# Patient Record
Sex: FEMALE | Race: WHITE | NOT HISPANIC OR LATINO | Employment: UNEMPLOYED | URBAN - NONMETROPOLITAN AREA
[De-identification: names, ages, dates, MRNs, and addresses within clinical notes are randomized per-mention and may not be internally consistent; named-entity substitution may affect disease eponyms.]

---

## 2017-09-29 ENCOUNTER — APPOINTMENT (OUTPATIENT)
Dept: GENERAL RADIOLOGY | Facility: HOSPITAL | Age: 32
End: 2017-09-29

## 2017-09-29 ENCOUNTER — HOSPITAL ENCOUNTER (EMERGENCY)
Facility: HOSPITAL | Age: 32
Discharge: HOME | End: 2017-09-29
Attending: EMERGENCY MEDICINE | Admitting: EMERGENCY MEDICINE

## 2017-09-29 ENCOUNTER — APPOINTMENT (OUTPATIENT)
Dept: CT IMAGING | Facility: HOSPITAL | Age: 32
End: 2017-09-29

## 2017-09-29 VITALS
SYSTOLIC BLOOD PRESSURE: 115 MMHG | RESPIRATION RATE: 16 BRPM | HEART RATE: 78 BPM | WEIGHT: 120 LBS | TEMPERATURE: 97.4 F | DIASTOLIC BLOOD PRESSURE: 73 MMHG | BODY MASS INDEX: 18.19 KG/M2 | OXYGEN SATURATION: 99 % | HEIGHT: 68 IN

## 2017-09-29 DIAGNOSIS — K92.2 GASTROINTESTINAL HEMORRHAGE, UNSPECIFIED GASTROINTESTINAL HEMORRHAGE TYPE: ICD-10-CM

## 2017-09-29 DIAGNOSIS — K52.9 GASTROENTERITIS: Primary | ICD-10-CM

## 2017-09-29 DIAGNOSIS — N30.90 CYSTITIS: ICD-10-CM

## 2017-09-29 LAB
ABO GROUP BLD: NORMAL
ALBUMIN SERPL-MCNC: 3.6 G/DL (ref 3.4–4.8)
ALBUMIN/GLOB SERPL: 1.6 G/DL (ref 1.1–1.8)
ALP SERPL-CCNC: 46 U/L (ref 38–126)
ALT SERPL W P-5'-P-CCNC: 31 U/L (ref 9–52)
ANION GAP SERPL CALCULATED.3IONS-SCNC: 9 MMOL/L (ref 5–15)
APTT PPP: 30.8 SECONDS (ref 20–40.3)
AST SERPL-CCNC: 24 U/L (ref 14–36)
BACTERIA UR QL AUTO: ABNORMAL /HPF
BASOPHILS # BLD AUTO: 0.03 10*3/MM3 (ref 0–0.2)
BASOPHILS NFR BLD AUTO: 0.7 % (ref 0–2)
BILIRUB SERPL-MCNC: 0.3 MG/DL (ref 0.2–1.3)
BILIRUB UR QL STRIP: NEGATIVE
BLD GP AB SCN SERPL QL: NEGATIVE
BUN BLD-MCNC: 12 MG/DL (ref 7–21)
BUN/CREAT SERPL: 21.4 (ref 7–25)
CALCIUM SPEC-SCNC: 8.1 MG/DL (ref 8.4–10.2)
CHLORIDE SERPL-SCNC: 108 MMOL/L (ref 95–110)
CLARITY UR: ABNORMAL
CO2 SERPL-SCNC: 26 MMOL/L (ref 22–31)
COLOR UR: YELLOW
CREAT BLD-MCNC: 0.56 MG/DL (ref 0.5–1)
DEPRECATED RDW RBC AUTO: 49.5 FL (ref 36.4–46.3)
EOSINOPHIL # BLD AUTO: 0.02 10*3/MM3 (ref 0–0.7)
EOSINOPHIL NFR BLD AUTO: 0.4 % (ref 0–7)
ERYTHROCYTE [DISTWIDTH] IN BLOOD BY AUTOMATED COUNT: 13.5 % (ref 11.5–14.5)
GFR SERPL CREATININE-BSD FRML MDRD: 126 ML/MIN/1.73 (ref 64–149)
GLOBULIN UR ELPH-MCNC: 2.3 GM/DL (ref 2.3–3.5)
GLUCOSE BLD-MCNC: 89 MG/DL (ref 60–100)
GLUCOSE BLDC GLUCOMTR-MCNC: 89 MG/DL (ref 70–130)
GLUCOSE UR STRIP-MCNC: NEGATIVE MG/DL
HCG SERPL QL: NEGATIVE
HCT VFR BLD AUTO: 34.3 % (ref 35–45)
HGB BLD-MCNC: 11.8 G/DL (ref 12–15.5)
HGB UR QL STRIP.AUTO: ABNORMAL
HOLD SPECIMEN: NORMAL
HOLD SPECIMEN: NORMAL
HYALINE CASTS UR QL AUTO: ABNORMAL /LPF
IMM GRANULOCYTES # BLD: 0.02 10*3/MM3 (ref 0–0.02)
IMM GRANULOCYTES NFR BLD: 0.4 % (ref 0–0.5)
INR PPP: 1.08 (ref 0.8–1.2)
KETONES UR QL STRIP: NEGATIVE
LEUKOCYTE ESTERASE UR QL STRIP.AUTO: ABNORMAL
LIPASE SERPL-CCNC: 128 U/L (ref 23–300)
LYMPHOCYTES # BLD AUTO: 1.79 10*3/MM3 (ref 0.6–4.2)
LYMPHOCYTES NFR BLD AUTO: 38.9 % (ref 10–50)
Lab: NORMAL
MAGNESIUM SERPL-MCNC: 1.7 MG/DL (ref 1.6–2.3)
MCH RBC QN AUTO: 35.1 PG (ref 26.5–34)
MCHC RBC AUTO-ENTMCNC: 34.4 G/DL (ref 31.4–36)
MCV RBC AUTO: 102.1 FL (ref 80–98)
MONOCYTES # BLD AUTO: 0.32 10*3/MM3 (ref 0–0.9)
MONOCYTES NFR BLD AUTO: 7 % (ref 0–12)
NEUTROPHILS # BLD AUTO: 2.42 10*3/MM3 (ref 2–8.6)
NEUTROPHILS NFR BLD AUTO: 52.6 % (ref 37–80)
NITRITE UR QL STRIP: NEGATIVE
PH UR STRIP.AUTO: 7 [PH] (ref 5–9)
PLATELET # BLD AUTO: 220 10*3/MM3 (ref 150–450)
PMV BLD AUTO: 9 FL (ref 8–12)
POTASSIUM BLD-SCNC: 4 MMOL/L (ref 3.5–5.1)
PROT SERPL-MCNC: 5.9 G/DL (ref 6.3–8.6)
PROT UR QL STRIP: NEGATIVE
PROTHROMBIN TIME: 13.9 SECONDS (ref 11.1–15.3)
RBC # BLD AUTO: 3.36 10*6/MM3 (ref 3.77–5.16)
RBC # UR: ABNORMAL /HPF
REF LAB TEST METHOD: ABNORMAL
RH BLD: POSITIVE
SODIUM BLD-SCNC: 143 MMOL/L (ref 137–145)
SP GR UR STRIP: 1.01 (ref 1–1.03)
SQUAMOUS #/AREA URNS HPF: ABNORMAL /HPF
TROPONIN I SERPL-MCNC: <0.012 NG/ML
UROBILINOGEN UR QL STRIP: ABNORMAL
WBC NRBC COR # BLD: 4.6 10*3/MM3 (ref 3.2–9.8)
WBC UR QL AUTO: ABNORMAL /HPF
WHOLE BLOOD HOLD SPECIMEN: NORMAL
WHOLE BLOOD HOLD SPECIMEN: NORMAL

## 2017-09-29 PROCEDURE — 86900 BLOOD TYPING SEROLOGIC ABO: CPT | Performed by: EMERGENCY MEDICINE

## 2017-09-29 PROCEDURE — 74177 CT ABD & PELVIS W/CONTRAST: CPT

## 2017-09-29 PROCEDURE — 85610 PROTHROMBIN TIME: CPT | Performed by: EMERGENCY MEDICINE

## 2017-09-29 PROCEDURE — 86850 RBC ANTIBODY SCREEN: CPT | Performed by: EMERGENCY MEDICINE

## 2017-09-29 PROCEDURE — 99284 EMERGENCY DEPT VISIT MOD MDM: CPT

## 2017-09-29 PROCEDURE — 0 IOPAMIDOL 61 % SOLUTION: Performed by: EMERGENCY MEDICINE

## 2017-09-29 PROCEDURE — 96375 TX/PRO/DX INJ NEW DRUG ADDON: CPT

## 2017-09-29 PROCEDURE — 81001 URINALYSIS AUTO W/SCOPE: CPT | Performed by: EMERGENCY MEDICINE

## 2017-09-29 PROCEDURE — 71010 HC CHEST PA OR AP: CPT

## 2017-09-29 PROCEDURE — 80053 COMPREHEN METABOLIC PANEL: CPT | Performed by: EMERGENCY MEDICINE

## 2017-09-29 PROCEDURE — 93010 ELECTROCARDIOGRAM REPORT: CPT | Performed by: INTERNAL MEDICINE

## 2017-09-29 PROCEDURE — 85730 THROMBOPLASTIN TIME PARTIAL: CPT | Performed by: EMERGENCY MEDICINE

## 2017-09-29 PROCEDURE — 25010000002 MORPHINE PER 10 MG: Performed by: EMERGENCY MEDICINE

## 2017-09-29 PROCEDURE — 96374 THER/PROPH/DIAG INJ IV PUSH: CPT

## 2017-09-29 PROCEDURE — 87086 URINE CULTURE/COLONY COUNT: CPT | Performed by: EMERGENCY MEDICINE

## 2017-09-29 PROCEDURE — 82962 GLUCOSE BLOOD TEST: CPT

## 2017-09-29 PROCEDURE — 84703 CHORIONIC GONADOTROPIN ASSAY: CPT | Performed by: EMERGENCY MEDICINE

## 2017-09-29 PROCEDURE — 96361 HYDRATE IV INFUSION ADD-ON: CPT

## 2017-09-29 PROCEDURE — 93005 ELECTROCARDIOGRAM TRACING: CPT

## 2017-09-29 PROCEDURE — 85025 COMPLETE CBC W/AUTO DIFF WBC: CPT | Performed by: EMERGENCY MEDICINE

## 2017-09-29 PROCEDURE — 83690 ASSAY OF LIPASE: CPT | Performed by: EMERGENCY MEDICINE

## 2017-09-29 PROCEDURE — 84484 ASSAY OF TROPONIN QUANT: CPT | Performed by: EMERGENCY MEDICINE

## 2017-09-29 PROCEDURE — 83735 ASSAY OF MAGNESIUM: CPT | Performed by: EMERGENCY MEDICINE

## 2017-09-29 PROCEDURE — 86901 BLOOD TYPING SEROLOGIC RH(D): CPT | Performed by: EMERGENCY MEDICINE

## 2017-09-29 PROCEDURE — 25010000002 ONDANSETRON PER 1 MG: Performed by: EMERGENCY MEDICINE

## 2017-09-29 RX ORDER — ONDANSETRON 2 MG/ML
4 INJECTION INTRAMUSCULAR; INTRAVENOUS ONCE
Status: COMPLETED | OUTPATIENT
Start: 2017-09-29 | End: 2017-09-29

## 2017-09-29 RX ORDER — PANTOPRAZOLE SODIUM 40 MG/10ML
80 INJECTION, POWDER, LYOPHILIZED, FOR SOLUTION INTRAVENOUS ONCE
Status: COMPLETED | OUTPATIENT
Start: 2017-09-29 | End: 2017-09-29

## 2017-09-29 RX ORDER — CEPHALEXIN 500 MG/1
500 CAPSULE ORAL 2 TIMES DAILY
Qty: 9 CAPSULE | Refills: 0 | Status: SHIPPED | OUTPATIENT
Start: 2017-09-29 | End: 2017-09-29

## 2017-09-29 RX ORDER — CEPHALEXIN 500 MG/1
500 CAPSULE ORAL 2 TIMES DAILY
Qty: 10 CAPSULE | Refills: 0 | Status: SHIPPED | OUTPATIENT
Start: 2017-09-29 | End: 2018-06-19

## 2017-09-29 RX ORDER — PANTOPRAZOLE SODIUM 40 MG/1
40 TABLET, DELAYED RELEASE ORAL DAILY
Qty: 30 TABLET | Refills: 0 | Status: SHIPPED | OUTPATIENT
Start: 2017-09-29 | End: 2018-06-19

## 2017-09-29 RX ORDER — ONDANSETRON 4 MG/1
4 TABLET, FILM COATED ORAL EVERY 6 HOURS
Qty: 9 TABLET | Refills: 0 | Status: SHIPPED | OUTPATIENT
Start: 2017-09-29 | End: 2018-06-19

## 2017-09-29 RX ORDER — ONDANSETRON 4 MG/1
4 TABLET, FILM COATED ORAL EVERY 6 HOURS
Qty: 10 TABLET | Refills: 0 | Status: SHIPPED | OUTPATIENT
Start: 2017-09-29 | End: 2017-09-29

## 2017-09-29 RX ORDER — PANTOPRAZOLE SODIUM 40 MG/1
40 TABLET, DELAYED RELEASE ORAL DAILY
Qty: 30 TABLET | Refills: 0 | Status: SHIPPED | OUTPATIENT
Start: 2017-09-29 | End: 2017-09-29

## 2017-09-29 RX ORDER — CEPHALEXIN 500 MG/1
500 CAPSULE ORAL ONCE
Status: COMPLETED | OUTPATIENT
Start: 2017-09-29 | End: 2017-09-29

## 2017-09-29 RX ORDER — MORPHINE SULFATE 4 MG/ML
4 INJECTION, SOLUTION INTRAMUSCULAR; INTRAVENOUS ONCE
Status: COMPLETED | OUTPATIENT
Start: 2017-09-29 | End: 2017-09-29

## 2017-09-29 RX ORDER — SODIUM CHLORIDE 0.9 % (FLUSH) 0.9 %
10 SYRINGE (ML) INJECTION AS NEEDED
Status: DISCONTINUED | OUTPATIENT
Start: 2017-09-29 | End: 2017-09-29 | Stop reason: HOSPADM

## 2017-09-29 RX ADMIN — ONDANSETRON 4 MG: 2 INJECTION INTRAMUSCULAR; INTRAVENOUS at 16:58

## 2017-09-29 RX ADMIN — MORPHINE SULFATE 4 MG: 4 INJECTION, SOLUTION INTRAMUSCULAR; INTRAVENOUS at 19:13

## 2017-09-29 RX ADMIN — SODIUM CHLORIDE 1000 ML: 9 INJECTION, SOLUTION INTRAVENOUS at 15:19

## 2017-09-29 RX ADMIN — CEPHALEXIN 500 MG: 500 CAPSULE ORAL at 18:41

## 2017-09-29 RX ADMIN — PANTOPRAZOLE SODIUM 80 MG: 40 INJECTION, POWDER, FOR SOLUTION INTRAVENOUS at 16:53

## 2017-09-29 RX ADMIN — IOPAMIDOL 75 ML: 612 INJECTION, SOLUTION INTRAVENOUS at 17:17

## 2017-09-29 RX ADMIN — SODIUM CHLORIDE 1000 ML: 9 INJECTION, SOLUTION INTRAVENOUS at 16:53

## 2017-10-01 LAB — BACTERIA SPEC AEROBE CULT: NORMAL

## 2017-10-02 ENCOUNTER — TELEPHONE (OUTPATIENT)
Dept: FAMILY MEDICINE CLINIC | Facility: CLINIC | Age: 32
End: 2017-10-02

## 2017-10-04 ENCOUNTER — APPOINTMENT (OUTPATIENT)
Dept: CT IMAGING | Facility: HOSPITAL | Age: 32
End: 2017-10-04

## 2017-10-04 ENCOUNTER — HOSPITAL ENCOUNTER (EMERGENCY)
Facility: HOSPITAL | Age: 32
Discharge: ANOTHER HOSPITAL | End: 2017-10-04
Attending: FAMILY MEDICINE | Admitting: FAMILY MEDICINE

## 2017-10-04 ENCOUNTER — APPOINTMENT (OUTPATIENT)
Dept: GENERAL RADIOLOGY | Facility: HOSPITAL | Age: 32
End: 2017-10-04

## 2017-10-04 VITALS
OXYGEN SATURATION: 100 % | HEART RATE: 80 BPM | BODY MASS INDEX: 18.19 KG/M2 | RESPIRATION RATE: 20 BRPM | TEMPERATURE: 97.5 F | HEIGHT: 68 IN | DIASTOLIC BLOOD PRESSURE: 72 MMHG | SYSTOLIC BLOOD PRESSURE: 114 MMHG | WEIGHT: 120 LBS

## 2017-10-04 DIAGNOSIS — R56.9 NEW ONSET SEIZURE (HCC): Primary | ICD-10-CM

## 2017-10-04 DIAGNOSIS — F10.921 ALCOHOL INTOXICATION WITH DELIRIUM (HCC): ICD-10-CM

## 2017-10-04 LAB
ALBUMIN SERPL-MCNC: 4.7 G/DL (ref 3.4–4.8)
ALBUMIN/GLOB SERPL: 1.5 G/DL (ref 1.1–1.8)
ALP SERPL-CCNC: 69 U/L (ref 38–126)
ALT SERPL W P-5'-P-CCNC: 21 U/L (ref 9–52)
AMPHET+METHAMPHET UR QL: NEGATIVE
ANION GAP SERPL CALCULATED.3IONS-SCNC: 17 MMOL/L (ref 5–15)
ARTERIAL PATENCY WRIST A: ABNORMAL
AST SERPL-CCNC: 28 U/L (ref 14–36)
ATMOSPHERIC PRESS: ABNORMAL MMHG
B-HCG UR QL: NEGATIVE
BACTERIA UR QL AUTO: ABNORMAL /HPF
BARBITURATES UR QL SCN: NEGATIVE
BASE EXCESS BLDA CALC-SCNC: -3.9 MMOL/L (ref -2.4–2.4)
BASOPHILS # BLD AUTO: 0.02 10*3/MM3 (ref 0–0.2)
BASOPHILS NFR BLD AUTO: 0.3 % (ref 0–2)
BDY SITE: ABNORMAL
BENZODIAZ UR QL SCN: NEGATIVE
BILIRUB SERPL-MCNC: 0.4 MG/DL (ref 0.2–1.3)
BILIRUB UR QL STRIP: NEGATIVE
BUN BLD-MCNC: 10 MG/DL (ref 7–21)
BUN/CREAT SERPL: 16.1 (ref 7–25)
CA-I BLD-MCNC: 4.3 MG/DL (ref 4.5–4.9)
CALCIUM SPEC-SCNC: 9.6 MG/DL (ref 8.4–10.2)
CANNABINOIDS SERPL QL: NEGATIVE
CHLORIDE SERPL-SCNC: 106 MMOL/L (ref 95–110)
CLARITY UR: CLEAR
CO2 BLDA-SCNC: 20 MMOL/L (ref 23–27)
CO2 SERPL-SCNC: 23 MMOL/L (ref 22–31)
COCAINE UR QL: NEGATIVE
COLOR UR: YELLOW
CREAT BLD-MCNC: 0.62 MG/DL (ref 0.5–1)
D-LACTATE SERPL-SCNC: 4.1 MMOL/L (ref 0.5–2)
DEPRECATED RDW RBC AUTO: 50.1 FL (ref 36.4–46.3)
EOSINOPHIL # BLD AUTO: 0.01 10*3/MM3 (ref 0–0.7)
EOSINOPHIL NFR BLD AUTO: 0.2 % (ref 0–7)
ERYTHROCYTE [DISTWIDTH] IN BLOOD BY AUTOMATED COUNT: 13.8 % (ref 11.5–14.5)
ETHANOL BLD-MCNC: 162 MG/DL (ref 0–10)
ETHANOL UR QL: 0.16 %
GFR SERPL CREATININE-BSD FRML MDRD: 112 ML/MIN/1.73 (ref 60–149)
GLOBULIN UR ELPH-MCNC: 3.2 GM/DL (ref 2.3–3.5)
GLUCOSE BLD-MCNC: 106 MG/DL (ref 60–100)
GLUCOSE BLDA-MCNC: 81 MMOL/L
GLUCOSE BLDC GLUCOMTR-MCNC: 104 MG/DL (ref 70–130)
GLUCOSE UR STRIP-MCNC: NEGATIVE MG/DL
HCO3 BLDA-SCNC: 19.1 MMOL/L (ref 22–26)
HCT VFR BLD AUTO: 38.4 % (ref 35–45)
HCT VFR BLD CALC: 36 % (ref 38–47)
HGB BLD-MCNC: 13.4 G/DL (ref 12–15.5)
HGB BLDA-MCNC: 12.2 G/DL (ref 12–16)
HGB UR QL STRIP.AUTO: NEGATIVE
HOLD SPECIMEN: NORMAL
HYALINE CASTS UR QL AUTO: ABNORMAL /LPF
IMM GRANULOCYTES # BLD: 0.03 10*3/MM3 (ref 0–0.02)
IMM GRANULOCYTES NFR BLD: 0.5 % (ref 0–0.5)
KETONES UR QL STRIP: NEGATIVE
LEUKOCYTE ESTERASE UR QL STRIP.AUTO: ABNORMAL
LIPASE SERPL-CCNC: 212 U/L (ref 23–300)
LYMPHOCYTES # BLD AUTO: 1.73 10*3/MM3 (ref 0.6–4.2)
LYMPHOCYTES NFR BLD AUTO: 29.8 % (ref 10–50)
MAGNESIUM SERPL-MCNC: 2 MG/DL (ref 1.6–2.3)
MCH RBC QN AUTO: 35.2 PG (ref 26.5–34)
MCHC RBC AUTO-ENTMCNC: 34.9 G/DL (ref 31.4–36)
MCV RBC AUTO: 100.8 FL (ref 80–98)
METHADONE UR QL SCN: NEGATIVE
MODALITY: ABNORMAL
MONOCYTES # BLD AUTO: 0.29 10*3/MM3 (ref 0–0.9)
MONOCYTES NFR BLD AUTO: 5 % (ref 0–12)
NEUTROPHILS # BLD AUTO: 3.72 10*3/MM3 (ref 2–8.6)
NEUTROPHILS NFR BLD AUTO: 64.2 % (ref 37–80)
NITRITE UR QL STRIP: NEGATIVE
OPIATES UR QL: POSITIVE
OXYCODONE UR QL SCN: NEGATIVE
PCO2 BLDA: 28.7 MM HG (ref 35–45)
PH BLDA: 7.44 PH UNITS (ref 7.35–7.45)
PH UR STRIP.AUTO: 8 [PH] (ref 5–9)
PLATELET # BLD AUTO: 268 10*3/MM3 (ref 150–450)
PMV BLD AUTO: 9.3 FL (ref 8–12)
PO2 BLDA: 33.7 MM HG (ref 80–105)
POTASSIUM BLD-SCNC: 4.4 MMOL/L (ref 3.5–5.1)
POTASSIUM BLDA-SCNC: 3.27 MMOL/L (ref 3.6–4.9)
PROT SERPL-MCNC: 7.9 G/DL (ref 6.3–8.6)
PROT UR QL STRIP: NEGATIVE
RBC # BLD AUTO: 3.81 10*6/MM3 (ref 3.77–5.16)
RBC # UR: ABNORMAL /HPF
REF LAB TEST METHOD: ABNORMAL
SAO2 % BLDCOA: 64 % (ref 94–100)
SODIUM BLD-SCNC: 146 MMOL/L (ref 137–145)
SODIUM BLDA-SCNC: 145.1 MMOL/L (ref 138–146)
SP GR UR STRIP: 1.01 (ref 1–1.03)
SQUAMOUS #/AREA URNS HPF: ABNORMAL /HPF
T4 SERPL-MCNC: 9.3 MCG/DL (ref 5.5–11)
TSH SERPL DL<=0.05 MIU/L-ACNC: 1.25 MIU/ML (ref 0.46–4.68)
UROBILINOGEN UR QL STRIP: ABNORMAL
WBC NRBC COR # BLD: 5.8 10*3/MM3 (ref 3.2–9.8)
WBC UR QL AUTO: ABNORMAL /HPF
WHOLE BLOOD HOLD SPECIMEN: NORMAL
WHOLE BLOOD HOLD SPECIMEN: NORMAL

## 2017-10-04 PROCEDURE — 80307 DRUG TEST PRSMV CHEM ANLYZR: CPT | Performed by: FAMILY MEDICINE

## 2017-10-04 PROCEDURE — 25010000002 MAGNESIUM SULFATE PER 500 MG OF MAGNESIUM: Performed by: FAMILY MEDICINE

## 2017-10-04 PROCEDURE — 70450 CT HEAD/BRAIN W/O DYE: CPT

## 2017-10-04 PROCEDURE — 85025 COMPLETE CBC W/AUTO DIFF WBC: CPT | Performed by: FAMILY MEDICINE

## 2017-10-04 PROCEDURE — 96361 HYDRATE IV INFUSION ADD-ON: CPT

## 2017-10-04 PROCEDURE — 96367 TX/PROPH/DG ADDL SEQ IV INF: CPT

## 2017-10-04 PROCEDURE — 25010000002 THIAMINE PER 100 MG: Performed by: FAMILY MEDICINE

## 2017-10-04 PROCEDURE — 25010000002 LORAZEPAM PER 2 MG: Performed by: FAMILY MEDICINE

## 2017-10-04 PROCEDURE — 83605 ASSAY OF LACTIC ACID: CPT | Performed by: FAMILY MEDICINE

## 2017-10-04 PROCEDURE — 83690 ASSAY OF LIPASE: CPT | Performed by: FAMILY MEDICINE

## 2017-10-04 PROCEDURE — 84443 ASSAY THYROID STIM HORMONE: CPT | Performed by: FAMILY MEDICINE

## 2017-10-04 PROCEDURE — 87040 BLOOD CULTURE FOR BACTERIA: CPT | Performed by: FAMILY MEDICINE

## 2017-10-04 PROCEDURE — 71010 HC CHEST PA OR AP: CPT

## 2017-10-04 PROCEDURE — 25010000002 LEVETRIRACETAM PER 10 MG: Performed by: FAMILY MEDICINE

## 2017-10-04 PROCEDURE — 87086 URINE CULTURE/COLONY COUNT: CPT | Performed by: FAMILY MEDICINE

## 2017-10-04 PROCEDURE — 83735 ASSAY OF MAGNESIUM: CPT | Performed by: FAMILY MEDICINE

## 2017-10-04 PROCEDURE — 81001 URINALYSIS AUTO W/SCOPE: CPT | Performed by: FAMILY MEDICINE

## 2017-10-04 PROCEDURE — 80053 COMPREHEN METABOLIC PANEL: CPT | Performed by: FAMILY MEDICINE

## 2017-10-04 PROCEDURE — 81025 URINE PREGNANCY TEST: CPT | Performed by: FAMILY MEDICINE

## 2017-10-04 PROCEDURE — 96365 THER/PROPH/DIAG IV INF INIT: CPT

## 2017-10-04 PROCEDURE — 82962 GLUCOSE BLOOD TEST: CPT

## 2017-10-04 PROCEDURE — 82803 BLOOD GASES ANY COMBINATION: CPT | Performed by: FAMILY MEDICINE

## 2017-10-04 PROCEDURE — 99285 EMERGENCY DEPT VISIT HI MDM: CPT

## 2017-10-04 PROCEDURE — 96375 TX/PRO/DX INJ NEW DRUG ADDON: CPT

## 2017-10-04 PROCEDURE — 84436 ASSAY OF TOTAL THYROXINE: CPT | Performed by: FAMILY MEDICINE

## 2017-10-04 RX ORDER — LORAZEPAM 2 MG/ML
1 INJECTION INTRAMUSCULAR ONCE
Status: COMPLETED | OUTPATIENT
Start: 2017-10-04 | End: 2017-10-04

## 2017-10-04 RX ORDER — SODIUM CHLORIDE 9 MG/ML
1000 INJECTION, SOLUTION INTRAVENOUS ONCE
Status: COMPLETED | OUTPATIENT
Start: 2017-10-04 | End: 2017-10-04

## 2017-10-04 RX ADMIN — SODIUM CHLORIDE 1000 ML: 9 INJECTION, SOLUTION INTRAVENOUS at 08:32

## 2017-10-04 RX ADMIN — MAGNESIUM SULFATE HEPTAHYDRATE 1000 ML/HR: 500 INJECTION, SOLUTION INTRAMUSCULAR; INTRAVENOUS at 09:58

## 2017-10-04 RX ADMIN — LORAZEPAM 1 MG: 2 INJECTION INTRAMUSCULAR; INTRAVENOUS at 09:45

## 2017-10-04 RX ADMIN — LEVETIRACETAM 1500 MG: 100 INJECTION, SOLUTION INTRAVENOUS at 11:12

## 2017-10-04 NOTE — ED NOTES
"Pt arrived to ER with a GCS of 6.  Pt's fiance states they were in the ER this past Saturday with c/o stomach pain.  Pt's fiance states he found her this morning unresponsive and having seizure like activity.  Pt's fiance stated that pt takes a \"few\" shots of liquor every day but that she did not drink any last night as far as he knew.        Mercedez Shay RN  10/04/17 4910    "

## 2017-10-04 NOTE — ED PROVIDER NOTES
"Subjective   HPI Comments: 32 year old brought in to ed after partner found her unresponsive with vomiting all over and foam around her mouth .       Patient is a 32 y.o. female presenting with altered mental status.   History provided by:  Friend  Altered Mental Status   Presenting symptoms: unresponsiveness    Severity:  Moderate  Most recent episode:  Today  Episode history:  Single  Context: alcohol use    Associated symptoms: nausea and vomiting    Vomiting:     Quality:  Stomach contents    Severity:  Mild    Timing:  Intermittent    Progression:  Unchanged      Review of Systems   Unable to perform ROS: Mental status change   Gastrointestinal: Positive for nausea and vomiting.       Past Medical History:   Diagnosis Date   • GERD (gastroesophageal reflux disease)        No Known Allergies    History reviewed. No pertinent surgical history.    History reviewed. No pertinent family history.    Social History     Social History   • Marital status:      Spouse name: N/A   • Number of children: N/A   • Years of education: N/A     Social History Main Topics   • Smoking status: Current Every Day Smoker     Packs/day: 0.50     Types: Cigarettes   • Smokeless tobacco: Never Used   • Alcohol use 8.4 oz/week     14 Shots of liquor per week   • Drug use: No   • Sexual activity: Not Asked     Other Topics Concern   • None     Social History Narrative   • None           Objective    /72  Pulse 80  Temp 97.5 °F (36.4 °C) (Oral)   Resp 20  Ht 68\" (172.7 cm)  Wt 120 lb (54.4 kg)  LMP 07/03/2017 (LMP Unknown) Comment: reports menstural cycle wsa 2 months ago and could be pregnant  SpO2 100%  BMI 18.25 kg/m2    Physical Exam   Constitutional: She appears well-developed and well-nourished.   HENT:   Head: Normocephalic and atraumatic.   Right Ear: External ear normal.   Left Ear: External ear normal.   Nose: Nose normal.   Mouth/Throat: Oropharynx is clear and moist.   Eyes: Conjunctivae and EOM are normal. " Pupils are equal, round, and reactive to light.   Neck: Normal range of motion. Neck supple.   Pulmonary/Chest: Effort normal and breath sounds normal.   Abdominal: Soft. Bowel sounds are normal.   Musculoskeletal: Normal range of motion.   Neurological: She has normal reflexes. She is unresponsive. GCS eye subscore is 2. GCS verbal subscore is 4. GCS motor subscore is 4.   Limited neuro exam due to decreased mental status.      Skin: Skin is warm.   Psychiatric: She has a normal mood and affect. Judgment normal.   Nursing note and vitals reviewed.      Procedures         ED Course  ED Course        Labs Reviewed   LACTIC ACID, PLASMA - Abnormal; Notable for the following:        Result Value    Lactate 4.1 (*)     All other components within normal limits   COMPREHENSIVE METABOLIC PANEL - Abnormal; Notable for the following:     Glucose 106 (*)     Sodium 146 (*)     Anion Gap 17.0 (*)     All other components within normal limits   URINALYSIS W/ CULTURE IF INDICATED - Abnormal; Notable for the following:     Leuk Esterase, UA Trace (*)     All other components within normal limits   URINE DRUG SCREEN - Abnormal; Notable for the following:     Opiate Screen Positive (*)     All other components within normal limits    Narrative:     Negative Thresholds For Drugs Screened in Urine:     Amphetamines          500 ng/ml  Barbiturates          200 ng/ml  Benzodiazepines       200 ng/ml  Cocaine               150 ng/ml  Methadone             300 ng/mL  Opiates               300 ng/mL  Oxycodone             100 ng/mL  THC                   20 ng/mL    The normal value for all drugs tested is negative. This report includes final unconfirmed screening results.  A positive result by this assay can be, at your request, sent to the Reference Lab for confirmation by gas chromatography. Unconfirmed results must not be used for non-medical purposes, such as employment or legal testing. Clinical consideration should be applied to  any drug of abuse test result, particularly when unconfirmed results are used.   ETHANOL - Abnormal; Notable for the following:     Ethanol 162 (*)     All other components within normal limits   CBC WITH AUTO DIFFERENTIAL - Abnormal; Notable for the following:     .8 (*)     MCH 35.2 (*)     RDW-SD 50.1 (*)     Immature Grans, Absolute 0.03 (*)     All other components within normal limits   URINALYSIS, MICROSCOPIC ONLY - Abnormal; Notable for the following:     RBC, UA 0-2 (*)     WBC, UA 13-20 (*)     All other components within normal limits   BLOOD GAS, ARTERIAL - Abnormal; Notable for the following:     pCO2, Arterial 28.7 (*)     pO2, Arterial 33.7 (*)     HCO3, Arterial 19.1 (*)     Base Excess, Arterial -3.9 (*)     O2 Saturation, Arterial 64.0 (*)     CO2 Content 20.0 (*)     Potassium, Arterial 3.27 (*)     Ionized Calcium 4.30 (*)     All other components within normal limits   BLOOD CULTURE - Normal   BLOOD CULTURE - Normal   URINE CULTURE - Normal   LIPASE - Normal   PREGNANCY, URINE - Normal   MAGNESIUM - Normal   T4 - Normal    Narrative:     The concentration of Total T4 in samples from pregnant women is erroneously low (20%) when measured using the access Total T4 Assay.  Erroneously low results could mask hyperthyroidism.  Do not use the Access Total T4 assay as the only marker for evaluating pregnant patients for thyroid disorders.   TSH - Normal   POCT GLUCOSE FINGERSTICK - Normal   RAINBOW DRAW    Narrative:     The following orders were created for panel order [place] Draw.  Procedure                               Abnormality         Status                     ---------                               -----------         ------                     Light Blue Top[[id number]]                                   Final result               Green Top (Gel)[[id number]]                                  Final result               Lavender Top[[id number]]                                     Final  result               Gold Top - SST[[id number]]                                   Final result                 Please view results for these tests on the individual orders.   LACTIC ACID REFLEX TIMER   LIGHT BLUE TOP   GREEN TOP   LAVENDER TOP   [facility]   CBC AND DIFFERENTIAL    Narrative:     The following orders were created for panel order CBC & Differential.  Procedure                               Abnormality         Status                     ---------                               -----------         ------                     CBC Auto Differential[[id number]]        Abnormal            Final result                 Please view results for these tests on the individual orders.     Ct Head Without Contrast    Result Date: 10/4/2017  Narrative: Noncontrast CT examination of the brain. INDICATION: Alteration of mental status. Confusion, Technique: Axial 5 mm contiguous images with brain parenchymal and bone windows This exam was performed according to our departmental dose-optimization program, which includes automated exposure control, adjustment of the mA and/or kV according to patient size and/or use of iterative reconstruction technique. Prior relevant examination: CT brain December 23, 2009, unavailable on PACS. Examination profoundly degraded by patient movement despite multiple attempts. No evidence of any acute hemorrhage mass effect or shift. There is loss of sulcation in both cerebral hemispheres. This can be secondary to cerebral edema. A repeat examination perhaps after appropriate sedation may be useful.     Impression: CONCLUSION: Very limited examination due to patient motion. No evidence of mass effect or hemorrhage. Questionable loss of sulcation in both cerebral hemispheres,  which may be seen in patient with cerebral edema. A follow-up CT examination possibly with sedation may be useful. Electronically signed by:  Jorge Mullen MD  10/4/2017 9:40 AM CDT Workstation: [id number]    Ct Abdomen  Pelvis With Contrast    Result Date: 9/29/2017  Narrative: [facility] [place] Patient Name: SHAWNEE SPRINGER ORDERING: LETTY HELLER ATTENDING: LETTY HELLER REFERRING: LETTY HELLER ----------------------- PROCEDURE: CT abdomen and pelvis with intravenous contrast HISTORY: Periumbilical pain, upper and lower GI bleed Dose length product: 303 This exam was performed using radiation doses that are as low as reasonably achievable (ALARA). This exam was performed according to our departmental dose optimization program, which includes automated exposure control, adjustment of the mA and/or KV according to patient size and/or use of iterative reconstruction technique. COMPARISON: No comparison CONTRAST: Oral and 75 cc intravenous Isovue 300 TECHNIQUE: Multiple contiguous contrast enhanced axial images are obtained of the abdomen and pelvis. FINDINGS: LOWER CHEST: Unremarkable. HEPATOBILIARY: Unremarkable. SPLEEN: Unremarkable. PANCREAS: Unremarkable ADRENAL GLANDS: Unremarkable. KIDNEYS/URETERS: No evidence of hydronephrosis or suspicious mass. GASTROINTESTINAL: Unremarkable REPRODUCTIVE ORGANS: There is a cystic structure in the right adnexal region measuring 2.3 cm, likely a cyst. The uterus is retroverted. URINARY BLADDER: There is focal thickening of the anterior wall of the urinary bladder. There is perivesicular inflammation which could be due to cystitis. VASCULAR: Unremarkable LYMPH NODES: No pathologically enlarged nodes by size criteria. PERITONEUM/RETROPERITONEUM: Unremarkable. OSSEOUS STRUCTURES: Unremarkable.     Impression: CONCLUSION: Focal thickening of the anterior wall of the urinary bladder, nonspecific but bladder neoplasm should be considered. Perivesicular inflammation is also present which could be due to cystitis. Recommend direct visualization and urinalysis. Electronically signed by:  Ranjit Marie MD  9/29/2017 5:47 PM CDT Workstation: [id number]    Xr Chest 1 View    Result Date:  10/4/2017  Narrative: [facility] Patient Name: MISS SHAWNEE SPRINGER ORDERING: KANWAL GRANT ATTENDING: KANWAL GRANT REFERRING: KANWAL GRANT ----------------------- PROCEDURE: Portable chest x-ray TECHNIQUE: Single AP view of the chest COMPARISON: 9/29/2017 HISTORY: altered mental status FINDINGS:  Life-support devices: None Lungs/pleura: No overt pulmonary vascular congestion, focal pulmonary parenchymal opacity, pleural effusion or pneumothorax. Heart, hilar and mediastinal structures:  Normal accounting for projection and technique     Impression: CONCLUSION: No acute pulmonary disease. Electronically signed by:  Ranjit Marie MD  10/4/2017 8:10 AM CDT Workstation: [id number]    Xr Chest 1 View    Result Date: 9/29/2017  Narrative: PROCEDURE: Single chest view AP REASON FOR EXAM:Weak/Dizzy/AMS triage protocol FINDINGS: Cardiac and pulmonary vasculature are normal. Lungs are clear. Pleural spaces are normal. No acute osseous abnormality.     Impression: Negative single view chest Electronically signed by:  Doug Calvo MD  9/29/2017 4:07 PM CDT Workstation: [id number]    Limited ct scan exam due to shaking movement while exam .   Pt had few  episiodes of generalised shaking with decreased mental status - stays post ictal while in ed.   Discussed findings with MARIOLA Flynn recommends transfer to neuro facility .  Discussed case with dr hill - accepts transfer             MDM  Number of Diagnoses or Management Options  Alcohol intoxication with delirium:   New onset seizure:       Final diagnoses:   New onset seizure   Alcohol intoxication with delirium            Kanwal Grant MD  10/07/17 0903

## 2017-10-04 NOTE — ED NOTES
Lab contacted nurse and said that the second blood culture will be drawn with lactic reflex.     Benji Tsai, ABI  10/04/17 5057

## 2017-10-05 LAB — BACTERIA SPEC AEROBE CULT: NORMAL

## 2017-10-07 ENCOUNTER — HOSPITAL ENCOUNTER (EMERGENCY)
Facility: HOSPITAL | Age: 32
Discharge: HOME | End: 2017-10-07
Attending: EMERGENCY MEDICINE | Admitting: EMERGENCY MEDICINE

## 2017-10-07 ENCOUNTER — APPOINTMENT (OUTPATIENT)
Dept: CT IMAGING | Facility: HOSPITAL | Age: 32
End: 2017-10-07

## 2017-10-07 VITALS
WEIGHT: 119.93 LBS | RESPIRATION RATE: 18 BRPM | SYSTOLIC BLOOD PRESSURE: 133 MMHG | HEART RATE: 92 BPM | TEMPERATURE: 98.2 F | OXYGEN SATURATION: 100 % | BODY MASS INDEX: 18.82 KG/M2 | HEIGHT: 67 IN | DIASTOLIC BLOOD PRESSURE: 99 MMHG

## 2017-10-07 DIAGNOSIS — J01.20 ACUTE NON-RECURRENT ETHMOIDAL SINUSITIS: Primary | ICD-10-CM

## 2017-10-07 LAB
ALBUMIN SERPL-MCNC: 4.3 G/DL (ref 3.4–4.8)
ALBUMIN/GLOB SERPL: 1.7 G/DL (ref 1.1–1.8)
ALP SERPL-CCNC: 48 U/L (ref 38–126)
ALT SERPL W P-5'-P-CCNC: 20 U/L (ref 9–52)
ANION GAP SERPL CALCULATED.3IONS-SCNC: 12 MMOL/L (ref 5–15)
APTT PPP: 27.8 SECONDS (ref 20–40.3)
AST SERPL-CCNC: 17 U/L (ref 14–36)
B-HCG UR QL: NEGATIVE
BACTERIA UR QL AUTO: ABNORMAL /HPF
BASOPHILS # BLD AUTO: 0.04 10*3/MM3 (ref 0–0.2)
BASOPHILS NFR BLD AUTO: 0.5 % (ref 0–2)
BILIRUB SERPL-MCNC: 0.3 MG/DL (ref 0.2–1.3)
BILIRUB UR QL STRIP: NEGATIVE
BUN BLD-MCNC: 10 MG/DL (ref 7–21)
BUN/CREAT SERPL: 17.2 (ref 7–25)
CALCIUM SPEC-SCNC: 9 MG/DL (ref 8.4–10.2)
CHLORIDE SERPL-SCNC: 106 MMOL/L (ref 95–110)
CLARITY UR: ABNORMAL
CO2 SERPL-SCNC: 24 MMOL/L (ref 22–31)
COLOR UR: YELLOW
CREAT BLD-MCNC: 0.58 MG/DL (ref 0.5–1)
DEPRECATED RDW RBC AUTO: 52.3 FL (ref 36.4–46.3)
EOSINOPHIL # BLD AUTO: 0.03 10*3/MM3 (ref 0–0.7)
EOSINOPHIL NFR BLD AUTO: 0.4 % (ref 0–7)
ERYTHROCYTE [DISTWIDTH] IN BLOOD BY AUTOMATED COUNT: 14.1 % (ref 11.5–14.5)
GFR SERPL CREATININE-BSD FRML MDRD: 121 ML/MIN/1.73 (ref 64–149)
GLOBULIN UR ELPH-MCNC: 2.6 GM/DL (ref 2.3–3.5)
GLUCOSE BLD-MCNC: 91 MG/DL (ref 60–100)
GLUCOSE UR STRIP-MCNC: NEGATIVE MG/DL
HCT VFR BLD AUTO: 34.6 % (ref 35–45)
HGB BLD-MCNC: 12.1 G/DL (ref 12–15.5)
HGB UR QL STRIP.AUTO: ABNORMAL
HOLD SPECIMEN: NORMAL
HYALINE CASTS UR QL AUTO: ABNORMAL /LPF
IMM GRANULOCYTES # BLD: 0.03 10*3/MM3 (ref 0–0.02)
IMM GRANULOCYTES NFR BLD: 0.4 % (ref 0–0.5)
INR PPP: 0.84 (ref 0.8–1.2)
KETONES UR QL STRIP: NEGATIVE
LEUKOCYTE ESTERASE UR QL STRIP.AUTO: ABNORMAL
LYMPHOCYTES # BLD AUTO: 3.3 10*3/MM3 (ref 0.6–4.2)
LYMPHOCYTES NFR BLD AUTO: 43.9 % (ref 10–50)
MCH RBC QN AUTO: 35.7 PG (ref 26.5–34)
MCHC RBC AUTO-ENTMCNC: 35 G/DL (ref 31.4–36)
MCV RBC AUTO: 102.1 FL (ref 80–98)
MONOCYTES # BLD AUTO: 0.51 10*3/MM3 (ref 0–0.9)
MONOCYTES NFR BLD AUTO: 6.8 % (ref 0–12)
NEUTROPHILS # BLD AUTO: 3.6 10*3/MM3 (ref 2–8.6)
NEUTROPHILS NFR BLD AUTO: 48 % (ref 37–80)
NITRITE UR QL STRIP: NEGATIVE
PH UR STRIP.AUTO: 6.5 [PH] (ref 5–9)
PLATELET # BLD AUTO: 228 10*3/MM3 (ref 150–450)
PMV BLD AUTO: 8.8 FL (ref 8–12)
POTASSIUM BLD-SCNC: 3.6 MMOL/L (ref 3.5–5.1)
PROT SERPL-MCNC: 6.9 G/DL (ref 6.3–8.6)
PROT UR QL STRIP: NEGATIVE
PROTHROMBIN TIME: 11.4 SECONDS (ref 11.1–15.3)
RBC # BLD AUTO: 3.39 10*6/MM3 (ref 3.77–5.16)
RBC # UR: ABNORMAL /HPF
REF LAB TEST METHOD: ABNORMAL
SODIUM BLD-SCNC: 142 MMOL/L (ref 137–145)
SP GR UR STRIP: 1.02 (ref 1–1.03)
SQUAMOUS #/AREA URNS HPF: ABNORMAL /HPF
UROBILINOGEN UR QL STRIP: ABNORMAL
WBC NRBC COR # BLD: 7.51 10*3/MM3 (ref 3.2–9.8)
WBC UR QL AUTO: ABNORMAL /HPF
YEAST URNS QL MICRO: ABNORMAL /HPF

## 2017-10-07 PROCEDURE — 81001 URINALYSIS AUTO W/SCOPE: CPT | Performed by: EMERGENCY MEDICINE

## 2017-10-07 PROCEDURE — 87086 URINE CULTURE/COLONY COUNT: CPT | Performed by: EMERGENCY MEDICINE

## 2017-10-07 PROCEDURE — 25010000002 METOCLOPRAMIDE PER 10 MG: Performed by: EMERGENCY MEDICINE

## 2017-10-07 PROCEDURE — 70450 CT HEAD/BRAIN W/O DYE: CPT

## 2017-10-07 PROCEDURE — 80053 COMPREHEN METABOLIC PANEL: CPT | Performed by: EMERGENCY MEDICINE

## 2017-10-07 PROCEDURE — 85025 COMPLETE CBC W/AUTO DIFF WBC: CPT | Performed by: EMERGENCY MEDICINE

## 2017-10-07 PROCEDURE — 85730 THROMBOPLASTIN TIME PARTIAL: CPT | Performed by: EMERGENCY MEDICINE

## 2017-10-07 PROCEDURE — 25010000002 DIPHENHYDRAMINE PER 50 MG: Performed by: EMERGENCY MEDICINE

## 2017-10-07 PROCEDURE — 96375 TX/PRO/DX INJ NEW DRUG ADDON: CPT

## 2017-10-07 PROCEDURE — 85610 PROTHROMBIN TIME: CPT | Performed by: EMERGENCY MEDICINE

## 2017-10-07 PROCEDURE — 81025 URINE PREGNANCY TEST: CPT | Performed by: EMERGENCY MEDICINE

## 2017-10-07 PROCEDURE — 96374 THER/PROPH/DIAG INJ IV PUSH: CPT

## 2017-10-07 PROCEDURE — 99284 EMERGENCY DEPT VISIT MOD MDM: CPT

## 2017-10-07 RX ORDER — SODIUM CHLORIDE 0.9 % (FLUSH) 0.9 %
10 SYRINGE (ML) INJECTION AS NEEDED
Status: DISCONTINUED | OUTPATIENT
Start: 2017-10-07 | End: 2017-10-08 | Stop reason: HOSPADM

## 2017-10-07 RX ORDER — DIPHENHYDRAMINE HYDROCHLORIDE 50 MG/ML
12.5 INJECTION INTRAMUSCULAR; INTRAVENOUS ONCE
Status: COMPLETED | OUTPATIENT
Start: 2017-10-07 | End: 2017-10-07

## 2017-10-07 RX ORDER — METOCLOPRAMIDE HYDROCHLORIDE 5 MG/ML
10 INJECTION INTRAMUSCULAR; INTRAVENOUS
Status: DISCONTINUED | OUTPATIENT
Start: 2017-10-07 | End: 2017-10-08 | Stop reason: HOSPADM

## 2017-10-07 RX ORDER — AMOXICILLIN AND CLAVULANATE POTASSIUM 875; 125 MG/1; MG/1
1 TABLET, FILM COATED ORAL 2 TIMES DAILY
Qty: 20 TABLET | Refills: 0 | Status: SHIPPED | OUTPATIENT
Start: 2017-10-07 | End: 2017-10-17

## 2017-10-07 RX ORDER — AMOXICILLIN AND CLAVULANATE POTASSIUM 875; 125 MG/1; MG/1
1 TABLET, FILM COATED ORAL ONCE
Status: COMPLETED | OUTPATIENT
Start: 2017-10-07 | End: 2017-10-07

## 2017-10-07 RX ADMIN — DIPHENHYDRAMINE HYDROCHLORIDE 12.5 MG: 50 INJECTION INTRAMUSCULAR; INTRAVENOUS at 23:30

## 2017-10-07 RX ADMIN — Medication 10 ML: at 23:31

## 2017-10-07 RX ADMIN — AMOXICILLIN AND CLAVULANATE POTASSIUM 1 TABLET: 875; 125 TABLET, FILM COATED ORAL at 23:31

## 2017-10-07 RX ADMIN — METOCLOPRAMIDE 10 MG: 5 INJECTION, SOLUTION INTRAMUSCULAR; INTRAVENOUS at 23:29

## 2017-10-08 NOTE — DISCHARGE INSTRUCTIONS

## 2017-10-08 NOTE — ED PROVIDER NOTES
Subjective   History of Present Illness  32-year-old female with history of alcohol abuse comes into the emergency department because of a headache that is present since she had a seizure last week.  She states they believe the seizure may have been secondary to her alcohol abuse and alcohol withdrawals.  She reports that she has not had any alcohol since she was in the hospital last week.  She only complains of this with pressure in her head.  As stated is been slowly getting worse over the last week.  Does not suddenly changed today but has worsened.  She has no fevers or chills.  She has no neurological complaints.  She states she feels like she may be having a seizure according to the nursing note.  She denies having any other medical problems.  She is unsure as to what medication she was started on the does not believe she was put on medications for seizures.  When she was seen here last week she was transferred to [facility].  Review of Systems   Constitutional: Negative for chills and fever.   HENT: Negative for rhinorrhea, sinus pressure and sneezing.    Eyes: Negative for pain and redness.   Respiratory: Negative for cough, chest tightness and shortness of breath.    Gastrointestinal: Negative for abdominal pain, diarrhea, nausea and vomiting.   Genitourinary: Negative for dysuria, flank pain, menstrual problem, pelvic pain, vaginal bleeding, vaginal discharge and vaginal pain.   Musculoskeletal: Negative for arthralgias, back pain and joint swelling.   Skin: Negative for rash.   Neurological: Positive for seizures and headaches. Negative for dizziness, tremors, syncope, weakness and numbness.   Hematological: Negative.    Psychiatric/Behavioral: Negative for self-injury and suicidal ideas.       Past Medical History:   Diagnosis Date   • GERD (gastroesophageal reflux disease)        No Known Allergies    No past surgical history on file.    No family history on file.    Social History     Social History   •  Marital status:      Spouse name: N/A   • Number of children: N/A   • Years of education: N/A     Social History Main Topics   • Smoking status: Current Every Day Smoker     Packs/day: 0.50     Types: Cigarettes   • Smokeless tobacco: Never Used   • Alcohol use 8.4 oz/week     14 Shots of liquor per week   • Drug use: No   • Sexual activity: Not on file     Other Topics Concern   • Not on file     Social History Narrative   • No narrative on file           Objective   Physical Exam   Constitutional: She is oriented to person, place, and time. She appears well-developed and well-nourished.   HENT:   Head: Normocephalic and atraumatic.   Eyes: Conjunctivae and EOM are normal.   2 mm bilaterally   Neck: Normal range of motion. Neck supple.   Cardiovascular: Regular rhythm and normal heart sounds.    Pulmonary/Chest: Effort normal and breath sounds normal.   Abdominal: Soft. Bowel sounds are normal.   Musculoskeletal: Normal range of motion.   Neurological: She is alert and oriented to person, place, and time. No cranial nerve deficit. She exhibits normal muscle tone. Coordination normal.   Skin: Skin is warm and dry.   Psychiatric: She has a normal mood and affect.   Nursing note and vitals reviewed.      Procedures         ED Course  ED Course        Labs Reviewed   URINALYSIS W/ CULTURE IF INDICATED - Abnormal; Notable for the following:        Result Value    Appearance, UA Cloudy (*)     Blood, UA Small (1+) (*)     Leuk Esterase, UA Moderate (2+) (*)     All other components within normal limits   CBC WITH AUTO DIFFERENTIAL - Abnormal; Notable for the following:     RBC 3.39 (*)     Hematocrit 34.6 (*)     .1 (*)     MCH 35.7 (*)     RDW-SD 52.3 (*)     Immature Grans, Absolute 0.03 (*)     All other components within normal limits   URINALYSIS, MICROSCOPIC ONLY - Abnormal; Notable for the following:     RBC, UA 0-2 (*)     WBC, UA 6-12 (*)     Bacteria, UA 3+ (*)     Squamous Epithelial Cells, UA  13-20 (*)     All other components within normal limits   COMPREHENSIVE METABOLIC PANEL - Normal   PROTIME-INR - Normal    Narrative:     Therapeutic range for most indications is 2.0-3.0 INR,  or 2.5-3.5 for mechanical heart valves.   APTT - Normal    Narrative:     The recommended Heparin therapeutic range is 68-97 seconds.   PREGNANCY, URINE - Normal   URINE CULTURE   CBC AND DIFFERENTIAL    Narrative:     The following orders were created for panel order CBC & Differential.  Procedure                               Abnormality         Status                     ---------                               -----------         ------                     CBC Auto Differential[[id number]]        Abnormal            Final result                 Please view results for these tests on the individual orders.   EXTRA TUBES    Narrative:     The following orders were created for panel order Extra Tubes.  Procedure                               Abnormality         Status                     ---------                               -----------         ------                     Gold Top - SST[[id number]]                                   In process                   Please view results for these tests on the individual orders.   GOLD TOP - SST     CT Head Without Contrast   Final Result   No acute intracranial abnormality.      Electronically signed by:  Negro Morales MD  10/7/2017 10:49 PM   CDT Workstation: [id number]                  [facility]  Number of Diagnoses or Management Options  Acute non-recurrent ethmoidal sinusitis:   Diagnosis management comments: Patient's workup with ethmoid sinusitis.  As his symptoms have been persistent now for approximate 1 week we'll treat with Augmentin especially since she is worsening.  Her head CT does not have any acute intracranial pathology.  She's not had any seizures while in the emergency department.  Discharge home on Augmentin for sinusitis for 10 days.      Final diagnoses:   Acute  non-recurrent ethmoidal sinusitis            Matias Harden MD  10/07/17 1010  I also reviewed the patient's records from her recent hospitalization at [facility].  Chest negative neuropathy any acute intracranial pathology that time as well.  I do not have discharge medications but she has not apparently been taking any antiepileptic medications at this time     Matias Harden MD  10/07/17 2806

## 2017-10-09 LAB
BACTERIA SPEC AEROBE CULT: NORMAL

## 2018-06-16 ENCOUNTER — HOSPITAL ENCOUNTER (EMERGENCY)
Facility: HOSPITAL | Age: 33
Discharge: LEFT BEFORE DISCHARGE | End: 2018-06-16

## 2018-06-16 VITALS
RESPIRATION RATE: 20 BRPM | WEIGHT: 128 LBS | HEART RATE: 84 BPM | TEMPERATURE: 98.7 F | OXYGEN SATURATION: 98 % | HEIGHT: 67 IN | DIASTOLIC BLOOD PRESSURE: 89 MMHG | BODY MASS INDEX: 20.09 KG/M2 | SYSTOLIC BLOOD PRESSURE: 122 MMHG

## 2018-06-19 ENCOUNTER — TELEPHONE (OUTPATIENT)
Dept: OBSTETRICS AND GYNECOLOGY | Facility: CLINIC | Age: 33
End: 2018-06-19

## 2018-06-19 ENCOUNTER — APPOINTMENT (OUTPATIENT)
Dept: ULTRASOUND IMAGING | Facility: HOSPITAL | Age: 33
End: 2018-06-19

## 2018-06-19 ENCOUNTER — HOSPITAL ENCOUNTER (EMERGENCY)
Facility: HOSPITAL | Age: 33
Discharge: HOME | End: 2018-06-19
Attending: EMERGENCY MEDICINE | Admitting: EMERGENCY MEDICINE

## 2018-06-19 VITALS
HEART RATE: 100 BPM | BODY MASS INDEX: 20.09 KG/M2 | TEMPERATURE: 99.5 F | HEIGHT: 66 IN | RESPIRATION RATE: 18 BRPM | WEIGHT: 125 LBS | SYSTOLIC BLOOD PRESSURE: 114 MMHG | OXYGEN SATURATION: 98 % | DIASTOLIC BLOOD PRESSURE: 78 MMHG

## 2018-06-19 DIAGNOSIS — O03.9 MISCARRIAGE: Primary | ICD-10-CM

## 2018-06-19 DIAGNOSIS — O03.9 SAB (SPONTANEOUS ABORTION): Primary | ICD-10-CM

## 2018-06-19 LAB
ABO GROUP BLD: NORMAL
BACTERIA UR QL AUTO: ABNORMAL /HPF
BASOPHILS # BLD AUTO: 0.02 10*3/MM3 (ref 0–0.2)
BASOPHILS NFR BLD AUTO: 0.3 % (ref 0–2)
BILIRUB UR QL STRIP: ABNORMAL
CLARITY UR: CLEAR
COLOR UR: YELLOW
DEPRECATED RDW RBC AUTO: 43.6 FL (ref 36.4–46.3)
EOSINOPHIL # BLD AUTO: 0.04 10*3/MM3 (ref 0–0.7)
EOSINOPHIL NFR BLD AUTO: 0.6 % (ref 0–7)
ERYTHROCYTE [DISTWIDTH] IN BLOOD BY AUTOMATED COUNT: 12.2 % (ref 11.5–14.5)
GLUCOSE UR STRIP-MCNC: ABNORMAL MG/DL
HCG INTACT+B SERPL-ACNC: 1362.7 MIU/ML
HCT VFR BLD AUTO: 36.3 % (ref 35–45)
HGB BLD-MCNC: 13 G/DL (ref 12–15.5)
HGB UR QL STRIP.AUTO: ABNORMAL
HOLD SPECIMEN: NORMAL
HYALINE CASTS UR QL AUTO: ABNORMAL /LPF
IMM GRANULOCYTES # BLD: 0.03 10*3/MM3 (ref 0–0.02)
IMM GRANULOCYTES NFR BLD: 0.4 % (ref 0–0.5)
KETONES UR QL STRIP: ABNORMAL
LEUKOCYTE ESTERASE UR QL STRIP.AUTO: ABNORMAL
LYMPHOCYTES # BLD AUTO: 1.48 10*3/MM3 (ref 0.6–4.2)
LYMPHOCYTES NFR BLD AUTO: 20.6 % (ref 10–50)
MCH RBC QN AUTO: 35.1 PG (ref 26.5–34)
MCHC RBC AUTO-ENTMCNC: 35.8 G/DL (ref 31.4–36)
MCV RBC AUTO: 98.1 FL (ref 80–98)
MONOCYTES # BLD AUTO: 0.74 10*3/MM3 (ref 0–0.9)
MONOCYTES NFR BLD AUTO: 10.3 % (ref 0–12)
NEUTROPHILS # BLD AUTO: 4.87 10*3/MM3 (ref 2–8.6)
NEUTROPHILS NFR BLD AUTO: 67.8 % (ref 37–80)
NITRITE UR QL STRIP: POSITIVE
PH UR STRIP.AUTO: 6 [PH] (ref 5–9)
PLATELET # BLD AUTO: 216 10*3/MM3 (ref 150–450)
PMV BLD AUTO: 9.3 FL (ref 8–12)
PROT UR QL STRIP: ABNORMAL
RBC # BLD AUTO: 3.7 10*6/MM3 (ref 3.77–5.16)
RBC # UR: ABNORMAL /HPF
REF LAB TEST METHOD: ABNORMAL
RH BLD: POSITIVE
SP GR UR STRIP: 1.02 (ref 1–1.03)
SQUAMOUS #/AREA URNS HPF: ABNORMAL /HPF
UROBILINOGEN UR QL STRIP: ABNORMAL
WBC NRBC COR # BLD: 7.18 10*3/MM3 (ref 3.2–9.8)
WBC UR QL AUTO: ABNORMAL /HPF

## 2018-06-19 PROCEDURE — 85025 COMPLETE CBC W/AUTO DIFF WBC: CPT | Performed by: EMERGENCY MEDICINE

## 2018-06-19 PROCEDURE — 76817 TRANSVAGINAL US OBSTETRIC: CPT

## 2018-06-19 PROCEDURE — 86901 BLOOD TYPING SEROLOGIC RH(D): CPT | Performed by: EMERGENCY MEDICINE

## 2018-06-19 PROCEDURE — 87186 SC STD MICRODIL/AGAR DIL: CPT | Performed by: EMERGENCY MEDICINE

## 2018-06-19 PROCEDURE — 36415 COLL VENOUS BLD VENIPUNCTURE: CPT

## 2018-06-19 PROCEDURE — 99283 EMERGENCY DEPT VISIT LOW MDM: CPT

## 2018-06-19 PROCEDURE — 81001 URINALYSIS AUTO W/SCOPE: CPT | Performed by: EMERGENCY MEDICINE

## 2018-06-19 PROCEDURE — 87077 CULTURE AEROBIC IDENTIFY: CPT | Performed by: EMERGENCY MEDICINE

## 2018-06-19 PROCEDURE — 87086 URINE CULTURE/COLONY COUNT: CPT | Performed by: EMERGENCY MEDICINE

## 2018-06-19 PROCEDURE — 84702 CHORIONIC GONADOTROPIN TEST: CPT | Performed by: EMERGENCY MEDICINE

## 2018-06-19 PROCEDURE — 86900 BLOOD TYPING SEROLOGIC ABO: CPT | Performed by: EMERGENCY MEDICINE

## 2018-06-19 RX ORDER — PRENATAL VIT NO.126/IRON/FOLIC 28MG-0.8MG
1 TABLET ORAL DAILY
COMMUNITY
End: 2018-10-10

## 2018-06-19 NOTE — TELEPHONE ENCOUNTER
----- Message from Joanna Schneider sent at 6/19/2018  2:51 PM CDT -----  Regarding: FW: miscarriage  Contact: [phone]  I received this in a staff message and was wondering where I should put her? Just let me know.    Thanks  ----- Message -----  From: Letty Fofana  Sent: 6/19/2018   2:41 PM  To: [facility]  Subject: miscarriage                                      F/U THIAGO Lopez 3 days        Called and spoke with the pt.  Per Dr. Lopez, I told the pt to come on Thursday of this week to have lab drawn (quant), and then to keep her appt with Dr. Lopez on the 10th.  The pt verbalized understanding.

## 2018-06-19 NOTE — TELEPHONE ENCOUNTER
----- Message from Joanna Schneider sent at 6/19/2018  3:14 PM CDT -----  Contact: [phone]  I called pt to schedule an appointment and she is wanting to talk to someone. She is really upset and she doesn't know what she needs to do. She would like a call back please.    Thanks      I did speak with the pt and was very sympathetic with her.  I instructed her to come in on Thursday for a quant level, and then to keep her appt with Dr. Lopez.  The pt was very tearful and upset, but she verbalized understanding.

## 2018-06-19 NOTE — ED PROVIDER NOTES
Subjective   History of Present Illness  Patient is a 33 red female's complaining of lower abdominal/suprapubic cramping for couple days.  Cramping worse today.  Started bleeding about 7 days ago bleeding is worse.  She may apply last some tissue and clots.  Last normal menses was about 70 weeks ago.  She is  2 para 1 AB 0.  She does follow with Dr. Lopez.  She is on prenatal vitamins.  She does not know if she has had to have Campbell Catalan previous pregnancy.  Review of Systems   Constitutional: Negative for activity change, appetite change, chills, diaphoresis, fatigue and fever.   Cardiovascular: Negative for chest pain, palpitations and leg swelling.   Genitourinary: Positive for vaginal bleeding. Negative for difficulty urinating, dyspareunia, dysuria, enuresis, flank pain and frequency.   All other systems reviewed and are negative.      Past Medical History:   Diagnosis Date   • GERD (gastroesophageal reflux disease)        No Known Allergies    History reviewed. No pertinent surgical history.    History reviewed. No pertinent family history.    Social History     Social History   • Marital status:      Social History Main Topics   • Smoking status: Current Every Day Smoker     Packs/day: 0.50     Types: Cigarettes   • Smokeless tobacco: Never Used   • Alcohol use 8.4 oz/week     14 Shots of liquor per week   • Drug use: No     Other Topics Concern   • Not on file           Objective   Physical Exam   Constitutional: She appears well-developed and well-nourished. No distress.   HENT:   Head: Normocephalic and atraumatic.   Cardiovascular: Normal rate, regular rhythm and normal heart sounds.    Pulmonary/Chest: Effort normal and breath sounds normal.   Abdominal: Soft. Bowel sounds are normal. She exhibits no distension and no mass. There is no tenderness. There is no rebound and no guarding.   Musculoskeletal: Normal range of motion. She exhibits no edema, tenderness or deformity.    Neurological: She is alert. She displays abnormal reflex. No cranial nerve deficit or sensory deficit. She exhibits normal muscle tone. Coordination normal.   Skin: She is not diaphoretic.   Psychiatric: She has a normal mood and affect. Her behavior is normal. Judgment and thought content normal.   Nursing note and vitals reviewed.      Procedures           ED Course      Lab work and imaging reviewed.  Beta hCG little low for dates.  Ultrasound was inconclusive that seems to be consistent with a spontaneous .  We will refer patient to Dr. Lopez in the next 2-3 days for reexamination.  I did review these results with the patient.  Of note her urine showed some leukocyte esterase as well as nitrites and equal RBCs and WBCs and trace bacteria I have elected at this point since she is asymptomatic to culture the urine and treatment would be at Dr. Lopez discretion.            MDM      Final diagnoses:   SAB (spontaneous )            Jose Hightower MD  [id number]

## 2018-06-21 ENCOUNTER — LAB (OUTPATIENT)
Dept: LAB | Facility: HOSPITAL | Age: 33
End: 2018-06-21

## 2018-06-21 DIAGNOSIS — O03.9 MISCARRIAGE: ICD-10-CM

## 2018-06-21 LAB
BACTERIA SPEC AEROBE CULT: ABNORMAL
HCG INTACT+B SERPL-ACNC: 206.95 MIU/ML

## 2018-06-21 PROCEDURE — 36415 COLL VENOUS BLD VENIPUNCTURE: CPT

## 2018-06-21 PROCEDURE — 84702 CHORIONIC GONADOTROPIN TEST: CPT

## 2018-10-10 ENCOUNTER — OFFICE VISIT (OUTPATIENT)
Dept: OBSTETRICS AND GYNECOLOGY | Facility: CLINIC | Age: 33
End: 2018-10-10

## 2018-10-10 VITALS
SYSTOLIC BLOOD PRESSURE: 139 MMHG | DIASTOLIC BLOOD PRESSURE: 102 MMHG | HEART RATE: 107 BPM | WEIGHT: 130 LBS | HEIGHT: 66 IN | BODY MASS INDEX: 20.89 KG/M2

## 2018-10-10 DIAGNOSIS — Z12.31 ENCOUNTER FOR SCREENING MAMMOGRAM FOR BREAST CANCER: ICD-10-CM

## 2018-10-10 DIAGNOSIS — R10.2 PELVIC PAIN: ICD-10-CM

## 2018-10-10 DIAGNOSIS — Z01.419 ENCOUNTER FOR GYNECOLOGICAL EXAMINATION WITHOUT ABNORMAL FINDING: Primary | ICD-10-CM

## 2018-10-10 DIAGNOSIS — N92.0 MENORRHAGIA WITH REGULAR CYCLE: ICD-10-CM

## 2018-10-10 DIAGNOSIS — N94.6 DYSMENORRHEA: ICD-10-CM

## 2018-10-10 DIAGNOSIS — Z80.3 FAMILY HISTORY OF BREAST CANCER IN MOTHER: ICD-10-CM

## 2018-10-10 PROCEDURE — 87624 HPV HI-RISK TYP POOLED RSLT: CPT | Performed by: NURSE PRACTITIONER

## 2018-10-10 PROCEDURE — 99395 PREV VISIT EST AGE 18-39: CPT | Performed by: NURSE PRACTITIONER

## 2018-10-10 PROCEDURE — G0123 SCREEN CERV/VAG THIN LAYER: HCPCS | Performed by: NURSE PRACTITIONER

## 2018-10-10 RX ORDER — LURASIDONE HYDROCHLORIDE 40 MG/1
40 TABLET, FILM COATED ORAL
Status: ON HOLD | COMMUNITY
Start: 2018-09-25 | End: 2019-03-16

## 2018-10-10 RX ORDER — DOXEPIN HYDROCHLORIDE 25 MG/1
50 CAPSULE ORAL
Status: ON HOLD | COMMUNITY
Start: 2018-10-03 | End: 2019-03-16

## 2018-10-10 RX ORDER — FOLIC ACID 1 MG/1
1 TABLET ORAL DAILY
Qty: 30 TABLET | Refills: 12 | Status: ON HOLD | OUTPATIENT
Start: 2018-10-10 | End: 2019-03-16

## 2018-10-10 RX ORDER — PNV NO.95/FERROUS FUM/FOLIC AC 28MG-0.8MG
1 TABLET ORAL DAILY
Qty: 30 TABLET | Refills: 12 | Status: ON HOLD | OUTPATIENT
Start: 2018-10-10 | End: 2019-03-16

## 2018-10-10 RX ORDER — PROMETHAZINE HYDROCHLORIDE 25 MG/1
25 TABLET ORAL EVERY 6 HOURS PRN
Status: ON HOLD | COMMUNITY
End: 2019-03-16

## 2018-10-10 RX ORDER — HYDROXYZINE PAMOATE 25 MG/1
25 CAPSULE ORAL 3 TIMES DAILY PRN
Status: ON HOLD | COMMUNITY
End: 2019-03-16

## 2018-10-10 NOTE — PROGRESS NOTES
"Subjective   Nata Bain is a 33 y.o. Here for pap smear. States that she was referred by her PCP for a follow up because she was never seen after her miscarriage in .    LMP- 4 weeks ago; States that since her miscarriage in  her periods have been very heavy and painful. Prior to the SAB she just had mild cramping with a normal flow. Now her period is starting to affect her ADLs.  Last pap- 2016 \"Benign cellular changes\" negative HPV  Last mammogram- never      Gynecologic Exam   The patient's primary symptoms include pelvic pain and vaginal discharge. The patient's pertinent negatives include no genital itching, genital lesions, genital odor, genital rash, missed menses or vaginal bleeding. Pertinent negatives include no abdominal pain, constipation, diarrhea, dysuria, headaches, nausea, rash, urgency or vomiting. She is sexually active. No, her partner does not have an STD. She uses nothing for contraception. Her menstrual history has been regular. Her past medical history is significant for menorrhagia and miscarriage. There is no history of an abdominal surgery, a  section, an ectopic pregnancy, endometriosis, a gynecological surgery, herpes simplex, metrorrhagia, ovarian cysts, perineal abscess, PID, an STD, a terminated pregnancy or vaginosis.       The following portions of the patient's history were reviewed and updated as appropriate: allergies, current medications, past family history, past medical history, past social history, past surgical history and problem list.    Review of Systems   Constitutional: Negative for activity change, appetite change, fatigue and unexpected weight change.   Respiratory: Negative for chest tightness and shortness of breath.    Cardiovascular: Negative for chest pain, palpitations and leg swelling.   Gastrointestinal: Negative for abdominal distention, abdominal pain, blood in stool, constipation, diarrhea, nausea and vomiting.   Endocrine: " Negative for cold intolerance, heat intolerance, polydipsia, polyphagia and polyuria.   Genitourinary: Positive for menorrhagia, pelvic pain and vaginal discharge. Negative for difficulty urinating, dyspareunia, dysuria, genital sores, menstrual problem, missed menses, urgency, vaginal bleeding and vaginal pain.   Musculoskeletal: Negative for gait problem and myalgias.   Skin: Negative for color change, pallor and rash.   Neurological: Negative for dizziness, weakness, light-headedness and headaches.   Hematological: Negative for adenopathy.   Psychiatric/Behavioral: Negative for agitation, confusion, dysphoric mood, self-injury and suicidal ideas. The patient is not nervous/anxious.      Breast: Denies dimpling, skin changes or nipple discharge. Reports lumpy tissue with more tenderness than usual. States that her mother had breast cancer around her age and she's concerned about the lumps that she feels.     Objective   Physical Exam   Constitutional: She is oriented to person, place, and time. She appears well-developed and well-nourished.   Neck: No thyromegaly present.   Cardiovascular: Normal rate, regular rhythm, normal heart sounds and intact distal pulses.    Pulmonary/Chest: Effort normal and breath sounds normal. Right breast exhibits tenderness. Right breast exhibits no inverted nipple, no mass, no nipple discharge and no skin change. Left breast exhibits tenderness. Left breast exhibits no inverted nipple, no mass, no nipple discharge and no skin change. Breasts are symmetrical.   Fibrocystic changes noted bilaterally.   Abdominal: Soft. Bowel sounds are normal. She exhibits no distension. There is no tenderness.   Genitourinary: No breast discharge or bleeding. There is no rash, tenderness, lesion or injury on the right labia. There is no rash, tenderness, lesion or injury on the left labia. Uterus is tender. Uterus is not deviated, not enlarged and not fixed. Cervix exhibits motion tenderness. Cervix  exhibits no discharge and no friability. Right adnexum displays no mass, no tenderness and no fullness. Left adnexum displays no mass, no tenderness and no fullness. No erythema, tenderness or bleeding in the vagina. No foreign body in the vagina. No signs of injury around the vagina. Vaginal discharge found.   Genitourinary Comments: Thick, white discharge noted. Pap smear obtained.    Lymphadenopathy:     She has no axillary adenopathy.        Right: No inguinal adenopathy present.        Left: No inguinal adenopathy present.   Neurological: She is alert and oriented to person, place, and time.   Skin: Skin is warm, dry and intact.   Psychiatric: She has a normal mood and affect. Her speech is normal and behavior is normal.   Nursing note and vitals reviewed.        Assessment/Plan   Nata was seen today for gynecologic exam.    Diagnoses and all orders for this visit:    Encounter for gynecological examination without abnormal finding  -     Liquid-based Pap Smear, Screening    Family history of breast cancer in mother  -     Mammo Screening Digital Tomosynthesis Bilateral With CAD; Future    Pelvic pain  -     US Non-ob Transvaginal; Future    Encounter for screening mammogram for breast cancer  -     Mammo Screening Digital Tomosynthesis Bilateral With CAD; Future    Menorrhagia with regular cycle    Dysmenorrhea    Other orders  -     Prenatal Vit-Fe Fumarate-FA (PRENATAL VITAMIN) 27-0.8 MG tablet; Take 1 tablet by mouth Daily.  -     folic acid (FOLVITE) 1 MG tablet; Take 1 tablet by mouth Daily.    Pt is wanting to have a baby so doesn't want to start on a birth control to help with heavy, painful periods. Had a good amount of pain with bimanual exam. Declined STD screening. Will have pelvic u/s and baseline mammogram done ASAP. Will call with results when available.

## 2018-10-17 LAB
GEN CATEG CVX/VAG CYTO-IMP: NORMAL
LAB AP CASE REPORT: NORMAL
LAB AP GYN ADDITIONAL INFORMATION: NORMAL
LAB AP GYN OTHER FINDINGS: NORMAL
PATH INTERP SPEC-IMP: NORMAL
STAT OF ADQ CVX/VAG CYTO-IMP: NORMAL

## 2018-10-21 ENCOUNTER — HOSPITAL ENCOUNTER (EMERGENCY)
Facility: HOSPITAL | Age: 33
Discharge: HOME | End: 2018-10-21
Admitting: NURSE PRACTITIONER

## 2018-10-21 ENCOUNTER — APPOINTMENT (OUTPATIENT)
Dept: GENERAL RADIOLOGY | Facility: HOSPITAL | Age: 33
End: 2018-10-21

## 2018-10-21 ENCOUNTER — APPOINTMENT (OUTPATIENT)
Dept: CT IMAGING | Facility: HOSPITAL | Age: 33
End: 2018-10-21

## 2018-10-21 VITALS
HEIGHT: 67 IN | HEART RATE: 100 BPM | TEMPERATURE: 98.2 F | OXYGEN SATURATION: 98 % | DIASTOLIC BLOOD PRESSURE: 91 MMHG | WEIGHT: 130 LBS | BODY MASS INDEX: 20.4 KG/M2 | RESPIRATION RATE: 18 BRPM | SYSTOLIC BLOOD PRESSURE: 120 MMHG

## 2018-10-21 DIAGNOSIS — N30.01 ACUTE CYSTITIS WITH HEMATURIA: Primary | ICD-10-CM

## 2018-10-21 LAB
ALBUMIN SERPL-MCNC: 4.3 G/DL (ref 3.4–4.8)
ALBUMIN/GLOB SERPL: 1.2 G/DL (ref 1.1–1.8)
ALP SERPL-CCNC: 93 U/L (ref 38–126)
ALT SERPL W P-5'-P-CCNC: 44 U/L (ref 9–52)
ANION GAP SERPL CALCULATED.3IONS-SCNC: 13 MMOL/L (ref 5–15)
AST SERPL-CCNC: 36 U/L (ref 14–36)
B-HCG UR QL: NEGATIVE
BACTERIA UR QL AUTO: ABNORMAL /HPF
BASOPHILS # BLD AUTO: 0.02 10*3/MM3 (ref 0–0.2)
BASOPHILS NFR BLD AUTO: 0.1 % (ref 0–2)
BILIRUB SERPL-MCNC: 0.7 MG/DL (ref 0.2–1.3)
BILIRUB UR QL STRIP: ABNORMAL
BUN BLD-MCNC: 10 MG/DL (ref 7–21)
BUN/CREAT SERPL: 19.2 (ref 7–25)
CALCIUM SPEC-SCNC: 9.4 MG/DL (ref 8.4–10.2)
CHLORIDE SERPL-SCNC: 96 MMOL/L (ref 95–110)
CLARITY UR: ABNORMAL
CO2 SERPL-SCNC: 24 MMOL/L (ref 22–31)
COLOR UR: ABNORMAL
CREAT BLD-MCNC: 0.52 MG/DL (ref 0.5–1)
D-LACTATE SERPL-SCNC: 0.8 MMOL/L (ref 0.5–2)
DEPRECATED RDW RBC AUTO: 45.9 FL (ref 36.4–46.3)
EOSINOPHIL # BLD AUTO: 0.03 10*3/MM3 (ref 0–0.7)
EOSINOPHIL NFR BLD AUTO: 0.2 % (ref 0–7)
ERYTHROCYTE [DISTWIDTH] IN BLOOD BY AUTOMATED COUNT: 12.4 % (ref 11.5–14.5)
GFR SERPL CREATININE-BSD FRML MDRD: 137 ML/MIN/1.73 (ref 64–149)
GLOBULIN UR ELPH-MCNC: 3.5 GM/DL (ref 2.3–3.5)
GLUCOSE BLD-MCNC: 110 MG/DL (ref 60–100)
GLUCOSE UR STRIP-MCNC: NEGATIVE MG/DL
HCT VFR BLD AUTO: 37.4 % (ref 35–45)
HGB BLD-MCNC: 13.4 G/DL (ref 12–15.5)
HGB UR QL STRIP.AUTO: ABNORMAL
HOLD SPECIMEN: NORMAL
HOLD SPECIMEN: NORMAL
HYALINE CASTS UR QL AUTO: ABNORMAL /LPF
IMM GRANULOCYTES # BLD: 0.05 10*3/MM3 (ref 0–0.02)
IMM GRANULOCYTES NFR BLD: 0.3 % (ref 0–0.5)
KETONES UR QL STRIP: ABNORMAL
LEUKOCYTE ESTERASE UR QL STRIP.AUTO: ABNORMAL
LYMPHOCYTES # BLD AUTO: 1 10*3/MM3 (ref 0.6–4.2)
LYMPHOCYTES NFR BLD AUTO: 6 % (ref 10–50)
MCH RBC QN AUTO: 35.9 PG (ref 26.5–34)
MCHC RBC AUTO-ENTMCNC: 35.8 G/DL (ref 31.4–36)
MCV RBC AUTO: 100.3 FL (ref 80–98)
MONOCYTES # BLD AUTO: 1.57 10*3/MM3 (ref 0–0.9)
MONOCYTES NFR BLD AUTO: 9.4 % (ref 0–12)
MUCOUS THREADS URNS QL MICRO: ABNORMAL /HPF
NEUTROPHILS # BLD AUTO: 14.05 10*3/MM3 (ref 2–8.6)
NEUTROPHILS NFR BLD AUTO: 84 % (ref 37–80)
NITRITE UR QL STRIP: POSITIVE
PH UR STRIP.AUTO: 5.5 [PH] (ref 5–9)
PLATELET # BLD AUTO: 209 10*3/MM3 (ref 150–450)
PMV BLD AUTO: 9.5 FL (ref 8–12)
POTASSIUM BLD-SCNC: 3.7 MMOL/L (ref 3.5–5.1)
PROT SERPL-MCNC: 7.8 G/DL (ref 6.3–8.6)
PROT UR QL STRIP: ABNORMAL
RBC # BLD AUTO: 3.73 10*6/MM3 (ref 3.77–5.16)
RBC # UR: ABNORMAL /HPF
REF LAB TEST METHOD: ABNORMAL
SODIUM BLD-SCNC: 133 MMOL/L (ref 137–145)
SP GR UR STRIP: 1.03 (ref 1–1.03)
SQUAMOUS #/AREA URNS HPF: ABNORMAL /HPF
UROBILINOGEN UR QL STRIP: ABNORMAL
WBC NRBC COR # BLD: 16.72 10*3/MM3 (ref 3.2–9.8)
WBC UR QL AUTO: ABNORMAL /HPF
WHOLE BLOOD HOLD SPECIMEN: NORMAL
WHOLE BLOOD HOLD SPECIMEN: NORMAL

## 2018-10-21 PROCEDURE — 25010000002 MORPHINE PER 10 MG: Performed by: NURSE PRACTITIONER

## 2018-10-21 PROCEDURE — 87086 URINE CULTURE/COLONY COUNT: CPT | Performed by: NURSE PRACTITIONER

## 2018-10-21 PROCEDURE — 87077 CULTURE AEROBIC IDENTIFY: CPT | Performed by: NURSE PRACTITIONER

## 2018-10-21 PROCEDURE — 74176 CT ABD & PELVIS W/O CONTRAST: CPT

## 2018-10-21 PROCEDURE — 25010000002 HYDROMORPHONE PER 4 MG: Performed by: NURSE PRACTITIONER

## 2018-10-21 PROCEDURE — 96365 THER/PROPH/DIAG IV INF INIT: CPT

## 2018-10-21 PROCEDURE — 25010000002 ONDANSETRON PER 1 MG: Performed by: NURSE PRACTITIONER

## 2018-10-21 PROCEDURE — 25010000002 CEFTRIAXONE PER 250 MG: Performed by: NURSE PRACTITIONER

## 2018-10-21 PROCEDURE — 81001 URINALYSIS AUTO W/SCOPE: CPT | Performed by: NURSE PRACTITIONER

## 2018-10-21 PROCEDURE — 85025 COMPLETE CBC W/AUTO DIFF WBC: CPT | Performed by: NURSE PRACTITIONER

## 2018-10-21 PROCEDURE — 87186 SC STD MICRODIL/AGAR DIL: CPT | Performed by: NURSE PRACTITIONER

## 2018-10-21 PROCEDURE — 80053 COMPREHEN METABOLIC PANEL: CPT | Performed by: NURSE PRACTITIONER

## 2018-10-21 PROCEDURE — 81025 URINE PREGNANCY TEST: CPT | Performed by: NURSE PRACTITIONER

## 2018-10-21 PROCEDURE — 96375 TX/PRO/DX INJ NEW DRUG ADDON: CPT

## 2018-10-21 PROCEDURE — 99284 EMERGENCY DEPT VISIT MOD MDM: CPT

## 2018-10-21 PROCEDURE — 83605 ASSAY OF LACTIC ACID: CPT | Performed by: NURSE PRACTITIONER

## 2018-10-21 PROCEDURE — 74018 RADEX ABDOMEN 1 VIEW: CPT

## 2018-10-21 RX ORDER — CIPROFLOXACIN 500 MG/1
500 TABLET, FILM COATED ORAL EVERY 12 HOURS SCHEDULED
Qty: 14 TABLET | Refills: 0 | Status: SHIPPED | OUTPATIENT
Start: 2018-10-21 | End: 2018-10-28

## 2018-10-21 RX ORDER — HYDROMORPHONE HCL 110MG/55ML
1 PATIENT CONTROLLED ANALGESIA SYRINGE INTRAVENOUS ONCE
Status: COMPLETED | OUTPATIENT
Start: 2018-10-21 | End: 2018-10-21

## 2018-10-21 RX ORDER — SODIUM CHLORIDE 0.9 % (FLUSH) 0.9 %
10 SYRINGE (ML) INJECTION AS NEEDED
Status: DISCONTINUED | OUTPATIENT
Start: 2018-10-21 | End: 2018-10-21 | Stop reason: HOSPADM

## 2018-10-21 RX ORDER — ONDANSETRON 2 MG/ML
4 INJECTION INTRAMUSCULAR; INTRAVENOUS ONCE
Status: COMPLETED | OUTPATIENT
Start: 2018-10-21 | End: 2018-10-21

## 2018-10-21 RX ORDER — OXYCODONE AND ACETAMINOPHEN 10; 325 MG/1; MG/1
1 TABLET ORAL ONCE
Status: COMPLETED | OUTPATIENT
Start: 2018-10-21 | End: 2018-10-21

## 2018-10-21 RX ORDER — PHENAZOPYRIDINE HYDROCHLORIDE 200 MG/1
200 TABLET, FILM COATED ORAL 3 TIMES DAILY PRN
Qty: 6 TABLET | Refills: 0 | Status: SHIPPED | OUTPATIENT
Start: 2018-10-21 | End: 2018-10-23

## 2018-10-21 RX ADMIN — MORPHINE SULFATE 4 MG: 4 INJECTION INTRAVENOUS at 13:43

## 2018-10-21 RX ADMIN — SODIUM CHLORIDE 1000 ML: 9 INJECTION, SOLUTION INTRAVENOUS at 15:01

## 2018-10-21 RX ADMIN — CEFTRIAXONE SODIUM 1 G: 1 INJECTION, POWDER, FOR SOLUTION INTRAMUSCULAR; INTRAVENOUS at 13:43

## 2018-10-21 RX ADMIN — OXYCODONE HYDROCHLORIDE AND ACETAMINOPHEN 1 TABLET: 10; 325 TABLET ORAL at 15:56

## 2018-10-21 RX ADMIN — HYDROMORPHONE HYDROCHLORIDE 1 MG: 2 INJECTION INTRAMUSCULAR; INTRAVENOUS; SUBCUTANEOUS at 15:02

## 2018-10-21 RX ADMIN — ONDANSETRON HYDROCHLORIDE 4 MG: 2 SOLUTION INTRAMUSCULAR; INTRAVENOUS at 13:43

## 2018-10-21 NOTE — ED PROVIDER NOTES
Subjective   33-year-old female emergency department complaining of acute generalized onset of abdominal pain that started yesterday.        History provided by:  Patient   used: No    Abdominal Pain   Pain location:  Generalized  Pain quality: sharp and shooting    Pain radiates to:  Does not radiate  Pain severity:  Moderate  Onset quality:  Gradual  Duration:  1 day  Timing:  Constant  Progression:  Worsening  Chronicity:  New  Relieved by:  Nothing  Worsened by:  Nothing  Associated symptoms: dysuria, hematuria and vaginal bleeding    Associated symptoms: no chest pain, no chills, no constipation, no cough, no fever, no nausea and no vomiting        Review of Systems   Constitutional: Negative for activity change, chills and fever.   HENT: Negative for congestion, ear pain, postnasal drip, sinus pain, sinus pressure and voice change.    Respiratory: Negative for apnea, cough and wheezing.    Cardiovascular: Negative for chest pain and palpitations.   Gastrointestinal: Positive for abdominal pain. Negative for abdominal distention, constipation, nausea and vomiting.   Endocrine: Negative for cold intolerance.   Genitourinary: Positive for difficulty urinating, dyspareunia, dysuria, flank pain, frequency, hematuria, urgency and vaginal bleeding.   Musculoskeletal: Negative for arthralgias.   Skin: Negative for rash.   Allergic/Immunologic: Negative for immunocompromised state.   Neurological: Negative for dizziness, weakness and numbness.   Hematological: Negative for adenopathy.   Psychiatric/Behavioral: Negative for confusion.   All other systems reviewed and are negative.      Past Medical History:   Diagnosis Date   • Abnormal Pap smear of cervix    • Anxiety    • Cervical dysplasia    • Depression    • GERD (gastroesophageal reflux disease)    • Seizures (CMS/[facility]) 2017   • Substance abuse (CMS/[facility])        No Known Allergies    History reviewed. No pertinent surgical history.    Family  History   Problem Relation Age of Onset   • Breast cancer Mother        Social History     Social History   • Marital status:      Social History Main Topics   • Smoking status: Current Every Day Smoker     Packs/day: 0.50     Types: Cigarettes   • Smokeless tobacco: Never Used   • Alcohol use 8.4 oz/week     14 Shots of liquor per week   • Drug use: No   • Sexual activity: Yes     Partners: Male     Birth control/ protection: None     Other Topics Concern   • Not on file           Objective   Physical Exam   Constitutional: She is oriented to person, place, and time. Vital signs are normal. She appears well-developed and well-nourished.   HENT:   Head: Normocephalic.   Nose: Nose normal.   Eyes: Pupils are equal, round, and reactive to light. Conjunctivae are normal.   Neck: Normal range of motion.   Cardiovascular: Normal rate, regular rhythm and normal heart sounds.    Pulmonary/Chest: Effort normal and breath sounds normal.   Abdominal: Soft. She exhibits distension. There is tenderness.   Musculoskeletal: Normal range of motion.   Neurological: She is alert and oriented to person, place, and time. GCS eye subscore is 4. GCS verbal subscore is 5. GCS motor subscore is 6.   Skin: Skin is warm and dry.   Psychiatric: She has a normal mood and affect.   Nursing note and vitals reviewed.      Procedures           ED Course      No dysuria, or frequency, however Nitrates noted. Treated with rocephin in ED. CT negative     ..  CT Abdomen Pelvis Without Contrast   Final Result   Conclusion:   No calculi or obstructive uropathy.   Fatty infiltration of the liver.   Large amount retained feces in the colon.   Tampon in place.      [id number]      Electronically signed by:  Ganesh Kyle MD  10/21/2018 2:23 PM   CDT Workstation: [id number]   Final Result   Conclusion:   No renal or ureteral calculus identified.   Moderate amount retained feces in the colon.      [id number]      Electronically signed  by:  Ganesh Kyle MD  10/21/2018 1:46 PM   CDT Workstation: [id number].  Results for orders placed or performed during the hospital encounter of 10/21/18   Comprehensive Metabolic Panel   Result Value Ref Range    Glucose 110 (H) 60 - 100 mg/dL    BUN 10 7 - 21 mg/dL    Creatinine 0.52 0.50 - 1.00 mg/dL    Sodium 133 (L) 137 - 145 mmol/L    Potassium 3.7 3.5 - 5.1 mmol/L    Chloride 96 95 - 110 mmol/L    CO2 24.0 22.0 - 31.0 mmol/L    Calcium 9.4 8.4 - 10.2 mg/dL    Total Protein 7.8 6.3 - 8.6 g/dL    Albumin 4.30 3.40 - 4.80 g/dL    ALT (SGPT) 44 9 - 52 U/L    AST (SGOT) 36 14 - 36 U/L    Alkaline Phosphatase 93 38 - 126 U/L    Total Bilirubin 0.7 0.2 - 1.3 mg/dL    eGFR Non  Amer 137 64 - 149 mL/min/1.73    Globulin 3.5 2.3 - 3.5 gm/dL    A/G Ratio 1.2 1.1 - 1.8 g/dL    BUN/Creatinine Ratio 19.2 7.0 - 25.0    Anion Gap 13.0 5.0 - 15.0 mmol/L   Pregnancy, Urine - Urine, Clean Catch   Result Value Ref Range    HCG, Urine QL Negative Negative   Urinalysis With Microscopic If Indicated (No Culture) - Urine, Clean Catch   Result Value Ref Range    Color, UA Dark Yellow Yellow, Straw, Dark Yellow, Rhonda    Appearance, UA Cloudy (A) Clear    pH, UA 5.5 5.0 - 9.0    Specific Gravity, UA 1.029 1.003 - 1.030    Glucose, UA Negative Negative    Ketones, UA 80 mg/dL (3+) (A) Negative    Bilirubin, UA Small (1+) (A) Negative    Blood, UA Small (1+) (A) Negative    Protein, UA Trace (A) Negative    Leuk Esterase, UA Small (1+) (A) Negative    Nitrite, UA Positive (A) Negative    Urobilinogen, UA 1.0 E.U./dL 0.2 - 1.0 E.U./dL   CBC Auto Differential   Result Value Ref Range    WBC 16.72 (H) 3.20 - 9.80 10*3/mm3    RBC 3.73 (L) 3.77 - 5.16 10*6/mm3    Hemoglobin 13.4 12.0 - 15.5 g/dL    Hematocrit 37.4 35.0 - 45.0 %    .3 (H) 80.0 - 98.0 fL    MCH 35.9 (H) 26.5 - 34.0 pg    MCHC 35.8 31.4 - 36.0 g/dL    RDW 12.4 11.5 - 14.5 %    RDW-SD 45.9 36.4 - 46.3 fl    MPV 9.5 8.0 - 12.0 fL    Platelets 209 150 -  450 10*3/mm3    Neutrophil % 84.0 (H) 37.0 - 80.0 %    Lymphocyte % 6.0 (L) 10.0 - 50.0 %    Monocyte % 9.4 0.0 - 12.0 %    Eosinophil % 0.2 0.0 - 7.0 %    Basophil % 0.1 0.0 - 2.0 %    Immature Grans % 0.3 0.0 - 0.5 %    Neutrophils, Absolute 14.05 (H) 2.00 - 8.60 10*3/mm3    Lymphocytes, Absolute 1.00 0.60 - 4.20 10*3/mm3    Monocytes, Absolute 1.57 (H) 0.00 - 0.90 10*3/mm3    Eosinophils, Absolute 0.03 0.00 - 0.70 10*3/mm3    Basophils, Absolute 0.02 0.00 - 0.20 10*3/mm3    Immature Grans, Absolute 0.05 (H) 0.00 - 0.02 10*3/mm3   Urinalysis, Microscopic Only - Urine, Clean Catch   Result Value Ref Range    RBC, UA 0-2 (A) None Seen /HPF    WBC, UA 3-5 None Seen, 0-2, 3-5 /HPF    Bacteria, UA 3+ (A) None Seen /HPF    Squamous Epithelial Cells, UA 6-12 (A) None Seen, 0-2 /HPF    Hyaline Casts, UA None Seen None Seen /LPF    Mucus, UA Small/1+ (A) None Seen, Trace /HPF    Methodology Manual Light Microscopy    Lactic Acid, Plasma   Result Value Ref Range    Lactate 0.8 0.5 - 2.0 mmol/L   Light Blue Top   Result Value Ref Range    Extra Tube hold for add-on    Green Top (Gel)   Result Value Ref Range    Extra Tube Hold for add-ons.    Lavender Top   Result Value Ref Range    Extra Tube hold for add-on    Gold Top - SST   Result Value Ref Range    Extra Tube Hold for add-ons.                  MDM      Final diagnoses:   Acute cystitis with hematuria            Jorge Maynard, APRN  10/21/18 1533       Jorge Maynard, APRN  10/21/18 1550

## 2018-10-23 LAB — BACTERIA SPEC AEROBE CULT: ABNORMAL

## 2019-03-16 ENCOUNTER — HOSPITAL ENCOUNTER (INPATIENT)
Facility: HOSPITAL | Age: 34
LOS: 10 days | Discharge: HOME | End: 2019-03-26
Attending: EMERGENCY MEDICINE | Admitting: INTERNAL MEDICINE

## 2019-03-16 ENCOUNTER — APPOINTMENT (OUTPATIENT)
Dept: CT IMAGING | Facility: HOSPITAL | Age: 34
End: 2019-03-16

## 2019-03-16 DIAGNOSIS — K85.90 ACUTE PANCREATITIS WITHOUT INFECTION OR NECROSIS, UNSPECIFIED PANCREATITIS TYPE: Primary | ICD-10-CM

## 2019-03-16 PROBLEM — F10.939 ALCOHOL WITHDRAWAL: Status: ACTIVE | Noted: 2019-03-16

## 2019-03-16 LAB
ALBUMIN SERPL-MCNC: 4.8 G/DL (ref 3.4–4.8)
ALBUMIN/GLOB SERPL: 1.6 G/DL (ref 1.1–1.8)
ALP SERPL-CCNC: 87 U/L (ref 38–126)
ALT SERPL W P-5'-P-CCNC: 78 U/L (ref 9–52)
ANION GAP SERPL CALCULATED.3IONS-SCNC: 16 MMOL/L (ref 5–15)
AST SERPL-CCNC: 200 U/L (ref 14–36)
BASOPHILS # BLD AUTO: 0.06 10*3/MM3 (ref 0–0.2)
BASOPHILS NFR BLD AUTO: 0.6 % (ref 0–1.5)
BILIRUB SERPL-MCNC: 1.2 MG/DL (ref 0.2–1.3)
BUN BLD-MCNC: 9 MG/DL (ref 7–21)
BUN/CREAT SERPL: 29 (ref 7–25)
CALCIUM SPEC-SCNC: 10.5 MG/DL (ref 8.4–10.2)
CHLORIDE SERPL-SCNC: 88 MMOL/L (ref 95–110)
CO2 SERPL-SCNC: 27 MMOL/L (ref 22–31)
CREAT BLD-MCNC: 0.31 MG/DL (ref 0.5–1)
DEPRECATED RDW RBC AUTO: 41.6 FL (ref 37–54)
EOSINOPHIL # BLD AUTO: 0.03 10*3/MM3 (ref 0–0.4)
EOSINOPHIL NFR BLD AUTO: 0.3 % (ref 0.3–6.2)
ERYTHROCYTE [DISTWIDTH] IN BLOOD BY AUTOMATED COUNT: 12 % (ref 12.3–15.4)
GFR SERPL CREATININE-BSD FRML MDRD: 247 ML/MIN/1.73 (ref 64–149)
GLOBULIN UR ELPH-MCNC: 3 GM/DL (ref 2.3–3.5)
GLUCOSE BLD-MCNC: 88 MG/DL (ref 60–100)
HCG SERPL QL: NEGATIVE
HCT VFR BLD AUTO: 40.6 % (ref 34–46.6)
HGB BLD-MCNC: 15.1 G/DL (ref 12–15.9)
IMM GRANULOCYTES # BLD AUTO: 0.04 10*3/MM3 (ref 0–0.05)
IMM GRANULOCYTES NFR BLD AUTO: 0.4 % (ref 0–0.5)
INR PPP: 0.98 (ref 0.8–1.2)
LIPASE SERPL-CCNC: 4479 U/L (ref 23–300)
LYMPHOCYTES # BLD AUTO: 2 10*3/MM3 (ref 0.7–3.1)
LYMPHOCYTES NFR BLD AUTO: 21.2 % (ref 19.6–45.3)
MAGNESIUM SERPL-MCNC: 1.1 MG/DL (ref 1.6–2.3)
MCH RBC QN AUTO: 35.8 PG (ref 26.6–33)
MCHC RBC AUTO-ENTMCNC: 37.2 G/DL (ref 31.5–35.7)
MCV RBC AUTO: 96.2 FL (ref 79–97)
MONOCYTES # BLD AUTO: 0.56 10*3/MM3 (ref 0.1–0.9)
MONOCYTES NFR BLD AUTO: 5.9 % (ref 5–12)
NEUTROPHILS # BLD AUTO: 6.76 10*3/MM3 (ref 1.4–7)
NEUTROPHILS NFR BLD AUTO: 71.6 % (ref 42.7–76)
NRBC BLD AUTO-RTO: 0 /100 WBC (ref 0–0)
PLATELET # BLD AUTO: 141 10*3/MM3 (ref 140–450)
PMV BLD AUTO: 10.3 FL (ref 6–12)
POTASSIUM BLD-SCNC: 3.1 MMOL/L (ref 3.5–5.1)
PROT SERPL-MCNC: 7.8 G/DL (ref 6.3–8.6)
PROTHROMBIN TIME: 12.8 SECONDS (ref 11.1–15.3)
RBC # BLD AUTO: 4.22 10*6/MM3 (ref 3.77–5.28)
RBC MORPH BLD: NORMAL
SMALL PLATELETS BLD QL SMEAR: ADEQUATE
SODIUM BLD-SCNC: 131 MMOL/L (ref 137–145)
WBC MORPH BLD: NORMAL
WBC NRBC COR # BLD: 9.45 10*3/MM3 (ref 3.4–10.8)

## 2019-03-16 PROCEDURE — 80053 COMPREHEN METABOLIC PANEL: CPT | Performed by: EMERGENCY MEDICINE

## 2019-03-16 PROCEDURE — 93005 ELECTROCARDIOGRAM TRACING: CPT

## 2019-03-16 PROCEDURE — 25010000002 LORAZEPAM PER 2 MG: Performed by: INTERNAL MEDICINE

## 2019-03-16 PROCEDURE — 74177 CT ABD & PELVIS W/CONTRAST: CPT

## 2019-03-16 PROCEDURE — 36415 COLL VENOUS BLD VENIPUNCTURE: CPT | Performed by: INTERNAL MEDICINE

## 2019-03-16 PROCEDURE — 99284 EMERGENCY DEPT VISIT MOD MDM: CPT

## 2019-03-16 PROCEDURE — 25010000003 POTASSIUM CHLORIDE 10 MEQ/100ML SOLUTION: Performed by: INTERNAL MEDICINE

## 2019-03-16 PROCEDURE — 93010 ELECTROCARDIOGRAM REPORT: CPT | Performed by: INTERNAL MEDICINE

## 2019-03-16 PROCEDURE — 25010000002 ONDANSETRON PER 1 MG: Performed by: EMERGENCY MEDICINE

## 2019-03-16 PROCEDURE — 85610 PROTHROMBIN TIME: CPT | Performed by: INTERNAL MEDICINE

## 2019-03-16 PROCEDURE — 25010000002 MAGNESIUM SULFATE 2 GM/50ML SOLUTION: Performed by: INTERNAL MEDICINE

## 2019-03-16 PROCEDURE — 25010000002 HYDROMORPHONE PER 4 MG: Performed by: EMERGENCY MEDICINE

## 2019-03-16 PROCEDURE — 25010000002 THIAMINE PER 100 MG: Performed by: INTERNAL MEDICINE

## 2019-03-16 PROCEDURE — 85007 BL SMEAR W/DIFF WBC COUNT: CPT | Performed by: EMERGENCY MEDICINE

## 2019-03-16 PROCEDURE — 85025 COMPLETE CBC W/AUTO DIFF WBC: CPT | Performed by: EMERGENCY MEDICINE

## 2019-03-16 PROCEDURE — 25010000002 PROMETHAZINE PER 50 MG: Performed by: EMERGENCY MEDICINE

## 2019-03-16 PROCEDURE — 93005 ELECTROCARDIOGRAM TRACING: CPT | Performed by: EMERGENCY MEDICINE

## 2019-03-16 PROCEDURE — 84703 CHORIONIC GONADOTROPIN ASSAY: CPT | Performed by: EMERGENCY MEDICINE

## 2019-03-16 PROCEDURE — 83735 ASSAY OF MAGNESIUM: CPT | Performed by: INTERNAL MEDICINE

## 2019-03-16 PROCEDURE — 25010000002 ONDANSETRON PER 1 MG: Performed by: INTERNAL MEDICINE

## 2019-03-16 PROCEDURE — 25010000002 HYDROMORPHONE 1 MG/ML SOLUTION: Performed by: INTERNAL MEDICINE

## 2019-03-16 PROCEDURE — 25010000002 MORPHINE PER 10 MG: Performed by: EMERGENCY MEDICINE

## 2019-03-16 PROCEDURE — 83690 ASSAY OF LIPASE: CPT | Performed by: EMERGENCY MEDICINE

## 2019-03-16 PROCEDURE — 25010000002 IOPAMIDOL 61 % SOLUTION: Performed by: EMERGENCY MEDICINE

## 2019-03-16 RX ORDER — SODIUM CHLORIDE 0.9 % (FLUSH) 0.9 %
3-10 SYRINGE (ML) INJECTION AS NEEDED
Status: DISCONTINUED | OUTPATIENT
Start: 2019-03-16 | End: 2019-03-26 | Stop reason: HOSPADM

## 2019-03-16 RX ORDER — ONDANSETRON 2 MG/ML
4 INJECTION INTRAMUSCULAR; INTRAVENOUS ONCE
Status: COMPLETED | OUTPATIENT
Start: 2019-03-16 | End: 2019-03-16

## 2019-03-16 RX ORDER — METOPROLOL TARTRATE 5 MG/5ML
5 INJECTION INTRAVENOUS EVERY 6 HOURS PRN
Status: DISCONTINUED | OUTPATIENT
Start: 2019-03-16 | End: 2019-03-26 | Stop reason: HOSPADM

## 2019-03-16 RX ORDER — LORAZEPAM 2 MG/ML
2 INJECTION INTRAMUSCULAR
Status: DISCONTINUED | OUTPATIENT
Start: 2019-03-16 | End: 2019-03-17

## 2019-03-16 RX ORDER — ONDANSETRON 2 MG/ML
4 INJECTION INTRAMUSCULAR; INTRAVENOUS EVERY 6 HOURS PRN
Status: DISCONTINUED | OUTPATIENT
Start: 2019-03-16 | End: 2019-03-26 | Stop reason: HOSPADM

## 2019-03-16 RX ORDER — FAMOTIDINE 10 MG/ML
20 INJECTION, SOLUTION INTRAVENOUS EVERY 12 HOURS SCHEDULED
Status: DISCONTINUED | OUTPATIENT
Start: 2019-03-17 | End: 2019-03-24 | Stop reason: CLARIF

## 2019-03-16 RX ORDER — PROMETHAZINE HYDROCHLORIDE 25 MG/ML
12.5 INJECTION, SOLUTION INTRAMUSCULAR; INTRAVENOUS EVERY 6 HOURS PRN
Status: DISCONTINUED | OUTPATIENT
Start: 2019-03-16 | End: 2019-03-26 | Stop reason: HOSPADM

## 2019-03-16 RX ORDER — PROMETHAZINE HYDROCHLORIDE 25 MG/ML
6.25 INJECTION, SOLUTION INTRAMUSCULAR; INTRAVENOUS ONCE
Status: COMPLETED | OUTPATIENT
Start: 2019-03-16 | End: 2019-03-16

## 2019-03-16 RX ORDER — SODIUM CHLORIDE 0.9 % (FLUSH) 0.9 %
3 SYRINGE (ML) INJECTION EVERY 12 HOURS SCHEDULED
Status: DISCONTINUED | OUTPATIENT
Start: 2019-03-16 | End: 2019-03-17

## 2019-03-16 RX ORDER — LORAZEPAM 1 MG/1
1 TABLET ORAL
Status: DISCONTINUED | OUTPATIENT
Start: 2019-03-16 | End: 2019-03-17

## 2019-03-16 RX ORDER — SODIUM CHLORIDE 0.9 % (FLUSH) 0.9 %
10 SYRINGE (ML) INJECTION AS NEEDED
Status: DISCONTINUED | OUTPATIENT
Start: 2019-03-16 | End: 2019-03-26 | Stop reason: HOSPADM

## 2019-03-16 RX ORDER — LURASIDONE HYDROCHLORIDE 40 MG/1
40 TABLET, FILM COATED ORAL DAILY
Status: DISCONTINUED | OUTPATIENT
Start: 2019-03-17 | End: 2019-03-22

## 2019-03-16 RX ORDER — LORAZEPAM 2 MG/ML
1 INJECTION INTRAMUSCULAR
Status: DISCONTINUED | OUTPATIENT
Start: 2019-03-16 | End: 2019-03-17

## 2019-03-16 RX ORDER — POTASSIUM CHLORIDE 7.45 MG/ML
10 INJECTION INTRAVENOUS
Status: DISCONTINUED | OUTPATIENT
Start: 2019-03-16 | End: 2019-03-26 | Stop reason: HOSPADM

## 2019-03-16 RX ORDER — MAGNESIUM SULFATE HEPTAHYDRATE 40 MG/ML
2 INJECTION, SOLUTION INTRAVENOUS AS NEEDED
Status: DISCONTINUED | OUTPATIENT
Start: 2019-03-16 | End: 2019-03-26 | Stop reason: HOSPADM

## 2019-03-16 RX ORDER — ONDANSETRON 2 MG/ML
4 INJECTION INTRAMUSCULAR; INTRAVENOUS EVERY 6 HOURS PRN
Status: DISCONTINUED | OUTPATIENT
Start: 2019-03-16 | End: 2019-03-16 | Stop reason: SDUPTHER

## 2019-03-16 RX ORDER — HYDROMORPHONE HCL 110MG/55ML
0.5 PATIENT CONTROLLED ANALGESIA SYRINGE INTRAVENOUS ONCE
Status: COMPLETED | OUTPATIENT
Start: 2019-03-16 | End: 2019-03-16

## 2019-03-16 RX ORDER — LORAZEPAM 1 MG/1
2 TABLET ORAL
Status: DISCONTINUED | OUTPATIENT
Start: 2019-03-16 | End: 2019-03-17

## 2019-03-16 RX ORDER — MAGNESIUM SULFATE HEPTAHYDRATE 40 MG/ML
4 INJECTION, SOLUTION INTRAVENOUS AS NEEDED
Status: DISCONTINUED | OUTPATIENT
Start: 2019-03-16 | End: 2019-03-26 | Stop reason: HOSPADM

## 2019-03-16 RX ORDER — SODIUM CHLORIDE 9 MG/ML
125 INJECTION, SOLUTION INTRAVENOUS CONTINUOUS
Status: DISCONTINUED | OUTPATIENT
Start: 2019-03-16 | End: 2019-03-21

## 2019-03-16 RX ORDER — CLONIDINE HYDROCHLORIDE 0.1 MG/1
0.1 TABLET ORAL EVERY 4 HOURS PRN
Status: DISCONTINUED | OUTPATIENT
Start: 2019-03-16 | End: 2019-03-26 | Stop reason: HOSPADM

## 2019-03-16 RX ORDER — NICOTINE 21 MG/24HR
1 PATCH, TRANSDERMAL 24 HOURS TRANSDERMAL
Status: DISCONTINUED | OUTPATIENT
Start: 2019-03-16 | End: 2019-03-26 | Stop reason: HOSPADM

## 2019-03-16 RX ORDER — LORAZEPAM 2 MG/ML
1 INJECTION INTRAMUSCULAR EVERY 6 HOURS
Status: DISCONTINUED | OUTPATIENT
Start: 2019-03-16 | End: 2019-03-17

## 2019-03-16 RX ORDER — LORAZEPAM 2 MG/ML
1 INJECTION INTRAMUSCULAR EVERY 8 HOURS
Status: DISCONTINUED | OUTPATIENT
Start: 2019-03-17 | End: 2019-03-17

## 2019-03-16 RX ADMIN — MORPHINE SULFATE 4 MG: 4 INJECTION INTRAVENOUS at 19:14

## 2019-03-16 RX ADMIN — NICOTINE 1 PATCH: 21 PATCH TRANSDERMAL at 22:21

## 2019-03-16 RX ADMIN — SODIUM CHLORIDE 125 ML/HR: 900 INJECTION, SOLUTION INTRAVENOUS at 19:51

## 2019-03-16 RX ADMIN — SODIUM CHLORIDE 125 ML/HR: 900 INJECTION, SOLUTION INTRAVENOUS at 22:22

## 2019-03-16 RX ADMIN — IOPAMIDOL 85 ML: 612 INJECTION, SOLUTION INTRAVENOUS at 18:09

## 2019-03-16 RX ADMIN — POTASSIUM CHLORIDE 10 MEQ: 7.46 INJECTION, SOLUTION INTRAVENOUS at 23:45

## 2019-03-16 RX ADMIN — HYDROMORPHONE HYDROCHLORIDE 0.5 MG: 2 INJECTION INTRAMUSCULAR; INTRAVENOUS; SUBCUTANEOUS at 19:51

## 2019-03-16 RX ADMIN — ONDANSETRON 4 MG: 2 INJECTION INTRAMUSCULAR; INTRAVENOUS at 17:12

## 2019-03-16 RX ADMIN — ONDANSETRON 4 MG: 2 INJECTION INTRAMUSCULAR; INTRAVENOUS at 19:14

## 2019-03-16 RX ADMIN — SODIUM CHLORIDE, PRESERVATIVE FREE 3 ML: 5 INJECTION INTRAVENOUS at 21:29

## 2019-03-16 RX ADMIN — PROMETHAZINE HYDROCHLORIDE 6.25 MG: 25 INJECTION INTRAMUSCULAR; INTRAVENOUS at 19:50

## 2019-03-16 RX ADMIN — SODIUM CHLORIDE 1000 ML: 900 INJECTION, SOLUTION INTRAVENOUS at 17:12

## 2019-03-16 RX ADMIN — POTASSIUM CHLORIDE 10 MEQ: 7.46 INJECTION, SOLUTION INTRAVENOUS at 22:22

## 2019-03-16 RX ADMIN — ONDANSETRON 4 MG: 2 INJECTION INTRAMUSCULAR; INTRAVENOUS at 23:13

## 2019-03-16 RX ADMIN — MORPHINE SULFATE 4 MG: 4 INJECTION, SOLUTION INTRAMUSCULAR; INTRAVENOUS at 17:38

## 2019-03-16 RX ADMIN — HYDROMORPHONE HYDROCHLORIDE 0.5 MG: 1 INJECTION, SOLUTION INTRAMUSCULAR; INTRAVENOUS; SUBCUTANEOUS at 23:13

## 2019-03-16 RX ADMIN — FAMOTIDINE 20 MG: 10 INJECTION INTRAVENOUS at 23:13

## 2019-03-16 RX ADMIN — FOLIC ACID 100 ML/HR: 5 INJECTION, SOLUTION INTRAMUSCULAR; INTRAVENOUS; SUBCUTANEOUS at 22:21

## 2019-03-16 RX ADMIN — LORAZEPAM 2 MG: 2 INJECTION, SOLUTION INTRAMUSCULAR; INTRAVENOUS at 21:28

## 2019-03-16 RX ADMIN — MAGNESIUM SULFATE HEPTAHYDRATE 2 G: 40 INJECTION, SOLUTION INTRAVENOUS at 23:14

## 2019-03-17 LAB
ANION GAP SERPL CALCULATED.3IONS-SCNC: 7 MMOL/L (ref 5–15)
BASOPHILS # BLD AUTO: 0.02 10*3/MM3 (ref 0–0.2)
BASOPHILS NFR BLD AUTO: 0.3 % (ref 0–1.5)
BILIRUB UR QL STRIP: NEGATIVE
BUN BLD-MCNC: 5 MG/DL (ref 7–21)
BUN/CREAT SERPL: 15.6 (ref 7–25)
CALCIUM SPEC-SCNC: 7.3 MG/DL (ref 8.4–10.2)
CHLORIDE SERPL-SCNC: 98 MMOL/L (ref 95–110)
CLARITY UR: CLEAR
CO2 SERPL-SCNC: 23 MMOL/L (ref 22–31)
COLOR UR: YELLOW
CREAT BLD-MCNC: 0.32 MG/DL (ref 0.5–1)
DEPRECATED RDW RBC AUTO: 41.7 FL (ref 37–54)
EOSINOPHIL # BLD AUTO: 0.06 10*3/MM3 (ref 0–0.4)
EOSINOPHIL NFR BLD AUTO: 0.9 % (ref 0.3–6.2)
ERYTHROCYTE [DISTWIDTH] IN BLOOD BY AUTOMATED COUNT: 11.9 % (ref 12.3–15.4)
GFR SERPL CREATININE-BSD FRML MDRD: 238 ML/MIN/1.73 (ref 64–149)
GLUCOSE BLD-MCNC: 81 MG/DL (ref 60–100)
GLUCOSE UR STRIP-MCNC: NEGATIVE MG/DL
HCT VFR BLD AUTO: 34.3 % (ref 34–46.6)
HGB BLD-MCNC: 12.4 G/DL (ref 12–15.9)
HGB UR QL STRIP.AUTO: NEGATIVE
IMM GRANULOCYTES # BLD AUTO: 0.02 10*3/MM3 (ref 0–0.05)
IMM GRANULOCYTES NFR BLD AUTO: 0.3 % (ref 0–0.5)
KETONES UR QL STRIP: ABNORMAL
LEUKOCYTE ESTERASE UR QL STRIP.AUTO: NEGATIVE
LYMPHOCYTES # BLD AUTO: 1.59 10*3/MM3 (ref 0.7–3.1)
LYMPHOCYTES NFR BLD AUTO: 23.7 % (ref 19.6–45.3)
MCH RBC QN AUTO: 35.3 PG (ref 26.6–33)
MCHC RBC AUTO-ENTMCNC: 36.2 G/DL (ref 31.5–35.7)
MCV RBC AUTO: 97.7 FL (ref 79–97)
MONOCYTES # BLD AUTO: 0.52 10*3/MM3 (ref 0.1–0.9)
MONOCYTES NFR BLD AUTO: 7.8 % (ref 5–12)
NEUTROPHILS # BLD AUTO: 4.49 10*3/MM3 (ref 1.4–7)
NEUTROPHILS NFR BLD AUTO: 67 % (ref 42.7–76)
NITRITE UR QL STRIP: NEGATIVE
NRBC BLD AUTO-RTO: 0 /100 WBC (ref 0–0)
PH UR STRIP.AUTO: 7 [PH] (ref 5–9)
PLATELET # BLD AUTO: 103 10*3/MM3 (ref 140–450)
PMV BLD AUTO: 10.3 FL (ref 6–12)
POTASSIUM BLD-SCNC: 4.1 MMOL/L (ref 3.5–5.1)
PROT UR QL STRIP: NEGATIVE
RBC # BLD AUTO: 3.51 10*6/MM3 (ref 3.77–5.28)
SODIUM BLD-SCNC: 128 MMOL/L (ref 137–145)
SP GR UR STRIP: 1.02 (ref 1–1.03)
UROBILINOGEN UR QL STRIP: ABNORMAL
WBC NRBC COR # BLD: 6.7 10*3/MM3 (ref 3.4–10.8)

## 2019-03-17 PROCEDURE — 25010000002 ONDANSETRON PER 1 MG: Performed by: INTERNAL MEDICINE

## 2019-03-17 PROCEDURE — 25010000002 HYDROMORPHONE 1 MG/ML SOLUTION: Performed by: INTERNAL MEDICINE

## 2019-03-17 PROCEDURE — 85025 COMPLETE CBC W/AUTO DIFF WBC: CPT | Performed by: INTERNAL MEDICINE

## 2019-03-17 PROCEDURE — 81003 URINALYSIS AUTO W/O SCOPE: CPT | Performed by: EMERGENCY MEDICINE

## 2019-03-17 PROCEDURE — 25010000002 PROMETHAZINE PER 50 MG: Performed by: INTERNAL MEDICINE

## 2019-03-17 PROCEDURE — 25010000002 ZIPRASIDONE MESYLATE PER 10 MG: Performed by: INTERNAL MEDICINE

## 2019-03-17 PROCEDURE — 25010000002 THIAMINE PER 100 MG: Performed by: INTERNAL MEDICINE

## 2019-03-17 PROCEDURE — 25010000003 POTASSIUM CHLORIDE 10 MEQ/100ML SOLUTION: Performed by: INTERNAL MEDICINE

## 2019-03-17 PROCEDURE — 25010000002 LORAZEPAM PER 2 MG: Performed by: INTERNAL MEDICINE

## 2019-03-17 PROCEDURE — 80048 BASIC METABOLIC PNL TOTAL CA: CPT | Performed by: INTERNAL MEDICINE

## 2019-03-17 PROCEDURE — 25010000002 MAGNESIUM SULFATE 2 GM/50ML SOLUTION: Performed by: INTERNAL MEDICINE

## 2019-03-17 RX ORDER — LORAZEPAM 2 MG/ML
2 INJECTION INTRAMUSCULAR
Status: DISCONTINUED | OUTPATIENT
Start: 2019-03-17 | End: 2019-03-22

## 2019-03-17 RX ORDER — LORAZEPAM 1 MG/1
1 TABLET ORAL
Status: DISCONTINUED | OUTPATIENT
Start: 2019-03-17 | End: 2019-03-22

## 2019-03-17 RX ORDER — ZIPRASIDONE MESYLATE 20 MG/ML
20 INJECTION, POWDER, LYOPHILIZED, FOR SOLUTION INTRAMUSCULAR ONCE
Status: COMPLETED | OUTPATIENT
Start: 2019-03-17 | End: 2019-03-17

## 2019-03-17 RX ORDER — LORAZEPAM 2 MG/1
4 TABLET ORAL
Status: DISCONTINUED | OUTPATIENT
Start: 2019-03-17 | End: 2019-03-22

## 2019-03-17 RX ORDER — LORAZEPAM 2 MG/ML
4 INJECTION INTRAMUSCULAR
Status: DISCONTINUED | OUTPATIENT
Start: 2019-03-17 | End: 2019-03-22

## 2019-03-17 RX ORDER — LORAZEPAM 2 MG/ML
1 INJECTION INTRAMUSCULAR
Status: DISCONTINUED | OUTPATIENT
Start: 2019-03-17 | End: 2019-03-22

## 2019-03-17 RX ORDER — LORAZEPAM 2 MG/1
2 TABLET ORAL
Status: DISCONTINUED | OUTPATIENT
Start: 2019-03-17 | End: 2019-03-22

## 2019-03-17 RX ORDER — SODIUM CHLORIDE 0.9 % (FLUSH) 0.9 %
10 SYRINGE (ML) INJECTION EVERY 12 HOURS SCHEDULED
Status: DISCONTINUED | OUTPATIENT
Start: 2019-03-17 | End: 2019-03-19

## 2019-03-17 RX ADMIN — LORAZEPAM 4 MG: 2 INJECTION INTRAMUSCULAR; INTRAVENOUS at 20:00

## 2019-03-17 RX ADMIN — LORAZEPAM 4 MG: 2 INJECTION INTRAMUSCULAR; INTRAVENOUS at 21:25

## 2019-03-17 RX ADMIN — NICOTINE 1 PATCH: 21 PATCH TRANSDERMAL at 08:37

## 2019-03-17 RX ADMIN — LORAZEPAM 1 MG: 2 INJECTION, SOLUTION INTRAMUSCULAR; INTRAVENOUS at 06:39

## 2019-03-17 RX ADMIN — LORAZEPAM 1 MG: 2 INJECTION, SOLUTION INTRAMUSCULAR; INTRAVENOUS at 08:35

## 2019-03-17 RX ADMIN — ZIPRASIDONE MESYLATE 20 MG: 20 INJECTION, POWDER, LYOPHILIZED, FOR SOLUTION INTRAMUSCULAR at 20:01

## 2019-03-17 RX ADMIN — POTASSIUM CHLORIDE 10 MEQ: 7.46 INJECTION, SOLUTION INTRAVENOUS at 00:49

## 2019-03-17 RX ADMIN — HYDROMORPHONE HYDROCHLORIDE 0.5 MG: 1 INJECTION, SOLUTION INTRAMUSCULAR; INTRAVENOUS; SUBCUTANEOUS at 06:39

## 2019-03-17 RX ADMIN — LORAZEPAM 2 MG: 2 INJECTION, SOLUTION INTRAMUSCULAR; INTRAVENOUS at 13:49

## 2019-03-17 RX ADMIN — LORAZEPAM 2 MG: 2 INJECTION INTRAMUSCULAR; INTRAVENOUS at 16:37

## 2019-03-17 RX ADMIN — FOLIC ACID 100 ML/HR: 5 INJECTION, SOLUTION INTRAMUSCULAR; INTRAVENOUS; SUBCUTANEOUS at 08:34

## 2019-03-17 RX ADMIN — LORAZEPAM 2 MG: 2 INJECTION INTRAMUSCULAR; INTRAVENOUS at 17:52

## 2019-03-17 RX ADMIN — LORAZEPAM 2 MG: 2 INJECTION INTRAMUSCULAR; INTRAVENOUS at 17:37

## 2019-03-17 RX ADMIN — LORAZEPAM 2 MG: 2 INJECTION, SOLUTION INTRAMUSCULAR; INTRAVENOUS at 15:00

## 2019-03-17 RX ADMIN — LORAZEPAM 4 MG: 2 INJECTION INTRAMUSCULAR; INTRAVENOUS at 23:20

## 2019-03-17 RX ADMIN — LORAZEPAM 1 MG: 2 INJECTION, SOLUTION INTRAMUSCULAR; INTRAVENOUS at 15:45

## 2019-03-17 RX ADMIN — PROMETHAZINE HYDROCHLORIDE 12.5 MG: 25 INJECTION, SOLUTION INTRAMUSCULAR; INTRAVENOUS at 17:35

## 2019-03-17 RX ADMIN — SODIUM CHLORIDE, PRESERVATIVE FREE 3 ML: 5 INJECTION INTRAVENOUS at 08:37

## 2019-03-17 RX ADMIN — POTASSIUM CHLORIDE 10 MEQ: 7.46 INJECTION, SOLUTION INTRAVENOUS at 02:53

## 2019-03-17 RX ADMIN — LORAZEPAM 1 MG: 2 INJECTION, SOLUTION INTRAMUSCULAR; INTRAVENOUS at 02:53

## 2019-03-17 RX ADMIN — MAGNESIUM SULFATE HEPTAHYDRATE 2 G: 40 INJECTION, SOLUTION INTRAVENOUS at 00:47

## 2019-03-17 RX ADMIN — POTASSIUM CHLORIDE 10 MEQ: 7.46 INJECTION, SOLUTION INTRAVENOUS at 01:45

## 2019-03-17 RX ADMIN — FAMOTIDINE 20 MG: 10 INJECTION INTRAVENOUS at 08:34

## 2019-03-17 RX ADMIN — LORAZEPAM 2 MG: 2 INJECTION INTRAMUSCULAR; INTRAVENOUS at 18:07

## 2019-03-17 RX ADMIN — LORAZEPAM 2 MG: 2 INJECTION, SOLUTION INTRAMUSCULAR; INTRAVENOUS at 07:57

## 2019-03-17 RX ADMIN — FAMOTIDINE 20 MG: 10 INJECTION INTRAVENOUS at 21:34

## 2019-03-17 RX ADMIN — ONDANSETRON 4 MG: 2 INJECTION INTRAMUSCULAR; INTRAVENOUS at 14:53

## 2019-03-17 RX ADMIN — LORAZEPAM 2 MG: 2 INJECTION INTRAMUSCULAR; INTRAVENOUS at 16:52

## 2019-03-17 RX ADMIN — SODIUM CHLORIDE 125 ML/HR: 900 INJECTION, SOLUTION INTRAVENOUS at 15:47

## 2019-03-17 RX ADMIN — HYDROMORPHONE HYDROCHLORIDE 0.5 MG: 1 INJECTION, SOLUTION INTRAMUSCULAR; INTRAVENOUS; SUBCUTANEOUS at 10:57

## 2019-03-17 RX ADMIN — MAGNESIUM SULFATE HEPTAHYDRATE 2 G: 40 INJECTION, SOLUTION INTRAVENOUS at 02:53

## 2019-03-17 RX ADMIN — LORAZEPAM 2 MG: 2 INJECTION INTRAMUSCULAR; INTRAVENOUS at 18:27

## 2019-03-17 RX ADMIN — POTASSIUM CHLORIDE 10 MEQ: 7.46 INJECTION, SOLUTION INTRAVENOUS at 03:46

## 2019-03-17 RX ADMIN — LORAZEPAM 2 MG: 2 INJECTION INTRAMUSCULAR; INTRAVENOUS at 17:22

## 2019-03-17 RX ADMIN — HYDROMORPHONE HYDROCHLORIDE 0.5 MG: 1 INJECTION, SOLUTION INTRAMUSCULAR; INTRAVENOUS; SUBCUTANEOUS at 02:53

## 2019-03-17 RX ADMIN — LORAZEPAM 2 MG: 2 INJECTION INTRAMUSCULAR; INTRAVENOUS at 17:07

## 2019-03-17 RX ADMIN — LORAZEPAM 2 MG: 2 INJECTION, SOLUTION INTRAMUSCULAR; INTRAVENOUS at 11:56

## 2019-03-17 RX ADMIN — ONDANSETRON 4 MG: 2 INJECTION INTRAMUSCULAR; INTRAVENOUS at 06:45

## 2019-03-17 RX ADMIN — HYDROMORPHONE HYDROCHLORIDE 0.5 MG: 1 INJECTION, SOLUTION INTRAMUSCULAR; INTRAVENOUS; SUBCUTANEOUS at 14:53

## 2019-03-17 RX ADMIN — SODIUM CHLORIDE, PRESERVATIVE FREE 10 ML: 5 INJECTION INTRAVENOUS at 21:28

## 2019-03-17 RX ADMIN — LURASIDONE HYDROCHLORIDE 40 MG: 40 TABLET, FILM COATED ORAL at 08:35

## 2019-03-17 RX ADMIN — HYDROMORPHONE HYDROCHLORIDE 0.5 MG: 1 INJECTION, SOLUTION INTRAMUSCULAR; INTRAVENOUS; SUBCUTANEOUS at 20:00

## 2019-03-18 ENCOUNTER — APPOINTMENT (OUTPATIENT)
Dept: INTERVENTIONAL RADIOLOGY/VASCULAR | Facility: HOSPITAL | Age: 34
End: 2019-03-18

## 2019-03-18 ENCOUNTER — APPOINTMENT (OUTPATIENT)
Dept: ULTRASOUND IMAGING | Facility: HOSPITAL | Age: 34
End: 2019-03-18

## 2019-03-18 LAB
ANION GAP SERPL CALCULATED.3IONS-SCNC: 7 MMOL/L (ref 5–15)
BASOPHILS # BLD AUTO: 0.02 10*3/MM3 (ref 0–0.2)
BASOPHILS NFR BLD AUTO: 0.3 % (ref 0–1.5)
BUN BLD-MCNC: 2 MG/DL (ref 7–21)
BUN/CREAT SERPL: 5.1 (ref 7–25)
CALCIUM SPEC-SCNC: 7.7 MG/DL (ref 8.4–10.2)
CHLORIDE SERPL-SCNC: 103 MMOL/L (ref 95–110)
CO2 SERPL-SCNC: 27 MMOL/L (ref 22–31)
CREAT BLD-MCNC: 0.39 MG/DL (ref 0.5–1)
DEPRECATED RDW RBC AUTO: 46.5 FL (ref 37–54)
EOSINOPHIL # BLD AUTO: 0.08 10*3/MM3 (ref 0–0.4)
EOSINOPHIL NFR BLD AUTO: 1.4 % (ref 0.3–6.2)
ERYTHROCYTE [DISTWIDTH] IN BLOOD BY AUTOMATED COUNT: 12.5 % (ref 12.3–15.4)
GFR SERPL CREATININE-BSD FRML MDRD: 189 ML/MIN/1.73 (ref 64–149)
GLUCOSE BLD-MCNC: 67 MG/DL (ref 60–100)
HCT VFR BLD AUTO: 36.4 % (ref 34–46.6)
HGB BLD-MCNC: 12.8 G/DL (ref 12–15.9)
IMM GRANULOCYTES # BLD AUTO: 0.02 10*3/MM3 (ref 0–0.05)
IMM GRANULOCYTES NFR BLD AUTO: 0.3 % (ref 0–0.5)
LYMPHOCYTES # BLD AUTO: 1.5 10*3/MM3 (ref 0.7–3.1)
LYMPHOCYTES NFR BLD AUTO: 25.7 % (ref 19.6–45.3)
MAGNESIUM SERPL-MCNC: 2 MG/DL (ref 1.6–2.3)
MCH RBC QN AUTO: 36.2 PG (ref 26.6–33)
MCHC RBC AUTO-ENTMCNC: 35.2 G/DL (ref 31.5–35.7)
MCV RBC AUTO: 102.8 FL (ref 79–97)
MONOCYTES # BLD AUTO: 0.53 10*3/MM3 (ref 0.1–0.9)
MONOCYTES NFR BLD AUTO: 9.1 % (ref 5–12)
NEUTROPHILS # BLD AUTO: 3.69 10*3/MM3 (ref 1.4–7)
NEUTROPHILS NFR BLD AUTO: 63.2 % (ref 42.7–76)
NRBC BLD AUTO-RTO: 0 /100 WBC (ref 0–0)
PLATELET # BLD AUTO: 108 10*3/MM3 (ref 140–450)
PMV BLD AUTO: 10.7 FL (ref 6–12)
POTASSIUM BLD-SCNC: 4.1 MMOL/L (ref 3.5–5.1)
RBC # BLD AUTO: 3.54 10*6/MM3 (ref 3.77–5.28)
SODIUM BLD-SCNC: 137 MMOL/L (ref 137–145)
WBC NRBC COR # BLD: 5.84 10*3/MM3 (ref 3.4–10.8)

## 2019-03-18 PROCEDURE — 25010000002 LORAZEPAM PER 2 MG: Performed by: INTERNAL MEDICINE

## 2019-03-18 PROCEDURE — 25010000002 PROMETHAZINE PER 50 MG: Performed by: INTERNAL MEDICINE

## 2019-03-18 PROCEDURE — 25010000002 HYDROMORPHONE 1 MG/ML SOLUTION: Performed by: HOSPITALIST

## 2019-03-18 PROCEDURE — 25010000002 ZIPRASIDONE MESYLATE PER 10 MG: Performed by: HOSPITALIST

## 2019-03-18 PROCEDURE — 25010000002 THIAMINE PER 100 MG: Performed by: INTERNAL MEDICINE

## 2019-03-18 PROCEDURE — 36410 VNPNXR 3YR/> PHY/QHP DX/THER: CPT

## 2019-03-18 PROCEDURE — 25010000002 HYDROMORPHONE 1 MG/ML SOLUTION: Performed by: INTERNAL MEDICINE

## 2019-03-18 PROCEDURE — 25010000002 ONDANSETRON PER 1 MG: Performed by: INTERNAL MEDICINE

## 2019-03-18 PROCEDURE — C1751 CATH, INF, PER/CENT/MIDLINE: HCPCS

## 2019-03-18 PROCEDURE — 80048 BASIC METABOLIC PNL TOTAL CA: CPT | Performed by: INTERNAL MEDICINE

## 2019-03-18 PROCEDURE — 76937 US GUIDE VASCULAR ACCESS: CPT

## 2019-03-18 PROCEDURE — 85025 COMPLETE CBC W/AUTO DIFF WBC: CPT | Performed by: INTERNAL MEDICINE

## 2019-03-18 PROCEDURE — 83735 ASSAY OF MAGNESIUM: CPT | Performed by: INTERNAL MEDICINE

## 2019-03-18 RX ORDER — 0.9 % SODIUM CHLORIDE 0.9 %
10 VIAL (ML) INJECTION EVERY 12 HOURS SCHEDULED
Status: DISCONTINUED | OUTPATIENT
Start: 2019-03-18 | End: 2019-03-26 | Stop reason: HOSPADM

## 2019-03-18 RX ORDER — ZIPRASIDONE MESYLATE 20 MG/ML
20 INJECTION, POWDER, LYOPHILIZED, FOR SOLUTION INTRAMUSCULAR EVERY 12 HOURS PRN
Status: DISCONTINUED | OUTPATIENT
Start: 2019-03-18 | End: 2019-03-21

## 2019-03-18 RX ADMIN — FAMOTIDINE 20 MG: 10 INJECTION INTRAVENOUS at 21:52

## 2019-03-18 RX ADMIN — LORAZEPAM 2 MG: 2 INJECTION, SOLUTION INTRAMUSCULAR; INTRAVENOUS at 00:23

## 2019-03-18 RX ADMIN — SODIUM CHLORIDE, PRESERVATIVE FREE 10 ML: 5 INJECTION INTRAVENOUS at 15:17

## 2019-03-18 RX ADMIN — LORAZEPAM 4 MG: 2 INJECTION INTRAMUSCULAR; INTRAVENOUS at 20:42

## 2019-03-18 RX ADMIN — LORAZEPAM 2 MG: 2 INJECTION, SOLUTION INTRAMUSCULAR; INTRAVENOUS at 10:33

## 2019-03-18 RX ADMIN — ZIPRASIDONE MESYLATE 20 MG: 20 INJECTION, POWDER, LYOPHILIZED, FOR SOLUTION INTRAMUSCULAR at 19:48

## 2019-03-18 RX ADMIN — LORAZEPAM 2 MG: 2 INJECTION, SOLUTION INTRAMUSCULAR; INTRAVENOUS at 16:48

## 2019-03-18 RX ADMIN — HYDROMORPHONE HYDROCHLORIDE 0.5 MG: 1 INJECTION, SOLUTION INTRAMUSCULAR; INTRAVENOUS; SUBCUTANEOUS at 07:21

## 2019-03-18 RX ADMIN — SODIUM CHLORIDE, PRESERVATIVE FREE 10 ML: 5 INJECTION INTRAVENOUS at 18:21

## 2019-03-18 RX ADMIN — LORAZEPAM 4 MG: 2 INJECTION INTRAMUSCULAR; INTRAVENOUS at 06:20

## 2019-03-18 RX ADMIN — PROMETHAZINE HYDROCHLORIDE 12.5 MG: 25 INJECTION, SOLUTION INTRAMUSCULAR; INTRAVENOUS at 20:43

## 2019-03-18 RX ADMIN — HYDROMORPHONE HYDROCHLORIDE 1 MG: 1 INJECTION, SOLUTION INTRAMUSCULAR; INTRAVENOUS; SUBCUTANEOUS at 19:15

## 2019-03-18 RX ADMIN — LURASIDONE HYDROCHLORIDE 40 MG: 40 TABLET, FILM COATED ORAL at 08:06

## 2019-03-18 RX ADMIN — ONDANSETRON 4 MG: 2 INJECTION INTRAMUSCULAR; INTRAVENOUS at 16:48

## 2019-03-18 RX ADMIN — FOLIC ACID 100 ML/HR: 5 INJECTION, SOLUTION INTRAMUSCULAR; INTRAVENOUS; SUBCUTANEOUS at 08:54

## 2019-03-18 RX ADMIN — LORAZEPAM 2 MG: 2 TABLET ORAL at 09:46

## 2019-03-18 RX ADMIN — FAMOTIDINE 20 MG: 10 INJECTION INTRAVENOUS at 08:06

## 2019-03-18 RX ADMIN — SODIUM CHLORIDE, PRESERVATIVE FREE 10 ML: 5 INJECTION INTRAVENOUS at 21:54

## 2019-03-18 RX ADMIN — LORAZEPAM 4 MG: 2 INJECTION INTRAMUSCULAR; INTRAVENOUS at 08:03

## 2019-03-18 RX ADMIN — ONDANSETRON 4 MG: 2 INJECTION INTRAMUSCULAR; INTRAVENOUS at 10:32

## 2019-03-18 RX ADMIN — HYDROMORPHONE HYDROCHLORIDE 0.5 MG: 1 INJECTION, SOLUTION INTRAMUSCULAR; INTRAVENOUS; SUBCUTANEOUS at 00:23

## 2019-03-18 RX ADMIN — LORAZEPAM 2 MG: 2 INJECTION, SOLUTION INTRAMUSCULAR; INTRAVENOUS at 14:43

## 2019-03-18 RX ADMIN — SODIUM CHLORIDE, PRESERVATIVE FREE 10 ML: 5 INJECTION INTRAVENOUS at 16:50

## 2019-03-18 RX ADMIN — LORAZEPAM 2 MG: 2 INJECTION, SOLUTION INTRAMUSCULAR; INTRAVENOUS at 18:21

## 2019-03-18 RX ADMIN — SODIUM CHLORIDE 125 ML/HR: 900 INJECTION, SOLUTION INTRAVENOUS at 00:30

## 2019-03-18 RX ADMIN — SODIUM CHLORIDE, PRESERVATIVE FREE 10 ML: 5 INJECTION INTRAVENOUS at 16:49

## 2019-03-18 RX ADMIN — HYDROMORPHONE HYDROCHLORIDE 0.5 MG: 1 INJECTION, SOLUTION INTRAMUSCULAR; INTRAVENOUS; SUBCUTANEOUS at 11:18

## 2019-03-18 RX ADMIN — LORAZEPAM 4 MG: 2 INJECTION INTRAMUSCULAR; INTRAVENOUS at 02:46

## 2019-03-18 RX ADMIN — NICOTINE 1 PATCH: 21 PATCH TRANSDERMAL at 08:06

## 2019-03-18 RX ADMIN — HYDROMORPHONE HYDROCHLORIDE 0.5 MG: 1 INJECTION, SOLUTION INTRAMUSCULAR; INTRAVENOUS; SUBCUTANEOUS at 15:16

## 2019-03-18 RX ADMIN — LORAZEPAM 4 MG: 2 INJECTION INTRAMUSCULAR; INTRAVENOUS at 03:59

## 2019-03-18 RX ADMIN — SODIUM CHLORIDE, PRESERVATIVE FREE 10 ML: 5 INJECTION INTRAVENOUS at 19:15

## 2019-03-18 RX ADMIN — LORAZEPAM 2 MG: 2 INJECTION, SOLUTION INTRAMUSCULAR; INTRAVENOUS at 12:52

## 2019-03-19 LAB
ANION GAP SERPL CALCULATED.3IONS-SCNC: 12 MMOL/L (ref 5–15)
BASOPHILS # BLD AUTO: 0.04 10*3/MM3 (ref 0–0.2)
BASOPHILS NFR BLD AUTO: 0.6 % (ref 0–1.5)
BUN BLD-MCNC: 3 MG/DL (ref 7–21)
BUN/CREAT SERPL: 8.1 (ref 7–25)
CALCIUM SPEC-SCNC: 7.8 MG/DL (ref 8.4–10.2)
CHLORIDE SERPL-SCNC: 102 MMOL/L (ref 95–110)
CO2 SERPL-SCNC: 24 MMOL/L (ref 22–31)
CREAT BLD-MCNC: 0.37 MG/DL (ref 0.5–1)
DEPRECATED RDW RBC AUTO: 45.9 FL (ref 37–54)
EOSINOPHIL # BLD AUTO: 0.12 10*3/MM3 (ref 0–0.4)
EOSINOPHIL NFR BLD AUTO: 1.9 % (ref 0.3–6.2)
ERYTHROCYTE [DISTWIDTH] IN BLOOD BY AUTOMATED COUNT: 12.3 % (ref 12.3–15.4)
GFR SERPL CREATININE-BSD FRML MDRD: 201 ML/MIN/1.73 (ref 64–149)
GLUCOSE BLD-MCNC: 56 MG/DL (ref 60–100)
HCT VFR BLD AUTO: 32.6 % (ref 34–46.6)
HGB BLD-MCNC: 11.5 G/DL (ref 12–15.9)
IMM GRANULOCYTES # BLD AUTO: 0.02 10*3/MM3 (ref 0–0.05)
IMM GRANULOCYTES NFR BLD AUTO: 0.3 % (ref 0–0.5)
LIPASE SERPL-CCNC: 759 U/L (ref 23–300)
LYMPHOCYTES # BLD AUTO: 1.76 10*3/MM3 (ref 0.7–3.1)
LYMPHOCYTES NFR BLD AUTO: 28.1 % (ref 19.6–45.3)
MCH RBC QN AUTO: 36.1 PG (ref 26.6–33)
MCHC RBC AUTO-ENTMCNC: 35.3 G/DL (ref 31.5–35.7)
MCV RBC AUTO: 102.2 FL (ref 79–97)
MONOCYTES # BLD AUTO: 0.54 10*3/MM3 (ref 0.1–0.9)
MONOCYTES NFR BLD AUTO: 8.6 % (ref 5–12)
NEUTROPHILS # BLD AUTO: 3.79 10*3/MM3 (ref 1.4–7)
NEUTROPHILS NFR BLD AUTO: 60.5 % (ref 42.7–76)
NRBC BLD AUTO-RTO: 0 /100 WBC (ref 0–0)
PLATELET # BLD AUTO: 111 10*3/MM3 (ref 140–450)
PMV BLD AUTO: 10.5 FL (ref 6–12)
POTASSIUM BLD-SCNC: 3 MMOL/L (ref 3.5–5.1)
POTASSIUM BLD-SCNC: 3.7 MMOL/L (ref 3.5–5.1)
RBC # BLD AUTO: 3.19 10*6/MM3 (ref 3.77–5.28)
SODIUM BLD-SCNC: 138 MMOL/L (ref 137–145)
WBC NRBC COR # BLD: 6.27 10*3/MM3 (ref 3.4–10.8)

## 2019-03-19 PROCEDURE — 80048 BASIC METABOLIC PNL TOTAL CA: CPT | Performed by: INTERNAL MEDICINE

## 2019-03-19 PROCEDURE — 25010000003 POTASSIUM CHLORIDE 10 MEQ/100ML SOLUTION: Performed by: INTERNAL MEDICINE

## 2019-03-19 PROCEDURE — 84132 ASSAY OF SERUM POTASSIUM: CPT | Performed by: HOSPITALIST

## 2019-03-19 PROCEDURE — 83690 ASSAY OF LIPASE: CPT | Performed by: STUDENT IN AN ORGANIZED HEALTH CARE EDUCATION/TRAINING PROGRAM

## 2019-03-19 PROCEDURE — 25010000002 HYDROMORPHONE 1 MG/ML SOLUTION: Performed by: HOSPITALIST

## 2019-03-19 PROCEDURE — 85025 COMPLETE CBC W/AUTO DIFF WBC: CPT | Performed by: INTERNAL MEDICINE

## 2019-03-19 PROCEDURE — 25010000002 ZIPRASIDONE MESYLATE PER 10 MG: Performed by: HOSPITALIST

## 2019-03-19 PROCEDURE — 25010000002 LORAZEPAM PER 2 MG: Performed by: INTERNAL MEDICINE

## 2019-03-19 RX ORDER — DEXTROSE AND SODIUM CHLORIDE 5; .9 G/100ML; G/100ML
125 INJECTION, SOLUTION INTRAVENOUS CONTINUOUS
Status: DISCONTINUED | OUTPATIENT
Start: 2019-03-19 | End: 2019-03-22

## 2019-03-19 RX ADMIN — HYDROMORPHONE HYDROCHLORIDE 1 MG: 1 INJECTION, SOLUTION INTRAMUSCULAR; INTRAVENOUS; SUBCUTANEOUS at 05:12

## 2019-03-19 RX ADMIN — POTASSIUM CHLORIDE 10 MEQ: 7.46 INJECTION, SOLUTION INTRAVENOUS at 05:58

## 2019-03-19 RX ADMIN — HYDROMORPHONE HYDROCHLORIDE 1 MG: 1 INJECTION, SOLUTION INTRAMUSCULAR; INTRAVENOUS; SUBCUTANEOUS at 21:59

## 2019-03-19 RX ADMIN — SODIUM CHLORIDE, PRESERVATIVE FREE 10 ML: 5 INJECTION INTRAVENOUS at 13:30

## 2019-03-19 RX ADMIN — POTASSIUM CHLORIDE 10 MEQ: 7.46 INJECTION, SOLUTION INTRAVENOUS at 08:30

## 2019-03-19 RX ADMIN — POTASSIUM CHLORIDE 10 MEQ: 7.46 INJECTION, SOLUTION INTRAVENOUS at 07:19

## 2019-03-19 RX ADMIN — POTASSIUM CHLORIDE 10 MEQ: 7.46 INJECTION, SOLUTION INTRAVENOUS at 12:22

## 2019-03-19 RX ADMIN — POTASSIUM CHLORIDE 10 MEQ: 7.46 INJECTION, SOLUTION INTRAVENOUS at 10:53

## 2019-03-19 RX ADMIN — NICOTINE 1 PATCH: 21 PATCH TRANSDERMAL at 08:19

## 2019-03-19 RX ADMIN — DEXTROSE AND SODIUM CHLORIDE 125 ML/HR: 5; 900 INJECTION, SOLUTION INTRAVENOUS at 06:50

## 2019-03-19 RX ADMIN — LORAZEPAM 4 MG: 2 INJECTION INTRAMUSCULAR; INTRAVENOUS at 05:12

## 2019-03-19 RX ADMIN — LORAZEPAM 4 MG: 2 INJECTION INTRAMUSCULAR; INTRAVENOUS at 02:25

## 2019-03-19 RX ADMIN — HYDROMORPHONE HYDROCHLORIDE 1 MG: 1 INJECTION, SOLUTION INTRAMUSCULAR; INTRAVENOUS; SUBCUTANEOUS at 18:02

## 2019-03-19 RX ADMIN — HYDROMORPHONE HYDROCHLORIDE 1 MG: 1 INJECTION, SOLUTION INTRAMUSCULAR; INTRAVENOUS; SUBCUTANEOUS at 14:05

## 2019-03-19 RX ADMIN — FAMOTIDINE 20 MG: 10 INJECTION INTRAVENOUS at 08:20

## 2019-03-19 RX ADMIN — DEXTROSE AND SODIUM CHLORIDE 125 ML/HR: 5; 900 INJECTION, SOLUTION INTRAVENOUS at 15:22

## 2019-03-19 RX ADMIN — FAMOTIDINE 20 MG: 10 INJECTION INTRAVENOUS at 20:55

## 2019-03-19 RX ADMIN — LORAZEPAM 2 MG: 2 INJECTION, SOLUTION INTRAMUSCULAR; INTRAVENOUS at 19:48

## 2019-03-19 RX ADMIN — LURASIDONE HYDROCHLORIDE 40 MG: 40 TABLET, FILM COATED ORAL at 08:19

## 2019-03-19 RX ADMIN — LORAZEPAM 4 MG: 2 INJECTION INTRAMUSCULAR; INTRAVENOUS at 07:27

## 2019-03-19 RX ADMIN — LORAZEPAM 4 MG: 2 INJECTION INTRAMUSCULAR; INTRAVENOUS at 03:30

## 2019-03-19 RX ADMIN — HYDROMORPHONE HYDROCHLORIDE 1 MG: 1 INJECTION, SOLUTION INTRAMUSCULAR; INTRAVENOUS; SUBCUTANEOUS at 10:16

## 2019-03-19 RX ADMIN — LORAZEPAM 4 MG: 2 INJECTION INTRAMUSCULAR; INTRAVENOUS at 00:56

## 2019-03-19 RX ADMIN — POTASSIUM CHLORIDE 10 MEQ: 7.46 INJECTION, SOLUTION INTRAVENOUS at 09:45

## 2019-03-19 RX ADMIN — SODIUM CHLORIDE, PRESERVATIVE FREE 10 ML: 5 INJECTION INTRAVENOUS at 21:50

## 2019-03-19 RX ADMIN — LORAZEPAM 2 MG: 2 INJECTION, SOLUTION INTRAMUSCULAR; INTRAVENOUS at 16:36

## 2019-03-19 RX ADMIN — ZIPRASIDONE MESYLATE 20 MG: 20 INJECTION, POWDER, LYOPHILIZED, FOR SOLUTION INTRAMUSCULAR at 20:54

## 2019-03-19 RX ADMIN — LORAZEPAM 2 MG: 2 INJECTION, SOLUTION INTRAMUSCULAR; INTRAVENOUS at 14:17

## 2019-03-19 RX ADMIN — ZIPRASIDONE MESYLATE 20 MG: 20 INJECTION, POWDER, LYOPHILIZED, FOR SOLUTION INTRAMUSCULAR at 08:19

## 2019-03-19 RX ADMIN — HYDROMORPHONE HYDROCHLORIDE 1 MG: 1 INJECTION, SOLUTION INTRAMUSCULAR; INTRAVENOUS; SUBCUTANEOUS at 00:59

## 2019-03-20 LAB
ANION GAP SERPL CALCULATED.3IONS-SCNC: 4 MMOL/L (ref 5–15)
BASOPHILS # BLD AUTO: 0.03 10*3/MM3 (ref 0–0.2)
BASOPHILS NFR BLD AUTO: 0.6 % (ref 0–1.5)
BUN BLD-MCNC: <2 MG/DL (ref 7–21)
BUN/CREAT SERPL: ABNORMAL (ref 7–25)
CALCIUM SPEC-SCNC: 7.5 MG/DL (ref 8.4–10.2)
CHLORIDE SERPL-SCNC: 108 MMOL/L (ref 95–110)
CO2 SERPL-SCNC: 24 MMOL/L (ref 22–31)
CREAT BLD-MCNC: 0.36 MG/DL (ref 0.5–1)
DEPRECATED RDW RBC AUTO: 44.2 FL (ref 37–54)
EOSINOPHIL # BLD AUTO: 0.11 10*3/MM3 (ref 0–0.4)
EOSINOPHIL NFR BLD AUTO: 2.3 % (ref 0.3–6.2)
ERYTHROCYTE [DISTWIDTH] IN BLOOD BY AUTOMATED COUNT: 12.1 % (ref 12.3–15.4)
GFR SERPL CREATININE-BSD FRML MDRD: 208 ML/MIN/1.73 (ref 64–149)
GLUCOSE BLD-MCNC: 96 MG/DL (ref 60–100)
HCT VFR BLD AUTO: 33.2 % (ref 34–46.6)
HGB BLD-MCNC: 11.9 G/DL (ref 12–15.9)
IMM GRANULOCYTES # BLD AUTO: 0.01 10*3/MM3 (ref 0–0.05)
IMM GRANULOCYTES NFR BLD AUTO: 0.2 % (ref 0–0.5)
LYMPHOCYTES # BLD AUTO: 1.61 10*3/MM3 (ref 0.7–3.1)
LYMPHOCYTES NFR BLD AUTO: 33.1 % (ref 19.6–45.3)
MCH RBC QN AUTO: 36.2 PG (ref 26.6–33)
MCHC RBC AUTO-ENTMCNC: 35.8 G/DL (ref 31.5–35.7)
MCV RBC AUTO: 100.9 FL (ref 79–97)
MONOCYTES # BLD AUTO: 0.58 10*3/MM3 (ref 0.1–0.9)
MONOCYTES NFR BLD AUTO: 11.9 % (ref 5–12)
NEUTROPHILS # BLD AUTO: 2.53 10*3/MM3 (ref 1.4–7)
NEUTROPHILS NFR BLD AUTO: 51.9 % (ref 42.7–76)
NRBC BLD AUTO-RTO: 0 /100 WBC (ref 0–0)
PLATELET # BLD AUTO: 132 10*3/MM3 (ref 140–450)
PMV BLD AUTO: 10.1 FL (ref 6–12)
POTASSIUM BLD-SCNC: 3.1 MMOL/L (ref 3.5–5.1)
POTASSIUM BLD-SCNC: 4 MMOL/L (ref 3.5–5.1)
RBC # BLD AUTO: 3.29 10*6/MM3 (ref 3.77–5.28)
SODIUM BLD-SCNC: 136 MMOL/L (ref 137–145)
WBC NRBC COR # BLD: 4.87 10*3/MM3 (ref 3.4–10.8)

## 2019-03-20 PROCEDURE — 80048 BASIC METABOLIC PNL TOTAL CA: CPT | Performed by: INTERNAL MEDICINE

## 2019-03-20 PROCEDURE — 25010000002 ZIPRASIDONE MESYLATE PER 10 MG: Performed by: HOSPITALIST

## 2019-03-20 PROCEDURE — 25010000002 HYDROMORPHONE 1 MG/ML SOLUTION: Performed by: HOSPITALIST

## 2019-03-20 PROCEDURE — 25010000003 POTASSIUM CHLORIDE 10 MEQ/100ML SOLUTION: Performed by: INTERNAL MEDICINE

## 2019-03-20 PROCEDURE — 85025 COMPLETE CBC W/AUTO DIFF WBC: CPT | Performed by: INTERNAL MEDICINE

## 2019-03-20 PROCEDURE — 25010000002 ONDANSETRON PER 1 MG: Performed by: INTERNAL MEDICINE

## 2019-03-20 PROCEDURE — 84132 ASSAY OF SERUM POTASSIUM: CPT | Performed by: INTERNAL MEDICINE

## 2019-03-20 PROCEDURE — 25010000002 LORAZEPAM PER 2 MG: Performed by: INTERNAL MEDICINE

## 2019-03-20 RX ORDER — SODIUM CHLORIDE 0.9 % (FLUSH) 0.9 %
10 SYRINGE (ML) INJECTION EVERY 12 HOURS SCHEDULED
Status: DISCONTINUED | OUTPATIENT
Start: 2019-03-20 | End: 2019-03-26 | Stop reason: HOSPADM

## 2019-03-20 RX ORDER — SODIUM CHLORIDE 0.9 % (FLUSH) 0.9 %
10 SYRINGE (ML) INJECTION AS NEEDED
Status: DISCONTINUED | OUTPATIENT
Start: 2019-03-20 | End: 2019-03-26 | Stop reason: HOSPADM

## 2019-03-20 RX ORDER — SODIUM CHLORIDE 0.9 % (FLUSH) 0.9 %
20 SYRINGE (ML) INJECTION AS NEEDED
Status: DISCONTINUED | OUTPATIENT
Start: 2019-03-20 | End: 2019-03-26 | Stop reason: HOSPADM

## 2019-03-20 RX ADMIN — NICOTINE 1 PATCH: 21 PATCH TRANSDERMAL at 08:31

## 2019-03-20 RX ADMIN — POTASSIUM CHLORIDE 10 MEQ: 7.46 INJECTION, SOLUTION INTRAVENOUS at 12:21

## 2019-03-20 RX ADMIN — LURASIDONE HYDROCHLORIDE 40 MG: 40 TABLET, FILM COATED ORAL at 08:30

## 2019-03-20 RX ADMIN — FAMOTIDINE 20 MG: 10 INJECTION INTRAVENOUS at 20:44

## 2019-03-20 RX ADMIN — POTASSIUM CHLORIDE 10 MEQ: 7.46 INJECTION, SOLUTION INTRAVENOUS at 10:37

## 2019-03-20 RX ADMIN — ONDANSETRON 4 MG: 2 INJECTION INTRAMUSCULAR; INTRAVENOUS at 08:57

## 2019-03-20 RX ADMIN — LORAZEPAM 2 MG: 2 INJECTION, SOLUTION INTRAMUSCULAR; INTRAVENOUS at 14:47

## 2019-03-20 RX ADMIN — CLONIDINE HYDROCHLORIDE 0.1 MG: 0.1 TABLET ORAL at 20:44

## 2019-03-20 RX ADMIN — LORAZEPAM 2 MG: 2 INJECTION, SOLUTION INTRAMUSCULAR; INTRAVENOUS at 07:48

## 2019-03-20 RX ADMIN — SODIUM CHLORIDE, PRESERVATIVE FREE 10 ML: 5 INJECTION INTRAVENOUS at 20:46

## 2019-03-20 RX ADMIN — FAMOTIDINE 20 MG: 10 INJECTION INTRAVENOUS at 08:30

## 2019-03-20 RX ADMIN — HYDROMORPHONE HYDROCHLORIDE 1 MG: 1 INJECTION, SOLUTION INTRAMUSCULAR; INTRAVENOUS; SUBCUTANEOUS at 20:45

## 2019-03-20 RX ADMIN — SODIUM CHLORIDE, PRESERVATIVE FREE 10 ML: 5 INJECTION INTRAVENOUS at 20:47

## 2019-03-20 RX ADMIN — HYDROMORPHONE HYDROCHLORIDE 1 MG: 1 INJECTION, SOLUTION INTRAMUSCULAR; INTRAVENOUS; SUBCUTANEOUS at 07:47

## 2019-03-20 RX ADMIN — SODIUM CHLORIDE, PRESERVATIVE FREE 10 ML: 5 INJECTION INTRAVENOUS at 20:45

## 2019-03-20 RX ADMIN — POTASSIUM CHLORIDE 10 MEQ: 7.46 INJECTION, SOLUTION INTRAVENOUS at 07:33

## 2019-03-20 RX ADMIN — LORAZEPAM 1 MG: 1 TABLET ORAL at 18:03

## 2019-03-20 RX ADMIN — POTASSIUM CHLORIDE 10 MEQ: 7.46 INJECTION, SOLUTION INTRAVENOUS at 09:27

## 2019-03-20 RX ADMIN — ZIPRASIDONE MESYLATE 20 MG: 20 INJECTION, POWDER, LYOPHILIZED, FOR SOLUTION INTRAMUSCULAR at 08:57

## 2019-03-20 RX ADMIN — POTASSIUM CHLORIDE 10 MEQ: 7.46 INJECTION, SOLUTION INTRAVENOUS at 08:28

## 2019-03-20 RX ADMIN — ZIPRASIDONE MESYLATE 20 MG: 20 INJECTION, POWDER, LYOPHILIZED, FOR SOLUTION INTRAMUSCULAR at 20:44

## 2019-03-20 RX ADMIN — HYDROMORPHONE HYDROCHLORIDE 1 MG: 1 INJECTION, SOLUTION INTRAMUSCULAR; INTRAVENOUS; SUBCUTANEOUS at 16:21

## 2019-03-20 RX ADMIN — SODIUM CHLORIDE 125 ML/HR: 900 INJECTION, SOLUTION INTRAVENOUS at 20:47

## 2019-03-20 RX ADMIN — SODIUM CHLORIDE, PRESERVATIVE FREE 10 ML: 5 INJECTION INTRAVENOUS at 08:31

## 2019-03-20 RX ADMIN — DEXTROSE AND SODIUM CHLORIDE 125 ML/HR: 5; 900 INJECTION, SOLUTION INTRAVENOUS at 08:29

## 2019-03-21 ENCOUNTER — APPOINTMENT (OUTPATIENT)
Dept: CT IMAGING | Facility: HOSPITAL | Age: 34
End: 2019-03-21

## 2019-03-21 LAB
ANION GAP SERPL CALCULATED.3IONS-SCNC: 8 MMOL/L (ref 5–15)
BASOPHILS # BLD AUTO: 0.04 10*3/MM3 (ref 0–0.2)
BASOPHILS NFR BLD AUTO: 0.7 % (ref 0–1.5)
BUN BLD-MCNC: <2 MG/DL (ref 7–21)
BUN/CREAT SERPL: ABNORMAL (ref 7–25)
C DIFF TOX GENS STL QL NAA+PROBE: NEGATIVE
CALCIUM SPEC-SCNC: 8.4 MG/DL (ref 8.4–10.2)
CHLORIDE SERPL-SCNC: 104 MMOL/L (ref 95–110)
CO2 SERPL-SCNC: 23 MMOL/L (ref 22–31)
CREAT BLD-MCNC: 0.37 MG/DL (ref 0.5–1)
DEPRECATED RDW RBC AUTO: 44.2 FL (ref 37–54)
EOSINOPHIL # BLD AUTO: 0.07 10*3/MM3 (ref 0–0.4)
EOSINOPHIL NFR BLD AUTO: 1.2 % (ref 0.3–6.2)
ERYTHROCYTE [DISTWIDTH] IN BLOOD BY AUTOMATED COUNT: 12.1 % (ref 12.3–15.4)
GFR SERPL CREATININE-BSD FRML MDRD: 201 ML/MIN/1.73 (ref 64–149)
GLUCOSE BLD-MCNC: 92 MG/DL (ref 60–100)
HCT VFR BLD AUTO: 35.2 % (ref 34–46.6)
HGB BLD-MCNC: 12.4 G/DL (ref 12–15.9)
IMM GRANULOCYTES # BLD AUTO: 0.02 10*3/MM3 (ref 0–0.05)
IMM GRANULOCYTES NFR BLD AUTO: 0.4 % (ref 0–0.5)
LIPASE SERPL-CCNC: 1424 U/L (ref 23–300)
LYMPHOCYTES # BLD AUTO: 1.8 10*3/MM3 (ref 0.7–3.1)
LYMPHOCYTES NFR BLD AUTO: 31.5 % (ref 19.6–45.3)
MCH RBC QN AUTO: 35 PG (ref 26.6–33)
MCHC RBC AUTO-ENTMCNC: 35.2 G/DL (ref 31.5–35.7)
MCV RBC AUTO: 99.4 FL (ref 79–97)
MONOCYTES # BLD AUTO: 0.83 10*3/MM3 (ref 0.1–0.9)
MONOCYTES NFR BLD AUTO: 14.5 % (ref 5–12)
NEUTROPHILS # BLD AUTO: 2.95 10*3/MM3 (ref 1.4–7)
NEUTROPHILS NFR BLD AUTO: 51.7 % (ref 42.7–76)
NRBC BLD AUTO-RTO: 0 /100 WBC (ref 0–0)
PLATELET # BLD AUTO: 142 10*3/MM3 (ref 140–450)
PMV BLD AUTO: 10.1 FL (ref 6–12)
POTASSIUM BLD-SCNC: 3.4 MMOL/L (ref 3.5–5.1)
RBC # BLD AUTO: 3.54 10*6/MM3 (ref 3.77–5.28)
SODIUM BLD-SCNC: 135 MMOL/L (ref 137–145)
WBC NRBC COR # BLD: 5.71 10*3/MM3 (ref 3.4–10.8)

## 2019-03-21 PROCEDURE — 25010000002 ZIPRASIDONE MESYLATE PER 10 MG: Performed by: HOSPITALIST

## 2019-03-21 PROCEDURE — 25010000003 POTASSIUM CHLORIDE 10 MEQ/100ML SOLUTION: Performed by: INTERNAL MEDICINE

## 2019-03-21 PROCEDURE — 83690 ASSAY OF LIPASE: CPT | Performed by: FAMILY MEDICINE

## 2019-03-21 PROCEDURE — 85025 COMPLETE CBC W/AUTO DIFF WBC: CPT | Performed by: INTERNAL MEDICINE

## 2019-03-21 PROCEDURE — 25010000002 HYDROMORPHONE 1 MG/ML SOLUTION: Performed by: FAMILY MEDICINE

## 2019-03-21 PROCEDURE — 74177 CT ABD & PELVIS W/CONTRAST: CPT

## 2019-03-21 PROCEDURE — 25010000002 LORAZEPAM PER 2 MG: Performed by: INTERNAL MEDICINE

## 2019-03-21 PROCEDURE — 87493 C DIFF AMPLIFIED PROBE: CPT | Performed by: PHYSICIAN ASSISTANT

## 2019-03-21 PROCEDURE — 25010000002 IOPAMIDOL 61 % SOLUTION: Performed by: INTERNAL MEDICINE

## 2019-03-21 PROCEDURE — 25010000002 ONDANSETRON PER 1 MG: Performed by: INTERNAL MEDICINE

## 2019-03-21 PROCEDURE — 25010000002 HYDROMORPHONE 1 MG/ML SOLUTION: Performed by: HOSPITALIST

## 2019-03-21 PROCEDURE — 80048 BASIC METABOLIC PNL TOTAL CA: CPT | Performed by: INTERNAL MEDICINE

## 2019-03-21 RX ORDER — L. ACIDOPHILUS/L.BULGARICUS 1MM CELL
1 TABLET ORAL DAILY
Status: DISCONTINUED | OUTPATIENT
Start: 2019-03-21 | End: 2019-03-26 | Stop reason: HOSPADM

## 2019-03-21 RX ORDER — LORAZEPAM 1 MG/1
1 TABLET ORAL EVERY 8 HOURS
Status: DISCONTINUED | OUTPATIENT
Start: 2019-03-21 | End: 2019-03-22

## 2019-03-21 RX ADMIN — NICOTINE 1 PATCH: 21 PATCH TRANSDERMAL at 08:16

## 2019-03-21 RX ADMIN — LORAZEPAM 1 MG: 2 INJECTION, SOLUTION INTRAMUSCULAR; INTRAVENOUS at 13:41

## 2019-03-21 RX ADMIN — HYDROMORPHONE HYDROCHLORIDE 1 MG: 1 INJECTION, SOLUTION INTRAMUSCULAR; INTRAVENOUS; SUBCUTANEOUS at 16:56

## 2019-03-21 RX ADMIN — FAMOTIDINE 20 MG: 10 INJECTION INTRAVENOUS at 20:13

## 2019-03-21 RX ADMIN — SODIUM CHLORIDE, PRESERVATIVE FREE 10 ML: 5 INJECTION INTRAVENOUS at 20:16

## 2019-03-21 RX ADMIN — LORAZEPAM 1 MG: 2 INJECTION, SOLUTION INTRAMUSCULAR; INTRAVENOUS at 10:38

## 2019-03-21 RX ADMIN — POTASSIUM CHLORIDE 10 MEQ: 7.46 INJECTION, SOLUTION INTRAVENOUS at 17:38

## 2019-03-21 RX ADMIN — SODIUM CHLORIDE 125 ML/HR: 900 INJECTION, SOLUTION INTRAVENOUS at 04:58

## 2019-03-21 RX ADMIN — POTASSIUM CHLORIDE 10 MEQ: 7.46 INJECTION, SOLUTION INTRAVENOUS at 18:42

## 2019-03-21 RX ADMIN — SODIUM CHLORIDE, PRESERVATIVE FREE 10 ML: 5 INJECTION INTRAVENOUS at 20:13

## 2019-03-21 RX ADMIN — SODIUM CHLORIDE, PRESERVATIVE FREE 10 ML: 5 INJECTION INTRAVENOUS at 08:19

## 2019-03-21 RX ADMIN — LORAZEPAM 1 MG: 2 INJECTION, SOLUTION INTRAMUSCULAR; INTRAVENOUS at 21:09

## 2019-03-21 RX ADMIN — HYDROMORPHONE HYDROCHLORIDE 1 MG: 1 INJECTION, SOLUTION INTRAMUSCULAR; INTRAVENOUS; SUBCUTANEOUS at 13:25

## 2019-03-21 RX ADMIN — POTASSIUM CHLORIDE 10 MEQ: 7.46 INJECTION, SOLUTION INTRAVENOUS at 14:01

## 2019-03-21 RX ADMIN — IOPAMIDOL 80 ML: 612 INJECTION, SOLUTION INTRAVENOUS at 16:13

## 2019-03-21 RX ADMIN — HYDROMORPHONE HYDROCHLORIDE 1 MG: 1 INJECTION, SOLUTION INTRAMUSCULAR; INTRAVENOUS; SUBCUTANEOUS at 09:21

## 2019-03-21 RX ADMIN — ZIPRASIDONE MESYLATE 20 MG: 20 INJECTION, POWDER, LYOPHILIZED, FOR SOLUTION INTRAMUSCULAR at 10:37

## 2019-03-21 RX ADMIN — HYDROMORPHONE HYDROCHLORIDE 1 MG: 1 INJECTION, SOLUTION INTRAMUSCULAR; INTRAVENOUS; SUBCUTANEOUS at 04:58

## 2019-03-21 RX ADMIN — FAMOTIDINE 20 MG: 10 INJECTION INTRAVENOUS at 08:14

## 2019-03-21 RX ADMIN — LORAZEPAM 1 MG: 1 TABLET ORAL at 15:27

## 2019-03-21 RX ADMIN — LORAZEPAM 1 MG: 1 TABLET ORAL at 08:12

## 2019-03-21 RX ADMIN — LORAZEPAM 1 MG: 2 INJECTION, SOLUTION INTRAMUSCULAR; INTRAVENOUS at 17:03

## 2019-03-21 RX ADMIN — DEXTROSE AND SODIUM CHLORIDE 125 ML/HR: 5; 900 INJECTION, SOLUTION INTRAVENOUS at 13:59

## 2019-03-21 RX ADMIN — HYDROMORPHONE HYDROCHLORIDE 1 MG: 1 INJECTION, SOLUTION INTRAMUSCULAR; INTRAVENOUS; SUBCUTANEOUS at 20:13

## 2019-03-21 RX ADMIN — LURASIDONE HYDROCHLORIDE 40 MG: 40 TABLET, FILM COATED ORAL at 20:12

## 2019-03-21 RX ADMIN — POTASSIUM CHLORIDE 10 MEQ: 7.46 INJECTION, SOLUTION INTRAVENOUS at 15:04

## 2019-03-21 RX ADMIN — LORAZEPAM 1 MG: 1 TABLET ORAL at 22:24

## 2019-03-21 RX ADMIN — LACTOBACILLUS TAB 1 TABLET: TAB at 08:12

## 2019-03-21 RX ADMIN — SODIUM CHLORIDE, PRESERVATIVE FREE 10 ML: 5 INJECTION INTRAVENOUS at 08:15

## 2019-03-21 RX ADMIN — LORAZEPAM 1 MG: 1 TABLET ORAL at 05:12

## 2019-03-21 RX ADMIN — SODIUM CHLORIDE, PRESERVATIVE FREE 10 ML: 5 INJECTION INTRAVENOUS at 20:17

## 2019-03-21 RX ADMIN — LORAZEPAM 1 MG: 2 INJECTION, SOLUTION INTRAMUSCULAR; INTRAVENOUS at 19:02

## 2019-03-21 RX ADMIN — ONDANSETRON 4 MG: 2 INJECTION INTRAMUSCULAR; INTRAVENOUS at 20:23

## 2019-03-22 LAB
ALBUMIN SERPL-MCNC: 3.2 G/DL (ref 3.4–4.8)
ALBUMIN/GLOB SERPL: 1.2 G/DL (ref 1.1–1.8)
ALP SERPL-CCNC: 61 U/L (ref 38–126)
ALT SERPL W P-5'-P-CCNC: 35 U/L (ref 9–52)
ANION GAP SERPL CALCULATED.3IONS-SCNC: 7 MMOL/L (ref 5–15)
AST SERPL-CCNC: 44 U/L (ref 14–36)
BASOPHILS # BLD AUTO: 0.04 10*3/MM3 (ref 0–0.2)
BASOPHILS NFR BLD AUTO: 0.7 % (ref 0–1.5)
BILIRUB SERPL-MCNC: 0.4 MG/DL (ref 0.2–1.3)
BUN BLD-MCNC: <2 MG/DL (ref 7–21)
BUN/CREAT SERPL: ABNORMAL (ref 7–25)
CALCIUM SPEC-SCNC: 8.8 MG/DL (ref 8.4–10.2)
CHLORIDE SERPL-SCNC: 102 MMOL/L (ref 95–110)
CO2 SERPL-SCNC: 25 MMOL/L (ref 22–31)
CREAT BLD-MCNC: 0.41 MG/DL (ref 0.5–1)
DEPRECATED RDW RBC AUTO: 44.2 FL (ref 37–54)
EOSINOPHIL # BLD AUTO: 0.07 10*3/MM3 (ref 0–0.4)
EOSINOPHIL NFR BLD AUTO: 1.3 % (ref 0.3–6.2)
ERYTHROCYTE [DISTWIDTH] IN BLOOD BY AUTOMATED COUNT: 12.3 % (ref 12.3–15.4)
GFR SERPL CREATININE-BSD FRML MDRD: 179 ML/MIN/1.73 (ref 64–149)
GLOBULIN UR ELPH-MCNC: 2.7 GM/DL (ref 2.3–3.5)
GLUCOSE BLD-MCNC: 97 MG/DL (ref 60–100)
HCT VFR BLD AUTO: 33.1 % (ref 34–46.6)
HGB BLD-MCNC: 12 G/DL (ref 12–15.9)
HOLD SPECIMEN: NORMAL
IMM GRANULOCYTES # BLD AUTO: 0.01 10*3/MM3 (ref 0–0.05)
IMM GRANULOCYTES NFR BLD AUTO: 0.2 % (ref 0–0.5)
LYMPHOCYTES # BLD AUTO: 1.82 10*3/MM3 (ref 0.7–3.1)
LYMPHOCYTES NFR BLD AUTO: 32.6 % (ref 19.6–45.3)
MAGNESIUM SERPL-MCNC: 1.2 MG/DL (ref 1.6–2.3)
MCH RBC QN AUTO: 36 PG (ref 26.6–33)
MCHC RBC AUTO-ENTMCNC: 36.3 G/DL (ref 31.5–35.7)
MCV RBC AUTO: 99.4 FL (ref 79–97)
MONOCYTES # BLD AUTO: 0.77 10*3/MM3 (ref 0.1–0.9)
MONOCYTES NFR BLD AUTO: 13.8 % (ref 5–12)
NEUTROPHILS # BLD AUTO: 2.87 10*3/MM3 (ref 1.4–7)
NEUTROPHILS NFR BLD AUTO: 51.4 % (ref 42.7–76)
NRBC BLD AUTO-RTO: 0 /100 WBC (ref 0–0)
PLATELET # BLD AUTO: 156 10*3/MM3 (ref 140–450)
PMV BLD AUTO: 10.2 FL (ref 6–12)
POTASSIUM BLD-SCNC: 3.4 MMOL/L (ref 3.5–5.1)
POTASSIUM BLD-SCNC: 3.8 MMOL/L (ref 3.5–5.1)
PROT SERPL-MCNC: 5.9 G/DL (ref 6.3–8.6)
RBC # BLD AUTO: 3.33 10*6/MM3 (ref 3.77–5.28)
SODIUM BLD-SCNC: 134 MMOL/L (ref 137–145)
WBC NRBC COR # BLD: 5.58 10*3/MM3 (ref 3.4–10.8)

## 2019-03-22 PROCEDURE — 84132 ASSAY OF SERUM POTASSIUM: CPT | Performed by: PHYSICIAN ASSISTANT

## 2019-03-22 PROCEDURE — 99254 IP/OBS CNSLTJ NEW/EST MOD 60: CPT | Performed by: PSYCHIATRY & NEUROLOGY

## 2019-03-22 PROCEDURE — 83735 ASSAY OF MAGNESIUM: CPT | Performed by: FAMILY MEDICINE

## 2019-03-22 PROCEDURE — 25010000002 HYDROMORPHONE 1 MG/ML SOLUTION: Performed by: FAMILY MEDICINE

## 2019-03-22 PROCEDURE — 25010000002 LORAZEPAM PER 2 MG: Performed by: INTERNAL MEDICINE

## 2019-03-22 PROCEDURE — 25010000002 MAGNESIUM SULFATE 2 GM/50ML SOLUTION: Performed by: INTERNAL MEDICINE

## 2019-03-22 PROCEDURE — 25010000003 POTASSIUM CHLORIDE 10 MEQ/100ML SOLUTION: Performed by: INTERNAL MEDICINE

## 2019-03-22 PROCEDURE — 85025 COMPLETE CBC W/AUTO DIFF WBC: CPT | Performed by: INTERNAL MEDICINE

## 2019-03-22 PROCEDURE — 80053 COMPREHEN METABOLIC PANEL: CPT | Performed by: PHYSICIAN ASSISTANT

## 2019-03-22 RX ORDER — THIAMINE MONONITRATE (VIT B1) 100 MG
100 TABLET ORAL DAILY
Status: DISCONTINUED | OUTPATIENT
Start: 2019-03-22 | End: 2019-03-26 | Stop reason: HOSPADM

## 2019-03-22 RX ORDER — DIAZEPAM 5 MG/1
10 TABLET ORAL EVERY 6 HOURS PRN
Status: DISPENSED | OUTPATIENT
Start: 2019-03-22 | End: 2019-03-24

## 2019-03-22 RX ORDER — DIAZEPAM 5 MG/1
10 TABLET ORAL EVERY 6 HOURS
Status: COMPLETED | OUTPATIENT
Start: 2019-03-22 | End: 2019-03-23

## 2019-03-22 RX ORDER — DIAZEPAM 5 MG/1
10 TABLET ORAL EVERY 6 HOURS PRN
Status: ACTIVE | OUTPATIENT
Start: 2019-03-24 | End: 2019-03-25

## 2019-03-22 RX ORDER — DIAZEPAM 5 MG/1
10 TABLET ORAL DAILY
Status: COMPLETED | OUTPATIENT
Start: 2019-03-25 | End: 2019-03-25

## 2019-03-22 RX ORDER — DIAZEPAM 5 MG/1
10 TABLET ORAL EVERY 12 HOURS
Status: COMPLETED | OUTPATIENT
Start: 2019-03-24 | End: 2019-03-25

## 2019-03-22 RX ORDER — DIAZEPAM 5 MG/1
10 TABLET ORAL EVERY 8 HOURS
Status: DISPENSED | OUTPATIENT
Start: 2019-03-23 | End: 2019-03-24

## 2019-03-22 RX ADMIN — Medication 100 MG: at 18:44

## 2019-03-22 RX ADMIN — MAGNESIUM SULFATE HEPTAHYDRATE 2 G: 40 INJECTION, SOLUTION INTRAVENOUS at 11:40

## 2019-03-22 RX ADMIN — HYDROMORPHONE HYDROCHLORIDE 1 MG: 1 INJECTION, SOLUTION INTRAMUSCULAR; INTRAVENOUS; SUBCUTANEOUS at 09:10

## 2019-03-22 RX ADMIN — LACTOBACILLUS TAB 1 TABLET: TAB at 08:59

## 2019-03-22 RX ADMIN — HYDROMORPHONE HYDROCHLORIDE 1 MG: 1 INJECTION, SOLUTION INTRAMUSCULAR; INTRAVENOUS; SUBCUTANEOUS at 06:13

## 2019-03-22 RX ADMIN — DIAZEPAM 10 MG: 5 TABLET ORAL at 22:31

## 2019-03-22 RX ADMIN — HYDROMORPHONE HYDROCHLORIDE 1 MG: 1 INJECTION, SOLUTION INTRAMUSCULAR; INTRAVENOUS; SUBCUTANEOUS at 15:23

## 2019-03-22 RX ADMIN — LORAZEPAM 1 MG: 1 TABLET ORAL at 13:54

## 2019-03-22 RX ADMIN — SODIUM CHLORIDE, PRESERVATIVE FREE 10 ML: 5 INJECTION INTRAVENOUS at 21:09

## 2019-03-22 RX ADMIN — POTASSIUM CHLORIDE 10 MEQ: 7.46 INJECTION, SOLUTION INTRAVENOUS at 06:14

## 2019-03-22 RX ADMIN — SODIUM CHLORIDE, PRESERVATIVE FREE 10 ML: 5 INJECTION INTRAVENOUS at 21:10

## 2019-03-22 RX ADMIN — FAMOTIDINE 20 MG: 10 INJECTION INTRAVENOUS at 08:59

## 2019-03-22 RX ADMIN — LORAZEPAM 1 MG: 2 INJECTION, SOLUTION INTRAMUSCULAR; INTRAVENOUS at 04:33

## 2019-03-22 RX ADMIN — HYDROMORPHONE HYDROCHLORIDE 1 MG: 1 INJECTION, SOLUTION INTRAMUSCULAR; INTRAVENOUS; SUBCUTANEOUS at 12:15

## 2019-03-22 RX ADMIN — MAGNESIUM SULFATE HEPTAHYDRATE 2 G: 40 INJECTION, SOLUTION INTRAVENOUS at 14:41

## 2019-03-22 RX ADMIN — POTASSIUM CHLORIDE 10 MEQ: 7.46 INJECTION, SOLUTION INTRAVENOUS at 08:59

## 2019-03-22 RX ADMIN — NICOTINE 1 PATCH: 21 PATCH TRANSDERMAL at 08:59

## 2019-03-22 RX ADMIN — HYDROMORPHONE HYDROCHLORIDE 1 MG: 1 INJECTION, SOLUTION INTRAMUSCULAR; INTRAVENOUS; SUBCUTANEOUS at 18:40

## 2019-03-22 RX ADMIN — DIAZEPAM 10 MG: 5 TABLET ORAL at 18:44

## 2019-03-22 RX ADMIN — LORAZEPAM 2 MG: 2 INJECTION, SOLUTION INTRAMUSCULAR; INTRAVENOUS at 14:47

## 2019-03-22 RX ADMIN — LORAZEPAM 2 MG: 2 INJECTION, SOLUTION INTRAMUSCULAR; INTRAVENOUS at 17:49

## 2019-03-22 RX ADMIN — FAMOTIDINE 20 MG: 10 INJECTION INTRAVENOUS at 21:10

## 2019-03-22 RX ADMIN — LORAZEPAM 2 MG: 2 INJECTION, SOLUTION INTRAMUSCULAR; INTRAVENOUS at 11:35

## 2019-03-22 RX ADMIN — POTASSIUM CHLORIDE 10 MEQ: 7.46 INJECTION, SOLUTION INTRAVENOUS at 10:03

## 2019-03-22 RX ADMIN — LORAZEPAM 1 MG: 1 TABLET ORAL at 06:13

## 2019-03-22 RX ADMIN — HYDROMORPHONE HYDROCHLORIDE 1 MG: 1 INJECTION, SOLUTION INTRAMUSCULAR; INTRAVENOUS; SUBCUTANEOUS at 02:44

## 2019-03-22 RX ADMIN — MAGNESIUM SULFATE HEPTAHYDRATE 2 G: 40 INJECTION, SOLUTION INTRAVENOUS at 10:00

## 2019-03-22 RX ADMIN — SODIUM CHLORIDE, PRESERVATIVE FREE 10 ML: 5 INJECTION INTRAVENOUS at 09:00

## 2019-03-22 RX ADMIN — HYDROMORPHONE HYDROCHLORIDE 1 MG: 1 INJECTION, SOLUTION INTRAMUSCULAR; INTRAVENOUS; SUBCUTANEOUS at 21:16

## 2019-03-23 LAB
ANION GAP SERPL CALCULATED.3IONS-SCNC: 10 MMOL/L (ref 5–15)
BASOPHILS # BLD AUTO: 0.04 10*3/MM3 (ref 0–0.2)
BASOPHILS NFR BLD AUTO: 0.7 % (ref 0–1.5)
BUN BLD-MCNC: 3 MG/DL (ref 7–21)
BUN/CREAT SERPL: 7.1 (ref 7–25)
CALCIUM SPEC-SCNC: 9.4 MG/DL (ref 8.4–10.2)
CHLORIDE SERPL-SCNC: 99 MMOL/L (ref 95–110)
CO2 SERPL-SCNC: 24 MMOL/L (ref 22–31)
CREAT BLD-MCNC: 0.42 MG/DL (ref 0.5–1)
DEPRECATED RDW RBC AUTO: 44.2 FL (ref 37–54)
EOSINOPHIL # BLD AUTO: 0.07 10*3/MM3 (ref 0–0.4)
EOSINOPHIL NFR BLD AUTO: 1.3 % (ref 0.3–6.2)
ERYTHROCYTE [DISTWIDTH] IN BLOOD BY AUTOMATED COUNT: 12.2 % (ref 12.3–15.4)
GFR SERPL CREATININE-BSD FRML MDRD: 174 ML/MIN/1.73 (ref 64–149)
GLUCOSE BLD-MCNC: 90 MG/DL (ref 60–100)
HCT VFR BLD AUTO: 32.5 % (ref 34–46.6)
HGB BLD-MCNC: 11.9 G/DL (ref 12–15.9)
IMM GRANULOCYTES # BLD AUTO: 0.01 10*3/MM3 (ref 0–0.05)
IMM GRANULOCYTES NFR BLD AUTO: 0.2 % (ref 0–0.5)
LIPASE SERPL-CCNC: 1035 U/L (ref 23–300)
LYMPHOCYTES # BLD AUTO: 1.29 10*3/MM3 (ref 0.7–3.1)
LYMPHOCYTES NFR BLD AUTO: 24.2 % (ref 19.6–45.3)
MCH RBC QN AUTO: 36.4 PG (ref 26.6–33)
MCHC RBC AUTO-ENTMCNC: 36.6 G/DL (ref 31.5–35.7)
MCV RBC AUTO: 99.4 FL (ref 79–97)
MONOCYTES # BLD AUTO: 1.01 10*3/MM3 (ref 0.1–0.9)
MONOCYTES NFR BLD AUTO: 18.9 % (ref 5–12)
NEUTROPHILS # BLD AUTO: 2.92 10*3/MM3 (ref 1.4–7)
NEUTROPHILS NFR BLD AUTO: 54.7 % (ref 42.7–76)
NRBC BLD AUTO-RTO: 0 /100 WBC (ref 0–0)
PLATELET # BLD AUTO: 200 10*3/MM3 (ref 140–450)
PMV BLD AUTO: 10 FL (ref 6–12)
POTASSIUM BLD-SCNC: 3.8 MMOL/L (ref 3.5–5.1)
RBC # BLD AUTO: 3.27 10*6/MM3 (ref 3.77–5.28)
SODIUM BLD-SCNC: 133 MMOL/L (ref 137–145)
WBC NRBC COR # BLD: 5.34 10*3/MM3 (ref 3.4–10.8)

## 2019-03-23 PROCEDURE — 99232 SBSQ HOSP IP/OBS MODERATE 35: CPT | Performed by: PSYCHIATRY & NEUROLOGY

## 2019-03-23 PROCEDURE — 80048 BASIC METABOLIC PNL TOTAL CA: CPT | Performed by: INTERNAL MEDICINE

## 2019-03-23 PROCEDURE — 85025 COMPLETE CBC W/AUTO DIFF WBC: CPT | Performed by: INTERNAL MEDICINE

## 2019-03-23 PROCEDURE — 83690 ASSAY OF LIPASE: CPT | Performed by: PHYSICIAN ASSISTANT

## 2019-03-23 PROCEDURE — 25010000002 HYDROMORPHONE 1 MG/ML SOLUTION: Performed by: FAMILY MEDICINE

## 2019-03-23 RX ORDER — ACETAMINOPHEN 325 MG/1
325 TABLET ORAL EVERY 6 HOURS PRN
Status: DISCONTINUED | OUTPATIENT
Start: 2019-03-23 | End: 2019-03-26 | Stop reason: HOSPADM

## 2019-03-23 RX ORDER — OXYCODONE AND ACETAMINOPHEN 10; 325 MG/1; MG/1
1 TABLET ORAL EVERY 4 HOURS PRN
Status: DISCONTINUED | OUTPATIENT
Start: 2019-03-23 | End: 2019-03-25

## 2019-03-23 RX ADMIN — OXYCODONE HYDROCHLORIDE AND ACETAMINOPHEN 1 TABLET: 10; 325 TABLET ORAL at 13:56

## 2019-03-23 RX ADMIN — DIAZEPAM 10 MG: 5 TABLET ORAL at 16:46

## 2019-03-23 RX ADMIN — SODIUM CHLORIDE, PRESERVATIVE FREE 10 ML: 5 INJECTION INTRAVENOUS at 21:38

## 2019-03-23 RX ADMIN — DIAZEPAM 10 MG: 5 TABLET ORAL at 08:26

## 2019-03-23 RX ADMIN — DIAZEPAM 10 MG: 5 TABLET ORAL at 01:24

## 2019-03-23 RX ADMIN — FAMOTIDINE 20 MG: 10 INJECTION INTRAVENOUS at 21:37

## 2019-03-23 RX ADMIN — HYDROMORPHONE HYDROCHLORIDE 1 MG: 1 INJECTION, SOLUTION INTRAMUSCULAR; INTRAVENOUS; SUBCUTANEOUS at 01:27

## 2019-03-23 RX ADMIN — DIAZEPAM 10 MG: 5 TABLET ORAL at 06:23

## 2019-03-23 RX ADMIN — SODIUM CHLORIDE, PRESERVATIVE FREE 10 ML: 5 INJECTION INTRAVENOUS at 08:27

## 2019-03-23 RX ADMIN — ACETAMINOPHEN 325 MG: 325 TABLET, FILM COATED ORAL at 04:43

## 2019-03-23 RX ADMIN — OXYCODONE HYDROCHLORIDE AND ACETAMINOPHEN 1 TABLET: 10; 325 TABLET ORAL at 22:35

## 2019-03-23 RX ADMIN — HYDROMORPHONE HYDROCHLORIDE 1 MG: 1 INJECTION, SOLUTION INTRAMUSCULAR; INTRAVENOUS; SUBCUTANEOUS at 04:34

## 2019-03-23 RX ADMIN — DIAZEPAM 10 MG: 5 TABLET ORAL at 19:27

## 2019-03-23 RX ADMIN — OXYCODONE HYDROCHLORIDE AND ACETAMINOPHEN 1 TABLET: 10; 325 TABLET ORAL at 18:22

## 2019-03-23 RX ADMIN — HYDROMORPHONE HYDROCHLORIDE 1 MG: 1 INJECTION, SOLUTION INTRAMUSCULAR; INTRAVENOUS; SUBCUTANEOUS at 07:35

## 2019-03-23 RX ADMIN — LACTOBACILLUS TAB 1 TABLET: TAB at 08:27

## 2019-03-23 RX ADMIN — NICOTINE 1 PATCH: 21 PATCH TRANSDERMAL at 08:26

## 2019-03-23 RX ADMIN — HYDROMORPHONE HYDROCHLORIDE 1 MG: 1 INJECTION, SOLUTION INTRAMUSCULAR; INTRAVENOUS; SUBCUTANEOUS at 11:06

## 2019-03-23 RX ADMIN — FAMOTIDINE 20 MG: 10 INJECTION INTRAVENOUS at 08:26

## 2019-03-23 RX ADMIN — DIAZEPAM 10 MG: 5 TABLET ORAL at 12:16

## 2019-03-23 RX ADMIN — Medication 100 MG: at 08:26

## 2019-03-24 LAB
ANION GAP SERPL CALCULATED.3IONS-SCNC: 14 MMOL/L (ref 5–15)
BASOPHILS # BLD AUTO: 0.08 10*3/MM3 (ref 0–0.2)
BASOPHILS NFR BLD AUTO: 1.1 % (ref 0–1.5)
BUN BLD-MCNC: 8 MG/DL (ref 7–21)
BUN/CREAT SERPL: 13.3 (ref 7–25)
CALCIUM SPEC-SCNC: 10 MG/DL (ref 8.4–10.2)
CHLORIDE SERPL-SCNC: 99 MMOL/L (ref 95–110)
CO2 SERPL-SCNC: 24 MMOL/L (ref 22–31)
CREAT BLD-MCNC: 0.6 MG/DL (ref 0.5–1)
DEPRECATED RDW RBC AUTO: 47.1 FL (ref 37–54)
EOSINOPHIL # BLD AUTO: 0.1 10*3/MM3 (ref 0–0.4)
EOSINOPHIL NFR BLD AUTO: 1.3 % (ref 0.3–6.2)
ERYTHROCYTE [DISTWIDTH] IN BLOOD BY AUTOMATED COUNT: 12.6 % (ref 12.3–15.4)
GFR SERPL CREATININE-BSD FRML MDRD: 115 ML/MIN/1.73 (ref 64–149)
GLUCOSE BLD-MCNC: 96 MG/DL (ref 60–100)
HCT VFR BLD AUTO: 38.6 % (ref 34–46.6)
HGB BLD-MCNC: 13.6 G/DL (ref 12–15.9)
IMM GRANULOCYTES # BLD AUTO: 0.03 10*3/MM3 (ref 0–0.05)
IMM GRANULOCYTES NFR BLD AUTO: 0.4 % (ref 0–0.5)
LYMPHOCYTES # BLD AUTO: 2.7 10*3/MM3 (ref 0.7–3.1)
LYMPHOCYTES NFR BLD AUTO: 36 % (ref 19.6–45.3)
MCH RBC QN AUTO: 36.1 PG (ref 26.6–33)
MCHC RBC AUTO-ENTMCNC: 35.2 G/DL (ref 31.5–35.7)
MCV RBC AUTO: 102.4 FL (ref 79–97)
MONOCYTES # BLD AUTO: 1.35 10*3/MM3 (ref 0.1–0.9)
MONOCYTES NFR BLD AUTO: 18 % (ref 5–12)
NEUTROPHILS # BLD AUTO: 3.24 10*3/MM3 (ref 1.4–7)
NEUTROPHILS NFR BLD AUTO: 43.2 % (ref 42.7–76)
NRBC BLD AUTO-RTO: 0 /100 WBC (ref 0–0)
PLATELET # BLD AUTO: 246 10*3/MM3 (ref 140–450)
PMV BLD AUTO: 10.4 FL (ref 6–12)
POTASSIUM BLD-SCNC: 4.3 MMOL/L (ref 3.5–5.1)
RBC # BLD AUTO: 3.77 10*6/MM3 (ref 3.77–5.28)
SODIUM BLD-SCNC: 137 MMOL/L (ref 137–145)
WBC NRBC COR # BLD: 7.5 10*3/MM3 (ref 3.4–10.8)

## 2019-03-24 PROCEDURE — 80048 BASIC METABOLIC PNL TOTAL CA: CPT | Performed by: INTERNAL MEDICINE

## 2019-03-24 PROCEDURE — 25010000002 PROMETHAZINE PER 50 MG: Performed by: INTERNAL MEDICINE

## 2019-03-24 PROCEDURE — 85025 COMPLETE CBC W/AUTO DIFF WBC: CPT | Performed by: INTERNAL MEDICINE

## 2019-03-24 RX ORDER — FAMOTIDINE 20 MG/1
20 TABLET, FILM COATED ORAL EVERY 12 HOURS
Status: DISCONTINUED | OUTPATIENT
Start: 2019-03-24 | End: 2019-03-26 | Stop reason: HOSPADM

## 2019-03-24 RX ADMIN — PROMETHAZINE HYDROCHLORIDE 12.5 MG: 25 INJECTION, SOLUTION INTRAMUSCULAR; INTRAVENOUS at 22:08

## 2019-03-24 RX ADMIN — OXYCODONE HYDROCHLORIDE AND ACETAMINOPHEN 1 TABLET: 10; 325 TABLET ORAL at 11:18

## 2019-03-24 RX ADMIN — OXYCODONE HYDROCHLORIDE AND ACETAMINOPHEN 1 TABLET: 10; 325 TABLET ORAL at 20:41

## 2019-03-24 RX ADMIN — OXYCODONE HYDROCHLORIDE AND ACETAMINOPHEN 1 TABLET: 10; 325 TABLET ORAL at 05:24

## 2019-03-24 RX ADMIN — OXYCODONE HYDROCHLORIDE AND ACETAMINOPHEN 1 TABLET: 10; 325 TABLET ORAL at 15:24

## 2019-03-24 RX ADMIN — NICOTINE 1 PATCH: 21 PATCH TRANSDERMAL at 09:59

## 2019-03-24 RX ADMIN — FAMOTIDINE 20 MG: 10 INJECTION INTRAVENOUS at 09:58

## 2019-03-24 RX ADMIN — Medication 100 MG: at 09:57

## 2019-03-24 RX ADMIN — SODIUM CHLORIDE, PRESERVATIVE FREE 10 ML: 5 INJECTION INTRAVENOUS at 20:42

## 2019-03-24 RX ADMIN — SODIUM CHLORIDE, PRESERVATIVE FREE 10 ML: 5 INJECTION INTRAVENOUS at 09:58

## 2019-03-24 RX ADMIN — DIAZEPAM 10 MG: 5 TABLET ORAL at 09:58

## 2019-03-24 RX ADMIN — DIAZEPAM 10 MG: 5 TABLET ORAL at 06:26

## 2019-03-24 RX ADMIN — FAMOTIDINE 20 MG: 20 TABLET ORAL at 20:41

## 2019-03-24 RX ADMIN — LACTOBACILLUS TAB 1 TABLET: TAB at 09:57

## 2019-03-24 RX ADMIN — DIAZEPAM 10 MG: 5 TABLET ORAL at 18:16

## 2019-03-24 RX ADMIN — SODIUM CHLORIDE, PRESERVATIVE FREE 10 ML: 5 INJECTION INTRAVENOUS at 09:59

## 2019-03-25 LAB
ANION GAP SERPL CALCULATED.3IONS-SCNC: 9 MMOL/L (ref 5–15)
BASOPHILS # BLD AUTO: 0.1 10*3/MM3 (ref 0–0.2)
BASOPHILS NFR BLD AUTO: 1.5 % (ref 0–1.5)
BUN BLD-MCNC: 12 MG/DL (ref 7–21)
BUN/CREAT SERPL: 20.3 (ref 7–25)
CALCIUM SPEC-SCNC: 9.8 MG/DL (ref 8.4–10.2)
CHLORIDE SERPL-SCNC: 101 MMOL/L (ref 95–110)
CO2 SERPL-SCNC: 27 MMOL/L (ref 22–31)
CREAT BLD-MCNC: 0.59 MG/DL (ref 0.5–1)
DEPRECATED RDW RBC AUTO: 46.9 FL (ref 37–54)
EOSINOPHIL # BLD AUTO: 0.09 10*3/MM3 (ref 0–0.4)
EOSINOPHIL NFR BLD AUTO: 1.3 % (ref 0.3–6.2)
ERYTHROCYTE [DISTWIDTH] IN BLOOD BY AUTOMATED COUNT: 12.6 % (ref 12.3–15.4)
GFR SERPL CREATININE-BSD FRML MDRD: 117 ML/MIN/1.73 (ref 64–149)
GLUCOSE BLD-MCNC: 92 MG/DL (ref 60–100)
HCT VFR BLD AUTO: 37.5 % (ref 34–46.6)
HGB BLD-MCNC: 12.8 G/DL (ref 12–15.9)
IMM GRANULOCYTES # BLD AUTO: 0.02 10*3/MM3 (ref 0–0.05)
IMM GRANULOCYTES NFR BLD AUTO: 0.3 % (ref 0–0.5)
LYMPHOCYTES # BLD AUTO: 2.74 10*3/MM3 (ref 0.7–3.1)
LYMPHOCYTES NFR BLD AUTO: 40.8 % (ref 19.6–45.3)
MCH RBC QN AUTO: 34.9 PG (ref 26.6–33)
MCHC RBC AUTO-ENTMCNC: 34.1 G/DL (ref 31.5–35.7)
MCV RBC AUTO: 102.2 FL (ref 79–97)
MONOCYTES # BLD AUTO: 1.3 10*3/MM3 (ref 0.1–0.9)
MONOCYTES NFR BLD AUTO: 19.3 % (ref 5–12)
NEUTROPHILS # BLD AUTO: 2.47 10*3/MM3 (ref 1.4–7)
NEUTROPHILS NFR BLD AUTO: 36.8 % (ref 42.7–76)
NRBC BLD AUTO-RTO: 0 /100 WBC (ref 0–0)
PLATELET # BLD AUTO: 254 10*3/MM3 (ref 140–450)
PMV BLD AUTO: 10.8 FL (ref 6–12)
POTASSIUM BLD-SCNC: 4.2 MMOL/L (ref 3.5–5.1)
RBC # BLD AUTO: 3.67 10*6/MM3 (ref 3.77–5.28)
SODIUM BLD-SCNC: 137 MMOL/L (ref 137–145)
WBC NRBC COR # BLD: 6.72 10*3/MM3 (ref 3.4–10.8)

## 2019-03-25 PROCEDURE — 80048 BASIC METABOLIC PNL TOTAL CA: CPT | Performed by: INTERNAL MEDICINE

## 2019-03-25 PROCEDURE — 85025 COMPLETE CBC W/AUTO DIFF WBC: CPT | Performed by: INTERNAL MEDICINE

## 2019-03-25 PROCEDURE — 25010000002 METHOCARBAMOL 1000 MG/10ML SOLUTION 10 ML VIAL: Performed by: PHYSICIAN ASSISTANT

## 2019-03-25 PROCEDURE — 99232 SBSQ HOSP IP/OBS MODERATE 35: CPT | Performed by: PSYCHIATRY & NEUROLOGY

## 2019-03-25 PROCEDURE — 25010000002 KETOROLAC TROMETHAMINE PER 15 MG: Performed by: PHYSICIAN ASSISTANT

## 2019-03-25 RX ORDER — CALCIUM CARBONATE 200(500)MG
2 TABLET,CHEWABLE ORAL EVERY 6 HOURS PRN
Status: DISCONTINUED | OUTPATIENT
Start: 2019-03-25 | End: 2019-03-26 | Stop reason: HOSPADM

## 2019-03-25 RX ORDER — KETOROLAC TROMETHAMINE 30 MG/ML
30 INJECTION, SOLUTION INTRAMUSCULAR; INTRAVENOUS ONCE
Status: COMPLETED | OUTPATIENT
Start: 2019-03-25 | End: 2019-03-25

## 2019-03-25 RX ORDER — KETOROLAC TROMETHAMINE 10 MG/1
10 TABLET, FILM COATED ORAL EVERY 6 HOURS PRN
Status: DISCONTINUED | OUTPATIENT
Start: 2019-03-25 | End: 2019-03-26 | Stop reason: HOSPADM

## 2019-03-25 RX ORDER — DOCUSATE SODIUM 100 MG/1
100 CAPSULE, LIQUID FILLED ORAL 2 TIMES DAILY
Status: DISCONTINUED | OUTPATIENT
Start: 2019-03-25 | End: 2019-03-26 | Stop reason: HOSPADM

## 2019-03-25 RX ORDER — ESCITALOPRAM OXALATE 5 MG/1
5 TABLET ORAL ONCE
Status: COMPLETED | OUTPATIENT
Start: 2019-03-25 | End: 2019-03-25

## 2019-03-25 RX ORDER — QUETIAPINE FUMARATE 100 MG/1
100 TABLET, FILM COATED ORAL NIGHTLY
Status: DISCONTINUED | OUTPATIENT
Start: 2019-03-25 | End: 2019-03-26 | Stop reason: HOSPADM

## 2019-03-25 RX ORDER — ESCITALOPRAM OXALATE 10 MG/1
10 TABLET ORAL DAILY
Status: DISCONTINUED | OUTPATIENT
Start: 2019-03-26 | End: 2019-03-26 | Stop reason: HOSPADM

## 2019-03-25 RX ORDER — METHOCARBAMOL 750 MG/1
750 TABLET, FILM COATED ORAL 4 TIMES DAILY
Status: DISCONTINUED | OUTPATIENT
Start: 2019-03-25 | End: 2019-03-26 | Stop reason: HOSPADM

## 2019-03-25 RX ADMIN — CALCIUM CARBONATE (ANTACID) CHEW TAB 500 MG 2 TABLET: 500 CHEW TAB at 11:12

## 2019-03-25 RX ADMIN — DOCUSATE SODIUM 100 MG: 100 CAPSULE, LIQUID FILLED ORAL at 20:05

## 2019-03-25 RX ADMIN — METHOCARBAMOL 750 MG: 750 TABLET ORAL at 18:21

## 2019-03-25 RX ADMIN — METHOCARBAMOL 750 MG: 750 TABLET ORAL at 21:16

## 2019-03-25 RX ADMIN — FAMOTIDINE 20 MG: 20 TABLET ORAL at 09:12

## 2019-03-25 RX ADMIN — QUETIAPINE 100 MG: 100 TABLET ORAL at 20:05

## 2019-03-25 RX ADMIN — LACTOBACILLUS TAB 1 TABLET: TAB at 09:12

## 2019-03-25 RX ADMIN — SODIUM CHLORIDE, PRESERVATIVE FREE 10 ML: 5 INJECTION INTRAVENOUS at 20:06

## 2019-03-25 RX ADMIN — ACETAMINOPHEN 325 MG: 325 TABLET, FILM COATED ORAL at 18:20

## 2019-03-25 RX ADMIN — DOCUSATE SODIUM 100 MG: 100 CAPSULE, LIQUID FILLED ORAL at 11:03

## 2019-03-25 RX ADMIN — METHOCARBAMOL 1000 MG: 100 INJECTION INTRAMUSCULAR; INTRAVENOUS at 11:03

## 2019-03-25 RX ADMIN — ESCITALOPRAM OXALATE 5 MG: 5 TABLET, FILM COATED ORAL at 18:18

## 2019-03-25 RX ADMIN — SODIUM CHLORIDE, PRESERVATIVE FREE 10 ML: 5 INJECTION INTRAVENOUS at 09:13

## 2019-03-25 RX ADMIN — Medication 100 MG: at 09:12

## 2019-03-25 RX ADMIN — KETOROLAC TROMETHAMINE 10 MG: 10 TABLET, FILM COATED ORAL at 20:05

## 2019-03-25 RX ADMIN — NICOTINE 1 PATCH: 21 PATCH TRANSDERMAL at 09:13

## 2019-03-25 RX ADMIN — ACETAMINOPHEN 325 MG: 325 TABLET, FILM COATED ORAL at 13:33

## 2019-03-25 RX ADMIN — DIAZEPAM 10 MG: 5 TABLET ORAL at 18:19

## 2019-03-25 RX ADMIN — ACETAMINOPHEN 325 MG: 325 TABLET, FILM COATED ORAL at 05:53

## 2019-03-25 RX ADMIN — KETOROLAC TROMETHAMINE 30 MG: 30 INJECTION, SOLUTION INTRAMUSCULAR; INTRAVENOUS at 11:03

## 2019-03-25 RX ADMIN — DIAZEPAM 10 MG: 5 TABLET ORAL at 05:54

## 2019-03-25 RX ADMIN — OXYCODONE HYDROCHLORIDE AND ACETAMINOPHEN 1 TABLET: 10; 325 TABLET ORAL at 07:14

## 2019-03-25 RX ADMIN — FAMOTIDINE 20 MG: 20 TABLET ORAL at 20:06

## 2019-03-26 VITALS
DIASTOLIC BLOOD PRESSURE: 77 MMHG | HEART RATE: 78 BPM | SYSTOLIC BLOOD PRESSURE: 114 MMHG | OXYGEN SATURATION: 99 % | TEMPERATURE: 98.2 F | HEIGHT: 67 IN | BODY MASS INDEX: 21.49 KG/M2 | RESPIRATION RATE: 16 BRPM | WEIGHT: 136.9 LBS

## 2019-03-26 PROBLEM — F10.939 ALCOHOL WITHDRAWAL (HCC): Status: RESOLVED | Noted: 2019-03-16 | Resolved: 2019-03-26

## 2019-03-26 PROBLEM — K85.90 ACUTE PANCREATITIS WITHOUT INFECTION OR NECROSIS: Status: RESOLVED | Noted: 2019-03-16 | Resolved: 2019-03-26

## 2019-03-26 LAB
ANION GAP SERPL CALCULATED.3IONS-SCNC: 13 MMOL/L
BASOPHILS # BLD AUTO: 0.06 10*3/MM3 (ref 0–0.2)
BASOPHILS NFR BLD AUTO: 1.2 % (ref 0–1.5)
BUN BLD-MCNC: 16 MG/DL (ref 6–20)
BUN/CREAT SERPL: 24.2 (ref 7–25)
CALCIUM SPEC-SCNC: 10.1 MG/DL (ref 8.6–10.5)
CHLORIDE SERPL-SCNC: 103 MMOL/L (ref 98–107)
CO2 SERPL-SCNC: 24 MMOL/L (ref 22–29)
CREAT BLD-MCNC: 0.66 MG/DL (ref 0.57–1)
DEPRECATED RDW RBC AUTO: 45.1 FL (ref 37–54)
EOSINOPHIL # BLD AUTO: 0.07 10*3/MM3 (ref 0–0.4)
EOSINOPHIL NFR BLD AUTO: 1.4 % (ref 0.3–6.2)
ERYTHROCYTE [DISTWIDTH] IN BLOOD BY AUTOMATED COUNT: 12.3 % (ref 12.3–15.4)
GFR SERPL CREATININE-BSD FRML MDRD: 103 ML/MIN/1.73
GLUCOSE BLD-MCNC: 99 MG/DL (ref 65–99)
HCT VFR BLD AUTO: 33.1 % (ref 34–46.6)
HGB BLD-MCNC: 11.4 G/DL (ref 12–15.9)
IMM GRANULOCYTES # BLD AUTO: 0.02 10*3/MM3 (ref 0–0.05)
IMM GRANULOCYTES NFR BLD AUTO: 0.4 % (ref 0–0.5)
LYMPHOCYTES # BLD AUTO: 2.01 10*3/MM3 (ref 0.7–3.1)
LYMPHOCYTES NFR BLD AUTO: 38.8 % (ref 19.6–45.3)
MCH RBC QN AUTO: 35.1 PG (ref 26.6–33)
MCHC RBC AUTO-ENTMCNC: 34.4 G/DL (ref 31.5–35.7)
MCV RBC AUTO: 101.8 FL (ref 79–97)
MONOCYTES # BLD AUTO: 1 10*3/MM3 (ref 0.1–0.9)
MONOCYTES NFR BLD AUTO: 19.3 % (ref 5–12)
NEUTROPHILS # BLD AUTO: 2.02 10*3/MM3 (ref 1.4–7)
NEUTROPHILS NFR BLD AUTO: 38.9 % (ref 42.7–76)
NRBC BLD AUTO-RTO: 0 /100 WBC (ref 0–0)
PLATELET # BLD AUTO: 240 10*3/MM3 (ref 140–450)
PMV BLD AUTO: 10.3 FL (ref 6–12)
POTASSIUM BLD-SCNC: 4.3 MMOL/L (ref 3.5–5.2)
RBC # BLD AUTO: 3.25 10*6/MM3 (ref 3.77–5.28)
SODIUM BLD-SCNC: 140 MMOL/L (ref 136–145)
WBC NRBC COR # BLD: 5.18 10*3/MM3 (ref 3.4–10.8)

## 2019-03-26 PROCEDURE — 80048 BASIC METABOLIC PNL TOTAL CA: CPT | Performed by: INTERNAL MEDICINE

## 2019-03-26 PROCEDURE — 85025 COMPLETE CBC W/AUTO DIFF WBC: CPT | Performed by: INTERNAL MEDICINE

## 2019-03-26 RX ORDER — HYDROXYZINE PAMOATE 50 MG/1
100 CAPSULE ORAL 3 TIMES DAILY PRN
Qty: 90 CAPSULE | Refills: 1 | Status: SHIPPED | OUTPATIENT
Start: 2019-03-26 | End: 2019-06-09

## 2019-03-26 RX ORDER — ESCITALOPRAM OXALATE 10 MG/1
10 TABLET ORAL DAILY
Qty: 30 TABLET | Refills: 2 | Status: SHIPPED | OUTPATIENT
Start: 2019-03-27 | End: 2019-06-09

## 2019-03-26 RX ORDER — METHOCARBAMOL 750 MG/1
750 TABLET, FILM COATED ORAL 4 TIMES DAILY PRN
Qty: 30 TABLET | Refills: 1 | Status: SHIPPED | OUTPATIENT
Start: 2019-03-26 | End: 2019-04-02

## 2019-03-26 RX ORDER — ONDANSETRON 4 MG/1
4 TABLET, ORALLY DISINTEGRATING ORAL EVERY 6 HOURS PRN
Qty: 20 TABLET | Refills: 1 | Status: SHIPPED | OUTPATIENT
Start: 2019-03-26 | End: 2019-07-21 | Stop reason: SDUPTHER

## 2019-03-26 RX ORDER — L. ACIDOPHILUS/L.BULGARICUS 1MM CELL
1 TABLET ORAL DAILY
Qty: 30 TABLET | Refills: 1 | Status: SHIPPED | OUTPATIENT
Start: 2019-03-27 | End: 2019-06-09

## 2019-03-26 RX ORDER — QUETIAPINE FUMARATE 50 MG/1
50 TABLET, FILM COATED ORAL NIGHTLY
Qty: 30 TABLET | Refills: 1 | Status: SHIPPED | OUTPATIENT
Start: 2019-03-26 | End: 2019-06-09

## 2019-03-26 RX ORDER — KETOROLAC TROMETHAMINE 10 MG/1
10 TABLET, FILM COATED ORAL EVERY 6 HOURS PRN
Qty: 20 TABLET | Refills: 0 | Status: SHIPPED | OUTPATIENT
Start: 2019-03-26 | End: 2019-03-30

## 2019-03-26 RX ORDER — NICOTINE 21 MG/24HR
1 PATCH, TRANSDERMAL 24 HOURS TRANSDERMAL
Qty: 14 EACH | Refills: 1 | Status: SHIPPED | OUTPATIENT
Start: 2019-03-27 | End: 2019-06-09

## 2019-03-26 RX ORDER — PSEUDOEPHEDRINE HCL 30 MG
100 TABLET ORAL 2 TIMES DAILY
Qty: 60 EACH | Refills: 1 | Status: SHIPPED | OUTPATIENT
Start: 2019-03-26 | End: 2019-12-16

## 2019-03-26 RX ADMIN — ESCITALOPRAM OXALATE 10 MG: 10 TABLET ORAL at 08:14

## 2019-03-26 RX ADMIN — LACTOBACILLUS TAB 1 TABLET: TAB at 08:14

## 2019-03-26 RX ADMIN — SODIUM CHLORIDE, PRESERVATIVE FREE 10 ML: 5 INJECTION INTRAVENOUS at 08:18

## 2019-03-26 RX ADMIN — KETOROLAC TROMETHAMINE 10 MG: 10 TABLET, FILM COATED ORAL at 08:14

## 2019-03-26 RX ADMIN — FAMOTIDINE 20 MG: 20 TABLET ORAL at 08:14

## 2019-03-26 RX ADMIN — METHOCARBAMOL 750 MG: 750 TABLET ORAL at 08:14

## 2019-03-26 RX ADMIN — NICOTINE 1 PATCH: 21 PATCH TRANSDERMAL at 08:16

## 2019-03-26 RX ADMIN — DOCUSATE SODIUM 100 MG: 100 CAPSULE, LIQUID FILLED ORAL at 08:14

## 2019-03-26 RX ADMIN — SODIUM CHLORIDE, PRESERVATIVE FREE 10 ML: 5 INJECTION INTRAVENOUS at 08:16

## 2019-03-26 RX ADMIN — Medication 100 MG: at 08:14

## 2019-03-27 ENCOUNTER — READMISSION MANAGEMENT (OUTPATIENT)
Dept: CALL CENTER | Facility: HOSPITAL | Age: 34
End: 2019-03-27

## 2019-03-27 NOTE — OUTREACH NOTE
Prep Survey      Responses   Facility patient discharged from?  [facility]   Is patient eligible?  No   What are the reasons patient is not eligible?  Behavioral Health [Alcohol and narcotic withdrawal]   Discharge diagnosis  Acute pancreatitis, w/o infection or necrosis, alcohol abuse, in recovery, narcotic/drug abuse in recovery, chronic back pain   Does the patient have one of the following disease processes/diagnoses(primary or secondary)?  Other   Prep survey completed?  Yes          nA Yepez RN

## 2019-04-02 ENCOUNTER — OFFICE VISIT (OUTPATIENT)
Dept: FAMILY MEDICINE CLINIC | Facility: CLINIC | Age: 34
End: 2019-04-02

## 2019-04-02 VITALS
DIASTOLIC BLOOD PRESSURE: 64 MMHG | BODY MASS INDEX: 21.82 KG/M2 | SYSTOLIC BLOOD PRESSURE: 112 MMHG | OXYGEN SATURATION: 97 % | HEART RATE: 89 BPM | WEIGHT: 139 LBS | HEIGHT: 67 IN

## 2019-04-02 DIAGNOSIS — M54.9 CHRONIC BACK PAIN, UNSPECIFIED BACK LOCATION, UNSPECIFIED BACK PAIN LATERALITY: ICD-10-CM

## 2019-04-02 DIAGNOSIS — G89.29 CHRONIC BACK PAIN, UNSPECIFIED BACK LOCATION, UNSPECIFIED BACK PAIN LATERALITY: ICD-10-CM

## 2019-04-02 DIAGNOSIS — Z87.898 FORMER CONSUMPTION OF ALCOHOL: ICD-10-CM

## 2019-04-02 DIAGNOSIS — Z09 FOLLOW UP: Primary | ICD-10-CM

## 2019-04-02 PROCEDURE — 99203 OFFICE O/P NEW LOW 30 MIN: CPT | Performed by: STUDENT IN AN ORGANIZED HEALTH CARE EDUCATION/TRAINING PROGRAM

## 2019-04-02 RX ORDER — CARBAMAZEPINE 100 MG/1
100 TABLET, EXTENDED RELEASE ORAL 2 TIMES DAILY
COMMUNITY
Start: 2019-04-01 | End: 2019-12-16

## 2019-04-02 RX ORDER — LITHIUM CARBONATE 300 MG
1 TABLET ORAL 3 TIMES DAILY
COMMUNITY
Start: 2019-04-01 | End: 2019-12-16

## 2019-04-02 RX ORDER — THIAMINE MONONITRATE (VIT B1) 100 MG
100 TABLET ORAL DAILY
Qty: 30 TABLET | Refills: 5 | Status: SHIPPED | OUTPATIENT
Start: 2019-04-02 | End: 2019-12-16

## 2019-04-02 RX ORDER — CYCLOBENZAPRINE HCL 5 MG
5 TABLET ORAL 3 TIMES DAILY PRN
Qty: 90 TABLET | Refills: 3 | Status: SHIPPED | OUTPATIENT
Start: 2019-04-02 | End: 2019-12-16

## 2019-04-02 RX ORDER — KETOROLAC TROMETHAMINE 10 MG/1
10 TABLET, FILM COATED ORAL 3 TIMES DAILY PRN
COMMUNITY
End: 2019-06-09

## 2019-04-02 RX ORDER — LITHIUM CARBONATE 150 MG/1
150 CAPSULE ORAL
COMMUNITY
End: 2019-04-02

## 2019-04-02 RX ORDER — LIDOCAINE 50 MG/G
1 PATCH TOPICAL EVERY 24 HOURS
Qty: 30 EACH | Refills: 2 | Status: SHIPPED | OUTPATIENT
Start: 2019-04-02 | End: 2019-12-16
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
## 2019-04-15 NOTE — PROGRESS NOTES
I have seen the patient.  I have reviewed the notes, assessments, and/or procedures performed by Jose Keen MD, I concur with her/his documentation and assessment and plan for Nata Bain.               This document has been electronically signed by Humble Hernandez MD on April 15, 2019 10:00 AM

## 2019-07-20 ENCOUNTER — HOSPITAL ENCOUNTER (EMERGENCY)
Facility: HOSPITAL | Age: 34
Discharge: LEFT BEFORE DISCHARGE | End: 2019-07-20

## 2019-07-21 ENCOUNTER — HOSPITAL ENCOUNTER (EMERGENCY)
Facility: HOSPITAL | Age: 34
Discharge: HOME | End: 2019-07-21
Attending: EMERGENCY MEDICINE | Admitting: EMERGENCY MEDICINE

## 2019-07-21 VITALS
HEIGHT: 68 IN | TEMPERATURE: 97.8 F | DIASTOLIC BLOOD PRESSURE: 94 MMHG | RESPIRATION RATE: 20 BRPM | HEART RATE: 95 BPM | SYSTOLIC BLOOD PRESSURE: 148 MMHG | WEIGHT: 130.2 LBS | BODY MASS INDEX: 19.73 KG/M2 | OXYGEN SATURATION: 100 %

## 2019-07-21 DIAGNOSIS — F10.29 ALCOHOL DEPENDENCE WITH UNSPECIFIED ALCOHOL-INDUCED DISORDER (HCC): ICD-10-CM

## 2019-07-21 DIAGNOSIS — R11.2 NON-INTRACTABLE VOMITING WITH NAUSEA, UNSPECIFIED VOMITING TYPE: ICD-10-CM

## 2019-07-21 DIAGNOSIS — R74.8 ELEVATED LIPASE: ICD-10-CM

## 2019-07-21 DIAGNOSIS — R10.13 EPIGASTRIC PAIN: Primary | ICD-10-CM

## 2019-07-21 LAB
ALBUMIN SERPL-MCNC: 4.2 G/DL (ref 3.5–5.2)
ALBUMIN/GLOB SERPL: 1.4 G/DL
ALP SERPL-CCNC: 68 U/L (ref 39–117)
ALT SERPL W P-5'-P-CCNC: 17 U/L (ref 1–33)
AMPHET+METHAMPHET UR QL: NEGATIVE
ANION GAP SERPL CALCULATED.3IONS-SCNC: 18 MMOL/L (ref 5–15)
AST SERPL-CCNC: 49 U/L (ref 1–32)
B-HCG UR QL: NEGATIVE
BACTERIA UR QL AUTO: ABNORMAL /HPF
BARBITURATES UR QL SCN: NEGATIVE
BASOPHILS # BLD AUTO: 0.04 10*3/MM3 (ref 0–0.2)
BASOPHILS NFR BLD AUTO: 0.5 % (ref 0–1.5)
BENZODIAZ UR QL SCN: NEGATIVE
BILIRUB SERPL-MCNC: 0.6 MG/DL (ref 0.2–1.2)
BILIRUB UR QL STRIP: ABNORMAL
BUN BLD-MCNC: 9 MG/DL (ref 6–20)
BUN/CREAT SERPL: 17.6 (ref 7–25)
CALCIUM SPEC-SCNC: 9.4 MG/DL (ref 8.6–10.5)
CANNABINOIDS SERPL QL: NEGATIVE
CARBAMAZEPINE SERPL-MCNC: <2 MCG/ML (ref 4–12)
CHLORIDE SERPL-SCNC: 98 MMOL/L (ref 98–107)
CLARITY UR: ABNORMAL
CO2 SERPL-SCNC: 20 MMOL/L (ref 22–29)
COCAINE UR QL: NEGATIVE
COLOR UR: ABNORMAL
CREAT BLD-MCNC: 0.51 MG/DL (ref 0.57–1)
DEPRECATED RDW RBC AUTO: 42.9 FL (ref 37–54)
EOSINOPHIL # BLD AUTO: 0.03 10*3/MM3 (ref 0–0.4)
EOSINOPHIL NFR BLD AUTO: 0.4 % (ref 0.3–6.2)
ERYTHROCYTE [DISTWIDTH] IN BLOOD BY AUTOMATED COUNT: 12.1 % (ref 12.3–15.4)
ETHANOL BLD-MCNC: <10 MG/DL (ref 0–10)
ETHANOL UR QL: <0.01 %
GFR SERPL CREATININE-BSD FRML MDRD: 139 ML/MIN/1.73
GLOBULIN UR ELPH-MCNC: 3 GM/DL
GLUCOSE BLD-MCNC: 118 MG/DL (ref 65–99)
GLUCOSE UR STRIP-MCNC: NEGATIVE MG/DL
GRAN CASTS URNS QL MICRO: ABNORMAL /LPF
HCT VFR BLD AUTO: 35.1 % (ref 34–46.6)
HGB BLD-MCNC: 12.6 G/DL (ref 12–15.9)
HGB UR QL STRIP.AUTO: NEGATIVE
HOLD SPECIMEN: NORMAL
HYALINE CASTS UR QL AUTO: ABNORMAL /LPF
IMM GRANULOCYTES # BLD AUTO: 0.02 10*3/MM3 (ref 0–0.05)
IMM GRANULOCYTES NFR BLD AUTO: 0.3 % (ref 0–0.5)
KETONES UR QL STRIP: ABNORMAL
LDH SERPL-CCNC: 145 U/L (ref 135–214)
LEUKOCYTE ESTERASE UR QL STRIP.AUTO: ABNORMAL
LIPASE SERPL-CCNC: 89 U/L (ref 13–60)
LITHIUM SERPL-SCNC: <0.1 MMOL/L (ref 0.6–1.2)
LYMPHOCYTES # BLD AUTO: 1.77 10*3/MM3 (ref 0.7–3.1)
LYMPHOCYTES NFR BLD AUTO: 22.5 % (ref 19.6–45.3)
MAGNESIUM SERPL-MCNC: 1.7 MG/DL (ref 1.6–2.6)
MCH RBC QN AUTO: 34.7 PG (ref 26.6–33)
MCHC RBC AUTO-ENTMCNC: 35.9 G/DL (ref 31.5–35.7)
MCV RBC AUTO: 96.7 FL (ref 79–97)
METHADONE UR QL SCN: NEGATIVE
MONOCYTES # BLD AUTO: 0.57 10*3/MM3 (ref 0.1–0.9)
MONOCYTES NFR BLD AUTO: 7.3 % (ref 5–12)
NEUTROPHILS # BLD AUTO: 5.42 10*3/MM3 (ref 1.7–7)
NEUTROPHILS NFR BLD AUTO: 69 % (ref 42.7–76)
NITRITE UR QL STRIP: NEGATIVE
NRBC BLD AUTO-RTO: 0 /100 WBC (ref 0–0.2)
OPIATES UR QL: NEGATIVE
OXYCODONE UR QL SCN: NEGATIVE
PH UR STRIP.AUTO: 5.5 [PH] (ref 5–9)
PLATELET # BLD AUTO: 210 10*3/MM3 (ref 140–450)
PMV BLD AUTO: 9.2 FL (ref 6–12)
POTASSIUM BLD-SCNC: 3.2 MMOL/L (ref 3.5–5.2)
PROT SERPL-MCNC: 7.2 G/DL (ref 6–8.5)
PROT UR QL STRIP: ABNORMAL
RBC # BLD AUTO: 3.63 10*6/MM3 (ref 3.77–5.28)
RBC # UR: ABNORMAL /HPF
REF LAB TEST METHOD: ABNORMAL
SODIUM BLD-SCNC: 136 MMOL/L (ref 136–145)
SP GR UR STRIP: 1.05 (ref 1–1.03)
SQUAMOUS #/AREA URNS HPF: ABNORMAL /HPF
UROBILINOGEN UR QL STRIP: ABNORMAL
WBC NRBC COR # BLD: 7.85 10*3/MM3 (ref 3.4–10.8)
WBC UR QL AUTO: ABNORMAL /HPF
WHOLE BLOOD HOLD SPECIMEN: NORMAL
WHOLE BLOOD HOLD SPECIMEN: NORMAL

## 2019-07-21 PROCEDURE — 80156 ASSAY CARBAMAZEPINE TOTAL: CPT | Performed by: EMERGENCY MEDICINE

## 2019-07-21 PROCEDURE — 80307 DRUG TEST PRSMV CHEM ANLYZR: CPT | Performed by: EMERGENCY MEDICINE

## 2019-07-21 PROCEDURE — 96365 THER/PROPH/DIAG IV INF INIT: CPT

## 2019-07-21 PROCEDURE — 85025 COMPLETE CBC W/AUTO DIFF WBC: CPT | Performed by: EMERGENCY MEDICINE

## 2019-07-21 PROCEDURE — 96375 TX/PRO/DX INJ NEW DRUG ADDON: CPT

## 2019-07-21 PROCEDURE — 96361 HYDRATE IV INFUSION ADD-ON: CPT

## 2019-07-21 PROCEDURE — 25010000002 HYDROMORPHONE PER 4 MG: Performed by: EMERGENCY MEDICINE

## 2019-07-21 PROCEDURE — 83735 ASSAY OF MAGNESIUM: CPT | Performed by: EMERGENCY MEDICINE

## 2019-07-21 PROCEDURE — 25010000002 LORAZEPAM PER 2 MG: Performed by: EMERGENCY MEDICINE

## 2019-07-21 PROCEDURE — 81025 URINE PREGNANCY TEST: CPT | Performed by: EMERGENCY MEDICINE

## 2019-07-21 PROCEDURE — 25010000002 MAGNESIUM SULFATE PER 500 MG OF MAGNESIUM: Performed by: EMERGENCY MEDICINE

## 2019-07-21 PROCEDURE — 81001 URINALYSIS AUTO W/SCOPE: CPT | Performed by: EMERGENCY MEDICINE

## 2019-07-21 PROCEDURE — 80053 COMPREHEN METABOLIC PANEL: CPT | Performed by: EMERGENCY MEDICINE

## 2019-07-21 PROCEDURE — 99284 EMERGENCY DEPT VISIT MOD MDM: CPT

## 2019-07-21 PROCEDURE — 83690 ASSAY OF LIPASE: CPT | Performed by: EMERGENCY MEDICINE

## 2019-07-21 PROCEDURE — 83615 LACTATE (LD) (LDH) ENZYME: CPT | Performed by: EMERGENCY MEDICINE

## 2019-07-21 PROCEDURE — 80178 ASSAY OF LITHIUM: CPT | Performed by: EMERGENCY MEDICINE

## 2019-07-21 PROCEDURE — 25010000002 THIAMINE PER 100 MG: Performed by: EMERGENCY MEDICINE

## 2019-07-21 PROCEDURE — 25010000002 ONDANSETRON PER 1 MG: Performed by: EMERGENCY MEDICINE

## 2019-07-21 RX ORDER — LORAZEPAM 2 MG/ML
1 INJECTION INTRAMUSCULAR ONCE
Status: COMPLETED | OUTPATIENT
Start: 2019-07-21 | End: 2019-07-21

## 2019-07-21 RX ORDER — SODIUM CHLORIDE 9 MG/ML
250 INJECTION, SOLUTION INTRAVENOUS CONTINUOUS
Status: DISCONTINUED | OUTPATIENT
Start: 2019-07-21 | End: 2019-07-21 | Stop reason: HOSPADM

## 2019-07-21 RX ORDER — ONDANSETRON 4 MG/1
4 TABLET, ORALLY DISINTEGRATING ORAL EVERY 6 HOURS PRN
Qty: 20 TABLET | Refills: 0 | Status: SHIPPED | OUTPATIENT
Start: 2019-07-21 | End: 2019-12-16

## 2019-07-21 RX ORDER — SODIUM CHLORIDE 0.9 % (FLUSH) 0.9 %
10 SYRINGE (ML) INJECTION AS NEEDED
Status: DISCONTINUED | OUTPATIENT
Start: 2019-07-21 | End: 2019-07-21 | Stop reason: HOSPADM

## 2019-07-21 RX ORDER — HYDROMORPHONE HCL 110MG/55ML
1 PATIENT CONTROLLED ANALGESIA SYRINGE INTRAVENOUS ONCE
Status: COMPLETED | OUTPATIENT
Start: 2019-07-21 | End: 2019-07-21

## 2019-07-21 RX ORDER — HYDROCODONE BITARTRATE AND ACETAMINOPHEN 5; 325 MG/1; MG/1
1 TABLET ORAL 3 TIMES DAILY PRN
COMMUNITY
End: 2019-07-21 | Stop reason: SDUPTHER

## 2019-07-21 RX ORDER — HYDROCODONE BITARTRATE AND ACETAMINOPHEN 5; 325 MG/1; MG/1
1 TABLET ORAL 3 TIMES DAILY PRN
Qty: 15 TABLET | Refills: 0 | Status: SHIPPED | OUTPATIENT
Start: 2019-07-21 | End: 2019-12-16

## 2019-07-21 RX ORDER — ONDANSETRON HCL IN 0.9 % NACL 8 MG/50 ML
8 INTRAVENOUS SOLUTION, PIGGYBACK (ML) INTRAVENOUS ONCE
Status: COMPLETED | OUTPATIENT
Start: 2019-07-21 | End: 2019-07-21

## 2019-07-21 RX ORDER — CHLORDIAZEPOXIDE HYDROCHLORIDE 25 MG/1
25 CAPSULE, GELATIN COATED ORAL 4 TIMES DAILY PRN
Qty: 15 CAPSULE | Refills: 0 | Status: SHIPPED | OUTPATIENT
Start: 2019-07-21 | End: 2019-12-16

## 2019-07-21 RX ADMIN — ONDANSETRON 8 MG: 2 INJECTION INTRAMUSCULAR; INTRAVENOUS at 12:29

## 2019-07-21 RX ADMIN — LORAZEPAM 1 MG: 2 INJECTION INTRAMUSCULAR; INTRAVENOUS at 13:26

## 2019-07-21 RX ADMIN — FOLIC ACID 1000 ML/HR: 5 INJECTION, SOLUTION INTRAMUSCULAR; INTRAVENOUS; SUBCUTANEOUS at 13:08

## 2019-07-21 RX ADMIN — HYDROMORPHONE HYDROCHLORIDE 1 MG: 2 INJECTION, SOLUTION INTRAMUSCULAR; INTRAVENOUS; SUBCUTANEOUS at 12:29

## 2019-07-21 RX ADMIN — SODIUM CHLORIDE 250 ML/HR: 900 INJECTION, SOLUTION INTRAVENOUS at 13:55

## 2019-07-21 RX ADMIN — SODIUM CHLORIDE 1000 ML: 900 INJECTION, SOLUTION INTRAVENOUS at 11:56

## 2019-08-07 ENCOUNTER — HOSPITAL ENCOUNTER (OUTPATIENT)
Dept: PHYSICAL THERAPY | Facility: HOSPITAL | Age: 34
Setting detail: THERAPIES SERIES
Discharge: HOME | End: 2019-08-07

## 2019-08-07 DIAGNOSIS — G89.29 CHRONIC BACK PAIN, UNSPECIFIED BACK LOCATION, UNSPECIFIED BACK PAIN LATERALITY: Primary | ICD-10-CM

## 2019-08-07 DIAGNOSIS — M54.9 CHRONIC BACK PAIN, UNSPECIFIED BACK LOCATION, UNSPECIFIED BACK PAIN LATERALITY: Primary | ICD-10-CM

## 2019-08-07 PROCEDURE — 97162 PT EVAL MOD COMPLEX 30 MIN: CPT | Performed by: PHYSICAL THERAPIST
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
## 2019-08-16 ENCOUNTER — APPOINTMENT (OUTPATIENT)
Dept: PHYSICAL THERAPY | Facility: HOSPITAL | Age: 34
End: 2019-08-16

## 2019-08-20 ENCOUNTER — HOSPITAL ENCOUNTER (OUTPATIENT)
Dept: PHYSICAL THERAPY | Facility: HOSPITAL | Age: 34
Setting detail: THERAPIES SERIES
Discharge: HOME | End: 2019-08-20

## 2019-08-20 DIAGNOSIS — M54.9 CHRONIC BACK PAIN, UNSPECIFIED BACK LOCATION, UNSPECIFIED BACK PAIN LATERALITY: Primary | ICD-10-CM

## 2019-08-20 DIAGNOSIS — G89.29 CHRONIC BACK PAIN, UNSPECIFIED BACK LOCATION, UNSPECIFIED BACK PAIN LATERALITY: Primary | ICD-10-CM

## 2019-08-20 PROCEDURE — G0283 ELEC STIM OTHER THAN WOUND: HCPCS

## 2019-08-20 PROCEDURE — 97110 THERAPEUTIC EXERCISES: CPT

## 2019-08-20 NOTE — THERAPY TREATMENT NOTE
Outpatient Physical Therapy Ortho Treatment Note  [facility]     Patient Name: Nata Bain  : 1985  MRN: [id number]  Today's Date: 2019      Visit Date: 2019  Subjective Improvement: 0%  Visit Number:     Recert Date:   19  MD Visit:   TBD  Total Approved Visits:  20 per year          Visit Dx:    ICD-10-CM ICD-9-CM   1. Chronic back pain, unspecified back location, unspecified back pain laterality M54.9 724.5    G89.29 338.29       Patient Active Problem List   Diagnosis   • Family history of breast cancer in mother   • Pelvic pain   • Menorrhagia with regular cycle   • Dysmenorrhea        Past Medical History:   Diagnosis Date   • Abnormal Pap smear of cervix    • Alcoholism (CMS/[facility])    • Anxiety    • Cervical dysplasia    • Depression    • Fibrocystic breast    • GERD (gastroesophageal reflux disease)    • Seizures (CMS/[facility]) 2017   • Substance abuse (CMS/[facility])    • Substance abuse (CMS/[facility])         Past Surgical History:   Procedure Laterality Date   • RHINOPLASTY         PT Ortho     Row Name 19 1400       Precautions and Contraindications    Precautions/Limitations  no known precautions/limitations  -BB       Posture/Observations    Posture/Observations Comments  Poor posture in sitting. Pt very gaurded at times. LE's shaky at times with stretches.  Pt shifts wt from hip to hip in sitting and in supine LL equal. ASIS equal. Very tender bilat lumbar and sacrum.  -BB      User Key  (r) = Recorded By, (t) = Taken By, (c) = Cosigned By    Initials Name Provider Type    Sita Whitney PTA Physical Therapy Assistant                      PT Assessment/Plan     Row Name 19 1400          PT Assessment    Assessment Comments  Provided HEP sheets. Encouraged compliance with gentle strength and stretch. Poor tolerance to supine and sitting especially when cued to use upright posture.   -BB        PT Plan    PT Frequency  2x/week  -BB     Predicted Duration of  Therapy Intervention (Therapy Eval)  x 3 weeks  -BB     PT Plan Comments  Continue to monitor pain and alignment.  Gentle core strength and stretch. Modalities listed in POC as needed.   -BB       User Key  (r) = Recorded By, (t) = Taken By, (c) = Cosigned By    Initials Name Provider Type    Sita Whitney PTA Physical Therapy Assistant          Modalities     Row Name 08/20/19 1400             Moist Heat    MH Applied  Yes  -BB      Location  low back with IFC in sidelying  -BB      Rx Minutes  15 mins  -BB      MH S/P Rx  Yes  -BB         ELECTRICAL STIMULATION    Attended/Unattended  Unattended  -BB      Stimulation Type  IFC  -BB      Location/Electrode Placement/Other  low back with  MHP sidelying 15 min  -BB        User Key  (r) = Recorded By, (t) = Taken By, (c) = Cosigned By    Initials Name Provider Type    Sita Whitney PTA Physical Therapy Assistant        Exercises     Row Name 08/20/19 1400             Subjective Comments    Subjective Comments  States she has a hard time sitting up straight due to the pain.    -BB         Subjective Pain    Able to rate subjective pain?  yes  -BB      Pre-Treatment Pain Level  7  -BB      Post-Treatment Pain Level  5  -BB      Subjective Pain Comment  reduced pain after IFC and MHP  -BB         Exercise 1    Exercise Name 1  LTR  -BB      Cueing 1  Verbal  -BB      Reps 1  10  -BB      Time 1  10 sec  -BB         Exercise 2    Exercise Name 2  SKTC with towel  -BB      Cueing 2  Verbal;Demo  -BB      Reps 2  3  -BB      Time 2  30  -BB         Exercise 3    Exercise Name 3  Supine piriformis stretch  with towel  -BB      Cueing 3  Verbal;Demo  -BB      Reps 3  3  -BB      Time 3  30  -BB         Exercise 4    Exercise Name 4  seated trans abs  -BB      Cueing 4  Verbal;Demo  -BB      Sets 4  2  -BB      Reps 4  5  -BB      Additional Comments  unable to do supine and had to take a break after 5 in sitting due to discomfort  -BB         Exercise 5     Exercise Name 5  seated add squeeze   -BB      Cueing 5  Verbal;Demo  -BB      Sets 5  --  -BB      Reps 5  10  -BB      Time 5  5  -BB         Exercise 6    Exercise Name 6  alignment check  -BB         Exercise 7    Exercise Name 7  --  -BB         Exercise 8    Exercise Name 8  IFC and MHP see flowsheet  -BB      Time 8  15 min  -BB        User Key  (r) = Recorded By, (t) = Taken By, (c) = Cosigned By    Initials Name Provider Type    Sita Whitney PTA Physical Therapy Assistant                       PT OP Goals     Row Name 08/20/19 1400          PT Short Term Goals    STG Date to Achieve  08/28/19  -BB     STG 1  Pt indep with HEP  -BB     STG 1 Progress  Not Met  -BB     STG 2  Improve lumbar spine flex AROM to min limit 75%-fingers to ankles (pain free)  -BB     STG 2 Progress  Not Met  -BB     STG 3  Improve B hip flex MMT to 5/5  -BB     STG 3 Progress  Not Met  -BB     STG 4  Improve core trunk stability to WFL  -BB     STG 4 Progress  Not Met  -BB     STG 5  Improve L hip ER flexibility to WNL  -BB     STG 5 Progress  Not Met  -BB     STG 6  Reduce LBP by 50-75% with walking short to intermediate distances  -BB     STG 6 Progress  Not Met  -BB        Time Calculation    PT Goal Re-Cert Due Date  08/28/19  -BB       User Key  (r) = Recorded By, (t) = Taken By, (c) = Cosigned By    Initials Name Provider Type    Sita Whitney PTA Physical Therapy Assistant          Therapy Education  Education Details: add sq, trans abs, LTR, SKTC and piriformis stretch. STrongly encouraged postural awareness and HEP.   Given: HEP  Program: New  How Provided: Verbal, Demonstration, Written  Provided to: Patient              Time Calculation:   Start Time: 1400  Stop Time: 1449  Time Calculation (min): 49 min  Total Timed Code Minutes- PT: 30 minute(s)  Therapy Charges for Today     Code Description Service Date Service Provider Modifiers Qty    09715223754 HC PT THER PROC EA 15 MIN 8/20/2019 Sita Purcell  THIAGO, PTA GP 2    38904996081 HC PT ELECTRICAL STIM UNATTENDED 8/20/2019 Sita Purcell, PTA  1                    Sita Purcell PTA  8/20/2019

## 2019-09-04 ENCOUNTER — OFFICE VISIT (OUTPATIENT)
Dept: FAMILY MEDICINE CLINIC | Facility: CLINIC | Age: 34
End: 2019-09-04

## 2019-09-04 VITALS
BODY MASS INDEX: 19.87 KG/M2 | HEART RATE: 104 BPM | OXYGEN SATURATION: 94 % | HEIGHT: 68 IN | DIASTOLIC BLOOD PRESSURE: 80 MMHG | SYSTOLIC BLOOD PRESSURE: 130 MMHG | WEIGHT: 131.1 LBS

## 2019-09-04 DIAGNOSIS — R10.84 GENERALIZED ABDOMINAL PAIN: ICD-10-CM

## 2019-09-04 DIAGNOSIS — Z00.00 HEALTHCARE MAINTENANCE: Primary | ICD-10-CM

## 2019-09-04 PROCEDURE — 99213 OFFICE O/P EST LOW 20 MIN: CPT | Performed by: STUDENT IN AN ORGANIZED HEALTH CARE EDUCATION/TRAINING PROGRAM

## 2019-09-04 RX ORDER — CHOLESTYRAMINE 4 G/5.5G
4 POWDER, FOR SUSPENSION ORAL ONCE
Qty: 40 PACKET | Refills: 0 | Status: SHIPPED | OUTPATIENT
Start: 2019-09-04 | End: 2019-09-04

## 2019-09-04 RX ORDER — SUCRALFATE 1 G/1
1 TABLET ORAL 4 TIMES DAILY
Qty: 120 TABLET | Refills: 0 | Status: SHIPPED | OUTPATIENT
Start: 2019-09-04 | End: 2019-12-16

## 2019-09-04 NOTE — PROGRESS NOTES
I have seen the patient.  I have reviewed the notes, assessments, and/or procedures performed by dr Almaguer, I concur with her/his documentation and assessment and plan for Nata Bain.               This document has been electronically signed by Humble Hernandez MD on September 4, 2019 11:46 AM

## 2019-09-04 NOTE — PROGRESS NOTES
"  Subjective:     Nata Bain is a 34 y.o. female who presents for follow up for ABDOMINAL PAIN, steadily worsening.     Diarrhea for the 2 months,   -no association was food, \"if I eat anything It runs through me\"  -occasionally will notice some blood in her stool but not in the past week. Noticed it in the toilet water and toilet paper  - does have a hemorrhoid from the birth of her child     GERD  -worsening heartburn  -currently on Prilosec and tums ( notes \"eating tums like candy\")    OF NOTE: Patient was  seen in the ED on 7/21/19 by Dr Hampton for epigastric pain. Patient has history of Alcoholic Pancreatitis     CT ABDOMEN ON 3/21/2019- ENDORSED SEVERE FATTY METAMORPHOSIS OF THE LIVER  -patient has history of alcohol abuse \" would drink a fifth of 100 proof alcohol a day)  Patient denies Chest pain, Chest pressure, Palpitations, SOA, fever, chills, N/V, or Diarrhea.     Preventative:  Over the past 2 weeks, have you felt down, depressed, or hopeless?No   Over the past 2 weeks, have you felt little interest or pleasure in doing things?No  Clinical depression screening refused by patient.No     PHQ-9 Depression Screening  Little interest or pleasure in doing things?     Feeling down, depressed, or hopeless?     Trouble falling or staying asleep, or sleeping too much?     Feeling tired or having little energy?     Poor appetite or overeating?     Feeling bad about yourself - or that you are a failure or have let yourself or your family down?     Trouble concentrating on things, such as reading the newspaper or watching television?     Moving or speaking so slowly that other people could have noticed? Or the opposite - being so fidgety or restless that you have been moving around a lot more than usual?     Thoughts that you would be better off dead, or of hurting yourself in some way?     PHQ-9 Total Score     If you checked off any problems, how difficult have these problems made it for you to do your " work, take care of things at home, or get along with other people?         On osteoporosis therapy?Not Indicated     Past Medical Hx:  Past Medical History:   Diagnosis Date   • Abnormal Pap smear of cervix    • Alcoholism (CMS/[facility])    • Anxiety    • Cervical dysplasia    • Depression    • Fibrocystic breast    • GERD (gastroesophageal reflux disease)    • Seizures (CMS/[facility]) 2017   • Substance abuse (CMS/[facility])    • Substance abuse (CMS/[facility])        Past Surgical Hx:  Past Surgical History:   Procedure Laterality Date   • RHINOPLASTY         Health Maintenance:  Health Maintenance   Topic Date Due   • ANNUAL PHYSICAL  02/04/1989   • PNEUMOCOCCAL VACCINE (19-64 MEDIUM RISK) (1 of 1 - PPSV23) 02/04/2005   • TDAP/TD VACCINES (1 - Tdap) 02/04/2005   • INFLUENZA VACCINE  08/01/2019   • PAP SMEAR  10/10/2021       Current Meds:    Current Outpatient Medications:   •  albuterol (PROVENTIL) (2.5 MG/3ML) 0.083% nebulizer solution, Take 2.5 mg by nebulization Every 4 (Four) Hours As Needed for Wheezing., Disp: 25 vial, Rfl: 0  •  diclofenac (FLECTOR) 1.3 % patch patch, Apply 1 patch topically to the appropriate area as directed 2 (Two) Times a Day., Disp: , Rfl:   •  diclofenac (VOLTAREN) 1 % gel gel, Apply 4 g topically to the appropriate area as directed 4 (Four) Times a Day As Needed., Disp: , Rfl:   •  docusate sodium 100 MG capsule, Take 100 mg by mouth 2 (Two) Times a Day., Disp: 60 each, Rfl: 1  •  HYDROcodone-acetaminophen (NORCO) 5-325 MG per tablet, Take 1 tablet by mouth 3 (Three) Times a Day As Needed for Severe Pain . (Patient taking differently: Take 1 tablet by mouth 3 (Three) Times a Day As Needed for Severe Pain . 7.5), Disp: 15 tablet, Rfl: 0  •  ondansetron ODT (ZOFRAN ODT) 4 MG disintegrating tablet, Take 1 tablet by mouth Every 6 (Six) Hours As Needed for Nausea or Vomiting., Disp: 20 tablet, Rfl: 0  •  brompheniramine-pseudoephedrine-DM 30-2-10 MG/5ML syrup, Take 5 mL by mouth 4 (Four) Times a Day As Needed  for Congestion, Cough or Allergies., Disp: 118 mL, Rfl: 0  •  carBAMazepine XR (TEGRETOL-XR) 100 MG 12 hr tablet, Take 100 mg by mouth 2 (Two) Times a Day., Disp: , Rfl:   •  chlordiazePOXIDE (LIBRIUM) 25 MG capsule, Take 1 capsule by mouth 4 (Four) Times a Day As Needed for Withdrawal., Disp: 15 capsule, Rfl: 0  •  cyclobenzaprine (FLEXERIL) 5 MG tablet, Take 1 tablet by mouth 3 (Three) Times a Day As Needed for Muscle Spasms., Disp: 90 tablet, Rfl: 3  •  lidocaine (LIDODERM) 5 %, Place 1 patch on the skin as directed by provider Daily. Remove & Discard patch within 12 hours or as directed by MD, Disp: 30 each, Rfl: 2  •  lithium 300 MG tablet, Take 1 tablet by mouth 3 (Three) Times a Day., Disp: , Rfl:   •  OLANZapine (zyPREXA) 7.5 MG tablet, Take 7.5 mg by mouth Every Night., Disp: , Rfl:   •  PREVALITE 4 g packet, Take 1 packet by mouth 1 (One) Time for 1 dose., Disp: 40 packet, Rfl: 0  •  promethazine-dextromethorphan (PROMETHAZINE-DM) 6.25-15 MG/5ML syrup, Take 5 mL by mouth 4 (Four) Times a Day As Needed for Cough., Disp: 118 mL, Rfl: 0  •  sucralfate (CARAFATE) 1 g tablet, Take 1 tablet by mouth 4 (Four) Times a Day., Disp: 120 tablet, Rfl: 0  •  thiamine (VITAMIN B-1) 100 MG tablet, Take 1 tablet by mouth Daily., Disp: 30 tablet, Rfl: 5    Current Facility-Administered Medications:   •  pneumococcal polysaccharide 23-valent (PNEUMOVAX-23) vaccine 0.5 mL, 0.5 mL, Intramuscular, During Hospitalization, Yosi Almaguer MD    Allergies:  Patient has no known allergies.    Family Hx:  Family History   Problem Relation Age of Onset   • Breast cancer Mother    • Cancer Mother    • COPD Mother    • Breast cancer Maternal Grandmother    • Breast cancer Paternal Grandmother    • Breast cancer Maternal Aunt    • Hypertension Father    • Heart disease Father         Social History:  Social History     Socioeconomic History   • Marital status:      Spouse name: Not on file   • Number of children: Not on file   •  "Years of education: Not on file   • Highest education level: Not on file   Tobacco Use   • Smoking status: Current Every Day Smoker     Packs/day: 1.00     Years: 20.00     Pack years: 20.00     Types: Cigarettes   • Smokeless tobacco: Never Used   Substance and Sexual Activity   • Alcohol use: Yes     Frequency: Never     Comment: occasional use, 1-2 beers/week   • Drug use: No     Types: Fentanyl, Oxycodone     Comment: daily use   • Sexual activity: Yes     Partners: Male     Birth control/protection: None       Review of Systems  Review of Systems   Constitutional: Negative for activity change, appetite change, chills and fever.   HENT: Negative for congestion, ear pain and sore throat.    Eyes: Negative for redness and visual disturbance.   Respiratory: Negative for cough, shortness of breath and wheezing.    Cardiovascular: Negative for chest pain and palpitations.   Gastrointestinal: pOSITIVE for abdominal pain, diarrhea  Genitourinary: Negative for difficulty urinating and dysuria.   Musculoskeletal: Negative for arthralgias and gait problem.   Skin: Negative for color change and rash.   Neurological: Negative for dizziness, weakness and headaches.   Psychiatric/Behavioral: Negative for dysphoric mood and sleep disturbance. The patient is not nervous/anxious.          Objective:     /80   Pulse 104   Ht 171.5 cm (67.5\")   Wt 59.5 kg (131 lb 1.6 oz)   SpO2 94%   BMI 20.23 kg/m²     Constitutional: oriented to person, place, and time. well-developed and well-nourished.   HENT:   Head: Normocephalic and atraumatic.   Right Ear: External ear normal.   Left Ear: External ear normal.   Nose: Nose normal.   Eyes: Conjunctivae and EOM are normal.    Neck: Normal range of motion. Neck supple.   Cardiovascular: Normal rate, regular rhythm and normal heart sounds.    Pulmonary/Chest: Effort normal and breath sounds normal. no wheezes.   Abdominal: Soft. Bowel sounds are normal. exhibits no distension. " generalized tenderness     Musculoskeletal: Normal range of motion.  exhibits no edema or deformity.   Neurological: alert and oriented to person, place, and time.   Skin: Skin is warm.   Psychiatric: has a normal mood and affect. behavior is normal. Thought content normal.   Nursing note and vitals reviewed.    Assessment/Plan:     Nata was seen today for gi problem.    Diagnoses and all orders for this visit:    Healthcare maintenance  -     Pregnancy, Urine - Urine, Clean Catch; Future  -     pneumococcal polysaccharide 23-valent (PNEUMOVAX-23) vaccine 0.5 mL  -     Tdap Vaccine Greater Than or Equal To 6yo IM  -     Hepatitis panel, acute; Future    Generalized abdominal pain  -     Lipase; Future  -     CT Abdomen Pelvis With Contrast; Future  -     Amylase; Future  -     Comprehensive metabolic panel; Future  -     CBC w AUTO Differential; Future    Other orders  -     sucralfate (CARAFATE) 1 g tablet; Take 1 tablet by mouth 4 (Four) Times a Day.  -     PREVALITE 4 g packet; Take 1 packet by mouth 1 (One) Time for 1 dose.     -  prevalite for stool bulking       Reviewed the following with the patient: advised patient of need for:  immunizations discussed.  Reviewed labs, patient voiced understanding of results.     Follow-up:     Return in about 2 weeks (around 9/18/2019).           Goals     None          Preventative:    Vaccines Recommended at this visit:   Pneumovax 23 and TDaP/TD    Vaccines Received at this visit:  Patient refused all vaccinations at this visit.    Screenings Recommended at this visit:  Hep C Screening    Screenings Ordered at this visit:  Hep C Screening    Smoking Status:  Patient is current smoker. Patient is not interested in smoking cessation.    Alcohol Intake:  Occasional/rare    Patient's Body mass index is 20.23 kg/m². BMI is within normal parameters. No follow-up required..         RISK SCORE: 3      Signed,   Yosi Almaguer MD  Family Medicine Resident PGY2  [facility]  [place]        This document has been electronically signed by Yosi Almaguer MD on September 4, 2019 11:28 AM    EMR Dragon/Transcription disclaimer:   Some of this note may be an electronic transcription/translation of spoken language to printed text. The electronic translation of spoken language may permit erroneous, or at times, nonsensical words or phrases to be inadvertently transcribed; Although I have reviewed the note for such errors, some may still exist.

## 2019-09-09 ENCOUNTER — HOSPITAL ENCOUNTER (OUTPATIENT)
Dept: CT IMAGING | Facility: HOSPITAL | Age: 34
Discharge: HOME | End: 2019-09-09
Admitting: RADIOLOGY

## 2019-09-09 ENCOUNTER — LAB (OUTPATIENT)
Dept: LAB | Facility: HOSPITAL | Age: 34
End: 2019-09-09

## 2019-09-09 DIAGNOSIS — R10.84 GENERALIZED ABDOMINAL PAIN: ICD-10-CM

## 2019-09-09 DIAGNOSIS — Z00.00 HEALTHCARE MAINTENANCE: ICD-10-CM

## 2019-09-09 LAB
ALBUMIN SERPL-MCNC: 4.9 G/DL (ref 3.5–5.2)
ALBUMIN/GLOB SERPL: 1.4 G/DL
ALP SERPL-CCNC: 60 U/L (ref 39–117)
ALT SERPL W P-5'-P-CCNC: 19 U/L (ref 1–33)
AMYLASE SERPL-CCNC: 67 U/L (ref 28–100)
ANION GAP SERPL CALCULATED.3IONS-SCNC: 11 MMOL/L (ref 5–15)
AST SERPL-CCNC: 22 U/L (ref 1–32)
B-HCG UR QL: NEGATIVE
BASOPHILS # BLD AUTO: 0.05 10*3/MM3 (ref 0–0.2)
BASOPHILS NFR BLD AUTO: 0.5 % (ref 0–1.5)
BILIRUB SERPL-MCNC: 0.3 MG/DL (ref 0.2–1.2)
BUN BLD-MCNC: 6 MG/DL (ref 6–20)
BUN/CREAT SERPL: 10.9 (ref 7–25)
CALCIUM SPEC-SCNC: 10.5 MG/DL (ref 8.6–10.5)
CHLORIDE SERPL-SCNC: 94 MMOL/L (ref 98–107)
CO2 SERPL-SCNC: 28 MMOL/L (ref 22–29)
CREAT BLD-MCNC: 0.55 MG/DL (ref 0.57–1)
DEPRECATED RDW RBC AUTO: 48.8 FL (ref 37–54)
EOSINOPHIL # BLD AUTO: 0.04 10*3/MM3 (ref 0–0.4)
EOSINOPHIL NFR BLD AUTO: 0.4 % (ref 0.3–6.2)
ERYTHROCYTE [DISTWIDTH] IN BLOOD BY AUTOMATED COUNT: 13.2 % (ref 12.3–15.4)
GFR SERPL CREATININE-BSD FRML MDRD: 127 ML/MIN/1.73
GLOBULIN UR ELPH-MCNC: 3.5 GM/DL
GLUCOSE BLD-MCNC: 87 MG/DL (ref 65–99)
HAV IGM SERPL QL IA: NORMAL
HBV CORE IGM SERPL QL IA: NORMAL
HBV SURFACE AG SERPL QL IA: NORMAL
HCT VFR BLD AUTO: 42.3 % (ref 34–46.6)
HCV AB SER DONR QL: NORMAL
HGB BLD-MCNC: 14.9 G/DL (ref 12–15.9)
IMM GRANULOCYTES # BLD AUTO: 0.04 10*3/MM3 (ref 0–0.05)
IMM GRANULOCYTES NFR BLD AUTO: 0.4 % (ref 0–0.5)
LIPASE SERPL-CCNC: 49 U/L (ref 13–60)
LYMPHOCYTES # BLD AUTO: 2.44 10*3/MM3 (ref 0.7–3.1)
LYMPHOCYTES NFR BLD AUTO: 26.3 % (ref 19.6–45.3)
MCH RBC QN AUTO: 35.3 PG (ref 26.6–33)
MCHC RBC AUTO-ENTMCNC: 35.2 G/DL (ref 31.5–35.7)
MCV RBC AUTO: 100.2 FL (ref 79–97)
MONOCYTES # BLD AUTO: 0.88 10*3/MM3 (ref 0.1–0.9)
MONOCYTES NFR BLD AUTO: 9.5 % (ref 5–12)
NEUTROPHILS # BLD AUTO: 5.84 10*3/MM3 (ref 1.7–7)
NEUTROPHILS NFR BLD AUTO: 62.9 % (ref 42.7–76)
NRBC BLD AUTO-RTO: 0 /100 WBC (ref 0–0.2)
PLATELET # BLD AUTO: 361 10*3/MM3 (ref 140–450)
PMV BLD AUTO: 8.6 FL (ref 6–12)
POTASSIUM BLD-SCNC: 5.4 MMOL/L (ref 3.5–5.2)
PROT SERPL-MCNC: 8.4 G/DL (ref 6–8.5)
RBC # BLD AUTO: 4.22 10*6/MM3 (ref 3.77–5.28)
SODIUM BLD-SCNC: 133 MMOL/L (ref 136–145)
WBC NRBC COR # BLD: 9.29 10*3/MM3 (ref 3.4–10.8)

## 2019-09-09 PROCEDURE — 80074 ACUTE HEPATITIS PANEL: CPT

## 2019-09-09 PROCEDURE — 36415 COLL VENOUS BLD VENIPUNCTURE: CPT

## 2019-09-09 PROCEDURE — 81025 URINE PREGNANCY TEST: CPT

## 2019-09-09 PROCEDURE — 82150 ASSAY OF AMYLASE: CPT

## 2019-09-09 PROCEDURE — 74177 CT ABD & PELVIS W/CONTRAST: CPT

## 2019-09-09 PROCEDURE — 80053 COMPREHEN METABOLIC PANEL: CPT

## 2019-09-09 PROCEDURE — 83690 ASSAY OF LIPASE: CPT

## 2019-09-09 PROCEDURE — 25010000002 IOPAMIDOL 61 % SOLUTION: Performed by: STUDENT IN AN ORGANIZED HEALTH CARE EDUCATION/TRAINING PROGRAM

## 2019-09-09 PROCEDURE — 85025 COMPLETE CBC W/AUTO DIFF WBC: CPT

## 2019-09-09 RX ADMIN — IOPAMIDOL 80 ML: 612 INJECTION, SOLUTION INTRAVENOUS at 11:48

## 2019-10-23 ENCOUNTER — TRANSCRIBE ORDERS (OUTPATIENT)
Dept: MRI IMAGING | Facility: HOSPITAL | Age: 34
End: 2019-10-23

## 2019-10-23 DIAGNOSIS — M54.50 LOW BACK PAIN, UNSPECIFIED BACK PAIN LATERALITY, UNSPECIFIED CHRONICITY, UNSPECIFIED WHETHER SCIATICA PRESENT: Primary | ICD-10-CM

## 2019-10-23 DIAGNOSIS — M43.06 SPONDYLOLYSIS, LUMBAR REGION: ICD-10-CM

## 2019-10-23 DIAGNOSIS — M54.6 PAIN IN THORACIC SPINE: ICD-10-CM

## 2020-01-02 ENCOUNTER — TELEPHONE (OUTPATIENT)
Dept: FAMILY MEDICINE CLINIC | Facility: CLINIC | Age: 35
End: 2020-01-02

## 2020-01-06 ENCOUNTER — APPOINTMENT (OUTPATIENT)
Dept: GENERAL RADIOLOGY | Facility: HOSPITAL | Age: 35
End: 2020-01-06

## 2020-01-06 ENCOUNTER — HOSPITAL ENCOUNTER (INPATIENT)
Facility: HOSPITAL | Age: 35
LOS: 8 days | Discharge: HOME | End: 2020-01-14
Attending: FAMILY MEDICINE | Admitting: INTERNAL MEDICINE

## 2020-01-06 DIAGNOSIS — R11.2 NON-INTRACTABLE VOMITING WITH NAUSEA, UNSPECIFIED VOMITING TYPE: Primary | ICD-10-CM

## 2020-01-06 DIAGNOSIS — F10.930 ALCOHOL WITHDRAWAL SYNDROME WITHOUT COMPLICATION (HCC): ICD-10-CM

## 2020-01-06 PROBLEM — R11.10 NON-INTRACTABLE VOMITING: Status: ACTIVE | Noted: 2020-01-06

## 2020-01-06 LAB
ALBUMIN SERPL-MCNC: 4.6 G/DL (ref 3.5–5.2)
ALBUMIN/GLOB SERPL: 1.4 G/DL
ALP SERPL-CCNC: 84 U/L (ref 39–117)
ALT SERPL W P-5'-P-CCNC: 57 U/L (ref 1–33)
ANION GAP SERPL CALCULATED.3IONS-SCNC: 18 MMOL/L (ref 5–15)
AST SERPL-CCNC: 62 U/L (ref 1–32)
BACTERIA UR QL AUTO: ABNORMAL /HPF
BASOPHILS # BLD AUTO: 0.07 10*3/MM3 (ref 0–0.2)
BASOPHILS NFR BLD AUTO: 0.8 % (ref 0–1.5)
BILIRUB SERPL-MCNC: 0.3 MG/DL (ref 0.2–1.2)
BILIRUB UR QL STRIP: NEGATIVE
BUN BLD-MCNC: 8 MG/DL (ref 6–20)
BUN/CREAT SERPL: 14 (ref 7–25)
CALCIUM SPEC-SCNC: 9.4 MG/DL (ref 8.6–10.5)
CHLORIDE SERPL-SCNC: 95 MMOL/L (ref 98–107)
CLARITY UR: ABNORMAL
CO2 SERPL-SCNC: 25 MMOL/L (ref 22–29)
COLOR UR: YELLOW
CREAT BLD-MCNC: 0.57 MG/DL (ref 0.57–1)
DEPRECATED RDW RBC AUTO: 48.1 FL (ref 37–54)
EOSINOPHIL # BLD AUTO: 0.02 10*3/MM3 (ref 0–0.4)
EOSINOPHIL NFR BLD AUTO: 0.2 % (ref 0.3–6.2)
ERYTHROCYTE [DISTWIDTH] IN BLOOD BY AUTOMATED COUNT: 12.4 % (ref 12.3–15.4)
GFR SERPL CREATININE-BSD FRML MDRD: 122 ML/MIN/1.73
GLOBULIN UR ELPH-MCNC: 3.3 GM/DL
GLUCOSE BLD-MCNC: 85 MG/DL (ref 65–99)
GLUCOSE UR STRIP-MCNC: NEGATIVE MG/DL
HCG SERPL QL: NEGATIVE
HCT VFR BLD AUTO: 41.7 % (ref 34–46.6)
HGB BLD-MCNC: 14.6 G/DL (ref 12–15.9)
HGB UR QL STRIP.AUTO: NEGATIVE
HOLD SPECIMEN: NORMAL
HYALINE CASTS UR QL AUTO: ABNORMAL /LPF
IMM GRANULOCYTES # BLD AUTO: 0.03 10*3/MM3 (ref 0–0.05)
IMM GRANULOCYTES NFR BLD AUTO: 0.3 % (ref 0–0.5)
KETONES UR QL STRIP: NEGATIVE
LEUKOCYTE ESTERASE UR QL STRIP.AUTO: ABNORMAL
LIPASE SERPL-CCNC: 52 U/L (ref 13–60)
LYMPHOCYTES # BLD AUTO: 1.65 10*3/MM3 (ref 0.7–3.1)
LYMPHOCYTES NFR BLD AUTO: 17.9 % (ref 19.6–45.3)
MCH RBC QN AUTO: 36.5 PG (ref 26.6–33)
MCHC RBC AUTO-ENTMCNC: 35 G/DL (ref 31.5–35.7)
MCV RBC AUTO: 104.3 FL (ref 79–97)
MONOCYTES # BLD AUTO: 0.44 10*3/MM3 (ref 0.1–0.9)
MONOCYTES NFR BLD AUTO: 4.8 % (ref 5–12)
NEUTROPHILS # BLD AUTO: 7.01 10*3/MM3 (ref 1.7–7)
NEUTROPHILS NFR BLD AUTO: 76 % (ref 42.7–76)
NITRITE UR QL STRIP: NEGATIVE
NRBC BLD AUTO-RTO: 0 /100 WBC (ref 0–0.2)
PH UR STRIP.AUTO: 6.5 [PH] (ref 5–9)
PLATELET # BLD AUTO: 191 10*3/MM3 (ref 140–450)
PMV BLD AUTO: 9.1 FL (ref 6–12)
POTASSIUM BLD-SCNC: 3.9 MMOL/L (ref 3.5–5.2)
PROT SERPL-MCNC: 7.9 G/DL (ref 6–8.5)
PROT UR QL STRIP: ABNORMAL
RBC # BLD AUTO: 4 10*6/MM3 (ref 3.77–5.28)
RBC # UR: ABNORMAL /HPF
REF LAB TEST METHOD: ABNORMAL
SODIUM BLD-SCNC: 138 MMOL/L (ref 136–145)
SP GR UR STRIP: 1.01 (ref 1–1.03)
SQUAMOUS #/AREA URNS HPF: ABNORMAL /HPF
UROBILINOGEN UR QL STRIP: ABNORMAL
WBC NRBC COR # BLD: 9.22 10*3/MM3 (ref 3.4–10.8)
WBC UR QL AUTO: ABNORMAL /HPF
WHOLE BLOOD HOLD SPECIMEN: NORMAL
WHOLE BLOOD HOLD SPECIMEN: NORMAL

## 2020-01-06 PROCEDURE — 94799 UNLISTED PULMONARY SVC/PX: CPT

## 2020-01-06 PROCEDURE — G0378 HOSPITAL OBSERVATION PER HR: HCPCS

## 2020-01-06 PROCEDURE — 74019 RADEX ABDOMEN 2 VIEWS: CPT

## 2020-01-06 PROCEDURE — 81001 URINALYSIS AUTO W/SCOPE: CPT

## 2020-01-06 PROCEDURE — 82746 ASSAY OF FOLIC ACID SERUM: CPT | Performed by: INTERNAL MEDICINE

## 2020-01-06 PROCEDURE — 25010000002 THIAMINE PER 100 MG: Performed by: FAMILY MEDICINE

## 2020-01-06 PROCEDURE — 85025 COMPLETE CBC W/AUTO DIFF WBC: CPT

## 2020-01-06 PROCEDURE — 25010000002 ONDANSETRON PER 1 MG: Performed by: FAMILY MEDICINE

## 2020-01-06 PROCEDURE — 83690 ASSAY OF LIPASE: CPT

## 2020-01-06 PROCEDURE — 25010000002 THIAMINE PER 100 MG: Performed by: HOSPITALIST

## 2020-01-06 PROCEDURE — 25010000002 LORAZEPAM PER 2 MG: Performed by: FAMILY MEDICINE

## 2020-01-06 PROCEDURE — 25010000002 MAGNESIUM SULFATE PER 500 MG OF MAGNESIUM: Performed by: FAMILY MEDICINE

## 2020-01-06 PROCEDURE — 84703 CHORIONIC GONADOTROPIN ASSAY: CPT

## 2020-01-06 PROCEDURE — 25010000002 LORAZEPAM PER 2 MG: Performed by: HOSPITALIST

## 2020-01-06 PROCEDURE — 99284 EMERGENCY DEPT VISIT MOD MDM: CPT

## 2020-01-06 PROCEDURE — 82607 VITAMIN B-12: CPT | Performed by: INTERNAL MEDICINE

## 2020-01-06 PROCEDURE — 80053 COMPREHEN METABOLIC PANEL: CPT

## 2020-01-06 PROCEDURE — 25010000002 KETOROLAC TROMETHAMINE PER 15 MG: Performed by: FAMILY MEDICINE

## 2020-01-06 PROCEDURE — 25010000002 LORAZEPAM PER 2 MG

## 2020-01-06 RX ORDER — LORAZEPAM 2 MG/ML
INJECTION INTRAMUSCULAR
Status: DISCONTINUED
Start: 2020-01-06 | End: 2020-01-14 | Stop reason: HOSPADM

## 2020-01-06 RX ORDER — SODIUM CHLORIDE 0.9 % (FLUSH) 0.9 %
10 SYRINGE (ML) INJECTION EVERY 12 HOURS SCHEDULED
Status: DISCONTINUED | OUTPATIENT
Start: 2020-01-06 | End: 2020-01-14 | Stop reason: HOSPADM

## 2020-01-06 RX ORDER — THIAMINE MONONITRATE (VIT B1) 100 MG
100 TABLET ORAL ONCE
Status: COMPLETED | OUTPATIENT
Start: 2020-01-06 | End: 2020-01-07

## 2020-01-06 RX ORDER — LORAZEPAM 2 MG/ML
1 INJECTION INTRAMUSCULAR EVERY 6 HOURS
Status: DISCONTINUED | OUTPATIENT
Start: 2020-01-06 | End: 2020-01-07

## 2020-01-06 RX ORDER — LORAZEPAM 2 MG/ML
1 INJECTION INTRAMUSCULAR
Status: DISCONTINUED | OUTPATIENT
Start: 2020-01-06 | End: 2020-01-14 | Stop reason: HOSPADM

## 2020-01-06 RX ORDER — ALBUTEROL SULFATE 2.5 MG/3ML
2.5 SOLUTION RESPIRATORY (INHALATION) EVERY 6 HOURS PRN
Status: DISCONTINUED | OUTPATIENT
Start: 2020-01-06 | End: 2020-01-10 | Stop reason: DRUGHIGH

## 2020-01-06 RX ORDER — LORAZEPAM 2 MG/1
2 TABLET ORAL
Status: DISCONTINUED | OUTPATIENT
Start: 2020-01-06 | End: 2020-01-14 | Stop reason: HOSPADM

## 2020-01-06 RX ORDER — LORAZEPAM 2 MG/ML
INJECTION INTRAMUSCULAR
Status: COMPLETED
Start: 2020-01-06 | End: 2020-01-06

## 2020-01-06 RX ORDER — LORAZEPAM 2 MG/ML
2 INJECTION INTRAMUSCULAR
Status: DISCONTINUED | OUTPATIENT
Start: 2020-01-06 | End: 2020-01-14 | Stop reason: HOSPADM

## 2020-01-06 RX ORDER — ONDANSETRON 2 MG/ML
4 INJECTION INTRAMUSCULAR; INTRAVENOUS ONCE
Status: COMPLETED | OUTPATIENT
Start: 2020-01-06 | End: 2020-01-06

## 2020-01-06 RX ORDER — PROMETHAZINE HYDROCHLORIDE 25 MG/ML
12.5 INJECTION, SOLUTION INTRAMUSCULAR; INTRAVENOUS ONCE
Status: DISCONTINUED | OUTPATIENT
Start: 2020-01-06 | End: 2020-01-06

## 2020-01-06 RX ORDER — KETOROLAC TROMETHAMINE 15 MG/ML
15 INJECTION, SOLUTION INTRAMUSCULAR; INTRAVENOUS ONCE
Status: COMPLETED | OUTPATIENT
Start: 2020-01-06 | End: 2020-01-06

## 2020-01-06 RX ORDER — LORAZEPAM 1 MG/1
1 TABLET ORAL
Status: DISCONTINUED | OUTPATIENT
Start: 2020-01-06 | End: 2020-01-14 | Stop reason: HOSPADM

## 2020-01-06 RX ORDER — LORAZEPAM 2 MG/ML
2 INJECTION INTRAMUSCULAR ONCE
Status: COMPLETED | OUTPATIENT
Start: 2020-01-06 | End: 2020-01-06

## 2020-01-06 RX ORDER — SERTRALINE HYDROCHLORIDE 25 MG/1
25 TABLET, FILM COATED ORAL DAILY
Status: DISCONTINUED | OUTPATIENT
Start: 2020-01-07 | End: 2020-01-14 | Stop reason: HOSPADM

## 2020-01-06 RX ORDER — BUPRENORPHINE HYDROCHLORIDE AND NALOXONE HYDROCHLORIDE DIHYDRATE 8; 2 MG/1; MG/1
1 TABLET SUBLINGUAL DAILY
Status: DISCONTINUED | OUTPATIENT
Start: 2020-01-07 | End: 2020-01-07

## 2020-01-06 RX ORDER — SODIUM CHLORIDE 9 MG/ML
INJECTION, SOLUTION INTRAVENOUS
Status: COMPLETED
Start: 2020-01-06 | End: 2020-01-07

## 2020-01-06 RX ORDER — SODIUM CHLORIDE 0.9 % (FLUSH) 0.9 %
10 SYRINGE (ML) INJECTION AS NEEDED
Status: DISCONTINUED | OUTPATIENT
Start: 2020-01-06 | End: 2020-01-14 | Stop reason: HOSPADM

## 2020-01-06 RX ORDER — LORAZEPAM 2 MG/ML
1 INJECTION INTRAMUSCULAR ONCE
Status: COMPLETED | OUTPATIENT
Start: 2020-01-06 | End: 2020-01-06

## 2020-01-06 RX ORDER — LORAZEPAM 2 MG/ML
1 INJECTION INTRAMUSCULAR EVERY 8 HOURS
Status: DISCONTINUED | OUTPATIENT
Start: 2020-01-07 | End: 2020-01-07

## 2020-01-06 RX ADMIN — LORAZEPAM 1 MG: 2 INJECTION INTRAMUSCULAR; INTRAVENOUS at 17:29

## 2020-01-06 RX ADMIN — LORAZEPAM 1 MG: 2 INJECTION, SOLUTION INTRAMUSCULAR; INTRAVENOUS at 22:30

## 2020-01-06 RX ADMIN — KETOROLAC TROMETHAMINE 15 MG: 15 INJECTION, SOLUTION INTRAMUSCULAR; INTRAVENOUS at 17:28

## 2020-01-06 RX ADMIN — LORAZEPAM 2 MG: 2 INJECTION INTRAMUSCULAR at 19:42

## 2020-01-06 RX ADMIN — SODIUM CHLORIDE, PRESERVATIVE FREE 10 ML: 5 INJECTION INTRAVENOUS at 22:30

## 2020-01-06 RX ADMIN — MAGNESIUM SULFATE HEPTAHYDRATE 1000 ML/HR: 500 INJECTION, SOLUTION INTRAMUSCULAR; INTRAVENOUS at 17:50

## 2020-01-06 RX ADMIN — LORAZEPAM 2 MG: 2 INJECTION, SOLUTION INTRAMUSCULAR; INTRAVENOUS at 19:42

## 2020-01-06 RX ADMIN — THIAMINE HYDROCHLORIDE 100 MG: 100 INJECTION, SOLUTION INTRAMUSCULAR; INTRAVENOUS at 22:32

## 2020-01-06 RX ADMIN — SODIUM CHLORIDE 1000 ML: 9 INJECTION, SOLUTION INTRAVENOUS at 22:31

## 2020-01-06 RX ADMIN — ONDANSETRON 4 MG: 2 INJECTION INTRAMUSCULAR; INTRAVENOUS at 17:34

## 2020-01-06 NOTE — ED PROVIDER NOTES
Subjective   Patient admits to daily alcohol use and states that she has not had a drink in the past 2days she is trying to quit drinking.  Presents emergency department with upper abdominal pain, nausea vomiting and believes that she may be having pancreatitis again.      Nausea   The primary symptoms include fatigue, abdominal pain, nausea and vomiting. Primary symptoms do not include fever, diarrhea, jaundice, dysuria, myalgias, arthralgias or rash. The illness began yesterday.   The illness does not include chills. Associated medical issues do not include gastric bypass or bowel resection.   Vomiting   The primary symptoms include fatigue, abdominal pain, nausea and vomiting. Primary symptoms do not include fever, diarrhea, jaundice, dysuria, myalgias, arthralgias or rash.   The illness does not include chills. Associated medical issues do not include gastric bypass or bowel resection.   Abdominal Pain   Associated symptoms: fatigue, nausea and vomiting    Associated symptoms: no chest pain, no chills, no cough, no diarrhea, no dysuria, no fever, no shortness of breath and no sore throat        Review of Systems   Constitutional: Positive for activity change, appetite change and fatigue. Negative for chills, diaphoresis and fever.   HENT: Negative for congestion, ear discharge, ear pain, nosebleeds, rhinorrhea, sinus pressure, sore throat and trouble swallowing.    Eyes: Negative for discharge and redness.   Respiratory: Negative for apnea, cough, chest tightness, shortness of breath and wheezing.    Cardiovascular: Negative for chest pain.   Gastrointestinal: Positive for abdominal pain, nausea and vomiting. Negative for diarrhea and jaundice.   Endocrine: Negative for polyuria.   Genitourinary: Negative for dysuria, frequency and urgency.   Musculoskeletal: Negative for arthralgias, myalgias and neck pain.   Skin: Negative for color change and rash.   Allergic/Immunologic: Negative for immunocompromised state.    Neurological: Negative for dizziness, seizures, syncope, weakness, light-headedness and headaches.   Hematological: Negative for adenopathy. Does not bruise/bleed easily.   Psychiatric/Behavioral: Negative for behavioral problems and confusion.   All other systems reviewed and are negative.      Past Medical History:   Diagnosis Date   • Abnormal Pap smear of cervix    • Alcoholism (CMS/[facility])    • Anxiety    • Cervical dysplasia    • Depression    • Fibrocystic breast    • GERD (gastroesophageal reflux disease)    • Seizures (CMS/[facility]) 2017   • Substance abuse (CMS/[facility])    • Substance abuse (CMS/[facility])        No Known Allergies    Past Surgical History:   Procedure Laterality Date   • RHINOPLASTY         Family History   Problem Relation Age of Onset   • Breast cancer Mother    • Cancer Mother    • COPD Mother    • Breast cancer Maternal Grandmother    • Breast cancer Paternal Grandmother    • Breast cancer Maternal Aunt    • Hypertension Father    • Heart disease Father        Social History     Socioeconomic History   • Marital status:      Spouse name: Not on file   • Number of children: Not on file   • Years of education: Not on file   • Highest education level: Not on file   Tobacco Use   • Smoking status: Current Every Day Smoker     Packs/day: 1.00     Years: 20.00     Pack years: 20.00     Types: Cigarettes   • Smokeless tobacco: Never Used   Substance and Sexual Activity   • Alcohol use: Yes     Frequency: Never     Comment: patient states she usually drinks a fifth of 100 proof vodka daily, but has not in the last few days.   • Drug use: No     Types: Fentanyl, Oxycodone     Comment: daily use   • Sexual activity: Yes     Partners: Male     Birth control/protection: None           Objective   Physical Exam   Constitutional: She is oriented to person, place, and time. She appears well-developed and well-nourished.   HENT:   Head: Normocephalic and atraumatic.   Nose: Nose normal.   Mouth/Throat:  Oropharynx is clear and moist.   Eyes: Pupils are equal, round, and reactive to light. Conjunctivae and EOM are normal. Right eye exhibits no discharge. Left eye exhibits no discharge. No scleral icterus.   Neck: Normal range of motion. Neck supple. No tracheal deviation present.   Cardiovascular: Regular rhythm and normal heart sounds. Tachycardia present.   No murmur heard.  Pulmonary/Chest: Effort normal and breath sounds normal. No stridor. No respiratory distress. She has no wheezes. She has no rales.   Abdominal: Soft. Bowel sounds are normal. She exhibits no distension and no mass. There is tenderness in the epigastric area and left upper quadrant. There is no rebound and no guarding.   Musculoskeletal: She exhibits no edema.   Neurological: She is alert and oriented to person, place, and time. She displays tremor (mild, hands). Coordination normal.   Skin: Skin is warm and dry. No rash noted. No erythema.   Psychiatric: She has a normal mood and affect. Her behavior is normal. Thought content normal.   Nursing note and vitals reviewed.      Procedures           ED Course             Labs Reviewed   URINALYSIS W/ CULTURE IF INDICATED - Abnormal; Notable for the following components:       Result Value    Appearance, UA Slightly Cloudy (*)     Protein, UA 30 mg/dL (1+) (*)     Leuk Esterase, UA Trace (*)     All other components within normal limits   URINALYSIS, MICROSCOPIC ONLY - Abnormal; Notable for the following components:    RBC, UA 0-2 (*)     Bacteria, UA 3+ (*)     Squamous Epithelial Cells, UA 3-5 (*)     All other components within normal limits   COMPREHENSIVE METABOLIC PANEL - Abnormal; Notable for the following components:    Chloride 95 (*)     ALT (SGPT) 57 (*)     AST (SGOT) 62 (*)     Anion Gap 18.0 (*)     All other components within normal limits    Narrative:     GFR Normal >60  Chronic Kidney Disease <60  Kidney Failure <15     CBC WITH AUTO DIFFERENTIAL - Abnormal; Notable for the  following components:    .3 (*)     MCH 36.5 (*)     Lymphocyte % 17.9 (*)     Monocyte % 4.8 (*)     Eosinophil % 0.2 (*)     Neutrophils, Absolute 7.01 (*)     All other components within normal limits   LIPASE - Normal   HCG, SERUM, QUALITATIVE - Normal   RAINBOW DRAW    Narrative:     The following orders were created for panel order [place] Draw.  Procedure                               Abnormality         Status                     ---------                               -----------         ------                     Light Blue Top[[id number]]                                   Final result               Green Top (Gel)[[id number]]                                  Final result               Lavender Top[[id number]]                                     Final result               Gold Top - SST[[id number]]                                                                Please view results for these tests on the individual orders.   CBC AND DIFFERENTIAL    Narrative:     The following orders were created for panel order CBC & Differential.  Procedure                               Abnormality         Status                     ---------                               -----------         ------                     CBC Auto Differential[[id number]]        Abnormal            Final result                 Please view results for these tests on the individual orders.   LIGHT BLUE TOP   GREEN TOP   LAVENDER TOP   GOLD TOP - SST       XR Abdomen Flat & Upright   Final Result   CONCLUSION:   No free air.   No mechanical bowel obstruction.      [id number]      Electronically signed by:  Ganesh Kyle MD  1/6/2020 5:21 PM [facility]   Workstation: [id number]                                              [facility]    Final diagnoses:   Non-intractable vomiting with nausea, unspecified vomiting type   Alcohol withdrawal syndrome without complication (CMS/HCC)            Han Perdomo MD  01/06/20 1842       Han Perdomo MD  01/06/20  1844

## 2020-01-07 LAB
ANION GAP SERPL CALCULATED.3IONS-SCNC: 11 MMOL/L (ref 5–15)
BASOPHILS # BLD AUTO: 0.04 10*3/MM3 (ref 0–0.2)
BASOPHILS NFR BLD AUTO: 0.7 % (ref 0–1.5)
BUN BLD-MCNC: 10 MG/DL (ref 6–20)
BUN/CREAT SERPL: 19.2 (ref 7–25)
CALCIUM SPEC-SCNC: 8.2 MG/DL (ref 8.6–10.5)
CHLORIDE SERPL-SCNC: 102 MMOL/L (ref 98–107)
CO2 SERPL-SCNC: 24 MMOL/L (ref 22–29)
CREAT BLD-MCNC: 0.52 MG/DL (ref 0.57–1)
DEPRECATED RDW RBC AUTO: 50.8 FL (ref 37–54)
EOSINOPHIL # BLD AUTO: 0.04 10*3/MM3 (ref 0–0.4)
EOSINOPHIL NFR BLD AUTO: 0.7 % (ref 0.3–6.2)
ERYTHROCYTE [DISTWIDTH] IN BLOOD BY AUTOMATED COUNT: 12.8 % (ref 12.3–15.4)
GFR SERPL CREATININE-BSD FRML MDRD: 136 ML/MIN/1.73
GLUCOSE BLD-MCNC: 79 MG/DL (ref 65–99)
HCT VFR BLD AUTO: 34.5 % (ref 34–46.6)
HGB BLD-MCNC: 11.9 G/DL (ref 12–15.9)
IMM GRANULOCYTES # BLD AUTO: 0.07 10*3/MM3 (ref 0–0.05)
IMM GRANULOCYTES NFR BLD AUTO: 1.2 % (ref 0–0.5)
LYMPHOCYTES # BLD AUTO: 2.13 10*3/MM3 (ref 0.7–3.1)
LYMPHOCYTES NFR BLD AUTO: 37 % (ref 19.6–45.3)
MCH RBC QN AUTO: 37.3 PG (ref 26.6–33)
MCHC RBC AUTO-ENTMCNC: 34.5 G/DL (ref 31.5–35.7)
MCV RBC AUTO: 108.2 FL (ref 79–97)
MONOCYTES # BLD AUTO: 0.51 10*3/MM3 (ref 0.1–0.9)
MONOCYTES NFR BLD AUTO: 8.9 % (ref 5–12)
NEUTROPHILS # BLD AUTO: 2.96 10*3/MM3 (ref 1.7–7)
NEUTROPHILS NFR BLD AUTO: 51.5 % (ref 42.7–76)
NRBC BLD AUTO-RTO: 0 /100 WBC (ref 0–0.2)
PLATELET # BLD AUTO: 169 10*3/MM3 (ref 140–450)
PMV BLD AUTO: 9.3 FL (ref 6–12)
POTASSIUM BLD-SCNC: 4 MMOL/L (ref 3.5–5.2)
RBC # BLD AUTO: 3.19 10*6/MM3 (ref 3.77–5.28)
SODIUM BLD-SCNC: 137 MMOL/L (ref 136–145)
WBC NRBC COR # BLD: 5.75 10*3/MM3 (ref 3.4–10.8)

## 2020-01-07 PROCEDURE — 25010000002 KETOROLAC TROMETHAMINE PER 15 MG: Performed by: HOSPITALIST

## 2020-01-07 PROCEDURE — G0378 HOSPITAL OBSERVATION PER HR: HCPCS

## 2020-01-07 PROCEDURE — 85025 COMPLETE CBC W/AUTO DIFF WBC: CPT | Performed by: HOSPITALIST

## 2020-01-07 PROCEDURE — 25010000002 KETOROLAC TROMETHAMINE PER 15 MG: Performed by: PHYSICIAN ASSISTANT

## 2020-01-07 PROCEDURE — 94640 AIRWAY INHALATION TREATMENT: CPT

## 2020-01-07 PROCEDURE — 25010000002 LORAZEPAM PER 2 MG: Performed by: HOSPITALIST

## 2020-01-07 PROCEDURE — 25010000002 THIAMINE PER 100 MG: Performed by: HOSPITALIST

## 2020-01-07 PROCEDURE — 94760 N-INVAS EAR/PLS OXIMETRY 1: CPT

## 2020-01-07 PROCEDURE — 80048 BASIC METABOLIC PNL TOTAL CA: CPT | Performed by: HOSPITALIST

## 2020-01-07 RX ORDER — DIAZEPAM 5 MG/1
5 TABLET ORAL EVERY 6 HOURS
Status: DISCONTINUED | OUTPATIENT
Start: 2020-01-07 | End: 2020-01-08

## 2020-01-07 RX ORDER — SODIUM CHLORIDE 9 MG/ML
100 INJECTION, SOLUTION INTRAVENOUS CONTINUOUS
Status: DISCONTINUED | OUTPATIENT
Start: 2020-01-07 | End: 2020-01-14 | Stop reason: HOSPADM

## 2020-01-07 RX ORDER — BUPRENORPHINE HYDROCHLORIDE AND NALOXONE HYDROCHLORIDE DIHYDRATE 8; 2 MG/1; MG/1
1 TABLET SUBLINGUAL 3 TIMES DAILY
Status: DISCONTINUED | OUTPATIENT
Start: 2020-01-07 | End: 2020-01-14 | Stop reason: HOSPADM

## 2020-01-07 RX ORDER — NICOTINE 21 MG/24HR
1 PATCH, TRANSDERMAL 24 HOURS TRANSDERMAL
Status: DISCONTINUED | OUTPATIENT
Start: 2020-01-07 | End: 2020-01-14 | Stop reason: HOSPADM

## 2020-01-07 RX ORDER — IPRATROPIUM BROMIDE AND ALBUTEROL SULFATE 2.5; .5 MG/3ML; MG/3ML
3 SOLUTION RESPIRATORY (INHALATION)
Status: DISCONTINUED | OUTPATIENT
Start: 2020-01-07 | End: 2020-01-10

## 2020-01-07 RX ORDER — KETOROLAC TROMETHAMINE 30 MG/ML
30 INJECTION, SOLUTION INTRAMUSCULAR; INTRAVENOUS EVERY 6 HOURS PRN
Status: DISPENSED | OUTPATIENT
Start: 2020-01-07 | End: 2020-01-12

## 2020-01-07 RX ADMIN — LORAZEPAM 2 MG: 2 INJECTION, SOLUTION INTRAMUSCULAR; INTRAVENOUS at 21:53

## 2020-01-07 RX ADMIN — SODIUM CHLORIDE 100 ML/HR: 900 INJECTION, SOLUTION INTRAVENOUS at 05:16

## 2020-01-07 RX ADMIN — DIAZEPAM 5 MG: 5 TABLET ORAL at 12:46

## 2020-01-07 RX ADMIN — BUPRENORPHINE HYDROCHLORIDE AND NALOXONE HYDROCHLORIDE DIHYDRATE 1 TABLET: 8; 2 TABLET SUBLINGUAL at 09:28

## 2020-01-07 RX ADMIN — KETOROLAC TROMETHAMINE 30 MG: 30 INJECTION, SOLUTION INTRAMUSCULAR at 14:46

## 2020-01-07 RX ADMIN — BUPRENORPHINE HYDROCHLORIDE AND NALOXONE HYDROCHLORIDE DIHYDRATE 1 TABLET: 8; 2 TABLET SUBLINGUAL at 15:32

## 2020-01-07 RX ADMIN — FOLIC ACID 100 ML/HR: 5 INJECTION, SOLUTION INTRAMUSCULAR; INTRAVENOUS; SUBCUTANEOUS at 10:46

## 2020-01-07 RX ADMIN — SODIUM CHLORIDE, PRESERVATIVE FREE 10 ML: 5 INJECTION INTRAVENOUS at 09:33

## 2020-01-07 RX ADMIN — BUPRENORPHINE HYDROCHLORIDE AND NALOXONE HYDROCHLORIDE DIHYDRATE 1 TABLET: 8; 2 TABLET SUBLINGUAL at 21:47

## 2020-01-07 RX ADMIN — LORAZEPAM 2 MG: 2 INJECTION, SOLUTION INTRAMUSCULAR; INTRAVENOUS at 00:46

## 2020-01-07 RX ADMIN — SODIUM CHLORIDE 100 ML/HR: 900 INJECTION, SOLUTION INTRAVENOUS at 21:47

## 2020-01-07 RX ADMIN — LORAZEPAM 2 MG: 2 INJECTION, SOLUTION INTRAMUSCULAR; INTRAVENOUS at 19:32

## 2020-01-07 RX ADMIN — LORAZEPAM 2 MG: 2 INJECTION, SOLUTION INTRAMUSCULAR; INTRAVENOUS at 03:16

## 2020-01-07 RX ADMIN — SERTRALINE HYDROCHLORIDE 25 MG: 25 TABLET ORAL at 09:28

## 2020-01-07 RX ADMIN — DIAZEPAM 5 MG: 5 TABLET ORAL at 18:31

## 2020-01-07 RX ADMIN — IPRATROPIUM BROMIDE AND ALBUTEROL SULFATE 3 ML: 2.5; .5 SOLUTION RESPIRATORY (INHALATION) at 20:28

## 2020-01-07 RX ADMIN — LORAZEPAM 1 MG: 2 INJECTION, SOLUTION INTRAMUSCULAR; INTRAVENOUS at 09:29

## 2020-01-07 RX ADMIN — NICOTINE 1 PATCH: 21 PATCH, EXTENDED RELEASE TRANSDERMAL at 10:45

## 2020-01-07 RX ADMIN — LORAZEPAM 2 MG: 2 INJECTION, SOLUTION INTRAMUSCULAR; INTRAVENOUS at 17:02

## 2020-01-07 RX ADMIN — LORAZEPAM 1 MG: 2 INJECTION, SOLUTION INTRAMUSCULAR; INTRAVENOUS at 05:35

## 2020-01-07 RX ADMIN — LORAZEPAM 2 MG: 2 INJECTION, SOLUTION INTRAMUSCULAR; INTRAVENOUS at 14:11

## 2020-01-07 RX ADMIN — LORAZEPAM 2 MG: 2 INJECTION, SOLUTION INTRAMUSCULAR; INTRAVENOUS at 10:46

## 2020-01-07 NOTE — PROGRESS NOTES
[facility] Medicine Services  INPATIENT PROGRESS NOTE    Length of Stay: 0  Date of Admission: 1/6/2020  Primary Care Physician: Provider, No Known    Subjective   Chief Complaint/HPI: This 34-year-old  female has a longstanding history of alcohol abuse.  Patient drinks at least 1/5 of vodka a day.  Patient had a brief stent of sobriety in early to mid 2019, but started drinking heavily again secondary to chronic back pain and stress.  Patient's last drink was on January 6 around 2 AM, and she began to have withdrawal symptoms shortly after.    Ativan is not optimal treatment for this particular patient's condition.  She does have a history of utilizing a Valium taper with success.  Have discussed with Dr. Zuniga of psychiatry and have initiated a Valium taper.    Review of Systems   Constitutional: Negative for chills and fever.   Respiratory: Negative for shortness of breath.    Cardiovascular: Negative for chest pain.   Gastrointestinal: Positive for nausea. Negative for abdominal pain and vomiting.   Musculoskeletal: Positive for arthralgias.   Neurological: Negative for dizziness and syncope.   Psychiatric/Behavioral: Negative for confusion and hallucinations.      All pertinent negatives and positives are as above. All other systems have been reviewed and are negative unless otherwise stated.     Objective    Temp:  [97.6 °F (36.4 °C)-97.8 °F (36.6 °C)] 97.6 °F (36.4 °C)  Heart Rate:  [] 104  Resp:  [16-20] 18  BP: ()/() 138/77    Physical Exam   Constitutional: She is oriented to person, place, and time. She appears well-developed and well-nourished. No distress.   HENT:   Head: Normocephalic and atraumatic.   Cardiovascular: Normal rate and regular rhythm.   Pulmonary/Chest: Effort normal and breath sounds normal. No stridor. No respiratory distress.   Abdominal: Soft. Bowel sounds are normal. She exhibits no distension. There is no  tenderness.   Musculoskeletal: She exhibits no edema.   Neurological: She is alert and oriented to person, place, and time.   Skin: Skin is warm and dry. Capillary refill takes less than 2 seconds. She is not diaphoretic.     Results Review:  I have reviewed the labs, radiology results, and diagnostic studies.    Laboratory Data:   Results from last 7 days   Lab Units 01/07/20  0509 01/06/20  1442   SODIUM mmol/L 137 138   POTASSIUM mmol/L 4.0 3.9   CHLORIDE mmol/L 102 95*   CO2 mmol/L 24.0 25.0   BUN mg/dL 10 8   CREATININE mg/dL 0.52* 0.57   GLUCOSE mg/dL 79 85   CALCIUM mg/dL 8.2* 9.4   BILIRUBIN mg/dL  --  0.3   ALK PHOS U/L  --  84   ALT (SGPT) U/L  --  57*   AST (SGOT) U/L  --  62*   ANION GAP mmol/L 11.0 18.0*     Estimated Creatinine Clearance: 164 mL/min (A) (by C-G formula based on SCr of 0.52 mg/dL (L)).          Results from last 7 days   Lab Units 01/07/20  0509 01/06/20  1442   WBC 10*3/mm3 5.75 9.22   HEMOGLOBIN g/dL 11.9* 14.6   HEMATOCRIT % 34.5 41.7   PLATELETS 10*3/mm3 169 191           Culture Data:   No results found for: BLOODCX  No results found for: URINECX  No results found for: RESPCX  No results found for: WOUNDCX  No results found for: STOOLCX  No components found for: BODYFLD    Radiology Data:   Imaging Results (Last 24 Hours)     Procedure Component Value Units Date/Time    XR Abdomen Flat & Upright [[id number]] Collected:  01/06/20 1708     Updated:  01/06/20 1722    Narrative:       ABDOMINAL SERIES    HISTORY: Abdominal pain. Nausea and vomiting. Fatigue.    Supine and upright films of the abdomen were obtained.    COMPARISON: October 21, 2018    FINDINGS:    No free air.  No mechanical bowel obstruction.  Some retained feces scattered throughout the colon.  No organomegaly.  No abnormal calcifications.  No acute osseous abnormality.        Impression:       CONCLUSION:  No free air.  No mechanical bowel obstruction.    [id number]    Electronically signed by:  Ganesh Kyle MD  1/6/2020  5:21 PM [facility]  Workstation: [id number]          I have reviewed the patient's current medications.     Assessment/Plan     Active Hospital Problems    Diagnosis   • Non-intractable vomiting   ETOH abuse  ETOH withdrawal  Chronic pain        Plan: Scheduled Valium with subsequent Valium taper, supportive treatment, continue to treat as hospital course dictates.                  This document has been electronically signed by KYLEE Trammell on January 7, 2020 12:25 PM

## 2020-01-07 NOTE — PLAN OF CARE
Problem: Patient Care Overview  Goal: Plan of Care Review  Outcome: Ongoing (interventions implemented as appropriate)  Flowsheets (Taken 1/7/2020 1210)  Progress: improving  Plan of Care Reviewed With: patient  Outcome Summary: pain controled, no nausea and vomiting, ativan given for withdrawal no n/v

## 2020-01-07 NOTE — PLAN OF CARE
Problem: Patient Care Overview  Goal: Plan of Care Review  Outcome: Ongoing (interventions implemented as appropriate)  Flowsheets (Taken 1/7/2020 7602)  Progress: no change  Plan of Care Reviewed With: patient  Outcome Summary: new admit this shift, VSS, prn and scheduled Ativan given. Will continue to monitor.

## 2020-01-07 NOTE — H&P
[facility] Medicine Admission      Date of Admission: 1/6/2020      Primary Care Physician: Provider, No Known      Chief Complaint: Alcohol withdrawal    HPI: Patient is a 34-year-old  female with a long history of severe alcohol abuse.  She states that she has been drinking heavily for greater than 15 years.  She states that she drinks at least fifth of vodka every day.  She will drink 2 fifths of vodka if she has access to it.  She was hospitalized roughly a year ago for alcoholic pancreatitis and went through significant alcohol withdrawal during that hospital stay.  She states that every morning when she wakes up she is jittery and tremulous and she has to start drinking to combat that.  States that she is tried to stop drinking at home on multiple occasions and has suffered significant withdrawal symptoms including nausea, vomiting, hallucinations, and seizures.  Her last drink was approximately 2 AM on the morning of January 6.  She began to have withdrawal symptoms and presented to the hospital rather than drinking again.  Her CIWA score in the emergency department was 12.  At the time of my interview patient denied hallucinations, but was markedly tremulous.    Concurrent Medical History:  has a past medical history of Abnormal Pap smear of cervix, Alcoholism (CMS/[facility]), Anxiety, Cervical dysplasia, Depression, Fibrocystic breast, GERD (gastroesophageal reflux disease), Seizures (CMS/HCC) (2017), Substance abuse (CMS/[facility]), and Substance abuse (CMS/[facility]).    Past Surgical History:  has a past surgical history that includes Rhinoplasty.    Family History: family history includes Breast cancer in her maternal aunt, maternal grandmother, mother, and paternal grandmother; COPD in her mother; Cancer in her mother; Heart disease in her father; Hypertension in her father.     Social History:  reports that she has been smoking cigarettes. She has a 20.00 pack-year  smoking history. She has never used smokeless tobacco. She reports that she drinks alcohol. She reports that she does not use drugs.    Allergies: No Known Allergies    Medications:   Prior to Admission medications    Medication Sig Start Date End Date Taking? Authorizing Provider   albuterol (PROVENTIL) (2.5 MG/3ML) 0.083% nebulizer solution Take 2.5 mg by nebulization Every 6 (Six) Hours As Needed for Wheezing or Shortness of Air. 12/16/19  Yes Mayelin Hanson APRN   buprenorphine-naloxone (SUBOXONE) 2-0.5 MG film film Place  under the tongue Daily.   Yes Emergency, Nurse Tiarra, RN   Sertraline HCl (ZOLOFT PO) Take  by mouth.   Yes Caty Yepez MD   baclofen (LIORESAL) 10 MG tablet  12/12/19   Emergency, Nurse Tiarra, RN   diclofenac (FLECTOR) 1.3 % patch patch Apply 1 patch topically to the appropriate area as directed 2 (Two) Times a Day.    Caty Yepez MD   diclofenac (VOLTAREN) 1 % gel gel Apply 4 g topically to the appropriate area as directed 4 (Four) Times a Day As Needed.    ProviderCaty MD       Review of Systems:  Review of Systems   Constitutional: Positive for activity change and appetite change. Negative for chills, fatigue, fever and unexpected weight change.   HENT: Negative for congestion, facial swelling, hearing loss, nosebleeds, rhinorrhea, sneezing, trouble swallowing and voice change.    Eyes: Negative for photophobia and visual disturbance.   Respiratory: Negative for apnea, cough, choking, chest tightness, shortness of breath, wheezing and stridor.    Cardiovascular: Negative for chest pain, palpitations and leg swelling.   Gastrointestinal: Positive for nausea and vomiting. Negative for abdominal pain, blood in stool, constipation and diarrhea.   Endocrine: Negative for cold intolerance, heat intolerance, polydipsia, polyphagia and polyuria.   Genitourinary: Negative for dysuria, flank pain and hematuria.   Musculoskeletal: Negative for arthralgias, back  pain, myalgias and neck pain.   Skin: Negative for rash and wound.   Allergic/Immunologic: Negative for immunocompromised state.   Neurological: Positive for tremors and headaches. Negative for dizziness, seizures, syncope, speech difficulty, weakness, light-headedness and numbness.   Hematological: Does not bruise/bleed easily.   Psychiatric/Behavioral: Positive for agitation. Negative for behavioral problems, confusion, decreased concentration, hallucinations, self-injury and suicidal ideas. The patient is nervous/anxious.       Otherwise complete ROS is negative except as mentioned above.    Physical Exam:   Temp:  [97.8 °F (36.6 °C)] 97.8 °F (36.6 °C)  Heart Rate:  [] 92  Resp:  [16-18] 16  BP: (114-158)/() 118/76     Physical Exam   Constitutional: She is oriented to person, place, and time. She appears well-developed and well-nourished.   HENT:   Head: Normocephalic and atraumatic.   Nose: Nose normal.   Mouth/Throat: Oropharynx is clear and moist.   Eyes: Pupils are equal, round, and reactive to light. Conjunctivae, EOM and lids are normal. No scleral icterus.   Neck: Normal range of motion. Neck supple. No JVD present. No tracheal tenderness and no spinous process tenderness present. No neck rigidity. No tracheal deviation, no edema and normal range of motion present.   Cardiovascular: Normal rate, regular rhythm, S1 normal, S2 normal, normal heart sounds and normal pulses. Exam reveals no gallop and no friction rub.   No murmur heard.  Pulmonary/Chest: Effort normal and breath sounds normal. No accessory muscle usage. No respiratory distress. She has no decreased breath sounds. She has no wheezes. She has no rales. She exhibits no tenderness.   Abdominal: Soft. Bowel sounds are normal. She exhibits no distension and no mass. There is no tenderness. There is no rebound and no guarding.   Musculoskeletal: She exhibits no edema or tenderness.   Neurological: She is alert and oriented to person,  place, and time. She has normal reflexes. She displays no atrophy and normal reflexes. No cranial nerve deficit or sensory deficit. She exhibits normal muscle tone. She displays no seizure activity. Coordination normal.   Skin: Skin is warm. No rash noted. No pallor.   Psychiatric: Judgment and thought content normal. Her mood appears anxious. She is agitated.   Significantly tremulous         Results Reviewed:  I have personally reviewed current lab, radiology, and data and agree with results.  Lab Results (last 24 hours)     Procedure Component Value Units Date/Time    [place] Draw [[id number]] Collected:  01/06/20 1441    Specimen:  Blood Updated:  01/06/20 1545    Narrative:       The following orders were created for panel order [place] Draw.  Procedure                               Abnormality         Status                     ---------                               -----------         ------                     Light Blue Top[[id number]]                                   Final result               Green Top (Gel)[[id number]]                                  Final result               Lavender Top[[id number]]                                     Final result               Gold Top - SST[[id number]]                                                                Please view results for these tests on the individual orders.    Light Blue Top [[id number]] Collected:  01/06/20 1441    Specimen:  Blood Updated:  01/06/20 1545     Extra Tube hold for add-on     Comment: Auto resulted       Green Top (Gel) [[id number]] Collected:  01/06/20 1442    Specimen:  Blood Updated:  01/06/20 1545     Extra Tube Hold for add-ons.     Comment: Auto resulted.       Lavender Top [[id number]] Collected:  01/06/20 1442    Specimen:  Blood Updated:  01/06/20 1545     Extra Tube hold for add-on     Comment: Auto resulted       Comprehensive Metabolic Panel [[id number]]  (Abnormal) Collected:  01/06/20 1442    Specimen:  Blood Updated:   01/06/20 1516     Glucose 85 mg/dL      BUN 8 mg/dL      Creatinine 0.57 mg/dL      Sodium 138 mmol/L      Potassium 3.9 mmol/L      Chloride 95 mmol/L      CO2 25.0 mmol/L      Calcium 9.4 mg/dL      Total Protein 7.9 g/dL      Albumin 4.60 g/dL      ALT (SGPT) 57 U/L      AST (SGOT) 62 U/L      Alkaline Phosphatase 84 U/L      Total Bilirubin 0.3 mg/dL      eGFR Non African Amer 122 mL/min/1.73      Globulin 3.3 gm/dL      A/G Ratio 1.4 g/dL      BUN/Creatinine Ratio 14.0     Anion Gap 18.0 mmol/L     Narrative:       GFR Normal >60  Chronic Kidney Disease <60  Kidney Failure <15      Lipase [[id number]]  (Normal) Collected:  01/06/20 1442    Specimen:  Blood Updated:  01/06/20 1516     Lipase 52 U/L     hCG, Serum, Qualitative [[id number]]  (Normal) Collected:  01/06/20 1441    Specimen:  Blood Updated:  01/06/20 1507     HCG Qualitative Negative    CBC & Differential [[id number]] Collected:  01/06/20 1442    Specimen:  Blood Updated:  01/06/20 1453    Narrative:       The following orders were created for panel order CBC & Differential.  Procedure                               Abnormality         Status                     ---------                               -----------         ------                     CBC Auto Differential[[id number]]        Abnormal            Final result                 Please view results for these tests on the individual orders.    CBC Auto Differential [[id number]]  (Abnormal) Collected:  01/06/20 1442    Specimen:  Blood Updated:  01/06/20 1453     WBC 9.22 10*3/mm3      RBC 4.00 10*6/mm3      Hemoglobin 14.6 g/dL      Hematocrit 41.7 %      .3 fL      MCH 36.5 pg      MCHC 35.0 g/dL      RDW 12.4 %      RDW-SD 48.1 fl      MPV 9.1 fL      Platelets 191 10*3/mm3      Neutrophil % 76.0 %      Lymphocyte % 17.9 %      Monocyte % 4.8 %      Eosinophil % 0.2 %      Basophil % 0.8 %      Immature Grans % 0.3 %      Neutrophils, Absolute 7.01 10*3/mm3      Lymphocytes, Absolute 1.65  10*3/mm3      Monocytes, Absolute 0.44 10*3/mm3      Eosinophils, Absolute 0.02 10*3/mm3      Basophils, Absolute 0.07 10*3/mm3      Immature Grans, Absolute 0.03 10*3/mm3      nRBC 0.0 /100 WBC     Urinalysis, Microscopic Only - Urine, Clean Catch [[id number]]  (Abnormal) Collected:  01/06/20 1327    Specimen:  Urine, Clean Catch Updated:  01/06/20 1425     RBC, UA 0-2 /HPF      WBC, UA 0-2 /HPF      Bacteria, UA 3+ /HPF      Squamous Epithelial Cells, UA 3-5 /HPF      Hyaline Casts, UA None Seen /LPF      Methodology Manual Light Microscopy    Urinalysis With Culture If Indicated - Urine, Clean Catch [[id number]]  (Abnormal) Collected:  01/06/20 1327    Specimen:  Urine, Clean Catch Updated:  01/06/20 1402     Color, UA Yellow     Appearance, UA Slightly Cloudy     pH, UA 6.5     Specific [place], UA 1.006     Comment: Result obtained by Refractometer        Glucose, UA Negative     Ketones, UA Negative     Bilirubin, UA Negative     Blood, UA Negative     Protein, UA 30 mg/dL (1+)     Leuk Esterase, UA Trace     Nitrite, UA Negative     Urobilinogen, UA 0.2 E.U./dL        Imaging Results (Last 24 Hours)     Procedure Component Value Units Date/Time    XR Abdomen Flat & Upright [[id number]] Collected:  01/06/20 1708     Updated:  01/06/20 1722    Narrative:       ABDOMINAL SERIES    HISTORY: Abdominal pain. Nausea and vomiting. Fatigue.    Supine and upright films of the abdomen were obtained.    COMPARISON: October 21, 2018    FINDINGS:    No free air.  No mechanical bowel obstruction.  Some retained feces scattered throughout the colon.  No organomegaly.  No abnormal calcifications.  No acute osseous abnormality.        Impression:       CONCLUSION:  No free air.  No mechanical bowel obstruction.    [id number]    Electronically signed by:  Ganesh Kyle MD  1/6/2020 5:21 PM CST  Workstation: [id number]            Assessment:    Active Hospital Problems    Diagnosis   • Non-intractable vomiting             Plan:  1.   Alcohol withdrawal: Patient with history of significant alcohol withdrawal.  She has a very longstanding history of heavy alcohol abuse.  She verbalizes the desire to stop drinking.  She is already starting to have withdrawal symptoms.  She has received Ativan in the emergency department.  Will place on CIWA scale with both scheduled and as needed Ativan.  I have discussed with the patient and her spouse that due to the volume of alcohol that she drinks, she may require intubation to get through her symptoms.  They verbalized understanding.  2.  Alcohol abuse: Patient with longstanding history of alcohol abuse.  She states that she wants to quit.  She is attempted multiple times in the past but has failed due to withdrawal symptoms.  3.  History of alcoholic pancreatitis  4.  Tobacco abuse  5.  DVT prophylaxis: SCDs.    I discussed the patient's findings and my recommendations with: Patient and spouse        This document has been electronically signed by Ant Saenz MD on January 6, 2020 7:12 PM

## 2020-01-08 PROBLEM — F10.939 ALCOHOL WITHDRAWAL (HCC): Status: ACTIVE | Noted: 2020-01-08

## 2020-01-08 LAB
ANION GAP SERPL CALCULATED.3IONS-SCNC: 9 MMOL/L (ref 5–15)
BASOPHILS # BLD AUTO: 0.03 10*3/MM3 (ref 0–0.2)
BASOPHILS NFR BLD AUTO: 0.6 % (ref 0–1.5)
BUN BLD-MCNC: 5 MG/DL (ref 6–20)
BUN/CREAT SERPL: 11.9 (ref 7–25)
CALCIUM SPEC-SCNC: 8 MG/DL (ref 8.6–10.5)
CHLORIDE SERPL-SCNC: 104 MMOL/L (ref 98–107)
CO2 SERPL-SCNC: 25 MMOL/L (ref 22–29)
CREAT BLD-MCNC: 0.42 MG/DL (ref 0.57–1)
DEPRECATED RDW RBC AUTO: 48.8 FL (ref 37–54)
EOSINOPHIL # BLD AUTO: 0.04 10*3/MM3 (ref 0–0.4)
EOSINOPHIL NFR BLD AUTO: 0.8 % (ref 0.3–6.2)
ERYTHROCYTE [DISTWIDTH] IN BLOOD BY AUTOMATED COUNT: 12.4 % (ref 12.3–15.4)
GFR SERPL CREATININE-BSD FRML MDRD: >150 ML/MIN/1.73
GLUCOSE BLD-MCNC: 90 MG/DL (ref 65–99)
HCT VFR BLD AUTO: 36.3 % (ref 34–46.6)
HGB BLD-MCNC: 12.5 G/DL (ref 12–15.9)
IMM GRANULOCYTES # BLD AUTO: 0.01 10*3/MM3 (ref 0–0.05)
IMM GRANULOCYTES NFR BLD AUTO: 0.2 % (ref 0–0.5)
LIPASE SERPL-CCNC: 43 U/L (ref 13–60)
LYMPHOCYTES # BLD AUTO: 1.91 10*3/MM3 (ref 0.7–3.1)
LYMPHOCYTES NFR BLD AUTO: 36.9 % (ref 19.6–45.3)
MCH RBC QN AUTO: 36.9 PG (ref 26.6–33)
MCHC RBC AUTO-ENTMCNC: 34.4 G/DL (ref 31.5–35.7)
MCV RBC AUTO: 107.1 FL (ref 79–97)
MONOCYTES # BLD AUTO: 0.45 10*3/MM3 (ref 0.1–0.9)
MONOCYTES NFR BLD AUTO: 8.7 % (ref 5–12)
NEUTROPHILS # BLD AUTO: 2.74 10*3/MM3 (ref 1.7–7)
NEUTROPHILS NFR BLD AUTO: 52.8 % (ref 42.7–76)
NRBC BLD AUTO-RTO: 0 /100 WBC (ref 0–0.2)
PLATELET # BLD AUTO: 168 10*3/MM3 (ref 140–450)
PMV BLD AUTO: 9.7 FL (ref 6–12)
POTASSIUM BLD-SCNC: 3.9 MMOL/L (ref 3.5–5.2)
RBC # BLD AUTO: 3.39 10*6/MM3 (ref 3.77–5.28)
SODIUM BLD-SCNC: 138 MMOL/L (ref 136–145)
WBC NRBC COR # BLD: 5.18 10*3/MM3 (ref 3.4–10.8)

## 2020-01-08 PROCEDURE — 94799 UNLISTED PULMONARY SVC/PX: CPT

## 2020-01-08 PROCEDURE — 25010000002 KETOROLAC TROMETHAMINE PER 15 MG: Performed by: HOSPITALIST

## 2020-01-08 PROCEDURE — 83690 ASSAY OF LIPASE: CPT | Performed by: PHYSICIAN ASSISTANT

## 2020-01-08 PROCEDURE — 85025 COMPLETE CBC W/AUTO DIFF WBC: CPT | Performed by: HOSPITALIST

## 2020-01-08 PROCEDURE — 25010000002 LORAZEPAM PER 2 MG: Performed by: HOSPITALIST

## 2020-01-08 PROCEDURE — 94760 N-INVAS EAR/PLS OXIMETRY 1: CPT

## 2020-01-08 PROCEDURE — 80048 BASIC METABOLIC PNL TOTAL CA: CPT | Performed by: HOSPITALIST

## 2020-01-08 PROCEDURE — 25010000002 KETOROLAC TROMETHAMINE PER 15 MG: Performed by: PHYSICIAN ASSISTANT

## 2020-01-08 PROCEDURE — 25010000002 THIAMINE PER 100 MG: Performed by: HOSPITALIST

## 2020-01-08 RX ORDER — DIAZEPAM 5 MG/1
10 TABLET ORAL EVERY 6 HOURS
Status: DISCONTINUED | OUTPATIENT
Start: 2020-01-08 | End: 2020-01-12

## 2020-01-08 RX ORDER — ACETAMINOPHEN 325 MG/1
650 TABLET ORAL EVERY 6 HOURS PRN
Status: DISCONTINUED | OUTPATIENT
Start: 2020-01-08 | End: 2020-01-14 | Stop reason: HOSPADM

## 2020-01-08 RX ADMIN — SERTRALINE HYDROCHLORIDE 25 MG: 25 TABLET ORAL at 08:17

## 2020-01-08 RX ADMIN — NICOTINE 1 PATCH: 21 PATCH, EXTENDED RELEASE TRANSDERMAL at 08:15

## 2020-01-08 RX ADMIN — LORAZEPAM 2 MG: 2 INJECTION, SOLUTION INTRAMUSCULAR; INTRAVENOUS at 21:34

## 2020-01-08 RX ADMIN — KETOROLAC TROMETHAMINE 30 MG: 30 INJECTION, SOLUTION INTRAMUSCULAR at 14:30

## 2020-01-08 RX ADMIN — LORAZEPAM 2 MG: 2 INJECTION, SOLUTION INTRAMUSCULAR; INTRAVENOUS at 19:44

## 2020-01-08 RX ADMIN — BUPRENORPHINE HYDROCHLORIDE AND NALOXONE HYDROCHLORIDE DIHYDRATE 1 TABLET: 8; 2 TABLET SUBLINGUAL at 20:30

## 2020-01-08 RX ADMIN — SODIUM CHLORIDE 100 ML/HR: 900 INJECTION, SOLUTION INTRAVENOUS at 21:06

## 2020-01-08 RX ADMIN — DIAZEPAM 10 MG: 5 TABLET ORAL at 17:39

## 2020-01-08 RX ADMIN — LORAZEPAM 2 MG: 2 INJECTION, SOLUTION INTRAMUSCULAR; INTRAVENOUS at 14:30

## 2020-01-08 RX ADMIN — SODIUM CHLORIDE, PRESERVATIVE FREE 10 ML: 5 INJECTION INTRAVENOUS at 08:15

## 2020-01-08 RX ADMIN — LORAZEPAM 2 MG: 2 INJECTION, SOLUTION INTRAMUSCULAR; INTRAVENOUS at 18:26

## 2020-01-08 RX ADMIN — DIAZEPAM 5 MG: 5 TABLET ORAL at 06:37

## 2020-01-08 RX ADMIN — LORAZEPAM 2 MG: 2 INJECTION, SOLUTION INTRAMUSCULAR; INTRAVENOUS at 10:59

## 2020-01-08 RX ADMIN — DIAZEPAM 5 MG: 5 TABLET ORAL at 12:26

## 2020-01-08 RX ADMIN — KETOROLAC TROMETHAMINE 30 MG: 30 INJECTION, SOLUTION INTRAMUSCULAR at 05:28

## 2020-01-08 RX ADMIN — LORAZEPAM 2 MG: 2 INJECTION, SOLUTION INTRAMUSCULAR; INTRAVENOUS at 18:54

## 2020-01-08 RX ADMIN — LORAZEPAM 2 MG: 2 INJECTION, SOLUTION INTRAMUSCULAR; INTRAVENOUS at 21:06

## 2020-01-08 RX ADMIN — IPRATROPIUM BROMIDE AND ALBUTEROL SULFATE 3 ML: 2.5; .5 SOLUTION RESPIRATORY (INHALATION) at 07:40

## 2020-01-08 RX ADMIN — FOLIC ACID 100 ML/HR: 5 INJECTION, SOLUTION INTRAMUSCULAR; INTRAVENOUS; SUBCUTANEOUS at 09:25

## 2020-01-08 RX ADMIN — DIAZEPAM 5 MG: 5 TABLET ORAL at 02:11

## 2020-01-08 RX ADMIN — BUPRENORPHINE HYDROCHLORIDE AND NALOXONE HYDROCHLORIDE DIHYDRATE 1 TABLET: 8; 2 TABLET SUBLINGUAL at 17:38

## 2020-01-08 RX ADMIN — KETOROLAC TROMETHAMINE 30 MG: 30 INJECTION, SOLUTION INTRAMUSCULAR at 20:30

## 2020-01-08 RX ADMIN — IPRATROPIUM BROMIDE AND ALBUTEROL SULFATE 3 ML: 2.5; .5 SOLUTION RESPIRATORY (INHALATION) at 19:53

## 2020-01-08 RX ADMIN — LORAZEPAM 2 MG: 2 INJECTION, SOLUTION INTRAMUSCULAR; INTRAVENOUS at 20:30

## 2020-01-08 RX ADMIN — LORAZEPAM 2 MG: 2 INJECTION, SOLUTION INTRAMUSCULAR; INTRAVENOUS at 17:01

## 2020-01-08 RX ADMIN — LORAZEPAM 2 MG: 2 INJECTION, SOLUTION INTRAMUSCULAR; INTRAVENOUS at 17:39

## 2020-01-08 RX ADMIN — IPRATROPIUM BROMIDE AND ALBUTEROL SULFATE 3 ML: 2.5; .5 SOLUTION RESPIRATORY (INHALATION) at 10:23

## 2020-01-08 RX ADMIN — BUPRENORPHINE HYDROCHLORIDE AND NALOXONE HYDROCHLORIDE DIHYDRATE 1 TABLET: 8; 2 TABLET SUBLINGUAL at 08:15

## 2020-01-08 RX ADMIN — LORAZEPAM 2 MG: 2 INJECTION, SOLUTION INTRAMUSCULAR; INTRAVENOUS at 09:25

## 2020-01-08 RX ADMIN — LORAZEPAM 2 MG: 2 INJECTION, SOLUTION INTRAMUSCULAR; INTRAVENOUS at 22:57

## 2020-01-08 RX ADMIN — LORAZEPAM 2 MG: 2 INJECTION, SOLUTION INTRAMUSCULAR; INTRAVENOUS at 12:09

## 2020-01-08 RX ADMIN — LORAZEPAM 2 MG: 2 INJECTION, SOLUTION INTRAMUSCULAR; INTRAVENOUS at 05:24

## 2020-01-08 RX ADMIN — IPRATROPIUM BROMIDE AND ALBUTEROL SULFATE 3 ML: 2.5; .5 SOLUTION RESPIRATORY (INHALATION) at 14:27

## 2020-01-08 NOTE — PAYOR COMM NOTE
"Lena Rucker  [facility]  (P)[phone]  (F)[phone]    REF#[id number]        Springer, Shawnee Avis (34 y.o. Female)     Date of Birth Social Security Number Address Home Phone MRN    1985  [address] [facility] [id number] [phone] [id number]    Yazidism Marital Status          None        Admission Date Admission Type Admitting Provider Attending Provider Department, Room/Bed    1/6/20 Emergency Rubens Liu MD Williams, Kevin L, MD [facility] 3 EAST, 369/1    Discharge Date Discharge Disposition Discharge Destination                       Attending Provider:  Ant Saenz MD    Allergies:  No Known Allergies    Isolation:  None   Infection:  None   Code Status:  CPR    Ht:  172.7 cm (67.99\")   Wt:  67.5 kg (148 lb 14.4 oz)    Admission Cmt:  None   Principal Problem:  None                Active Insurance as of 1/6/2020     Primary Coverage     Payor Plan Insurance Group Employer/Plan Group    [place] [id number]     Payor Plan Address Payor Plan Phone Number Payor Plan Fax Number Effective Dates    [employer] Claims Office - [address] [address]   1/1/2020 - None Entered    [facility] [id number]       Subscriber Name Subscriber Birth Date Member ID       SHAWNEE SPRINGER 1985 [id number]                 Emergency Contacts      (Rel.) Home Phone Work Phone Mobile Phone    UmuJorge (Spouse) [phone] -- [phone]    OdellJoanna (Mother) [phone] -- [phone]    Hilario Springer (Step Parent) [phone] -- [phone]               History & Physical      Ant Saenz MD at 01/06/20 [facility]                [facility] Medicine Admission      Date of Admission: 1/6/2020      Primary Care Physician: Provider, No Known      Chief Complaint: Alcohol withdrawal    HPI: Patient is a 34-year-old  female with a long history " of severe alcohol abuse.  She states that she has been drinking heavily for greater than 15 years.  She states that she drinks at least fifth of vodka every day.  She will drink 2 fifths of vodka if she has access to it.  She was hospitalized roughly a year ago for alcoholic pancreatitis and went through significant alcohol withdrawal during that hospital stay.  She states that every morning when she wakes up she is jittery and tremulous and she has to start drinking to combat that.  States that she is tried to stop drinking at home on multiple occasions and has suffered significant withdrawal symptoms including nausea, vomiting, hallucinations, and seizures.  Her last drink was approximately 2 AM on the morning of January 6.  She began to have withdrawal symptoms and presented to the hospital rather than drinking again.  Her CIWA score in the emergency department was 12.  At the time of my interview patient denied hallucinations, but was markedly tremulous.    Concurrent Medical History:  has a past medical history of Abnormal Pap smear of cervix, Alcoholism (CMS/[facility]), Anxiety, Cervical dysplasia, Depression, Fibrocystic breast, GERD (gastroesophageal reflux disease), Seizures (CMS/[facility]) (2017), Substance abuse (CMS/[facility]), and Substance abuse (CMS/[facility]).    Past Surgical History:  has a past surgical history that includes Rhinoplasty.    Family History: family history includes Breast cancer in her maternal aunt, maternal grandmother, mother, and paternal grandmother; COPD in her mother; Cancer in her mother; Heart disease in her father; Hypertension in her father.     Social History:  reports that she has been smoking cigarettes. She has a 20.00 pack-year smoking history. She has never used smokeless tobacco. She reports that she drinks alcohol. She reports that she does not use drugs.    Allergies: No Known Allergies    Medications:   Prior to Admission medications    Medication Sig Start Date End Date Taking?  Authorizing Provider   albuterol (PROVENTIL) (2.5 MG/3ML) 0.083% nebulizer solution Take 2.5 mg by nebulization Every 6 (Six) Hours As Needed for Wheezing or Shortness of Air. 12/16/19  Yes Mayelin Hanson APRN   buprenorphine-naloxone (SUBOXONE) 2-0.5 MG film film Place  under the tongue Daily.   Yes Emergency, Nurse Tiarra, RN   Sertraline HCl (ZOLOFT PO) Take  by mouth.   Yes Caty Yepez MD   baclofen (LIORESAL) 10 MG tablet  12/12/19   Emergency, Nurse Tiarra, RN   diclofenac (FLECTOR) 1.3 % patch patch Apply 1 patch topically to the appropriate area as directed 2 (Two) Times a Day.    Caty Yepez MD   diclofenac (VOLTAREN) 1 % gel gel Apply 4 g topically to the appropriate area as directed 4 (Four) Times a Day As Needed.    ProviderCaty MD       Review of Systems:  Review of Systems   Constitutional: Positive for activity change and appetite change. Negative for chills, fatigue, fever and unexpected weight change.   HENT: Negative for congestion, facial swelling, hearing loss, nosebleeds, rhinorrhea, sneezing, trouble swallowing and voice change.    Eyes: Negative for photophobia and visual disturbance.   Respiratory: Negative for apnea, cough, choking, chest tightness, shortness of breath, wheezing and stridor.    Cardiovascular: Negative for chest pain, palpitations and leg swelling.   Gastrointestinal: Positive for nausea and vomiting. Negative for abdominal pain, blood in stool, constipation and diarrhea.   Endocrine: Negative for cold intolerance, heat intolerance, polydipsia, polyphagia and polyuria.   Genitourinary: Negative for dysuria, flank pain and hematuria.   Musculoskeletal: Negative for arthralgias, back pain, myalgias and neck pain.   Skin: Negative for rash and wound.   Allergic/Immunologic: Negative for immunocompromised state.   Neurological: Positive for tremors and headaches. Negative for dizziness, seizures, syncope, speech difficulty, weakness,  light-headedness and numbness.   Hematological: Does not bruise/bleed easily.   Psychiatric/Behavioral: Positive for agitation. Negative for behavioral problems, confusion, decreased concentration, hallucinations, self-injury and suicidal ideas. The patient is nervous/anxious.       Otherwise complete ROS is negative except as mentioned above.    Physical Exam:   Temp:  [97.8 °F (36.6 °C)] 97.8 °F (36.6 °C)  Heart Rate:  [] 92  Resp:  [16-18] 16  BP: (114-158)/() 118/76     Physical Exam   Constitutional: She is oriented to person, place, and time. She appears well-developed and well-nourished.   HENT:   Head: Normocephalic and atraumatic.   Nose: Nose normal.   Mouth/Throat: Oropharynx is clear and moist.   Eyes: Pupils are equal, round, and reactive to light. Conjunctivae, EOM and lids are normal. No scleral icterus.   Neck: Normal range of motion. Neck supple. No JVD present. No tracheal tenderness and no spinous process tenderness present. No neck rigidity. No tracheal deviation, no edema and normal range of motion present.   Cardiovascular: Normal rate, regular rhythm, S1 normal, S2 normal, normal heart sounds and normal pulses. Exam reveals no gallop and no friction rub.   No murmur heard.  Pulmonary/Chest: Effort normal and breath sounds normal. No accessory muscle usage. No respiratory distress. She has no decreased breath sounds. She has no wheezes. She has no rales. She exhibits no tenderness.   Abdominal: Soft. Bowel sounds are normal. She exhibits no distension and no mass. There is no tenderness. There is no rebound and no guarding.   Musculoskeletal: She exhibits no edema or tenderness.   Neurological: She is alert and oriented to person, place, and time. She has normal reflexes. She displays no atrophy and normal reflexes. No cranial nerve deficit or sensory deficit. She exhibits normal muscle tone. She displays no seizure activity. Coordination normal.   Skin: Skin is warm. No rash noted.  No pallor.   Psychiatric: Judgment and thought content normal. Her mood appears anxious. She is agitated.   Significantly tremulous         Results Reviewed:  I have personally reviewed current lab, radiology, and data and agree with results.  Lab Results (last 24 hours)     Procedure Component Value Units Date/Time    [place] Draw [[id number]] Collected:  01/06/20 1441    Specimen:  Blood Updated:  01/06/20 1545    Narrative:       The following orders were created for panel order [place] Draw.  Procedure                               Abnormality         Status                     ---------                               -----------         ------                     Light Blue Top[[id number]]                                   Final result               Green Top (Gel)[[id number]]                                  Final result               Lavender Top[[id number]]                                     Final result               Gold Top - SST[[id number]]                                                                Please view results for these tests on the individual orders.    Light Blue Top [[id number]] Collected:  01/06/20 1441    Specimen:  Blood Updated:  01/06/20 1545     Extra Tube hold for add-on     Comment: Auto resulted       Green Top (Gel) [[id number]] Collected:  01/06/20 1442    Specimen:  Blood Updated:  01/06/20 1545     Extra Tube Hold for add-ons.     Comment: Auto resulted.       Lavender Top [[id number]] Collected:  01/06/20 1442    Specimen:  Blood Updated:  01/06/20 1545     Extra Tube hold for add-on     Comment: Auto resulted       Comprehensive Metabolic Panel [[id number]]  (Abnormal) Collected:  01/06/20 1442    Specimen:  Blood Updated:  01/06/20 1516     Glucose 85 mg/dL      BUN 8 mg/dL      Creatinine 0.57 mg/dL      Sodium 138 mmol/L      Potassium 3.9 mmol/L      Chloride 95 mmol/L      CO2 25.0 mmol/L      Calcium 9.4 mg/dL      Total Protein 7.9 g/dL      Albumin 4.60 g/dL      ALT (SGPT)  57 U/L      AST (SGOT) 62 U/L      Alkaline Phosphatase 84 U/L      Total Bilirubin 0.3 mg/dL      eGFR Non African Amer 122 mL/min/1.73      Globulin 3.3 gm/dL      A/G Ratio 1.4 g/dL      BUN/Creatinine Ratio 14.0     Anion Gap 18.0 mmol/L     Narrative:       GFR Normal >60  Chronic Kidney Disease <60  Kidney Failure <15      Lipase [[id number]]  (Normal) Collected:  01/06/20 1442    Specimen:  Blood Updated:  01/06/20 1516     Lipase 52 U/L     hCG, Serum, Qualitative [[id number]]  (Normal) Collected:  01/06/20 1441    Specimen:  Blood Updated:  01/06/20 1507     HCG Qualitative Negative    CBC & Differential [[id number]] Collected:  01/06/20 1442    Specimen:  Blood Updated:  01/06/20 1453    Narrative:       The following orders were created for panel order CBC & Differential.  Procedure                               Abnormality         Status                     ---------                               -----------         ------                     CBC Auto Differential[[id number]]        Abnormal            Final result                 Please view results for these tests on the individual orders.    CBC Auto Differential [[id number]]  (Abnormal) Collected:  01/06/20 1442    Specimen:  Blood Updated:  01/06/20 1453     WBC 9.22 10*3/mm3      RBC 4.00 10*6/mm3      Hemoglobin 14.6 g/dL      Hematocrit 41.7 %      .3 fL      MCH 36.5 pg      MCHC 35.0 g/dL      RDW 12.4 %      RDW-SD 48.1 fl      MPV 9.1 fL      Platelets 191 10*3/mm3      Neutrophil % 76.0 %      Lymphocyte % 17.9 %      Monocyte % 4.8 %      Eosinophil % 0.2 %      Basophil % 0.8 %      Immature Grans % 0.3 %      Neutrophils, Absolute 7.01 10*3/mm3      Lymphocytes, Absolute 1.65 10*3/mm3      Monocytes, Absolute 0.44 10*3/mm3      Eosinophils, Absolute 0.02 10*3/mm3      Basophils, Absolute 0.07 10*3/mm3      Immature Grans, Absolute 0.03 10*3/mm3      nRBC 0.0 /100 WBC     Urinalysis, Microscopic Only - Urine, Clean Catch [[id number]]   (Abnormal) Collected:  01/06/20 1327    Specimen:  Urine, Clean Catch Updated:  01/06/20 1425     RBC, UA 0-2 /HPF      WBC, UA 0-2 /HPF      Bacteria, UA 3+ /HPF      Squamous Epithelial Cells, UA 3-5 /HPF      Hyaline Casts, UA None Seen /LPF      Methodology Manual Light Microscopy    Urinalysis With Culture If Indicated - Urine, Clean Catch [[id number]]  (Abnormal) Collected:  01/06/20 1327    Specimen:  Urine, Clean Catch Updated:  01/06/20 1402     Color, UA Yellow     Appearance, UA Slightly Cloudy     pH, UA 6.5     Specific [place], UA 1.006     Comment: Result obtained by Refractometer        Glucose, UA Negative     Ketones, UA Negative     Bilirubin, UA Negative     Blood, UA Negative     Protein, UA 30 mg/dL (1+)     Leuk Esterase, UA Trace     Nitrite, UA Negative     Urobilinogen, UA 0.2 E.U./dL        Imaging Results (Last 24 Hours)     Procedure Component Value Units Date/Time    XR Abdomen Flat & Upright [[id number]] Collected:  01/06/20 1708     Updated:  01/06/20 1722    Narrative:       ABDOMINAL SERIES    HISTORY: Abdominal pain. Nausea and vomiting. Fatigue.    Supine and upright films of the abdomen were obtained.    COMPARISON: October 21, 2018    FINDINGS:    No free air.  No mechanical bowel obstruction.  Some retained feces scattered throughout the colon.  No organomegaly.  No abnormal calcifications.  No acute osseous abnormality.        Impression:       CONCLUSION:  No free air.  No mechanical bowel obstruction.    [id number]    Electronically signed by:  Ganesh Kyle MD  1/6/2020 5:21 PM CST  Workstation: [id number]            Assessment:    Active Hospital Problems    Diagnosis   • Non-intractable vomiting             Plan:  1.  Alcohol withdrawal: Patient with history of significant alcohol withdrawal.  She has a very longstanding history of heavy alcohol abuse.  She verbalizes the desire to stop drinking.  She is already starting to have withdrawal symptoms.  She has received Ativan in the  emergency department.  Will place on CIWA scale with both scheduled and as needed Ativan.  I have discussed with the patient and her spouse that due to the volume of alcohol that she drinks, she may require intubation to get through her symptoms.  They verbalized understanding.  2.  Alcohol abuse: Patient with longstanding history of alcohol abuse.  She states that she wants to quit.  She is attempted multiple times in the past but has failed due to withdrawal symptoms.  3.  History of alcoholic pancreatitis  4.  Tobacco abuse  5.  DVT prophylaxis: SCDs.    I discussed the patient's findings and my recommendations with: Patient and spouse        This document has been electronically signed by Ant Saenz MD on January 6, 2020 7:12 PM                    Electronically signed by Ant Saenz MD at 01/06/20 2036          Emergency Department Notes      Han Perdomo MD at 01/06/20 1654          Subjective   Patient admits to daily alcohol use and states that she has not had a drink in the past 2days she is trying to quit drinking.  Presents emergency department with upper abdominal pain, nausea vomiting and believes that she may be having pancreatitis again.      Nausea   The primary symptoms include fatigue, abdominal pain, nausea and vomiting. Primary symptoms do not include fever, diarrhea, jaundice, dysuria, myalgias, arthralgias or rash. The illness began yesterday.   The illness does not include chills. Associated medical issues do not include gastric bypass or bowel resection.   Vomiting   The primary symptoms include fatigue, abdominal pain, nausea and vomiting. Primary symptoms do not include fever, diarrhea, jaundice, dysuria, myalgias, arthralgias or rash.   The illness does not include chills. Associated medical issues do not include gastric bypass or bowel resection.   Abdominal Pain   Associated symptoms: fatigue, nausea and vomiting    Associated symptoms: no chest pain, no chills,  no cough, no diarrhea, no dysuria, no fever, no shortness of breath and no sore throat        Review of Systems   Constitutional: Positive for activity change, appetite change and fatigue. Negative for chills, diaphoresis and fever.   HENT: Negative for congestion, ear discharge, ear pain, nosebleeds, rhinorrhea, sinus pressure, sore throat and trouble swallowing.    Eyes: Negative for discharge and redness.   Respiratory: Negative for apnea, cough, chest tightness, shortness of breath and wheezing.    Cardiovascular: Negative for chest pain.   Gastrointestinal: Positive for abdominal pain, nausea and vomiting. Negative for diarrhea and jaundice.   Endocrine: Negative for polyuria.   Genitourinary: Negative for dysuria, frequency and urgency.   Musculoskeletal: Negative for arthralgias, myalgias and neck pain.   Skin: Negative for color change and rash.   Allergic/Immunologic: Negative for immunocompromised state.   Neurological: Negative for dizziness, seizures, syncope, weakness, light-headedness and headaches.   Hematological: Negative for adenopathy. Does not bruise/bleed easily.   Psychiatric/Behavioral: Negative for behavioral problems and confusion.   All other systems reviewed and are negative.      Past Medical History:   Diagnosis Date   • Abnormal Pap smear of cervix    • Alcoholism (CMS/[facility])    • Anxiety    • Cervical dysplasia    • Depression    • Fibrocystic breast    • GERD (gastroesophageal reflux disease)    • Seizures (CMS/[facility]) 2017   • Substance abuse (CMS/[facility])    • Substance abuse (CMS/[facility])        No Known Allergies    Past Surgical History:   Procedure Laterality Date   • RHINOPLASTY         Family History   Problem Relation Age of Onset   • Breast cancer Mother    • Cancer Mother    • COPD Mother    • Breast cancer Maternal Grandmother    • Breast cancer Paternal Grandmother    • Breast cancer Maternal Aunt    • Hypertension Father    • Heart disease Father        Social History          Socioeconomic History   • Marital status:      Spouse name: Not on file   • Number of children: Not on file   • Years of education: Not on file   • Highest education level: Not on file   Tobacco Use   • Smoking status: Current Every Day Smoker     Packs/day: 1.00     Years: 20.00     Pack years: 20.00     Types: Cigarettes   • Smokeless tobacco: Never Used   Substance and Sexual Activity   • Alcohol use: Yes     Frequency: Never     Comment: patient states she usually drinks a fifth of 100 proof vodka daily, but has not in the last few days.   • Drug use: No     Types: Fentanyl, Oxycodone     Comment: daily use   • Sexual activity: Yes     Partners: Male     Birth control/protection: None           Objective   Physical Exam   Constitutional: She is oriented to person, place, and time. She appears well-developed and well-nourished.   HENT:   Head: Normocephalic and atraumatic.   Nose: Nose normal.   Mouth/Throat: Oropharynx is clear and moist.   Eyes: Pupils are equal, round, and reactive to light. Conjunctivae and EOM are normal. Right eye exhibits no discharge. Left eye exhibits no discharge. No scleral icterus.   Neck: Normal range of motion. Neck supple. No tracheal deviation present.   Cardiovascular: Regular rhythm and normal heart sounds. Tachycardia present.   No murmur heard.  Pulmonary/Chest: Effort normal and breath sounds normal. No stridor. No respiratory distress. She has no wheezes. She has no rales.   Abdominal: Soft. Bowel sounds are normal. She exhibits no distension and no mass. There is tenderness in the epigastric area and left upper quadrant. There is no rebound and no guarding.   Musculoskeletal: She exhibits no edema.   Neurological: She is alert and oriented to person, place, and time. She displays tremor (mild, hands). Coordination normal.   Skin: Skin is warm and dry. No rash noted. No erythema.   Psychiatric: She has a normal mood and affect. Her behavior is normal. Thought  content normal.   Nursing note and vitals reviewed.      Procedures          ED Course             Labs Reviewed   URINALYSIS W/ CULTURE IF INDICATED - Abnormal; Notable for the following components:       Result Value    Appearance, UA Slightly Cloudy (*)     Protein, UA 30 mg/dL (1+) (*)     Leuk Esterase, UA Trace (*)     All other components within normal limits   URINALYSIS, MICROSCOPIC ONLY - Abnormal; Notable for the following components:    RBC, UA 0-2 (*)     Bacteria, UA 3+ (*)     Squamous Epithelial Cells, UA 3-5 (*)     All other components within normal limits   COMPREHENSIVE METABOLIC PANEL - Abnormal; Notable for the following components:    Chloride 95 (*)     ALT (SGPT) 57 (*)     AST (SGOT) 62 (*)     Anion Gap 18.0 (*)     All other components within normal limits    Narrative:     GFR Normal >60  Chronic Kidney Disease <60  Kidney Failure <15     CBC WITH AUTO DIFFERENTIAL - Abnormal; Notable for the following components:    .3 (*)     MCH 36.5 (*)     Lymphocyte % 17.9 (*)     Monocyte % 4.8 (*)     Eosinophil % 0.2 (*)     Neutrophils, Absolute 7.01 (*)     All other components within normal limits   LIPASE - Normal   HCG, SERUM, QUALITATIVE - Normal   RAINBOW DRAW    Narrative:     The following orders were created for panel order [place] Draw.  Procedure                               Abnormality         Status                     ---------                               -----------         ------                     Light Blue Top[[id number]]                                   Final result               Green Top (Gel)[[id number]]                                  Final result               Lavender Top[[id number]]                                     Final result               Gold Top - SST[[id number]]                                                                Please view results for these tests on the individual orders.   CBC AND DIFFERENTIAL    Narrative:     The following orders were  created for panel order CBC & Differential.  Procedure                               Abnormality         Status                     ---------                               -----------         ------                     CBC Auto Differential[[id number]]        Abnormal            Final result                 Please view results for these tests on the individual orders.   LIGHT BLUE TOP   GREEN TOP   LAVENDER TOP   GOLD TOP - SST       XR Abdomen Flat & Upright   Final Result   CONCLUSION:   No free air.   No mechanical bowel obstruction.      [id number]      Electronically signed by:  Ganesh Kyle MD  1/6/2020 5:21 PM [facility]   Workstation: [id number]                                              [facility]    Final diagnoses:   Non-intractable vomiting with nausea, unspecified vomiting type   Alcohol withdrawal syndrome without complication (CMS/HCC)            Han Perdomo MD  01/06/20 1842       Han Perdomo MD  01/06/20 1844      Electronically signed by Han Perdomo MD at 01/06/20 1844         Facility-Administered Medications as of 1/8/2020   Medication Dose Route Frequency Provider Last Rate Last Dose   • acetaminophen (TYLENOL) tablet 650 mg  650 mg Oral Q6H PRN George Benjamin PA       • albuterol (PROVENTIL) nebulizer solution 0.083% 2.5 mg/3mL  2.5 mg Nebulization Q6H PRN Ant Saenz MD       • buprenorphine-naloxone (SUBOXONE) 8-2 MG per SL tablet 1 tablet  1 tablet Sublingual TID Ant Saenz MD   1 tablet at 01/08/20 0815   • diazePAM (VALIUM) tablet 5 mg  5 mg Oral Q6H Ant Saenz MD   5 mg at 01/08/20 0637   • ipratropium-albuterol (DUO-NEB) nebulizer solution 3 mL  3 mL Nebulization 4x Daily - RT George Benjamin PA   3 mL at 01/08/20 1023   • [COMPLETED] ketorolac (TORADOL) injection 15 mg  15 mg Intravenous Once Han Perdomo MD   15 mg at 01/06/20 1728   • ketorolac (TORADOL) injection 30 mg  30 mg Intravenous Q6H PRN George Benjamin PA   30 mg at  01/08/20 0528   • [COMPLETED] LORazepam (ATIVAN) injection 1 mg  1 mg Intravenous Once Han Perdomo MD   1 mg at 01/06/20 1729   • LORazepam (ATIVAN) tablet 1 mg  1 mg Oral Q2H PRN Ant Saenz MD        Or   • LORazepam (ATIVAN) injection 1 mg  1 mg Intravenous Q2H PRN Ant Saenz MD        Or   • LORazepam (ATIVAN) tablet 2 mg  2 mg Oral Q1H PRN Ant Saenz MD        Or   • LORazepam (ATIVAN) injection 2 mg  2 mg Intravenous Q1H PRN Ant Saenz MD   2 mg at 01/08/20 1059    Or   • LORazepam (ATIVAN) injection 2 mg  2 mg Intravenous Q15 Min PRN Ant Saenz MD        Or   • LORazepam (ATIVAN) injection 2 mg  2 mg Intramuscular Q15 Min PRN Ant Saenz MD       • [COMPLETED] LORazepam (ATIVAN) injection 2 mg  2 mg Intravenous Once Ant Saenz MD   2 mg at 01/06/20 1942   • multiple vitamin (M.V.I. Adult) 10 mL, thiamine (B-1) 100 mg, folic acid 1 mg in sodium chloride 0.9 % 1,000 mL infusion  100 mL/hr Intravenous Daily Ant Saenz  mL/hr at 01/08/20 0925 100 mL/hr at 01/08/20 0925   • [COMPLETED] multiple vitamin (M.V.I. Adult) 10 mL, thiamine (B-1) 100 mg, folic acid 1 mg, magnesium sulfate 2 g in sodium chloride 0.9 % 1,000 mL infusion  1,000 mL/hr Intravenous Once Han Perdomo MD   Stopped at 01/06/20 1848   • nicotine (NICODERM CQ) 21 MG/24HR patch 1 patch  1 patch Transdermal Q24H George Benjamin, PA   1 patch at 01/08/20 0815   • [COMPLETED] ondansetron (ZOFRAN) injection 4 mg  4 mg Intravenous Once Han Perdomo MD   4 mg at 01/06/20 1734   • sertraline (ZOLOFT) tablet 25 mg  25 mg Oral Daily Ant Saenz MD   25 mg at 01/08/20 0817   • sodium chloride 0.9 % flush 10 mL  10 mL Intravenous Q12H Ant Saenz MD   10 mL at 01/08/20 0815   • sodium chloride 0.9 % flush 10 mL  10 mL Intravenous PRN Ant Saenz MD       • sodium chloride 0.9 % infusion  100 mL/hr Intravenous Continuous Yosi Steinberg MD    Stopped at 01/08/20 0925   • [COMPLETED] thiamine (B-1) 100 mg in sodium chloride 0.9 % 100 mL IVPB  100 mg Intravenous Once Ant Saenz MD   Stopped at 01/07/20 0047    Or   • [COMPLETED] thiamine (VITAMIN B-1) tablet 100 mg  100 mg Oral Once Ant Saenz MD         Lab Results (last 48 hours)     Procedure Component Value Units Date/Time    Basic Metabolic Panel [[id number]]  (Abnormal) Collected:  01/08/20 0510    Specimen:  Blood Updated:  01/08/20 0623     Glucose 90 mg/dL      BUN 5 mg/dL      Creatinine 0.42 mg/dL      Sodium 138 mmol/L      Potassium 3.9 mmol/L      Chloride 104 mmol/L      CO2 25.0 mmol/L      Calcium 8.0 mg/dL      eGFR Non African Amer >150 mL/min/1.73      BUN/Creatinine Ratio 11.9     Anion Gap 9.0 mmol/L     Narrative:       GFR Normal >60  Chronic Kidney Disease <60  Kidney Failure <15      CBC & Differential [[id number]] Collected:  01/08/20 0510    Specimen:  Blood Updated:  01/08/20 0557    Narrative:       The following orders were created for panel order CBC & Differential.  Procedure                               Abnormality         Status                     ---------                               -----------         ------                     CBC Auto Differential[[id number]]        Abnormal            Final result                 Please view results for these tests on the individual orders.    CBC Auto Differential [[id number]]  (Abnormal) Collected:  01/08/20 0510    Specimen:  Blood Updated:  01/08/20 0557     WBC 5.18 10*3/mm3      RBC 3.39 10*6/mm3      Hemoglobin 12.5 g/dL      Hematocrit 36.3 %      .1 fL      MCH 36.9 pg      MCHC 34.4 g/dL      RDW 12.4 %      RDW-SD 48.8 fl      MPV 9.7 fL      Platelets 168 10*3/mm3      Neutrophil % 52.8 %      Lymphocyte % 36.9 %      Monocyte % 8.7 %      Eosinophil % 0.8 %      Basophil % 0.6 %      Immature Grans % 0.2 %      Neutrophils, Absolute 2.74 10*3/mm3      Lymphocytes, Absolute 1.91 10*3/mm3       Monocytes, Absolute 0.45 10*3/mm3      Eosinophils, Absolute 0.04 10*3/mm3      Basophils, Absolute 0.03 10*3/mm3      Immature Grans, Absolute 0.01 10*3/mm3      nRBC 0.0 /100 WBC     [place] Draw [[id number]] Collected:  01/06/20 1441    Specimen:  Blood Updated:  01/07/20 4209    Narrative:       The following orders were created for panel order [place] Draw.  Procedure                               Abnormality         Status                     ---------                               -----------         ------                     Light Blue Top[[id number]]                                   Final result               Green Top (Gel)[[id number]]                                  Final result               Lavender Top[[id number]]                                     Final result               Gold Top - SST[[id number]]                                                                Please view results for these tests on the individual orders.    Basic Metabolic Panel [[id number]]  (Abnormal) Collected:  01/07/20 0509    Specimen:  Blood Updated:  01/07/20 0535     Glucose 79 mg/dL      BUN 10 mg/dL      Creatinine 0.52 mg/dL      Sodium 137 mmol/L      Potassium 4.0 mmol/L      Chloride 102 mmol/L      CO2 24.0 mmol/L      Calcium 8.2 mg/dL      eGFR Non African Amer 136 mL/min/1.73      BUN/Creatinine Ratio 19.2     Anion Gap 11.0 mmol/L     Narrative:       GFR Normal >60  Chronic Kidney Disease <60  Kidney Failure <15      CBC & Differential [[id number]] Collected:  01/07/20 0509    Specimen:  Blood Updated:  01/07/20 0528    Narrative:       The following orders were created for panel order CBC & Differential.  Procedure                               Abnormality         Status                     ---------                               -----------         ------                     CBC Auto Differential[[id number]]        Abnormal            Final result                 Please view results for these tests on the  individual orders.    CBC Auto Differential [[id number]]  (Abnormal) Collected:  01/07/20 0509    Specimen:  Blood Updated:  01/07/20 0528     WBC 5.75 10*3/mm3      RBC 3.19 10*6/mm3      Hemoglobin 11.9 g/dL      Hematocrit 34.5 %      .2 fL      MCH 37.3 pg      MCHC 34.5 g/dL      RDW 12.8 %      RDW-SD 50.8 fl      MPV 9.3 fL      Platelets 169 10*3/mm3      Neutrophil % 51.5 %      Lymphocyte % 37.0 %      Monocyte % 8.9 %      Eosinophil % 0.7 %      Basophil % 0.7 %      Immature Grans % 1.2 %      Neutrophils, Absolute 2.96 10*3/mm3      Lymphocytes, Absolute 2.13 10*3/mm3      Monocytes, Absolute 0.51 10*3/mm3      Eosinophils, Absolute 0.04 10*3/mm3      Basophils, Absolute 0.04 10*3/mm3      Immature Grans, Absolute 0.07 10*3/mm3      nRBC 0.0 /100 WBC     Light Blue Top [[id number]] Collected:  01/06/20 1441    Specimen:  Blood Updated:  01/06/20 1545     Extra Tube hold for add-on     Comment: Auto resulted       Green Top (Gel) [[id number]] Collected:  01/06/20 1442    Specimen:  Blood Updated:  01/06/20 1545     Extra Tube Hold for add-ons.     Comment: Auto resulted.       Lavender Top [[id number]] Collected:  01/06/20 1442    Specimen:  Blood Updated:  01/06/20 1545     Extra Tube hold for add-on     Comment: Auto resulted       Comprehensive Metabolic Panel [[id number]]  (Abnormal) Collected:  01/06/20 1442    Specimen:  Blood Updated:  01/06/20 1516     Glucose 85 mg/dL      BUN 8 mg/dL      Creatinine 0.57 mg/dL      Sodium 138 mmol/L      Potassium 3.9 mmol/L      Chloride 95 mmol/L      CO2 25.0 mmol/L      Calcium 9.4 mg/dL      Total Protein 7.9 g/dL      Albumin 4.60 g/dL      ALT (SGPT) 57 U/L      AST (SGOT) 62 U/L      Alkaline Phosphatase 84 U/L      Total Bilirubin 0.3 mg/dL      eGFR Non African Amer 122 mL/min/1.73      Globulin 3.3 gm/dL      A/G Ratio 1.4 g/dL      BUN/Creatinine Ratio 14.0     Anion Gap 18.0 mmol/L     Narrative:       GFR Normal >60  Chronic Kidney Disease  <60  Kidney Failure <15      Lipase [[id number]]  (Normal) Collected:  01/06/20 1442    Specimen:  Blood Updated:  01/06/20 1516     Lipase 52 U/L     hCG, Serum, Qualitative [[id number]]  (Normal) Collected:  01/06/20 1441    Specimen:  Blood Updated:  01/06/20 1507     HCG Qualitative Negative    CBC & Differential [[id number]] Collected:  01/06/20 1442    Specimen:  Blood Updated:  01/06/20 1453    Narrative:       The following orders were created for panel order CBC & Differential.  Procedure                               Abnormality         Status                     ---------                               -----------         ------                     CBC Auto Differential[[id number]]        Abnormal            Final result                 Please view results for these tests on the individual orders.    CBC Auto Differential [[id number]]  (Abnormal) Collected:  01/06/20 1442    Specimen:  Blood Updated:  01/06/20 1453     WBC 9.22 10*3/mm3      RBC 4.00 10*6/mm3      Hemoglobin 14.6 g/dL      Hematocrit 41.7 %      .3 fL      MCH 36.5 pg      MCHC 35.0 g/dL      RDW 12.4 %      RDW-SD 48.1 fl      MPV 9.1 fL      Platelets 191 10*3/mm3      Neutrophil % 76.0 %      Lymphocyte % 17.9 %      Monocyte % 4.8 %      Eosinophil % 0.2 %      Basophil % 0.8 %      Immature Grans % 0.3 %      Neutrophils, Absolute 7.01 10*3/mm3      Lymphocytes, Absolute 1.65 10*3/mm3      Monocytes, Absolute 0.44 10*3/mm3      Eosinophils, Absolute 0.02 10*3/mm3      Basophils, Absolute 0.07 10*3/mm3      Immature Grans, Absolute 0.03 10*3/mm3      nRBC 0.0 /100 WBC     Urinalysis, Microscopic Only - Urine, Clean Catch [[id number]]  (Abnormal) Collected:  01/06/20 1327    Specimen:  Urine, Clean Catch Updated:  01/06/20 1425     RBC, UA 0-2 /HPF      WBC, UA 0-2 /HPF      Bacteria, UA 3+ /HPF      Squamous Epithelial Cells, UA 3-5 /HPF      Hyaline Casts, UA None Seen /LPF      Methodology Manual Light Microscopy    Urinalysis With  Culture If Indicated - Urine, Clean Catch [[id number]]  (Abnormal) Collected:  01/06/20 1327    Specimen:  Urine, Clean Catch Updated:  01/06/20 1402     Color, UA Yellow     Appearance, UA Slightly Cloudy     pH, UA 6.5     Specific [place], UA 1.006     Comment: Result obtained by Refractometer        Glucose, UA Negative     Ketones, UA Negative     Bilirubin, UA Negative     Blood, UA Negative     Protein, UA 30 mg/dL (1+)     Leuk Esterase, UA Trace     Nitrite, UA Negative     Urobilinogen, UA 0.2 E.U./dL          Imaging Results (Last 48 Hours)     Procedure Component Value Units Date/Time    XR Abdomen Flat & Upright [[id number]] Collected:  01/06/20 1708     Updated:  01/06/20 1722    Narrative:       ABDOMINAL SERIES    HISTORY: Abdominal pain. Nausea and vomiting. Fatigue.    Supine and upright films of the abdomen were obtained.    COMPARISON: October 21, 2018    FINDINGS:    No free air.  No mechanical bowel obstruction.  Some retained feces scattered throughout the colon.  No organomegaly.  No abnormal calcifications.  No acute osseous abnormality.        Impression:       CONCLUSION:  No free air.  No mechanical bowel obstruction.    [id number]    Electronically signed by:  Ganesh Kyle MD  1/6/2020 5:21 PM CST  Workstation: [id number]        ECG/EMG Results (last 48 hours)     ** No results found for the last 48 hours. **           Physician Progress Notes (last 48 hours) (Notes from 01/06/20 1121 through 01/08/20 1121)      George Benjamin PA at 01/07/20 1221              [facility] Medicine Services  INPATIENT PROGRESS NOTE    Length of Stay: 0  Date of Admission: 1/6/2020  Primary Care Physician: Provider, No Known    Subjective   Chief Complaint/HPI: This 34-year-old  female has a longstanding history of alcohol abuse.  Patient drinks at least 1/5 of vodka a day.  Patient had a brief stent of sobriety in early to mid 2019, but started drinking heavily  again secondary to chronic back pain and stress.  Patient's last drink was on January 6 around 2 AM, and she began to have withdrawal symptoms shortly after.    Ativan is not optimal treatment for this particular patient's condition.  She does have a history of utilizing a Valium taper with success.  Have discussed with Dr. Zuniga of psychiatry and have initiated a Valium taper.    Review of Systems   Constitutional: Negative for chills and fever.   Respiratory: Negative for shortness of breath.    Cardiovascular: Negative for chest pain.   Gastrointestinal: Positive for nausea. Negative for abdominal pain and vomiting.   Musculoskeletal: Positive for arthralgias.   Neurological: Negative for dizziness and syncope.   Psychiatric/Behavioral: Negative for confusion and hallucinations.      All pertinent negatives and positives are as above. All other systems have been reviewed and are negative unless otherwise stated.     Objective    Temp:  [97.6 °F (36.4 °C)-97.8 °F (36.6 °C)] 97.6 °F (36.4 °C)  Heart Rate:  [] 104  Resp:  [16-20] 18  BP: ()/() 138/77    Physical Exam   Constitutional: She is oriented to person, place, and time. She appears well-developed and well-nourished. No distress.   HENT:   Head: Normocephalic and atraumatic.   Cardiovascular: Normal rate and regular rhythm.   Pulmonary/Chest: Effort normal and breath sounds normal. No stridor. No respiratory distress.   Abdominal: Soft. Bowel sounds are normal. She exhibits no distension. There is no tenderness.   Musculoskeletal: She exhibits no edema.   Neurological: She is alert and oriented to person, place, and time.   Skin: Skin is warm and dry. Capillary refill takes less than 2 seconds. She is not diaphoretic.     Results Review:  I have reviewed the labs, radiology results, and diagnostic studies.    Laboratory Data:   Results from last 7 days   Lab Units 01/07/20  0509 01/06/20  1442   SODIUM mmol/L 137 138   POTASSIUM mmol/L 4.0  3.9   CHLORIDE mmol/L 102 95*   CO2 mmol/L 24.0 25.0   BUN mg/dL 10 8   CREATININE mg/dL 0.52* 0.57   GLUCOSE mg/dL 79 85   CALCIUM mg/dL 8.2* 9.4   BILIRUBIN mg/dL  --  0.3   ALK PHOS U/L  --  84   ALT (SGPT) U/L  --  57*   AST (SGOT) U/L  --  62*   ANION GAP mmol/L 11.0 18.0*     Estimated Creatinine Clearance: 164 mL/min (A) (by C-G formula based on SCr of 0.52 mg/dL (L)).          Results from last 7 days   Lab Units 01/07/20  0509 01/06/20  1442   WBC 10*3/mm3 5.75 9.22   HEMOGLOBIN g/dL 11.9* 14.6   HEMATOCRIT % 34.5 41.7   PLATELETS 10*3/mm3 169 191           Culture Data:   No results found for: BLOODCX  No results found for: URINECX  No results found for: RESPCX  No results found for: WOUNDCX  No results found for: STOOLCX  No components found for: BODYFLD    Radiology Data:   Imaging Results (Last 24 Hours)     Procedure Component Value Units Date/Time    XR Abdomen Flat & Upright [[id number]] Collected:  01/06/20 1708     Updated:  01/06/20 1722    Narrative:       ABDOMINAL SERIES    HISTORY: Abdominal pain. Nausea and vomiting. Fatigue.    Supine and upright films of the abdomen were obtained.    COMPARISON: October 21, 2018    FINDINGS:    No free air.  No mechanical bowel obstruction.  Some retained feces scattered throughout the colon.  No organomegaly.  No abnormal calcifications.  No acute osseous abnormality.        Impression:       CONCLUSION:  No free air.  No mechanical bowel obstruction.    [id number]    Electronically signed by:  Ganesh Kyle MD  1/6/2020 5:21 PM CST  Workstation: [id number]          I have reviewed the patient's current medications.     Assessment/Plan     Active Hospital Problems    Diagnosis   • Non-intractable vomiting   ETOH abuse  ETOH withdrawal  Chronic pain        Plan: Scheduled Valium with subsequent Valium taper, supportive treatment, continue to treat as hospital course dictates.                  This document has been electronically signed by KYLEE Trammell on  January 7, 2020 12:25 PM        Electronically signed by George Benjamin PA at 01/07/20 1226       Consult Notes (last 48 hours) (Notes from 01/06/20 1121 through 01/08/20 1121)    No notes of this type exist for this encounter.

## 2020-01-08 NOTE — NURSING NOTE
Patient shaking all over nervous, agitated, head moving around Patient at times seeing hallucinations.  Ativan given as ordered Family concerned.  Called KYLEE HOFF aware.  Dr ochoa up to see patient going to unit to control withdrawal.

## 2020-01-08 NOTE — PROGRESS NOTES
[facility] Medicine Services  INPATIENT PROGRESS NOTE    Length of Stay: 0  Date of Admission: 1/6/2020  Primary Care Physician: Provider, No Known    Subjective   Please note that all previous progress notes, lab findings, radiographical findings, medication changes, and physical exam findings have been noted and updated as appropriate.    1/8/2020: Patient much more anxious, still demonstrating tremors despite CIWA scale.  Patient was found to have a medication box with Tylenol and antidepressants.  Patient was counseled that she cannot keep medications in the room.  Spouse will take medications out of the room.  Have asked attending physician, Dr. Ant Saenz, to evaluate the patient for possible escalation of treatment.    Update:  Patient refusing intubation for ativan drip.  Will go to ICU under the care of Dr. Saenz.    Chief Complaint/HPI: This 34-year-old  female has a longstanding history of alcohol abuse.  Patient drinks at least 1/5 of vodka a day.  Patient had a brief stent of sobriety in early to mid 2019, but started drinking heavily again secondary to chronic back pain and stress.  Patient's last drink was on January 6 around 2 AM, and she began to have withdrawal symptoms shortly after.    Ativan is not optimal treatment for this particular patient's condition.  She does have a history of utilizing a Valium taper with success.  Have discussed with Dr. Zuniga of psychiatry and have initiated a Valium taper.    Review of Systems   Constitutional: Negative for chills and fever.   Respiratory: Negative for shortness of breath.    Cardiovascular: Negative for chest pain.   Gastrointestinal: Positive for nausea. Negative for abdominal pain and vomiting.   Musculoskeletal: Positive for arthralgias and back pain.   Neurological: Positive for tremors. Negative for dizziness and syncope.   Psychiatric/Behavioral: Negative for confusion and  hallucinations. The patient is nervous/anxious.       All pertinent negatives and positives are as above. All other systems have been reviewed and are negative unless otherwise stated.     Objective    Temp:  [96.5 °F (35.8 °C)-97.8 °F (36.6 °C)] 96.5 °F (35.8 °C)  Heart Rate:  [] 92  Resp:  [16-19] 16  BP: ()/(55-94) 118/56    Physical Exam   Constitutional: She is oriented to person, place, and time. She appears well-developed and well-nourished. No distress.   HENT:   Head: Normocephalic and atraumatic.   Cardiovascular: Normal rate and regular rhythm.   Pulmonary/Chest: Effort normal and breath sounds normal. No stridor. No respiratory distress.   Abdominal: Soft. Bowel sounds are normal. She exhibits no distension. There is no tenderness.   Musculoskeletal: She exhibits no edema.   Neurological: She is alert and oriented to person, place, and time.   Skin: Skin is warm and dry. Capillary refill takes less than 2 seconds. She is not diaphoretic.   Psychiatric: Her mood appears anxious. She is slowed.     Results Review:  I have reviewed the labs, radiology results, and diagnostic studies.    Laboratory Data:   Results from last 7 days   Lab Units 01/08/20  0510 01/07/20  0509 01/06/20  1442   SODIUM mmol/L 138 137 138   POTASSIUM mmol/L 3.9 4.0 3.9   CHLORIDE mmol/L 104 102 95*   CO2 mmol/L 25.0 24.0 25.0   BUN mg/dL 5* 10 8   CREATININE mg/dL 0.42* 0.52* 0.57   GLUCOSE mg/dL 90 79 85   CALCIUM mg/dL 8.0* 8.2* 9.4   BILIRUBIN mg/dL  --   --  0.3   ALK PHOS U/L  --   --  84   ALT (SGPT) U/L  --   --  57*   AST (SGOT) U/L  --   --  62*   ANION GAP mmol/L 9.0 11.0 18.0*     Estimated Creatinine Clearance: 203 mL/min (A) (by C-G formula based on SCr of 0.42 mg/dL (L)).          Results from last 7 days   Lab Units 01/08/20  0510 01/07/20  0509 01/06/20  1442   WBC 10*3/mm3 5.18 5.75 9.22   HEMOGLOBIN g/dL 12.5 11.9* 14.6   HEMATOCRIT % 36.3 34.5 41.7   PLATELETS 10*3/mm3 168 169 191           Culture  Data:   No results found for: BLOODCX  No results found for: URINECX  No results found for: RESPCX  No results found for: WOUNDCX  No results found for: STOOLCX  No components found for: BODYFLD    Radiology Data:   Imaging Results (Last 24 Hours)     ** No results found for the last 24 hours. **          I have reviewed the patient's current medications.     Assessment/Plan     Active Hospital Problems    Diagnosis   • Alcohol withdrawal (CMS/HCC)   • Non-intractable vomiting   ETOH abuse  ETOH withdrawal  Chronic pain        Plan:  Send to ICU under the care of Dr. Ant Saenz.  Patient currently refusing intubation for ativan drip.                  This document has been electronically signed by KYLEE Trammell on January 8, 2020 11:30 AM

## 2020-01-08 NOTE — PLAN OF CARE
Problem: Patient Care Overview  Goal: Plan of Care Review  Outcome: Ongoing (interventions implemented as appropriate)  Flowsheets  Taken 1/8/2020 0156  Progress: no change  Outcome Summary: BP lower, currently sleeping well without complaints at this time, will cont to monitor  Taken 1/7/2020 2019  Plan of Care Reviewed With: patient

## 2020-01-09 LAB
ANION GAP SERPL CALCULATED.3IONS-SCNC: 8 MMOL/L (ref 5–15)
BASOPHILS # BLD AUTO: 0.03 10*3/MM3 (ref 0–0.2)
BASOPHILS NFR BLD AUTO: 0.5 % (ref 0–1.5)
BUN BLD-MCNC: 5 MG/DL (ref 6–20)
BUN/CREAT SERPL: 11.4 (ref 7–25)
CALCIUM SPEC-SCNC: 8.2 MG/DL (ref 8.6–10.5)
CHLORIDE SERPL-SCNC: 107 MMOL/L (ref 98–107)
CO2 SERPL-SCNC: 24 MMOL/L (ref 22–29)
CREAT BLD-MCNC: 0.44 MG/DL (ref 0.57–1)
DEPRECATED RDW RBC AUTO: 49.8 FL (ref 37–54)
EOSINOPHIL # BLD AUTO: 0.06 10*3/MM3 (ref 0–0.4)
EOSINOPHIL NFR BLD AUTO: 1 % (ref 0.3–6.2)
ERYTHROCYTE [DISTWIDTH] IN BLOOD BY AUTOMATED COUNT: 12.6 % (ref 12.3–15.4)
GFR SERPL CREATININE-BSD FRML MDRD: >150 ML/MIN/1.73
GLUCOSE BLD-MCNC: 90 MG/DL (ref 65–99)
HCT VFR BLD AUTO: 33.4 % (ref 34–46.6)
HGB BLD-MCNC: 11.4 G/DL (ref 12–15.9)
IMM GRANULOCYTES # BLD AUTO: 0.02 10*3/MM3 (ref 0–0.05)
IMM GRANULOCYTES NFR BLD AUTO: 0.3 % (ref 0–0.5)
LYMPHOCYTES # BLD AUTO: 1.67 10*3/MM3 (ref 0.7–3.1)
LYMPHOCYTES NFR BLD AUTO: 28.1 % (ref 19.6–45.3)
MACROCYTES BLD QL SMEAR: NORMAL
MCH RBC QN AUTO: 36.7 PG (ref 26.6–33)
MCHC RBC AUTO-ENTMCNC: 34.1 G/DL (ref 31.5–35.7)
MCV RBC AUTO: 107.4 FL (ref 79–97)
MONOCYTES # BLD AUTO: 0.51 10*3/MM3 (ref 0.1–0.9)
MONOCYTES NFR BLD AUTO: 8.6 % (ref 5–12)
NEUTROPHILS # BLD AUTO: 3.65 10*3/MM3 (ref 1.7–7)
NEUTROPHILS NFR BLD AUTO: 61.5 % (ref 42.7–76)
NRBC BLD AUTO-RTO: 0 /100 WBC (ref 0–0.2)
PLATELET # BLD AUTO: 147 10*3/MM3 (ref 140–450)
PMV BLD AUTO: 9.5 FL (ref 6–12)
POTASSIUM BLD-SCNC: 3.8 MMOL/L (ref 3.5–5.2)
RBC # BLD AUTO: 3.11 10*6/MM3 (ref 3.77–5.28)
SMALL PLATELETS BLD QL SMEAR: ADEQUATE
SODIUM BLD-SCNC: 139 MMOL/L (ref 136–145)
WBC MORPH BLD: NORMAL
WBC NRBC COR # BLD: 5.94 10*3/MM3 (ref 3.4–10.8)

## 2020-01-09 PROCEDURE — 25010000002 THIAMINE PER 100 MG: Performed by: HOSPITALIST

## 2020-01-09 PROCEDURE — 94799 UNLISTED PULMONARY SVC/PX: CPT

## 2020-01-09 PROCEDURE — 85025 COMPLETE CBC W/AUTO DIFF WBC: CPT | Performed by: HOSPITALIST

## 2020-01-09 PROCEDURE — 80048 BASIC METABOLIC PNL TOTAL CA: CPT | Performed by: HOSPITALIST

## 2020-01-09 PROCEDURE — 25010000002 KETOROLAC TROMETHAMINE PER 15 MG: Performed by: HOSPITALIST

## 2020-01-09 PROCEDURE — 85007 BL SMEAR W/DIFF WBC COUNT: CPT | Performed by: HOSPITALIST

## 2020-01-09 PROCEDURE — 25010000002 LORAZEPAM PER 2 MG: Performed by: HOSPITALIST

## 2020-01-09 RX ADMIN — LORAZEPAM 2 MG: 2 INJECTION, SOLUTION INTRAMUSCULAR; INTRAVENOUS at 09:08

## 2020-01-09 RX ADMIN — LORAZEPAM 2 MG: 2 INJECTION, SOLUTION INTRAMUSCULAR; INTRAVENOUS at 07:37

## 2020-01-09 RX ADMIN — KETOROLAC TROMETHAMINE 30 MG: 30 INJECTION, SOLUTION INTRAMUSCULAR at 21:30

## 2020-01-09 RX ADMIN — LORAZEPAM 2 MG: 2 INJECTION, SOLUTION INTRAMUSCULAR; INTRAVENOUS at 15:25

## 2020-01-09 RX ADMIN — IPRATROPIUM BROMIDE AND ALBUTEROL SULFATE 3 ML: 2.5; .5 SOLUTION RESPIRATORY (INHALATION) at 16:45

## 2020-01-09 RX ADMIN — LORAZEPAM 2 MG: 2 INJECTION, SOLUTION INTRAMUSCULAR; INTRAVENOUS at 09:30

## 2020-01-09 RX ADMIN — LORAZEPAM 2 MG: 2 INJECTION, SOLUTION INTRAMUSCULAR; INTRAVENOUS at 17:56

## 2020-01-09 RX ADMIN — LORAZEPAM 2 MG: 2 INJECTION, SOLUTION INTRAMUSCULAR; INTRAVENOUS at 21:24

## 2020-01-09 RX ADMIN — LORAZEPAM 2 MG: 2 INJECTION, SOLUTION INTRAMUSCULAR; INTRAVENOUS at 23:22

## 2020-01-09 RX ADMIN — DIAZEPAM 10 MG: 5 TABLET ORAL at 12:32

## 2020-01-09 RX ADMIN — DIAZEPAM 10 MG: 5 TABLET ORAL at 06:30

## 2020-01-09 RX ADMIN — LORAZEPAM 2 MG: 2 INJECTION, SOLUTION INTRAMUSCULAR; INTRAVENOUS at 12:13

## 2020-01-09 RX ADMIN — IPRATROPIUM BROMIDE AND ALBUTEROL SULFATE 3 ML: 2.5; .5 SOLUTION RESPIRATORY (INHALATION) at 08:06

## 2020-01-09 RX ADMIN — NICOTINE 1 PATCH: 21 PATCH, EXTENDED RELEASE TRANSDERMAL at 08:26

## 2020-01-09 RX ADMIN — BUPRENORPHINE HYDROCHLORIDE AND NALOXONE HYDROCHLORIDE DIHYDRATE 1 TABLET: 8; 2 TABLET SUBLINGUAL at 21:24

## 2020-01-09 RX ADMIN — ACETAMINOPHEN 650 MG: 325 TABLET, FILM COATED ORAL at 23:32

## 2020-01-09 RX ADMIN — IPRATROPIUM BROMIDE AND ALBUTEROL SULFATE 3 ML: 2.5; .5 SOLUTION RESPIRATORY (INHALATION) at 12:19

## 2020-01-09 RX ADMIN — LORAZEPAM 2 MG: 2 INJECTION, SOLUTION INTRAMUSCULAR; INTRAVENOUS at 02:27

## 2020-01-09 RX ADMIN — LORAZEPAM 2 MG: 2 INJECTION, SOLUTION INTRAMUSCULAR; INTRAVENOUS at 13:49

## 2020-01-09 RX ADMIN — BUPRENORPHINE HYDROCHLORIDE AND NALOXONE HYDROCHLORIDE DIHYDRATE 1 TABLET: 8; 2 TABLET SUBLINGUAL at 15:47

## 2020-01-09 RX ADMIN — SODIUM CHLORIDE, PRESERVATIVE FREE 10 ML: 5 INJECTION INTRAVENOUS at 08:26

## 2020-01-09 RX ADMIN — LORAZEPAM 2 MG: 2 INJECTION, SOLUTION INTRAMUSCULAR; INTRAVENOUS at 10:44

## 2020-01-09 RX ADMIN — DIAZEPAM 10 MG: 5 TABLET ORAL at 23:33

## 2020-01-09 RX ADMIN — DIAZEPAM 10 MG: 5 TABLET ORAL at 17:59

## 2020-01-09 RX ADMIN — LORAZEPAM 2 MG: 2 INJECTION, SOLUTION INTRAMUSCULAR; INTRAVENOUS at 14:41

## 2020-01-09 RX ADMIN — LORAZEPAM 2 MG: 2 INJECTION, SOLUTION INTRAMUSCULAR; INTRAVENOUS at 15:48

## 2020-01-09 RX ADMIN — KETOROLAC TROMETHAMINE 30 MG: 30 INJECTION, SOLUTION INTRAMUSCULAR at 14:44

## 2020-01-09 RX ADMIN — KETOROLAC TROMETHAMINE 30 MG: 30 INJECTION, SOLUTION INTRAMUSCULAR at 08:26

## 2020-01-09 RX ADMIN — LORAZEPAM 2 MG: 2 INJECTION, SOLUTION INTRAMUSCULAR; INTRAVENOUS at 16:22

## 2020-01-09 RX ADMIN — BUPRENORPHINE HYDROCHLORIDE AND NALOXONE HYDROCHLORIDE DIHYDRATE 1 TABLET: 8; 2 TABLET SUBLINGUAL at 08:26

## 2020-01-09 RX ADMIN — SERTRALINE HYDROCHLORIDE 25 MG: 25 TABLET ORAL at 08:29

## 2020-01-09 RX ADMIN — KETOROLAC TROMETHAMINE 30 MG: 30 INJECTION, SOLUTION INTRAMUSCULAR at 02:30

## 2020-01-09 RX ADMIN — FOLIC ACID 100 ML/HR: 5 INJECTION, SOLUTION INTRAMUSCULAR; INTRAVENOUS; SUBCUTANEOUS at 09:08

## 2020-01-09 RX ADMIN — LORAZEPAM 2 MG: 2 INJECTION, SOLUTION INTRAMUSCULAR; INTRAVENOUS at 04:21

## 2020-01-09 RX ADMIN — DIAZEPAM 10 MG: 5 TABLET ORAL at 00:35

## 2020-01-09 NOTE — PLAN OF CARE
Problem: Skin Injury Risk (Adult)  Intervention: Promote/Optimize Nutrition  Flowsheets (Taken 1/9/2020 1256)  Oral Nutrition Promotion: calorie dense foods provided; calorie dense liquids provided; nutritional therapy counseling provided     Problem: Patient Care Overview  Goal: Plan of Care Review  Outcome: Ongoing (interventions implemented as appropriate)  Flowsheets  Taken 1/8/2020 1811 by Barbara Ramirez RN  Progress: no change  Taken 1/9/2020 1256 by Gavi Ng RD  Plan of Care Reviewed With: patient;spouse;caregiver  Outcome Summary: New Assessment:  Pt reports a hx of decreased appetite for the last month with a wt loss of ~15#.  Rd will add supplements and monitor

## 2020-01-09 NOTE — SIGNIFICANT NOTE
Called by nursing secondary to markedly elevated CIWA score.  I went to visit with patient and she was obviously very tremulous, very anxious, very nervous, and sweating.  CIWA score at the time of my evaluation was 24.  I discussed with the patient again the importance of appropriate treatment including intubation and Ativan drip.  Patient and her  both declined intubation at this time.  I counseled him for approximately 15 minutes and strongly encouraged them to reconsider.  I talked with the patient about the inappropriateness of only partially treating her alcohol withdrawal.  Patient verbalized understanding as did her .  They still remain steadfast that they did not want to be intubated.  Patient did state that if she quit breathing intubation was acceptable, but not until then.  Nursing staff was in the room.  I have significant concern that giving the patient the amount of benzodiazepines that she will require for treatment of her alcohol withdrawal will lead to severe respiratory depression and potentially respiratory arrest.  I discussed the concern with the patient.  She continued to decline intubation at the present time.  I told her we would continue to manage her alcohol withdrawal as best we were able given the constraints she placed on ice.        This document has been electronically signed by Ant Saenz MD on January 8, 2020 8:05 PM

## 2020-01-09 NOTE — PROGRESS NOTES
[facility] Medicine Services  INPATIENT PROGRESS NOTE    Length of Stay: 1  Date of Admission: 1/6/2020  Primary Care Physician: Provider, No Known    Subjective   Chief Complaint: Alcohol Withdrawal  HPI:  Patient still has significant tremulousness and anxiety.  She wants to get to the bedside commode.    Review of Systems   Constitutional: Positive for activity change. Negative for appetite change, chills, fatigue, fever and unexpected weight change.   Respiratory: Negative for cough, choking, chest tightness, shortness of breath and wheezing.    Cardiovascular: Negative for chest pain, palpitations and leg swelling.   Gastrointestinal: Negative for abdominal pain, blood in stool, constipation, diarrhea, nausea and vomiting.   Genitourinary: Negative for dysuria, flank pain and hematuria.   Neurological: Positive for tremors. Negative for dizziness, seizures, syncope, speech difficulty, weakness, light-headedness, numbness and headaches.   Hematological: Does not bruise/bleed easily.   Psychiatric/Behavioral: Positive for agitation, confusion and decreased concentration. The patient is nervous/anxious.         All pertinent negatives and positives are as above. All other systems have been reviewed and are negative unless otherwise stated.     Objective    Temp:  [97.9 °F (36.6 °C)-98.7 °F (37.1 °C)] 98.6 °F (37 °C)  Heart Rate:  [] 78  Resp:  [12-23] 12  BP: (110-151)/(67-96) 131/90    Physical Exam   Constitutional: She appears well-developed and well-nourished.   HENT:   Head: Normocephalic and atraumatic.   Eyes: Pupils are equal, round, and reactive to light. EOM are normal.   Neck: Normal range of motion. Neck supple.   Cardiovascular: Normal rate, regular rhythm and normal heart sounds. Exam reveals no gallop and no friction rub.   No murmur heard.  Pulmonary/Chest: Effort normal and breath sounds normal. No respiratory distress. She has no wheezes. She has no  rales. She exhibits no tenderness.   Abdominal: Soft. Bowel sounds are normal. She exhibits no distension. There is no tenderness. There is no guarding.   Musculoskeletal: She exhibits no edema.   Skin: Skin is warm and dry.   Psychiatric: Thought content normal. Her mood appears anxious. Her speech is rapid and/or pressured and slurred. She is agitated and hyperactive. She is not aggressive and not actively hallucinating. Cognition and memory are impaired. She expresses impulsivity. She is inattentive.   Vitals reviewed.        Results Review:  I have reviewed the labs, radiology results, and diagnostic studies.    Laboratory Data:   Results from last 7 days   Lab Units 01/09/20  0310 01/08/20  0510 01/07/20  0509 01/06/20  1442   SODIUM mmol/L 139 138 137 138   POTASSIUM mmol/L 3.8 3.9 4.0 3.9   CHLORIDE mmol/L 107 104 102 95*   CO2 mmol/L 24.0 25.0 24.0 25.0   BUN mg/dL 5* 5* 10 8   CREATININE mg/dL 0.44* 0.42* 0.52* 0.57   GLUCOSE mg/dL 90 90 79 85   CALCIUM mg/dL 8.2* 8.0* 8.2* 9.4   BILIRUBIN mg/dL  --   --   --  0.3   ALK PHOS U/L  --   --   --  84   ALT (SGPT) U/L  --   --   --  57*   AST (SGOT) U/L  --   --   --  62*   ANION GAP mmol/L 8.0 9.0 11.0 18.0*     Estimated Creatinine Clearance: 188 mL/min (A) (by C-G formula based on SCr of 0.44 mg/dL (L)).          Results from last 7 days   Lab Units 01/09/20  0310 01/08/20  0510 01/07/20  0509 01/06/20  1442   WBC 10*3/mm3 5.94 5.18 5.75 9.22   HEMOGLOBIN g/dL 11.4* 12.5 11.9* 14.6   HEMATOCRIT % 33.4* 36.3 34.5 41.7   PLATELETS 10*3/mm3 147 168 169 191           Culture Data:   No results found for: BLOODCX  No results found for: URINECX  No results found for: RESPCX  No results found for: WOUNDCX  No results found for: STOOLCX  No components found for: BODYFLD    Radiology Data:   Imaging Results (Last 24 Hours)     ** No results found for the last 24 hours. **          I have reviewed the patient's current medications.     Assessment/Plan     Active  Hospital Problems    Diagnosis   • Alcohol withdrawal (CMS/HCC)   • Non-intractable vomiting       Plan:    1.  Alcohol withdrawal:  Patient with severe withdrawal symptoms.  Patient continues to decline intubation.  Will continue to do our best to appropriately treat the patient's withdrawal symptoms within the confines she has allowed.  2.  Chronic alcohol use  3.  Chronic Pain              Discharge Planning: I expect patient to be discharged to home in 5-7 days.        This document has been electronically signed by Ant Saenz MD on January 9, 2020 12:44 PM

## 2020-01-09 NOTE — CONSULTS
"Adult Nutrition  Assessment    Patient Name:  Nata Bain  YOB: 1985  MRN: [id number]  Admit Date:  1/6/2020    Assessment Date:  1/9/2020    Comments:  Pt was transferred to CCU due to increasing symptoms of Alcohol Withdrawal which may eventually require intubation.  Pt has a hx of severe alcohol abuse.  She reports decreased appetite for the past month with wt loss of ~15#.  (However, per Epic records I cannot verify this wt loss).  SHe is still not eating well.  Intake only averaging ~33% for documented meals.  Will add Boost with meals and monitor her intake.      Reason for Assessment     Row Name 01/09/20 1239          Reason for Assessment    Reason For Assessment  nurse/nurse practitioner consult     Diagnosis  substance use/abuse     Identified At Risk by Screening Criteria  unintentional loss of 10 lbs or more in the past 2 mos;reduced oral intake over the last month         Nutrition/Diet History     Row Name 01/09/20 1240          Nutrition/Diet History    Typical Food/Fluid Intake  Pt states that her appetite is getting better than it was.  She has not been eating well for the last month.  \"I have been sick\"  She has lost wt, about 15#--verified by her            Labs/Tests/Procedures/Meds     Row Name 01/09/20 1244          Labs/Procedures/Meds    Lab Results Reviewed  reviewed, pertinent     Lab Results Comments  Bun 5; Creat 0.44; ALT 57 (H);         Diagnostic Tests/Procedures    Diagnostic Test/Procedure Reviewed  reviewed, pertinent        Medications    Pertinent Medications Reviewed  reviewed, pertinent     Pertinent Medications Comments  VAlium; CIWA protocol; MVI bag         Physical Findings     Row Name 01/09/20 1246          Physical Findings    Overall Physical Appearance  on oxygen therapy     Gastrointestinal  nausea         Estimated/Assessed Needs     Row Name 01/09/20 1252          Calculation Measurements    Weight Used For Calculations  63.5 kg (140 " lb)        Estimated/Assessed Needs    Additional Documentation  Additional Requirements (Group);Fluid Requirements (Group);Keeler-St. Jeor Equation (Group);Protein Requirements (Group);Calorie Requirements (Group);KCAL/KG (Group)        Calorie Requirements    Estimated Calorie Requirement (kcal/day)  1900     Estimated Calorie Need Method  kcal/kg        KCAL/KG    KCAL/KG  30 Kcal/Kg (kcal)     30 Kcal/Kg (kcal)  1905.12        Keeler-St. Jeor Equation    RMR (Keeler-St. Jeor Equation)  1388.413        Protein Requirements    Weight Used For Protein Calculations  63.5 kg (140 lb)     Est Protein Requirement Amount (gms/kg)  1.2 gm protein     Estimated Protein Requirements (gms/day)  76.2        Fluid Requirements    Estimated Fluid Requirements (mL/day)  1900     Estimated Fluid Requirement Method  RDA Method     RDA Method (mL)  1900     Niesha-Celsa Method (over 20 kg)  2770.08         Nutrition Prescription Ordered     Row Name 01/09/20 1253          Nutrition Prescription PO    Current PO Diet  Regular     Fluid Consistency  Thin         Evaluation of Received Nutrient/Fluid Intake     Row Name 01/09/20 1253          PO Evaluation    Number of Days PO Intake Evaluated  Insufficient Data     Number of Meals  3     % PO Intake  25/25/50               Electronically signed by:  Gavi Ng RD  01/09/20 12:58 PM

## 2020-01-09 NOTE — PROGRESS NOTES
Discharge Planning Assessment  [facility]     Patient Name: Nata Bain  MRN: [id number]  Today's Date: 1/9/2020    Admit Date: 1/6/2020    Discharge Needs Assessment     Row Name 01/09/20 1256       Living Environment    Lives With  child(mojgan), dependent;spouse    Current Living Arrangements  home/apartment/condo    Primary Care Provided by  self    Provides Primary Care For  child(mojgan) 8 year old son.     Family Caregiver if Needed  spouse    Quality of Family Relationships  helpful;supportive    Able to Return to Prior Arrangements  yes    Living Arrangement Comments  Pt resides at home with spouse and 8 year old son.        Resource/Environmental Concerns    Transportation Concerns  car, none       Transition Planning    Patient/Family Anticipates Transition to  home    Patient/Family Anticipated Services at Transition  rehabilitation services    Transportation Anticipated  family or friend will provide       Discharge Needs Assessment    Concerns to be Addressed  adjustment to diagnosis/illness;coping/stress;substance/tobacco abuse/use    Concerns Comments  Alcoholic. Pt admitts to drinking 2 fifths of Vodka daily. Has been to [facility] in past. Refused resources but wants to stop drinking.     Equipment Currently Used at Home  nebulizer    Equipment Needed After Discharge  none    Outpatient/Agency/Support Group Needs  outpatient substance abuse treatment    Current Discharge Risk  chronically ill;substance use/abuse        Discharge Plan     Row Name 01/09/20 1300       Plan    Plan Comments  LSW assessment complete. Pt resides at home with spouse and 8 year old son. Pt voiced good support system. Pt reports being independent prior to hospitalization. Pt admitts to drinking 2 fifths of vodka dialy. Pt reports she has been to [facility] in past. LSW offered resources. Pt refused. However she openly admitts she wants to stop drinking for her spouse and son. Pt plans to return home at d/c and she does  not anticipate any needs. LSW/case mgt will follow up as consulted and complete arrangements as ordered. Ric ANTHONY        Destination      Coordination has not been started for this encounter.      Durable Medical Equipment      Coordination has not been started for this encounter.      Dialysis/Infusion      Coordination has not been started for this encounter.      Home Medical Care      Coordination has not been started for this encounter.      Therapy      Coordination has not been started for this encounter.      Community Resources      Coordination has not been started for this encounter.        Expected Discharge Date and Time     Expected Discharge Date Expected Discharge Time    Noe 10, 2020         Demographic Summary     Row Name 01/09/20 1256       General Information    Admission Type  inpatient    Referral Source  high risk screening    Reason for Consult  discharge planning    Preferred Language  English     Used During This Interaction  no       Contact Information    Contact Information Obtained for          Functional Status     Row Name 01/09/20 1256       Functional Status    Usual Activity Tolerance  good    Current Activity Tolerance  fair       Functional Status, IADL    Medications  independent Pt uses [facility]    Meal Preparation  independent    Housekeeping  independent    Laundry  independent    Shopping  independent       Mental Status Summary    Recent Changes in Mental Status/Cognitive Functioning  no changes       Employment/    Employment Status  homemaker        Psychosocial    No documentation.       Abuse/Neglect    No documentation.       Legal    No documentation.       Substance Abuse    No documentation.       Patient Forms    No documentation.           RAJ Avila

## 2020-01-09 NOTE — PLAN OF CARE
Patient moved to ICU today secondary to large amount of medication that is needed for control of her alcohol withdrawal syndrome, to be able to be monitored. She has continued to need doses of ativan for control of symptoms. CIWA decreased to 9 at one time but has consistently remained 19 or 20. Vital signs have remained stable and urine output has been adequate. Patient was able to tolerate eating at dinner tonight.

## 2020-01-09 NOTE — PLAN OF CARE
No significant events this shift. Pt alert and oriented. Last CIWA score was 14, with visible tremors, and headache noted. Ativan PRN given as needed. Pt sleeping off and on most of the day. No complaints of nausea or vomiting. Appetite slightly increased. PT able to transfer to bedside commode and back to bed with very little assistance. VSS. Will continute to monitor.

## 2020-01-09 NOTE — PLAN OF CARE
Problem: Patient Care Overview  Goal: Plan of Care Review  Outcome: Ongoing (interventions implemented as appropriate)  Flowsheets  Taken 1/9/2020 0400  Plan of Care Reviewed With: patient  Taken 1/9/2020 0509  Outcome Summary: Patient resting on RA with VSS through shift. Tremors have been decently controlled with CIWA scale. Continuing to monitor patient.  Goal: Individualization and Mutuality  Outcome: Ongoing (interventions implemented as appropriate)  Goal: Discharge Needs Assessment  Outcome: Ongoing (interventions implemented as appropriate)  Goal: Interprofessional Rounds/Family Conf  Outcome: Ongoing (interventions implemented as appropriate)     Problem: Pain, Chronic (Adult)  Goal: Acceptable Pain/Comfort Level and Functional Ability  Outcome: Ongoing (interventions implemented as appropriate)  Flowsheets (Taken 1/8/2020 1811 by Barbara Ramirez RN)  Acceptable Pain/Comfort Level and Functional Ability: making progress toward outcome     Problem: Alcohol Withdrawal Acute, Risk/Actual (Adult)  Goal: Signs and Symptoms of Listed Potential Problems Will be Absent, Minimized or Managed (Alcohol Withdrawal Acute, Risk/Actual)  Outcome: Ongoing (interventions implemented as appropriate)  Flowsheets  Taken 1/8/2020 1811 by Barbara Ramirez RN  Problems Assessed (Alcohol Withdrawal Syndrome): all  Taken 1/9/2020 0509 by Criss Solis RN  Problems Present (Alcohol W/D Syndrome): effects of SHAHRAZD (alcohol withdrawal syndrome)     Problem: Nausea/Vomiting (Adult)  Goal: Symptom Relief  Outcome: Ongoing (interventions implemented as appropriate)  Flowsheets (Taken 1/9/2020 0509)  Symptom Relief: making progress toward outcome  Goal: Adequate Hydration  Outcome: Ongoing (interventions implemented as appropriate)  Flowsheets (Taken 1/9/2020 0509)  Adequate Hydration: making progress toward outcome     Problem: Fall Risk (Adult)  Goal: Identify Related Risk Factors and Signs and Symptoms  Outcome: Ongoing  (interventions implemented as appropriate)  Flowsheets (Taken 1/9/2020 0501)  Related Risk Factors (Fall Risk): culprit medication(s); depression/anxiety; polypharmacy; environment unfamiliar  Signs and Symptoms (Fall Risk): presence of risk factors  Goal: Absence of Fall  Outcome: Ongoing (interventions implemented as appropriate)  Flowsheets (Taken 1/9/2020 2450)  Absence of Fall: making progress toward outcome

## 2020-01-10 LAB
AMPHET+METHAMPHET UR QL: NEGATIVE
AMPHETAMINES UR QL: NEGATIVE
ANION GAP SERPL CALCULATED.3IONS-SCNC: 13 MMOL/L (ref 5–15)
ANISOCYTOSIS BLD QL: ABNORMAL
BARBITURATES UR QL SCN: NEGATIVE
BASOPHILS # BLD AUTO: 0.05 10*3/MM3 (ref 0–0.2)
BASOPHILS NFR BLD AUTO: 0.5 % (ref 0–1.5)
BENZODIAZ UR QL SCN: POSITIVE
BUN BLD-MCNC: 6 MG/DL (ref 6–20)
BUN/CREAT SERPL: 11.1 (ref 7–25)
BUPRENORPHINE SERPL-MCNC: POSITIVE NG/ML
CALCIUM SPEC-SCNC: 9.6 MG/DL (ref 8.6–10.5)
CANNABINOIDS SERPL QL: NEGATIVE
CHLORIDE SERPL-SCNC: 100 MMOL/L (ref 98–107)
CO2 SERPL-SCNC: 27 MMOL/L (ref 22–29)
COCAINE UR QL: NEGATIVE
CREAT BLD-MCNC: 0.54 MG/DL (ref 0.57–1)
DEPRECATED RDW RBC AUTO: 53 FL (ref 37–54)
EOSINOPHIL # BLD AUTO: 0.07 10*3/MM3 (ref 0–0.4)
EOSINOPHIL NFR BLD AUTO: 0.7 % (ref 0.3–6.2)
ERYTHROCYTE [DISTWIDTH] IN BLOOD BY AUTOMATED COUNT: 13.1 % (ref 12.3–15.4)
GFR SERPL CREATININE-BSD FRML MDRD: 130 ML/MIN/1.73
GLUCOSE BLD-MCNC: 91 MG/DL (ref 65–99)
HCT VFR BLD AUTO: 38.8 % (ref 34–46.6)
HGB BLD-MCNC: 13 G/DL (ref 12–15.9)
IMM GRANULOCYTES # BLD AUTO: 0.03 10*3/MM3 (ref 0–0.05)
IMM GRANULOCYTES NFR BLD AUTO: 0.3 % (ref 0–0.5)
LARGE PLATELETS: ABNORMAL
LYMPHOCYTES # BLD AUTO: 0.94 10*3/MM3 (ref 0.7–3.1)
LYMPHOCYTES # BLD MANUAL: 1.22 10*3/MM3 (ref 0.7–3.1)
LYMPHOCYTES NFR BLD AUTO: 9.3 % (ref 19.6–45.3)
LYMPHOCYTES NFR BLD MANUAL: 12 % (ref 19.6–45.3)
LYMPHOCYTES NFR BLD MANUAL: 6 % (ref 5–12)
MCH RBC QN AUTO: 37 PG (ref 26.6–33)
MCHC RBC AUTO-ENTMCNC: 33.5 G/DL (ref 31.5–35.7)
MCV RBC AUTO: 110.5 FL (ref 79–97)
METHADONE UR QL SCN: NEGATIVE
MONOCYTES # BLD AUTO: 0.54 10*3/MM3 (ref 0.1–0.9)
MONOCYTES # BLD AUTO: 0.61 10*3/MM3 (ref 0.1–0.9)
MONOCYTES NFR BLD AUTO: 5.3 % (ref 5–12)
NEUTROPHILS # BLD AUTO: 8.31 10*3/MM3 (ref 1.7–7)
NEUTROPHILS # BLD AUTO: 8.51 10*3/MM3 (ref 1.7–7)
NEUTROPHILS NFR BLD AUTO: 83.9 % (ref 42.7–76)
NEUTROPHILS NFR BLD MANUAL: 82 % (ref 42.7–76)
NRBC BLD AUTO-RTO: 0 /100 WBC (ref 0–0.2)
OPIATES UR QL: NEGATIVE
OXYCODONE UR QL SCN: NEGATIVE
PCP UR QL SCN: NEGATIVE
PLATELET # BLD AUTO: 160 10*3/MM3 (ref 140–450)
PMV BLD AUTO: 9.4 FL (ref 6–12)
POIKILOCYTOSIS BLD QL SMEAR: ABNORMAL
POTASSIUM BLD-SCNC: 4.3 MMOL/L (ref 3.5–5.2)
PROPOXYPH UR QL: NEGATIVE
RBC # BLD AUTO: 3.51 10*6/MM3 (ref 3.77–5.28)
SMALL PLATELETS BLD QL SMEAR: ADEQUATE
SMUDGE CELLS IN BLOOD BY LIGHT MICROSCOPY: 2 /100 WBC
SODIUM BLD-SCNC: 140 MMOL/L (ref 136–145)
STOMATOCYTES BLD QL SMEAR: ABNORMAL
TRICYCLICS UR QL SCN: NEGATIVE
WBC MORPH BLD: NORMAL
WBC NRBC COR # BLD: 10.14 10*3/MM3 (ref 3.4–10.8)

## 2020-01-10 PROCEDURE — 25010000002 THIAMINE PER 100 MG: Performed by: HOSPITALIST

## 2020-01-10 PROCEDURE — 25010000002 LORAZEPAM PER 2 MG: Performed by: HOSPITALIST

## 2020-01-10 PROCEDURE — 25010000002 LORAZEPAM PER 2 MG

## 2020-01-10 PROCEDURE — 80306 DRUG TEST PRSMV INSTRMNT: CPT | Performed by: HOSPITALIST

## 2020-01-10 PROCEDURE — 85007 BL SMEAR W/DIFF WBC COUNT: CPT | Performed by: HOSPITALIST

## 2020-01-10 PROCEDURE — 25010000002 KETOROLAC TROMETHAMINE PER 15 MG: Performed by: HOSPITALIST

## 2020-01-10 PROCEDURE — 94760 N-INVAS EAR/PLS OXIMETRY 1: CPT

## 2020-01-10 PROCEDURE — 94799 UNLISTED PULMONARY SVC/PX: CPT

## 2020-01-10 PROCEDURE — 85025 COMPLETE CBC W/AUTO DIFF WBC: CPT | Performed by: HOSPITALIST

## 2020-01-10 PROCEDURE — 80048 BASIC METABOLIC PNL TOTAL CA: CPT | Performed by: HOSPITALIST

## 2020-01-10 RX ORDER — LORAZEPAM 2 MG/ML
INJECTION INTRAMUSCULAR
Status: COMPLETED
Start: 2020-01-10 | End: 2020-01-10

## 2020-01-10 RX ORDER — IPRATROPIUM BROMIDE AND ALBUTEROL SULFATE 2.5; .5 MG/3ML; MG/3ML
3 SOLUTION RESPIRATORY (INHALATION) EVERY 4 HOURS PRN
Status: DISCONTINUED | OUTPATIENT
Start: 2020-01-10 | End: 2020-01-14 | Stop reason: HOSPADM

## 2020-01-10 RX ADMIN — KETOROLAC TROMETHAMINE 30 MG: 30 INJECTION, SOLUTION INTRAMUSCULAR at 11:10

## 2020-01-10 RX ADMIN — DIAZEPAM 10 MG: 5 TABLET ORAL at 17:48

## 2020-01-10 RX ADMIN — LORAZEPAM 2 MG: 2 INJECTION, SOLUTION INTRAMUSCULAR; INTRAVENOUS at 19:50

## 2020-01-10 RX ADMIN — BUPRENORPHINE HYDROCHLORIDE AND NALOXONE HYDROCHLORIDE DIHYDRATE 1 TABLET: 8; 2 TABLET SUBLINGUAL at 08:00

## 2020-01-10 RX ADMIN — BUPRENORPHINE HYDROCHLORIDE AND NALOXONE HYDROCHLORIDE DIHYDRATE 1 TABLET: 8; 2 TABLET SUBLINGUAL at 16:09

## 2020-01-10 RX ADMIN — LORAZEPAM 2 MG: 2 INJECTION, SOLUTION INTRAMUSCULAR; INTRAVENOUS at 22:36

## 2020-01-10 RX ADMIN — SODIUM CHLORIDE 100 ML/HR: 900 INJECTION, SOLUTION INTRAVENOUS at 19:40

## 2020-01-10 RX ADMIN — DIAZEPAM 10 MG: 5 TABLET ORAL at 12:40

## 2020-01-10 RX ADMIN — SODIUM CHLORIDE 100 ML/HR: 900 INJECTION, SOLUTION INTRAVENOUS at 04:23

## 2020-01-10 RX ADMIN — LORAZEPAM 2 MG: 2 INJECTION, SOLUTION INTRAMUSCULAR; INTRAVENOUS at 16:06

## 2020-01-10 RX ADMIN — LORAZEPAM 2 MG: 2 INJECTION, SOLUTION INTRAMUSCULAR; INTRAVENOUS at 17:50

## 2020-01-10 RX ADMIN — BUPRENORPHINE HYDROCHLORIDE AND NALOXONE HYDROCHLORIDE DIHYDRATE 1 TABLET: 8; 2 TABLET SUBLINGUAL at 21:12

## 2020-01-10 RX ADMIN — KETOROLAC TROMETHAMINE 30 MG: 30 INJECTION, SOLUTION INTRAMUSCULAR at 17:49

## 2020-01-10 RX ADMIN — LORAZEPAM 2 MG: 2 INJECTION, SOLUTION INTRAMUSCULAR; INTRAVENOUS at 21:15

## 2020-01-10 RX ADMIN — LORAZEPAM 2 MG: 2 INJECTION, SOLUTION INTRAMUSCULAR; INTRAVENOUS at 11:10

## 2020-01-10 RX ADMIN — FOLIC ACID 100 ML/HR: 5 INJECTION, SOLUTION INTRAMUSCULAR; INTRAVENOUS; SUBCUTANEOUS at 08:54

## 2020-01-10 RX ADMIN — LORAZEPAM 2 MG: 2 INJECTION, SOLUTION INTRAMUSCULAR; INTRAVENOUS at 07:54

## 2020-01-10 RX ADMIN — LORAZEPAM 2 MG: 2 INJECTION, SOLUTION INTRAMUSCULAR; INTRAVENOUS at 12:40

## 2020-01-10 RX ADMIN — IPRATROPIUM BROMIDE AND ALBUTEROL SULFATE 3 ML: 2.5; .5 SOLUTION RESPIRATORY (INHALATION) at 08:23

## 2020-01-10 RX ADMIN — LORAZEPAM 2 MG: 2 INJECTION, SOLUTION INTRAMUSCULAR; INTRAVENOUS at 08:52

## 2020-01-10 RX ADMIN — SERTRALINE HYDROCHLORIDE 25 MG: 25 TABLET ORAL at 08:00

## 2020-01-10 RX ADMIN — KETOROLAC TROMETHAMINE 30 MG: 30 INJECTION, SOLUTION INTRAMUSCULAR at 04:23

## 2020-01-10 RX ADMIN — LORAZEPAM 2 MG: 2 INJECTION, SOLUTION INTRAMUSCULAR; INTRAVENOUS at 10:15

## 2020-01-10 RX ADMIN — NICOTINE 1 PATCH: 21 PATCH, EXTENDED RELEASE TRANSDERMAL at 08:01

## 2020-01-10 RX ADMIN — LORAZEPAM 2 MG: 2 INJECTION, SOLUTION INTRAMUSCULAR; INTRAVENOUS at 13:25

## 2020-01-10 RX ADMIN — LORAZEPAM 2 MG: 2 INJECTION, SOLUTION INTRAMUSCULAR; INTRAVENOUS at 04:23

## 2020-01-10 RX ADMIN — LORAZEPAM 2 MG: 2 INJECTION, SOLUTION INTRAMUSCULAR; INTRAVENOUS at 18:41

## 2020-01-10 RX ADMIN — LORAZEPAM 2 MG: 2 INJECTION, SOLUTION INTRAMUSCULAR; INTRAVENOUS at 16:23

## 2020-01-10 RX ADMIN — SODIUM CHLORIDE, PRESERVATIVE FREE 10 ML: 5 INJECTION INTRAVENOUS at 08:01

## 2020-01-10 RX ADMIN — ACETAMINOPHEN 650 MG: 325 TABLET, FILM COATED ORAL at 07:37

## 2020-01-10 RX ADMIN — LORAZEPAM 2 MG: 2 INJECTION, SOLUTION INTRAMUSCULAR; INTRAVENOUS at 09:18

## 2020-01-10 RX ADMIN — LORAZEPAM 2 MG: 2 INJECTION, SOLUTION INTRAMUSCULAR; INTRAVENOUS at 15:00

## 2020-01-10 RX ADMIN — DIAZEPAM 10 MG: 5 TABLET ORAL at 07:28

## 2020-01-10 RX ADMIN — LORAZEPAM 1 MG: 2 INJECTION, SOLUTION INTRAMUSCULAR; INTRAVENOUS at 01:22

## 2020-01-10 NOTE — PLAN OF CARE
Problem: Patient Care Overview  Goal: Plan of Care Review  Outcome: Ongoing (interventions implemented as appropriate)  Flowsheets  Taken 1/10/2020 0603  Progress: improving  Outcome Summary: Patient resting on RA with VSS through shift. Patient tolerating ativan per CIWA scale well and continues to experience tremors. Patient receiving tramadol for abd pain, tolerating well. Continuing to monitor patient.  Taken 1/10/2020 0400  Plan of Care Reviewed With: patient;spouse  Goal: Individualization and Mutuality  Outcome: Ongoing (interventions implemented as appropriate)  Goal: Discharge Needs Assessment  Outcome: Ongoing (interventions implemented as appropriate)  Goal: Interprofessional Rounds/Family Conf  Outcome: Ongoing (interventions implemented as appropriate)     Problem: Pain, Chronic (Adult)  Goal: Acceptable Pain/Comfort Level and Functional Ability  Outcome: Ongoing (interventions implemented as appropriate)     Problem: Alcohol Withdrawal Acute, Risk/Actual (Adult)  Goal: Signs and Symptoms of Listed Potential Problems Will be Absent, Minimized or Managed (Alcohol Withdrawal Acute, Risk/Actual)  Outcome: Ongoing (interventions implemented as appropriate)     Problem: Nausea/Vomiting (Adult)  Goal: Symptom Relief  Outcome: Ongoing (interventions implemented as appropriate)  Goal: Adequate Hydration  Outcome: Ongoing (interventions implemented as appropriate)     Problem: Fall Risk (Adult)  Goal: Absence of Fall  Outcome: Ongoing (interventions implemented as appropriate)     Problem: Skin Injury Risk (Adult)  Goal: Identify Related Risk Factors and Signs and Symptoms  Outcome: Ongoing (interventions implemented as appropriate)  Goal: Skin Health and Integrity  Outcome: Ongoing (interventions implemented as appropriate)

## 2020-01-10 NOTE — SIGNIFICANT NOTE
"1/9/2020 1943-pt sleeping, family ask to wait  1/9/2020 2036-pt states \"not right now, i'll call ya\"  Now- pt sleeping, family ask not to disturb  "

## 2020-01-10 NOTE — PROGRESS NOTES
[facility] Medicine Services  INPATIENT PROGRESS NOTE    Length of Stay: 2  Date of Admission: 1/6/2020  Primary Care Physician: Provider, No Known    Subjective   Chief Complaint: Alcohol Withdrawal  HPI: Patient continues to have tremors and anxiety.  She is complaining of significant abdominal and back pain.  She is asking specifically for narcotics..    Review of Systems   Constitutional: Positive for activity change. Negative for appetite change, chills, fatigue, fever and unexpected weight change.   Respiratory: Negative for cough, choking, chest tightness, shortness of breath and wheezing.    Cardiovascular: Negative for chest pain, palpitations and leg swelling.   Gastrointestinal: Negative for abdominal pain, blood in stool, constipation, diarrhea, nausea and vomiting.   Genitourinary: Negative for dysuria, flank pain and hematuria.   Musculoskeletal: Positive for back pain and myalgias.   Neurological: Positive for tremors. Negative for dizziness, seizures, syncope, speech difficulty, weakness, light-headedness, numbness and headaches.   Hematological: Does not bruise/bleed easily.   Psychiatric/Behavioral: Positive for agitation and decreased concentration. Negative for confusion. The patient is nervous/anxious.         All pertinent negatives and positives are as above. All other systems have been reviewed and are negative unless otherwise stated.     Objective    Temp:  [97.5 °F (36.4 °C)-98.8 °F (37.1 °C)] 98.6 °F (37 °C)  Heart Rate:  [] 83  Resp:  [14-20] 20  BP: (100-161)/() 132/84    Physical Exam   Constitutional: She appears well-developed and well-nourished.   HENT:   Head: Normocephalic and atraumatic.   Eyes: Pupils are equal, round, and reactive to light. EOM are normal.   Neck: Normal range of motion. Neck supple.   Cardiovascular: Normal rate, regular rhythm and normal heart sounds. Exam reveals no gallop and no friction rub.   No murmur  heard.  Pulmonary/Chest: Effort normal and breath sounds normal. No respiratory distress. She has no wheezes. She has no rales. She exhibits no tenderness.   Abdominal: Soft. Bowel sounds are normal. She exhibits no distension. There is no tenderness. There is no guarding.   Musculoskeletal: She exhibits no edema.   Skin: Skin is warm and dry.   Psychiatric: Thought content normal. Her mood appears anxious. Her affect is angry and labile. Her speech is rapid and/or pressured and slurred. She is agitated and hyperactive. She is not aggressive and not actively hallucinating. Cognition and memory are impaired. She expresses impulsivity. She is inattentive.   Vitals reviewed.        Results Review:  I have reviewed the labs, radiology results, and diagnostic studies.    Laboratory Data:   Results from last 7 days   Lab Units 01/10/20  0433 01/09/20  0310 01/08/20  0510  01/06/20  1442   SODIUM mmol/L 140 139 138   < > 138   POTASSIUM mmol/L 4.3 3.8 3.9   < > 3.9   CHLORIDE mmol/L 100 107 104   < > 95*   CO2 mmol/L 27.0 24.0 25.0   < > 25.0   BUN mg/dL 6 5* 5*   < > 8   CREATININE mg/dL 0.54* 0.44* 0.42*   < > 0.57   GLUCOSE mg/dL 91 90 90   < > 85   CALCIUM mg/dL 9.6 8.2* 8.0*   < > 9.4   BILIRUBIN mg/dL  --   --   --   --  0.3   ALK PHOS U/L  --   --   --   --  84   ALT (SGPT) U/L  --   --   --   --  57*   AST (SGOT) U/L  --   --   --   --  62*   ANION GAP mmol/L 13.0 8.0 9.0   < > 18.0*    < > = values in this interval not displayed.     Estimated Creatinine Clearance: 144.6 mL/min (A) (by C-G formula based on SCr of 0.54 mg/dL (L)).          Results from last 7 days   Lab Units 01/10/20  0433 01/09/20  0310 01/08/20  0510 01/07/20  0509 01/06/20  1442   WBC 10*3/mm3 10.14 5.94 5.18 5.75 9.22   HEMOGLOBIN g/dL 13.0 11.4* 12.5 11.9* 14.6   HEMATOCRIT % 38.8 33.4* 36.3 34.5 41.7   PLATELETS 10*3/mm3 160 147 168 169 191           Culture Data:   No results found for: BLOODCX  No results found for: URINECX  No results  found for: RESPCX  No results found for: WOUNDCX  No results found for: STOOLCX  No components found for: BODYFLD    Radiology Data:   Imaging Results (Last 24 Hours)     ** No results found for the last 24 hours. **          I have reviewed the patient's current medications.     Assessment/Plan     Active Hospital Problems    Diagnosis   • Alcohol withdrawal (CMS/HCC)   • Non-intractable vomiting       Plan:    1.  Alcohol withdrawal:  Patient with severe withdrawal symptoms.  Patient continues to decline intubation.  Will continue to do our best to appropriately treat the patient's withdrawal symptoms within the confines she has allowed.  2.  Chronic alcohol use  3.  Chronic Pain    Patient is adamant that she get narcotics for her abdominal and back pain.  She states that she is on Suboxone for pain at home.  The Suboxone that she is getting here (at the same as her home dose) is not effective.  She states that the Toradol is also not effective.  I expressed to her my concern on restarting her on narcotic analgesics given that she has a history of opiate addiction and is on large doses of Ativan for her withdrawal.  She became very upset and told me that I did not know her.  When I asked her what she took prior to the initiation of Suboxone, she told me that she took roughly a dozen Lortab tens every day in addition to chewing a 75 mcg fentanyl patch every day.  I told her that she would not be able to get the amount of narcotics that it would take to make her comfortable.  She was at high risk of relapse with her opiate addiction.  Patient became very tearful and her  immediately raise his voice to me and to tell me that he did not appreciate me scolding his wife.  I again tried to explain to the patient and her  that my intentions were in her best interest.  I tried to help them understand that it was unsafe to attempt to add the amount of narcotics that she was going to require and that I had  significant concerns about a relapse.  Patient and her  were both visibly upset.      Discharge Planning: I expect patient to be discharged to home in 5-7 days.        This document has been electronically signed by Ant Saenz MD on January 10, 2020 2:24 PM

## 2020-01-11 ENCOUNTER — APPOINTMENT (OUTPATIENT)
Dept: INTERVENTIONAL RADIOLOGY/VASCULAR | Facility: HOSPITAL | Age: 35
End: 2020-01-11

## 2020-01-11 ENCOUNTER — APPOINTMENT (OUTPATIENT)
Dept: ULTRASOUND IMAGING | Facility: HOSPITAL | Age: 35
End: 2020-01-11

## 2020-01-11 LAB
ANION GAP SERPL CALCULATED.3IONS-SCNC: 12 MMOL/L (ref 5–15)
BASOPHILS # BLD AUTO: 0.06 10*3/MM3 (ref 0–0.2)
BASOPHILS NFR BLD AUTO: 0.5 % (ref 0–1.5)
BUN BLD-MCNC: 9 MG/DL (ref 6–20)
BUN/CREAT SERPL: 14.8 (ref 7–25)
CALCIUM SPEC-SCNC: 8.5 MG/DL (ref 8.6–10.5)
CHLORIDE SERPL-SCNC: 102 MMOL/L (ref 98–107)
CO2 SERPL-SCNC: 21 MMOL/L (ref 22–29)
CREAT BLD-MCNC: 0.61 MG/DL (ref 0.57–1)
DEPRECATED RDW RBC AUTO: 50.7 FL (ref 37–54)
EOSINOPHIL # BLD AUTO: 0.04 10*3/MM3 (ref 0–0.4)
EOSINOPHIL NFR BLD AUTO: 0.3 % (ref 0.3–6.2)
ERYTHROCYTE [DISTWIDTH] IN BLOOD BY AUTOMATED COUNT: 12.8 % (ref 12.3–15.4)
GFR SERPL CREATININE-BSD FRML MDRD: 113 ML/MIN/1.73
GLUCOSE BLD-MCNC: 107 MG/DL (ref 65–99)
HCT VFR BLD AUTO: 34.1 % (ref 34–46.6)
HGB BLD-MCNC: 11.6 G/DL (ref 12–15.9)
IMM GRANULOCYTES # BLD AUTO: 0.07 10*3/MM3 (ref 0–0.05)
IMM GRANULOCYTES NFR BLD AUTO: 0.6 % (ref 0–0.5)
LYMPHOCYTES # BLD AUTO: 0.88 10*3/MM3 (ref 0.7–3.1)
LYMPHOCYTES NFR BLD AUTO: 7.2 % (ref 19.6–45.3)
MCH RBC QN AUTO: 36.4 PG (ref 26.6–33)
MCHC RBC AUTO-ENTMCNC: 34 G/DL (ref 31.5–35.7)
MCV RBC AUTO: 106.9 FL (ref 79–97)
MONOCYTES # BLD AUTO: 1.13 10*3/MM3 (ref 0.1–0.9)
MONOCYTES NFR BLD AUTO: 9.2 % (ref 5–12)
NEUTROPHILS # BLD AUTO: 10.08 10*3/MM3 (ref 1.7–7)
NEUTROPHILS NFR BLD AUTO: 82.2 % (ref 42.7–76)
NRBC BLD AUTO-RTO: 0 /100 WBC (ref 0–0.2)
PLATELET # BLD AUTO: 119 10*3/MM3 (ref 140–450)
PMV BLD AUTO: 9.7 FL (ref 6–12)
POTASSIUM BLD-SCNC: 3.9 MMOL/L (ref 3.5–5.2)
RBC # BLD AUTO: 3.19 10*6/MM3 (ref 3.77–5.28)
SODIUM BLD-SCNC: 135 MMOL/L (ref 136–145)
WBC NRBC COR # BLD: 12.26 10*3/MM3 (ref 3.4–10.8)

## 2020-01-11 PROCEDURE — 36410 VNPNXR 3YR/> PHY/QHP DX/THER: CPT

## 2020-01-11 PROCEDURE — C1751 CATH, INF, PER/CENT/MIDLINE: HCPCS

## 2020-01-11 PROCEDURE — 25010000002 LORAZEPAM PER 2 MG: Performed by: HOSPITALIST

## 2020-01-11 PROCEDURE — 05HD33Z INSERTION OF INFUSION DEVICE INTO RIGHT CEPHALIC VEIN, PERCUTANEOUS APPROACH: ICD-10-PCS | Performed by: FAMILY MEDICINE

## 2020-01-11 PROCEDURE — 25010000002 THIAMINE PER 100 MG: Performed by: HOSPITALIST

## 2020-01-11 PROCEDURE — 25010000002 KETOROLAC TROMETHAMINE PER 15 MG: Performed by: HOSPITALIST

## 2020-01-11 PROCEDURE — 85025 COMPLETE CBC W/AUTO DIFF WBC: CPT | Performed by: HOSPITALIST

## 2020-01-11 PROCEDURE — 76937 US GUIDE VASCULAR ACCESS: CPT

## 2020-01-11 PROCEDURE — 80048 BASIC METABOLIC PNL TOTAL CA: CPT | Performed by: HOSPITALIST

## 2020-01-11 RX ADMIN — LORAZEPAM 1 MG: 1 TABLET ORAL at 20:57

## 2020-01-11 RX ADMIN — LORAZEPAM 2 MG: 2 INJECTION, SOLUTION INTRAMUSCULAR; INTRAVENOUS at 04:39

## 2020-01-11 RX ADMIN — LORAZEPAM 2 MG: 2 INJECTION, SOLUTION INTRAMUSCULAR; INTRAVENOUS at 03:58

## 2020-01-11 RX ADMIN — KETOROLAC TROMETHAMINE 30 MG: 30 INJECTION, SOLUTION INTRAMUSCULAR at 02:19

## 2020-01-11 RX ADMIN — SODIUM CHLORIDE 100 ML/HR: 900 INJECTION, SOLUTION INTRAVENOUS at 20:56

## 2020-01-11 RX ADMIN — SODIUM CHLORIDE 100 ML/HR: 900 INJECTION, SOLUTION INTRAVENOUS at 06:04

## 2020-01-11 RX ADMIN — BUPRENORPHINE HYDROCHLORIDE AND NALOXONE HYDROCHLORIDE DIHYDRATE 1 TABLET: 8; 2 TABLET SUBLINGUAL at 20:56

## 2020-01-11 RX ADMIN — DIAZEPAM 10 MG: 5 TABLET ORAL at 12:14

## 2020-01-11 RX ADMIN — LORAZEPAM 1 MG: 1 TABLET ORAL at 16:38

## 2020-01-11 RX ADMIN — SERTRALINE HYDROCHLORIDE 25 MG: 25 TABLET ORAL at 08:15

## 2020-01-11 RX ADMIN — LORAZEPAM 2 MG: 2 INJECTION, SOLUTION INTRAMUSCULAR; INTRAVENOUS at 02:26

## 2020-01-11 RX ADMIN — DIAZEPAM 10 MG: 5 TABLET ORAL at 06:16

## 2020-01-11 RX ADMIN — SODIUM CHLORIDE, PRESERVATIVE FREE 10 ML: 5 INJECTION INTRAVENOUS at 20:57

## 2020-01-11 RX ADMIN — LORAZEPAM 1 MG: 2 INJECTION, SOLUTION INTRAMUSCULAR; INTRAVENOUS at 08:16

## 2020-01-11 RX ADMIN — DIAZEPAM 10 MG: 5 TABLET ORAL at 18:33

## 2020-01-11 RX ADMIN — LORAZEPAM 1 MG: 2 INJECTION, SOLUTION INTRAMUSCULAR; INTRAVENOUS at 10:25

## 2020-01-11 RX ADMIN — BUPRENORPHINE HYDROCHLORIDE AND NALOXONE HYDROCHLORIDE DIHYDRATE 1 TABLET: 8; 2 TABLET SUBLINGUAL at 16:27

## 2020-01-11 RX ADMIN — NICOTINE 1 PATCH: 21 PATCH, EXTENDED RELEASE TRANSDERMAL at 08:39

## 2020-01-11 RX ADMIN — BUPRENORPHINE HYDROCHLORIDE AND NALOXONE HYDROCHLORIDE DIHYDRATE 1 TABLET: 8; 2 TABLET SUBLINGUAL at 08:15

## 2020-01-11 RX ADMIN — FOLIC ACID 100 ML/HR: 5 INJECTION, SOLUTION INTRAMUSCULAR; INTRAVENOUS; SUBCUTANEOUS at 08:17

## 2020-01-11 RX ADMIN — LORAZEPAM 2 MG: 2 INJECTION, SOLUTION INTRAMUSCULAR; INTRAVENOUS at 06:16

## 2020-01-11 RX ADMIN — ACETAMINOPHEN 650 MG: 325 TABLET, FILM COATED ORAL at 18:33

## 2020-01-11 RX ADMIN — KETOROLAC TROMETHAMINE 30 MG: 30 INJECTION, SOLUTION INTRAMUSCULAR at 15:45

## 2020-01-11 RX ADMIN — DIAZEPAM 10 MG: 5 TABLET ORAL at 02:19

## 2020-01-11 RX ADMIN — LORAZEPAM 1 MG: 2 INJECTION, SOLUTION INTRAMUSCULAR; INTRAVENOUS at 13:07

## 2020-01-11 RX ADMIN — KETOROLAC TROMETHAMINE 30 MG: 30 INJECTION, SOLUTION INTRAMUSCULAR at 10:30

## 2020-01-11 NOTE — PLAN OF CARE
Patient continues with repeated requests for narcotics this shift. MD addressed this with patient and spouse. Patient continues with ativan per CIWA scale. Electronic cigarette being used by patient in the patient room. Educated the patient on the no tobacco use of any kind on hospital premises. Patient agrees to follow all rules @ this time. Will continue to monitor patient for S/S of withdrawal.

## 2020-01-11 NOTE — PROGRESS NOTES
[facility] Medicine Services  INPATIENT PROGRESS NOTE    Length of Stay: 3  Date of Admission: 1/6/2020  Primary Care Physician: Provider, No Known    Subjective   Chief Complaint: Alcohol withdrawal  HPI:    Patient has been with polysubstance abuse and withdrawal.  He admits to drinking alcohol daily and also using multiple narcotic drugs off the street as well.    Review of Systems   Respiratory: Negative for shortness of breath.    Cardiovascular: Negative for chest pain.          All pertinent negatives and positives are as above. All other systems have been reviewed and are negative unless otherwise stated.     Objective    Temp:  [97.6 °F (36.4 °C)-98.6 °F (37 °C)] 98.5 °F (36.9 °C)  Heart Rate:  [] 94  Resp:  [10-20] 14  BP: ()/(54-93) 143/93  Physical Exam   Constitutional: She appears well-developed and well-nourished. No distress.   HENT:   Head: Normocephalic and atraumatic.   Cardiovascular: Normal rate.   Pulmonary/Chest: Effort normal. No respiratory distress. She has no wheezes.   Abdominal: Soft. She exhibits no distension.   Musculoskeletal: Normal range of motion. She exhibits no edema.   Neurological: She is alert. No cranial nerve deficit.   Skin: Skin is warm and dry. She is not diaphoretic.   Psychiatric: She has a normal mood and affect. Her behavior is normal. Judgment and thought content normal.   Vitals reviewed.          Results Review:  I have reviewed the labs, radiology results, and diagnostic studies.    Laboratory Data:   Lab Results (last 24 hours)     Procedure Component Value Units Date/Time    CBC & Differential [[id number]] Collected:  01/11/20 0349    Specimen:  Blood Updated:  01/11/20 0521    Narrative:       The following orders were created for panel order CBC & Differential.  Procedure                               Abnormality         Status                     ---------                               -----------          ------                     CBC Auto Differential[[id number]]        Abnormal            Final result                 Please view results for these tests on the individual orders.    CBC Auto Differential [[id number]]  (Abnormal) Collected:  01/11/20 0349    Specimen:  Blood Updated:  01/11/20 0521     WBC 12.26 10*3/mm3      RBC 3.19 10*6/mm3      Hemoglobin 11.6 g/dL      Hematocrit 34.1 %      .9 fL      MCH 36.4 pg      MCHC 34.0 g/dL      RDW 12.8 %      RDW-SD 50.7 fl      MPV 9.7 fL      Platelets 119 10*3/mm3      Neutrophil % 82.2 %      Lymphocyte % 7.2 %      Monocyte % 9.2 %      Eosinophil % 0.3 %      Basophil % 0.5 %      Immature Grans % 0.6 %      Neutrophils, Absolute 10.08 10*3/mm3      Lymphocytes, Absolute 0.88 10*3/mm3      Monocytes, Absolute 1.13 10*3/mm3      Eosinophils, Absolute 0.04 10*3/mm3      Basophils, Absolute 0.06 10*3/mm3      Immature Grans, Absolute 0.07 10*3/mm3      nRBC 0.0 /100 WBC     Basic Metabolic Panel [[id number]]  (Abnormal) Collected:  01/11/20 0349    Specimen:  Blood Updated:  01/11/20 0503     Glucose 107 mg/dL      BUN 9 mg/dL      Creatinine 0.61 mg/dL      Sodium 135 mmol/L      Potassium 3.9 mmol/L      Chloride 102 mmol/L      CO2 21.0 mmol/L      Calcium 8.5 mg/dL      eGFR Non African Amer 113 mL/min/1.73      BUN/Creatinine Ratio 14.8     Anion Gap 12.0 mmol/L     Narrative:       GFR Normal >60  Chronic Kidney Disease <60  Kidney Failure <15      Urine Drug Screen - Urine, Clean Catch [[id number]]  (Abnormal) Collected:  01/10/20 1747    Specimen:  Urine, Clean Catch Updated:  01/10/20 1823     THC, Screen, Urine Negative     Phencyclidine (PCP), Urine Negative     Cocaine Screen, Urine Negative     Methamphetamine, Ur Negative     Opiate Screen Negative     Amphetamine Screen, Urine Negative     Benzodiazepine Screen, Urine Positive     Tricyclic Antidepressants Screen Negative     Methadone Screen, Urine Negative     Barbiturates Screen, Urine  Negative     Oxycodone Screen, Urine Negative     Propoxyphene Screen Negative     Buprenorphine, Screen, Urine Positive    Narrative:       Cutoff For Drugs Screened:    Amphetamines               500 ng/ml  Barbiturates               200 ng/ml  Benzodiazepines            150 ng/ml  Cocaine                    150 ng/ml  Methadone                  200 ng/ml  Opiates                    100 ng/ml  Phencyclidine               25 ng/ml  THC                            50 ng/ml  Methamphetamine            500 ng/ml  Tricyclic Antidepressants  300 ng/ml  Oxycodone                  100 ng/ml  Propoxyphene               300 ng/ml  Buprenorphine               10 ng/ml    The normal value for all drugs tested is negative. This report includes unconfirmed screening results, with the cutoff values listed, to be used for medical treatment purposes only.  Unconfirmed results must not be used for non-medical purposes such as employment or legal testing.  Clinical consideration should be applied to any drug of abuse test, particularly when unconfirmed results are used.            Culture Data:   No results found for: BLOODCX  No results found for: URINECX  No results found for: RESPCX  No results found for: WOUNDCX  No results found for: STOOLCX  No components found for: BODYFLD    Radiology Data:   Imaging Results (Last 24 Hours)     Procedure Component Value Units Date/Time    US Guided Vascular Access [[id number]] Resulted:  01/11/20 1221     Updated:  01/11/20 1221    Narrative:       This procedure was auto-finalized with no dictation required.    IR Insert Midline Without Port Pump 5 Plus [[id number]] Resulted:  01/11/20 1145     Updated:  01/11/20 1145    Narrative:       This procedure was auto-finalized with no dictation required.          I have reviewed the patient's current medications.     Assessment/Plan     Active Hospital Problems    Diagnosis   • Alcohol withdrawal (CMS/HCC)   • Non-intractable vomiting       Alcohol  withdrawal-continue with CIWA protocol and continue with Ativan    Polysubstance abuse-continue with counseling    DVT prophylaxis-SCD          Mateus Rosario MD   01/11/20   5:37 PM

## 2020-01-11 NOTE — PROGRESS NOTES
TWO PATIENT IDENTIFIERS WERE USED. THE PATIENT WAS DRAPED WITH A FULL BODY DRAPE AND THE PATIENT'S RIGHT ARM WAS PREPPED WITH CHLORA PREP. ULTRASOUND WAS USED TO LOCALIZE THERIGHT CEPHALIC VEIN. SUBCUTANEOUS TISSUE AT THE CATHETER SITE WAS INFILTRATED WITH 2% LIDOCAINE. UNDER ULTRASOUND GUIDANCE, THE VEIN WAS ACCESSED WITH A 21 GAUGE  NEEDLE. AN 0.018 WIRE WAS THEN THREADED THROUGH THE NEEDLE. THE 21 GAUGE NEEDLE WAS REMOVED AND A 4 Monegasque SHEATH WAS PLACED OVER THE WIRE INTO THE VEIN.THE MIDLINE CATHETER WAS TRIMMED TO 20CM. THE MIDLINE CATHETER WAS THEN PLACED OVER THE WIRE INTO THE VEIN, THE SHEATH WAS PEELED AWAY, WIRE WAS REMOVED. CATHETER WAS FLUSHED WITH NORMAL SALINE AND CATHETER TIP APPLIED. BIOPATCH PLACED. CATHETER SECURED WITH STAT LOCK AND TEGADERM. PATIENT TOLERATED PROCEDURE WELL. THIS WAS DONE AT BEDSIDE      IMPRESSION:SUCCESSFUL PLACEMENT OF DUAL LUMEN MIDLINE.           Joanna Pineda  1/11/2020  11:45 AM

## 2020-01-11 NOTE — PLAN OF CARE
Problem: Patient Care Overview  Goal: Plan of Care Review  Outcome: Ongoing (interventions implemented as appropriate)  Flowsheets  Taken 1/10/2020 1802 by Sandy Nino RN  Progress: no change  Taken 1/11/2020 0400 by Katerine Minor RN  Plan of Care Reviewed With: patient;spouse  Taken 1/11/2020 0749 by Katerine Minor RN  Outcome Summary: Pt sinus tachycardia upon start of shift, now NSR in 70's. BP WNL. Pt continues to have moderate tremors, states frustration and feelings of anxiety. CIWA scale followed and Ativan IV given as prescribed. Spouse at bedside. Toradol given IV for c/o back pain. Tolerating regular diet well without N/V. Pt asks for Ativan each time RN enters room. Explained CIWA scale to patient. Will continue to monitor closely.  Goal: Individualization and Mutuality  Outcome: Ongoing (interventions implemented as appropriate)  Goal: Discharge Needs Assessment  Outcome: Ongoing (interventions implemented as appropriate)  Goal: Interprofessional Rounds/Family Conf  Outcome: Ongoing (interventions implemented as appropriate)     Problem: Pain, Chronic (Adult)  Goal: Acceptable Pain/Comfort Level and Functional Ability  Outcome: Ongoing (interventions implemented as appropriate)     Problem: Alcohol Withdrawal Acute, Risk/Actual (Adult)  Goal: Signs and Symptoms of Listed Potential Problems Will be Absent, Minimized or Managed (Alcohol Withdrawal Acute, Risk/Actual)  Outcome: Ongoing (interventions implemented as appropriate)     Problem: Nausea/Vomiting (Adult)  Goal: Symptom Relief  Outcome: Ongoing (interventions implemented as appropriate)  Goal: Adequate Hydration  Outcome: Ongoing (interventions implemented as appropriate)     Problem: Fall Risk (Adult)  Goal: Absence of Fall  Outcome: Ongoing (interventions implemented as appropriate)     Problem: Skin Injury Risk (Adult)  Goal: Identify Related Risk Factors and Signs and Symptoms  Outcome: Ongoing (interventions implemented as  appropriate)  Goal: Skin Health and Integrity  Outcome: Ongoing (interventions implemented as appropriate)

## 2020-01-11 NOTE — NURSING NOTE
Patient continues to want ativan IV.  CIWAS does not justify her having ativan at this time.  Gave Ativan 1mg earlier and patient wanted me to put it in a closer port and flush behind it.  I told her this isnt the way to give the med.  Will continue to monitor

## 2020-01-12 ENCOUNTER — APPOINTMENT (OUTPATIENT)
Dept: GENERAL RADIOLOGY | Facility: HOSPITAL | Age: 35
End: 2020-01-12

## 2020-01-12 LAB
ANION GAP SERPL CALCULATED.3IONS-SCNC: 12 MMOL/L (ref 5–15)
B PERT DNA SPEC QL NAA+PROBE: NOT DETECTED
BACTERIA BLD CULT: ABNORMAL
BACTERIA UR QL AUTO: ABNORMAL /HPF
BASOPHILS # BLD AUTO: 0.02 10*3/MM3 (ref 0–0.2)
BASOPHILS # BLD AUTO: 0.03 10*3/MM3 (ref 0–0.2)
BASOPHILS NFR BLD AUTO: 0.3 % (ref 0–1.5)
BASOPHILS NFR BLD AUTO: 0.4 % (ref 0–1.5)
BILIRUB UR QL STRIP: NEGATIVE
BOTTLE TYPE: ABNORMAL
BUN BLD-MCNC: 8 MG/DL (ref 6–20)
BUN/CREAT SERPL: 13.3 (ref 7–25)
C PNEUM DNA NPH QL NAA+NON-PROBE: NOT DETECTED
CALCIUM SPEC-SCNC: 8.8 MG/DL (ref 8.6–10.5)
CHLORIDE SERPL-SCNC: 98 MMOL/L (ref 98–107)
CLARITY UR: CLEAR
CO2 SERPL-SCNC: 22 MMOL/L (ref 22–29)
COLOR UR: YELLOW
CREAT BLD-MCNC: 0.6 MG/DL (ref 0.57–1)
D-LACTATE SERPL-SCNC: 0.5 MMOL/L (ref 0.5–2)
DEPRECATED RDW RBC AUTO: 49.2 FL (ref 37–54)
DEPRECATED RDW RBC AUTO: 49.4 FL (ref 37–54)
EOSINOPHIL # BLD AUTO: 0.01 10*3/MM3 (ref 0–0.4)
EOSINOPHIL # BLD AUTO: 0.01 10*3/MM3 (ref 0–0.4)
EOSINOPHIL NFR BLD AUTO: 0.1 % (ref 0.3–6.2)
EOSINOPHIL NFR BLD AUTO: 0.1 % (ref 0.3–6.2)
ERYTHROCYTE [DISTWIDTH] IN BLOOD BY AUTOMATED COUNT: 12.6 % (ref 12.3–15.4)
ERYTHROCYTE [DISTWIDTH] IN BLOOD BY AUTOMATED COUNT: 12.9 % (ref 12.3–15.4)
FLUAV H1 2009 PAND RNA NPH QL NAA+PROBE: NOT DETECTED
FLUAV H1 HA GENE NPH QL NAA+PROBE: NOT DETECTED
FLUAV H3 RNA NPH QL NAA+PROBE: NOT DETECTED
FLUAV SUBTYP SPEC NAA+PROBE: NOT DETECTED
FLUBV RNA ISLT QL NAA+PROBE: NOT DETECTED
FOLATE SERPL-MCNC: <2 NG/ML (ref 4.78–24.2)
GFR SERPL CREATININE-BSD FRML MDRD: 115 ML/MIN/1.73
GLUCOSE BLD-MCNC: 113 MG/DL (ref 65–99)
GLUCOSE UR STRIP-MCNC: NEGATIVE MG/DL
HADV DNA SPEC NAA+PROBE: NOT DETECTED
HCOV 229E RNA SPEC QL NAA+PROBE: NOT DETECTED
HCOV HKU1 RNA SPEC QL NAA+PROBE: NOT DETECTED
HCOV NL63 RNA SPEC QL NAA+PROBE: NOT DETECTED
HCOV OC43 RNA SPEC QL NAA+PROBE: NOT DETECTED
HCT VFR BLD AUTO: 32.5 % (ref 34–46.6)
HCT VFR BLD AUTO: 34.5 % (ref 34–46.6)
HGB BLD-MCNC: 11.5 G/DL (ref 12–15.9)
HGB BLD-MCNC: 12 G/DL (ref 12–15.9)
HGB UR QL STRIP.AUTO: NEGATIVE
HMPV RNA NPH QL NAA+NON-PROBE: NOT DETECTED
HPIV1 RNA SPEC QL NAA+PROBE: NOT DETECTED
HPIV2 RNA SPEC QL NAA+PROBE: NOT DETECTED
HPIV3 RNA NPH QL NAA+PROBE: NOT DETECTED
HPIV4 P GENE NPH QL NAA+PROBE: NOT DETECTED
HYALINE CASTS UR QL AUTO: ABNORMAL /LPF
IMM GRANULOCYTES # BLD AUTO: 0.03 10*3/MM3 (ref 0–0.05)
IMM GRANULOCYTES # BLD AUTO: 0.05 10*3/MM3 (ref 0–0.05)
IMM GRANULOCYTES NFR BLD AUTO: 0.4 % (ref 0–0.5)
IMM GRANULOCYTES NFR BLD AUTO: 0.7 % (ref 0–0.5)
KETONES UR QL STRIP: NEGATIVE
LEUKOCYTE ESTERASE UR QL STRIP.AUTO: ABNORMAL
LYMPHOCYTES # BLD AUTO: 0.41 10*3/MM3 (ref 0.7–3.1)
LYMPHOCYTES # BLD AUTO: 0.61 10*3/MM3 (ref 0.7–3.1)
LYMPHOCYTES NFR BLD AUTO: 5.7 % (ref 19.6–45.3)
LYMPHOCYTES NFR BLD AUTO: 8.2 % (ref 19.6–45.3)
M PNEUMO IGG SER IA-ACNC: NOT DETECTED
MCH RBC QN AUTO: 37 PG (ref 26.6–33)
MCH RBC QN AUTO: 37.3 PG (ref 26.6–33)
MCHC RBC AUTO-ENTMCNC: 34.8 G/DL (ref 31.5–35.7)
MCHC RBC AUTO-ENTMCNC: 35.4 G/DL (ref 31.5–35.7)
MCV RBC AUTO: 105.5 FL (ref 79–97)
MCV RBC AUTO: 106.5 FL (ref 79–97)
MONOCYTES # BLD AUTO: 0.81 10*3/MM3 (ref 0.1–0.9)
MONOCYTES # BLD AUTO: 0.95 10*3/MM3 (ref 0.1–0.9)
MONOCYTES NFR BLD AUTO: 11.2 % (ref 5–12)
MONOCYTES NFR BLD AUTO: 12.7 % (ref 5–12)
NEUTROPHILS # BLD AUTO: 5.82 10*3/MM3 (ref 1.7–7)
NEUTROPHILS # BLD AUTO: 5.97 10*3/MM3 (ref 1.7–7)
NEUTROPHILS NFR BLD AUTO: 77.9 % (ref 42.7–76)
NEUTROPHILS NFR BLD AUTO: 82.3 % (ref 42.7–76)
NITRITE UR QL STRIP: POSITIVE
NRBC BLD AUTO-RTO: 0 /100 WBC (ref 0–0.2)
NRBC BLD AUTO-RTO: 0 /100 WBC (ref 0–0.2)
PH UR STRIP.AUTO: 7 [PH] (ref 5–9)
PLATELET # BLD AUTO: 108 10*3/MM3 (ref 140–450)
PLATELET # BLD AUTO: 110 10*3/MM3 (ref 140–450)
PMV BLD AUTO: 9.7 FL (ref 6–12)
PMV BLD AUTO: 9.8 FL (ref 6–12)
POTASSIUM BLD-SCNC: 3.4 MMOL/L (ref 3.5–5.2)
PROT UR QL STRIP: NEGATIVE
RBC # BLD AUTO: 3.08 10*6/MM3 (ref 3.77–5.28)
RBC # BLD AUTO: 3.24 10*6/MM3 (ref 3.77–5.28)
RBC # UR: ABNORMAL /HPF
REF LAB TEST METHOD: ABNORMAL
RHINOVIRUS RNA SPEC NAA+PROBE: NOT DETECTED
RSV RNA NPH QL NAA+NON-PROBE: NOT DETECTED
SODIUM BLD-SCNC: 132 MMOL/L (ref 136–145)
SP GR UR STRIP: 1.01 (ref 1–1.03)
SQUAMOUS #/AREA URNS HPF: ABNORMAL /HPF
UROBILINOGEN UR QL STRIP: ABNORMAL
VIT B12 BLD-MCNC: 542 PG/ML (ref 211–946)
WBC NRBC COR # BLD: 7.25 10*3/MM3 (ref 3.4–10.8)
WBC NRBC COR # BLD: 7.47 10*3/MM3 (ref 3.4–10.8)
WBC UR QL AUTO: ABNORMAL /HPF

## 2020-01-12 PROCEDURE — 80048 BASIC METABOLIC PNL TOTAL CA: CPT | Performed by: HOSPITALIST

## 2020-01-12 PROCEDURE — 25010000002 PIPERACILLIN SOD-TAZOBACTAM PER 1 G: Performed by: INTERNAL MEDICINE

## 2020-01-12 PROCEDURE — 81001 URINALYSIS AUTO W/SCOPE: CPT | Performed by: INTERNAL MEDICINE

## 2020-01-12 PROCEDURE — 85025 COMPLETE CBC W/AUTO DIFF WBC: CPT | Performed by: INTERNAL MEDICINE

## 2020-01-12 PROCEDURE — 71045 X-RAY EXAM CHEST 1 VIEW: CPT

## 2020-01-12 PROCEDURE — 25010000002 VANCOMYCIN 5 G RECONSTITUTED SOLUTION: Performed by: INTERNAL MEDICINE

## 2020-01-12 PROCEDURE — 25010000002 KETOROLAC TROMETHAMINE PER 15 MG: Performed by: HOSPITALIST

## 2020-01-12 PROCEDURE — 87040 BLOOD CULTURE FOR BACTERIA: CPT | Performed by: INTERNAL MEDICINE

## 2020-01-12 PROCEDURE — 25010000002 LORAZEPAM PER 2 MG: Performed by: HOSPITALIST

## 2020-01-12 PROCEDURE — 85025 COMPLETE CBC W/AUTO DIFF WBC: CPT | Performed by: HOSPITALIST

## 2020-01-12 PROCEDURE — 87150 DNA/RNA AMPLIFIED PROBE: CPT | Performed by: INTERNAL MEDICINE

## 2020-01-12 PROCEDURE — 87086 URINE CULTURE/COLONY COUNT: CPT | Performed by: INTERNAL MEDICINE

## 2020-01-12 PROCEDURE — 87186 SC STD MICRODIL/AGAR DIL: CPT | Performed by: INTERNAL MEDICINE

## 2020-01-12 PROCEDURE — 83605 ASSAY OF LACTIC ACID: CPT | Performed by: INTERNAL MEDICINE

## 2020-01-12 PROCEDURE — 25010000002 THIAMINE PER 100 MG: Performed by: HOSPITALIST

## 2020-01-12 PROCEDURE — 0099U HC BIOFIRE FILMARRAY RESP PANEL 1: CPT | Performed by: HOSPITALIST

## 2020-01-12 PROCEDURE — 87088 URINE BACTERIA CULTURE: CPT | Performed by: INTERNAL MEDICINE

## 2020-01-12 RX ORDER — POTASSIUM CHLORIDE 1.5 G/1.77G
20 POWDER, FOR SOLUTION ORAL ONCE
Status: COMPLETED | OUTPATIENT
Start: 2020-01-12 | End: 2020-01-12

## 2020-01-12 RX ORDER — POTASSIUM CHLORIDE 1.5 G/1.77G
40 POWDER, FOR SOLUTION ORAL ONCE
Status: COMPLETED | OUTPATIENT
Start: 2020-01-12 | End: 2020-01-12

## 2020-01-12 RX ORDER — LACTULOSE 10 G/15ML
20 SOLUTION ORAL DAILY
Status: DISCONTINUED | OUTPATIENT
Start: 2020-01-12 | End: 2020-01-14 | Stop reason: HOSPADM

## 2020-01-12 RX ORDER — DIAZEPAM 5 MG/1
10 TABLET ORAL EVERY 6 HOURS PRN
Status: DISCONTINUED | OUTPATIENT
Start: 2020-01-12 | End: 2020-01-14 | Stop reason: HOSPADM

## 2020-01-12 RX ORDER — CHLORDIAZEPOXIDE HYDROCHLORIDE 10 MG/1
10 CAPSULE, GELATIN COATED ORAL EVERY 6 HOURS SCHEDULED
Status: DISCONTINUED | OUTPATIENT
Start: 2020-01-12 | End: 2020-01-13

## 2020-01-12 RX ORDER — IBUPROFEN 400 MG/1
400 TABLET ORAL ONCE
Status: COMPLETED | OUTPATIENT
Start: 2020-01-12 | End: 2020-01-12

## 2020-01-12 RX ADMIN — BUPRENORPHINE HYDROCHLORIDE AND NALOXONE HYDROCHLORIDE DIHYDRATE 1 TABLET: 8; 2 TABLET SUBLINGUAL at 15:50

## 2020-01-12 RX ADMIN — SERTRALINE HYDROCHLORIDE 25 MG: 25 TABLET ORAL at 09:19

## 2020-01-12 RX ADMIN — SODIUM CHLORIDE 100 ML/HR: 900 INJECTION, SOLUTION INTRAVENOUS at 19:10

## 2020-01-12 RX ADMIN — SODIUM CHLORIDE, PRESERVATIVE FREE 10 ML: 5 INJECTION INTRAVENOUS at 09:20

## 2020-01-12 RX ADMIN — PIPERACILLIN SODIUM AND TAZOBACTAM SODIUM 3.38 G: 3; .375 INJECTION, POWDER, LYOPHILIZED, FOR SOLUTION INTRAVENOUS at 10:14

## 2020-01-12 RX ADMIN — LACTULOSE 20 G: 20 SOLUTION ORAL at 12:41

## 2020-01-12 RX ADMIN — PIPERACILLIN SODIUM AND TAZOBACTAM SODIUM 3.38 G: 3; .375 INJECTION, POWDER, LYOPHILIZED, FOR SOLUTION INTRAVENOUS at 16:22

## 2020-01-12 RX ADMIN — ACETAMINOPHEN 650 MG: 325 TABLET, FILM COATED ORAL at 03:00

## 2020-01-12 RX ADMIN — NICOTINE 1 PATCH: 21 PATCH, EXTENDED RELEASE TRANSDERMAL at 09:20

## 2020-01-12 RX ADMIN — SODIUM CHLORIDE, PRESERVATIVE FREE 10 ML: 5 INJECTION INTRAVENOUS at 20:38

## 2020-01-12 RX ADMIN — VANCOMYCIN HYDROCHLORIDE 1250 MG: 5 INJECTION, POWDER, LYOPHILIZED, FOR SOLUTION INTRAVENOUS at 10:59

## 2020-01-12 RX ADMIN — DIAZEPAM 10 MG: 5 TABLET ORAL at 20:38

## 2020-01-12 RX ADMIN — LORAZEPAM 1 MG: 2 INJECTION, SOLUTION INTRAMUSCULAR; INTRAVENOUS at 04:53

## 2020-01-12 RX ADMIN — BUPRENORPHINE HYDROCHLORIDE AND NALOXONE HYDROCHLORIDE DIHYDRATE 1 TABLET: 8; 2 TABLET SUBLINGUAL at 20:38

## 2020-01-12 RX ADMIN — LORAZEPAM 1 MG: 1 TABLET ORAL at 02:35

## 2020-01-12 RX ADMIN — POTASSIUM CHLORIDE 40 MEQ: 1.5 POWDER, FOR SOLUTION ORAL at 10:15

## 2020-01-12 RX ADMIN — LORAZEPAM 1 MG: 1 TABLET ORAL at 23:11

## 2020-01-12 RX ADMIN — KETOROLAC TROMETHAMINE 30 MG: 30 INJECTION, SOLUTION INTRAMUSCULAR at 04:52

## 2020-01-12 RX ADMIN — DIAZEPAM 10 MG: 5 TABLET ORAL at 14:03

## 2020-01-12 RX ADMIN — ACETAMINOPHEN 650 MG: 325 TABLET, FILM COATED ORAL at 19:10

## 2020-01-12 RX ADMIN — BUPRENORPHINE HYDROCHLORIDE AND NALOXONE HYDROCHLORIDE DIHYDRATE 1 TABLET: 8; 2 TABLET SUBLINGUAL at 09:19

## 2020-01-12 RX ADMIN — DIAZEPAM 10 MG: 5 TABLET ORAL at 05:44

## 2020-01-12 RX ADMIN — PIPERACILLIN SODIUM AND TAZOBACTAM SODIUM 3.38 G: 3; .375 INJECTION, POWDER, LYOPHILIZED, FOR SOLUTION INTRAVENOUS at 23:04

## 2020-01-12 RX ADMIN — CHLORDIAZEPOXIDE HYDROCHLORIDE 10 MG: 10 CAPSULE ORAL at 23:04

## 2020-01-12 RX ADMIN — CHLORDIAZEPOXIDE HYDROCHLORIDE 10 MG: 10 CAPSULE ORAL at 12:41

## 2020-01-12 RX ADMIN — POTASSIUM CHLORIDE 20 MEQ: 1.5 POWDER, FOR SOLUTION ORAL at 10:15

## 2020-01-12 RX ADMIN — IBUPROFEN 400 MG: 400 TABLET ORAL at 04:14

## 2020-01-12 RX ADMIN — SODIUM CHLORIDE 100 ML/HR: 900 INJECTION, SOLUTION INTRAVENOUS at 10:14

## 2020-01-12 RX ADMIN — FOLIC ACID 100 ML/HR: 5 INJECTION, SOLUTION INTRAMUSCULAR; INTRAVENOUS; SUBCUTANEOUS at 09:19

## 2020-01-12 RX ADMIN — LORAZEPAM 1 MG: 1 TABLET ORAL at 15:56

## 2020-01-12 RX ADMIN — VANCOMYCIN HYDROCHLORIDE 750 MG: 5 INJECTION, POWDER, LYOPHILIZED, FOR SOLUTION INTRAVENOUS at 19:10

## 2020-01-12 RX ADMIN — SODIUM CHLORIDE 100 ML/HR: 900 INJECTION, SOLUTION INTRAVENOUS at 05:56

## 2020-01-12 RX ADMIN — CHLORDIAZEPOXIDE HYDROCHLORIDE 10 MG: 10 CAPSULE ORAL at 18:00

## 2020-01-12 NOTE — NURSING NOTE
CNA called around 0300 to state pt temp was 102.6. Ice packs applied, room cooled, and tylenol given. At 0400 temperature was all over the place from different sites. Pt agreed to rectal temp, rectal temp 104.1. Dr. Steinberg paged. Cooling blanket applied, more ice packs applied, and ibuprofen given per order. Temp is now down to 101.4, cooling blanket still in place. Will continue to monitor this pt.

## 2020-01-12 NOTE — PROGRESS NOTES
"Pharmacokinetics by Pharmacy - Vancomycin Initial Consult    Nata Bain is a 34 y.o. female being initiated on vancomycin for sepsis. Patient is also receiving Zosyn.    Objective:     [Ht: 172.7 cm (67.99\"); Wt: 62 kg (136 lb 11 oz)]     WBC   Date Value Ref Range Status   01/12/2020 7.25 3.40 - 10.80 10*3/mm3 Final   01/11/2020 12.26 (H) 3.40 - 10.80 10*3/mm3 Final   01/10/2020 10.14 3.40 - 10.80 10*3/mm3 Final    No results found for: CRP, LACTATE   Temp Readings from Last 1 Encounters:   01/12/20 97.6 °F (36.4 °C)     Estimated Creatinine Clearance: 130.5 mL/min (by C-G formula based on SCr of 0.6 mg/dL).   Creatinine   Date Value Ref Range Status   01/12/2020 0.60 0.57 - 1.00 mg/dL Final   01/11/2020 0.61 0.57 - 1.00 mg/dL Final   01/10/2020 0.54 (L) 0.57 - 1.00 mg/dL Final       Baseline culture results:  Microbiology Results (last 10 days)     ** No results found for the last 240 hours. **        No results found for: RESPCX    Assessment  Pharmacy has been consulted to dose vancomycin for 1 day  No levels will be needed for a duration of 1 day    WBC WNL today  Febrile recently  Scr stable  No cultures available    Patient to receive loading dose of 1250 mg IV vancomycin once followed by 750 mg IV every 8 hours for 2 doses only as this will be for the duration of the current consult  No levels ordered    Plan  1. Give vancomycin 1250mg IV x 1 followed by vancomycin 750mg IV Q8H  2. No levels ordered  3. Pharmacy will monitor renal function and adjust dose accordingly.    Ponce Agosto, [facility]  01/12/20 9:44 AM     "

## 2020-01-12 NOTE — PLAN OF CARE
Problem: Patient Care Overview  Goal: Plan of Care Review  Outcome: Ongoing (interventions implemented as appropriate)  Flowsheets (Taken 1/11/2020 2151)  Progress: no change  Plan of Care Reviewed With: patient; spouse  Note:   Pt up ad prashanth. Vss. Scoring low on the CIWA scale with PRN medications given per order. C/o pain with medications given. No s/s of distress at this time. Will continue to monitor this pt

## 2020-01-13 LAB
ANION GAP SERPL CALCULATED.3IONS-SCNC: 9 MMOL/L (ref 5–15)
BACTERIA SPEC AEROBE CULT: ABNORMAL
BASOPHILS # BLD AUTO: 0.02 10*3/MM3 (ref 0–0.2)
BASOPHILS NFR BLD AUTO: 0.4 % (ref 0–1.5)
BUN BLD-MCNC: 5 MG/DL (ref 6–20)
BUN/CREAT SERPL: 8.6 (ref 7–25)
CALCIUM SPEC-SCNC: 8.8 MG/DL (ref 8.6–10.5)
CHLORIDE SERPL-SCNC: 106 MMOL/L (ref 98–107)
CO2 SERPL-SCNC: 22 MMOL/L (ref 22–29)
CREAT BLD-MCNC: 0.58 MG/DL (ref 0.57–1)
DEPRECATED RDW RBC AUTO: 51.6 FL (ref 37–54)
EOSINOPHIL # BLD AUTO: 0.03 10*3/MM3 (ref 0–0.4)
EOSINOPHIL NFR BLD AUTO: 0.6 % (ref 0.3–6.2)
ERYTHROCYTE [DISTWIDTH] IN BLOOD BY AUTOMATED COUNT: 13.1 % (ref 12.3–15.4)
GFR SERPL CREATININE-BSD FRML MDRD: 120 ML/MIN/1.73
GLUCOSE BLD-MCNC: 97 MG/DL (ref 65–99)
HCT VFR BLD AUTO: 31.5 % (ref 34–46.6)
HGB BLD-MCNC: 10.8 G/DL (ref 12–15.9)
IMM GRANULOCYTES # BLD AUTO: 0.03 10*3/MM3 (ref 0–0.05)
IMM GRANULOCYTES NFR BLD AUTO: 0.6 % (ref 0–0.5)
LYMPHOCYTES # BLD AUTO: 0.92 10*3/MM3 (ref 0.7–3.1)
LYMPHOCYTES NFR BLD AUTO: 17.1 % (ref 19.6–45.3)
MCH RBC QN AUTO: 36.7 PG (ref 26.6–33)
MCHC RBC AUTO-ENTMCNC: 34.3 G/DL (ref 31.5–35.7)
MCV RBC AUTO: 107.1 FL (ref 79–97)
MONOCYTES # BLD AUTO: 0.77 10*3/MM3 (ref 0.1–0.9)
MONOCYTES NFR BLD AUTO: 14.3 % (ref 5–12)
NEUTROPHILS # BLD AUTO: 3.62 10*3/MM3 (ref 1.7–7)
NEUTROPHILS NFR BLD AUTO: 67 % (ref 42.7–76)
NRBC BLD AUTO-RTO: 0 /100 WBC (ref 0–0.2)
PLATELET # BLD AUTO: 103 10*3/MM3 (ref 140–450)
PMV BLD AUTO: 10.7 FL (ref 6–12)
POTASSIUM BLD-SCNC: 3.8 MMOL/L (ref 3.5–5.2)
RBC # BLD AUTO: 2.94 10*6/MM3 (ref 3.77–5.28)
SODIUM BLD-SCNC: 137 MMOL/L (ref 136–145)
WBC NRBC COR # BLD: 5.39 10*3/MM3 (ref 3.4–10.8)

## 2020-01-13 PROCEDURE — 87070 CULTURE OTHR SPECIMN AEROBIC: CPT | Performed by: INTERNAL MEDICINE

## 2020-01-13 PROCEDURE — 80048 BASIC METABOLIC PNL TOTAL CA: CPT | Performed by: HOSPITALIST

## 2020-01-13 PROCEDURE — 87205 SMEAR GRAM STAIN: CPT | Performed by: INTERNAL MEDICINE

## 2020-01-13 PROCEDURE — 25010000002 PIPERACILLIN SOD-TAZOBACTAM PER 1 G: Performed by: INTERNAL MEDICINE

## 2020-01-13 PROCEDURE — 25010000002 THIAMINE PER 100 MG: Performed by: HOSPITALIST

## 2020-01-13 PROCEDURE — 85025 COMPLETE CBC W/AUTO DIFF WBC: CPT | Performed by: HOSPITALIST

## 2020-01-13 PROCEDURE — 25010000002 VANCOMYCIN 5 G RECONSTITUTED SOLUTION: Performed by: INTERNAL MEDICINE

## 2020-01-13 RX ORDER — LANOLIN ALCOHOL/MO/W.PET/CERES
1000 CREAM (GRAM) TOPICAL DAILY
Status: DISCONTINUED | OUTPATIENT
Start: 2020-01-14 | End: 2020-01-14 | Stop reason: HOSPADM

## 2020-01-13 RX ORDER — THIAMINE MONONITRATE (VIT B1) 100 MG
100 TABLET ORAL DAILY
Status: DISCONTINUED | OUTPATIENT
Start: 2020-01-14 | End: 2020-01-14 | Stop reason: HOSPADM

## 2020-01-13 RX ORDER — FOLIC ACID 1 MG/1
1 TABLET ORAL DAILY
Status: DISCONTINUED | OUTPATIENT
Start: 2020-01-14 | End: 2020-01-14 | Stop reason: HOSPADM

## 2020-01-13 RX ORDER — CHLORDIAZEPOXIDE HYDROCHLORIDE 10 MG/1
10 CAPSULE, GELATIN COATED ORAL EVERY 8 HOURS SCHEDULED
Status: DISCONTINUED | OUTPATIENT
Start: 2020-01-13 | End: 2020-01-14 | Stop reason: HOSPADM

## 2020-01-13 RX ADMIN — DIAZEPAM 10 MG: 5 TABLET ORAL at 17:36

## 2020-01-13 RX ADMIN — DIAZEPAM 10 MG: 5 TABLET ORAL at 05:41

## 2020-01-13 RX ADMIN — SODIUM CHLORIDE, PRESERVATIVE FREE 10 ML: 5 INJECTION INTRAVENOUS at 21:35

## 2020-01-13 RX ADMIN — ACETAMINOPHEN 650 MG: 325 TABLET, FILM COATED ORAL at 17:36

## 2020-01-13 RX ADMIN — PIPERACILLIN SODIUM AND TAZOBACTAM SODIUM 3.38 G: 3; .375 INJECTION, POWDER, LYOPHILIZED, FOR SOLUTION INTRAVENOUS at 09:07

## 2020-01-13 RX ADMIN — LORAZEPAM 1 MG: 1 TABLET ORAL at 18:27

## 2020-01-13 RX ADMIN — CHLORDIAZEPOXIDE HYDROCHLORIDE 10 MG: 10 CAPSULE ORAL at 05:37

## 2020-01-13 RX ADMIN — VANCOMYCIN HYDROCHLORIDE 750 MG: 5 INJECTION, POWDER, LYOPHILIZED, FOR SOLUTION INTRAVENOUS at 02:19

## 2020-01-13 RX ADMIN — SODIUM CHLORIDE, PRESERVATIVE FREE 10 ML: 5 INJECTION INTRAVENOUS at 09:07

## 2020-01-13 RX ADMIN — SODIUM CHLORIDE 100 ML/HR: 900 INJECTION, SOLUTION INTRAVENOUS at 09:06

## 2020-01-13 RX ADMIN — NICOTINE 1 PATCH: 21 PATCH, EXTENDED RELEASE TRANSDERMAL at 09:07

## 2020-01-13 RX ADMIN — CHLORDIAZEPOXIDE HYDROCHLORIDE 10 MG: 10 CAPSULE ORAL at 22:49

## 2020-01-13 RX ADMIN — ACETAMINOPHEN 650 MG: 325 TABLET, FILM COATED ORAL at 11:37

## 2020-01-13 RX ADMIN — BUPRENORPHINE HYDROCHLORIDE AND NALOXONE HYDROCHLORIDE DIHYDRATE 1 TABLET: 8; 2 TABLET SUBLINGUAL at 09:07

## 2020-01-13 RX ADMIN — LORAZEPAM 1 MG: 1 TABLET ORAL at 14:53

## 2020-01-13 RX ADMIN — DIAZEPAM 10 MG: 5 TABLET ORAL at 11:37

## 2020-01-13 RX ADMIN — BUPRENORPHINE HYDROCHLORIDE AND NALOXONE HYDROCHLORIDE DIHYDRATE 1 TABLET: 8; 2 TABLET SUBLINGUAL at 16:13

## 2020-01-13 RX ADMIN — SERTRALINE HYDROCHLORIDE 25 MG: 25 TABLET ORAL at 09:07

## 2020-01-13 RX ADMIN — BUPRENORPHINE HYDROCHLORIDE AND NALOXONE HYDROCHLORIDE DIHYDRATE 1 TABLET: 8; 2 TABLET SUBLINGUAL at 21:35

## 2020-01-13 RX ADMIN — CHLORDIAZEPOXIDE HYDROCHLORIDE 10 MG: 10 CAPSULE ORAL at 11:37

## 2020-01-13 RX ADMIN — LORAZEPAM 1 MG: 1 TABLET ORAL at 09:05

## 2020-01-13 RX ADMIN — LACTULOSE 20 G: 20 SOLUTION ORAL at 09:06

## 2020-01-13 RX ADMIN — FOLIC ACID 100 ML/HR: 5 INJECTION, SOLUTION INTRAMUSCULAR; INTRAVENOUS; SUBCUTANEOUS at 09:06

## 2020-01-13 NOTE — CONSULTS
Adult Nutrition  Assessment    Patient Name:  Nata Bain  YOB: 1985  MRN: [id number]  Admit Date:  1/6/2020    Assessment Date:  1/13/2020    Comments:  Pt continues to be treated for Alcohol withdrawals, CIWA.  During her admit she was dxed with Sepsis/recurrent fevers due to UTI and EColi in Blood cultures.  She remains on Thiamine.  RD was unable to speak to pt due to pt sleeping.  Per staff she said that she got EColi here-from food given to her at the hospital.  She is eating food brought to her from family.  Intake averaging ~45% for the last 5 documented meals.  ? Acceptance and intake of supplements.  Will monitor    Reason for Assessment     Row Name 01/13/20 1556          Reason for Assessment    Reason For Assessment  follow-up protocol         Nutrition/Diet History     Row Name 01/13/20 1553          Nutrition/Diet History    Typical Food/Fluid Intake  Pt sleeping soundly and no family was present.  She would not answer when I called her name.             Labs/Tests/Procedures/Meds     Row Name 01/13/20 1556          Labs/Procedures/Meds    Lab Results Reviewed  reviewed, pertinent        Diagnostic Tests/Procedures    Diagnostic Test/Procedure Reviewed  reviewed, pertinent        Medications    Pertinent Medications Reviewed  reviewed, pertinent         Physical Findings     Row Name 01/13/20 1554          Physical Findings    Overall Physical Appearance  on oxygen therapy           Nutrition Prescription Ordered     Row Name 01/13/20 1554          Nutrition Prescription PO    Current PO Diet  Regular     Fluid Consistency  Thin     Supplement  Boost Plus (Ensure Enlive, Ensure Plus)     Supplement Frequency  3 times a day         Evaluation of Received Nutrient/Fluid Intake     Row Name 01/13/20 1550          PO Evaluation    Number of Days PO Intake Evaluated  3 days     Number of Meals  5     % PO Intake  45% average               Electronically signed by:  Gavi Ng,  RD  01/13/20 4:01 PM

## 2020-01-13 NOTE — PROGRESS NOTES
[facility] Medicine Services  INPATIENT PROGRESS NOTE    Length of Stay: 5  Date of Admission: 1/6/2020  Primary Care Physician: Provider, No Known    Subjective   Chief Complaint: Fever and alcohol withdrawal    HPI: Ms. Bain is a 34-year-old-year-old female with history of alcohol abuse with recurrent alcohol withdrawal who was admitted for alcohol withdrawal symptoms.  During the admission on 1/12/2020 patient developed high-grade fevers to 104 F.  Blood cultures growing E. coli with sensitivities pending.  She has been started on IV Zosyn.    This morning patient denies fevers and does complain of generalized body pains and some abdominal pain.  She has nausea but denied vomiting and ate most of her breakfast this morning.    Review of Systems   Constitutional: Positive for fatigue and fever. Negative for activity change, appetite change, chills, diaphoresis and unexpected weight change.   HENT: Negative for congestion, dental problem, drooling, ear discharge, rhinorrhea, sinus pressure, sinus pain, sneezing, sore throat, tinnitus and trouble swallowing.    Eyes: Negative for photophobia, discharge, itching and visual disturbance.   Respiratory: Negative for apnea, cough, choking, chest tightness, shortness of breath, wheezing and stridor.    Cardiovascular: Negative for chest pain, palpitations and leg swelling.   Gastrointestinal: Positive for abdominal pain. Negative for abdominal distention, anal bleeding, blood in stool, constipation, diarrhea, nausea and vomiting.   Endocrine: Negative for cold intolerance and heat intolerance.   Genitourinary: Negative for difficulty urinating, dyspareunia, dysuria, enuresis, frequency, urgency, vaginal bleeding and vaginal discharge.   Musculoskeletal: Positive for arthralgias, back pain and myalgias. Negative for gait problem, joint swelling, neck pain and neck stiffness.   Skin: Negative for rash.   Allergic/Immunologic:  Negative.    Neurological: Negative for dizziness, tremors, seizures, syncope, facial asymmetry, speech difficulty, weakness, light-headedness, numbness and headaches.   Psychiatric/Behavioral: Negative for agitation, behavioral problems, confusion, decreased concentration, dysphoric mood, hallucinations, self-injury and sleep disturbance. The patient is not nervous/anxious and is not hyperactive.      All pertinent negatives and positives are as above. All other systems have been reviewed and are negative unless otherwise stated.     Objective    Temp:  [96.5 °F (35.8 °C)-98.2 °F (36.8 °C)] 98.2 °F (36.8 °C)  Heart Rate:  [73-98] 76  Resp:  [18] 18  BP: (126-142)/(77-97) 134/78    Physical Exam   Constitutional: She is oriented to person, place, and time. She appears well-developed and well-nourished.   HENT:   Head: Normocephalic and atraumatic.   Mouth/Throat: No oropharyngeal exudate.   Eyes: Pupils are equal, round, and reactive to light. EOM are normal. No scleral icterus.   Neck: Normal range of motion. No JVD present.   Cardiovascular: Normal rate, regular rhythm and normal heart sounds.   No murmur heard.  Pulmonary/Chest: Effort normal and breath sounds normal. No stridor. No respiratory distress. She has no wheezes. She has no rales. She exhibits no tenderness.   Abdominal: Soft. Bowel sounds are normal. She exhibits no distension. There is tenderness. There is no rebound and no guarding. No hernia.   Vague generalized tenderness.  No rebound.   Musculoskeletal: Normal range of motion. She exhibits no edema.   Neurological: She is alert and oriented to person, place, and time. She exhibits normal muscle tone.   Skin: Skin is warm and dry.     Results Review:  I have reviewed the labs, radiology results, and diagnostic studies.    Laboratory Data:   Results from last 7 days   Lab Units 01/13/20  0530 01/12/20  0440 01/11/20  0349  01/06/20  1442   SODIUM mmol/L 137 132* 135*   < > 138   POTASSIUM mmol/L  3.8 3.4* 3.9   < > 3.9   CHLORIDE mmol/L 106 98 102   < > 95*   CO2 mmol/L 22.0 22.0 21.0*   < > 25.0   BUN mg/dL 5* 8 9   < > 8   CREATININE mg/dL 0.58 0.60 0.61   < > 0.57   GLUCOSE mg/dL 97 113* 107*   < > 85   CALCIUM mg/dL 8.8 8.8 8.5*   < > 9.4   BILIRUBIN mg/dL  --   --   --   --  0.3   ALK PHOS U/L  --   --   --   --  84   ALT (SGPT) U/L  --   --   --   --  57*   AST (SGOT) U/L  --   --   --   --  62*   ANION GAP mmol/L 9.0 12.0 12.0   < > 18.0*    < > = values in this interval not displayed.     Estimated Creatinine Clearance: 134.6 mL/min (by C-G formula based on SCr of 0.58 mg/dL).          Results from last 7 days   Lab Units 01/13/20  0530 01/12/20  1031 01/12/20  0440 01/11/20  0349 01/10/20  0433   WBC 10*3/mm3 5.39 7.47 7.25 12.26* 10.14   HEMOGLOBIN g/dL 10.8* 11.5* 12.0 11.6* 13.0   HEMATOCRIT % 31.5* 32.5* 34.5 34.1 38.8   PLATELETS 10*3/mm3 103* 110* 108* 119* 160           Culture Data:   Blood Culture   Date Value Ref Range Status   01/12/2020 No growth at 24 hours  Preliminary   01/12/2020 No growth at 24 hours  Preliminary   01/12/2020 No growth at 24 hours  Preliminary   01/12/2020 Abnormal Stain (A)  Preliminary     Urine Culture   Date Value Ref Range Status   01/12/2020 <25,000 CFU/mL Escherichia coli (A)  Final     Comment:     Call if further workup needed.       No results found for: RESPCX  No results found for: WOUNDCX  No results found for: STOOLCX  No components found for: BODYFLD    Radiology Data:   Imaging Results (Last 24 Hours)     ** No results found for the last 24 hours. **          I have reviewed the patient's current medications.     Assessment/Plan     Active Hospital Problems    Diagnosis   • Alcohol withdrawal (CMS/HCC)   • Non-intractable vomiting         Recurrent Fevers        Hyponatremia        Thrombocytopenia         Anemia  Plan:      1. Sepsis/recurrent fevers: Likely secondary to urinary tract infection.  Blood cultures growing E. coli on urine culture as  well.  Continue IV Zosyn.  Await sensitivities.  Plan to switch antibiotics to oral based on sensitivities and discharge in next 1 to 4 hours.  Discontinue vancomycin.    2.  Alcohol withdrawal: Continue patient on the CIWAS protocol.  Will wean Librium to 10 mg every 8 hours and keep IV Ativan PRN.  Plan to discharge to complete a Librium taper in the next 24 hours. Discontinue IV banana bag and with folic acid, thiamine orally.    3.  Nausea and vomiting.  Continue patient on Zofran as needed    3.  Generalized weakness/deconditioning.  This is likely secondary to withdrawal.  Will get physical therapy to work with patient.    4.  Hyponatremia: Mild, continue IV fluids and monitor sodium levels with BMP    5.  Thrombocytopenia: Currently no signs of bleed, continue to monitor    6.  Macrocytic anemia: Likely due to B12 versus folate deficiency.  Replete B12 folic acid orally.    DVT prophylaxis with SCDs  CODE STATUS is full code    Discharge Planning: I expect patient to be discharged to home in 1 to 2 days.    Mckenzie Young MD

## 2020-01-13 NOTE — PLAN OF CARE
Problem: Patient Care Overview  Goal: Plan of Care Review  Outcome: Ongoing (interventions implemented as appropriate)  Flowsheets (Taken 1/13/2020 7275)  Progress: no change  Plan of Care Reviewed With: patient  Note:   Vital signs stable. CIWA scores have ranged from 6 to 8. Pt consistently has complained of pain to head, flank, back and abdomen. Pt declined to eat hospital food, saying she believes it gave her e-coli. Family brings food to the Pt.

## 2020-01-13 NOTE — PLAN OF CARE
Problem: Patient Care Overview  Goal: Plan of Care Review  Outcome: Ongoing (interventions implemented as appropriate)  Flowsheets (Taken 1/13/2020 6367)  Progress: improving  Plan of Care Reviewed With: caregiver  Outcome Summary: Pt with intake averaging ~45%.  Alcohol withdrawals continue to be treated.  Dxed with Sepsis/fevers/Ecoli in blood cultures.  Will continue to monitor

## 2020-01-13 NOTE — PLAN OF CARE
Problem: Patient Care Overview  Goal: Plan of Care Review  Outcome: Ongoing (interventions implemented as appropriate)  Flowsheets (Taken 1/12/2020 2149)  Progress: no change  Plan of Care Reviewed With: patient; spouse  Note:   Pt up ad prashanth. Walking halls this shift. Vss. No new complaints. Will continue to monitor this pt.

## 2020-01-14 VITALS
OXYGEN SATURATION: 96 % | HEART RATE: 74 BPM | BODY MASS INDEX: 20.61 KG/M2 | RESPIRATION RATE: 18 BRPM | DIASTOLIC BLOOD PRESSURE: 82 MMHG | HEIGHT: 68 IN | SYSTOLIC BLOOD PRESSURE: 138 MMHG | WEIGHT: 136 LBS | TEMPERATURE: 96.7 F

## 2020-01-14 PROBLEM — N39.0 E. COLI UTI (URINARY TRACT INFECTION): Status: ACTIVE | Noted: 2020-01-14

## 2020-01-14 PROBLEM — B96.20 E. COLI UTI (URINARY TRACT INFECTION): Status: ACTIVE | Noted: 2020-01-14

## 2020-01-14 PROBLEM — A41.50 SEPSIS DUE TO GRAM NEGATIVE BACTERIA: Status: ACTIVE | Noted: 2020-01-14

## 2020-01-14 LAB
ANION GAP SERPL CALCULATED.3IONS-SCNC: 13 MMOL/L (ref 5–15)
ANISOCYTOSIS BLD QL: NORMAL
BASOPHILS # BLD AUTO: 0.04 10*3/MM3 (ref 0–0.2)
BASOPHILS NFR BLD AUTO: 0.7 % (ref 0–1.5)
BUN BLD-MCNC: 7 MG/DL (ref 6–20)
BUN/CREAT SERPL: 11.7 (ref 7–25)
CALCIUM SPEC-SCNC: 9.3 MG/DL (ref 8.6–10.5)
CHLORIDE SERPL-SCNC: 98 MMOL/L (ref 98–107)
CLUMPED PLATELETS: PRESENT
CO2 SERPL-SCNC: 24 MMOL/L (ref 22–29)
CREAT BLD-MCNC: 0.6 MG/DL (ref 0.57–1)
DEPRECATED RDW RBC AUTO: 50.2 FL (ref 37–54)
EOSINOPHIL # BLD AUTO: 0.01 10*3/MM3 (ref 0–0.4)
EOSINOPHIL NFR BLD AUTO: 0.2 % (ref 0.3–6.2)
ERYTHROCYTE [DISTWIDTH] IN BLOOD BY AUTOMATED COUNT: 13.1 % (ref 12.3–15.4)
GFR SERPL CREATININE-BSD FRML MDRD: 115 ML/MIN/1.73
GLUCOSE BLD-MCNC: 99 MG/DL (ref 65–99)
HCT VFR BLD AUTO: 33.9 % (ref 34–46.6)
HGB BLD-MCNC: 11.8 G/DL (ref 12–15.9)
IMM GRANULOCYTES # BLD AUTO: 0.02 10*3/MM3 (ref 0–0.05)
IMM GRANULOCYTES NFR BLD AUTO: 0.3 % (ref 0–0.5)
LYMPHOCYTES # BLD AUTO: 1.17 10*3/MM3 (ref 0.7–3.1)
LYMPHOCYTES NFR BLD AUTO: 20 % (ref 19.6–45.3)
MCH RBC QN AUTO: 36.5 PG (ref 26.6–33)
MCHC RBC AUTO-ENTMCNC: 34.8 G/DL (ref 31.5–35.7)
MCV RBC AUTO: 105 FL (ref 79–97)
MONOCYTES # BLD AUTO: 1.13 10*3/MM3 (ref 0.1–0.9)
MONOCYTES NFR BLD AUTO: 19.3 % (ref 5–12)
NEUTROPHILS # BLD AUTO: 3.47 10*3/MM3 (ref 1.7–7)
NEUTROPHILS NFR BLD AUTO: 59.5 % (ref 42.7–76)
NRBC BLD AUTO-RTO: 0 /100 WBC (ref 0–0.2)
PLATELET # BLD AUTO: 144 10*3/MM3 (ref 140–450)
PMV BLD AUTO: 10 FL (ref 6–12)
POTASSIUM BLD-SCNC: 3.8 MMOL/L (ref 3.5–5.2)
RBC # BLD AUTO: 3.23 10*6/MM3 (ref 3.77–5.28)
SMALL PLATELETS BLD QL SMEAR: ADEQUATE
SODIUM BLD-SCNC: 135 MMOL/L (ref 136–145)
WBC MORPH BLD: NORMAL
WBC NRBC COR # BLD: 5.84 10*3/MM3 (ref 3.4–10.8)

## 2020-01-14 PROCEDURE — 85007 BL SMEAR W/DIFF WBC COUNT: CPT | Performed by: HOSPITALIST

## 2020-01-14 PROCEDURE — 80048 BASIC METABOLIC PNL TOTAL CA: CPT | Performed by: HOSPITALIST

## 2020-01-14 PROCEDURE — 85025 COMPLETE CBC W/AUTO DIFF WBC: CPT | Performed by: HOSPITALIST

## 2020-01-14 RX ORDER — ONDANSETRON 4 MG/1
4 TABLET, FILM COATED ORAL EVERY 8 HOURS PRN
Qty: 21 TABLET | Refills: 0 | Status: SHIPPED | OUTPATIENT
Start: 2020-01-14 | End: 2021-01-18

## 2020-01-14 RX ORDER — CHLORDIAZEPOXIDE HYDROCHLORIDE 10 MG/1
CAPSULE, GELATIN COATED ORAL
Qty: 6 CAPSULE | Refills: 0 | Status: SHIPPED | OUTPATIENT
Start: 2020-01-14 | End: 2020-01-23

## 2020-01-14 RX ORDER — FOLIC ACID 1 MG/1
1 TABLET ORAL DAILY
Qty: 30 TABLET | Refills: 0 | Status: SHIPPED | OUTPATIENT
Start: 2020-01-15 | End: 2020-06-10

## 2020-01-14 RX ORDER — AMOXICILLIN AND CLAVULANATE POTASSIUM 875; 125 MG/1; MG/1
1 TABLET, FILM COATED ORAL EVERY 12 HOURS SCHEDULED
Qty: 20 TABLET | Refills: 0 | Status: SHIPPED | OUTPATIENT
Start: 2020-01-14 | End: 2020-01-24

## 2020-01-14 RX ORDER — SERTRALINE HYDROCHLORIDE 25 MG/1
25 TABLET, FILM COATED ORAL DAILY
Qty: 30 TABLET | Refills: 0 | Status: ON HOLD | OUTPATIENT
Start: 2020-01-14 | End: 2020-06-01

## 2020-01-14 RX ORDER — LANOLIN ALCOHOL/MO/W.PET/CERES
100 CREAM (GRAM) TOPICAL DAILY
Qty: 30 TABLET | Refills: 0 | Status: SHIPPED | OUTPATIENT
Start: 2020-01-15 | End: 2020-06-10 | Stop reason: SDUPTHER

## 2020-01-14 RX ORDER — AMOXICILLIN AND CLAVULANATE POTASSIUM 875; 125 MG/1; MG/1
1 TABLET, FILM COATED ORAL EVERY 12 HOURS SCHEDULED
Status: DISCONTINUED | OUTPATIENT
Start: 2020-01-14 | End: 2020-01-14 | Stop reason: HOSPADM

## 2020-01-14 RX ADMIN — SODIUM CHLORIDE 100 ML/HR: 900 INJECTION, SOLUTION INTRAVENOUS at 03:45

## 2020-01-14 RX ADMIN — LACTULOSE 20 G: 20 SOLUTION ORAL at 09:28

## 2020-01-14 RX ADMIN — SODIUM CHLORIDE, PRESERVATIVE FREE 10 ML: 5 INJECTION INTRAVENOUS at 09:28

## 2020-01-14 RX ADMIN — CYANOCOBALAMIN TAB 1000 MCG 1000 MCG: 1000 TAB at 09:29

## 2020-01-14 RX ADMIN — SERTRALINE HYDROCHLORIDE 25 MG: 25 TABLET ORAL at 09:29

## 2020-01-14 RX ADMIN — CHLORDIAZEPOXIDE HYDROCHLORIDE 10 MG: 10 CAPSULE ORAL at 13:23

## 2020-01-14 RX ADMIN — NICOTINE 1 PATCH: 21 PATCH, EXTENDED RELEASE TRANSDERMAL at 09:28

## 2020-01-14 RX ADMIN — DIAZEPAM 10 MG: 5 TABLET ORAL at 11:45

## 2020-01-14 RX ADMIN — LORAZEPAM 1 MG: 1 TABLET ORAL at 05:40

## 2020-01-14 RX ADMIN — FOLIC ACID 1 MG: 1 TABLET ORAL at 09:29

## 2020-01-14 RX ADMIN — CHLORDIAZEPOXIDE HYDROCHLORIDE 10 MG: 10 CAPSULE ORAL at 05:40

## 2020-01-14 RX ADMIN — BUPRENORPHINE HYDROCHLORIDE AND NALOXONE HYDROCHLORIDE DIHYDRATE 1 TABLET: 8; 2 TABLET SUBLINGUAL at 09:29

## 2020-01-14 RX ADMIN — ACETAMINOPHEN 650 MG: 325 TABLET, FILM COATED ORAL at 13:22

## 2020-01-14 RX ADMIN — SODIUM CHLORIDE 100 ML/HR: 900 INJECTION, SOLUTION INTRAVENOUS at 13:23

## 2020-01-14 RX ADMIN — LORAZEPAM 1 MG: 1 TABLET ORAL at 13:23

## 2020-01-14 RX ADMIN — AMOXICILLIN AND CLAVULANATE POTASSIUM 1 TABLET: 875; 125 TABLET, FILM COATED ORAL at 14:48

## 2020-01-14 RX ADMIN — ACETAMINOPHEN 650 MG: 325 TABLET, FILM COATED ORAL at 05:40

## 2020-01-14 RX ADMIN — DIAZEPAM 10 MG: 5 TABLET ORAL at 04:37

## 2020-01-14 RX ADMIN — Medication 100 MG: at 09:29

## 2020-01-14 NOTE — CONSULTS
"Adult Nutrition  Assessment    Patient Name:  Nata Bain  YOB: 1985  MRN: [id number]  Admit Date:  1/6/2020    Assessment Date:  1/14/2020    Comments:  Pt seems in very good spirits today.  SHe reports an improved appetite.  NO Gi distress except for intermittent mild nausea.  Drinking the Boost.  Discharge pending.  Encouraged pt to eat well at home, Nutrient dense foods and beverages.  She voiced understanding.      Reason for Assessment     Row Name 01/14/20 1516          Reason for Assessment    Reason For Assessment  follow-up protocol         Nutrition/Diet History     Row Name 01/14/20 1516          Nutrition/Diet History    Typical Food/Fluid Intake  Pt awake and reports that she is eating \"pretty good, better\"  She still has intermittent nausea but not bad.  Drinking the Boost           Labs/Tests/Procedures/Meds     Row Name 01/14/20 1516          Labs/Procedures/Meds    Lab Results Reviewed  reviewed, pertinent     Lab Results Comments  Na 135        Diagnostic Tests/Procedures    Diagnostic Test/Procedure Reviewed  reviewed, pertinent        Medications    Pertinent Medications Reviewed  reviewed, pertinent     Pertinent Medications Comments  Librium; Folic Acid; LActulose; Thiamine; Vit B12         Physical Findings     Row Name 01/14/20 1517          Physical Findings    Gastrointestinal  nausea mild           Nutrition Prescription Ordered     Row Name 01/14/20 1517          Nutrition Prescription PO    Current PO Diet  Regular     Fluid Consistency  Thin     Supplement  Boost Plus (Ensure Enlive, Ensure Plus)     Supplement Frequency  3 times a day         Evaluation of Received Nutrient/Fluid Intake     Row Name 01/14/20 1517          PO Evaluation    Number of Days PO Intake Evaluated  Insufficient Data     Number of Meals  3     % PO Intake  100/100/75               Electronically signed by:  Gavi Ng RD  01/14/20 3:20 PM  "

## 2020-01-14 NOTE — PLAN OF CARE
Problem: Patient Care Overview  Goal: Plan of Care Review  Outcome: Ongoing (interventions implemented as appropriate)  Flowsheets  Taken 1/14/2020 0336  Progress: no change  Outcome Summary: Pt resting at this time.  VSS.  No s/s of pain or discomfort are noted.  Continuing to monitor this pt with the CIWA scale.  Pt significant other remains at bedside.  Will continue to monitor this pt.  Taken 1/13/2020 2376  Plan of Care Reviewed With: patient

## 2020-01-14 NOTE — DISCHARGE SUMMARY
[facility] Medicine [facility]  DISCHARGE SUMMARY       Date of Admission: 1/6/2020  Date of Discharge:  1/14/2020  Primary Care Physician: Provider, No Known    Presenting Problem/History of Present Illness:  Alcohol withdrawal syndrome without complication (CMS/[facility]) [F10.230]  Non-intractable vomiting with nausea, unspecified vomiting type [R11.2]  Alcohol withdrawal (CMS/[facility]) [F10.239]       Final Discharge Diagnoses:  Active Hospital Problems    Diagnosis   • **Sepsis due to Gram negative bacteria (CMS/[facility])   • E. coli UTI (urinary tract infection)   • Alcohol withdrawal (CMS/[facility])   • Non-intractable vomiting     Consults:   Consults     No orders found from 12/8/2019 to 1/7/2020.          Procedures Performed: None              Pertinent Test Results:   Collected Updated Procedure Result Status    01/14/2020 0524 01/14/2020 0554 CBC Auto Differential [[id number]]   (Abnormal)   Blood    Final result WBC 5.84 10*3/mm3   RBC 3.23Low  10*6/mm3   Hemoglobin 11.8Low  g/dL   Hematocrit 33.9Low  %   .0High  fL   MCH 36.5High  pg   MCHC 34.8 g/dL   RDW 13.1 %   RDW-SD 50.2 fl   MPV 10.0 fL   Platelets 144 10*3/mm3   Neutrophil Rel % 59.5 %   Lymphocyte Rel % 20.0 %   Monocyte Rel % 19.3High  %   Eosinophil Rel % 0.2Low  %   Basophil Rel % 0.7 %   Immature Granulocyte Rel % 0.3 %   Neutrophils Absolute 3.47 10*3/mm3   Lymphocytes Absolute 1.17 10*3/mm3   Monocytes Absolute 1.13High  10*3/mm3   Eosinophils Absolute 0.01 10*3/mm3   Basophils Absolute 0.04 10*3/mm3   Immature Grans, Absolute 0.02 10*3/mm3   nRBC 0.0 /100 WBC           01/14/2020 0524 01/14/2020 0627 Scan Slide [[id number]]   Blood    Final result Anisocytes Slight/1+   WBC Morphology Normal   Platelet Estimate Adequate   Clumped Platelets Present            01/14/2020 0523 01/14/2020 0606 Basic Metabolic Panel [[id number]]    (Abnormal)   Blood    Final result Glucose 99 mg/dL   BUN 7 mg/dL   Creatinine 0.60  mg/dL   Sodium 135Low  mmol/L   Potassium 3.8 mmol/L   Chloride 98 mmol/L   CO2 24.0 mmol/L   Calcium 9.3 mg/dL   eGFR Non African Am 115 mL/min/1.73   BUN/Creatinine Ratio 11.7    Anion Gap 13.0 mmol/L           01/13/2020 1157 01/15/2020 1231 Respiratory Culture - Sputum, Cough [[id number]]   Sputum from Cough    Final result Respiratory Culture Light growth (2+) Normal Respiratory Valentin   Gram Stain Few (2+) WBCs per low power field    Rare (1+) Epithelial cells per low power field    Mixed bacterial valentin              01/13/2020 0530 01/13/2020 0638 Basic Metabolic Panel [[id number]]    (Abnormal)   Blood    Final result Glucose 97 mg/dL   BUN 5Low  mg/dL   Creatinine 0.58 mg/dL   Sodium 137 mmol/L   Potassium 3.8 mmol/L   Chloride 106 mmol/L   CO2 22.0 mmol/L   Calcium 8.8 mg/dL   eGFR Non African Am 120 mL/min/1.73   BUN/Creatinine Ratio 8.6    Anion Gap 9.0 mmol/L          01/12/2020 1300 01/12/2020 1306 Urinalysis With Culture If Indicated - Urine, Clean Catch [[id number]]   (Abnormal)   Urine, Clean Catch    Final result Color, UA Yellow   Appearance, UA Clear   pH, UA 7.0   Specific Gravity, UA 1.012   Glucose Negative   Ketones, UA Negative   Bilirubin, UA Negative   Blood, UA Negative   Protein, UA Negative   Leukocytes, UA TraceAbnormal    Nitrite, UA PositiveAbnormal    Urobilinogen, UA 1.0 E.U./dL           01/12/2020 1300 01/12/2020 1308 Urinalysis, Microscopic Only - Urine, Clean Catch [[id number]]   (Abnormal)   Urine, Clean Catch    Final result RBC, UA 6-12Abnormal  /HPF   WBC, UA 6-12Abnormal  /HPF   Bacteria, UA 1+Abnormal  /HPF   Squamous Epithelial Cells, UA 3-5Abnormal  /HPF   Hyaline Casts, UA 0-2 /LPF   Methodology: Automated Microscopy            01/12/2020 1300 01/13/2020 0946 Urine Culture - Urine, Urine, Clean Catch [[id number]]   (Abnormal)   Urine, Clean Catch    Final result Urine Culture <25,000 CFU/mL Escherichia coliAbnormal         01/12/2020 0440 01/16/2020 0605 Blood Culture -  Blood, Arm, Left [[id number]]    (Abnormal)   Blood from Arm, Left    Final result Blood Culture Escherichia coli ESBLAbnormal    Isolated from Aerobic Bottle   Gram Stain Aerobic Bottle Gram negative bacilli       Susceptibility     Escherichia coli ESBL (1)     Antibiotic Interpretation Method Status    Ertapenem Susceptible JORGE Final    Meropenem Susceptible JORGE Final    Susceptibility Comments     Cefazolin sensitivity will not be reported for Enterobacteriaceae in non-urine isolates. If cefazolin is preferred, please call the microbiology lab to request an E-test.   With the exception of urinary-sourced infections, aminoglycosides should not be used as monotherapy.   For ESBL-producing infections in the blood, a carbapenem is recommended as first-line therapy for optimal clinical outcomes.          01/12/2020 0440 01/12/2020 2241 Blood Culture ID, PCR - Blood, Arm, Left [[id number]]    (Abnormal)   Blood from Arm, Left    Final result BCID, PCR Escherichia coli. Identification by BCID PCR.Critical    BOTTLE TYPE Aerobic Bottle              01/11/2020 0349 01/11/2020 0503 Basic Metabolic Panel [[id number]]    (Abnormal)   Blood    Final result Glucose 107High  mg/dL   BUN 9 mg/dL   Creatinine 0.61 mg/dL   Sodium 135Low  mmol/L   Potassium 3.9 mmol/L   Chloride 102 mmol/L   CO2 21.0Low  mmol/L   Calcium 8.5Low  mg/dL   eGFR Non African Am 113 mL/min/1.73   BUN/Creatinine Ratio 14.8    Anion Gap 12.0 mmol/L           01/11/2020 0349 01/11/2020 0521 CBC Auto Differential [[id number]]   (Abnormal)   Blood    Final result WBC 12.26High  10*3/mm3   RBC 3.19Low  10*6/mm3   Hemoglobin 11.6Low  g/dL   Hematocrit 34.1 %   .9High  fL   MCH 36.4High  pg   MCHC 34.0 g/dL   RDW 12.8 %   RDW-SD 50.7 fl   MPV 9.7 fL   Platelets 119Low  10*3/mm3   Neutrophil Rel % 82.2High  %   Lymphocyte Rel % 7.2Low  %   Monocyte Rel % 9.2 %   Eosinophil Rel % 0.3 %   Basophil Rel % 0.5 %   Immature Granulocyte Rel % 0.6High  %      Neutrophils Absolute 10.08High  10*3/mm3   Lymphocytes Absolute 0.88 10*3/mm3   Monocytes Absolute 1.13High  10*3/mm3   Eosinophils Absolute 0.04 10*3/mm3   Basophils Absolute 0.06 10*3/mm3   Immature Grans, Absolute 0.07High  10*3/mm3   nRBC 0.0 /100 WBC           01/10/2020 1747 01/10/2020 1823 Urine Drug Screen - Urine, Clean Catch [[id number]]    (Abnormal)   Urine, Clean Catch    Final result THC Screen, Urine Negative   Phencyclidine (PCP), Urine Negative   Cocaine Screen, Urine Negative   Methamphetamine, Ur Negative   Opiate Screen Negative   Amphetamine, Urine Qual Negative   Benzodiazepine Screen, Urine PositiveAbnormal    Tricyclic Antidepressants Screen Negative   Methadone Screen , Urine Negative   Barbiturates Screen, Urine Negative   Oxycodone Screen, Urine Negative   Propoxyphene Screen Negative   Buprenorphine, Screen, Urine PositiveAbnormal              Procedure Component Value Units Date/Time   XR Chest 1 View [[id number]] Larry as Reviewed   Order Status: Completed Collected: 01/12/20 0923    Updated: 01/12/20 0951   Narrative:       PROCEDURE: Single chest view portable    REASON FOR EXAM:fever, R11.2 Nausea with vomiting, unspecified  F10.230 Alcohol dependence with withdrawal, uncomplicated    FINDINGS: Right PICC line versus midline central venous catheter  with tip in the region of the mid to distal right subclavian  vein. Cardiac and pulmonary vasculature are normal. Lungs are  clear. Pleural spaces are normal. No acute osseous abnormality.   Impression:     1.  Right PICC line versus midline central venous catheter with  tip in the region of the mid to distal right subclavian vein.   2.  Otherwise unremarkable chest.    Electronically signed by:  Doug Calvo MD  1/12/2020 9:50 AM [facility]  Workstation: [id number]       Chief Complaint on Day of Discharge: None    Hospital Course:  The patient is a 34 y.o. female is a 34-year-old-year-old female with history of Alcoholic pancreatitis, alcohol abuse  "with recurrent alcohol withdrawal treatments in the past who was admitted for alcohol withdrawal symptoms. During the admission on 1/12/2020 patient developed high-grade fevers to 104 F.    Urine culture grew E. coli and patient was started on IV Zosyn.  Blood cultures growing ESBL E. coli in 1 out of 6 bottles which was thought to be a contaminant as patient responded to IV Zosyn which she was treated with.  Patient defervesced and was afebrile for 48 hours prior to discharge.  She was discharged to complete a course of p.o. Augmentin and given the Librium taper for alcohol detox.     Condition on Discharge:  Stable    Physical Exam on Discharge:  /80 (BP Location: Left arm, Patient Position: Sitting)   Pulse 73   Temp 96.8 °F (36 °C) (Temporal)   Resp 18   Ht 172.7 cm (67.99\")   Wt 61.7 kg (136 lb)   LMP 01/01/2020   SpO2 95%   Breastfeeding No   BMI 20.68 kg/m²      Physical Exam  Constitutional: She is oriented to person, place, and time. She appears well-developed and well-nourished.   HENT:   Head: Normocephalic and atraumatic.   Mouth/Throat: No oropharyngeal exudate.   Eyes: Pupils are equal, round, and reactive to light. EOM are normal. No scleral icterus.   Neck: Normal range of motion. No JVD present.   Cardiovascular: Normal rate, regular rhythm and normal heart sounds.   No murmur heard.  Pulmonary/Chest: Effort normal and breath sounds normal. No stridor. No respiratory distress. She has no wheezes. She has no rales. She exhibits no tenderness.   Abdominal: Soft. Bowel sounds are normal. She exhibits no distension. There is  no tenderness. There is no rebound and no guarding. No hernia.   Musculoskeletal: Normal range of motion. She exhibits no edema.   Neurological: She is alert and oriented to person, place, and time. She exhibits normal muscle tone.   Skin: Skin is warm and dry.     Discharge Disposition: Home self-care    Discharge Medications:     Discharge Medications      New " Medications      Instructions Start Date   amoxicillin-clavulanate 875-125 MG per tablet  Commonly known as:  AUGMENTIN   1 tablet, Oral, Every 12 Hours Scheduled      chlordiazePOXIDE 10 MG capsule  Commonly known as:  LIBRIUM  Notes to patient:  Take as directed   On 1/14 take 1 tablet 3 times daily, on 01/15 take 1 tab 2 times daily and on 01/16 take 1 tab then stop      cyanocobalamin 1000 MCG tablet  Commonly known as:  VITAMIN B-12   1,000 mcg, Oral, Daily      folic acid 1 MG tablet  Commonly known as:  FOLVITE   1 mg, Oral, Daily      ondansetron 4 MG tablet  Commonly known as:  ZOFRAN   4 mg, Oral, Every 8 Hours PRN      thiamine 100 MG tablet  Commonly known as:  VITAMIN B1   100 mg, Oral, Daily         Changes to Medications      Instructions Start Date   sertraline 25 MG tablet  Commonly known as:  ZOLOFT  What changed:    · medication strength  · how much to take  · when to take this   25 mg, Oral, Daily         Continue These Medications      Instructions Start Date   albuterol (2.5 MG/3ML) 0.083% nebulizer solution  Commonly known as:  PROVENTIL   2.5 mg, Nebulization, Every 6 Hours PRN      buprenorphine-naloxone 2-0.5 MG film film  Commonly known as:  SUBOXONE   Sublingual, Daily      diclofenac 1 % gel gel  Commonly known as:  VOLTAREN   4 g, Topical, 4 Times Daily PRN      diclofenac 1.3 % patch patch  Commonly known as:  FLECTOR   1 patch, Topical, 2 Times Daily         Stop These Medications    baclofen 10 MG tablet  Commonly known as:  LIORESAL            Discharge Diet:  Regular diet    Activity at Discharge: Self-limited activity    Discharge Care Plan/Instructions:   1.  Establish care with family medicine resident at [facility] family medicine clinic and follow-up within 1 week of discharge.   2.  Complete your antibiotics as prescribed to treat your blood infection.    Follow-up Appointments:   Future Appointments   Date Time Provider Department Center   1/23/2020  1:45 PM  Yosi Almaguer MD [facility] None       Test Results Pending at Discharge:       Mckenzie Young MD  01/14/20  11:11 AM    Time: 35 minutes of time was spent evaluating patient and planning discharge.      Part of this note may be an electronic transcription/translation of spoken language to printed text using the Dragon Dictation system.

## 2020-01-14 NOTE — PLAN OF CARE
Problem: Patient Care Overview  Goal: Plan of Care Review  Outcome: Ongoing (interventions implemented as appropriate)  Flowsheets (Taken 1/14/2020 1046)  Progress: no change  Plan of Care Reviewed With: patient  Note:   Vital signs stable. Pt says she feels better than yesterday. Still complains of high baseline pain in head, flanks, back, and abdomen. Pt frequently asks about her PRN meds.

## 2020-01-14 NOTE — PLAN OF CARE
Problem: Patient Care Overview  Goal: Plan of Care Review  Outcome: Ongoing (interventions implemented as appropriate)  Flowsheets (Taken 1/14/2020 151)  Progress: improving  Plan of Care Reviewed With: patient; spouse  Outcome Summary: Very good po intake for the last 3 meals.  Drinking the Boost.  Discharge pending.

## 2020-01-15 ENCOUNTER — READMISSION MANAGEMENT (OUTPATIENT)
Dept: CALL CENTER | Facility: HOSPITAL | Age: 35
End: 2020-01-15

## 2020-01-15 LAB
BACTERIA SPEC RESP CULT: NORMAL
GRAM STN SPEC: NORMAL

## 2020-01-15 NOTE — OUTREACH NOTE
Prep Survey      Responses   Facility patient discharged from?  [facility]   Is patient eligible?  No   What are the reasons patient is not eligible?  Other   Does the patient have one of the following disease processes/diagnoses(primary or secondary)?  Other   Prep survey completed?  Yes          Zoila Leyva RN

## 2020-01-15 NOTE — PAYOR COMM NOTE
"Shawnee Springer (34 y.o. Female)     Date of Birth Social Security Number Address Home Phone MRN    [id number]  [id number] [phone] [id number]    Faith Marital Status          None        Admission Date Admission Type Admitting Provider Attending Provider Department, Room/Bed    1/6/20 Emergency Rubens Liu MD  [address], 427/1    Discharge Date Discharge Disposition Discharge Destination        1/14/2020 Home or Self Care              Attending Provider:  (none)   Allergies:  No Known Allergies    Isolation:  None   Infection:  None   Code Status:  Prior    Ht:  172.7 cm (67.99\")   Wt:  61.7 kg (136 lb)    Admission Cmt:  None   Principal Problem:  Sepsis due to Gram negative bacteria (CMS/[facility]) [A41.50]                 Active Insurance as of 1/6/2020     Primary Coverage     Payor Plan Insurance Group Employer/Plan Group    [place] [id number]     Payor Plan Address Payor Plan Phone Number Payor Plan Fax Number Effective Dates    [employer] Claims Office - [address] [address] [phone]  1/1/2020 - None Entered    [facility] [address]       Subscriber Name Subscriber Birth Date Member ID       SPRINGER,SHAWNEE PATRICIA 1985 [id number]                 Emergency Contacts      (Rel.) Home Phone Work Phone Mobile Phone    Jorge Sanz (Spouse) [phone] -- [phone]    OdellJoanna (Mother) [phone] -- [phone]    Hilario Springer (Step Parent) [phone] -- [phone]            Insurance Information                [place] Phone: [phone]    Subscriber: Shawnee Springer Subscriber#: [id number]    Group#: [id number] Precert#:              History & Physical      Ant Saenz MD at 01/06/20 1912                [facility] Medicine Admission      Date of Admission: 1/6/2020      Primary Care " Physician: Provider, No Known      Chief Complaint: Alcohol withdrawal    HPI: Patient is a 34-year-old  female with a long history of severe alcohol abuse.  She states that she has been drinking heavily for greater than 15 years.  She states that she drinks at least fifth of vodka every day.  She will drink 2 fifths of vodka if she has access to it.  She was hospitalized roughly a year ago for alcoholic pancreatitis and went through significant alcohol withdrawal during that hospital stay.  She states that every morning when she wakes up she is jittery and tremulous and she has to start drinking to combat that.  States that she is tried to stop drinking at home on multiple occasions and has suffered significant withdrawal symptoms including nausea, vomiting, hallucinations, and seizures.  Her last drink was approximately 2 AM on the morning of January 6.  She began to have withdrawal symptoms and presented to the hospital rather than drinking again.  Her CIWA score in the emergency department was 12.  At the time of my interview patient denied hallucinations, but was markedly tremulous.    Concurrent Medical History:  has a past medical history of Abnormal Pap smear of cervix, Alcoholism (CMS/[facility]), Anxiety, Cervical dysplasia, Depression, Fibrocystic breast, GERD (gastroesophageal reflux disease), Seizures (CMS/HCC) (2017), Substance abuse (CMS/[facility]), and Substance abuse (CMS/[facility]).    Past Surgical History:  has a past surgical history that includes Rhinoplasty.    Family History: family history includes Breast cancer in her maternal aunt, maternal grandmother, mother, and paternal grandmother; COPD in her mother; Cancer in her mother; Heart disease in her father; Hypertension in her father.     Social History:  reports that she has been smoking cigarettes. She has a 20.00 pack-year smoking history. She has never used smokeless tobacco. She reports that she drinks alcohol. She reports that she does not use  drugs.    Allergies: No Known Allergies    Medications:   Prior to Admission medications    Medication Sig Start Date End Date Taking? Authorizing Provider   albuterol (PROVENTIL) (2.5 MG/3ML) 0.083% nebulizer solution Take 2.5 mg by nebulization Every 6 (Six) Hours As Needed for Wheezing or Shortness of Air. 12/16/19  Yes Mayelin Hanson APRN   buprenorphine-naloxone (SUBOXONE) 2-0.5 MG film film Place  under the tongue Daily.   Yes Emergency, Nurse Epic, RN   Sertraline HCl (ZOLOFT PO) Take  by mouth.   Yes Caty Yepez MD   baclofen (LIORESAL) 10 MG tablet  12/12/19   Emergency, Nurse Tiarra RN   diclofenac (FLECTOR) 1.3 % patch patch Apply 1 patch topically to the appropriate area as directed 2 (Two) Times a Day.    Caty Yepez MD   diclofenac (VOLTAREN) 1 % gel gel Apply 4 g topically to the appropriate area as directed 4 (Four) Times a Day As Needed.    ProviderCaty MD       Review of Systems:  Review of Systems   Constitutional: Positive for activity change and appetite change. Negative for chills, fatigue, fever and unexpected weight change.   HENT: Negative for congestion, facial swelling, hearing loss, nosebleeds, rhinorrhea, sneezing, trouble swallowing and voice change.    Eyes: Negative for photophobia and visual disturbance.   Respiratory: Negative for apnea, cough, choking, chest tightness, shortness of breath, wheezing and stridor.    Cardiovascular: Negative for chest pain, palpitations and leg swelling.   Gastrointestinal: Positive for nausea and vomiting. Negative for abdominal pain, blood in stool, constipation and diarrhea.   Endocrine: Negative for cold intolerance, heat intolerance, polydipsia, polyphagia and polyuria.   Genitourinary: Negative for dysuria, flank pain and hematuria.   Musculoskeletal: Negative for arthralgias, back pain, myalgias and neck pain.   Skin: Negative for rash and wound.   Allergic/Immunologic: Negative for immunocompromised  state.   Neurological: Positive for tremors and headaches. Negative for dizziness, seizures, syncope, speech difficulty, weakness, light-headedness and numbness.   Hematological: Does not bruise/bleed easily.   Psychiatric/Behavioral: Positive for agitation. Negative for behavioral problems, confusion, decreased concentration, hallucinations, self-injury and suicidal ideas. The patient is nervous/anxious.       Otherwise complete ROS is negative except as mentioned above.    Physical Exam:   Temp:  [97.8 °F (36.6 °C)] 97.8 °F (36.6 °C)  Heart Rate:  [] 92  Resp:  [16-18] 16  BP: (114-158)/() 118/76     Physical Exam   Constitutional: She is oriented to person, place, and time. She appears well-developed and well-nourished.   HENT:   Head: Normocephalic and atraumatic.   Nose: Nose normal.   Mouth/Throat: Oropharynx is clear and moist.   Eyes: Pupils are equal, round, and reactive to light. Conjunctivae, EOM and lids are normal. No scleral icterus.   Neck: Normal range of motion. Neck supple. No JVD present. No tracheal tenderness and no spinous process tenderness present. No neck rigidity. No tracheal deviation, no edema and normal range of motion present.   Cardiovascular: Normal rate, regular rhythm, S1 normal, S2 normal, normal heart sounds and normal pulses. Exam reveals no gallop and no friction rub.   No murmur heard.  Pulmonary/Chest: Effort normal and breath sounds normal. No accessory muscle usage. No respiratory distress. She has no decreased breath sounds. She has no wheezes. She has no rales. She exhibits no tenderness.   Abdominal: Soft. Bowel sounds are normal. She exhibits no distension and no mass. There is no tenderness. There is no rebound and no guarding.   Musculoskeletal: She exhibits no edema or tenderness.   Neurological: She is alert and oriented to person, place, and time. She has normal reflexes. She displays no atrophy and normal reflexes. No cranial nerve deficit or sensory  deficit. She exhibits normal muscle tone. She displays no seizure activity. Coordination normal.   Skin: Skin is warm. No rash noted. No pallor.   Psychiatric: Judgment and thought content normal. Her mood appears anxious. She is agitated.   Significantly tremulous         Results Reviewed:  I have personally reviewed current lab, radiology, and data and agree with results.  Lab Results (last 24 hours)     Procedure Component Value Units Date/Time    [place] Draw [[id number]] Collected:  01/06/20 1441    Specimen:  Blood Updated:  01/06/20 1545    Narrative:       The following orders were created for panel order [place] Draw.  Procedure                               Abnormality         Status                     ---------                               -----------         ------                     Light Blue Top[[id number]]                                   Final result               Green Top (Gel)[[id number]]                                  Final result               Lavender Top[[id number]]                                     Final result               Gold Top - SST[[id number]]                                                                Please view results for these tests on the individual orders.    Light Blue Top [[id number]] Collected:  01/06/20 1441    Specimen:  Blood Updated:  01/06/20 1545     Extra Tube hold for add-on     Comment: Auto resulted       Green Top (Gel) [[id number]] Collected:  01/06/20 1442    Specimen:  Blood Updated:  01/06/20 1545     Extra Tube Hold for add-ons.     Comment: Auto resulted.       Lavender Top [[id number]] Collected:  01/06/20 1442    Specimen:  Blood Updated:  01/06/20 1545     Extra Tube hold for add-on     Comment: Auto resulted       Comprehensive Metabolic Panel [[id number]]  (Abnormal) Collected:  01/06/20 1442    Specimen:  Blood Updated:  01/06/20 1516     Glucose 85 mg/dL      BUN 8 mg/dL      Creatinine 0.57 mg/dL      Sodium 138 mmol/L      Potassium 3.9 mmol/L         Chloride 95 mmol/L      CO2 25.0 mmol/L      Calcium 9.4 mg/dL      Total Protein 7.9 g/dL      Albumin 4.60 g/dL      ALT (SGPT) 57 U/L      AST (SGOT) 62 U/L      Alkaline Phosphatase 84 U/L      Total Bilirubin 0.3 mg/dL      eGFR Non African Amer 122 mL/min/1.73      Globulin 3.3 gm/dL      A/G Ratio 1.4 g/dL      BUN/Creatinine Ratio 14.0     Anion Gap 18.0 mmol/L     Narrative:       GFR Normal >60  Chronic Kidney Disease <60  Kidney Failure <15      Lipase [[id number]]  (Normal) Collected:  01/06/20 1442    Specimen:  Blood Updated:  01/06/20 1516     Lipase 52 U/L     hCG, Serum, Qualitative [[id number]]  (Normal) Collected:  01/06/20 1441    Specimen:  Blood Updated:  01/06/20 1507     HCG Qualitative Negative    CBC & Differential [[id number]] Collected:  01/06/20 1442    Specimen:  Blood Updated:  01/06/20 1453    Narrative:       The following orders were created for panel order CBC & Differential.  Procedure                               Abnormality         Status                     ---------                               -----------         ------                     CBC Auto Differential[[id number]]        Abnormal            Final result                 Please view results for these tests on the individual orders.    CBC Auto Differential [[id number]]  (Abnormal) Collected:  01/06/20 1442    Specimen:  Blood Updated:  01/06/20 1453     WBC 9.22 10*3/mm3      RBC 4.00 10*6/mm3      Hemoglobin 14.6 g/dL      Hematocrit 41.7 %      .3 fL      MCH 36.5 pg      MCHC 35.0 g/dL      RDW 12.4 %      RDW-SD 48.1 fl      MPV 9.1 fL      Platelets 191 10*3/mm3      Neutrophil % 76.0 %      Lymphocyte % 17.9 %      Monocyte % 4.8 %      Eosinophil % 0.2 %      Basophil % 0.8 %      Immature Grans % 0.3 %      Neutrophils, Absolute 7.01 10*3/mm3      Lymphocytes, Absolute 1.65 10*3/mm3      Monocytes, Absolute 0.44 10*3/mm3      Eosinophils, Absolute 0.02 10*3/mm3      Basophils, Absolute 0.07 10*3/mm3       Immature Grans, Absolute 0.03 10*3/mm3      nRBC 0.0 /100 WBC     Urinalysis, Microscopic Only - Urine, Clean Catch [[id number]]  (Abnormal) Collected:  01/06/20 1327    Specimen:  Urine, Clean Catch Updated:  01/06/20 1425     RBC, UA 0-2 /HPF      WBC, UA 0-2 /HPF      Bacteria, UA 3+ /HPF      Squamous Epithelial Cells, UA 3-5 /HPF      Hyaline Casts, UA None Seen /LPF      Methodology Manual Light Microscopy    Urinalysis With Culture If Indicated - Urine, Clean Catch [[id number]]  (Abnormal) Collected:  01/06/20 1327    Specimen:  Urine, Clean Catch Updated:  01/06/20 1402     Color, UA Yellow     Appearance, UA Slightly Cloudy     pH, UA 6.5     Specific [place], UA 1.006     Comment: Result obtained by Refractometer        Glucose, UA Negative     Ketones, UA Negative     Bilirubin, UA Negative     Blood, UA Negative     Protein, UA 30 mg/dL (1+)     Leuk Esterase, UA Trace     Nitrite, UA Negative     Urobilinogen, UA 0.2 E.U./dL        Imaging Results (Last 24 Hours)     Procedure Component Value Units Date/Time    XR Abdomen Flat & Upright [[id number]] Collected:  01/06/20 1708     Updated:  01/06/20 1722    Narrative:       ABDOMINAL SERIES    HISTORY: Abdominal pain. Nausea and vomiting. Fatigue.    Supine and upright films of the abdomen were obtained.    COMPARISON: October 21, 2018    FINDINGS:    No free air.  No mechanical bowel obstruction.  Some retained feces scattered throughout the colon.  No organomegaly.  No abnormal calcifications.  No acute osseous abnormality.        Impression:       CONCLUSION:  No free air.  No mechanical bowel obstruction.    [id number]    Electronically signed by:  Ganesh Kyle MD  1/6/2020 5:21 PM [facility]  Workstation: [id number]            Assessment:    Active Hospital Problems    Diagnosis   • Non-intractable vomiting             Plan:  1.  Alcohol withdrawal: Patient with history of significant alcohol withdrawal.  She has a very longstanding history of heavy alcohol abuse.   She verbalizes the desire to stop drinking.  She is already starting to have withdrawal symptoms.  She has received Ativan in the emergency department.  Will place on CIWA scale with both scheduled and as needed Ativan.  I have discussed with the patient and her spouse that due to the volume of alcohol that she drinks, she may require intubation to get through her symptoms.  They verbalized understanding.  2.  Alcohol abuse: Patient with longstanding history of alcohol abuse.  She states that she wants to quit.  She is attempted multiple times in the past but has failed due to withdrawal symptoms.  3.  History of alcoholic pancreatitis  4.  Tobacco abuse  5.  DVT prophylaxis: SCDs.    I discussed the patient's findings and my recommendations with: Patient and spouse        This document has been electronically signed by Ant Saenz MD on January 6, 2020 7:12 PM                    Electronically signed by Ant Saenz MD at 01/06/20 2036          Emergency Department Notes      Han Perdomo MD at 01/06/20 1654          Subjective   Patient admits to daily alcohol use and states that she has not had a drink in the past 2days she is trying to quit drinking.  Presents emergency department with upper abdominal pain, nausea vomiting and believes that she may be having pancreatitis again.      Nausea   The primary symptoms include fatigue, abdominal pain, nausea and vomiting. Primary symptoms do not include fever, diarrhea, jaundice, dysuria, myalgias, arthralgias or rash. The illness began yesterday.   The illness does not include chills. Associated medical issues do not include gastric bypass or bowel resection.   Vomiting   The primary symptoms include fatigue, abdominal pain, nausea and vomiting. Primary symptoms do not include fever, diarrhea, jaundice, dysuria, myalgias, arthralgias or rash.   The illness does not include chills. Associated medical issues do not include gastric bypass or bowel  resection.   Abdominal Pain   Associated symptoms: fatigue, nausea and vomiting    Associated symptoms: no chest pain, no chills, no cough, no diarrhea, no dysuria, no fever, no shortness of breath and no sore throat        Review of Systems   Constitutional: Positive for activity change, appetite change and fatigue. Negative for chills, diaphoresis and fever.   HENT: Negative for congestion, ear discharge, ear pain, nosebleeds, rhinorrhea, sinus pressure, sore throat and trouble swallowing.    Eyes: Negative for discharge and redness.   Respiratory: Negative for apnea, cough, chest tightness, shortness of breath and wheezing.    Cardiovascular: Negative for chest pain.   Gastrointestinal: Positive for abdominal pain, nausea and vomiting. Negative for diarrhea and jaundice.   Endocrine: Negative for polyuria.   Genitourinary: Negative for dysuria, frequency and urgency.   Musculoskeletal: Negative for arthralgias, myalgias and neck pain.   Skin: Negative for color change and rash.   Allergic/Immunologic: Negative for immunocompromised state.   Neurological: Negative for dizziness, seizures, syncope, weakness, light-headedness and headaches.   Hematological: Negative for adenopathy. Does not bruise/bleed easily.   Psychiatric/Behavioral: Negative for behavioral problems and confusion.   All other systems reviewed and are negative.      Past Medical History:   Diagnosis Date   • Abnormal Pap smear of cervix    • Alcoholism (CMS/[facility])    • Anxiety    • Cervical dysplasia    • Depression    • Fibrocystic breast    • GERD (gastroesophageal reflux disease)    • Seizures (CMS/HCC) 2017   • Substance abuse (CMS/HCC)    • Substance abuse (CMS/[facility])        No Known Allergies    Past Surgical History:   Procedure Laterality Date   • RHINOPLASTY         Family History   Problem Relation Age of Onset   • Breast cancer Mother    • Cancer Mother    • COPD Mother    • Breast cancer Maternal Grandmother    • Breast cancer Paternal  Grandmother    • Breast cancer Maternal Aunt    • Hypertension Father    • Heart disease Father        Social History     Socioeconomic History   • Marital status:      Spouse name: Not on file   • Number of children: Not on file   • Years of education: Not on file   • Highest education level: Not on file   Tobacco Use   • Smoking status: Current Every Day Smoker     Packs/day: 1.00     Years: 20.00     Pack years: 20.00     Types: Cigarettes   • Smokeless tobacco: Never Used   Substance and Sexual Activity   • Alcohol use: Yes     Frequency: Never     Comment: patient states she usually drinks a fifth of 100 proof vodka daily, but has not in the last few days.   • Drug use: No     Types: Fentanyl, Oxycodone     Comment: daily use   • Sexual activity: Yes     Partners: Male     Birth control/protection: None           Objective   Physical Exam   Constitutional: She is oriented to person, place, and time. She appears well-developed and well-nourished.   HENT:   Head: Normocephalic and atraumatic.   Nose: Nose normal.   Mouth/Throat: Oropharynx is clear and moist.   Eyes: Pupils are equal, round, and reactive to light. Conjunctivae and EOM are normal. Right eye exhibits no discharge. Left eye exhibits no discharge. No scleral icterus.   Neck: Normal range of motion. Neck supple. No tracheal deviation present.   Cardiovascular: Regular rhythm and normal heart sounds. Tachycardia present.   No murmur heard.  Pulmonary/Chest: Effort normal and breath sounds normal. No stridor. No respiratory distress. She has no wheezes. She has no rales.   Abdominal: Soft. Bowel sounds are normal. She exhibits no distension and no mass. There is tenderness in the epigastric area and left upper quadrant. There is no rebound and no guarding.   Musculoskeletal: She exhibits no edema.   Neurological: She is alert and oriented to person, place, and time. She displays tremor (mild, hands). Coordination normal.   Skin: Skin is warm and  dry. No rash noted. No erythema.   Psychiatric: She has a normal mood and affect. Her behavior is normal. Thought content normal.   Nursing note and vitals reviewed.      Procedures          ED Course             Labs Reviewed   URINALYSIS W/ CULTURE IF INDICATED - Abnormal; Notable for the following components:       Result Value    Appearance, UA Slightly Cloudy (*)     Protein, UA 30 mg/dL (1+) (*)     Leuk Esterase, UA Trace (*)     All other components within normal limits   URINALYSIS, MICROSCOPIC ONLY - Abnormal; Notable for the following components:    RBC, UA 0-2 (*)     Bacteria, UA 3+ (*)     Squamous Epithelial Cells, UA 3-5 (*)     All other components within normal limits   COMPREHENSIVE METABOLIC PANEL - Abnormal; Notable for the following components:    Chloride 95 (*)     ALT (SGPT) 57 (*)     AST (SGOT) 62 (*)     Anion Gap 18.0 (*)     All other components within normal limits    Narrative:     GFR Normal >60  Chronic Kidney Disease <60  Kidney Failure <15     CBC WITH AUTO DIFFERENTIAL - Abnormal; Notable for the following components:    .3 (*)     MCH 36.5 (*)     Lymphocyte % 17.9 (*)     Monocyte % 4.8 (*)     Eosinophil % 0.2 (*)     Neutrophils, Absolute 7.01 (*)     All other components within normal limits   LIPASE - Normal   HCG, SERUM, QUALITATIVE - Normal   RAINBOW DRAW    Narrative:     The following orders were created for panel order [place] Draw.  Procedure                               Abnormality         Status                     ---------                               -----------         ------                     Light Blue Top[[id number]]                                   Final result               Green Top (Gel)[[id number]]                                  Final result               Lavender Top[[id number]]                                     Final result               Gold Top - SST[[id number]]                                                                Please view  results for these tests on the individual orders.   CBC AND DIFFERENTIAL    Narrative:     The following orders were created for panel order CBC & Differential.  Procedure                               Abnormality         Status                     ---------                               -----------         ------                     CBC Auto Differential[[id number]]        Abnormal            Final result                 Please view results for these tests on the individual orders.   LIGHT BLUE TOP   GREEN TOP   LAVENDER TOP   GOLD TOP - SST       XR Abdomen Flat & Upright   Final Result   CONCLUSION:   No free air.   No mechanical bowel obstruction.      [id number]      Electronically signed by:  Ganesh Kyle MD  1/6/2020 5:21 PM [facility]   Workstation: [id number]                                              [facility]    Final diagnoses:   Non-intractable vomiting with nausea, unspecified vomiting type   Alcohol withdrawal syndrome without complication (CMS/HCC)            Han Perdomo MD  01/06/20 1842       Han Perdomo MD  01/06/20 1844      Electronically signed by Han Perdomo MD at 01/06/20 1844          Physician Progress Notes (last 7 days) (Notes from 01/08/20 1430 through 01/15/20 1430)      Mckenzie Young MD at 01/13/20 1306              [facility] Medicine Services  INPATIENT PROGRESS NOTE    Length of Stay: 5  Date of Admission: 1/6/2020  Primary Care Physician: Provider, No Known    Subjective   Chief Complaint: Fever and alcohol withdrawal    HPI: Ms. Bain is a 34-year-old-year-old female with history of alcohol abuse with recurrent alcohol withdrawal who was admitted for alcohol withdrawal symptoms.  During the admission on 1/12/2020 patient developed high-grade fevers to 104 F.  Blood cultures growing E. coli with sensitivities pending.  She has been started on IV Zosyn.    This morning patient denies fevers and does complain of generalized  body pains and some abdominal pain.  She has nausea but denied vomiting and ate most of her breakfast this morning.    Review of Systems   Constitutional: Positive for fatigue and fever. Negative for activity change, appetite change, chills, diaphoresis and unexpected weight change.   HENT: Negative for congestion, dental problem, drooling, ear discharge, rhinorrhea, sinus pressure, sinus pain, sneezing, sore throat, tinnitus and trouble swallowing.    Eyes: Negative for photophobia, discharge, itching and visual disturbance.   Respiratory: Negative for apnea, cough, choking, chest tightness, shortness of breath, wheezing and stridor.    Cardiovascular: Negative for chest pain, palpitations and leg swelling.   Gastrointestinal: Positive for abdominal pain. Negative for abdominal distention, anal bleeding, blood in stool, constipation, diarrhea, nausea and vomiting.   Endocrine: Negative for cold intolerance and heat intolerance.   Genitourinary: Negative for difficulty urinating, dyspareunia, dysuria, enuresis, frequency, urgency, vaginal bleeding and vaginal discharge.   Musculoskeletal: Positive for arthralgias, back pain and myalgias. Negative for gait problem, joint swelling, neck pain and neck stiffness.   Skin: Negative for rash.   Allergic/Immunologic: Negative.    Neurological: Negative for dizziness, tremors, seizures, syncope, facial asymmetry, speech difficulty, weakness, light-headedness, numbness and headaches.   Psychiatric/Behavioral: Negative for agitation, behavioral problems, confusion, decreased concentration, dysphoric mood, hallucinations, self-injury and sleep disturbance. The patient is not nervous/anxious and is not hyperactive.      All pertinent negatives and positives are as above. All other systems have been reviewed and are negative unless otherwise stated.     Objective    Temp:  [96.5 °F (35.8 °C)-98.2 °F (36.8 °C)] 98.2 °F (36.8 °C)  Heart Rate:  [73-98] 76  Resp:  [18] 18  BP:  (126-142)/(77-97) 134/78    Physical Exam   Constitutional: She is oriented to person, place, and time. She appears well-developed and well-nourished.   HENT:   Head: Normocephalic and atraumatic.   Mouth/Throat: No oropharyngeal exudate.   Eyes: Pupils are equal, round, and reactive to light. EOM are normal. No scleral icterus.   Neck: Normal range of motion. No JVD present.   Cardiovascular: Normal rate, regular rhythm and normal heart sounds.   No murmur heard.  Pulmonary/Chest: Effort normal and breath sounds normal. No stridor. No respiratory distress. She has no wheezes. She has no rales. She exhibits no tenderness.   Abdominal: Soft. Bowel sounds are normal. She exhibits no distension. There is tenderness. There is no rebound and no guarding. No hernia.   Vague generalized tenderness.  No rebound.   Musculoskeletal: Normal range of motion. She exhibits no edema.   Neurological: She is alert and oriented to person, place, and time. She exhibits normal muscle tone.   Skin: Skin is warm and dry.     Results Review:  I have reviewed the labs, radiology results, and diagnostic studies.    Laboratory Data:   Results from last 7 days   Lab Units 01/13/20  0530 01/12/20  0440 01/11/20  0349  01/06/20  1442   SODIUM mmol/L 137 132* 135*   < > 138   POTASSIUM mmol/L 3.8 3.4* 3.9   < > 3.9   CHLORIDE mmol/L 106 98 102   < > 95*   CO2 mmol/L 22.0 22.0 21.0*   < > 25.0   BUN mg/dL 5* 8 9   < > 8   CREATININE mg/dL 0.58 0.60 0.61   < > 0.57   GLUCOSE mg/dL 97 113* 107*   < > 85   CALCIUM mg/dL 8.8 8.8 8.5*   < > 9.4   BILIRUBIN mg/dL  --   --   --   --  0.3   ALK PHOS U/L  --   --   --   --  84   ALT (SGPT) U/L  --   --   --   --  57*   AST (SGOT) U/L  --   --   --   --  62*   ANION GAP mmol/L 9.0 12.0 12.0   < > 18.0*    < > = values in this interval not displayed.     Estimated Creatinine Clearance: 134.6 mL/min (by C-G formula based on SCr of 0.58 mg/dL).          Results from last 7 days   Lab Units 01/13/20  0538  01/12/20  1031 01/12/20  0440 01/11/20  0349 01/10/20  0433   WBC 10*3/mm3 5.39 7.47 7.25 12.26* 10.14   HEMOGLOBIN g/dL 10.8* 11.5* 12.0 11.6* 13.0   HEMATOCRIT % 31.5* 32.5* 34.5 34.1 38.8   PLATELETS 10*3/mm3 103* 110* 108* 119* 160           Culture Data:   Blood Culture   Date Value Ref Range Status   01/12/2020 No growth at 24 hours  Preliminary   01/12/2020 No growth at 24 hours  Preliminary   01/12/2020 No growth at 24 hours  Preliminary   01/12/2020 Abnormal Stain (A)  Preliminary     Urine Culture   Date Value Ref Range Status   01/12/2020 <25,000 CFU/mL Escherichia coli (A)  Final     Comment:     Call if further workup needed.       No results found for: RESPCX  No results found for: WOUNDCX  No results found for: STOOLCX  No components found for: BODYFLD    Radiology Data:   Imaging Results (Last 24 Hours)     ** No results found for the last 24 hours. **          I have reviewed the patient's current medications.     Assessment/Plan     Active Hospital Problems    Diagnosis   • Alcohol withdrawal (CMS/HCC)   • Non-intractable vomiting         Recurrent Fevers        Hyponatremia        Thrombocytopenia         Anemia  Plan:      1. Sepsis/recurrent fevers: Likely secondary to urinary tract infection.  Blood cultures growing E. coli on urine culture as well.  Continue IV Zosyn.  Await sensitivities.  Plan to switch antibiotics to oral based on sensitivities and discharge in next 1 to 4 hours.  Discontinue vancomycin.    2.  Alcohol withdrawal: Continue patient on the CIWAS protocol.  Will wean Librium to 10 mg every 8 hours and keep IV Ativan PRN.  Plan to discharge to complete a Librium taper in the next 24 hours. Discontinue IV banana bag and with folic acid, thiamine orally.    3.  Nausea and vomiting.  Continue patient on Zofran as needed    3.  Generalized weakness/deconditioning.  This is likely secondary to withdrawal.  Will get physical therapy to work with patient.    4.  Hyponatremia: Mild,  continue IV fluids and monitor sodium levels with BMP    5.  Thrombocytopenia: Currently no signs of bleed, continue to monitor    6.  Macrocytic anemia: Likely due to B12 versus folate deficiency.  Replete B12 folic acid orally.    DVT prophylaxis with SCDs  CODE STATUS is full code    Discharge Planning: I expect patient to be discharged to home in 1 to 2 days.    Mckenzie Young MD    Electronically signed by Mckenzie Young MD at 01/13/20 1319     Larry Bob MD at 01/12/20 1436              [facility] Medicine Services  INPATIENT PROGRESS NOTE    Length of Stay: 4  Date of Admission: 1/6/2020  Primary Care Physician: Provider, No Known    Subjective   Chief Complaint: Fever and alcohol withdrawal    HPI: Ms. Bain is a 34-year-old-year-old female with history of alcohol abuse with recurrent alcohol withdrawal who was admitted for alcohol withdrawal symptoms.  Early this morning patient developed a fever of 102.6 around 3 AM and despite being given Tylenol patient spiked another fever of 104 around 4 AM.  Initial blood cultures will obtain by the on-call physician.  This morning during rounds patient is febrile is improving but still remains very weak report of some chills.  Denies any chest pain, shortness of breath, vision changes, palpitations, orthopnea, or hallucinations.    Review of Systems   Constitutional: Positive for chills, diaphoresis, fatigue and fever. Negative for activity change, appetite change and unexpected weight change.   HENT: Negative for congestion, dental problem, drooling, ear discharge, rhinorrhea, sinus pressure, sinus pain, sneezing, sore throat, tinnitus and trouble swallowing.    Eyes: Negative for photophobia, discharge, itching and visual disturbance.   Respiratory: Positive for cough. Negative for apnea, choking, chest tightness, shortness of breath, wheezing and stridor.    Cardiovascular: Negative for chest pain, palpitations  and leg swelling.   Gastrointestinal: Negative for abdominal distention, abdominal pain, anal bleeding, blood in stool, constipation, diarrhea, nausea and vomiting.   Endocrine: Negative for cold intolerance and heat intolerance.   Genitourinary: Positive for frequency. Negative for difficulty urinating, dyspareunia, dysuria, enuresis, urgency, vaginal bleeding and vaginal discharge.   Musculoskeletal: Positive for arthralgias, back pain and myalgias. Negative for gait problem, joint swelling, neck pain and neck stiffness.   Skin: Negative for rash.   Allergic/Immunologic: Negative.    Neurological: Negative for dizziness, tremors, seizures, syncope, facial asymmetry, speech difficulty, weakness, light-headedness, numbness and headaches.   Psychiatric/Behavioral: Positive for confusion. Negative for agitation, behavioral problems, decreased concentration, dysphoric mood, hallucinations, self-injury, sleep disturbance and suicidal ideas. The patient is nervous/anxious and is hyperactive.      All pertinent negatives and positives are as above. All other systems have been reviewed and are negative unless otherwise stated.     Objective    Temp:  [96 °F (35.6 °C)-104.1 °F (40.1 °C)] 96.5 °F (35.8 °C)  Heart Rate:  [] 74  Resp:  [14-18] 18  BP: (105-152)/(61-93) 105/68    Physical Exam   Constitutional: She appears well-developed and well-nourished.   HENT:   Head: Normocephalic and atraumatic.   Eyes: Pupils are equal, round, and reactive to light. EOM are normal.   Neck: Normal range of motion. No JVD present.   Pulmonary/Chest: No stridor. No respiratory distress. She has no wheezes. She has no rales. She exhibits no tenderness.   Abdominal: Soft. Bowel sounds are normal. She exhibits no distension. There is no tenderness. There is no rebound and no guarding. No hernia.   Skin: She is diaphoretic.   Psychiatric: Her mood appears anxious. She is slowed. Thought content is delusional. Cognition and memory are  normal. She does not exhibit a depressed mood. She is inattentive.       Results Review:  I have reviewed the labs, radiology results, and diagnostic studies.    Laboratory Data:   Results from last 7 days   Lab Units 01/12/20  0440 01/11/20  0349 01/10/20  0433  01/06/20  1442   SODIUM mmol/L 132* 135* 140   < > 138   POTASSIUM mmol/L 3.4* 3.9 4.3   < > 3.9   CHLORIDE mmol/L 98 102 100   < > 95*   CO2 mmol/L 22.0 21.0* 27.0   < > 25.0   BUN mg/dL 8 9 6   < > 8   CREATININE mg/dL 0.60 0.61 0.54*   < > 0.57   GLUCOSE mg/dL 113* 107* 91   < > 85   CALCIUM mg/dL 8.8 8.5* 9.6   < > 9.4   BILIRUBIN mg/dL  --   --   --   --  0.3   ALK PHOS U/L  --   --   --   --  84   ALT (SGPT) U/L  --   --   --   --  57*   AST (SGOT) U/L  --   --   --   --  62*   ANION GAP mmol/L 12.0 12.0 13.0   < > 18.0*    < > = values in this interval not displayed.     Estimated Creatinine Clearance: 130.5 mL/min (by C-G formula based on SCr of 0.6 mg/dL).          Results from last 7 days   Lab Units 01/12/20  1031 01/12/20  0440 01/11/20  0349 01/10/20  0433 01/09/20  0310   WBC 10*3/mm3 7.47 7.25 12.26* 10.14 5.94   HEMOGLOBIN g/dL 11.5* 12.0 11.6* 13.0 11.4*   HEMATOCRIT % 32.5* 34.5 34.1 38.8 33.4*   PLATELETS 10*3/mm3 110* 108* 119* 160 147           Culture Data:   No results found for: BLOODCX  No results found for: URINECX  No results found for: RESPCX  No results found for: WOUNDCX  No results found for: STOOLCX  No components found for: BODYFLD    Radiology Data:   Imaging Results (Last 24 Hours)     Procedure Component Value Units Date/Time    XR Chest 1 View [[id number]] Collected:  01/12/20 0923     Updated:  01/12/20 0951    Narrative:         PROCEDURE: Single chest view portable    REASON FOR EXAM:fever, R11.2 Nausea with vomiting, unspecified  F10.230 Alcohol dependence with withdrawal, uncomplicated    FINDINGS: Right PICC line versus midline central venous catheter  with tip in the region of the mid to distal right  subclavian  vein. Cardiac and pulmonary vasculature are normal. Lungs are  clear. Pleural spaces are normal. No acute osseous abnormality.      Impression:       1.  Right PICC line versus midline central venous catheter with  tip in the region of the mid to distal right subclavian vein.   2.  Otherwise unremarkable chest.    Electronically signed by:  Doug Calvo MD  1/12/2020 9:50 AM CST  Workstation: [id number]          I have reviewed the patient's current medications.     Assessment/Plan     Active Hospital Problems    Diagnosis   • Alcohol withdrawal (CMS/HCC)   • Non-intractable vomiting         Recurrent Fevers        Hyponatremia        Thrombocytopenia         Anemia  Plan:      1. Sepsis/recurrent fevers: This may be secondary to infectious etiology versus worsening alcohol withdrawal symptoms.  Due to concern for an infectious etiology blood cultures have been obtained, will obtain urinalysis and urine culture.  We will start the sepsis bundle with lactate levels obtained.  Start patient on IV fluids with initial 30 mill grams per kg and keep patient on normal saline at a rate of 125 cc an hour.  Chest x-ray obtained upon review shows no acute chest process.  Start patient on antibiotic coverage with vancomycin and Zosyn while awaiting for cultures to be available.  Will de-escalate antibiotic treatment if initial cultures showed no positive cultures.    2.  Alcohol withdrawal: Continue patient on the CIWAS protocol.  Start patient on Librium 10 mg every 6 hours as needed for withdrawal symptoms.  We will only keep Ativan 1 mg IV PRN for symptoms if not controlled with the Librium.  Continue banana bag with folic acid and thiamine.    3.  Nausea and vomiting.  Continue patient on Zofran as needed    3.  Generalized weakness/deconditioning.  This is likely secondary to withdrawal.  Will get physical therapy to work with patient.    4.  Hyponatremia: Mild, continue IV fluids and monitor sodium levels with  BMP    5.  Thrombocytopenia: Currently no signs of bleed, continue to monitor    6.  Macrocytic anemia: Likely due to B12 versus folate deficiency.  Obtain folate and B12 levels.        Continue patient on folic acid thiamine with the banana bag    The patient has one is at the bedside and therefore plans were discussed with the patient and  who agreed to the plan.      Discharge Planning: I expect patient to be discharged to home in 2 to 3 days days.    Larry Bob MD    Electronically signed by Larry Bob MD at 01/12/20 9877     Mateus Rosario MD at 01/11/20 8289              [facility] Medicine Services  INPATIENT PROGRESS NOTE    Length of Stay: 3  Date of Admission: 1/6/2020  Primary Care Physician: Provider, No Known    Subjective   Chief Complaint: Alcohol withdrawal  HPI:    Patient has been with polysubstance abuse and withdrawal.  He admits to drinking alcohol daily and also using multiple narcotic drugs off the street as well.    Review of Systems   Respiratory: Negative for shortness of breath.    Cardiovascular: Negative for chest pain.          All pertinent negatives and positives are as above. All other systems have been reviewed and are negative unless otherwise stated.     Objective    Temp:  [97.6 °F (36.4 °C)-98.6 °F (37 °C)] 98.5 °F (36.9 °C)  Heart Rate:  [] 94  Resp:  [10-20] 14  BP: ()/(54-93) 143/93  Physical Exam   Constitutional: She appears well-developed and well-nourished. No distress.   HENT:   Head: Normocephalic and atraumatic.   Cardiovascular: Normal rate.   Pulmonary/Chest: Effort normal. No respiratory distress. She has no wheezes.   Abdominal: Soft. She exhibits no distension.   Musculoskeletal: Normal range of motion. She exhibits no edema.   Neurological: She is alert. No cranial nerve deficit.   Skin: Skin is warm and dry. She is not diaphoretic.   Psychiatric: She has a normal mood and affect. Her behavior is  normal. Judgment and thought content normal.   Vitals reviewed.          Results Review:  I have reviewed the labs, radiology results, and diagnostic studies.    Laboratory Data:   Lab Results (last 24 hours)     Procedure Component Value Units Date/Time    CBC & Differential [[id number]] Collected:  01/11/20 0349    Specimen:  Blood Updated:  01/11/20 0521    Narrative:       The following orders were created for panel order CBC & Differential.  Procedure                               Abnormality         Status                     ---------                               -----------         ------                     CBC Auto Differential[[id number]]        Abnormal            Final result                 Please view results for these tests on the individual orders.    CBC Auto Differential [[id number]]  (Abnormal) Collected:  01/11/20 0349    Specimen:  Blood Updated:  01/11/20 0521     WBC 12.26 10*3/mm3      RBC 3.19 10*6/mm3      Hemoglobin 11.6 g/dL      Hematocrit 34.1 %      .9 fL      MCH 36.4 pg      MCHC 34.0 g/dL      RDW 12.8 %      RDW-SD 50.7 fl      MPV 9.7 fL      Platelets 119 10*3/mm3      Neutrophil % 82.2 %      Lymphocyte % 7.2 %      Monocyte % 9.2 %      Eosinophil % 0.3 %      Basophil % 0.5 %      Immature Grans % 0.6 %      Neutrophils, Absolute 10.08 10*3/mm3      Lymphocytes, Absolute 0.88 10*3/mm3      Monocytes, Absolute 1.13 10*3/mm3      Eosinophils, Absolute 0.04 10*3/mm3      Basophils, Absolute 0.06 10*3/mm3      Immature Grans, Absolute 0.07 10*3/mm3      nRBC 0.0 /100 WBC     Basic Metabolic Panel [[id number]]  (Abnormal) Collected:  01/11/20 0349    Specimen:  Blood Updated:  01/11/20 0503     Glucose 107 mg/dL      BUN 9 mg/dL      Creatinine 0.61 mg/dL      Sodium 135 mmol/L      Potassium 3.9 mmol/L      Chloride 102 mmol/L      CO2 21.0 mmol/L      Calcium 8.5 mg/dL      eGFR Non African Amer 113 mL/min/1.73      BUN/Creatinine Ratio 14.8     Anion Gap 12.0 mmol/L      Narrative:       GFR Normal >60  Chronic Kidney Disease <60  Kidney Failure <15      Urine Drug Screen - Urine, Clean Catch [[id number]]  (Abnormal) Collected:  01/10/20 1747    Specimen:  Urine, Clean Catch Updated:  01/10/20 1823     THC, Screen, Urine Negative     Phencyclidine (PCP), Urine Negative     Cocaine Screen, Urine Negative     Methamphetamine, Ur Negative     Opiate Screen Negative     Amphetamine Screen, Urine Negative     Benzodiazepine Screen, Urine Positive     Tricyclic Antidepressants Screen Negative     Methadone Screen, Urine Negative     Barbiturates Screen, Urine Negative     Oxycodone Screen, Urine Negative     Propoxyphene Screen Negative     Buprenorphine, Screen, Urine Positive    Narrative:       Cutoff For Drugs Screened:    Amphetamines               500 ng/ml  Barbiturates               200 ng/ml  Benzodiazepines            150 ng/ml  Cocaine                    150 ng/ml  Methadone                  200 ng/ml  Opiates                    100 ng/ml  Phencyclidine               25 ng/ml  THC                            50 ng/ml  Methamphetamine            500 ng/ml  Tricyclic Antidepressants  300 ng/ml  Oxycodone                  100 ng/ml  Propoxyphene               300 ng/ml  Buprenorphine               10 ng/ml    The normal value for all drugs tested is negative. This report includes unconfirmed screening results, with the cutoff values listed, to be used for medical treatment purposes only.  Unconfirmed results must not be used for non-medical purposes such as employment or legal testing.  Clinical consideration should be applied to any drug of abuse test, particularly when unconfirmed results are used.            Culture Data:   No results found for: BLOODCX  No results found for: URINECX  No results found for: RESPCX  No results found for: WOUNDCX  No results found for: STOOLCX  No components found for: BODYFLD    Radiology Data:   Imaging Results (Last 24 Hours)     Procedure  Component Value Units Date/Time    US Guided Vascular Access [[id number]] Resulted:  01/11/20 1221     Updated:  01/11/20 1221    Narrative:       This procedure was auto-finalized with no dictation required.    IR Insert Midline Without Port Pump 5 Plus [[id number]] Resulted:  01/11/20 1145     Updated:  01/11/20 1145    Narrative:       This procedure was auto-finalized with no dictation required.          I have reviewed the patient's current medications.     Assessment/Plan     Active Hospital Problems    Diagnosis   • Alcohol withdrawal (CMS/HCC)   • Non-intractable vomiting       Alcohol withdrawal-continue with CIWA protocol and continue with Ativan    Polysubstance abuse-continue with counseling    DVT prophylaxis-SCD          Mateus Rosario MD   01/11/20   5:37 PM      Electronically signed by Mateus Rosario MD at 01/11/20 2770     Ant Saenz MD at 01/10/20 1424              [facility] Medicine Services  INPATIENT PROGRESS NOTE    Length of Stay: 2  Date of Admission: 1/6/2020  Primary Care Physician: Provider, No Known    Subjective   Chief Complaint: Alcohol Withdrawal  HPI: Patient continues to have tremors and anxiety.  She is complaining of significant abdominal and back pain.  She is asking specifically for narcotics..    Review of Systems   Constitutional: Positive for activity change. Negative for appetite change, chills, fatigue, fever and unexpected weight change.   Respiratory: Negative for cough, choking, chest tightness, shortness of breath and wheezing.    Cardiovascular: Negative for chest pain, palpitations and leg swelling.   Gastrointestinal: Negative for abdominal pain, blood in stool, constipation, diarrhea, nausea and vomiting.   Genitourinary: Negative for dysuria, flank pain and hematuria.   Musculoskeletal: Positive for back pain and myalgias.   Neurological: Positive for tremors. Negative for dizziness, seizures, syncope, speech  difficulty, weakness, light-headedness, numbness and headaches.   Hematological: Does not bruise/bleed easily.   Psychiatric/Behavioral: Positive for agitation and decreased concentration. Negative for confusion. The patient is nervous/anxious.         All pertinent negatives and positives are as above. All other systems have been reviewed and are negative unless otherwise stated.     Objective    Temp:  [97.5 °F (36.4 °C)-98.8 °F (37.1 °C)] 98.6 °F (37 °C)  Heart Rate:  [] 83  Resp:  [14-20] 20  BP: (100-161)/() 132/84    Physical Exam   Constitutional: She appears well-developed and well-nourished.   HENT:   Head: Normocephalic and atraumatic.   Eyes: Pupils are equal, round, and reactive to light. EOM are normal.   Neck: Normal range of motion. Neck supple.   Cardiovascular: Normal rate, regular rhythm and normal heart sounds. Exam reveals no gallop and no friction rub.   No murmur heard.  Pulmonary/Chest: Effort normal and breath sounds normal. No respiratory distress. She has no wheezes. She has no rales. She exhibits no tenderness.   Abdominal: Soft. Bowel sounds are normal. She exhibits no distension. There is no tenderness. There is no guarding.   Musculoskeletal: She exhibits no edema.   Skin: Skin is warm and dry.   Psychiatric: Thought content normal. Her mood appears anxious. Her affect is angry and labile. Her speech is rapid and/or pressured and slurred. She is agitated and hyperactive. She is not aggressive and not actively hallucinating. Cognition and memory are impaired. She expresses impulsivity. She is inattentive.   Vitals reviewed.        Results Review:  I have reviewed the labs, radiology results, and diagnostic studies.    Laboratory Data:   Results from last 7 days   Lab Units 01/10/20  0433 01/09/20  0310 01/08/20  0510  01/06/20  1442   SODIUM mmol/L 140 139 138   < > 138   POTASSIUM mmol/L 4.3 3.8 3.9   < > 3.9   CHLORIDE mmol/L 100 107 104   < > 95*   CO2 mmol/L 27.0 24.0  25.0   < > 25.0   BUN mg/dL 6 5* 5*   < > 8   CREATININE mg/dL 0.54* 0.44* 0.42*   < > 0.57   GLUCOSE mg/dL 91 90 90   < > 85   CALCIUM mg/dL 9.6 8.2* 8.0*   < > 9.4   BILIRUBIN mg/dL  --   --   --   --  0.3   ALK PHOS U/L  --   --   --   --  84   ALT (SGPT) U/L  --   --   --   --  57*   AST (SGOT) U/L  --   --   --   --  62*   ANION GAP mmol/L 13.0 8.0 9.0   < > 18.0*    < > = values in this interval not displayed.     Estimated Creatinine Clearance: 144.6 mL/min (A) (by C-G formula based on SCr of 0.54 mg/dL (L)).          Results from last 7 days   Lab Units 01/10/20  0433 01/09/20  0310 01/08/20  0510 01/07/20  0509 01/06/20  1442   WBC 10*3/mm3 10.14 5.94 5.18 5.75 9.22   HEMOGLOBIN g/dL 13.0 11.4* 12.5 11.9* 14.6   HEMATOCRIT % 38.8 33.4* 36.3 34.5 41.7   PLATELETS 10*3/mm3 160 147 168 169 191           Culture Data:   No results found for: BLOODCX  No results found for: URINECX  No results found for: RESPCX  No results found for: WOUNDCX  No results found for: STOOLCX  No components found for: BODYFLD    Radiology Data:   Imaging Results (Last 24 Hours)     ** No results found for the last 24 hours. **          I have reviewed the patient's current medications.     Assessment/Plan     Active Hospital Problems    Diagnosis   • Alcohol withdrawal (CMS/HCC)   • Non-intractable vomiting       Plan:    1.  Alcohol withdrawal:  Patient with severe withdrawal symptoms.  Patient continues to decline intubation.  Will continue to do our best to appropriately treat the patient's withdrawal symptoms within the confines she has allowed.  2.  Chronic alcohol use  3.  Chronic Pain    Patient is adamant that she get narcotics for her abdominal and back pain.  She states that she is on Suboxone for pain at home.  The Suboxone that she is getting here (at the same as her home dose) is not effective.  She states that the Toradol is also not effective.  I expressed to her my concern on restarting her on narcotic analgesics  given that she has a history of opiate addiction and is on large doses of Ativan for her withdrawal.  She became very upset and told me that I did not know her.  When I asked her what she took prior to the initiation of Suboxone, she told me that she took roughly a dozen Lortab tens every day in addition to chewing a 75 mcg fentanyl patch every day.  I told her that she would not be able to get the amount of narcotics that it would take to make her comfortable.  She was at high risk of relapse with her opiate addiction.  Patient became very tearful and her  immediately raise his voice to me and to tell me that he did not appreciate me scolding his wife.  I again tried to explain to the patient and her  that my intentions were in her best interest.  I tried to help them understand that it was unsafe to attempt to add the amount of narcotics that she was going to require and that I had significant concerns about a relapse.  Patient and her  were both visibly upset.      Discharge Planning: I expect patient to be discharged to home in 5-7 days.        This document has been electronically signed by Ant Saenz MD on January 10, 2020 2:24 PM        Electronically signed by Ant Saenz MD at 01/10/20 1436     Ant Saenz MD at 01/09/20 1244              [facility] Medicine Services  INPATIENT PROGRESS NOTE    Length of Stay: 1  Date of Admission: 1/6/2020  Primary Care Physician: Provider, No Known    Subjective   Chief Complaint: Alcohol Withdrawal  HPI:  Patient still has significant tremulousness and anxiety.  She wants to get to the bedside commode.    Review of Systems   Constitutional: Positive for activity change. Negative for appetite change, chills, fatigue, fever and unexpected weight change.   Respiratory: Negative for cough, choking, chest tightness, shortness of breath and wheezing.    Cardiovascular: Negative for chest pain,  palpitations and leg swelling.   Gastrointestinal: Negative for abdominal pain, blood in stool, constipation, diarrhea, nausea and vomiting.   Genitourinary: Negative for dysuria, flank pain and hematuria.   Neurological: Positive for tremors. Negative for dizziness, seizures, syncope, speech difficulty, weakness, light-headedness, numbness and headaches.   Hematological: Does not bruise/bleed easily.   Psychiatric/Behavioral: Positive for agitation, confusion and decreased concentration. The patient is nervous/anxious.         All pertinent negatives and positives are as above. All other systems have been reviewed and are negative unless otherwise stated.     Objective    Temp:  [97.9 °F (36.6 °C)-98.7 °F (37.1 °C)] 98.6 °F (37 °C)  Heart Rate:  [] 78  Resp:  [12-23] 12  BP: (110-151)/(67-96) 131/90    Physical Exam   Constitutional: She appears well-developed and well-nourished.   HENT:   Head: Normocephalic and atraumatic.   Eyes: Pupils are equal, round, and reactive to light. EOM are normal.   Neck: Normal range of motion. Neck supple.   Cardiovascular: Normal rate, regular rhythm and normal heart sounds. Exam reveals no gallop and no friction rub.   No murmur heard.  Pulmonary/Chest: Effort normal and breath sounds normal. No respiratory distress. She has no wheezes. She has no rales. She exhibits no tenderness.   Abdominal: Soft. Bowel sounds are normal. She exhibits no distension. There is no tenderness. There is no guarding.   Musculoskeletal: She exhibits no edema.   Skin: Skin is warm and dry.   Psychiatric: Thought content normal. Her mood appears anxious. Her speech is rapid and/or pressured and slurred. She is agitated and hyperactive. She is not aggressive and not actively hallucinating. Cognition and memory are impaired. She expresses impulsivity. She is inattentive.   Vitals reviewed.        Results Review:  I have reviewed the labs, radiology results, and diagnostic studies.    Laboratory  Data:   Results from last 7 days   Lab Units 01/09/20  0310 01/08/20  0510 01/07/20  0509 01/06/20  1442   SODIUM mmol/L 139 138 137 138   POTASSIUM mmol/L 3.8 3.9 4.0 3.9   CHLORIDE mmol/L 107 104 102 95*   CO2 mmol/L 24.0 25.0 24.0 25.0   BUN mg/dL 5* 5* 10 8   CREATININE mg/dL 0.44* 0.42* 0.52* 0.57   GLUCOSE mg/dL 90 90 79 85   CALCIUM mg/dL 8.2* 8.0* 8.2* 9.4   BILIRUBIN mg/dL  --   --   --  0.3   ALK PHOS U/L  --   --   --  84   ALT (SGPT) U/L  --   --   --  57*   AST (SGOT) U/L  --   --   --  62*   ANION GAP mmol/L 8.0 9.0 11.0 18.0*     Estimated Creatinine Clearance: 188 mL/min (A) (by C-G formula based on SCr of 0.44 mg/dL (L)).          Results from last 7 days   Lab Units 01/09/20  0310 01/08/20  0510 01/07/20  0509 01/06/20  1442   WBC 10*3/mm3 5.94 5.18 5.75 9.22   HEMOGLOBIN g/dL 11.4* 12.5 11.9* 14.6   HEMATOCRIT % 33.4* 36.3 34.5 41.7   PLATELETS 10*3/mm3 147 168 169 191           Culture Data:   No results found for: BLOODCX  No results found for: URINECX  No results found for: RESPCX  No results found for: WOUNDCX  No results found for: STOOLCX  No components found for: BODYFLD    Radiology Data:   Imaging Results (Last 24 Hours)     ** No results found for the last 24 hours. **          I have reviewed the patient's current medications.     Assessment/Plan     Active Hospital Problems    Diagnosis   • Alcohol withdrawal (CMS/HCC)   • Non-intractable vomiting       Plan:    1.  Alcohol withdrawal:  Patient with severe withdrawal symptoms.  Patient continues to decline intubation.  Will continue to do our best to appropriately treat the patient's withdrawal symptoms within the confines she has allowed.  2.  Chronic alcohol use  3.  Chronic Pain              Discharge Planning: I expect patient to be discharged to home in 5-7 days.        This document has been electronically signed by Ant Saenz MD on January 9, 2020 12:44 PM        Electronically signed by Ant Saenz MD at 01/09/20  1257

## 2020-01-15 NOTE — PAYOR COMM NOTE
"Justina Romo   [facility]  phone [phone]  fax [phone]    Auth# [id number]    Shawnee Springer (34 y.o. Female)     Date of Birth Social Security Number Address Home Phone MRN    1985  [address] [phone] [id number]    Restoration Marital Status          None        Admission Date Admission Type Admitting Provider Attending Provider Department, Room/Bed    1/6/20 Emergency Rubens Liu MD  [address], 427/1    Discharge Date Discharge Disposition Discharge Destination        1/14/2020 Home or Self Care              Attending Provider:  (none)   Allergies:  No Known Allergies    Isolation:  None   Infection:  None   Code Status:  Prior    Ht:  172.7 cm (67.99\")   Wt:  61.7 kg (136 lb)    Admission Cmt:  None   Principal Problem:  Sepsis due to Gram negative bacteria (CMS/[facility]) [A41.50]                 Active Insurance as of 1/6/2020     Primary Coverage     Payor Plan Insurance Group Employer/Plan Group    [place] [id number]     Payor Plan Address Payor Plan Phone Number Payor Plan Fax Number Effective Dates    [employer] Claims Office - [address] [address]   1/1/2020 - None Entered    [facility] [id number]       Subscriber Name Subscriber Birth Date Member ID       SHAWNEE SPRINGER 1985 [id number]                 Emergency Contacts      (Rel.) Home Phone Work Phone Mobile Phone    Jorge Sanz (Spouse) [phone] -- [phone]    Joanna Springer (Mother) [phone] -- [phone]    Hilario Springer (Step Parent) [phone] -- [phone]            Discharge Summary    No notes of this type exist for this encounter.         Discharge Order (From admission, onward)     Start     Ordered    01/14/20 1434  Discharge patient  Once     Expected Discharge Date:  01/14/20    Expected Discharge Time:  Afternoon    Discharge Disposition:  Home or Self Care  "   Physician of Record for Attribution - Please select from Treatment Team:  NEO ROSARIO [[id number]]    Review needed by CMO to determine Physician of Record:  No       Question Answer Comment   Physician of Record for Attribution - Please select from Treatment Team NEO ROSARIO    Review needed by CMO to determine Physician of Record No        01/14/20 1449

## 2020-01-16 LAB
BACTERIA SPEC AEROBE CULT: ABNORMAL
GRAM STN SPEC: ABNORMAL
ISOLATED FROM: ABNORMAL

## 2020-01-17 LAB
BACTERIA SPEC AEROBE CULT: NORMAL
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
## 2020-01-23 ENCOUNTER — OFFICE VISIT (OUTPATIENT)
Dept: FAMILY MEDICINE CLINIC | Facility: CLINIC | Age: 35
End: 2020-01-23

## 2020-01-23 VITALS
OXYGEN SATURATION: 98 % | SYSTOLIC BLOOD PRESSURE: 126 MMHG | BODY MASS INDEX: 21.82 KG/M2 | DIASTOLIC BLOOD PRESSURE: 82 MMHG | WEIGHT: 139 LBS | HEART RATE: 94 BPM | TEMPERATURE: 96.1 F | HEIGHT: 67 IN

## 2020-01-23 DIAGNOSIS — Z09 HOSPITAL DISCHARGE FOLLOW-UP: Primary | ICD-10-CM

## 2020-01-23 DIAGNOSIS — M54.50 CHRONIC MIDLINE LOW BACK PAIN WITHOUT SCIATICA: ICD-10-CM

## 2020-01-23 DIAGNOSIS — G89.29 CHRONIC MIDLINE LOW BACK PAIN WITHOUT SCIATICA: ICD-10-CM

## 2020-01-23 PROCEDURE — 99213 OFFICE O/P EST LOW 20 MIN: CPT | Performed by: STUDENT IN AN ORGANIZED HEALTH CARE EDUCATION/TRAINING PROGRAM

## 2020-01-23 RX ORDER — IBUPROFEN 600 MG/1
600 TABLET ORAL EVERY 6 HOURS PRN
Qty: 60 TABLET | Refills: 0 | Status: SHIPPED | OUTPATIENT
Start: 2020-01-23 | End: 2020-02-07

## 2020-01-23 RX ORDER — DIAZEPAM 5 MG/1
10 TABLET ORAL EVERY 8 HOURS PRN
COMMUNITY
Start: 2020-01-16 | End: 2021-04-12 | Stop reason: HOSPADM

## 2020-01-23 NOTE — PROGRESS NOTES
I have seen the patient.  I have reviewed the notes, assessments, and/or procedures performed by *Yosi Almaguer MD  ** during office visit I concur with her/his documentation and assessment and plan for Nata Bain.              This document has been electronically signed by George Fofana MD on January 23, 2020 3:22 PM

## 2020-01-29 ENCOUNTER — DOCUMENTATION (OUTPATIENT)
Dept: INTERNAL MEDICINE | Facility: HOSPITAL | Age: 35
End: 2020-01-29

## 2020-02-20 NOTE — PROGRESS NOTES
"    Family Medicine Residency  Yosi Almaguer MD    Subjective:     Nata Bain is a 35 y.o. female who presents for management of     Alcohol dependence syndrome  -At last office visit on 1/23/2020-patient reported that she was given Valium 5 mg 3 times daily for the week and plan to taper to 2 mg 3 times daily the following week.   -patient is going to [facility]   - patient reported that she is still valium 5 mg TID   -next appointment is on Thursday     -Patient reports seeing a therapist at [facility]    Chronic back pain  -Lumbar XR images obtained revealed   \" - Fracture(s): None    - Alignment: minimal levoscoliosis without abrupt step-offs.  There is also minimal, grade 1 retrolisthesis of L4 on L5.    - Disc space heights: Within normal limits for age      - Focal osteolytic/blastic: None      - Bone density:  Grossly within normal limits for age    - Misc.:  Lower lumbar facet arthropathy is present\"   (Copied from Dr. Ibarra's x-ray report on 1/23/2020)  -still reports lumbar pain rated at a 5-6 out of 10.  Describes pain as a throbbing dull sensation.  -denies Bowel or urinary incontinence.    Anxiety   -Patient stated \"My nerves are shot, I do not know what to do with my son \".  -Patient endorsed being under much more stress and anxiety regarding her health and her son.  Patient endorsed having to call the police on her 8-year-old child.  -currently on Zoloft  25 mg   -Patient sees therapist at [facility] however endorsed that she would like to see a different therapist.    UTI  -Patient reported that her urine started to have a strong odor a few weeks ago.    -Endorsed that she has history of recurrent UTIs and that her UTIs are always associated with a strong odor.  -Denies increased frequency or dysuria     Patient denied chest pain, chest pressure, SOA, fever, chills, n/v or diarrhea at this time.     The following portions of the patient's history were reviewed and " updated as appropriate: allergies, current medications, past family history, past medical history, past social history, past surgical history and problem list.    Past Medical Hx:  Past Medical History:   Diagnosis Date   • Abnormal Pap smear of cervix    • Alcoholism (CMS/[facility])    • Anxiety    • Cervical dysplasia    • Depression    • Fibrocystic breast    • GERD (gastroesophageal reflux disease)    • Seizures (CMS/[facility]) 2017   • Substance abuse (CMS/[facility])    • Substance abuse (CMS/[facility])        Past Surgical Hx:  Past Surgical History:   Procedure Laterality Date   • RHINOPLASTY         Current Meds:    Current Outpatient Medications:   •  albuterol (PROVENTIL) (2.5 MG/3ML) 0.083% nebulizer solution, Take 2.5 mg by nebulization Every 6 (Six) Hours As Needed for Wheezing or Shortness of Air., Disp: 150 mL, Rfl: 0  •  buprenorphine-naloxone (SUBOXONE) 8-2 MG per SL tablet, Place 3 tablets under the tongue Daily., Disp: , Rfl:   •  diazePAM (VALIUM) 5 MG tablet, , Disp: , Rfl:   •  folic acid (FOLVITE) 1 MG tablet, Take 1 tablet by mouth Daily., Disp: 30 tablet, Rfl: 0  •  gabapentin (NEURONTIN) 300 MG capsule, Take 1 capsule by mouth 3 (Three) Times a Day., Disp: , Rfl:   •  ondansetron (ZOFRAN) 4 MG tablet, Take 1 tablet by mouth Every 8 (Eight) Hours As Needed for Nausea or Vomiting., Disp: 21 tablet, Rfl: 0  •  sertraline (ZOLOFT) 25 MG tablet, Take 1 tablet by mouth Daily., Disp: 30 tablet, Rfl: 0  •  thiamine (VITAMIN B1) 100 MG tablet, Take 1 tablet by mouth Daily., Disp: 30 tablet, Rfl: 0  •  vitamin B-12 (VITAMIN B-12) 1000 MCG tablet, Take 1 tablet by mouth Daily., Disp: 30 tablet, Rfl: 0  •  sulfamethoxazole-trimethoprim (BACTRIM DS) 800-160 MG per tablet, Take 1 tablet by mouth 2 (Two) Times a Day for 7 days., Disp: 14 tablet, Rfl: 0    Allergies:  No Known Allergies    Family Hx:  Family History   Problem Relation Age of Onset   • Breast cancer Mother    • Cancer Mother    • COPD Mother    • Breast cancer  Maternal Grandmother    • Breast cancer Paternal Grandmother    • Breast cancer Maternal Aunt    • Hypertension Father    • Heart disease Father         Social History:  Social History     Socioeconomic History   • Marital status:      Spouse name: Not on file   • Number of children: Not on file   • Years of education: Not on file   • Highest education level: Not on file   Tobacco Use   • Smoking status: Current Every Day Smoker     Packs/day: 1.00     Years: 20.00     Pack years: 20.00     Types: Cigarettes   • Smokeless tobacco: Never Used   Substance and Sexual Activity   • Alcohol use: Not Currently     Frequency: Never   • Drug use: No     Types: Fentanyl, Oxycodone     Comment: daily use   • Sexual activity: Yes     Partners: Male     Birth control/protection: None       Review of Systems  Review of Systems   Constitutional: Negative for activity change, appetite change, chills, fatigue and fever.   HENT: Negative for congestion, hearing loss, sinus pressure, sinus pain, sneezing, sore throat and trouble swallowing.    Eyes: Negative for pain, discharge and itching.   Respiratory: Negative for apnea, cough, choking, chest tightness, shortness of breath, wheezing and stridor.    Cardiovascular: Negative for chest pain, palpitations and leg swelling.   Gastrointestinal: Negative for abdominal distention, abdominal pain, anal bleeding, constipation, diarrhea, nausea and vomiting.   Genitourinary: Negative for difficulty urinating, dyspareunia, dysuria, enuresis and flank pain.   Musculoskeletal: Positive for back pain. Negative for arthralgias and gait problem.   Skin: Negative for color change.   Neurological: Positive for headaches. Negative for dizziness, seizures, syncope, facial asymmetry, light-headedness and numbness.   Psychiatric/Behavioral: Negative for agitation and behavioral problems. The patient is nervous/anxious.        Objective:     /88   Pulse 103   Temp 98.1 °F (36.7 °C)  "(Tympanic)   Ht 170.2 cm (67\")   Wt 61.5 kg (135 lb 8 oz)   SpO2 98%   Breastfeeding No   BMI 21.22 kg/m²   Physical Exam   Constitutional: She is oriented to person, place, and time. She appears well-developed and well-nourished. No distress.   HENT:   Head: Normocephalic and atraumatic.   Right Ear: External ear normal.   Left Ear: External ear normal.   Nose: Nose normal.   Mouth/Throat: Oropharynx is clear and moist.   Eyes: Pupils are equal, round, and reactive to light. EOM are normal. Right eye exhibits no discharge. Left eye exhibits no discharge.   Neck: Normal range of motion. Neck supple. No tracheal deviation present. No thyromegaly present.   Cardiovascular: Normal rate, regular rhythm, normal heart sounds and intact distal pulses.   Pulmonary/Chest: Effort normal and breath sounds normal. No stridor. No respiratory distress. She has no wheezes. She has no rales.   Abdominal: Soft. Bowel sounds are normal. She exhibits no distension. There is no tenderness.   Musculoskeletal: Normal range of motion. She exhibits no edema, tenderness or deformity.   Lymphadenopathy:     She has no cervical adenopathy.   Neurological: She is alert and oriented to person, place, and time. No cranial nerve deficit.   Skin: Skin is warm and dry. She is not diaphoretic.   Psychiatric: Her mood appears anxious.        Assessment/Plan:     Nata was seen today for back pain.    Diagnoses and all orders for this visit:    Chronic midline low back pain without sciatica  -     MRI Lumbar Spine Without Contrast; Future  -     Ambulatory Referral to Orthopedic Surgery  -Due to on relenting nature of back pain, will make referral to orthopedic surgery and further imaging    Anxiety  -     Ambulatory Referral to Psychology  -As patient voiced desire to speak with a different therapist, referral was made to see Dr. Calabrese    Acute cystitis without hematuria  -     POCT urinalysis dipstick, automated-UA revealed trace blood, " negative proteins, negative nitrites but trace leukocytes  -     sulfamethoxazole-trimethoprim (BACTRIM DS) 800-160 MG per tablet; Take 1 tablet by mouth 2 (Two) Times a Day for 7 days.  Due to history of recurrent UTIs, decision was made to start antibiotic therapy empirically.      · Rx changes: refer to A/P  · Patient Education:  Medication compliance and advised lifestyle and dietary modifications  · Compliance at present is estimated to be excellent.     Reviewed labs, patient voiced understanding of results.   I reviewed this patients previous chart in order to optimize patient care.   Discussed risks and benefits of continued treatment. Patient voiced understanding.    Follow-up:     Return in about 2 weeks (around 3/6/2020).    Goals: Resolution of UTI  Barriers:none     Preventative:  Health Maintenance   Topic Date Due   • ANNUAL PHYSICAL  02/04/1989   • TDAP/TD VACCINES (2 - Tdap) 02/04/1997   • PNEUMOCOCCAL VACCINE (19-64 MEDIUM RISK) (1 of 1 - PPSV23) 02/04/2005   • PAP SMEAR  10/10/2021   • INFLUENZA VACCINE  Addressed       Female Preventative: Does monthly breast self examination    Social History     Tobacco Use   • Smoking status: Current Every Day Smoker     Packs/day: 1.00     Years: 20.00     Pack years: 20.00     Types: Cigarettes   • Smokeless tobacco: Never Used   Substance Use Topics   • Alcohol use: Not Currently     Frequency: Never       Patient's Body mass index is 21.22 kg/m². BMI is within normal parameters. No follow-up required..   eat more fruits and vegetables, decrease soda or juice intake, increase water intake and reduce fast food intake    RISK SCORE: 3    Signed,   Yosi Almaguer MD  Family Medicine Resident PGY2  [facility]        This document has been electronically signed by Yosi Almaguer MD on February 21, 2020 12:59 PM    Part of this note may be an electronic transcription/translation of spoken language to printed text using the Dragon Dictation System

## 2020-02-21 ENCOUNTER — OFFICE VISIT (OUTPATIENT)
Dept: FAMILY MEDICINE CLINIC | Facility: CLINIC | Age: 35
End: 2020-02-21

## 2020-02-21 VITALS
BODY MASS INDEX: 21.27 KG/M2 | HEIGHT: 67 IN | WEIGHT: 135.5 LBS | HEART RATE: 103 BPM | SYSTOLIC BLOOD PRESSURE: 122 MMHG | DIASTOLIC BLOOD PRESSURE: 88 MMHG | OXYGEN SATURATION: 98 % | TEMPERATURE: 98.1 F

## 2020-02-21 DIAGNOSIS — F41.9 ANXIETY: ICD-10-CM

## 2020-02-21 DIAGNOSIS — G89.29 CHRONIC MIDLINE LOW BACK PAIN WITHOUT SCIATICA: Primary | ICD-10-CM

## 2020-02-21 DIAGNOSIS — M54.50 CHRONIC MIDLINE LOW BACK PAIN WITHOUT SCIATICA: Primary | ICD-10-CM

## 2020-02-21 DIAGNOSIS — N30.00 ACUTE CYSTITIS WITHOUT HEMATURIA: ICD-10-CM

## 2020-02-21 LAB
BILIRUB BLD-MCNC: NEGATIVE MG/DL
CLARITY, POC: CLEAR
COLOR UR: ABNORMAL
GLUCOSE UR STRIP-MCNC: NEGATIVE MG/DL
KETONES UR QL: NEGATIVE
LEUKOCYTE EST, POC: ABNORMAL
NITRITE UR-MCNC: NEGATIVE MG/ML
PH UR: 6.5 [PH] (ref 5–8)
PROT UR STRIP-MCNC: NEGATIVE MG/DL
RBC # UR STRIP: ABNORMAL /UL
SP GR UR: 1.01 (ref 1–1.03)
UROBILINOGEN UR QL: NORMAL

## 2020-02-21 PROCEDURE — 99213 OFFICE O/P EST LOW 20 MIN: CPT | Performed by: STUDENT IN AN ORGANIZED HEALTH CARE EDUCATION/TRAINING PROGRAM

## 2020-02-21 RX ORDER — BUPRENORPHINE HYDROCHLORIDE AND NALOXONE HYDROCHLORIDE DIHYDRATE 8; 2 MG/1; MG/1
1 TABLET SUBLINGUAL 3 TIMES DAILY
COMMUNITY
Start: 2020-02-20

## 2020-02-21 RX ORDER — GABAPENTIN 300 MG/1
1 CAPSULE ORAL 3 TIMES DAILY
COMMUNITY
Start: 2020-02-20 | End: 2020-03-28

## 2020-02-21 RX ORDER — SULFAMETHOXAZOLE AND TRIMETHOPRIM 800; 160 MG/1; MG/1
1 TABLET ORAL 2 TIMES DAILY
Qty: 14 TABLET | Refills: 0 | Status: SHIPPED | OUTPATIENT
Start: 2020-02-21 | End: 2020-02-28

## 2020-02-21 NOTE — PROGRESS NOTES
I have seen the patient.  I have reviewed the notes, assessments, and/or procedures performed by Yosi Almaguer MD, I concur with her/his documentation and assessment and plan for Nata Bain.               This document has been electronically signed by Humble Hernandez MD on February 21, 2020 4:44 PM

## 2020-02-26 ENCOUNTER — APPOINTMENT (OUTPATIENT)
Dept: MRI IMAGING | Facility: HOSPITAL | Age: 35
End: 2020-02-26

## 2020-03-06 ENCOUNTER — HOSPITAL ENCOUNTER (EMERGENCY)
Facility: HOSPITAL | Age: 35
Discharge: HOME | End: 2020-03-06
Attending: FAMILY MEDICINE | Admitting: FAMILY MEDICINE

## 2020-03-06 VITALS
OXYGEN SATURATION: 98 % | HEIGHT: 68 IN | WEIGHT: 130 LBS | TEMPERATURE: 98.1 F | RESPIRATION RATE: 18 BRPM | BODY MASS INDEX: 19.7 KG/M2 | DIASTOLIC BLOOD PRESSURE: 79 MMHG | HEART RATE: 87 BPM | SYSTOLIC BLOOD PRESSURE: 135 MMHG

## 2020-03-06 DIAGNOSIS — F10.920 ALCOHOLIC INTOXICATION WITHOUT COMPLICATION (HCC): Primary | ICD-10-CM

## 2020-03-06 LAB
ALBUMIN SERPL-MCNC: 4.5 G/DL (ref 3.5–5.2)
ALBUMIN/GLOB SERPL: 1.4 G/DL
ALP SERPL-CCNC: 87 U/L (ref 39–117)
ALT SERPL W P-5'-P-CCNC: 13 U/L (ref 1–33)
AMPHET+METHAMPHET UR QL: NEGATIVE
AMPHETAMINES UR QL: NEGATIVE
AMYLASE SERPL-CCNC: 60 U/L (ref 28–100)
ANION GAP SERPL CALCULATED.3IONS-SCNC: 14 MMOL/L (ref 5–15)
APAP SERPL-MCNC: <5 MCG/ML (ref 10–30)
AST SERPL-CCNC: 25 U/L (ref 1–32)
B-HCG UR QL: NEGATIVE
BACTERIA UR QL AUTO: ABNORMAL /HPF
BARBITURATES UR QL SCN: NEGATIVE
BASOPHILS # BLD AUTO: 0.06 10*3/MM3 (ref 0–0.2)
BASOPHILS NFR BLD AUTO: 0.6 % (ref 0–1.5)
BENZODIAZ UR QL SCN: POSITIVE
BILIRUB SERPL-MCNC: 0.2 MG/DL (ref 0.2–1.2)
BILIRUB UR QL STRIP: NEGATIVE
BUN BLD-MCNC: 15 MG/DL (ref 6–20)
BUN/CREAT SERPL: 31.3 (ref 7–25)
BUPRENORPHINE SERPL-MCNC: POSITIVE NG/ML
CALCIUM SPEC-SCNC: 8.8 MG/DL (ref 8.6–10.5)
CANNABINOIDS SERPL QL: NEGATIVE
CHLORIDE SERPL-SCNC: 103 MMOL/L (ref 98–107)
CLARITY UR: CLEAR
CO2 SERPL-SCNC: 24 MMOL/L (ref 22–29)
COCAINE UR QL: NEGATIVE
COLOR UR: YELLOW
CREAT BLD-MCNC: 0.48 MG/DL (ref 0.57–1)
DEPRECATED RDW RBC AUTO: 44.1 FL (ref 37–54)
EOSINOPHIL # BLD AUTO: 0.03 10*3/MM3 (ref 0–0.4)
EOSINOPHIL NFR BLD AUTO: 0.3 % (ref 0.3–6.2)
ERYTHROCYTE [DISTWIDTH] IN BLOOD BY AUTOMATED COUNT: 12.5 % (ref 12.3–15.4)
ETHANOL BLD-MCNC: 313 MG/DL (ref 0–10)
ETHANOL UR QL: 0.31 %
GFR SERPL CREATININE-BSD FRML MDRD: 148 ML/MIN/1.73
GLOBULIN UR ELPH-MCNC: 3.3 GM/DL
GLUCOSE BLD-MCNC: 96 MG/DL (ref 65–99)
GLUCOSE UR STRIP-MCNC: NEGATIVE MG/DL
HCT VFR BLD AUTO: 41 % (ref 34–46.6)
HGB BLD-MCNC: 14.5 G/DL (ref 12–15.9)
HGB UR QL STRIP.AUTO: NEGATIVE
HOLD SPECIMEN: NORMAL
HOLD SPECIMEN: NORMAL
HYALINE CASTS UR QL AUTO: ABNORMAL /LPF
IMM GRANULOCYTES # BLD AUTO: 0.02 10*3/MM3 (ref 0–0.05)
IMM GRANULOCYTES NFR BLD AUTO: 0.2 % (ref 0–0.5)
KETONES UR QL STRIP: NEGATIVE
LEUKOCYTE ESTERASE UR QL STRIP.AUTO: NEGATIVE
LIPASE SERPL-CCNC: 75 U/L (ref 13–60)
LYMPHOCYTES # BLD AUTO: 4.44 10*3/MM3 (ref 0.7–3.1)
LYMPHOCYTES NFR BLD AUTO: 45.5 % (ref 19.6–45.3)
MCH RBC QN AUTO: 34.2 PG (ref 26.6–33)
MCHC RBC AUTO-ENTMCNC: 35.4 G/DL (ref 31.5–35.7)
MCV RBC AUTO: 96.7 FL (ref 79–97)
METHADONE UR QL SCN: NEGATIVE
MONOCYTES # BLD AUTO: 0.45 10*3/MM3 (ref 0.1–0.9)
MONOCYTES NFR BLD AUTO: 4.6 % (ref 5–12)
NEUTROPHILS # BLD AUTO: 4.75 10*3/MM3 (ref 1.7–7)
NEUTROPHILS NFR BLD AUTO: 48.8 % (ref 42.7–76)
NITRITE UR QL STRIP: POSITIVE
NRBC BLD AUTO-RTO: 0 /100 WBC (ref 0–0.2)
OPIATES UR QL: NEGATIVE
OXYCODONE UR QL SCN: NEGATIVE
PCP UR QL SCN: NEGATIVE
PH UR STRIP.AUTO: 6 [PH] (ref 5–9)
PLATELET # BLD AUTO: 232 10*3/MM3 (ref 140–450)
PMV BLD AUTO: 8.7 FL (ref 6–12)
POTASSIUM BLD-SCNC: 3.9 MMOL/L (ref 3.5–5.2)
PROPOXYPH UR QL: NEGATIVE
PROT SERPL-MCNC: 7.8 G/DL (ref 6–8.5)
PROT UR QL STRIP: ABNORMAL
RBC # BLD AUTO: 4.24 10*6/MM3 (ref 3.77–5.28)
RBC # UR: ABNORMAL /HPF
REF LAB TEST METHOD: ABNORMAL
SALICYLATES SERPL-MCNC: <0.3 MG/DL
SODIUM BLD-SCNC: 141 MMOL/L (ref 136–145)
SP GR UR STRIP: 1.02 (ref 1–1.03)
SQUAMOUS #/AREA URNS HPF: ABNORMAL /HPF
TRICYCLICS UR QL SCN: NEGATIVE
UROBILINOGEN UR QL STRIP: ABNORMAL
WBC NRBC COR # BLD: 9.75 10*3/MM3 (ref 3.4–10.8)
WBC UR QL AUTO: ABNORMAL /HPF
WHOLE BLOOD HOLD SPECIMEN: NORMAL
WHOLE BLOOD HOLD SPECIMEN: NORMAL

## 2020-03-06 PROCEDURE — 96361 HYDRATE IV INFUSION ADD-ON: CPT

## 2020-03-06 PROCEDURE — 99284 EMERGENCY DEPT VISIT MOD MDM: CPT

## 2020-03-06 PROCEDURE — 81025 URINE PREGNANCY TEST: CPT | Performed by: NURSE PRACTITIONER

## 2020-03-06 PROCEDURE — 80307 DRUG TEST PRSMV CHEM ANLYZR: CPT | Performed by: NURSE PRACTITIONER

## 2020-03-06 PROCEDURE — 80053 COMPREHEN METABOLIC PANEL: CPT | Performed by: NURSE PRACTITIONER

## 2020-03-06 PROCEDURE — 96374 THER/PROPH/DIAG INJ IV PUSH: CPT

## 2020-03-06 PROCEDURE — 96376 TX/PRO/DX INJ SAME DRUG ADON: CPT

## 2020-03-06 PROCEDURE — 85025 COMPLETE CBC W/AUTO DIFF WBC: CPT | Performed by: NURSE PRACTITIONER

## 2020-03-06 PROCEDURE — 25010000002 LORAZEPAM PER 2 MG: Performed by: NURSE PRACTITIONER

## 2020-03-06 PROCEDURE — 83690 ASSAY OF LIPASE: CPT | Performed by: NURSE PRACTITIONER

## 2020-03-06 PROCEDURE — 81001 URINALYSIS AUTO W/SCOPE: CPT | Performed by: NURSE PRACTITIONER

## 2020-03-06 PROCEDURE — 82150 ASSAY OF AMYLASE: CPT | Performed by: NURSE PRACTITIONER

## 2020-03-06 RX ORDER — LORAZEPAM 2 MG/ML
1 INJECTION INTRAMUSCULAR ONCE
Status: COMPLETED | OUTPATIENT
Start: 2020-03-06 | End: 2020-03-06

## 2020-03-06 RX ORDER — NITROFURANTOIN 25; 75 MG/1; MG/1
100 CAPSULE ORAL ONCE
Status: COMPLETED | OUTPATIENT
Start: 2020-03-06 | End: 2020-03-06

## 2020-03-06 RX ADMIN — NITROFURANTOIN MONOHYDRATE/MACROCRYSTALLINE 100 MG: 25; 75 CAPSULE ORAL at 19:21

## 2020-03-06 RX ADMIN — SODIUM CHLORIDE 1000 ML: 900 INJECTION, SOLUTION INTRAVENOUS at 19:20

## 2020-03-06 RX ADMIN — LORAZEPAM 1 MG: 2 INJECTION INTRAMUSCULAR; INTRAVENOUS at 19:21

## 2020-03-06 RX ADMIN — LORAZEPAM 1 MG: 2 INJECTION INTRAMUSCULAR; INTRAVENOUS at 21:50

## 2020-03-06 NOTE — ED NOTES
Pt reports last drink was 2am and pt usually drinks a 5th of vodka daily.     Goldie Barrera  03/06/20 7110

## 2020-03-07 NOTE — ED NOTES
As  I responded to room 5 in the Emergency as requested by the patient and her  . The couple were both noticeably upset that she(pt) was not going to be admitted to the hospital for Detox from chronic alcohol consumption. I apologized to them for any misunderstanding and explained as informed by their provider along with case management that our facility does not provide a program for detox at this time. I reiterated the fact that the best plan of care would be for her to contact the facility at which case management had arranged a screening on their behalf. They refused this plan due to the distance (approx. 1.5 hrs from [place]) away of this detox facility. I again explained that a detox facility would be the best plan of care and that based on the patients condition she did not meet criteria per the ER medical staff for admission to this hospital at this time. I explained to them both of the s/s of withdrawal from ETOH and how important it would be to properly detox at an appropriate facility.I instructed the patient and her  if they choose not to travel to the detox center we contacted or any other  that they need to immediately return to the ED if she experiences any of the s/s peviously explained. The patient will be given a list of these s/s and the information on the facility we contacted as well as other facilities in the surrounding area.     Yosi Murray RN  03/06/20 8044

## 2020-03-07 NOTE — ED NOTES
ARNEL YA d/w me pt wants admission for alcohol detox and she explained to her we do not offer that service here. I called [facility] and spoke with Zhou and he said pt needs to call them and do a prescreen over the phone and then they will d/w her details . Pt must call and be coherent per [facility] staff. I d/w Joshua YA . We will d/w pt.

## 2020-03-07 NOTE — ED NOTES
"I went with Joshua YA to pt's room to d/w pt and her  dcpoc. We d/w them pt doesn't meet criteria for admission at this time and explained we do not do detox or rehab. I d/w [facility] [place], [place] has beds and provided them with the number to call for prescreen. Pt and  do not want to go there because \"it's too far\".  works and said an hour and a half is just too far to go. I explained to them again there is nothing closer . I also provided them with a complete packet with lists of other facilities. Pt said she has been to [facility] 3 times in the past. They said  at the Suboxone clinic in [place] told them to come to the ER and she would be admitted. Pt and  understand pt is being d/c'd and they wanted to speak to the house supervisor. We called and d/w VIJAY Murray RN house supervisor to go speak with them.   "

## 2020-03-07 NOTE — ED NOTES
Pt states that a doctor told her that they were worried about her liver and that she could die.  Pt states that she is on suboxone now and also drinks and that she is never awake.  Pt's  states that the dr scared her and now she wants to seek treatment for her alcoholism.  Pt says that this dr says that we will admit her and that if we don't that she should ask to speak to an .      Goldie Barrera  03/06/20 0499

## 2020-03-07 NOTE — DISCHARGE INSTRUCTIONS
Please see Alcohol Intoxication handout for alcohol withdrawal symptoms. It is advised you call [facility] for in-patient detox and treatment. If you choose not to seek this treatment, return back to the ER if you go into withdrawal from alcohol.

## 2020-03-07 NOTE — ED PROVIDER NOTES
"Subjective   Patient presents to the ER with desire for alcohol intoxication. Patient states her last drink was earlier today in the AM. She reports she has been having episodes of where she will fall asleep and sleep 6-8 hours at a time. She discussed this with her Suboxone doctor Dr. Zaragoza who told her that was a symptoms of her \"liver shutting down\". Patient states she is now scared she is going to die and want to stop drinking. Patient reports she has been an alcoholic for the past 15-20 years and currently she drink from 1/5 to 1/2 gallon of Vodka daily. She is on Suboxone due to history of Percocet abuse. She c/o generalized body aches. She states the last time she tried to detox on her own she had a seizure and so she was advised by Dr. Zaragoza she needs medical detox. They were advised by him to come to this ER for this service. Patient is tearful and keeps repeating \"I just don't want to die\".  is a bedside.           Review of Systems   Constitutional: Positive for appetite change and fatigue. Negative for fever.   Respiratory: Negative.    Cardiovascular: Negative.    Gastrointestinal: Positive for abdominal pain (generalized). Negative for blood in stool, constipation, diarrhea, nausea and vomiting.   Genitourinary: Negative.    Musculoskeletal: Positive for myalgias (generalized body).   Skin: Negative.    Neurological: Positive for tremors. Negative for dizziness, weakness and headaches.   Psychiatric/Behavioral: Negative for suicidal ideas. The patient is nervous/anxious.        Past Medical History:   Diagnosis Date   • Abnormal Pap smear of cervix    • Alcoholism (CMS/[facility])    • Anxiety    • Cervical dysplasia    • Depression    • Fibrocystic breast    • GERD (gastroesophageal reflux disease)    • Seizures (CMS/[facility]) 2017   • Substance abuse (CMS/[facility])    • Substance abuse (CMS/[facility])        No Known Allergies    Past Surgical History:   Procedure Laterality Date   • RHINOPLASTY         Family " "History   Problem Relation Age of Onset   • Breast cancer Mother    • Cancer Mother    • COPD Mother    • Breast cancer Maternal Grandmother    • Breast cancer Paternal Grandmother    • Breast cancer Maternal Aunt    • Hypertension Father    • Heart disease Father        Social History     Socioeconomic History   • Marital status:      Spouse name: Not on file   • Number of children: Not on file   • Years of education: Not on file   • Highest education level: Not on file   Tobacco Use   • Smoking status: Current Every Day Smoker     Packs/day: 1.00     Years: 20.00     Pack years: 20.00     Types: Cigarettes   • Smokeless tobacco: Never Used   Substance and Sexual Activity   • Alcohol use: Yes     Frequency: Never     Comment: Pt reports drinking a 5th of vodka daily   • Drug use: No     Types: Fentanyl, Oxycodone   • Sexual activity: Yes     Partners: Male     Birth control/protection: None           Objective    /79 (BP Location: Left arm, Patient Position: Sitting)   Pulse 87   Temp 98.1 °F (36.7 °C) (Oral)   Resp 18   Ht 172.7 cm (68\")   Wt 59 kg (130 lb)   LMP 02/19/2020 (Within Weeks)   SpO2 98%   BMI 19.77 kg/m²     Physical Exam   Constitutional: She is oriented to person, place, and time. She appears well-developed and well-nourished. No distress.   HENT:   Head: Normocephalic and atraumatic.   Eyes: Pupils are equal, round, and reactive to light.   Neck: Neck supple.   Cardiovascular: Normal rate and regular rhythm.   No murmur heard.  Pulmonary/Chest: Effort normal and breath sounds normal. No respiratory distress.   Abdominal: Soft. Bowel sounds are normal. She exhibits no distension. There is tenderness (generalized). There is no rebound and no guarding.   Musculoskeletal: Normal range of motion.   Tremors noted to arms   Neurological: She is alert and oriented to person, place, and time.   Appears intoxicated   Skin: Skin is warm and dry. Capillary refill takes less than 2 seconds. "   Psychiatric: Her behavior is normal.   Tearful during interview and assessment.    Nursing note and vitals reviewed.      Procedures  Results for orders placed or performed during the hospital encounter of 03/06/20   Comprehensive Metabolic Panel   Result Value Ref Range    Glucose 96 65 - 99 mg/dL    BUN 15 6 - 20 mg/dL    Creatinine 0.48 (L) 0.57 - 1.00 mg/dL    Sodium 141 136 - 145 mmol/L    Potassium 3.9 3.5 - 5.2 mmol/L    Chloride 103 98 - 107 mmol/L    CO2 24.0 22.0 - 29.0 mmol/L    Calcium 8.8 8.6 - 10.5 mg/dL    Total Protein 7.8 6.0 - 8.5 g/dL    Albumin 4.50 3.50 - 5.20 g/dL    ALT (SGPT) 13 1 - 33 U/L    AST (SGOT) 25 1 - 32 U/L    Alkaline Phosphatase 87 39 - 117 U/L    Total Bilirubin 0.2 0.2 - 1.2 mg/dL    eGFR Non African Amer 148 >60 mL/min/1.73    Globulin 3.3 gm/dL    A/G Ratio 1.4 g/dL    BUN/Creatinine Ratio 31.3 (H) 7.0 - 25.0    Anion Gap 14.0 5.0 - 15.0 mmol/L   Acetaminophen Level   Result Value Ref Range    Acetaminophen <5.0 (L) 10.0 - 30.0 mcg/mL   Ethanol   Result Value Ref Range    Ethanol 313 (H) 0 - 10 mg/dL    Ethanol % 0.313 %   Salicylate Level   Result Value Ref Range    Salicylate <0.3 <=30.0 mg/dL   Urine Drug Screen - Urine, Clean Catch   Result Value Ref Range    THC, Screen, Urine Negative Negative    Phencyclidine (PCP), Urine Negative Negative    Cocaine Screen, Urine Negative Negative    Methamphetamine, Ur Negative Negative    Opiate Screen Negative Negative    Amphetamine Screen, Urine Negative Negative    Benzodiazepine Screen, Urine Positive (A) Negative    Tricyclic Antidepressants Screen Negative Negative    Methadone Screen, Urine Negative Negative    Barbiturates Screen, Urine Negative Negative    Oxycodone Screen, Urine Negative Negative    Propoxyphene Screen Negative Negative    Buprenorphine, Screen, Urine Positive (A) Negative   Pregnancy, Urine - Urine, Clean Catch   Result Value Ref Range    HCG, Urine QL Negative Negative   Urinalysis With Microscopic If  Indicated (No Culture) - Urine, Clean Catch   Result Value Ref Range    Color, UA Yellow Yellow, Straw, Dark Yellow, Rhonda    Appearance, UA Clear Clear    pH, UA 6.0 5.0 - 9.0    Specific Gravity, UA 1.020 1.003 - 1.030    Glucose, UA Negative Negative    Ketones, UA Negative Negative    Bilirubin, UA Negative Negative    Blood, UA Negative Negative    Protein, UA Trace (A) Negative    Leuk Esterase, UA Negative Negative    Nitrite, UA Positive (A) Negative    Urobilinogen, UA 0.2 E.U./dL 0.2 - 1.0 E.U./dL   CBC Auto Differential   Result Value Ref Range    WBC 9.75 3.40 - 10.80 10*3/mm3    RBC 4.24 3.77 - 5.28 10*6/mm3    Hemoglobin 14.5 12.0 - 15.9 g/dL    Hematocrit 41.0 34.0 - 46.6 %    MCV 96.7 79.0 - 97.0 fL    MCH 34.2 (H) 26.6 - 33.0 pg    MCHC 35.4 31.5 - 35.7 g/dL    RDW 12.5 12.3 - 15.4 %    RDW-SD 44.1 37.0 - 54.0 fl    MPV 8.7 6.0 - 12.0 fL    Platelets 232 140 - 450 10*3/mm3    Neutrophil % 48.8 42.7 - 76.0 %    Lymphocyte % 45.5 (H) 19.6 - 45.3 %    Monocyte % 4.6 (L) 5.0 - 12.0 %    Eosinophil % 0.3 0.3 - 6.2 %    Basophil % 0.6 0.0 - 1.5 %    Immature Grans % 0.2 0.0 - 0.5 %    Neutrophils, Absolute 4.75 1.70 - 7.00 10*3/mm3    Lymphocytes, Absolute 4.44 (H) 0.70 - 3.10 10*3/mm3    Monocytes, Absolute 0.45 0.10 - 0.90 10*3/mm3    Eosinophils, Absolute 0.03 0.00 - 0.40 10*3/mm3    Basophils, Absolute 0.06 0.00 - 0.20 10*3/mm3    Immature Grans, Absolute 0.02 0.00 - 0.05 10*3/mm3    nRBC 0.0 0.0 - 0.2 /100 WBC   Urinalysis, Microscopic Only - Urine, Clean Catch   Result Value Ref Range    RBC, UA 0-2 (A) None Seen /HPF    WBC, UA 3-5 None Seen, 0-2, 3-5 /HPF    Bacteria, UA 1+ (A) None Seen /HPF    Squamous Epithelial Cells, UA 3-5 (A) None Seen, 0-2 /HPF    Hyaline Casts, UA 0-2 None Seen /LPF    Methodology Automated Microscopy    Amylase   Result Value Ref Range    Amylase 60 28 - 100 U/L   Lipase   Result Value Ref Range    Lipase 75 (H) 13 - 60 U/L   Light Blue Top   Result Value Ref Range     Extra Tube hold for add-on    Green Top (Gel)   Result Value Ref Range    Extra Tube Hold for add-ons.    Lavender Top   Result Value Ref Range    Extra Tube hold for add-on    Gold Top - SST   Result Value Ref Range    Extra Tube Hold for add-ons.               ED Course  ED Course as of Mar 07 1018   Fri Mar 06, 2020   2109 Patient and patient's  is upset about discharge at this time. Hanna (case coordinator) and Ish (house supervisor) have both been in to talk to patient at depth about inpatient treatment at [facility] but patient's  is refusing at this time secondary to it being out of town. Patient and patient's  is insistent her detox will be here or no where and they are taking no responsibly to seek further treatment outside this facility despite appropriate resources being provided to them that are possibly available tonight if they would make a phone call to [facility].      [SH]      ED Course User Index  [SH] Jeannie Painter, BHAKTI                                           [facility]    Final diagnoses:   Alcoholic intoxication without complication (CMS/HCC)            Jeannie Painter APRN  03/07/20 1018

## 2020-03-08 ENCOUNTER — HOSPITAL ENCOUNTER (EMERGENCY)
Facility: HOSPITAL | Age: 35
Discharge: HOME | End: 2020-03-08
Attending: EMERGENCY MEDICINE | Admitting: EMERGENCY MEDICINE

## 2020-03-08 ENCOUNTER — APPOINTMENT (OUTPATIENT)
Dept: GENERAL RADIOLOGY | Facility: HOSPITAL | Age: 35
End: 2020-03-08

## 2020-03-08 VITALS
RESPIRATION RATE: 18 BRPM | TEMPERATURE: 98.9 F | BODY MASS INDEX: 19.7 KG/M2 | SYSTOLIC BLOOD PRESSURE: 125 MMHG | WEIGHT: 130 LBS | HEIGHT: 68 IN | OXYGEN SATURATION: 95 % | DIASTOLIC BLOOD PRESSURE: 78 MMHG | HEART RATE: 72 BPM

## 2020-03-08 DIAGNOSIS — R11.2 NON-INTRACTABLE VOMITING WITH NAUSEA, UNSPECIFIED VOMITING TYPE: Primary | ICD-10-CM

## 2020-03-08 DIAGNOSIS — F10.10 ALCOHOL ABUSE: ICD-10-CM

## 2020-03-08 LAB
ALBUMIN SERPL-MCNC: 4.3 G/DL (ref 3.5–5.2)
ALBUMIN/GLOB SERPL: 1.3 G/DL
ALP SERPL-CCNC: 89 U/L (ref 39–117)
ALT SERPL W P-5'-P-CCNC: 29 U/L (ref 1–33)
AMPHET+METHAMPHET UR QL: NEGATIVE
AMPHETAMINES UR QL: NEGATIVE
AMYLASE SERPL-CCNC: 45 U/L (ref 28–100)
ANION GAP SERPL CALCULATED.3IONS-SCNC: 16 MMOL/L (ref 5–15)
APAP SERPL-MCNC: <5 MCG/ML (ref 10–30)
AST SERPL-CCNC: 101 U/L (ref 1–32)
BARBITURATES UR QL SCN: NEGATIVE
BASOPHILS # BLD AUTO: 0.03 10*3/MM3 (ref 0–0.2)
BASOPHILS NFR BLD AUTO: 0.3 % (ref 0–1.5)
BENZODIAZ UR QL SCN: POSITIVE
BILIRUB SERPL-MCNC: 0.9 MG/DL (ref 0.2–1.2)
BUN BLD-MCNC: 5 MG/DL (ref 6–20)
BUN/CREAT SERPL: 9.8 (ref 7–25)
BUPRENORPHINE SERPL-MCNC: POSITIVE NG/ML
CALCIUM SPEC-SCNC: 8.7 MG/DL (ref 8.6–10.5)
CANNABINOIDS SERPL QL: NEGATIVE
CHLORIDE SERPL-SCNC: 100 MMOL/L (ref 98–107)
CO2 SERPL-SCNC: 22 MMOL/L (ref 22–29)
COCAINE UR QL: NEGATIVE
CREAT BLD-MCNC: 0.51 MG/DL (ref 0.57–1)
DEPRECATED RDW RBC AUTO: 42.5 FL (ref 37–54)
EOSINOPHIL # BLD AUTO: 0.02 10*3/MM3 (ref 0–0.4)
EOSINOPHIL NFR BLD AUTO: 0.2 % (ref 0.3–6.2)
ERYTHROCYTE [DISTWIDTH] IN BLOOD BY AUTOMATED COUNT: 12.3 % (ref 12.3–15.4)
ETHANOL BLD-MCNC: <10 MG/DL (ref 0–10)
ETHANOL UR QL: <0.01 %
GFR SERPL CREATININE-BSD FRML MDRD: 138 ML/MIN/1.73
GLOBULIN UR ELPH-MCNC: 3.3 GM/DL
GLUCOSE BLD-MCNC: 128 MG/DL (ref 65–99)
HCT VFR BLD AUTO: 40.7 % (ref 34–46.6)
HGB BLD-MCNC: 14.4 G/DL (ref 12–15.9)
HOLD SPECIMEN: NORMAL
HOLD SPECIMEN: NORMAL
IMM GRANULOCYTES # BLD AUTO: 0.03 10*3/MM3 (ref 0–0.05)
IMM GRANULOCYTES NFR BLD AUTO: 0.3 % (ref 0–0.5)
LIPASE SERPL-CCNC: 54 U/L (ref 13–60)
LYMPHOCYTES # BLD AUTO: 1.56 10*3/MM3 (ref 0.7–3.1)
LYMPHOCYTES NFR BLD AUTO: 16.2 % (ref 19.6–45.3)
MCH RBC QN AUTO: 33.8 PG (ref 26.6–33)
MCHC RBC AUTO-ENTMCNC: 35.4 G/DL (ref 31.5–35.7)
MCV RBC AUTO: 95.5 FL (ref 79–97)
METHADONE UR QL SCN: NEGATIVE
MONOCYTES # BLD AUTO: 0.49 10*3/MM3 (ref 0.1–0.9)
MONOCYTES NFR BLD AUTO: 5.1 % (ref 5–12)
NEUTROPHILS # BLD AUTO: 7.49 10*3/MM3 (ref 1.7–7)
NEUTROPHILS NFR BLD AUTO: 77.9 % (ref 42.7–76)
NRBC BLD AUTO-RTO: 0 /100 WBC (ref 0–0.2)
OPIATES UR QL: NEGATIVE
OXYCODONE UR QL SCN: NEGATIVE
PCP UR QL SCN: NEGATIVE
PLATELET # BLD AUTO: 182 10*3/MM3 (ref 140–450)
PMV BLD AUTO: 9.9 FL (ref 6–12)
POTASSIUM BLD-SCNC: 3.5 MMOL/L (ref 3.5–5.2)
PROPOXYPH UR QL: NEGATIVE
PROT SERPL-MCNC: 7.6 G/DL (ref 6–8.5)
RBC # BLD AUTO: 4.26 10*6/MM3 (ref 3.77–5.28)
SALICYLATES SERPL-MCNC: <0.3 MG/DL
SODIUM BLD-SCNC: 138 MMOL/L (ref 136–145)
TRICYCLICS UR QL SCN: NEGATIVE
TROPONIN T SERPL-MCNC: <0.01 NG/ML (ref 0–0.03)
TROPONIN T SERPL-MCNC: <0.01 NG/ML (ref 0–0.03)
WBC NRBC COR # BLD: 9.62 10*3/MM3 (ref 3.4–10.8)
WHOLE BLOOD HOLD SPECIMEN: NORMAL
WHOLE BLOOD HOLD SPECIMEN: NORMAL

## 2020-03-08 PROCEDURE — 82150 ASSAY OF AMYLASE: CPT | Performed by: NURSE PRACTITIONER

## 2020-03-08 PROCEDURE — 84484 ASSAY OF TROPONIN QUANT: CPT | Performed by: NURSE PRACTITIONER

## 2020-03-08 PROCEDURE — 85025 COMPLETE CBC W/AUTO DIFF WBC: CPT | Performed by: NURSE PRACTITIONER

## 2020-03-08 PROCEDURE — 71045 X-RAY EXAM CHEST 1 VIEW: CPT

## 2020-03-08 PROCEDURE — 93005 ELECTROCARDIOGRAM TRACING: CPT

## 2020-03-08 PROCEDURE — 96374 THER/PROPH/DIAG INJ IV PUSH: CPT

## 2020-03-08 PROCEDURE — 25010000002 ONDANSETRON PER 1 MG: Performed by: NURSE PRACTITIONER

## 2020-03-08 PROCEDURE — 99284 EMERGENCY DEPT VISIT MOD MDM: CPT

## 2020-03-08 PROCEDURE — 80307 DRUG TEST PRSMV CHEM ANLYZR: CPT | Performed by: NURSE PRACTITIONER

## 2020-03-08 PROCEDURE — 93005 ELECTROCARDIOGRAM TRACING: CPT | Performed by: EMERGENCY MEDICINE

## 2020-03-08 PROCEDURE — 83690 ASSAY OF LIPASE: CPT | Performed by: NURSE PRACTITIONER

## 2020-03-08 PROCEDURE — 25010000002 LORAZEPAM PER 2 MG: Performed by: NURSE PRACTITIONER

## 2020-03-08 PROCEDURE — 96375 TX/PRO/DX INJ NEW DRUG ADDON: CPT

## 2020-03-08 PROCEDURE — 80053 COMPREHEN METABOLIC PANEL: CPT | Performed by: NURSE PRACTITIONER

## 2020-03-08 PROCEDURE — 93010 ELECTROCARDIOGRAM REPORT: CPT | Performed by: INTERNAL MEDICINE

## 2020-03-08 RX ORDER — SODIUM CHLORIDE 0.9 % (FLUSH) 0.9 %
10 SYRINGE (ML) INJECTION AS NEEDED
Status: DISCONTINUED | OUTPATIENT
Start: 2020-03-08 | End: 2020-03-08 | Stop reason: HOSPADM

## 2020-03-08 RX ORDER — PROMETHAZINE HYDROCHLORIDE 25 MG/1
25 SUPPOSITORY RECTAL EVERY 6 HOURS PRN
Qty: 12 SUPPOSITORY | Refills: 0 | Status: SHIPPED | OUTPATIENT
Start: 2020-03-08 | End: 2021-01-19 | Stop reason: SDUPTHER

## 2020-03-08 RX ORDER — ONDANSETRON 2 MG/ML
4 INJECTION INTRAMUSCULAR; INTRAVENOUS ONCE
Status: COMPLETED | OUTPATIENT
Start: 2020-03-08 | End: 2020-03-08

## 2020-03-08 RX ORDER — LORAZEPAM 2 MG/ML
0.5 INJECTION INTRAMUSCULAR ONCE
Status: COMPLETED | OUTPATIENT
Start: 2020-03-08 | End: 2020-03-08

## 2020-03-08 RX ADMIN — LORAZEPAM 0.5 MG: 2 INJECTION INTRAMUSCULAR; INTRAVENOUS at 17:51

## 2020-03-08 RX ADMIN — ONDANSETRON 4 MG: 2 INJECTION INTRAMUSCULAR; INTRAVENOUS at 18:28

## 2020-03-08 RX ADMIN — SODIUM CHLORIDE 1000 ML: 900 INJECTION, SOLUTION INTRAVENOUS at 18:28

## 2020-03-08 NOTE — ED NOTES
IV attempt X2, RAC and RFA unsuccessful, dressing applied and apologized to patient     Celi Rand, LPN  03/08/20 4156

## 2020-03-09 ENCOUNTER — TELEPHONE (OUTPATIENT)
Dept: GENERAL RADIOLOGY | Facility: HOSPITAL | Age: 35
End: 2020-03-09

## 2020-03-09 NOTE — DISCHARGE INSTRUCTIONS
Follow up with detox facility or outpatient facility as discussed on Friday. See previous handouts on facility information. Call tomorrow to set this up. Fill Rx for antinausea medication.   Call Dr. Almaguer's office tomorrow if symptoms are not improving.

## 2020-03-09 NOTE — ED PROVIDER NOTES
Subjective   Patient presents to the ER with complaints of nausea vomiting diarrhea, abdominal pain, chest pain.  Patient was seen on Friday requesting medical alcoholic detox.  Patient did not show any signs of DTs at that time and was discharged home to follow-up outpatient.  Patient has been states they did not seek the outpatient treatment that was offered to them at the time and patient has been without alcohol for the past 48 hours.  Patient states today is when she started with the nausea vomiting and the diarrhea.  She states her abdominal pain is sharp and generalized and that her chest pain feels like her heart is doing flips in her chest.  She does have tremors noted to her hands.  She complains of generalized weakness at this time.   states that she did fall earlier today secondary to the weakness.          Review of Systems   Constitutional: Positive for activity change, appetite change and fatigue.   HENT: Negative.    Respiratory: Positive for shortness of breath. Negative for cough.    Cardiovascular: Positive for chest pain and palpitations.   Gastrointestinal: Positive for abdominal pain, diarrhea, nausea and vomiting. Negative for constipation.   Genitourinary: Negative.    Musculoskeletal: Positive for myalgias.   Skin: Negative.    Neurological: Positive for tremors, weakness and light-headedness. Negative for seizures and syncope.   Psychiatric/Behavioral: The patient is nervous/anxious.        Past Medical History:   Diagnosis Date   • Abnormal Pap smear of cervix    • Alcoholism (CMS/[facility])    • Anxiety    • Cervical dysplasia    • Depression    • Fibrocystic breast    • GERD (gastroesophageal reflux disease)    • Seizures (CMS/HCC) 2017   • Substance abuse (CMS/[facility])    • Substance abuse (CMS/[facility])        No Known Allergies    Past Surgical History:   Procedure Laterality Date   • RHINOPLASTY         Family History   Problem Relation Age of Onset   • Breast cancer Mother    • Cancer  "Mother    • COPD Mother    • Breast cancer Maternal Grandmother    • Breast cancer Paternal Grandmother    • Breast cancer Maternal Aunt    • Hypertension Father    • Heart disease Father        Social History     Socioeconomic History   • Marital status:      Spouse name: Not on file   • Number of children: Not on file   • Years of education: Not on file   • Highest education level: Not on file   Tobacco Use   • Smoking status: Current Every Day Smoker     Packs/day: 1.00     Years: 20.00     Pack years: 20.00     Types: Cigarettes   • Smokeless tobacco: Never Used   Substance and Sexual Activity   • Alcohol use: Yes     Frequency: Never     Comment: Pt reports drinking a 5th of vodka daily   • Drug use: No     Types: Fentanyl, Oxycodone   • Sexual activity: Yes     Partners: Male     Birth control/protection: None           Objective    /78   Pulse 72   Temp 98.9 °F (37.2 °C) (Oral)   Resp 18   Ht 172.7 cm (68\")   Wt 59 kg (130 lb)   LMP 02/19/2020 (Within Weeks)   SpO2 95%   Breastfeeding No   BMI 19.77 kg/m²     Physical Exam   Constitutional: She is oriented to person, place, and time. She appears well-developed and well-nourished. She does not appear ill.   HENT:   Head: Normocephalic and atraumatic.   Eyes: Pupils are equal, round, and reactive to light.   Neck: Normal range of motion. Neck supple.   Cardiovascular: Normal rate, regular rhythm, intact distal pulses and normal pulses.   No murmur heard.  Pulmonary/Chest: Effort normal and breath sounds normal. No respiratory distress. She has no decreased breath sounds. She has no wheezes. She has no rhonchi. She has no rales.   Abdominal: Soft. Bowel sounds are normal. She exhibits no distension. There is tenderness in the right upper quadrant and left upper quadrant. There is no rebound and no guarding.   Musculoskeletal: Normal range of motion.   Neurological: She is alert and oriented to person, place, and time.   Skin: Skin is warm " and dry. Capillary refill takes less than 2 seconds.   Psychiatric: She has a normal mood and affect. Her behavior is normal.   Nursing note and vitals reviewed.      ECG 12 Lead    Date/Time: 3/8/2020 3:15 PM  Performed by: Jeannie Painter APRN  Authorized by: Papito Hampton MD   Interpreted by physician  Rhythm: sinus tachycardia  Rate: tachycardic  BPM: 115  ST Segments: ST segments normal          Results for orders placed or performed during the hospital encounter of 03/08/20   Comprehensive Metabolic Panel   Result Value Ref Range    Glucose 128 (H) 65 - 99 mg/dL    BUN 5 (L) 6 - 20 mg/dL    Creatinine 0.51 (L) 0.57 - 1.00 mg/dL    Sodium 138 136 - 145 mmol/L    Potassium 3.5 3.5 - 5.2 mmol/L    Chloride 100 98 - 107 mmol/L    CO2 22.0 22.0 - 29.0 mmol/L    Calcium 8.7 8.6 - 10.5 mg/dL    Total Protein 7.6 6.0 - 8.5 g/dL    Albumin 4.30 3.50 - 5.20 g/dL    ALT (SGPT) 29 1 - 33 U/L    AST (SGOT) 101 (H) 1 - 32 U/L    Alkaline Phosphatase 89 39 - 117 U/L    Total Bilirubin 0.9 0.2 - 1.2 mg/dL    eGFR Non African Amer 138 >60 mL/min/1.73    Globulin 3.3 gm/dL    A/G Ratio 1.3 g/dL    BUN/Creatinine Ratio 9.8 7.0 - 25.0    Anion Gap 16.0 (H) 5.0 - 15.0 mmol/L   Acetaminophen Level   Result Value Ref Range    Acetaminophen <5.0 (L) 10.0 - 30.0 mcg/mL   Ethanol   Result Value Ref Range    Ethanol <10 0 - 10 mg/dL    Ethanol % <0.010 %   Salicylate Level   Result Value Ref Range    Salicylate <0.3 <=30.0 mg/dL   Urine Drug Screen - Urine, Clean Catch   Result Value Ref Range    THC, Screen, Urine Negative Negative    Phencyclidine (PCP), Urine Negative Negative    Cocaine Screen, Urine Negative Negative    Methamphetamine, Ur Negative Negative    Opiate Screen Negative Negative    Amphetamine Screen, Urine Negative Negative    Benzodiazepine Screen, Urine Positive (A) Negative    Tricyclic Antidepressants Screen Negative Negative    Methadone Screen, Urine Negative Negative    Barbiturates Screen, Urine  Negative Negative    Oxycodone Screen, Urine Negative Negative    Propoxyphene Screen Negative Negative    Buprenorphine, Screen, Urine Positive (A) Negative   Amylase   Result Value Ref Range    Amylase 45 28 - 100 U/L   Lipase   Result Value Ref Range    Lipase 54 13 - 60 U/L   Troponin   Result Value Ref Range    Troponin T <0.010 0.000 - 0.030 ng/mL   Troponin   Result Value Ref Range    Troponin T <0.010 0.000 - 0.030 ng/mL   CBC Auto Differential   Result Value Ref Range    WBC 9.62 3.40 - 10.80 10*3/mm3    RBC 4.26 3.77 - 5.28 10*6/mm3    Hemoglobin 14.4 12.0 - 15.9 g/dL    Hematocrit 40.7 34.0 - 46.6 %    MCV 95.5 79.0 - 97.0 fL    MCH 33.8 (H) 26.6 - 33.0 pg    MCHC 35.4 31.5 - 35.7 g/dL    RDW 12.3 12.3 - 15.4 %    RDW-SD 42.5 37.0 - 54.0 fl    MPV 9.9 6.0 - 12.0 fL    Platelets 182 140 - 450 10*3/mm3    Neutrophil % 77.9 (H) 42.7 - 76.0 %    Lymphocyte % 16.2 (L) 19.6 - 45.3 %    Monocyte % 5.1 5.0 - 12.0 %    Eosinophil % 0.2 (L) 0.3 - 6.2 %    Basophil % 0.3 0.0 - 1.5 %    Immature Grans % 0.3 0.0 - 0.5 %    Neutrophils, Absolute 7.49 (H) 1.70 - 7.00 10*3/mm3    Lymphocytes, Absolute 1.56 0.70 - 3.10 10*3/mm3    Monocytes, Absolute 0.49 0.10 - 0.90 10*3/mm3    Eosinophils, Absolute 0.02 0.00 - 0.40 10*3/mm3    Basophils, Absolute 0.03 0.00 - 0.20 10*3/mm3    Immature Grans, Absolute 0.03 0.00 - 0.05 10*3/mm3    nRBC 0.0 0.0 - 0.2 /100 WBC   Light Blue Top   Result Value Ref Range    Extra Tube hold for add-on    Green Top (Gel)   Result Value Ref Range    Extra Tube Hold for add-ons.    Lavender Top   Result Value Ref Range    Extra Tube hold for add-on    Gold Top - SST   Result Value Ref Range    Extra Tube Hold for add-ons.      Xr Chest 1 View    Result Date: 3/8/2020  Narrative: PORTABLE CHEST HISTORY: Chest pain Portable AP upright film of the chest was obtained at 5:00 PM. COMPARISON: January 12, 2020 FINDINGS: EKG leads. The lungs are clear of an acute process. The heart is not enlarged. The  pulmonary vasculature is not increased. No pleural effusion. No pneumothorax. No acute osseous abnormality.     Impression: CONCLUSION: Normal portable chest [id number] Electronically signed by:  Ganesh Kyle MD  3/8/2020 5:47 PM CDT Workstation: [id number]               ED Course                                           MDM    Final diagnoses:   Non-intractable vomiting with nausea, unspecified vomiting type   Alcohol abuse            Jeannie Painter, APRN  03/12/20 [id number]

## 2020-03-23 ENCOUNTER — APPOINTMENT (OUTPATIENT)
Dept: MRI IMAGING | Facility: HOSPITAL | Age: 35
End: 2020-03-23

## 2020-04-07 ENCOUNTER — TELEPHONE (OUTPATIENT)
Dept: FAMILY MEDICINE CLINIC | Facility: CLINIC | Age: 35
End: 2020-04-07

## 2020-04-07 NOTE — TELEPHONE ENCOUNTER
TRIED TO CALL PATIENT TO RESCHEDULE APPT FOR 04/27 WITH DR LEZAMA; UNABLE TO REACH PATIENT BUT LEFT VOICEMAIL X1.      THANK YOU,      SAMSON

## 2020-04-16 PROCEDURE — U0002 COVID-19 LAB TEST NON-CDC: HCPCS | Performed by: NURSE PRACTITIONER

## 2020-04-27 ENCOUNTER — OFFICE VISIT (OUTPATIENT)
Dept: FAMILY MEDICINE CLINIC | Facility: CLINIC | Age: 35
End: 2020-04-27

## 2020-04-27 DIAGNOSIS — F10.282 ALCOHOL DEPENDENCE WITH ALCOHOL-INDUCED SLEEP DISORDER (HCC): Primary | ICD-10-CM

## 2020-04-27 DIAGNOSIS — F31.32 BIPOLAR AFFECTIVE DISORDER, CURRENTLY DEPRESSED, MODERATE (HCC): ICD-10-CM

## 2020-04-27 PROCEDURE — 90791 PSYCH DIAGNOSTIC EVALUATION: CPT | Performed by: PSYCHOLOGIST
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
## 2020-04-28 ENCOUNTER — TELEPHONE (OUTPATIENT)
Dept: GENERAL RADIOLOGY | Facility: HOSPITAL | Age: 35
End: 2020-04-28

## 2020-05-07 DIAGNOSIS — M54.40 CHRONIC LOW BACK PAIN WITH SCIATICA, SCIATICA LATERALITY UNSPECIFIED, UNSPECIFIED BACK PAIN LATERALITY: Primary | ICD-10-CM

## 2020-05-07 DIAGNOSIS — G89.29 CHRONIC LOW BACK PAIN WITH SCIATICA, SCIATICA LATERALITY UNSPECIFIED, UNSPECIFIED BACK PAIN LATERALITY: Primary | ICD-10-CM

## 2020-05-07 DIAGNOSIS — M54.40 CHRONIC BILATERAL LOW BACK PAIN WITH SCIATICA, SCIATICA LATERALITY UNSPECIFIED: ICD-10-CM

## 2020-05-07 DIAGNOSIS — G89.29 CHRONIC BILATERAL LOW BACK PAIN WITH SCIATICA, SCIATICA LATERALITY UNSPECIFIED: ICD-10-CM

## 2020-06-01 ENCOUNTER — HOSPITAL ENCOUNTER (INPATIENT)
Facility: HOSPITAL | Age: 35
LOS: 4 days | Discharge: HOME | End: 2020-06-05
Attending: EMERGENCY MEDICINE | Admitting: HOSPITALIST

## 2020-06-01 ENCOUNTER — APPOINTMENT (OUTPATIENT)
Dept: CT IMAGING | Facility: HOSPITAL | Age: 35
End: 2020-06-01

## 2020-06-01 ENCOUNTER — APPOINTMENT (OUTPATIENT)
Dept: GENERAL RADIOLOGY | Facility: HOSPITAL | Age: 35
End: 2020-06-01

## 2020-06-01 ENCOUNTER — APPOINTMENT (OUTPATIENT)
Dept: ULTRASOUND IMAGING | Facility: HOSPITAL | Age: 35
End: 2020-06-01

## 2020-06-01 DIAGNOSIS — K85.20 ALCOHOL-INDUCED ACUTE PANCREATITIS, UNSPECIFIED COMPLICATION STATUS: Primary | ICD-10-CM

## 2020-06-01 DIAGNOSIS — K85.20 ALCOHOL-INDUCED ACUTE PANCREATITIS WITHOUT INFECTION OR NECROSIS: ICD-10-CM

## 2020-06-01 PROBLEM — F32.A ANXIETY AND DEPRESSION: Status: ACTIVE | Noted: 2020-06-01

## 2020-06-01 PROBLEM — F10.20 ALCOHOL DEPENDENCE: Status: ACTIVE | Noted: 2020-06-01

## 2020-06-01 PROBLEM — F11.90 OPIOID USE DISORDER: Status: ACTIVE | Noted: 2020-06-01

## 2020-06-01 PROBLEM — E87.6 HYPOKALEMIA: Status: ACTIVE | Noted: 2020-06-01

## 2020-06-01 PROBLEM — F41.9 ANXIETY AND DEPRESSION: Status: ACTIVE | Noted: 2020-06-01

## 2020-06-01 PROBLEM — F17.210 DEPENDENCE ON NICOTINE FROM CIGARETTES: Status: ACTIVE | Noted: 2020-06-01

## 2020-06-01 PROBLEM — E87.1 HYPONATREMIA: Status: ACTIVE | Noted: 2020-06-01

## 2020-06-01 LAB
ALBUMIN SERPL-MCNC: 4.5 G/DL (ref 3.5–5.2)
ALBUMIN/GLOB SERPL: 1.5 G/DL
ALP SERPL-CCNC: 96 U/L (ref 39–117)
ALT SERPL W P-5'-P-CCNC: 29 U/L (ref 1–33)
AMPHET+METHAMPHET UR QL: NEGATIVE
AMPHETAMINES UR QL: NEGATIVE
AMYLASE SERPL-CCNC: 419 U/L (ref 28–100)
ANION GAP SERPL CALCULATED.3IONS-SCNC: 14 MMOL/L (ref 5–15)
AST SERPL-CCNC: 62 U/L (ref 1–32)
BACTERIA UR QL AUTO: ABNORMAL /HPF
BARBITURATES UR QL SCN: NEGATIVE
BASOPHILS # BLD AUTO: 0.04 10*3/MM3 (ref 0–0.2)
BASOPHILS NFR BLD AUTO: 0.3 % (ref 0–1.5)
BENZODIAZ UR QL SCN: POSITIVE
BILIRUB SERPL-MCNC: 0.6 MG/DL (ref 0.2–1.2)
BILIRUB UR QL STRIP: ABNORMAL
BUN BLD-MCNC: 11 MG/DL (ref 6–20)
BUN/CREAT SERPL: 22.4 (ref 7–25)
BUPRENORPHINE SERPL-MCNC: POSITIVE NG/ML
CALCIUM SPEC-SCNC: 9.6 MG/DL (ref 8.6–10.5)
CANNABINOIDS SERPL QL: NEGATIVE
CHLORIDE SERPL-SCNC: 98 MMOL/L (ref 98–107)
CLARITY UR: ABNORMAL
CO2 SERPL-SCNC: 21 MMOL/L (ref 22–29)
COCAINE UR QL: NEGATIVE
COLOR UR: ABNORMAL
CREAT BLD-MCNC: 0.49 MG/DL (ref 0.57–1)
D-DIMER, QUANTITATIVE (MAD,POW, STR): 731 NG/ML (FEU) (ref 0–470)
DEPRECATED RDW RBC AUTO: 48.5 FL (ref 37–54)
EOSINOPHIL # BLD AUTO: 0.05 10*3/MM3 (ref 0–0.4)
EOSINOPHIL NFR BLD AUTO: 0.4 % (ref 0.3–6.2)
ERYTHROCYTE [DISTWIDTH] IN BLOOD BY AUTOMATED COUNT: 12.9 % (ref 12.3–15.4)
ETHANOL BLD-MCNC: <10 MG/DL (ref 0–10)
ETHANOL UR QL: <0.01 %
GFR SERPL CREATININE-BSD FRML MDRD: 145 ML/MIN/1.73
GLOBULIN UR ELPH-MCNC: 3.1 GM/DL
GLUCOSE BLD-MCNC: 107 MG/DL (ref 65–99)
GLUCOSE UR STRIP-MCNC: NEGATIVE MG/DL
HCG SERPL QL: NEGATIVE
HCT VFR BLD AUTO: 39.1 % (ref 34–46.6)
HGB BLD-MCNC: 14.3 G/DL (ref 12–15.9)
HGB UR QL STRIP.AUTO: NEGATIVE
HYALINE CASTS UR QL AUTO: ABNORMAL /LPF
IMM GRANULOCYTES # BLD AUTO: 0.05 10*3/MM3 (ref 0–0.05)
IMM GRANULOCYTES NFR BLD AUTO: 0.4 % (ref 0–0.5)
KETONES UR QL STRIP: ABNORMAL
LDH SERPL-CCNC: 173 U/L (ref 135–214)
LEUKOCYTE ESTERASE UR QL STRIP.AUTO: ABNORMAL
LIPASE SERPL-CCNC: 2645 U/L (ref 13–60)
LYMPHOCYTES # BLD AUTO: 2.15 10*3/MM3 (ref 0.7–3.1)
LYMPHOCYTES NFR BLD AUTO: 17.6 % (ref 19.6–45.3)
MCH RBC QN AUTO: 37 PG (ref 26.6–33)
MCHC RBC AUTO-ENTMCNC: 36.6 G/DL (ref 31.5–35.7)
MCV RBC AUTO: 101.3 FL (ref 79–97)
METHADONE UR QL SCN: NEGATIVE
MONOCYTES # BLD AUTO: 0.55 10*3/MM3 (ref 0.1–0.9)
MONOCYTES NFR BLD AUTO: 4.5 % (ref 5–12)
MUCOUS THREADS URNS QL MICRO: ABNORMAL /HPF
NEUTROPHILS # BLD AUTO: 9.4 10*3/MM3 (ref 1.7–7)
NEUTROPHILS NFR BLD AUTO: 76.8 % (ref 42.7–76)
NITRITE UR QL STRIP: NEGATIVE
NRBC BLD AUTO-RTO: 0 /100 WBC (ref 0–0.2)
OPIATES UR QL: POSITIVE
OXYCODONE UR QL SCN: NEGATIVE
PCP UR QL SCN: NEGATIVE
PH UR STRIP.AUTO: 5.5 [PH] (ref 5–9)
PLATELET # BLD AUTO: 226 10*3/MM3 (ref 140–450)
PMV BLD AUTO: 9 FL (ref 6–12)
POTASSIUM BLD-SCNC: 3.4 MMOL/L (ref 3.5–5.2)
PROPOXYPH UR QL: NEGATIVE
PROT SERPL-MCNC: 7.6 G/DL (ref 6–8.5)
PROT UR QL STRIP: ABNORMAL
RBC # BLD AUTO: 3.86 10*6/MM3 (ref 3.77–5.28)
RBC # UR: ABNORMAL /HPF
REF LAB TEST METHOD: ABNORMAL
SODIUM BLD-SCNC: 133 MMOL/L (ref 136–145)
SP GR UR STRIP: 1.04 (ref 1–1.03)
SQUAMOUS #/AREA URNS HPF: ABNORMAL /HPF
TRICYCLICS UR QL SCN: NEGATIVE
TROPONIN T SERPL-MCNC: <0.01 NG/ML (ref 0–0.03)
UROBILINOGEN UR QL STRIP: ABNORMAL
WAXY CASTS #/AREA URNS LPF: ABNORMAL /LPF
WBC NRBC COR # BLD: 12.24 10*3/MM3 (ref 3.4–10.8)
WBC UR QL AUTO: ABNORMAL /HPF

## 2020-06-01 PROCEDURE — 82150 ASSAY OF AMYLASE: CPT | Performed by: EMERGENCY MEDICINE

## 2020-06-01 PROCEDURE — 25010000002 LORAZEPAM PER 2 MG: Performed by: STUDENT IN AN ORGANIZED HEALTH CARE EDUCATION/TRAINING PROGRAM

## 2020-06-01 PROCEDURE — 85025 COMPLETE CBC W/AUTO DIFF WBC: CPT | Performed by: EMERGENCY MEDICINE

## 2020-06-01 PROCEDURE — 84703 CHORIONIC GONADOTROPIN ASSAY: CPT | Performed by: EMERGENCY MEDICINE

## 2020-06-01 PROCEDURE — 25010000002 METOCLOPRAMIDE PER 10 MG: Performed by: EMERGENCY MEDICINE

## 2020-06-01 PROCEDURE — 74177 CT ABD & PELVIS W/CONTRAST: CPT

## 2020-06-01 PROCEDURE — 99285 EMERGENCY DEPT VISIT HI MDM: CPT

## 2020-06-01 PROCEDURE — 80053 COMPREHEN METABOLIC PANEL: CPT | Performed by: EMERGENCY MEDICINE

## 2020-06-01 PROCEDURE — 93005 ELECTROCARDIOGRAM TRACING: CPT | Performed by: EMERGENCY MEDICINE

## 2020-06-01 PROCEDURE — 83615 LACTATE (LD) (LDH) ENZYME: CPT | Performed by: EMERGENCY MEDICINE

## 2020-06-01 PROCEDURE — 25010000002 HYDROMORPHONE 1 MG/ML SOLUTION: Performed by: EMERGENCY MEDICINE

## 2020-06-01 PROCEDURE — 80307 DRUG TEST PRSMV CHEM ANLYZR: CPT | Performed by: EMERGENCY MEDICINE

## 2020-06-01 PROCEDURE — 71045 X-RAY EXAM CHEST 1 VIEW: CPT

## 2020-06-01 PROCEDURE — 81001 URINALYSIS AUTO W/SCOPE: CPT | Performed by: EMERGENCY MEDICINE

## 2020-06-01 PROCEDURE — 99223 1ST HOSP IP/OBS HIGH 75: CPT | Performed by: STUDENT IN AN ORGANIZED HEALTH CARE EDUCATION/TRAINING PROGRAM

## 2020-06-01 PROCEDURE — 85379 FIBRIN DEGRADATION QUANT: CPT | Performed by: EMERGENCY MEDICINE

## 2020-06-01 PROCEDURE — 93005 ELECTROCARDIOGRAM TRACING: CPT

## 2020-06-01 PROCEDURE — 71275 CT ANGIOGRAPHY CHEST: CPT

## 2020-06-01 PROCEDURE — 93010 ELECTROCARDIOGRAM REPORT: CPT | Performed by: INTERNAL MEDICINE

## 2020-06-01 PROCEDURE — 83690 ASSAY OF LIPASE: CPT | Performed by: EMERGENCY MEDICINE

## 2020-06-01 PROCEDURE — 84484 ASSAY OF TROPONIN QUANT: CPT | Performed by: EMERGENCY MEDICINE

## 2020-06-01 PROCEDURE — 25010000002 ONDANSETRON PER 1 MG: Performed by: STUDENT IN AN ORGANIZED HEALTH CARE EDUCATION/TRAINING PROGRAM

## 2020-06-01 PROCEDURE — 0 IOPAMIDOL PER 1 ML: Performed by: EMERGENCY MEDICINE

## 2020-06-01 PROCEDURE — 76705 ECHO EXAM OF ABDOMEN: CPT

## 2020-06-01 RX ORDER — LORAZEPAM 2 MG/ML
2 INJECTION INTRAMUSCULAR EVERY 4 HOURS
Status: DISCONTINUED | OUTPATIENT
Start: 2020-06-01 | End: 2020-06-04

## 2020-06-01 RX ORDER — ALBUTEROL SULFATE 2.5 MG/3ML
2.5 SOLUTION RESPIRATORY (INHALATION) EVERY 6 HOURS PRN
Status: DISCONTINUED | OUTPATIENT
Start: 2020-06-01 | End: 2020-06-05 | Stop reason: HOSPADM

## 2020-06-01 RX ORDER — ACETAMINOPHEN 325 MG/1
650 TABLET ORAL EVERY 6 HOURS PRN
Status: DISCONTINUED | OUTPATIENT
Start: 2020-06-01 | End: 2020-06-05 | Stop reason: HOSPADM

## 2020-06-01 RX ORDER — BUPROPION HYDROCHLORIDE 100 MG/1
200 TABLET, EXTENDED RELEASE ORAL NIGHTLY
Status: DISCONTINUED | OUTPATIENT
Start: 2020-06-01 | End: 2020-06-05 | Stop reason: HOSPADM

## 2020-06-01 RX ORDER — FOLIC ACID 1 MG/1
1 TABLET ORAL DAILY
Status: DISCONTINUED | OUTPATIENT
Start: 2020-06-02 | End: 2020-06-05 | Stop reason: HOSPADM

## 2020-06-01 RX ORDER — FAMOTIDINE 10 MG/ML
20 INJECTION, SOLUTION INTRAVENOUS ONCE
Status: COMPLETED | OUTPATIENT
Start: 2020-06-01 | End: 2020-06-01

## 2020-06-01 RX ORDER — ONDANSETRON 2 MG/ML
4 INJECTION INTRAMUSCULAR; INTRAVENOUS EVERY 6 HOURS PRN
Status: DISCONTINUED | OUTPATIENT
Start: 2020-06-01 | End: 2020-06-05 | Stop reason: HOSPADM

## 2020-06-01 RX ORDER — SODIUM CHLORIDE 0.9 % (FLUSH) 0.9 %
10 SYRINGE (ML) INJECTION AS NEEDED
Status: DISCONTINUED | OUTPATIENT
Start: 2020-06-01 | End: 2020-06-05 | Stop reason: HOSPADM

## 2020-06-01 RX ORDER — GABAPENTIN 300 MG/1
600 CAPSULE ORAL EVERY 8 HOURS SCHEDULED
Status: DISCONTINUED | OUTPATIENT
Start: 2020-06-01 | End: 2020-06-05 | Stop reason: HOSPADM

## 2020-06-01 RX ORDER — SERTRALINE HYDROCHLORIDE 25 MG/1
25 TABLET, FILM COATED ORAL DAILY
Status: DISCONTINUED | OUTPATIENT
Start: 2020-06-02 | End: 2020-06-05 | Stop reason: HOSPADM

## 2020-06-01 RX ORDER — SODIUM CHLORIDE 9 MG/ML
50 INJECTION, SOLUTION INTRAVENOUS CONTINUOUS
Status: DISCONTINUED | OUTPATIENT
Start: 2020-06-01 | End: 2020-06-05 | Stop reason: HOSPADM

## 2020-06-01 RX ORDER — METOCLOPRAMIDE HYDROCHLORIDE 5 MG/ML
10 INJECTION INTRAMUSCULAR; INTRAVENOUS ONCE
Status: COMPLETED | OUTPATIENT
Start: 2020-06-01 | End: 2020-06-01

## 2020-06-01 RX ORDER — KETOROLAC TROMETHAMINE 30 MG/ML
30 INJECTION, SOLUTION INTRAMUSCULAR; INTRAVENOUS EVERY 6 HOURS PRN
Status: DISCONTINUED | OUTPATIENT
Start: 2020-06-01 | End: 2020-06-05 | Stop reason: HOSPADM

## 2020-06-01 RX ORDER — ONDANSETRON 4 MG/1
4 TABLET, FILM COATED ORAL EVERY 6 HOURS PRN
Status: DISCONTINUED | OUTPATIENT
Start: 2020-06-01 | End: 2020-06-05 | Stop reason: HOSPADM

## 2020-06-01 RX ORDER — NICOTINE 21 MG/24HR
1 PATCH, TRANSDERMAL 24 HOURS TRANSDERMAL
Status: DISCONTINUED | OUTPATIENT
Start: 2020-06-02 | End: 2020-06-05 | Stop reason: HOSPADM

## 2020-06-01 RX ORDER — BUPRENORPHINE HYDROCHLORIDE AND NALOXONE HYDROCHLORIDE DIHYDRATE 8; 2 MG/1; MG/1
1 TABLET SUBLINGUAL DAILY
Status: DISCONTINUED | OUTPATIENT
Start: 2020-06-02 | End: 2020-06-02

## 2020-06-01 RX ORDER — LANOLIN ALCOHOL/MO/W.PET/CERES
1000 CREAM (GRAM) TOPICAL DAILY
Status: DISCONTINUED | OUTPATIENT
Start: 2020-06-02 | End: 2020-06-05 | Stop reason: HOSPADM

## 2020-06-01 RX ORDER — SODIUM CHLORIDE 0.9 % (FLUSH) 0.9 %
10 SYRINGE (ML) INJECTION EVERY 12 HOURS SCHEDULED
Status: DISCONTINUED | OUTPATIENT
Start: 2020-06-01 | End: 2020-06-05 | Stop reason: HOSPADM

## 2020-06-01 RX ORDER — ASPIRIN 81 MG/1
324 TABLET, CHEWABLE ORAL ONCE
Status: DISCONTINUED | OUTPATIENT
Start: 2020-06-01 | End: 2020-06-05 | Stop reason: HOSPADM

## 2020-06-01 RX ORDER — BUPROPION HYDROCHLORIDE 150 MG/1
300 TABLET, EXTENDED RELEASE ORAL NIGHTLY
COMMUNITY
End: 2021-01-07

## 2020-06-01 RX ADMIN — LORAZEPAM 2 MG: 2 INJECTION INTRAMUSCULAR; INTRAVENOUS at 21:23

## 2020-06-01 RX ADMIN — SODIUM CHLORIDE 125 ML/HR: 900 INJECTION, SOLUTION INTRAVENOUS at 16:46

## 2020-06-01 RX ADMIN — GABAPENTIN 600 MG: 300 CAPSULE ORAL at 21:24

## 2020-06-01 RX ADMIN — IOPAMIDOL 90 ML: 755 INJECTION, SOLUTION INTRAVENOUS at 16:37

## 2020-06-01 RX ADMIN — HYDROMORPHONE HYDROCHLORIDE 1 MG: 1 INJECTION, SOLUTION INTRAMUSCULAR; INTRAVENOUS; SUBCUTANEOUS at 15:56

## 2020-06-01 RX ADMIN — FAMOTIDINE 20 MG: 10 INJECTION, SOLUTION INTRAVENOUS at 15:57

## 2020-06-01 RX ADMIN — ONDANSETRON 4 MG: 2 INJECTION INTRAMUSCULAR; INTRAVENOUS at 21:23

## 2020-06-01 RX ADMIN — SODIUM CHLORIDE 500 ML: 9 INJECTION, SOLUTION INTRAVENOUS at 15:55

## 2020-06-01 RX ADMIN — METOCLOPRAMIDE 10 MG: 5 INJECTION, SOLUTION INTRAMUSCULAR; INTRAVENOUS at 15:57

## 2020-06-01 NOTE — ED PROVIDER NOTES
"Subjective   34yo female pmh significant ptsd/etoh abuse/bipolar disorder/pancreatitis presents ED c/o 1d hx epigastric abdominal pain characterized as \"sharp\"/radiating back, substernal chest/neg exac or relieve factors/associated nausea, vomiting.  ROS neg fever/chills/hematemesis/hematochoezia/melena/dysuria.  Pt reports last etoh intake this am.      History provided by:  Patient  Abdominal Pain   Pain location:  Epigastric  Pain quality: aching and sharp    Pain radiates to:  Chest and back  Pain severity:  Severe  Onset quality:  Sudden  Duration:  1 day  Timing:  Constant  Progression:  Waxing and waning  Associated symptoms: chest pain, diarrhea, nausea and vomiting        Review of Systems   Constitutional: Negative.    HENT: Negative.    Respiratory: Negative.    Cardiovascular: Positive for chest pain.   Gastrointestinal: Positive for abdominal pain, diarrhea, nausea and vomiting. Negative for blood in stool.   Genitourinary: Negative.    Musculoskeletal: Negative.    Skin: Negative.    Allergic/Immunologic: Negative for immunocompromised state.   All other systems reviewed and are negative.      Past Medical History:   Diagnosis Date   • Abnormal Pap smear of cervix    • Alcoholism (CMS/HCC)    • Anxiety    • Cervical dysplasia    • Depression    • Fibrocystic breast    • GERD (gastroesophageal reflux disease)    • Seizures (CMS/HCC) 2017   • Substance abuse (CMS/HCC)    • Substance abuse (CMS/HCC)        No Known Allergies    Past Surgical History:   Procedure Laterality Date   • RHINOPLASTY         Family History   Problem Relation Age of Onset   • Breast cancer Mother    • Cancer Mother    • COPD Mother    • Breast cancer Maternal Grandmother    • Breast cancer Paternal Grandmother    • Breast cancer Maternal Aunt    • Hypertension Father    • Heart disease Father        Social History     Socioeconomic History   • Marital status:      Spouse name: Not on file   • Number of children: Not on " file   • Years of education: Not on file   • Highest education level: Not on file   Tobacco Use   • Smoking status: Current Every Day Smoker     Packs/day: 1.00     Years: 20.00     Pack years: 20.00     Types: Cigarettes   • Smokeless tobacco: Never Used   Substance and Sexual Activity   • Alcohol use: Yes     Frequency: Never     Comment: Pt reports drinking a 5th of vodka daily   • Drug use: No     Types: Fentanyl, Oxycodone   • Sexual activity: Yes     Partners: Male     Birth control/protection: None           Objective   Physical Exam   Constitutional: She is oriented to person, place, and time. She appears well-developed and well-nourished. She appears distressed.   HENT:   Head: Normocephalic and atraumatic.   Mouth/Throat: Oropharynx is clear and moist.   Eyes: Pupils are equal, round, and reactive to light.   Neck: Normal range of motion. Neck supple. No JVD present. No tracheal deviation present.   Cardiovascular: Normal rate, regular rhythm, normal heart sounds and intact distal pulses. Exam reveals no gallop and no friction rub.   No murmur heard.  Pulmonary/Chest: Effort normal and breath sounds normal. She has no wheezes. She has no rales.   Abdominal: Normal appearance. Bowel sounds are decreased. There is tenderness in the right upper quadrant and epigastric area. There is guarding. There is no rigidity, no rebound, no CVA tenderness, no tenderness at McBurney's point and negative Majano's sign.       Musculoskeletal: She exhibits no edema.   Lymphadenopathy:     She has no cervical adenopathy.   Neurological: She is alert and oriented to person, place, and time.   Skin: She is not diaphoretic.   Nursing note and vitals reviewed.      ECG 12 Lead    Date/Time: 6/1/2020 3:53 PM  Performed by: Aris Villarreal MD  Authorized by: Aris iVllarreal MD   Interpreted by physician  Rhythm: sinus tachycardia  Rate: tachycardic  BPM: 101  QRS axis: normal  Conduction: conduction normal  ST Segments: ST segments  normal  T Waves: T waves normal  Other: no other findings  Clinical impression: non-specific ECG                 ED Course      Labs Reviewed   COMPREHENSIVE METABOLIC PANEL - Abnormal; Notable for the following components:       Result Value    Glucose 107 (*)     Creatinine 0.49 (*)     Sodium 133 (*)     Potassium 3.4 (*)     CO2 21.0 (*)     AST (SGOT) 62 (*)     All other components within normal limits    Narrative:     GFR Normal >60  Chronic Kidney Disease <60  Kidney Failure <15     AMYLASE - Abnormal; Notable for the following components:    Amylase 419 (*)     All other components within normal limits   LIPASE - Abnormal; Notable for the following components:    Lipase 2,645 (*)     All other components within normal limits   D-DIMER, QUANTITATIVE - Abnormal; Notable for the following components:    D-Dimer, Quantitative 731 (*)     All other components within normal limits    Narrative:     Dimer values <500 ng/ml FEU are FDA approved as aid in diagnosis of deep venous thrombosis and pulmonary embolism.  This test should not be used in an exclusion strategy with pretest probability alone.    A recent guideline regarding diagnosis for pulmonary thromboembolism recommends an adjusted exclusion criterion of age x 10 ng/ml FEU for patients >50 years of age ([employer] Intern Med 2015; 163: 701-711).     CBC WITH AUTO DIFFERENTIAL - Abnormal; Notable for the following components:    WBC 12.24 (*)     .3 (*)     MCH 37.0 (*)     MCHC 36.6 (*)     Neutrophil % 76.8 (*)     Lymphocyte % 17.6 (*)     Monocyte % 4.5 (*)     Neutrophils, Absolute 9.40 (*)     All other components within normal limits   HCG, SERUM, QUALITATIVE - Normal   TROPONIN (IN-HOUSE) - Normal    Narrative:     Troponin T Reference Range:  <= 0.03 ng/mL-   Negative for AMI  >0.03 ng/mL-     Abnormal for myocardial necrosis.  Clinicians would have to utilize clinical acumen, EKG, Troponin and serial changes to determine if it is an Acute  Myocardial Infarction or myocardial injury due to an underlying chronic condition.       Results may be falsely decreased if patient taking Biotin.     ETHANOL   URINALYSIS W/ MICROSCOPIC IF INDICATED (NO CULTURE)   URINE DRUG SCREEN   LACTATE DEHYDROGENASE   CBC AND DIFFERENTIAL    Narrative:     The following orders were created for panel order CBC & Differential.  Procedure                               Abnormality         Status                     ---------                               -----------         ------                     CBC Auto Differential[[id number]]        Abnormal            Final result                 Please view results for these tests on the individual orders.     Us Gallbladder    Result Date: 6/1/2020  Narrative: Ultrasound gallbladder, right upper quadrant. HISTORY: Right upper quadrant abdominal pain. Prior exam: CT September 9, 2019. FINDINGS: Normal liver. No masses or intrahepatic biliary dilatation. Normal gallbladder. No stones or gallbladder wall thickening. There is mild dilatation common bile duct 0.6 cm. Inflammatory changes and fluid surrounding the pancreatic head and neck. The above findings suggest acute pancreatitis. Additional information with respect to pancreatitis may be obtained by CT the abdomen with contrast. Normal right kidney 10.2 x 4.7 x 4.0 cm. Normal aorta and inferior vena cava.     Impression: Normal liver. Normal gallbladder. Mild dilatation common bile duct 0.6 cm. Inflammatory changes and peripancreatic fluid surrounding the pancreatic head and neck. The above findings suggest acute pancreatitis. CT abdomen with contrast may be useful for further evaluation. Electronically signed by:  Jorge Mullen MD  6/1/2020 4:09 PM CDT Workstation: [id number]                                         MDM    Final diagnoses:   Alcohol-induced acute pancreatitis, unspecified complication status            Aris Villarreal MD  06/01/20 1610       Aris Villarreal MD  06/01/20  [id number]

## 2020-06-02 ENCOUNTER — TELEPHONE (OUTPATIENT)
Dept: GENERAL RADIOLOGY | Facility: HOSPITAL | Age: 35
End: 2020-06-02

## 2020-06-02 LAB
ANION GAP SERPL CALCULATED.3IONS-SCNC: 9 MMOL/L (ref 5–15)
BASOPHILS # BLD AUTO: 0.04 10*3/MM3 (ref 0–0.2)
BASOPHILS NFR BLD AUTO: 0.4 % (ref 0–1.5)
BUN BLD-MCNC: 8 MG/DL (ref 6–20)
BUN/CREAT SERPL: 17.8 (ref 7–25)
CALCIUM SPEC-SCNC: 8.2 MG/DL (ref 8.6–10.5)
CHLORIDE SERPL-SCNC: 102 MMOL/L (ref 98–107)
CO2 SERPL-SCNC: 21 MMOL/L (ref 22–29)
CREAT BLD-MCNC: 0.45 MG/DL (ref 0.57–1)
DEPRECATED RDW RBC AUTO: 48.6 FL (ref 37–54)
EOSINOPHIL # BLD AUTO: 0.09 10*3/MM3 (ref 0–0.4)
EOSINOPHIL NFR BLD AUTO: 1 % (ref 0.3–6.2)
ERYTHROCYTE [DISTWIDTH] IN BLOOD BY AUTOMATED COUNT: 12.6 % (ref 12.3–15.4)
FOLATE SERPL-MCNC: >20 NG/ML (ref 4.78–24.2)
GFR SERPL CREATININE-BSD FRML MDRD: >150 ML/MIN/1.73
GLUCOSE BLD-MCNC: 90 MG/DL (ref 65–99)
HCT VFR BLD AUTO: 34.2 % (ref 34–46.6)
HGB BLD-MCNC: 12.2 G/DL (ref 12–15.9)
IMM GRANULOCYTES # BLD AUTO: 0.05 10*3/MM3 (ref 0–0.05)
IMM GRANULOCYTES NFR BLD AUTO: 0.6 % (ref 0–0.5)
LIPASE SERPL-CCNC: 1460 U/L (ref 13–60)
LYMPHOCYTES # BLD AUTO: 2.14 10*3/MM3 (ref 0.7–3.1)
LYMPHOCYTES NFR BLD AUTO: 23.6 % (ref 19.6–45.3)
MCH RBC QN AUTO: 37 PG (ref 26.6–33)
MCHC RBC AUTO-ENTMCNC: 35.7 G/DL (ref 31.5–35.7)
MCV RBC AUTO: 103.6 FL (ref 79–97)
MONOCYTES # BLD AUTO: 0.54 10*3/MM3 (ref 0.1–0.9)
MONOCYTES NFR BLD AUTO: 5.9 % (ref 5–12)
NEUTROPHILS # BLD AUTO: 6.22 10*3/MM3 (ref 1.7–7)
NEUTROPHILS NFR BLD AUTO: 68.5 % (ref 42.7–76)
NRBC BLD AUTO-RTO: 0 /100 WBC (ref 0–0.2)
PLATELET # BLD AUTO: 173 10*3/MM3 (ref 140–450)
PMV BLD AUTO: 9.3 FL (ref 6–12)
POTASSIUM BLD-SCNC: 3.4 MMOL/L (ref 3.5–5.2)
RBC # BLD AUTO: 3.3 10*6/MM3 (ref 3.77–5.28)
SODIUM BLD-SCNC: 132 MMOL/L (ref 136–145)
VIT B12 BLD-MCNC: 574 PG/ML (ref 211–946)
WBC NRBC COR # BLD: 9.08 10*3/MM3 (ref 3.4–10.8)

## 2020-06-02 PROCEDURE — 25010000002 LORAZEPAM PER 2 MG: Performed by: STUDENT IN AN ORGANIZED HEALTH CARE EDUCATION/TRAINING PROGRAM

## 2020-06-02 PROCEDURE — 25010000002 KETOROLAC TROMETHAMINE PER 15 MG: Performed by: STUDENT IN AN ORGANIZED HEALTH CARE EDUCATION/TRAINING PROGRAM

## 2020-06-02 PROCEDURE — 82746 ASSAY OF FOLIC ACID SERUM: CPT | Performed by: STUDENT IN AN ORGANIZED HEALTH CARE EDUCATION/TRAINING PROGRAM

## 2020-06-02 PROCEDURE — 85025 COMPLETE CBC W/AUTO DIFF WBC: CPT | Performed by: STUDENT IN AN ORGANIZED HEALTH CARE EDUCATION/TRAINING PROGRAM

## 2020-06-02 PROCEDURE — 83690 ASSAY OF LIPASE: CPT | Performed by: STUDENT IN AN ORGANIZED HEALTH CARE EDUCATION/TRAINING PROGRAM

## 2020-06-02 PROCEDURE — 99232 SBSQ HOSP IP/OBS MODERATE 35: CPT | Performed by: STUDENT IN AN ORGANIZED HEALTH CARE EDUCATION/TRAINING PROGRAM

## 2020-06-02 PROCEDURE — 25010000002 THIAMINE PER 100 MG: Performed by: STUDENT IN AN ORGANIZED HEALTH CARE EDUCATION/TRAINING PROGRAM

## 2020-06-02 PROCEDURE — 80048 BASIC METABOLIC PNL TOTAL CA: CPT | Performed by: STUDENT IN AN ORGANIZED HEALTH CARE EDUCATION/TRAINING PROGRAM

## 2020-06-02 PROCEDURE — 25010000002 ONDANSETRON PER 1 MG: Performed by: STUDENT IN AN ORGANIZED HEALTH CARE EDUCATION/TRAINING PROGRAM

## 2020-06-02 PROCEDURE — 82607 VITAMIN B-12: CPT | Performed by: STUDENT IN AN ORGANIZED HEALTH CARE EDUCATION/TRAINING PROGRAM

## 2020-06-02 RX ORDER — BUPRENORPHINE HYDROCHLORIDE AND NALOXONE HYDROCHLORIDE DIHYDRATE 8; 2 MG/1; MG/1
1 TABLET SUBLINGUAL 3 TIMES DAILY
Status: DISCONTINUED | OUTPATIENT
Start: 2020-06-02 | End: 2020-06-05 | Stop reason: HOSPADM

## 2020-06-02 RX ORDER — BUPRENORPHINE HYDROCHLORIDE AND NALOXONE HYDROCHLORIDE DIHYDRATE 8; 2 MG/1; MG/1
1 TABLET SUBLINGUAL 3 TIMES DAILY
Status: DISCONTINUED | OUTPATIENT
Start: 2020-06-02 | End: 2020-06-02

## 2020-06-02 RX ADMIN — THIAMINE HYDROCHLORIDE 250 MG: 100 INJECTION, SOLUTION INTRAMUSCULAR; INTRAVENOUS at 10:53

## 2020-06-02 RX ADMIN — SERTRALINE 25 MG: 25 TABLET, FILM COATED ORAL at 10:52

## 2020-06-02 RX ADMIN — CYANOCOBALAMIN TAB 1000 MCG 1000 MCG: 1000 TAB at 10:52

## 2020-06-02 RX ADMIN — BUPRENORPHINE HYDROCHLORIDE AND NALOXONE HYDROCHLORIDE DIHYDRATE 1 TABLET: 8; 2 TABLET SUBLINGUAL at 10:52

## 2020-06-02 RX ADMIN — LORAZEPAM 2 MG: 2 INJECTION INTRAMUSCULAR; INTRAVENOUS at 07:07

## 2020-06-02 RX ADMIN — SODIUM CHLORIDE 125 ML/HR: 900 INJECTION, SOLUTION INTRAVENOUS at 07:07

## 2020-06-02 RX ADMIN — BUPROPION HYDROCHLORIDE 200 MG: 100 TABLET, EXTENDED RELEASE ORAL at 22:00

## 2020-06-02 RX ADMIN — BUPRENORPHINE HYDROCHLORIDE AND NALOXONE HYDROCHLORIDE DIHYDRATE 1 TABLET: 8; 2 TABLET SUBLINGUAL at 17:44

## 2020-06-02 RX ADMIN — KETOROLAC TROMETHAMINE 30 MG: 30 INJECTION, SOLUTION INTRAMUSCULAR; INTRAVENOUS at 03:57

## 2020-06-02 RX ADMIN — FOLIC ACID 1 MG: 1 TABLET ORAL at 10:52

## 2020-06-02 RX ADMIN — SODIUM CHLORIDE 200 ML/HR: 900 INJECTION, SOLUTION INTRAVENOUS at 23:43

## 2020-06-02 RX ADMIN — LORAZEPAM 2 MG: 2 INJECTION INTRAMUSCULAR; INTRAVENOUS at 22:04

## 2020-06-02 RX ADMIN — KETOROLAC TROMETHAMINE 30 MG: 30 INJECTION, SOLUTION INTRAMUSCULAR; INTRAVENOUS at 17:44

## 2020-06-02 RX ADMIN — NICOTINE 1 PATCH: 14 PATCH, EXTENDED RELEASE TRANSDERMAL at 10:53

## 2020-06-02 RX ADMIN — ONDANSETRON 4 MG: 2 INJECTION INTRAMUSCULAR; INTRAVENOUS at 03:56

## 2020-06-02 RX ADMIN — GABAPENTIN 600 MG: 300 CAPSULE ORAL at 22:04

## 2020-06-02 RX ADMIN — ONDANSETRON 4 MG: 2 INJECTION INTRAMUSCULAR; INTRAVENOUS at 15:20

## 2020-06-02 RX ADMIN — SODIUM CHLORIDE 200 ML/HR: 900 INJECTION, SOLUTION INTRAVENOUS at 11:14

## 2020-06-02 RX ADMIN — GABAPENTIN 600 MG: 300 CAPSULE ORAL at 07:00

## 2020-06-02 RX ADMIN — LORAZEPAM 2 MG: 2 INJECTION INTRAMUSCULAR; INTRAVENOUS at 17:44

## 2020-06-02 RX ADMIN — SODIUM CHLORIDE 200 ML/HR: 900 INJECTION, SOLUTION INTRAVENOUS at 17:55

## 2020-06-02 RX ADMIN — LORAZEPAM 2 MG: 2 INJECTION INTRAMUSCULAR; INTRAVENOUS at 03:56

## 2020-06-02 RX ADMIN — LORAZEPAM 2 MG: 2 INJECTION INTRAMUSCULAR; INTRAVENOUS at 14:56

## 2020-06-02 RX ADMIN — GABAPENTIN 600 MG: 300 CAPSULE ORAL at 14:56

## 2020-06-02 RX ADMIN — KETOROLAC TROMETHAMINE 30 MG: 30 INJECTION, SOLUTION INTRAMUSCULAR; INTRAVENOUS at 11:14

## 2020-06-02 RX ADMIN — LORAZEPAM 2 MG: 2 INJECTION INTRAMUSCULAR; INTRAVENOUS at 10:53

## 2020-06-02 RX ADMIN — SODIUM CHLORIDE, PRESERVATIVE FREE 10 ML: 5 INJECTION INTRAVENOUS at 11:05

## 2020-06-02 NOTE — H&P
HISTORY AND PHYSICAL  NAME: Nata Bain  : 1985  MRN: [id number]    DATE OF ADMISSION:  2020     DATE & TIME SEEN: 20 at 1830    PCP: Yosi Almaguer MD    CODE STATUS: Full code    CHIEF COMPLAINT:  Abdominal pain    HPI:  Nata Bain is a 35 y.o. female with a CMH of alcohol abuse with withdrawal seizures who presents complaining of sharp epigastric pain that started the morning of admission and radiated up to her chest, causing a sharp pain there, then generalized to include her back as well.  She denies any exacerbating or relieving factors for the abdominal pain but states that aspirin alleviated her chest pain.  She has had associated nonbloody, bilious emesis x2 today, and nonbloody diarrhea for about 3 days.  In addition she states her urine output is decreased.  Feels exactly like it just worse.  Pain is currently at a 9 and located in epigastrium.  Last drink was this morning.  She drinks about 1 to 1/2 pints or 1/5 of vodka per day.    CONCURRENT MEDICAL HISTORY:  Past Medical History:   Diagnosis Date   • Abnormal Pap smear of cervix    • Alcoholism (CMS/[facility])    • Anxiety    • Cervical dysplasia    • Depression    • Fibrocystic breast    • GERD (gastroesophageal reflux disease)    • Seizures (CMS/[facility]) 2017   • Substance abuse (CMS/[facility])    • Substance abuse (CMS/[facility])        PAST SURGICAL HISTORY:  Past Surgical History:   Procedure Laterality Date   • RHINOPLASTY         FAMILY HISTORY:  Family History   Problem Relation Age of Onset   • Breast cancer Mother    • Cancer Mother    • COPD Mother    • Breast cancer Maternal Grandmother    • Breast cancer Paternal Grandmother    • Breast cancer Maternal Aunt    • Hypertension Father    • Heart disease Father         SOCIAL HISTORY:  Social History     Socioeconomic History   • Marital status:      Spouse name: Not on file   • Number of children: Not on file   • Years of education: Not on file   • Highest  education level: Not on file   Tobacco Use   • Smoking status: Current Every Day Smoker     Packs/day: 1.00     Years: 20.00     Pack years: 20.00     Types: Cigarettes   • Smokeless tobacco: Never Used   Substance and Sexual Activity   • Alcohol use: Yes     Frequency: Never     Comment: Pt reports drinking a 5th of vodka daily   • Drug use: No     Types: Fentanyl, Oxycodone   • Sexual activity: Yes     Partners: Male     Birth control/protection: None       HOME MEDICATIONS:  Prior to Admission medications    Medication Sig Start Date End Date Taking? Authorizing Provider   albuterol (PROVENTIL) (2.5 MG/3ML) 0.083% nebulizer solution Take 2.5 mg by nebulization Every 6 (Six) Hours As Needed for Wheezing or Shortness of Air. 4/9/20   Rayna De La Torre APRN   azithromycin (ZITHROMAX) 250 MG tablet Take 2 tablets the first day, then 1 tablet daily for 4 days. 4/16/20   Linda Jennings APRN   buprenorphine-naloxone (SUBOXONE) 8-2 MG per SL tablet Place 3 tablets under the tongue Daily. 2/20/20   Caty Yepez MD   diazePAM (VALIUM) 5 MG tablet  1/16/20   Caty Yepez MD   folic acid (FOLVITE) 1 MG tablet Take 1 tablet by mouth Daily. 1/15/20   Mckenzie Young MD   gabapentin (NEURONTIN) 600 MG tablet  3/26/20   Caty Yepez MD   guaiFENesin (MUCINEX) 600 MG 12 hr tablet Take 1 tablet by mouth 2 (Two) Times a Day. 4/9/20   Rayna De La Torre APRN   ondansetron (ZOFRAN) 4 MG tablet Take 1 tablet by mouth Every 8 (Eight) Hours As Needed for Nausea or Vomiting. 1/14/20   Mckenzie Young MD   promethazine (PHENERGAN) 25 MG suppository Insert 1 suppository into the rectum Every 6 (Six) Hours As Needed for Nausea or Vomiting. 3/8/20   Jeannie Painter APRN   promethazine-dextromethorphan (PROMETHAZINE-DM) 6.25-15 MG/5ML syrup Take 5 mL by mouth 4 (Four) Times a Day As Needed for Cough. 4/16/20   Dick, Linda D, APRN   sertraline (ZOLOFT) 25 MG tablet Take 1 tablet by  mouth Daily. 1/14/20   Mckenzie Young MD   thiamine (VITAMIN B1) 100 MG tablet Take 1 tablet by mouth Daily. 1/15/20   Mckenzie Young MD   vitamin B-12 (VITAMIN B-12) 1000 MCG tablet Take 1 tablet by mouth Daily. 1/15/20   Mckenzie Young MD       ALLERGIES:  Patient has no known allergies.    REVIEW OF SYSTEMS  Review of Systems   Constitutional: Positive for appetite change. Negative for activity change.   HENT: Negative for congestion and trouble swallowing.    Respiratory: Negative for chest tightness and shortness of breath.    Cardiovascular: Negative for chest pain and palpitations.   Gastrointestinal: Positive for abdominal pain, diarrhea, nausea and vomiting. Negative for abdominal distention, anal bleeding and blood in stool.   Genitourinary: Negative for difficulty urinating and dysuria.   Musculoskeletal: Negative for arthralgias and myalgias.   Skin: Negative for color change and pallor.   Neurological: Negative for dizziness, light-headedness and headaches.   Psychiatric/Behavioral: Negative for agitation and behavioral problems.       PHYSICAL EXAM:  Temp:  [97 °F (36.1 °C)-98.2 °F (36.8 °C)] 97 °F (36.1 °C)  Heart Rate:  [] 88  Resp:  [18-20] 20  BP: (129-161)/() 161/85  Body mass index is 19.71 kg/m².  Physical Exam   Constitutional: She is oriented to person, place, and time. She appears well-developed and well-nourished. No distress.   HENT:   Head: Normocephalic and atraumatic.   Right Ear: External ear normal.   Left Ear: External ear normal.   Nose: Nose normal.   Eyes: Pupils are equal, round, and reactive to light. EOM are normal.   Neck: Normal range of motion. Neck supple.   Cardiovascular: Normal rate, regular rhythm and normal heart sounds. Exam reveals no gallop and no friction rub.   No murmur heard.  Pulmonary/Chest: Effort normal and breath sounds normal. No respiratory distress. She has no wheezes. She has no rales.   Abdominal: Soft. Normal appearance and  bowel sounds are normal. She exhibits no distension. There is generalized tenderness (Primarily in upper abdomen) and tenderness in the right upper quadrant, epigastric area and left upper quadrant.   Musculoskeletal: Normal range of motion. She exhibits no edema.   Neurological: She is alert and oriented to person, place, and time.   Skin: Skin is warm. No erythema.   Psychiatric: She has a normal mood and affect. Her behavior is normal.       DIAGNOSTIC DATA:   Lab Results (last 24 hours)     Procedure Component Value Units Date/Time    Urinalysis, Microscopic Only - Urine, Clean Catch [[id number]]  (Abnormal) Collected:  06/01/20 1614    Specimen:  Urine, Clean Catch Updated:  06/01/20 1648     RBC, UA 0-2 /HPF      WBC, UA 3-5 /HPF      Bacteria, UA 2+ /HPF      Squamous Epithelial Cells, UA 6-12 /HPF      Hyaline Casts, UA 13-20 /LPF      Waxy Casts 0-2 /LPF      Mucus, UA Moderate/2+ /HPF      Methodology Manual Light Microscopy    Urinalysis With Microscopic If Indicated (No Culture) - Urine, Clean Catch [[id number]]  (Abnormal) Collected:  06/01/20 1614    Specimen:  Urine, Clean Catch Updated:  06/01/20 1641     Color, UA Dark Yellow     Appearance, UA Cloudy     pH, UA 5.5     Specific Gravity, UA 1.044     Comment: BY REFRACTOMETER        Glucose, UA Negative     Ketones, UA 15 mg/dL (1+)     Bilirubin, UA Small (1+)     Blood, UA Negative     Protein, UA 30 mg/dL (1+)     Leuk Esterase, UA Trace     Nitrite, UA Negative     Urobilinogen, UA 0.2 E.U./dL    Lactate Dehydrogenase [[id number]]  (Normal) Collected:  06/01/20 1522    Specimen:  Blood Updated:  06/01/20 1639      U/L     Urine Drug Screen - Urine, Clean Catch [[id number]]  (Abnormal) Collected:  06/01/20 1614    Specimen:  Urine, Clean Catch Updated:  06/01/20 1636     THC, Screen, Urine Negative     Phencyclidine (PCP), Urine Negative     Cocaine Screen, Urine Negative     Methamphetamine, Ur Negative     Opiate Screen Positive      Amphetamine Screen, Urine Negative     Benzodiazepine Screen, Urine Positive     Tricyclic Antidepressants Screen Negative     Methadone Screen, Urine Negative     Barbiturates Screen, Urine Negative     Oxycodone Screen, Urine Negative     Propoxyphene Screen Negative     Buprenorphine, Screen, Urine Positive    Narrative:       Cutoff For Drugs Screened:    Amphetamines               500 ng/ml  Barbiturates               200 ng/ml  Benzodiazepines            150 ng/ml  Cocaine                    150 ng/ml  Methadone                  200 ng/ml  Opiates                    100 ng/ml  Phencyclidine               25 ng/ml  THC                            50 ng/ml  Methamphetamine            500 ng/ml  Tricyclic Antidepressants  300 ng/ml  Oxycodone                  100 ng/ml  Propoxyphene               300 ng/ml  Buprenorphine               10 ng/ml    The normal value for all drugs tested is negative. This report includes unconfirmed screening results, with the cutoff values listed, to be used for medical treatment purposes only.  Unconfirmed results must not be used for non-medical purposes such as employment or legal testing.  Clinical consideration should be applied to any drug of abuse test, particularly when unconfirmed results are used.      Lipase [[id number]]  (Abnormal) Collected:  06/01/20 1522    Specimen:  Blood Updated:  06/01/20 1605     Lipase 2,645 U/L     D-dimer, Quantitative [[id number]]  (Abnormal) Collected:  06/01/20 1522    Specimen:  Blood Updated:  06/01/20 1559     D-Dimer, Quantitative 731 ng/mL (FEU)     Narrative:       Dimer values <500 ng/ml FEU are FDA approved as aid in diagnosis of deep venous thrombosis and pulmonary embolism.  This test should not be used in an exclusion strategy with pretest probability alone.    A recent guideline regarding diagnosis for pulmonary thromboembolism recommends an adjusted exclusion criterion of age x 10 ng/ml FEU for patients >50 years of age (Lesley  Intern Med 2015; 163: 701-711).      Comprehensive Metabolic Panel [[id number]]  (Abnormal) Collected:  06/01/20 1522    Specimen:  Blood Updated:  06/01/20 1559     Glucose 107 mg/dL      BUN 11 mg/dL      Creatinine 0.49 mg/dL      Sodium 133 mmol/L      Potassium 3.4 mmol/L      Chloride 98 mmol/L      CO2 21.0 mmol/L      Calcium 9.6 mg/dL      Total Protein 7.6 g/dL      Albumin 4.50 g/dL      ALT (SGPT) 29 U/L      AST (SGOT) 62 U/L      Alkaline Phosphatase 96 U/L      Total Bilirubin 0.6 mg/dL      eGFR Non African Amer 145 mL/min/1.73      Globulin 3.1 gm/dL      A/G Ratio 1.5 g/dL      BUN/Creatinine Ratio 22.4     Anion Gap 14.0 mmol/L     Narrative:       GFR Normal >60  Chronic Kidney Disease <60  Kidney Failure <15      Amylase [[id number]]  (Abnormal) Collected:  06/01/20 1522    Specimen:  Blood Updated:  06/01/20 1559     Amylase 419 U/L     Ethanol [[id number]] Collected:  06/01/20 1522    Specimen:  Blood Updated:  06/01/20 1559     Ethanol <10 mg/dL      Ethanol % <0.010 %     Troponin [[id number]]  (Normal) Collected:  06/01/20 1522    Specimen:  Blood Updated:  06/01/20 1556     Troponin T <0.010 ng/mL     Narrative:       Troponin T Reference Range:  <= 0.03 ng/mL-   Negative for AMI  >0.03 ng/mL-     Abnormal for myocardial necrosis.  Clinicians would have to utilize clinical acumen, EKG, Troponin and serial changes to determine if it is an Acute Myocardial Infarction or myocardial injury due to an underlying chronic condition.       Results may be falsely decreased if patient taking Biotin.      hCG, Serum, Qualitative [[id number]]  (Normal) Collected:  06/01/20 1522    Specimen:  Blood Updated:  06/01/20 1548     HCG Qualitative Negative    CBC & Differential [[id number]] Collected:  06/01/20 1522    Specimen:  Blood Updated:  06/01/20 1530    Narrative:       The following orders were created for panel order CBC & Differential.  Procedure                               Abnormality         Status                      ---------                               -----------         ------                     CBC Auto Differential[[id number]]        Abnormal            Final result                 Please view results for these tests on the individual orders.    CBC Auto Differential [[id number]]  (Abnormal) Collected:  06/01/20 1522    Specimen:  Blood Updated:  06/01/20 1530     WBC 12.24 10*3/mm3      RBC 3.86 10*6/mm3      Hemoglobin 14.3 g/dL      Hematocrit 39.1 %      .3 fL      MCH 37.0 pg      MCHC 36.6 g/dL      RDW 12.9 %      RDW-SD 48.5 fl      MPV 9.0 fL      Platelets 226 10*3/mm3      Neutrophil % 76.8 %      Lymphocyte % 17.6 %      Monocyte % 4.5 %      Eosinophil % 0.4 %      Basophil % 0.3 %      Immature Grans % 0.4 %      Neutrophils, Absolute 9.40 10*3/mm3      Lymphocytes, Absolute 2.15 10*3/mm3      Monocytes, Absolute 0.55 10*3/mm3      Eosinophils, Absolute 0.05 10*3/mm3      Basophils, Absolute 0.04 10*3/mm3      Immature Grans, Absolute 0.05 10*3/mm3      nRBC 0.0 /100 WBC            Imaging Results (Last 24 Hours)     Procedure Component Value Units Date/Time    CT Angiogram Chest [[id number]] Collected:  06/01/20 1624     Updated:  06/01/20 1737    Narrative:       PROCEDURE: CT CHEST ANGIOGRAPHY WITH IV CONTRAST, CT ABDOMEN  PELVIS WITH IV CONTRAST    HISTORY: chest pain, K85.20 Alcohol induced acute pancreatitis  without necrosis or infection    TECHNIQUE: Axial images were obtained and multiplanar  reconstructions were made.    This exam was performed using radiation doses that are as low as  reasonably achievable (ALARA).  This exam was performed according to our departmental dose  optimization program, which includes automated exposure control,  adjustment of the mA and/or KV according to patient size and/or  use of iterative reconstruction technique.    CONTRAST: 90 mL Isovue-370    Note: Reconstructed MIP images were obtained in multiple  obliquities. No 3-D surface rendered  images were obtained for  this exam.    CTA FINDINGS:  PULMONARY CTA: The main pulmonary arteries and their major  branches are opacified with contrast without evidence of abnormal  filling defects.  OTHER VASCULAR: Unremarkable  LUNGS/PLEURA: The lungs are normal.  TRACHEA AND BRONCHI:  The trachea and bronchi are patent.  MEDIASTINUM, MICHAEL AND LYMPH NODES: The mediastinum, michael and  lymph nodes are normal.  HEART AND PERICARDIUM: The heart and pericardium are normal.  OSSEOUS STRUCTURES: Unremarkable.    ABDOMINAL/PELVIC CT findings  HEPATOBILIARY: Fatty liver.  SPLEEN: Unremarkable.  PANCREAS: There is a moderate amount of peripancreatic fat  stranding compatible with history of acute pancreatitis. No  walled off fluid collection is seen. Fluid is noted to track  inferiorly into the abdomen to the level of the umbilicus. The  pancreas enhances somewhat heterogeneously, especially in the  head.  ADRENAL GLANDS: Unremarkable.  KIDNEYS/URETERS: No evidence of hydronephrosis or suspicious  mass.  GASTROINTESTINAL: A normal appendix is visualized.  REPRODUCTIVE ORGANS: Unremarkable.  URINARY BLADDER: Unremarkable  VASCULAR: Unremarkable  LYMPH NODES: No pathologically enlarged nodes by size criteria.  PERITONEUM/RETROPERITONEUM: Unremarkable.   OSSEOUS STRUCTURES: Unremarkable.      Impression:       CONCLUSION:   1.  There is a moderate amount of peripancreatic fat stranding  compatible with history of acute pancreatitis. No walled off  fluid collection is seen. Fluid is noted to track inferiorly into  the abdomen to the level of the umbilicus.   2.  The pancreas enhances somewhat heterogeneously, especially in  the head.  3.  Fatty liver.    Electronically signed by:  Ranjit Marie MD  6/1/2020 5:36 PM CDT  Workstation: [id number]    CT Abdomen Pelvis With Contrast [[id number]] Collected:  06/01/20 1624     Updated:  06/01/20 1737    Narrative:       PROCEDURE: CT CHEST ANGIOGRAPHY WITH IV CONTRAST, CT ABDOMEN  PELVIS  WITH IV CONTRAST    HISTORY: chest pain, K85.20 Alcohol induced acute pancreatitis  without necrosis or infection    TECHNIQUE: Axial images were obtained and multiplanar  reconstructions were made.    This exam was performed using radiation doses that are as low as  reasonably achievable (ALARA).  This exam was performed according to our departmental dose  optimization program, which includes automated exposure control,  adjustment of the mA and/or KV according to patient size and/or  use of iterative reconstruction technique.    CONTRAST: 90 mL Isovue-370    Note: Reconstructed MIP images were obtained in multiple  obliquities. No 3-D surface rendered images were obtained for  this exam.    CTA FINDINGS:  PULMONARY CTA: The main pulmonary arteries and their major  branches are opacified with contrast without evidence of abnormal  filling defects.  OTHER VASCULAR: Unremarkable  LUNGS/PLEURA: The lungs are normal.  TRACHEA AND BRONCHI:  The trachea and bronchi are patent.  MEDIASTINUM, MICHAEL AND LYMPH NODES: The mediastinum, michael and  lymph nodes are normal.  HEART AND PERICARDIUM: The heart and pericardium are normal.  OSSEOUS STRUCTURES: Unremarkable.    ABDOMINAL/PELVIC CT findings  HEPATOBILIARY: Fatty liver.  SPLEEN: Unremarkable.  PANCREAS: There is a moderate amount of peripancreatic fat  stranding compatible with history of acute pancreatitis. No  walled off fluid collection is seen. Fluid is noted to track  inferiorly into the abdomen to the level of the umbilicus. The  pancreas enhances somewhat heterogeneously, especially in the  head.  ADRENAL GLANDS: Unremarkable.  KIDNEYS/URETERS: No evidence of hydronephrosis or suspicious  mass.  GASTROINTESTINAL: A normal appendix is visualized.  REPRODUCTIVE ORGANS: Unremarkable.  URINARY BLADDER: Unremarkable  VASCULAR: Unremarkable  LYMPH NODES: No pathologically enlarged nodes by size criteria.  PERITONEUM/RETROPERITONEUM: Unremarkable.   OSSEOUS STRUCTURES:  Unremarkable.      Impression:       CONCLUSION:   1.  There is a moderate amount of peripancreatic fat stranding  compatible with history of acute pancreatitis. No walled off  fluid collection is seen. Fluid is noted to track inferiorly into  the abdomen to the level of the umbilicus.   2.  The pancreas enhances somewhat heterogeneously, especially in  the head.  3.  Fatty liver.    Electronically signed by:  Ranjit Marie MD  6/1/2020 5:36 PM CDT  Workstation: [id number]    XR Chest 1 View [[id number]] Collected:  06/01/20 1558     Updated:  06/01/20 1630    Narrative:       Chest x-ray single view.       CLINICAL INDICATION: Shortness of breath . Chest pain    COMPARISON: Chest March 8, 2020.    FINDINGS: Cardiac silhouette is normal in size. Pulmonary  vascularity is unremarkable.     No focal infiltrate or consolidation.  No pleural effusion.  No  pneumothorax.      Impression:       No evidence of active disease. Normal chest.    Electronically signed by:  Jorge Mullen MD  6/1/2020 4:29 PM CDT  Workstation: [id number]    US Gallbladder [[id number]] Collected:  06/01/20 1513     Updated:  06/01/20 1610    Narrative:       Ultrasound gallbladder, right upper quadrant.    HISTORY: Right upper quadrant abdominal pain.    Prior exam: CT September 9, 2019.    FINDINGS:    Normal liver. No masses or intrahepatic biliary dilatation.    Normal gallbladder. No stones or gallbladder wall thickening.    There is mild dilatation common bile duct 0.6 cm. Inflammatory  changes and fluid surrounding the pancreatic head and neck. The  above findings suggest acute pancreatitis. Additional information  with respect to pancreatitis may be obtained by CT the abdomen  with contrast.    Normal right kidney 10.2 x 4.7 x 4.0 cm.    Normal aorta and inferior vena cava.      Impression:       Normal liver. Normal gallbladder. Mild dilatation  common bile duct 0.6 cm. Inflammatory changes and peripancreatic  fluid surrounding the pancreatic head  and neck. The above  findings suggest acute pancreatitis. CT abdomen with contrast may  be useful for further evaluation.    Electronically signed by:  Jorge Mullen MD  6/1/2020 4:09 PM CDT  Workstation: [id number]            I reviewed the patient's new clinical results.    ASSESSMENT AND PLAN: This is a 35 y.o. female with:    Active Hospital Problems    Diagnosis POA   • **Alcohol-induced acute pancreatitis [K85.20] Yes     -On admission had lipase of 2245, amylase of 419, d-dimer elevated to 731.  She received Pepcid, Dilaudid, Reglan, and a total of 1000 mL's of IV fluid.  Gallbladder ultrasound, chest x-ray were unremarkable.  CTA of chest, abdomen and pelvis showed peripancreatic fat stranding, and fatty liver as per Dr. Colindres reading.  -N.p.o.  -IV fluids  -Toradol and acetaminophen as needed for pain due to history of opioid abuse  -Zofran for nausea as needed  -Scheduled Ativan, and CIWA protocol  -IV thiamine  -Continuing folate and B12     • Alcohol dependence (CMS/HCC) [F10.20] Yes     -Last drink this morning  -Scheduled Ativan and CIWA protocol  -IV thiamine  -Continuing folate and B12     • Opioid use disorder (CMS/HCC) [F11.99] Yes     -Continue Suboxone and gabapentin     • Anxiety and depression [F41.9, F32.9] Yes     -Continue Zoloft and Wellbutrin     • Dependence on nicotine from cigarettes [F17.210] Yes     - Nicotine patches     • Hypokalemia [E87.6] Unknown     -We will monitor and replace as needed     • Hyponatremia [E87.1] Yes     -We will monitor and replace as needed         DVT prophylaxis: SCDs/TEDs     Nata Bain and I have discussed pain goals for this hospitalization after reviewing her current clinical condition, medical history and prior pain experiences.  The goal is to keep the pain level 1.  To help achieve this, I plan to try and treat the pain with toradol and acetaminophen due to her opioid withdrawal treatment. Opioids will be used however if necessary.    JOELLE #  [id number], reviewed and consistent with patient reported medications.    Expected Length of Stay: Where: current living arrangements and When:  2-3days    I discussed the patient's findings and my recommendations with patient and primary care team.     Brissa Jeffrey MD is the attending on record at time of admission, She is aware of the patient's status and agrees with the above history and physical.          This document has been electronically signed by Rishabh Raya MD on June 1, 2020 22:18

## 2020-06-02 NOTE — PROGRESS NOTES
"    FAMILY MEDICINE DAILY PROGRESS NOTE    NAME: Nata Bain  : 1985  MRN: [id number]      LOS: 1 day     PROVIDER OF SERVICE: Yosi Almaguer MD    Chief Complaint: Alcohol-induced acute pancreatitis    Subjective:     Interval History:  History taken from: patient chart RN  Patient is a 35-year-old  female admitted for alcohol induced pancreatitis.  Patient was found resting comfortably in bed, no acute distress.  Patient does rate epigastric abdominal pain at 6-7 out of 10.  Patient reports Toradol helps \"some \".    Patient voiced that her last drink was the morning of 2020, stated \"fell off the wagon\".      Patient Denies chest pain, chest pressure, SOA, fever, cough, chills, n/v or diarrhrea at this time     Review of Systems:   Review of Systems   Constitutional: Negative for activity change, appetite change, chills, fatigue and fever.   HENT: Negative for congestion, hearing loss, sinus pressure, sinus pain, sneezing, sore throat and trouble swallowing.    Eyes: Negative for pain, discharge and itching.   Respiratory: Negative for apnea, cough, choking, chest tightness, shortness of breath, wheezing and stridor.    Cardiovascular: Negative for chest pain, palpitations and leg swelling.   Gastrointestinal: Positive for abdominal pain. Negative for abdominal distention, anal bleeding, constipation, diarrhea, nausea and vomiting.   Genitourinary: Negative for difficulty urinating, dysuria, enuresis and flank pain.   Musculoskeletal: Negative for arthralgias, back pain and gait problem.   Skin: Negative for color change.   Neurological: Negative for dizziness, seizures, syncope, facial asymmetry, light-headedness, numbness and headaches.   Psychiatric/Behavioral: Negative for agitation and behavioral problems.       Objective:     Vital Signs  Temp:  [97 °F (36.1 °C)-98.2 °F (36.8 °C)] 97 °F (36.1 °C)  Heart Rate:  [] 98  Resp:  [18-20] 18  BP: (126-161)/() 126/79  Body " mass index is 19.71 kg/m².    Physical Exam  Physical Exam   Constitutional: She is oriented to person, place, and time. She appears well-developed and well-nourished. No distress.   HENT:   Head: Normocephalic and atraumatic.   Right Ear: External ear normal.   Left Ear: External ear normal.   Nose: Nose normal.   Mouth/Throat: Oropharynx is clear and moist.   Eyes: Pupils are equal, round, and reactive to light. EOM are normal. Right eye exhibits no discharge. Left eye exhibits no discharge.   Neck: Normal range of motion. Neck supple. No tracheal deviation present. No thyromegaly present.   Cardiovascular: Normal rate, regular rhythm, normal heart sounds and intact distal pulses.   Pulmonary/Chest: Effort normal and breath sounds normal. No stridor. No respiratory distress. She has no wheezes. She has no rales.   Abdominal: Soft. Bowel sounds are normal. She exhibits no distension. There is generalized tenderness.   Musculoskeletal: Normal range of motion. She exhibits no edema, tenderness or deformity.   Lymphadenopathy:     She has no cervical adenopathy.   Neurological: She is alert and oriented to person, place, and time. No cranial nerve deficit.   Skin: Skin is warm and dry. She is not diaphoretic.   Psychiatric: She has a normal mood and affect.   Vitals reviewed.      Medication Review    Current Facility-Administered Medications:   •  acetaminophen (TYLENOL) tablet 650 mg, 650 mg, Oral, Q6H PRN, Rishabh Raya MD  •  albuterol (PROVENTIL) nebulizer solution 0.083% 2.5 mg/3mL, 2.5 mg, Nebulization, Q6H PRN, Rishabh Raya MD  •  aspirin chewable tablet 324 mg, 324 mg, Oral, Once, Rishabh Raya MD  •  buprenorphine-naloxone (SUBOXONE) 8-2 MG per SL tablet 1 tablet, 1 tablet, Sublingual, Daily, Rishabh Raya MD  •  buPROPion SR (WELLBUTRIN SR) 12 hr tablet 200 mg, 200 mg, Oral, Nightly, Rishabh Raya MD  •  folic acid (FOLVITE) tablet 1 mg, 1 mg, Oral, Daily, Elver  Rishabh DAS MD  •  gabapentin (NEURONTIN) capsule 600 mg, 600 mg, Oral, Q8H, Rishabh Raya MD, 600 mg at 06/02/20 0700  •  ketorolac (TORADOL) injection 30 mg, 30 mg, Intravenous, Q6H PRN, Rishabh Raya MD, 30 mg at 06/02/20 0357  •  LORazepam (ATIVAN) injection 2 mg, 2 mg, Intravenous, Q4H, Rishabh Raya MD, 2 mg at 06/02/20 0707  •  nicotine (NICODERM CQ) 14 MG/24HR patch 1 patch, 1 patch, Transdermal, Q24H, Rishabh Raya MD  •  ondansetron (ZOFRAN) tablet 4 mg, 4 mg, Oral, Q6H PRN **OR** ondansetron (ZOFRAN) injection 4 mg, 4 mg, Intravenous, Q6H PRN, Rishabh Raya MD, 4 mg at 06/02/20 0356  •  sertraline (ZOLOFT) tablet 25 mg, 25 mg, Oral, Daily, Rishabh Raya MD  •  [COMPLETED] Insert peripheral IV, , , Once **AND** sodium chloride 0.9 % flush 10 mL, 10 mL, Intravenous, PRN, Rishabh Raya MD  •  sodium chloride 0.9 % flush 10 mL, 10 mL, Intravenous, Q12H, Rishabh Raya MD  •  sodium chloride 0.9 % flush 10 mL, 10 mL, Intravenous, PRN, Rishabh Raya MD  •  sodium chloride 0.9 % infusion, 125 mL/hr, Intravenous, Continuous, Rishabh Raya MD, Last Rate: 125 mL/hr at 06/02/20 0707, 125 mL/hr at 06/02/20 0707  •  thiamine (B-1) 250 mg in sodium chloride 0.9 % 100 mL IVPB, 250 mg, Intravenous, Daily, Rishabh Raya MD  •  vitamin B-12 (CYANOCOBALAMIN) tablet 1,000 mcg, 1,000 mcg, Oral, Daily, Rishabh Raya MD     Diagnostic Data    Lab Results (last 24 hours)     Procedure Component Value Units Date/Time    CBC & Differential [[id number]] Collected:  06/02/20 0644    Specimen:  Blood Updated:  06/02/20 0726    Narrative:       The following orders were created for panel order CBC & Differential.  Procedure                               Abnormality         Status                     ---------                               -----------         ------                     CBC Auto Differential[[id number]]        Abnormal            Final  result                 Please view results for these tests on the individual orders.    CBC Auto Differential [[id number]]  (Abnormal) Collected:  06/02/20 0644    Specimen:  Blood Updated:  06/02/20 0726     WBC 9.08 10*3/mm3      RBC 3.30 10*6/mm3      Hemoglobin 12.2 g/dL      Comment: Results confirmed by recollection        Hematocrit 34.2 %      .6 fL      MCH 37.0 pg      MCHC 35.7 g/dL      RDW 12.6 %      RDW-SD 48.6 fl      MPV 9.3 fL      Platelets 173 10*3/mm3      Neutrophil % 68.5 %      Lymphocyte % 23.6 %      Monocyte % 5.9 %      Eosinophil % 1.0 %      Basophil % 0.4 %      Immature Grans % 0.6 %      Neutrophils, Absolute 6.22 10*3/mm3      Lymphocytes, Absolute 2.14 10*3/mm3      Monocytes, Absolute 0.54 10*3/mm3      Eosinophils, Absolute 0.09 10*3/mm3      Basophils, Absolute 0.04 10*3/mm3      Immature Grans, Absolute 0.05 10*3/mm3      nRBC 0.0 /100 WBC     Lipase [[id number]]  (Abnormal) Collected:  06/02/20 0606    Specimen:  Blood Updated:  06/02/20 0648     Lipase 1,460 U/L     Basic Metabolic Panel [[id number]]  (Abnormal) Collected:  06/02/20 0606    Specimen:  Blood Updated:  06/02/20 0639     Glucose 90 mg/dL      BUN 8 mg/dL      Creatinine 0.45 mg/dL      Sodium 132 mmol/L      Potassium 3.4 mmol/L      Chloride 102 mmol/L      CO2 21.0 mmol/L      Calcium 8.2 mg/dL      eGFR Non African Amer >150 mL/min/1.73      BUN/Creatinine Ratio 17.8     Anion Gap 9.0 mmol/L     Narrative:       GFR Normal >60  Chronic Kidney Disease <60  Kidney Failure <15      Urinalysis, Microscopic Only - Urine, Clean Catch [[id number]]  (Abnormal) Collected:  06/01/20 1614    Specimen:  Urine, Clean Catch Updated:  06/01/20 1648     RBC, UA 0-2 /HPF      WBC, UA 3-5 /HPF      Bacteria, UA 2+ /HPF      Squamous Epithelial Cells, UA 6-12 /HPF      Hyaline Casts, UA 13-20 /LPF      Waxy Casts 0-2 /LPF      Mucus, UA Moderate/2+ /HPF      Methodology Manual Light Microscopy    Urinalysis With Microscopic  If Indicated (No Culture) - Urine, Clean Catch [[id number]]  (Abnormal) Collected:  06/01/20 1614    Specimen:  Urine, Clean Catch Updated:  06/01/20 1641     Color, UA Dark Yellow     Appearance, UA Cloudy     pH, UA 5.5     Specific Gravity, UA 1.044     Comment: BY REFRACTOMETER        Glucose, UA Negative     Ketones, UA 15 mg/dL (1+)     Bilirubin, UA Small (1+)     Blood, UA Negative     Protein, UA 30 mg/dL (1+)     Leuk Esterase, UA Trace     Nitrite, UA Negative     Urobilinogen, UA 0.2 E.U./dL    Lactate Dehydrogenase [[id number]]  (Normal) Collected:  06/01/20 1522    Specimen:  Blood Updated:  06/01/20 1639      U/L     Urine Drug Screen - Urine, Clean Catch [[id number]]  (Abnormal) Collected:  06/01/20 1614    Specimen:  Urine, Clean Catch Updated:  06/01/20 1636     THC, Screen, Urine Negative     Phencyclidine (PCP), Urine Negative     Cocaine Screen, Urine Negative     Methamphetamine, Ur Negative     Opiate Screen Positive     Amphetamine Screen, Urine Negative     Benzodiazepine Screen, Urine Positive     Tricyclic Antidepressants Screen Negative     Methadone Screen, Urine Negative     Barbiturates Screen, Urine Negative     Oxycodone Screen, Urine Negative     Propoxyphene Screen Negative     Buprenorphine, Screen, Urine Positive    Narrative:       Cutoff For Drugs Screened:    Amphetamines               500 ng/ml  Barbiturates               200 ng/ml  Benzodiazepines            150 ng/ml  Cocaine                    150 ng/ml  Methadone                  200 ng/ml  Opiates                    100 ng/ml  Phencyclidine               25 ng/ml  THC                            50 ng/ml  Methamphetamine            500 ng/ml  Tricyclic Antidepressants  300 ng/ml  Oxycodone                  100 ng/ml  Propoxyphene               300 ng/ml  Buprenorphine               10 ng/ml    The normal value for all drugs tested is negative. This report includes unconfirmed screening results, with the cutoff values  listed, to be used for medical treatment purposes only.  Unconfirmed results must not be used for non-medical purposes such as employment or legal testing.  Clinical consideration should be applied to any drug of abuse test, particularly when unconfirmed results are used.      Lipase [[id number]]  (Abnormal) Collected:  06/01/20 1522    Specimen:  Blood Updated:  06/01/20 1605     Lipase 2,645 U/L     D-dimer, Quantitative [[id number]]  (Abnormal) Collected:  06/01/20 1522    Specimen:  Blood Updated:  06/01/20 1559     D-Dimer, Quantitative 731 ng/mL (FEU)     Narrative:       Dimer values <500 ng/ml FEU are FDA approved as aid in diagnosis of deep venous thrombosis and pulmonary embolism.  This test should not be used in an exclusion strategy with pretest probability alone.    A recent guideline regarding diagnosis for pulmonary thromboembolism recommends an adjusted exclusion criterion of age x 10 ng/ml FEU for patients >50 years of age ([employer] Intern Med 2015; 163: 701-711).      Comprehensive Metabolic Panel [[id number]]  (Abnormal) Collected:  06/01/20 1522    Specimen:  Blood Updated:  06/01/20 1559     Glucose 107 mg/dL      BUN 11 mg/dL      Creatinine 0.49 mg/dL      Sodium 133 mmol/L      Potassium 3.4 mmol/L      Chloride 98 mmol/L      CO2 21.0 mmol/L      Calcium 9.6 mg/dL      Total Protein 7.6 g/dL      Albumin 4.50 g/dL      ALT (SGPT) 29 U/L      AST (SGOT) 62 U/L      Alkaline Phosphatase 96 U/L      Total Bilirubin 0.6 mg/dL      eGFR Non African Amer 145 mL/min/1.73      Globulin 3.1 gm/dL      A/G Ratio 1.5 g/dL      BUN/Creatinine Ratio 22.4     Anion Gap 14.0 mmol/L     Narrative:       GFR Normal >60  Chronic Kidney Disease <60  Kidney Failure <15      Amylase [[id number]]  (Abnormal) Collected:  06/01/20 1522    Specimen:  Blood Updated:  06/01/20 1559     Amylase 419 U/L     Ethanol [[id number]] Collected:  06/01/20 1522    Specimen:  Blood Updated:  06/01/20 1559     Ethanol <10 mg/dL       Ethanol % <0.010 %     Troponin [[id number]]  (Normal) Collected:  06/01/20 1522    Specimen:  Blood Updated:  06/01/20 1556     Troponin T <0.010 ng/mL     Narrative:       Troponin T Reference Range:  <= 0.03 ng/mL-   Negative for AMI  >0.03 ng/mL-     Abnormal for myocardial necrosis.  Clinicians would have to utilize clinical acumen, EKG, Troponin and serial changes to determine if it is an Acute Myocardial Infarction or myocardial injury due to an underlying chronic condition.       Results may be falsely decreased if patient taking Biotin.      hCG, Serum, Qualitative [[id number]]  (Normal) Collected:  06/01/20 1522    Specimen:  Blood Updated:  06/01/20 1548     HCG Qualitative Negative    CBC & Differential [[id number]] Collected:  06/01/20 1522    Specimen:  Blood Updated:  06/01/20 1530    Narrative:       The following orders were created for panel order CBC & Differential.  Procedure                               Abnormality         Status                     ---------                               -----------         ------                     CBC Auto Differential[[id number]]        Abnormal            Final result                 Please view results for these tests on the individual orders.    CBC Auto Differential [[id number]]  (Abnormal) Collected:  06/01/20 1522    Specimen:  Blood Updated:  06/01/20 1530     WBC 12.24 10*3/mm3      RBC 3.86 10*6/mm3      Hemoglobin 14.3 g/dL      Hematocrit 39.1 %      .3 fL      MCH 37.0 pg      MCHC 36.6 g/dL      RDW 12.9 %      RDW-SD 48.5 fl      MPV 9.0 fL      Platelets 226 10*3/mm3      Neutrophil % 76.8 %      Lymphocyte % 17.6 %      Monocyte % 4.5 %      Eosinophil % 0.4 %      Basophil % 0.3 %      Immature Grans % 0.4 %      Neutrophils, Absolute 9.40 10*3/mm3      Lymphocytes, Absolute 2.15 10*3/mm3      Monocytes, Absolute 0.55 10*3/mm3      Eosinophils, Absolute 0.05 10*3/mm3      Basophils, Absolute 0.04 10*3/mm3      Immature Grans, Absolute  0.05 10*3/mm3      nRBC 0.0 /100 WBC             I reviewed the patient's new clinical results.    Assessment/Plan:     Active Hospital Problems    Diagnosis POA   • **Alcohol-induced acute pancreatitis [K85.20] Yes     -On admission had lipase of 2245, amylase of 419, d-dimer elevated to 731.  She received Pepcid, Dilaudid, Reglan, and a total of 1000 mL's of IV fluid.  Gallbladder ultrasound, chest x-ray were unremarkable.  CTA of chest, abdomen and pelvis showed peripancreatic fat stranding, and fatty liver as per Dr. Colindres reading.    Trend lipase-   Results from last 7 days   Lab Units 06/02/20  0606 06/01/20  1522   LIPASE U/L 1,460* 2,645*   ]  - continue N.p.o.  -IV fluids    -Toradol and acetaminophen as needed for pain due to history of opioid abuse  -Zofran for nausea as needed  -Scheduled Ativan, and CIWA protocol  -IV thiamine  -Continuing folate and B12     • Alcohol dependence (CMS/HCC) [F10.20] Yes     Alcohol cessation counseling was provided  -Scheduled Ativan and CIWA protocol  -IV thiamine  -Continuing folate and B12     • Opioid use disorder (CMS/HCC) [F11.99] Yes     -Continue Suboxone and gabapentin     • Anxiety and depression [F41.9, F32.9] Yes     -Continue Zoloft and Wellbutrin     • Dependence on nicotine from cigarettes [F17.210] Yes     - Nicotine patches     • Hypokalemia [E87.6] Yes     -We will monitor and replace as needed    Results from last 7 days   Lab Units 06/02/20  0606 06/01/20  1522   POTASSIUM mmol/L 3.4* 3.4*   ]       • Hyponatremia [E87.1] Yes     -We will monitor and replace as needed    Results from last 7 days   Lab Units 06/02/20  0606 06/01/20  1522   SODIUM mmol/L 132* 133*   ]       Will continue to evaluate and monitor pt's course and will adjust treatment and care accordingly.   DVT prophylaxis: SCDs/TEDs   Code status is   Code Status and Medical Interventions:   Ordered at: 06/01/20 2047     Code Status:    CPR     Medical Interventions (Level of Support  Prior to Arrest):    Full       Plan for disposition:Place of residence in 1-2 days    Time: 15 min     Signed,   Yosi Almaguer MD  Family Medicine Resident PGY2  [facility]        This document has been electronically signed by Yosi Almaguer MD on June 2, 2020 07:38

## 2020-06-02 NOTE — PLAN OF CARE
Problem: Patient Care Overview  Goal: Plan of Care Review  Outcome: Ongoing (interventions implemented as appropriate)  Flowsheets (Taken 6/1/2020 1501)  Plan of Care Reviewed With: patient  Note:   Pt is alert and verbal. Independent with care. Denies distress at this time.

## 2020-06-02 NOTE — NURSING NOTE
Spoke with Dr Farmer regarding potassium of 3.4. Does not want to replace at this time.  Also, addressed UA. Does not want to order antibiotics at this time due to no symptoms.

## 2020-06-02 NOTE — TELEPHONE ENCOUNTER
PT: SHAWNEE SPRINGER : 1985, DID NOT SHOW UP FOR HER MRI LUMBAR SPINE WITHOUT CONTRAST APPOINTMENT ON 20 AT 10AM

## 2020-06-02 NOTE — ACP (ADVANCE CARE PLANNING)
Patient wishes to be full code and that her , Jorge Sanz, make decisions for her in the event that she is unable to.          This document has been electronically signed by Rishabh Raya MD on June 1, 2020 22:21

## 2020-06-02 NOTE — PROGRESS NOTES
Discharge Planning Assessment  [facility]     Patient Name: Nata Bain  MRN: [id number]  Today's Date: 6/2/2020    Admit Date: 6/1/2020    Discharge Needs Assessment     Row Name 06/02/20 1044       Living Environment    Lives With  spouse    Current Living Arrangements  home/apartment/condo    Primary Care Provided by  self    Provides Primary Care For  no one    Family Caregiver if Needed  spouse    Quality of Family Relationships  helpful;involved    Able to Return to Prior Arrangements  yes    Living Arrangement Comments  Pt resides at home with spouse and two dogs.        Resource/Environmental Concerns    Transportation Concerns  -- Pt does not drive. Relies on spouse for transportation needs.        Transition Planning    Patient/Family Anticipates Transition to  home    Patient/Family Anticipated Services at Transition  rehabilitation services    Transportation Anticipated  family or friend will provide       Discharge Needs Assessment    Concerns to be Addressed  coping/stress;substance/tobacco abuse/use;mental health;decision making    Concerns Comments  Pt has long history of alcoholism. She reports drinking at least a pint of alcohol daily.     Equipment Currently Used at Home  none    Equipment Needed After Discharge  none    Outpatient/Agency/Support Group Needs  outpatient substance abuse treatment Pt reports that she attends AA meetings.     Current Discharge Risk  substance use/abuse    Discharge Coordination/Progress  Pt also attends [facility] in [place]. Pt reports she see's a therapist at [facility] on a regular basis.         Discharge Plan     Row Name 06/02/20 1050       Plan    Plan Comments  W assessment complete. Pt resides at home with spouse. Pt appears to have limited but good support system. Pt reports being independent with ADL's and IADL's. Pt reports long history of alcoholism. She consumes at least a pint of alcohol daily. Pt infromed LSW that  she attends AA meetings. Pt is also established with [facility] in [place]. Pt reports she see's a therapist their on a regular basis. LSW offered additional resources however pt was not interested. . Her plan is to return home at d/c. LSW/case mgt will follow up as consulted and complete arrangements as ordered. Ric ANTHONY        Destination      Coordination has not been started for this encounter.      Durable Medical Equipment      Coordination has not been started for this encounter.      Dialysis/Infusion      Coordination has not been started for this encounter.      Home Medical Care      Coordination has not been started for this encounter.      Therapy      Coordination has not been started for this encounter.      Community Resources      Coordination has not been started for this encounter.          Demographic Summary     Row Name 06/02/20 1043       General Information    Admission Type  inpatient    Referral Source  high risk screening    Reason for Consult  discharge planning    Preferred Language  English     Used During This Interaction  no       Contact Information    Contact Information Obtained for          Functional Status     Row Name 06/02/20 1043       Functional Status    Usual Activity Tolerance  good    Current Activity Tolerance  fair       Functional Status, IADL    Medications  independent Pt uses [facility]    Meal Preparation  independent    Housekeeping  independent    Laundry  independent    Shopping  independent       Mental Status Summary    Recent Changes in Mental Status/Cognitive Functioning  no changes       Employment/    Employment Status  unemployed        Psychosocial    No documentation.       Abuse/Neglect    No documentation.       Legal    No documentation.       Substance Abuse    No documentation.       Patient Forms    No documentation.           RAJ Avila

## 2020-06-03 LAB
ANION GAP SERPL CALCULATED.3IONS-SCNC: 15 MMOL/L (ref 5–15)
BASOPHILS # BLD AUTO: 0.03 10*3/MM3 (ref 0–0.2)
BASOPHILS NFR BLD AUTO: 0.4 % (ref 0–1.5)
BUN BLD-MCNC: 6 MG/DL (ref 6–20)
BUN/CREAT SERPL: 15.8 (ref 7–25)
CALCIUM SPEC-SCNC: 7.1 MG/DL (ref 8.6–10.5)
CHLORIDE SERPL-SCNC: 109 MMOL/L (ref 98–107)
CO2 SERPL-SCNC: 17 MMOL/L (ref 22–29)
CREAT BLD-MCNC: 0.38 MG/DL (ref 0.57–1)
DEPRECATED RDW RBC AUTO: 48.8 FL (ref 37–54)
EOSINOPHIL # BLD AUTO: 0.12 10*3/MM3 (ref 0–0.4)
EOSINOPHIL NFR BLD AUTO: 1.5 % (ref 0.3–6.2)
ERYTHROCYTE [DISTWIDTH] IN BLOOD BY AUTOMATED COUNT: 12.3 % (ref 12.3–15.4)
GFR SERPL CREATININE-BSD FRML MDRD: >150 ML/MIN/1.73
GLUCOSE BLD-MCNC: 42 MG/DL (ref 65–99)
GLUCOSE BLDC GLUCOMTR-MCNC: 87 MG/DL (ref 70–130)
GLUCOSE BLDC GLUCOMTR-MCNC: 97 MG/DL (ref 70–130)
HCT VFR BLD AUTO: 30.8 % (ref 34–46.6)
HGB BLD-MCNC: 10.5 G/DL (ref 12–15.9)
IMM GRANULOCYTES # BLD AUTO: 0.04 10*3/MM3 (ref 0–0.05)
IMM GRANULOCYTES NFR BLD AUTO: 0.5 % (ref 0–0.5)
LIPASE SERPL-CCNC: 546 U/L (ref 13–60)
LYMPHOCYTES # BLD AUTO: 1.99 10*3/MM3 (ref 0.7–3.1)
LYMPHOCYTES NFR BLD AUTO: 24.3 % (ref 19.6–45.3)
MCH RBC QN AUTO: 36.7 PG (ref 26.6–33)
MCHC RBC AUTO-ENTMCNC: 34.1 G/DL (ref 31.5–35.7)
MCV RBC AUTO: 107.7 FL (ref 79–97)
MONOCYTES # BLD AUTO: 0.52 10*3/MM3 (ref 0.1–0.9)
MONOCYTES NFR BLD AUTO: 6.3 % (ref 5–12)
NEUTROPHILS # BLD AUTO: 5.5 10*3/MM3 (ref 1.7–7)
NEUTROPHILS NFR BLD AUTO: 67 % (ref 42.7–76)
NRBC BLD AUTO-RTO: 0 /100 WBC (ref 0–0.2)
PLATELET # BLD AUTO: 157 10*3/MM3 (ref 140–450)
PMV BLD AUTO: 10.2 FL (ref 6–12)
POTASSIUM BLD-SCNC: 3.5 MMOL/L (ref 3.5–5.2)
RBC # BLD AUTO: 2.86 10*6/MM3 (ref 3.77–5.28)
SODIUM BLD-SCNC: 141 MMOL/L (ref 136–145)
WBC NRBC COR # BLD: 8.2 10*3/MM3 (ref 3.4–10.8)

## 2020-06-03 PROCEDURE — 83690 ASSAY OF LIPASE: CPT | Performed by: STUDENT IN AN ORGANIZED HEALTH CARE EDUCATION/TRAINING PROGRAM

## 2020-06-03 PROCEDURE — 25010000002 KETOROLAC TROMETHAMINE PER 15 MG: Performed by: STUDENT IN AN ORGANIZED HEALTH CARE EDUCATION/TRAINING PROGRAM

## 2020-06-03 PROCEDURE — 99232 SBSQ HOSP IP/OBS MODERATE 35: CPT | Performed by: STUDENT IN AN ORGANIZED HEALTH CARE EDUCATION/TRAINING PROGRAM

## 2020-06-03 PROCEDURE — 82962 GLUCOSE BLOOD TEST: CPT

## 2020-06-03 PROCEDURE — 80048 BASIC METABOLIC PNL TOTAL CA: CPT | Performed by: STUDENT IN AN ORGANIZED HEALTH CARE EDUCATION/TRAINING PROGRAM

## 2020-06-03 PROCEDURE — 85025 COMPLETE CBC W/AUTO DIFF WBC: CPT | Performed by: STUDENT IN AN ORGANIZED HEALTH CARE EDUCATION/TRAINING PROGRAM

## 2020-06-03 PROCEDURE — 25010000002 LORAZEPAM PER 2 MG: Performed by: STUDENT IN AN ORGANIZED HEALTH CARE EDUCATION/TRAINING PROGRAM

## 2020-06-03 PROCEDURE — 25010000002 THIAMINE PER 100 MG: Performed by: STUDENT IN AN ORGANIZED HEALTH CARE EDUCATION/TRAINING PROGRAM

## 2020-06-03 RX ADMIN — SODIUM CHLORIDE 200 ML/HR: 900 INJECTION, SOLUTION INTRAVENOUS at 08:03

## 2020-06-03 RX ADMIN — GABAPENTIN 600 MG: 300 CAPSULE ORAL at 06:53

## 2020-06-03 RX ADMIN — KETOROLAC TROMETHAMINE 30 MG: 30 INJECTION, SOLUTION INTRAMUSCULAR; INTRAVENOUS at 01:06

## 2020-06-03 RX ADMIN — KETOROLAC TROMETHAMINE 30 MG: 30 INJECTION, SOLUTION INTRAMUSCULAR; INTRAVENOUS at 07:17

## 2020-06-03 RX ADMIN — LORAZEPAM 2 MG: 2 INJECTION INTRAMUSCULAR; INTRAVENOUS at 06:53

## 2020-06-03 RX ADMIN — SODIUM CHLORIDE 200 ML/HR: 900 INJECTION, SOLUTION INTRAVENOUS at 16:16

## 2020-06-03 RX ADMIN — GABAPENTIN 600 MG: 300 CAPSULE ORAL at 21:14

## 2020-06-03 RX ADMIN — LORAZEPAM 2 MG: 2 INJECTION INTRAMUSCULAR; INTRAVENOUS at 14:24

## 2020-06-03 RX ADMIN — KETOROLAC TROMETHAMINE 30 MG: 30 INJECTION, SOLUTION INTRAMUSCULAR; INTRAVENOUS at 21:14

## 2020-06-03 RX ADMIN — LORAZEPAM 2 MG: 2 INJECTION INTRAMUSCULAR; INTRAVENOUS at 16:22

## 2020-06-03 RX ADMIN — LORAZEPAM 2 MG: 2 INJECTION INTRAMUSCULAR; INTRAVENOUS at 21:14

## 2020-06-03 RX ADMIN — BUPROPION HYDROCHLORIDE 200 MG: 100 TABLET, EXTENDED RELEASE ORAL at 21:13

## 2020-06-03 RX ADMIN — SERTRALINE 25 MG: 25 TABLET, FILM COATED ORAL at 08:00

## 2020-06-03 RX ADMIN — CYANOCOBALAMIN TAB 1000 MCG 1000 MCG: 1000 TAB at 08:00

## 2020-06-03 RX ADMIN — KETOROLAC TROMETHAMINE 30 MG: 30 INJECTION, SOLUTION INTRAMUSCULAR; INTRAVENOUS at 14:24

## 2020-06-03 RX ADMIN — THIAMINE HYDROCHLORIDE 250 MG: 100 INJECTION, SOLUTION INTRAMUSCULAR; INTRAVENOUS at 09:47

## 2020-06-03 RX ADMIN — SODIUM CHLORIDE, PRESERVATIVE FREE 10 ML: 5 INJECTION INTRAVENOUS at 21:14

## 2020-06-03 RX ADMIN — BUPRENORPHINE HYDROCHLORIDE AND NALOXONE HYDROCHLORIDE DIHYDRATE 1 TABLET: 8; 2 TABLET SUBLINGUAL at 21:13

## 2020-06-03 RX ADMIN — FOLIC ACID 1 MG: 1 TABLET ORAL at 08:00

## 2020-06-03 RX ADMIN — BUPRENORPHINE HYDROCHLORIDE AND NALOXONE HYDROCHLORIDE DIHYDRATE 1 TABLET: 8; 2 TABLET SUBLINGUAL at 08:00

## 2020-06-03 RX ADMIN — BUPRENORPHINE HYDROCHLORIDE AND NALOXONE HYDROCHLORIDE DIHYDRATE 1 TABLET: 8; 2 TABLET SUBLINGUAL at 16:14

## 2020-06-03 RX ADMIN — GABAPENTIN 600 MG: 300 CAPSULE ORAL at 14:24

## 2020-06-03 RX ADMIN — LORAZEPAM 2 MG: 2 INJECTION INTRAMUSCULAR; INTRAVENOUS at 01:06

## 2020-06-03 RX ADMIN — LORAZEPAM 2 MG: 2 INJECTION INTRAMUSCULAR; INTRAVENOUS at 09:47

## 2020-06-03 RX ADMIN — SODIUM CHLORIDE 125 ML/HR: 900 INJECTION, SOLUTION INTRAVENOUS at 21:20

## 2020-06-03 RX ADMIN — NICOTINE 1 PATCH: 14 PATCH, EXTENDED RELEASE TRANSDERMAL at 08:02

## 2020-06-03 NOTE — PLAN OF CARE
Problem: Patient Care Overview  Goal: Plan of Care Review  Outcome: Ongoing (interventions implemented as appropriate)  Flowsheets  Taken 6/3/2020 0429  Progress: improving  Taken 6/2/2020 2100  Plan of Care Reviewed With: patient

## 2020-06-03 NOTE — PLAN OF CARE
Patient started on clear liquids today  Problem: Patient Care Overview  Goal: Plan of Care Review  Outcome: Ongoing (interventions implemented as appropriate)

## 2020-06-03 NOTE — PROGRESS NOTES
FAMILY MEDICINE DAILY PROGRESS NOTE    NAME: Nata Bain  : 1985  MRN: [id number]      LOS: 2 days     PROVIDER OF SERVICE: Yosi Almaguer MD    Chief Complaint: Alcohol-induced acute pancreatitis    Subjective:     Interval History:  History taken from: patient chart RN  Patient is a 35-year-old  female admitted for alcohol induced pancreatitis.  Patient was found resting comfortably in bed, no acute distress. Patient reports improvement in pain (rates pain at 0-1) and is wanting to eat.       Patient Denies chest pain, chest pressure, SOA, fever, cough, chills, n/v or diarrea at this time     Review of Systems:   Review of Systems   Constitutional: Negative for activity change, appetite change, chills, fatigue and fever.   HENT: Negative for congestion, hearing loss, sinus pressure, sinus pain, sneezing, sore throat and trouble swallowing.    Eyes: Negative for pain, discharge and itching.   Respiratory: Negative for apnea, cough, choking, chest tightness, shortness of breath, wheezing and stridor.    Cardiovascular: Negative for chest pain, palpitations and leg swelling.   Gastrointestinal: Negative for abdominal distention, anal bleeding, constipation, diarrhea, nausea and vomiting.   Genitourinary: Negative for difficulty urinating, dysuria, enuresis and flank pain.   Musculoskeletal: Negative for arthralgias, back pain and gait problem.   Skin: Negative for color change.   Neurological: Negative for dizziness, seizures, syncope, facial asymmetry, light-headedness, numbness and headaches.   Psychiatric/Behavioral: Negative for agitation and behavioral problems.       Objective:     Vital Signs  Temp:  [97 °F (36.1 °C)-98.3 °F (36.8 °C)] 97.1 °F (36.2 °C)  Heart Rate:  [72-84] 77  Resp:  [18] 18  BP: (105-144)/(69-94) 105/69  Body mass index is 20.82 kg/m².    Physical Exam  Physical Exam   Constitutional: She is oriented to person, place, and time. She appears well-developed and  well-nourished. No distress.   HENT:   Head: Normocephalic and atraumatic.   Right Ear: External ear normal.   Left Ear: External ear normal.   Nose: Nose normal.   Mouth/Throat: Oropharynx is clear and moist.   Eyes: Pupils are equal, round, and reactive to light. EOM are normal. Right eye exhibits no discharge. Left eye exhibits no discharge.   Neck: Normal range of motion. Neck supple. No tracheal deviation present. No thyromegaly present.   Cardiovascular: Normal rate, regular rhythm, normal heart sounds and intact distal pulses.   Pulmonary/Chest: Effort normal and breath sounds normal. No stridor. No respiratory distress. She has no wheezes. She has no rales.   Abdominal: Soft. Bowel sounds are normal. She exhibits no distension.   Musculoskeletal: Normal range of motion. She exhibits no edema, tenderness or deformity.   Lymphadenopathy:     She has no cervical adenopathy.   Neurological: She is alert and oriented to person, place, and time. No cranial nerve deficit.   Skin: Skin is warm and dry. She is not diaphoretic.   Psychiatric: She has a normal mood and affect.   Vitals reviewed.      Medication Review    Current Facility-Administered Medications:   •  acetaminophen (TYLENOL) tablet 650 mg, 650 mg, Oral, Q6H PRN, Rishabh Raya MD  •  albuterol (PROVENTIL) nebulizer solution 0.083% 2.5 mg/3mL, 2.5 mg, Nebulization, Q6H PRN, Rishabh Raya MD  •  aspirin chewable tablet 324 mg, 324 mg, Oral, Once, Rishabh Raya MD  •  buprenorphine-naloxone (SUBOXONE) 8-2 MG per SL tablet 1 tablet, 1 tablet, Sublingual, TID, Rishabh Raya MD, 1 tablet at 06/02/20 1744  •  buPROPion SR (WELLBUTRIN SR) 12 hr tablet 200 mg, 200 mg, Oral, Nightly, Rishabh Raya MD, 200 mg at 06/02/20 2200  •  folic acid (FOLVITE) tablet 1 mg, 1 mg, Oral, Daily, Rishabh Raya MD, 1 mg at 06/02/20 1052  •  gabapentin (NEURONTIN) capsule 600 mg, 600 mg, Oral, Q8H, Rishabh Raya MD, 600 mg at  06/03/20 0653  •  ketorolac (TORADOL) injection 30 mg, 30 mg, Intravenous, Q6H PRN, Rishabh Raya MD, 30 mg at 06/03/20 0717  •  LORazepam (ATIVAN) injection 2 mg, 2 mg, Intravenous, Q4H, Rishabh Raya MD, 2 mg at 06/03/20 0653  •  nicotine (NICODERM CQ) 14 MG/24HR patch 1 patch, 1 patch, Transdermal, Q24H, Rishabh Raya MD, 1 patch at 06/02/20 1053  •  ondansetron (ZOFRAN) tablet 4 mg, 4 mg, Oral, Q6H PRN **OR** ondansetron (ZOFRAN) injection 4 mg, 4 mg, Intravenous, Q6H PRN, Rishabh Raya MD, 4 mg at 06/02/20 1520  •  sertraline (ZOLOFT) tablet 25 mg, 25 mg, Oral, Daily, Rishabh Raya MD, 25 mg at 06/02/20 1052  •  [COMPLETED] Insert peripheral IV, , , Once **AND** sodium chloride 0.9 % flush 10 mL, 10 mL, Intravenous, PRN, Rishabh Raya MD  •  sodium chloride 0.9 % flush 10 mL, 10 mL, Intravenous, Q12H, Rishabh Raya MD, 10 mL at 06/02/20 1105  •  sodium chloride 0.9 % flush 10 mL, 10 mL, Intravenous, PRN, Rishabh Raya MD  •  sodium chloride 0.9 % infusion, 200 mL/hr, Intravenous, Continuous, Ponce Farmer MD, Last Rate: 200 mL/hr at 06/02/20 2343, 200 mL/hr at 06/02/20 2343  •  thiamine (B-1) 250 mg in sodium chloride 0.9 % 100 mL IVPB, 250 mg, Intravenous, Daily, Rishabh Raya MD, Last Rate: 200 mL/hr at 06/02/20 1053, 250 mg at 06/02/20 1053  •  vitamin B-12 (CYANOCOBALAMIN) tablet 1,000 mcg, 1,000 mcg, Oral, Daily, Rishabh Raya MD, 1,000 mcg at 06/02/20 1052     Diagnostic Data    Lab Results (last 24 hours)     Procedure Component Value Units Date/Time    Basic Metabolic Panel [[id number]]  (Abnormal) Collected:  06/03/20 0454    Specimen:  Blood Updated:  06/03/20 0715     Glucose 42 mg/dL      Comment: Results confirmed by repeat analysis        BUN 6 mg/dL      Creatinine 0.38 mg/dL      Sodium 141 mmol/L      Potassium 3.5 mmol/L      Chloride 109 mmol/L      CO2 17.0 mmol/L      Calcium 7.1 mg/dL      eGFR Non African Amer >150  mL/min/1.73      BUN/Creatinine Ratio 15.8     Anion Gap 15.0 mmol/L     Narrative:       GFR Normal >60  Chronic Kidney Disease <60  Kidney Failure <15      Lipase [[id number]]  (Abnormal) Collected:  06/03/20 0454    Specimen:  Blood Updated:  06/03/20 0653     Lipase 546 U/L     CBC & Differential [[id number]] Collected:  06/03/20 0454    Specimen:  Blood Updated:  06/03/20 0633    Narrative:       The following orders were created for panel order CBC & Differential.  Procedure                               Abnormality         Status                     ---------                               -----------         ------                     CBC Auto Differential[[id number]]        Abnormal            Final result                 Please view results for these tests on the individual orders.    CBC Auto Differential [[id number]]  (Abnormal) Collected:  06/03/20 0454    Specimen:  Blood Updated:  06/03/20 0633     WBC 8.20 10*3/mm3      RBC 2.86 10*6/mm3      Hemoglobin 10.5 g/dL      Hematocrit 30.8 %      .7 fL      MCH 36.7 pg      MCHC 34.1 g/dL      RDW 12.3 %      RDW-SD 48.8 fl      MPV 10.2 fL      Platelets 157 10*3/mm3      Neutrophil % 67.0 %      Lymphocyte % 24.3 %      Monocyte % 6.3 %      Eosinophil % 1.5 %      Basophil % 0.4 %      Immature Grans % 0.5 %      Neutrophils, Absolute 5.50 10*3/mm3      Lymphocytes, Absolute 1.99 10*3/mm3      Monocytes, Absolute 0.52 10*3/mm3      Eosinophils, Absolute 0.12 10*3/mm3      Basophils, Absolute 0.03 10*3/mm3      Immature Grans, Absolute 0.04 10*3/mm3      nRBC 0.0 /100 WBC     Vitamin B12 [[id number]]  (Normal) Collected:  06/02/20 0822    Specimen:  Blood Updated:  06/02/20 1239     Vitamin B-12 574 pg/mL     Narrative:       Results may be falsely increased if patient taking Biotin.      Folate [[id number]]  (Normal) Collected:  06/02/20 0822    Specimen:  Blood Updated:  06/02/20 1239     Folate >20.00 ng/mL     Narrative:       Results may be falsely  increased if patient taking Biotin.              I reviewed the patient's new clinical results.    Assessment/Plan:     Active Hospital Problems    Diagnosis POA   • **Alcohol-induced acute pancreatitis [K85.20] Yes     -On admission had lipase of 2245, amylase of 419, d-dimer elevated to 731.  She received Pepcid, Dilaudid, Reglan, and a total of 1000 mL's of IV fluid.  Gallbladder ultrasound, chest x-ray were unremarkable.  CTA of chest, abdomen and pelvis showed peripancreatic fat stranding, and fatty liver as per Dr. Colindres reading.    Trend lipase-   Results from last 7 days   Lab Units 06/03/20  0454 06/02/20  0606 06/01/20  1522   LIPASE U/L 546* 1,460* 2,645*   ]  -advance diet as tolerated starting with clears  -IV fluids    -Toradol and acetaminophen as needed for pain due to history of opioid abuse  -Zofran for nausea as needed  -Scheduled Ativan, and CIWA protocol  -IV thiamine  -Continuing folate and B12     • Alcohol dependence (CMS/HCC) [F10.20] Yes     Alcohol cessation counseling was provided  -Scheduled Ativan and CIWA protocol  -IV thiamine  -Continuing folate and B12     • Opioid use disorder (CMS/HCC) [F11.99] Yes     -Continue Suboxone and gabapentin     • Anxiety and depression [F41.9, F32.9] Yes     -Continue Zoloft and Wellbutrin     • Dependence on nicotine from cigarettes [F17.210] Yes     - Nicotine patches     • Hypokalemia [E87.6] Yes     -We will monitor and replace as needed    Results from last 7 days   Lab Units 06/03/20  0454 06/02/20  0606 06/01/20  1522   POTASSIUM mmol/L 3.5 3.4* 3.4*   ]       • Hyponatremia [E87.1] Yes     -We will monitor and replace as needed    Results from last 7 days   Lab Units 06/03/20  0454 06/02/20  0606 06/01/20  1522   SODIUM mmol/L 141 132* 133*   ]       Will continue to evaluate and monitor pt's course and will adjust treatment and care accordingly.   DVT prophylaxis: SCDs/TEDs   Code status is   Code Status and Medical Interventions:   Ordered  at: 06/01/20 2047     Code Status:    CPR     Medical Interventions (Level of Support Prior to Arrest):    Full       Plan for disposition:Place of residence in 1-2 days    Time: 15 min     Signed,   Yosi Almaguer MD  Family Medicine Resident PGY2  [facility]        This document has been electronically signed by Yosi Almaguer MD on Renetta 3, 2020 07:51

## 2020-06-04 LAB
ANION GAP SERPL CALCULATED.3IONS-SCNC: 9 MMOL/L (ref 5–15)
BASOPHILS # BLD AUTO: 0.02 10*3/MM3 (ref 0–0.2)
BASOPHILS NFR BLD AUTO: 0.3 % (ref 0–1.5)
BUN BLD-MCNC: 4 MG/DL (ref 6–20)
BUN/CREAT SERPL: 11.4 (ref 7–25)
CALCIUM SPEC-SCNC: 7.5 MG/DL (ref 8.6–10.5)
CHLORIDE SERPL-SCNC: 112 MMOL/L (ref 98–107)
CHOLEST SERPL-MCNC: 179 MG/DL (ref 0–200)
CO2 SERPL-SCNC: 21 MMOL/L (ref 22–29)
CREAT BLD-MCNC: 0.35 MG/DL (ref 0.57–1)
DEPRECATED RDW RBC AUTO: 47.8 FL (ref 37–54)
EOSINOPHIL # BLD AUTO: 0.13 10*3/MM3 (ref 0–0.4)
EOSINOPHIL NFR BLD AUTO: 1.7 % (ref 0.3–6.2)
ERYTHROCYTE [DISTWIDTH] IN BLOOD BY AUTOMATED COUNT: 12.3 % (ref 12.3–15.4)
GFR SERPL CREATININE-BSD FRML MDRD: >150 ML/MIN/1.73
GLUCOSE BLD-MCNC: 98 MG/DL (ref 65–99)
HCT VFR BLD AUTO: 29.6 % (ref 34–46.6)
HDLC SERPL-MCNC: 55 MG/DL (ref 40–60)
HGB BLD-MCNC: 10.3 G/DL (ref 12–15.9)
IMM GRANULOCYTES # BLD AUTO: 0.03 10*3/MM3 (ref 0–0.05)
IMM GRANULOCYTES NFR BLD AUTO: 0.4 % (ref 0–0.5)
LDLC SERPL CALC-MCNC: 105 MG/DL (ref 0–100)
LDLC/HDLC SERPL: 1.92 {RATIO}
LIPASE SERPL-CCNC: 418 U/L (ref 13–60)
LYMPHOCYTES # BLD AUTO: 1.78 10*3/MM3 (ref 0.7–3.1)
LYMPHOCYTES NFR BLD AUTO: 23.8 % (ref 19.6–45.3)
MCH RBC QN AUTO: 36.9 PG (ref 26.6–33)
MCHC RBC AUTO-ENTMCNC: 34.8 G/DL (ref 31.5–35.7)
MCV RBC AUTO: 106.1 FL (ref 79–97)
MONOCYTES # BLD AUTO: 0.42 10*3/MM3 (ref 0.1–0.9)
MONOCYTES NFR BLD AUTO: 5.6 % (ref 5–12)
NEUTROPHILS # BLD AUTO: 5.11 10*3/MM3 (ref 1.7–7)
NEUTROPHILS NFR BLD AUTO: 68.2 % (ref 42.7–76)
NRBC BLD AUTO-RTO: 0 /100 WBC (ref 0–0.2)
PLATELET # BLD AUTO: 161 10*3/MM3 (ref 140–450)
PMV BLD AUTO: 10.1 FL (ref 6–12)
POTASSIUM BLD-SCNC: 3.4 MMOL/L (ref 3.5–5.2)
RBC # BLD AUTO: 2.79 10*6/MM3 (ref 3.77–5.28)
SODIUM BLD-SCNC: 142 MMOL/L (ref 136–145)
TRIGL SERPL-MCNC: 93 MG/DL (ref 0–150)
VLDLC SERPL-MCNC: 18.6 MG/DL
WBC NRBC COR # BLD: 7.49 10*3/MM3 (ref 3.4–10.8)

## 2020-06-04 PROCEDURE — 80061 LIPID PANEL: CPT | Performed by: HOSPITALIST

## 2020-06-04 PROCEDURE — 99232 SBSQ HOSP IP/OBS MODERATE 35: CPT | Performed by: STUDENT IN AN ORGANIZED HEALTH CARE EDUCATION/TRAINING PROGRAM

## 2020-06-04 PROCEDURE — 25010000002 KETOROLAC TROMETHAMINE PER 15 MG: Performed by: STUDENT IN AN ORGANIZED HEALTH CARE EDUCATION/TRAINING PROGRAM

## 2020-06-04 PROCEDURE — 85025 COMPLETE CBC W/AUTO DIFF WBC: CPT | Performed by: STUDENT IN AN ORGANIZED HEALTH CARE EDUCATION/TRAINING PROGRAM

## 2020-06-04 PROCEDURE — 25010000002 LORAZEPAM PER 2 MG: Performed by: STUDENT IN AN ORGANIZED HEALTH CARE EDUCATION/TRAINING PROGRAM

## 2020-06-04 PROCEDURE — 25010000002 THIAMINE PER 100 MG: Performed by: STUDENT IN AN ORGANIZED HEALTH CARE EDUCATION/TRAINING PROGRAM

## 2020-06-04 PROCEDURE — 83690 ASSAY OF LIPASE: CPT | Performed by: STUDENT IN AN ORGANIZED HEALTH CARE EDUCATION/TRAINING PROGRAM

## 2020-06-04 PROCEDURE — 80048 BASIC METABOLIC PNL TOTAL CA: CPT | Performed by: STUDENT IN AN ORGANIZED HEALTH CARE EDUCATION/TRAINING PROGRAM

## 2020-06-04 RX ORDER — LORAZEPAM 2 MG/ML
2 INJECTION INTRAMUSCULAR
Status: DISCONTINUED | OUTPATIENT
Start: 2020-06-04 | End: 2020-06-05 | Stop reason: HOSPADM

## 2020-06-04 RX ORDER — LORAZEPAM 2 MG/1
2 TABLET ORAL
Status: DISCONTINUED | OUTPATIENT
Start: 2020-06-04 | End: 2020-06-05 | Stop reason: HOSPADM

## 2020-06-04 RX ORDER — LORAZEPAM 0.5 MG/1
1 TABLET ORAL
Status: DISCONTINUED | OUTPATIENT
Start: 2020-06-04 | End: 2020-06-05 | Stop reason: HOSPADM

## 2020-06-04 RX ORDER — POTASSIUM CHLORIDE 750 MG/1
40 CAPSULE, EXTENDED RELEASE ORAL DAILY
Status: DISCONTINUED | OUTPATIENT
Start: 2020-06-04 | End: 2020-06-05 | Stop reason: HOSPADM

## 2020-06-04 RX ORDER — LORAZEPAM 2 MG/ML
1 INJECTION INTRAMUSCULAR
Status: DISCONTINUED | OUTPATIENT
Start: 2020-06-04 | End: 2020-06-05 | Stop reason: HOSPADM

## 2020-06-04 RX ORDER — LORAZEPAM 2 MG/ML
2 INJECTION INTRAMUSCULAR EVERY 6 HOURS
Status: DISCONTINUED | OUTPATIENT
Start: 2020-06-04 | End: 2020-06-04

## 2020-06-04 RX ORDER — AMOXICILLIN 250 MG
2 CAPSULE ORAL NIGHTLY
Status: DISCONTINUED | OUTPATIENT
Start: 2020-06-04 | End: 2020-06-05 | Stop reason: HOSPADM

## 2020-06-04 RX ADMIN — LORAZEPAM 2 MG: 2 TABLET ORAL at 23:29

## 2020-06-04 RX ADMIN — BUPROPION HYDROCHLORIDE 200 MG: 100 TABLET, EXTENDED RELEASE ORAL at 20:27

## 2020-06-04 RX ADMIN — SENNOSIDES AND DOCUSATE SODIUM 2 TABLET: 8.6; 5 TABLET ORAL at 20:26

## 2020-06-04 RX ADMIN — BUPRENORPHINE HYDROCHLORIDE AND NALOXONE HYDROCHLORIDE DIHYDRATE 1 TABLET: 8; 2 TABLET SUBLINGUAL at 15:03

## 2020-06-04 RX ADMIN — KETOROLAC TROMETHAMINE 30 MG: 30 INJECTION, SOLUTION INTRAMUSCULAR; INTRAVENOUS at 11:28

## 2020-06-04 RX ADMIN — CYANOCOBALAMIN TAB 1000 MCG 1000 MCG: 1000 TAB at 08:07

## 2020-06-04 RX ADMIN — KETOROLAC TROMETHAMINE 30 MG: 30 INJECTION, SOLUTION INTRAMUSCULAR; INTRAVENOUS at 05:54

## 2020-06-04 RX ADMIN — KETOROLAC TROMETHAMINE 30 MG: 30 INJECTION, SOLUTION INTRAMUSCULAR; INTRAVENOUS at 23:29

## 2020-06-04 RX ADMIN — FOLIC ACID 1 MG: 1 TABLET ORAL at 08:07

## 2020-06-04 RX ADMIN — SODIUM CHLORIDE 125 ML/HR: 900 INJECTION, SOLUTION INTRAVENOUS at 05:50

## 2020-06-04 RX ADMIN — THIAMINE HYDROCHLORIDE 250 MG: 100 INJECTION, SOLUTION INTRAMUSCULAR; INTRAVENOUS at 10:06

## 2020-06-04 RX ADMIN — SODIUM CHLORIDE 50 ML/HR: 900 INJECTION, SOLUTION INTRAVENOUS at 18:02

## 2020-06-04 RX ADMIN — LORAZEPAM 2 MG: 2 INJECTION INTRAMUSCULAR; INTRAVENOUS at 05:54

## 2020-06-04 RX ADMIN — LORAZEPAM 2 MG: 2 TABLET ORAL at 11:28

## 2020-06-04 RX ADMIN — BUPRENORPHINE HYDROCHLORIDE AND NALOXONE HYDROCHLORIDE DIHYDRATE 1 TABLET: 8; 2 TABLET SUBLINGUAL at 08:07

## 2020-06-04 RX ADMIN — BUPRENORPHINE HYDROCHLORIDE AND NALOXONE HYDROCHLORIDE DIHYDRATE 1 TABLET: 8; 2 TABLET SUBLINGUAL at 20:27

## 2020-06-04 RX ADMIN — SERTRALINE 25 MG: 25 TABLET, FILM COATED ORAL at 08:07

## 2020-06-04 RX ADMIN — POTASSIUM CHLORIDE 40 MEQ: 10 CAPSULE, COATED, EXTENDED RELEASE ORAL at 08:07

## 2020-06-04 RX ADMIN — LORAZEPAM 2 MG: 2 TABLET ORAL at 17:57

## 2020-06-04 RX ADMIN — GABAPENTIN 600 MG: 300 CAPSULE ORAL at 23:00

## 2020-06-04 RX ADMIN — GABAPENTIN 600 MG: 300 CAPSULE ORAL at 05:54

## 2020-06-04 RX ADMIN — KETOROLAC TROMETHAMINE 30 MG: 30 INJECTION, SOLUTION INTRAMUSCULAR; INTRAVENOUS at 17:57

## 2020-06-04 RX ADMIN — GABAPENTIN 600 MG: 300 CAPSULE ORAL at 15:02

## 2020-06-04 RX ADMIN — NICOTINE 1 PATCH: 14 PATCH, EXTENDED RELEASE TRANSDERMAL at 08:08

## 2020-06-04 NOTE — PLAN OF CARE
Problem: Patient Care Overview  Goal: Plan of Care Review  Outcome: Ongoing (interventions implemented as appropriate)  Flowsheets (Taken 6/4/2020 0053)  Progress: improving  Plan of Care Reviewed With: patient  Note:   Pt up ad prashanth. Vss. C/o pain to abdomen with PRN pain medication. No s/s of distress at this time. Her CIWA score this shift was 1. Will continue to monitor this pt.

## 2020-06-04 NOTE — PROGRESS NOTES
FAMILY MEDICINE DAILY PROGRESS NOTE    NAME: Nata Bain  : 1985  MRN: [id number]      LOS: 3 days     PROVIDER OF SERVICE: Yosi Almaguer MD    Chief Complaint: Alcohol-induced acute pancreatitis    Subjective:     Interval History:  History taken from: patient chart RN  Patient is a 35-year-old  female admitted for alcohol induced pancreatitis.  Patient was found resting comfortably in bed, no acute distress. Patient has no complaints at this time. Tolerate mechanical soft diet and a cheeseburger last night without issues.     Patient Denies chest pain, chest pressure, SOA, fever, cough, chills, n/v or diarrea at this time     Review of Systems:   Review of Systems   Constitutional: Negative for activity change, appetite change, chills, fatigue and fever.   HENT: Negative for congestion, hearing loss, sinus pressure, sinus pain, sneezing, sore throat and trouble swallowing.    Eyes: Negative for pain, discharge and itching.   Respiratory: Negative for apnea, cough, choking, chest tightness, shortness of breath, wheezing and stridor.    Cardiovascular: Negative for chest pain, palpitations and leg swelling.   Gastrointestinal: Negative for abdominal distention, anal bleeding, constipation, diarrhea, nausea and vomiting.   Genitourinary: Negative for difficulty urinating, dysuria, enuresis and flank pain.   Musculoskeletal: Negative for arthralgias, back pain and gait problem.   Skin: Negative for color change.   Neurological: Negative for dizziness, seizures, syncope, facial asymmetry, light-headedness, numbness and headaches.   Psychiatric/Behavioral: Negative for agitation and behavioral problems.       Objective:     Vital Signs  Temp:  [97.4 °F (36.3 °C)-98.1 °F (36.7 °C)] 98 °F (36.7 °C)  Heart Rate:  [73-81] 77  Resp:  [18] 18  BP: (110-121)/(60-87) 121/87  Body mass index is 21.32 kg/m².    Physical Exam  Physical Exam   Constitutional: She is oriented to person, place, and time.  She appears well-developed and well-nourished. No distress.   HENT:   Head: Normocephalic and atraumatic.   Right Ear: External ear normal.   Left Ear: External ear normal.   Nose: Nose normal.   Mouth/Throat: Oropharynx is clear and moist.   Eyes: Pupils are equal, round, and reactive to light. EOM are normal. Right eye exhibits no discharge. Left eye exhibits no discharge.   Neck: Normal range of motion. Neck supple. No tracheal deviation present. No thyromegaly present.   Cardiovascular: Normal rate, regular rhythm, normal heart sounds and intact distal pulses.   Pulmonary/Chest: Effort normal and breath sounds normal. No stridor. No respiratory distress. She has no wheezes. She has no rales.   Abdominal: Soft. Bowel sounds are normal. She exhibits no distension.   Musculoskeletal: Normal range of motion. She exhibits no edema, tenderness or deformity.   Lymphadenopathy:     She has no cervical adenopathy.   Neurological: She is alert and oriented to person, place, and time. No cranial nerve deficit.   Skin: Skin is warm and dry. She is not diaphoretic.   Psychiatric: She has a normal mood and affect.   Vitals reviewed.      Medication Review    Current Facility-Administered Medications:   •  acetaminophen (TYLENOL) tablet 650 mg, 650 mg, Oral, Q6H PRN, Rishabh Raya MD  •  albuterol (PROVENTIL) nebulizer solution 0.083% 2.5 mg/3mL, 2.5 mg, Nebulization, Q6H PRN, Rishabh Raya MD  •  aspirin chewable tablet 324 mg, 324 mg, Oral, Once, Rishabh Raya MD  •  buprenorphine-naloxone (SUBOXONE) 8-2 MG per SL tablet 1 tablet, 1 tablet, Sublingual, TID, Rishabh Raya MD, 1 tablet at 06/03/20 2113  •  buPROPion SR (WELLBUTRIN SR) 12 hr tablet 200 mg, 200 mg, Oral, Nightly, Rishabh Raya MD, 200 mg at 06/03/20 2113  •  folic acid (FOLVITE) tablet 1 mg, 1 mg, Oral, Daily, Rishabh Raya MD, 1 mg at 06/03/20 0800  •  gabapentin (NEURONTIN) capsule 600 mg, 600 mg, Oral, Q8H, Elver  Rishabh DAS MD, 600 mg at 06/04/20 0554  •  ketorolac (TORADOL) injection 30 mg, 30 mg, Intravenous, Q6H PRN, Rishabh Raya MD, 30 mg at 06/04/20 0554  •  LORazepam (ATIVAN) injection 2 mg, 2 mg, Intravenous, Q6H, Yosi Almaguer MD  •  nicotine (NICODERM CQ) 14 MG/24HR patch 1 patch, 1 patch, Transdermal, Q24H, Rishabh Raya MD, 1 patch at 06/03/20 0802  •  ondansetron (ZOFRAN) tablet 4 mg, 4 mg, Oral, Q6H PRN **OR** ondansetron (ZOFRAN) injection 4 mg, 4 mg, Intravenous, Q6H PRN, Rishabh Raya MD, 4 mg at 06/02/20 1520  •  potassium chloride (MICRO-K) CR capsule 40 mEq, 40 mEq, Oral, Daily, Yosi Almaguer MD  •  sertraline (ZOLOFT) tablet 25 mg, 25 mg, Oral, Daily, Rishabh Raya MD, 25 mg at 06/03/20 0800  •  [COMPLETED] Insert peripheral IV, , , Once **AND** sodium chloride 0.9 % flush 10 mL, 10 mL, Intravenous, PRN, Rishabh Raya MD  •  sodium chloride 0.9 % flush 10 mL, 10 mL, Intravenous, Q12H, Rishabh Raya MD, 10 mL at 06/03/20 2114  •  sodium chloride 0.9 % flush 10 mL, 10 mL, Intravenous, PRN, Rishabh Raya MD  •  sodium chloride 0.9 % infusion, 125 mL/hr, Intravenous, Continuous, Ponce Farmer MD, Last Rate: 125 mL/hr at 06/04/20 0550, 125 mL/hr at 06/04/20 0550  •  thiamine (B-1) 250 mg in sodium chloride 0.9 % 100 mL IVPB, 250 mg, Intravenous, Daily, Rishabh Raya MD, Last Rate: 200 mL/hr at 06/03/20 0947, 250 mg at 06/03/20 0947  •  vitamin B-12 (CYANOCOBALAMIN) tablet 1,000 mcg, 1,000 mcg, Oral, Daily, Rishabh Raya MD, 1,000 mcg at 06/03/20 0800     Diagnostic Data    Lab Results (last 24 hours)     Procedure Component Value Units Date/Time    Lipase [[id number]]  (Abnormal) Collected:  06/04/20 0543    Specimen:  Blood Updated:  06/04/20 0649     Lipase 418 U/L     Basic Metabolic Panel [[id number]]  (Abnormal) Collected:  06/04/20 0543    Specimen:  Blood Updated:  06/04/20 0638     Glucose 98 mg/dL      BUN 4 mg/dL      Creatinine 0.35  mg/dL      Sodium 142 mmol/L      Potassium 3.4 mmol/L      Chloride 112 mmol/L      CO2 21.0 mmol/L      Calcium 7.5 mg/dL      eGFR Non African Amer >150 mL/min/1.73      BUN/Creatinine Ratio 11.4     Anion Gap 9.0 mmol/L     Narrative:       GFR Normal >60  Chronic Kidney Disease <60  Kidney Failure <15      Lipid Panel [[id number]]  (Abnormal) Collected:  06/04/20 0543    Specimen:  Blood Updated:  06/04/20 0638     Total Cholesterol 179 mg/dL      Triglycerides 93 mg/dL      HDL Cholesterol 55 mg/dL      LDL Cholesterol  105 mg/dL      VLDL Cholesterol 18.6 mg/dL      LDL/HDL Ratio 1.92    Narrative:       Cholesterol Reference Ranges  (U.S. Department of Health and Human Services ATP III Classifications)    Desirable          <200 mg/dL  Borderline High    200-239 mg/dL  High Risk          >240 mg/dL      Triglyceride Reference Ranges  (U.S. Department of Health and Human Services ATP III Classifications)    Normal           <150 mg/dL  Borderline High  150-199 mg/dL  High             200-499 mg/dL  Very High        >500 mg/dL    HDL Reference Ranges  (U.S. Department of Health and Human Services ATP III Classifcations)    Low     <40 mg/dl (major risk factor for CHD)  High    >60 mg/dl ('negative' risk factor for CHD)        LDL Reference Ranges  (U.S. Department of Health and Human Services ATP III Classifcations)    Optimal          <100 mg/dL  Near Optimal     100-129 mg/dL  Borderline High  130-159 mg/dL  High             160-189 mg/dL  Very High        >189 mg/dL    CBC & Differential [[id number]] Collected:  06/04/20 0543    Specimen:  Blood Updated:  06/04/20 0623    Narrative:       The following orders were created for panel order CBC & Differential.  Procedure                               Abnormality         Status                     ---------                               -----------         ------                     CBC Auto Differential[[id number]]        Abnormal            Final result                  Please view results for these tests on the individual orders.    CBC Auto Differential [[id number]]  (Abnormal) Collected:  06/04/20 0543    Specimen:  Blood Updated:  06/04/20 0623     WBC 7.49 10*3/mm3      RBC 2.79 10*6/mm3      Hemoglobin 10.3 g/dL      Hematocrit 29.6 %      .1 fL      MCH 36.9 pg      MCHC 34.8 g/dL      RDW 12.3 %      RDW-SD 47.8 fl      MPV 10.1 fL      Platelets 161 10*3/mm3      Neutrophil % 68.2 %      Lymphocyte % 23.8 %      Monocyte % 5.6 %      Eosinophil % 1.7 %      Basophil % 0.3 %      Immature Grans % 0.4 %      Neutrophils, Absolute 5.11 10*3/mm3      Lymphocytes, Absolute 1.78 10*3/mm3      Monocytes, Absolute 0.42 10*3/mm3      Eosinophils, Absolute 0.13 10*3/mm3      Basophils, Absolute 0.02 10*3/mm3      Immature Grans, Absolute 0.03 10*3/mm3      nRBC 0.0 /100 WBC     POC Glucose Once [[id number]]  (Normal) Collected:  06/03/20 1036    Specimen:  Blood Updated:  06/03/20 1109     Glucose 97 mg/dL      Comment: : [id number] ERICKSON PAXTONMeter ID: [id number]       POC Glucose Once [[id number]]  (Normal) Collected:  06/03/20 0807    Specimen:  Blood Updated:  06/03/20 0823     Glucose 87 mg/dL      Comment: : [id number] MONK CHRISTYMeter ID: [id number]               I reviewed the patient's new clinical results.    Assessment/Plan:     Active Hospital Problems    Diagnosis POA   • **Alcohol-induced acute pancreatitis [K85.20] Yes     -On admission had lipase of 2245, amylase of 419, d-dimer elevated to 731.  She received Pepcid, Dilaudid, Reglan, and a total of 1000 mL's of IV fluid.  Gallbladder ultrasound, chest x-ray were unremarkable.  CTA of chest, abdomen and pelvis showed peripancreatic fat stranding, and fatty liver as per Dr. Colindres reading.    Trend lipase-   Results from last 7 days   Lab Units 06/04/20  0543 06/03/20  0454 06/02/20  0606 06/01/20  1522   LIPASE U/L 418* 546* 1,460* 2,645*   ]  -advance diet as tolerated starting with  clears  -IV fluids    -Toradol and acetaminophen as needed for pain due to history of opioid abuse  -Zofran for nausea as needed  -Scheduled Ativan, and CIWA protocol  -IV thiamine  -Continuing folate and B12     • Alcohol dependence (CMS/HCC) [F10.20] Yes     Alcohol cessation counseling was provided  -Scheduled Ativan and CIWA protocol-  Ativan 2 mg Q6H   -IV thiamine  -Continuing folate and B12     • Opioid use disorder (CMS/HCC) [F11.99] Yes     -Continue Suboxone and gabapentin     • Anxiety and depression [F41.9, F32.9] Yes     -Continue Zoloft and Wellbutrin     • Dependence on nicotine from cigarettes [F17.210] Yes     - Nicotine patches     • Hypokalemia [E87.6] Yes     -We will monitor and replace as needed  Started on 40 meQ micro k po daily     Results from last 7 days   Lab Units 06/04/20  0543 06/03/20  0454 06/02/20  0606 06/01/20  1522   POTASSIUM mmol/L 3.4* 3.5 3.4* 3.4*   ]       • Hyponatremia [E87.1] Yes     -We will monitor and replace as needed    Results from last 7 days   Lab Units 06/04/20  0543 06/03/20  0454 06/02/20  0606 06/01/20  1522   SODIUM mmol/L 142 141 132* 133*   ]       Will continue to evaluate and monitor pt's course and will adjust treatment and care accordingly.   DVT prophylaxis: SCDs/TEDs   Code status is   Code Status and Medical Interventions:   Ordered at: 06/01/20 2047     Code Status:    CPR     Medical Interventions (Level of Support Prior to Arrest):    Full       Plan for disposition:Place of residence in 1-2 days    Time: 15 min     Signed,   Yosi Almaguer MD  Family Medicine Resident PGY2  [facility]        This document has been electronically signed by Yosi Almaguer MD on June 4, 2020 07:54

## 2020-06-04 NOTE — PLAN OF CARE
Problem: Patient Care Overview  Goal: Plan of Care Review  Outcome: Ongoing (interventions implemented as appropriate)  Flowsheets  Taken 6/4/2020 0053 by Lena Casas RNA  Plan of Care Reviewed With: patient  Taken 6/4/2020 1635 by Mino Horner RN  Outcome Summary: pt vss. Pt resting well at this time.

## 2020-06-05 ENCOUNTER — READMISSION MANAGEMENT (OUTPATIENT)
Dept: CALL CENTER | Facility: HOSPITAL | Age: 35
End: 2020-06-05

## 2020-06-05 VITALS
OXYGEN SATURATION: 99 % | SYSTOLIC BLOOD PRESSURE: 122 MMHG | HEIGHT: 68 IN | DIASTOLIC BLOOD PRESSURE: 100 MMHG | WEIGHT: 134 LBS | HEART RATE: 110 BPM | RESPIRATION RATE: 18 BRPM | TEMPERATURE: 96.2 F | BODY MASS INDEX: 20.31 KG/M2

## 2020-06-05 PROBLEM — K85.20 ALCOHOL-INDUCED ACUTE PANCREATITIS: Status: RESOLVED | Noted: 2020-06-01 | Resolved: 2020-06-05

## 2020-06-05 PROBLEM — E87.1 HYPONATREMIA: Status: RESOLVED | Noted: 2020-06-01 | Resolved: 2020-06-05

## 2020-06-05 PROBLEM — E87.6 HYPOKALEMIA: Status: RESOLVED | Noted: 2020-06-01 | Resolved: 2020-06-05

## 2020-06-05 LAB
ANION GAP SERPL CALCULATED.3IONS-SCNC: 11 MMOL/L (ref 5–15)
BASOPHILS # BLD AUTO: 0.05 10*3/MM3 (ref 0–0.2)
BASOPHILS NFR BLD AUTO: 0.6 % (ref 0–1.5)
BUN BLD-MCNC: 4 MG/DL (ref 6–20)
BUN/CREAT SERPL: 9.3 (ref 7–25)
CALCIUM SPEC-SCNC: 9 MG/DL (ref 8.6–10.5)
CHLORIDE SERPL-SCNC: 103 MMOL/L (ref 98–107)
CO2 SERPL-SCNC: 27 MMOL/L (ref 22–29)
CREAT BLD-MCNC: 0.43 MG/DL (ref 0.57–1)
DEPRECATED RDW RBC AUTO: 48.4 FL (ref 37–54)
EOSINOPHIL # BLD AUTO: 0.14 10*3/MM3 (ref 0–0.4)
EOSINOPHIL NFR BLD AUTO: 1.8 % (ref 0.3–6.2)
ERYTHROCYTE [DISTWIDTH] IN BLOOD BY AUTOMATED COUNT: 12.3 % (ref 12.3–15.4)
GFR SERPL CREATININE-BSD FRML MDRD: >150 ML/MIN/1.73
GLUCOSE BLD-MCNC: 95 MG/DL (ref 65–99)
HCT VFR BLD AUTO: 31.3 % (ref 34–46.6)
HGB BLD-MCNC: 10.9 G/DL (ref 12–15.9)
IMM GRANULOCYTES # BLD AUTO: 0.03 10*3/MM3 (ref 0–0.05)
IMM GRANULOCYTES NFR BLD AUTO: 0.4 % (ref 0–0.5)
LIPASE SERPL-CCNC: 338 U/L (ref 13–60)
LYMPHOCYTES # BLD AUTO: 2.3 10*3/MM3 (ref 0.7–3.1)
LYMPHOCYTES NFR BLD AUTO: 28.9 % (ref 19.6–45.3)
MCH RBC QN AUTO: 37.1 PG (ref 26.6–33)
MCHC RBC AUTO-ENTMCNC: 34.8 G/DL (ref 31.5–35.7)
MCV RBC AUTO: 106.5 FL (ref 79–97)
MONOCYTES # BLD AUTO: 0.57 10*3/MM3 (ref 0.1–0.9)
MONOCYTES NFR BLD AUTO: 7.2 % (ref 5–12)
NEUTROPHILS # BLD AUTO: 4.86 10*3/MM3 (ref 1.7–7)
NEUTROPHILS NFR BLD AUTO: 61.1 % (ref 42.7–76)
NRBC BLD AUTO-RTO: 0 /100 WBC (ref 0–0.2)
PLATELET # BLD AUTO: 194 10*3/MM3 (ref 140–450)
PMV BLD AUTO: 10.3 FL (ref 6–12)
POTASSIUM BLD-SCNC: 3.9 MMOL/L (ref 3.5–5.2)
RBC # BLD AUTO: 2.94 10*6/MM3 (ref 3.77–5.28)
SODIUM BLD-SCNC: 141 MMOL/L (ref 136–145)
WBC NRBC COR # BLD: 7.95 10*3/MM3 (ref 3.4–10.8)

## 2020-06-05 PROCEDURE — 83690 ASSAY OF LIPASE: CPT | Performed by: STUDENT IN AN ORGANIZED HEALTH CARE EDUCATION/TRAINING PROGRAM

## 2020-06-05 PROCEDURE — 99238 HOSP IP/OBS DSCHRG MGMT 30/<: CPT | Performed by: STUDENT IN AN ORGANIZED HEALTH CARE EDUCATION/TRAINING PROGRAM

## 2020-06-05 PROCEDURE — 80048 BASIC METABOLIC PNL TOTAL CA: CPT | Performed by: STUDENT IN AN ORGANIZED HEALTH CARE EDUCATION/TRAINING PROGRAM

## 2020-06-05 PROCEDURE — 85025 COMPLETE CBC W/AUTO DIFF WBC: CPT | Performed by: STUDENT IN AN ORGANIZED HEALTH CARE EDUCATION/TRAINING PROGRAM

## 2020-06-05 PROCEDURE — 25010000002 KETOROLAC TROMETHAMINE PER 15 MG: Performed by: STUDENT IN AN ORGANIZED HEALTH CARE EDUCATION/TRAINING PROGRAM

## 2020-06-05 RX ORDER — CHLORDIAZEPOXIDE HYDROCHLORIDE 25 MG/1
CAPSULE, GELATIN COATED ORAL
Qty: 12 CAPSULE | Refills: 0 | Status: SHIPPED | OUTPATIENT
Start: 2020-06-06 | End: 2020-06-08

## 2020-06-05 RX ORDER — CHLORDIAZEPOXIDE HYDROCHLORIDE 25 MG/1
CAPSULE, GELATIN COATED ORAL
Qty: 3 CAPSULE | Refills: 0 | Status: SHIPPED | OUTPATIENT
Start: 2020-06-05 | End: 2020-06-07

## 2020-06-05 RX ADMIN — POTASSIUM CHLORIDE 40 MEQ: 10 CAPSULE, COATED, EXTENDED RELEASE ORAL at 08:53

## 2020-06-05 RX ADMIN — SERTRALINE 25 MG: 25 TABLET, FILM COATED ORAL at 08:54

## 2020-06-05 RX ADMIN — BUPRENORPHINE HYDROCHLORIDE AND NALOXONE HYDROCHLORIDE DIHYDRATE 1 TABLET: 8; 2 TABLET SUBLINGUAL at 08:53

## 2020-06-05 RX ADMIN — LORAZEPAM 1 MG: 0.5 TABLET ORAL at 06:45

## 2020-06-05 RX ADMIN — GABAPENTIN 600 MG: 300 CAPSULE ORAL at 06:45

## 2020-06-05 RX ADMIN — KETOROLAC TROMETHAMINE 30 MG: 30 INJECTION, SOLUTION INTRAMUSCULAR; INTRAVENOUS at 06:46

## 2020-06-05 RX ADMIN — SODIUM CHLORIDE, PRESERVATIVE FREE 10 ML: 5 INJECTION INTRAVENOUS at 08:55

## 2020-06-05 RX ADMIN — FOLIC ACID 1 MG: 1 TABLET ORAL at 08:54

## 2020-06-05 RX ADMIN — CYANOCOBALAMIN TAB 1000 MCG 1000 MCG: 1000 TAB at 08:54

## 2020-06-05 RX ADMIN — NICOTINE 1 PATCH: 14 PATCH, EXTENDED RELEASE TRANSDERMAL at 08:54

## 2020-06-05 NOTE — PROGRESS NOTES
"    FAMILY MEDICINE DAILY PROGRESS NOTE    NAME: Nata Bain  : 1985  MRN: [id number]      LOS: 4 days     PROVIDER OF SERVICE: Yosi Almaguer MD    Chief Complaint: Alcohol-induced acute pancreatitis    Subjective:     Interval History:  History taken from: patient chart RN  Patient is a 35-year-old  female admitted for alcohol induced pancreatitis.  Patient was found resting comfortably in bed, no acute distress.  Patient endorsed complete resolution of symptoms.  Patient is able to tolerate solid food without incident.  Patient stated \"I want to go home today \".    Patient Denies chest pain, chest pressure, SOA, fever, cough, chills, n/v or diarrea at this time     Review of Systems:   Review of Systems   Constitutional: Negative for activity change, appetite change, chills, fatigue and fever.   HENT: Negative for congestion, hearing loss, sinus pressure, sinus pain, sneezing, sore throat and trouble swallowing.    Eyes: Negative for pain, discharge and itching.   Respiratory: Negative for apnea, cough, choking, chest tightness, shortness of breath, wheezing and stridor.    Cardiovascular: Negative for chest pain, palpitations and leg swelling.   Gastrointestinal: Negative for abdominal distention, anal bleeding, constipation, diarrhea, nausea and vomiting.   Genitourinary: Negative for difficulty urinating, dysuria, enuresis and flank pain.   Musculoskeletal: Negative for arthralgias, back pain and gait problem.   Skin: Negative for color change.   Neurological: Negative for dizziness, seizures, syncope, facial asymmetry, light-headedness, numbness and headaches.   Psychiatric/Behavioral: Negative for agitation and behavioral problems.       Objective:     Vital Signs  Temp:  [96 °F (35.6 °C)-98.5 °F (36.9 °C)] 96.2 °F (35.7 °C)  Heart Rate:  [72-85] 72  Resp:  [18-20] 18  BP: (122-150)/() 122/100  Body mass index is 20.37 kg/m².    Physical Exam  Physical Exam   Constitutional: " She is oriented to person, place, and time. She appears well-developed and well-nourished. No distress.   HENT:   Head: Normocephalic and atraumatic.   Right Ear: External ear normal.   Left Ear: External ear normal.   Nose: Nose normal.   Mouth/Throat: Oropharynx is clear and moist.   Eyes: Pupils are equal, round, and reactive to light. EOM are normal. Right eye exhibits no discharge. Left eye exhibits no discharge.   Neck: Normal range of motion. Neck supple. No tracheal deviation present. No thyromegaly present.   Cardiovascular: Normal rate, regular rhythm, normal heart sounds and intact distal pulses.   Pulmonary/Chest: Effort normal and breath sounds normal. No stridor. No respiratory distress. She has no wheezes. She has no rales.   Abdominal: Soft. Bowel sounds are normal. She exhibits no distension.   Musculoskeletal: Normal range of motion. She exhibits no edema, tenderness or deformity.   Lymphadenopathy:     She has no cervical adenopathy.   Neurological: She is alert and oriented to person, place, and time. No cranial nerve deficit.   Skin: Skin is warm and dry. She is not diaphoretic.   Psychiatric: She has a normal mood and affect.   Vitals reviewed.      Medication Review    Current Facility-Administered Medications:   •  acetaminophen (TYLENOL) tablet 650 mg, 650 mg, Oral, Q6H PRN, Rishabh Raya MD  •  albuterol (PROVENTIL) nebulizer solution 0.083% 2.5 mg/3mL, 2.5 mg, Nebulization, Q6H PRN, Rishabh Raya MD  •  aspirin chewable tablet 324 mg, 324 mg, Oral, Once, Rishabh Raya MD  •  buprenorphine-naloxone (SUBOXONE) 8-2 MG per SL tablet 1 tablet, 1 tablet, Sublingual, TID, Rishabh Raya MD, 1 tablet at 06/04/20 2027  •  buPROPion SR (WELLBUTRIN SR) 12 hr tablet 200 mg, 200 mg, Oral, Nightly, Rishabh Raya MD, 200 mg at 06/04/20 2027  •  folic acid (FOLVITE) tablet 1 mg, 1 mg, Oral, Daily, Rishabh Raya MD, 1 mg at 06/04/20 0807  •  gabapentin (NEURONTIN)  capsule 600 mg, 600 mg, Oral, Q8H, Rishabh Raya MD, 600 mg at 06/05/20 0645  •  ketorolac (TORADOL) injection 30 mg, 30 mg, Intravenous, Q6H PRN, Rishabh Raya MD, 30 mg at 06/05/20 0646  •  LORazepam (ATIVAN) tablet 1 mg, 1 mg, Oral, Q2H PRN, 1 mg at 06/05/20 0645 **OR** LORazepam (ATIVAN) injection 1 mg, 1 mg, Intravenous, Q2H PRN **OR** LORazepam (ATIVAN) tablet 2 mg, 2 mg, Oral, Q1H PRN, 2 mg at 06/04/20 2329 **OR** LORazepam (ATIVAN) injection 2 mg, 2 mg, Intravenous, Q1H PRN **OR** LORazepam (ATIVAN) injection 2 mg, 2 mg, Intravenous, Q15 Min PRN **OR** LORazepam (ATIVAN) injection 2 mg, 2 mg, Intramuscular, Q15 Min PRN, Ponce Farmer MD  •  nicotine (NICODERM CQ) 14 MG/24HR patch 1 patch, 1 patch, Transdermal, Q24H, Rishabh Raya MD, 1 patch at 06/04/20 0808  •  ondansetron (ZOFRAN) tablet 4 mg, 4 mg, Oral, Q6H PRN **OR** ondansetron (ZOFRAN) injection 4 mg, 4 mg, Intravenous, Q6H PRN, Rishabh Raya MD, 4 mg at 06/02/20 1520  •  potassium chloride (MICRO-K) CR capsule 40 mEq, 40 mEq, Oral, Daily, Yosi Almaguer MD, 40 mEq at 06/04/20 0807  •  sennosides-docusate (PERICOLACE) 8.6-50 MG per tablet 2 tablet, 2 tablet, Oral, Nightly, Ponce Farmer MD, 2 tablet at 06/04/20 2026  •  sertraline (ZOLOFT) tablet 25 mg, 25 mg, Oral, Daily, Rishabh Raya MD, 25 mg at 06/04/20 0807  •  [COMPLETED] Insert peripheral IV, , , Once **AND** sodium chloride 0.9 % flush 10 mL, 10 mL, Intravenous, PRN, Rishabh Raya MD  •  sodium chloride 0.9 % flush 10 mL, 10 mL, Intravenous, Q12H, Rishabh Raya MD, 10 mL at 06/03/20 2114  •  sodium chloride 0.9 % flush 10 mL, 10 mL, Intravenous, PRN, Rishabh Raya MD  •  sodium chloride 0.9 % infusion, 50 mL/hr, Intravenous, Continuous, Nadir Villarreal MD, Last Rate: 50 mL/hr at 06/04/20 1802, 50 mL/hr at 06/04/20 1802  •  thiamine (B-1) 250 mg in sodium chloride 0.9 % 100 mL IVPB, 250 mg, Intravenous, Daily, Elver  Rishabh DAS MD, Last Rate: 200 mL/hr at 06/04/20 1006, 250 mg at 06/04/20 1006  •  vitamin B-12 (CYANOCOBALAMIN) tablet 1,000 mcg, 1,000 mcg, Oral, Daily, Rishabh Raya MD, 1,000 mcg at 06/04/20 0807     Diagnostic Data    Lab Results (last 24 hours)     Procedure Component Value Units Date/Time    Lipase [[id number]]  (Abnormal) Collected:  06/05/20 0513    Specimen:  Blood Updated:  06/05/20 0653     Lipase 338 U/L     Basic Metabolic Panel [[id number]]  (Abnormal) Collected:  06/05/20 0513    Specimen:  Blood Updated:  06/05/20 0645     Glucose 95 mg/dL      BUN 4 mg/dL      Creatinine 0.43 mg/dL      Sodium 141 mmol/L      Potassium 3.9 mmol/L      Chloride 103 mmol/L      CO2 27.0 mmol/L      Calcium 9.0 mg/dL      eGFR Non African Amer >150 mL/min/1.73      BUN/Creatinine Ratio 9.3     Anion Gap 11.0 mmol/L     Narrative:       GFR Normal >60  Chronic Kidney Disease <60  Kidney Failure <15      CBC & Differential [[id number]] Collected:  06/05/20 0513    Specimen:  Blood Updated:  06/05/20 0634    Narrative:       The following orders were created for panel order CBC & Differential.  Procedure                               Abnormality         Status                     ---------                               -----------         ------                     CBC Auto Differential[[id number]]        Abnormal            Final result                 Please view results for these tests on the individual orders.    CBC Auto Differential [[id number]]  (Abnormal) Collected:  06/05/20 0513    Specimen:  Blood Updated:  06/05/20 0634     WBC 7.95 10*3/mm3      RBC 2.94 10*6/mm3      Hemoglobin 10.9 g/dL      Hematocrit 31.3 %      .5 fL      MCH 37.1 pg      MCHC 34.8 g/dL      RDW 12.3 %      RDW-SD 48.4 fl      MPV 10.3 fL      Platelets 194 10*3/mm3      Neutrophil % 61.1 %      Lymphocyte % 28.9 %      Monocyte % 7.2 %      Eosinophil % 1.8 %      Basophil % 0.6 %      Immature Grans % 0.4 %      Neutrophils,  Absolute 4.86 10*3/mm3      Lymphocytes, Absolute 2.30 10*3/mm3      Monocytes, Absolute 0.57 10*3/mm3      Eosinophils, Absolute 0.14 10*3/mm3      Basophils, Absolute 0.05 10*3/mm3      Immature Grans, Absolute 0.03 10*3/mm3      nRBC 0.0 /100 WBC             I reviewed the patient's new clinical results.    Assessment/Plan:     Active Hospital Problems    Diagnosis POA   • **Alcohol-induced acute pancreatitis [K85.20] Yes     -On admission had lipase of 2245, amylase of 419, d-dimer elevated to 731.  She received Pepcid, Dilaudid, Reglan, and a total of 1000 mL's of IV fluid.  Gallbladder ultrasound, chest x-ray were unremarkable.  CTA of chest, abdomen and pelvis showed peripancreatic fat stranding, and fatty liver as per Dr. Colindres reading.    Trend lipase-   Results from last 7 days   Lab Units 06/05/20  0513 06/04/20  0543 06/03/20  0454 06/02/20  0606 06/01/20  1522   LIPASE U/L 338* 418* 546* 1,460* 2,645*   ]  -advance diet as tolerated starting with clears  -IV fluids    -Toradol and acetaminophen as needed for pain due to history of opioid abuse  -Zofran for nausea as needed  -Scheduled Ativan, and CIWA protocol  -IV thiamine  -Continuing folate and B12     • Alcohol dependence (CMS/HCC) [F10.20] Yes     Alcohol cessation counseling was provided  Ativan and CIWA protocol-  Ativan 2 mg Q6H   -IV thiamine  -Continuing folate and B12     • Opioid use disorder (CMS/HCC) [F11.99] Yes     -Continue Suboxone and gabapentin     • Anxiety and depression [F41.9, F32.9] Yes     -Continue Zoloft and Wellbutrin     • Dependence on nicotine from cigarettes [F17.210] Yes     - Nicotine patches     • Hypokalemia [E87.6] Yes     -We will monitor and replace as needed  Started on 40 meQ micro k po daily     Results from last 7 days   Lab Units 06/05/20  0513 06/04/20  0543 06/03/20  0454 06/02/20  0606 06/01/20  1522   POTASSIUM mmol/L 3.9 3.4* 3.5 3.4* 3.4*   ]         Will continue to evaluate and monitor pt's course and  will adjust treatment and care accordingly.   DVT prophylaxis: SCDs/TEDs   Code status is   Code Status and Medical Interventions:   Ordered at: 06/01/20 2047     Code Status:    CPR     Medical Interventions (Level of Support Prior to Arrest):    Full       Plan for disposition:Place of residence in 1-2 days    Time: 15 min     Signed,   Yosi Almaguer MD  Family Medicine Resident PGY2  [facility]        This document has been electronically signed by Yosi Almaguer MD on June 5, 2020 07:16

## 2020-06-05 NOTE — PLAN OF CARE
Problem: Patient Care Overview  Goal: Plan of Care Review  Outcome: Ongoing (interventions implemented as appropriate)  Flowsheets (Taken 6/5/2020 1392)  Progress: no change  Plan of Care Reviewed With: patient  Note:   Pt alert and oriented. Feeling much better. Resting quietly at this time. Will monitor.

## 2020-06-05 NOTE — OUTREACH NOTE
Prep Survey      Responses   [facility] facility patient discharged from?  [place]   Is LACE score < 7 ?  No   Eligibility  [facility]   Date of Admission  06/01/20   Date of Discharge  06/05/20   Discharge Disposition  Home or Self Care   Discharge diagnosis  Alcohol-induced acute pancreatitis    COVID-19 Test Status  Negative   Does the patient have one of the following disease processes/diagnoses(primary or secondary)?  Other   Does the patient have Home health ordered?  No   Is there a DME ordered?  No   Prep survey completed?  Yes          Mary Alice Cornejo RN

## 2020-06-05 NOTE — DISCHARGE SUMMARY
FAMILY MEDICINE SERVICE   HOSPITAL DISCHARGE SUMMARY    PATIENT NAME: Nata Bain   PHYSICIAN: Yosi Almaguer MD   : 1985  MRN: [id number]    ADMITTED: 2020  DISCHARGED: 20     ADMISSION DIAGNOSES:  Active Hospital Problems    Diagnosis  POA   • Alcohol dependence (CMS/HCC) [F10.20]  Yes   • Opioid use disorder (CMS/HCC) [F11.99]  Yes   • Anxiety and depression [F41.9, F32.9]  Yes   • Dependence on nicotine from cigarettes [F17.210]  Yes      Resolved Hospital Problems    Diagnosis Date Resolved POA   • **Alcohol-induced acute pancreatitis [K85.20] 2020 Yes   • Hypokalemia [E87.6] 2020 Yes   • Hyponatremia [E87.1] 2020 Yes     DISCHARGE DIAGNOSES:   Active Hospital Problems    Diagnosis  POA   • Alcohol dependence (CMS/HCC) [F10.20]  Yes   • Opioid use disorder (CMS/HCC) [F11.99]  Yes   • Anxiety and depression [F41.9, F32.9]  Yes   • Dependence on nicotine from cigarettes [F17.210]  Yes      Resolved Hospital Problems    Diagnosis Date Resolved POA   • **Alcohol-induced acute pancreatitis [K85.20] 2020 Yes   • Hypokalemia [E87.6] 2020 Yes   • Hyponatremia [E87.1] 2020 Yes       SERVICE: Family Medicine. Attending Nadir Villarreal M.D., Resident Yosi Almaguer MD    CONSULTS:   Consult Orders (all) (From admission, onward)     Start     Ordered    20 1610  Family Practice - Resident (on-call MD unless specified)  Once     Specialty:  Family Medicine  Provider:  (Not yet assigned)    20 1609                PROCEDURES:   None     HISTORY OF PRESENT ILLNESS   Nata Bain is a 35 y.o. female with a CMH of alcohol abuse with withdrawal seizures who presents complaining of sharp epigastric pain that started the morning of admission and radiated up to her chest, causing a sharp pain there, then generalized to include her back as well.  She denies any exacerbating or relieving factors for the abdominal pain but states that aspirin alleviated  "her chest pain.  She has had associated nonbloody, bilious emesis x2 today, and nonbloody diarrhea for about 3 days.  In addition she states her urine output is decreased.  Feels exactly like it just worse.  Pain is currently at a 9 and located in epigastrium.  Last drink was this morning.  She drinks about 1 to 1/2 pints or 1/5 of vodka per day.    COPIED FROM ADMISSION H&P WRITTEN BY: Dr IVAN Raya    DIAGNOSTIC DATA:   Lab Results   Component Value Date    WBC 7.95 06/05/2020    HGB 10.9 (L) 06/05/2020    HCT 31.3 (L) 06/05/2020    .5 (H) 06/05/2020     06/05/2020     Lab Results   Component Value Date    GLUCOSE 95 06/05/2020    BUN 4 (L) 06/05/2020    CREATININE 0.43 (L) 06/05/2020    EGFRIFNONA >150 06/05/2020    BCR 9.3 06/05/2020    K 3.9 06/05/2020    CO2 27.0 06/05/2020    CALCIUM 9.0 06/05/2020    ALBUMIN 4.50 06/01/2020    AST 62 (H) 06/01/2020    ALT 29 06/01/2020       Imaging Results (Last 72 Hours)     ** No results found for the last 72 hours. **             HOSPITAL COURSE:   Patient was admitted to [facility] for acute alcohol induced pancreatitis.  CT imaging revealed \"there is moderate amount of peripancreatic fat stranding compatible with history of acute peritonitis.  No walled off fluid collection is seen.  Fluid is noted to track inferiorly into the abdomen to the level of the umbilicus.  The pancreas enhances somewhat heterogeneously, especially in the head.  Fatty liver.  \"As per Dr. Marie's CT report on 6/1/2020.  In the ED, patient had elevated d-dimer of 731-subsequent CTA chest revealed no evidence of PE.    Patient was made n.p.o., given IV fluids for rehydration, provided pain control.  Lipase on admission was 2645 and trended.  Diet was advanced on 6/30/2020 as lipase was 546.  Patient was started on clears and advanced as tolerated.  Pain was adequately controlled throughout hospital course.  Lipase was 338 on day of discharge.    Due to patient's longstanding history of " alcohol dependence, patient was placed on scheduled Ativan, CIWA Protocol and monitored for signs of withdrawal or delirium tremens.  Patient showed no signs of such during hospital course.  Patient was discharged home on Librium 25 mg taper.    Patient stable upon discharge home.  Patient was discharged with Librium 25 mg taper.  Folic acid p.o. and daily thiamine p.o.  Patient was advised to follow-up with PCP Dr. Almaguer in 1 week.    DISCHARGE CONDITION:   Stable     DISPOSITION:  Home or Self Care [1]  home    DISCHARGE MEDICATIONS     Discharge Medications      New Medications      Instructions Start Date   chlordiazePOXIDE 25 MG capsule  Commonly known as:  LIBRIUM   Take 1 capsule by mouth 2 (Two) Times a Day for 1 day, THEN 1 capsule Daily for 1 day.   Start Date:  June 5, 2020     chlordiazePOXIDE 25 MG capsule  Commonly known as:  LIBRIUM   Take 2 capsules by mouth Every 6 (Six) Hours for 1 day, THEN 1 capsule Every 6 (Six) Hours for 1 day.   Start Date:  June 6, 2020        Continue These Medications      Instructions Start Date   buprenorphine-naloxone 8-2 MG per SL tablet  Commonly known as:  SUBOXONE   3 tablets, Sublingual, 3 Times Daily, Patient takes one 8-2 tablet three times a day.      buPROPion  MG 12 hr tablet  Commonly known as:  WELLBUTRIN SR   300 mg, Oral, Nightly      cyanocobalamin 1000 MCG tablet  Commonly known as:  VITAMIN B-12   1,000 mcg, Oral, Daily      diazePAM 5 MG tablet  Commonly known as:  VALIUM   Oral, Every 8 Hours PRN      folic acid 1 MG tablet  Commonly known as:  FOLVITE   1 mg, Oral, Daily      gabapentin 600 MG tablet  Commonly known as:  NEURONTIN   300 mg, 3 Times Daily      guaiFENesin 600 MG 12 hr tablet  Commonly known as:  MUCINEX   600 mg, Oral, 2 Times Daily      ondansetron 4 MG tablet  Commonly known as:  Zofran   4 mg, Oral, Every 8 Hours PRN      promethazine 25 MG suppository  Commonly known as:  PHENERGAN   25 mg, Rectal, Every 6 Hours PRN         thiamine 100 MG tablet  Commonly known as:  VITAMIN B1   100 mg, Oral, Daily             INSTRUCTIONS:  Activity:   Activity Instructions     Activity as Tolerated          Diet:   Diet Instructions     Diet: Regular      Discharge Diet:  Regular        Special instructions: Patient instructed to call MD or return to ED with worsening shortness of breath, chest pain, fever greater than 100.4 degrees F or any other medical concerns..    FOLLOW UP:   Additional Instructions for the Follow-ups that You Need to Schedule     Discharge Follow-up with PCP   As directed       Currently Documented PCP:    Yosi Almaguer MD    PCP Phone Number:    [phone]     Follow Up Details:  Follow-up with PCP in 1 week           Follow-up Information     Yosi Almaguer MD Follow up.    Specialties:  Family Medicine, Emergency Medicine  Why:  Follow-up with PCP in 1 week  June 10th with Dr Peña @ 11 am  Contact information:  [address] [place] [id number] [phone]                   PENDING TEST RESULTS AT DISCHARGE    Time: Discharge 30 min    Nadir Villarreal M.D. is the attending at time of discharge, is aware of the patient's status and agrees with the above discharge summary.    Signed,   Yosi Almaguer MD  Family Medicine Resident PGY2  [facility]        This document has been electronically signed by Yosi Almaguer MD on June 5, 2020 09:25

## 2020-06-08 ENCOUNTER — TRANSITIONAL CARE MANAGEMENT TELEPHONE ENCOUNTER (OUTPATIENT)
Dept: CALL CENTER | Facility: HOSPITAL | Age: 35
End: 2020-06-08

## 2020-06-08 NOTE — OUTREACH NOTE
Call Center TCM Note      Responses   [facility] patient discharged from?  [place]   COVID-19 Test Status  Negative   Does the patient have one of the following disease processes/diagnoses(primary or secondary)?  Other   TCM attempt successful?  Yes   Call start time  1232   Call end time  1236   Discharge diagnosis  Alcohol-induced acute pancreatitis    Meds reviewed with patient/caregiver?  Yes   Does the patient have all medications ordered at discharge?  No [See below]   Prescription comments  Pt did not fill Librium as she is currently being treated by a Soboxone clinic and they have her on valium.    Is the patient taking all medications as directed (includes completed medication regime)?  No [See above]   Does the patient have a primary care provider?   Yes   Does the patient have an appointment with their PCP within 7 days of discharge?  Yes [06/10/2020, pt will see Dr Zeeshan Ramirez in same office]   Comments regarding PCP  Yosi Almaguer MD   Has the patient kept scheduled appointments due by today?  N/A   Has home health visited the patient within 72 hours of discharge?  N/A   TCM call completed?  Yes   Wrap up additional comments  Pt states she is better, eating small meals and staying hydrated, No N/v/D since d/c. Pt does have some mild abdominal to chest and back pain but much improved. Had 1 reg BM in a week today and states this has helped as well. Pt is sched for TCM with PCP office on 06/10/2020.          An Pugh MA    6/8/2020, 12:43

## 2020-06-08 NOTE — PAYOR COMM NOTE
"Lena Rucker  [facility]  P :[phone]  F: [phone]    [facility]#[id number]    Shawnee Springer (35 y.o. Female)     Date of Birth Social Security Number Address Home Phone MRN    1985  [address] [phone] [id number]    [employer] Marital Status          Scientology        Admission Date Admission Type Admitting Provider Attending Provider Department, Room/Bed    6/1/20 Emergency Ankur Sullivan MD  [facility] 3 EAST, 367/1    Discharge Date Discharge Disposition Discharge Destination        6/5/2020 Home or Self Care              Attending Provider:  (none)   Allergies:  No Known Allergies    Isolation:  None   Infection:  None   Code Status:  Prior    Ht:  172.7 cm (68\")   Wt:  60.8 kg (134 lb)    Admission Cmt:  None   Principal Problem:  Alcohol-induced acute pancreatitis [K85.20] More...                 Active Insurance as of 6/1/2020     Primary Coverage     Payor Plan Insurance Group Employer/Plan Group    [place] [id number]     Payor Plan Address Payor Plan Phone Number Payor Plan Fax Number Effective Dates    [employer] Claims Office - [address] [address] [phone]  1/1/2020 - None Entered    [facility] [id number]       Subscriber Name Subscriber Birth Date Member ID       SHAWNEE SPRINGER 1985 [id number]                 Emergency Contacts      (Rel.) Home Phone Work Phone Mobile Phone    UmuJorge irvin (Spouse) [phone] -- [phone]    OdellJoanna (Mother) [phone] -- [phone]    Hilario Springer (Step Parent) [phone] -- [phone]               Discharge Summary      Yosi Almaguer MD at 06/05/20 0741     Attestation signed by Nadir Villarreal MD at 06/07/20 1507    I have seen the patient on day of discharge. I have reviewed the notes, assessments, and/or procedures performed by Dr. Almaguer, I concur with her/his documentation " and assessment and plan for Nata Bain.       This document has been electronically signed by Nadir Villarreal MD on 2020 15:01                          FAMILY MEDICINE SERVICE   HOSPITAL DISCHARGE SUMMARY    PATIENT NAME: Nata Bain   PHYSICIAN: Yosi Almaguer MD   : 1985  MRN: [id number]    ADMITTED: 2020  DISCHARGED: 20     ADMISSION DIAGNOSES:  Active Hospital Problems    Diagnosis  POA   • Alcohol dependence (CMS/HCC) [F10.20]  Yes   • Opioid use disorder (CMS/HCC) [F11.99]  Yes   • Anxiety and depression [F41.9, F32.9]  Yes   • Dependence on nicotine from cigarettes [F17.210]  Yes      Resolved Hospital Problems    Diagnosis Date Resolved POA   • **Alcohol-induced acute pancreatitis [K85.20] 2020 Yes   • Hypokalemia [E87.6] 2020 Yes   • Hyponatremia [E87.1] 2020 Yes     DISCHARGE DIAGNOSES:   Active Hospital Problems    Diagnosis  POA   • Alcohol dependence (CMS/HCC) [F10.20]  Yes   • Opioid use disorder (CMS/HCC) [F11.99]  Yes   • Anxiety and depression [F41.9, F32.9]  Yes   • Dependence on nicotine from cigarettes [F17.210]  Yes      Resolved Hospital Problems    Diagnosis Date Resolved POA   • **Alcohol-induced acute pancreatitis [K85.20] 2020 Yes   • Hypokalemia [E87.6] 2020 Yes   • Hyponatremia [E87.1] 2020 Yes       SERVICE: Family Medicine. Attending Nadir Villarreal M.D., Resident Yosi Almaguer MD    CONSULTS:   Consult Orders (all) (From admission, onward)     Start     Ordered    20 1610  Family Practice - Resident (on-call MD unless specified)  Once     Specialty:  Family Medicine  Provider:  (Not yet assigned)    20 1609                PROCEDURES:   None     HISTORY OF PRESENT ILLNESS   Nata Bain is a 35 y.o. female with a CMH of alcohol abuse with withdrawal seizures who presents complaining of sharp epigastric pain that started the morning of admission and radiated up to her chest, causing a  "sharp pain there, then generalized to include her back as well.  She denies any exacerbating or relieving factors for the abdominal pain but states that aspirin alleviated her chest pain.  She has had associated nonbloody, bilious emesis x2 today, and nonbloody diarrhea for about 3 days.  In addition she states her urine output is decreased.  Feels exactly like it just worse.  Pain is currently at a 9 and located in epigastrium.  Last drink was this morning.  She drinks about 1 to 1/2 pints or 1/5 of vodka per day.    COPIED FROM ADMISSION H&P WRITTEN BY: Dr IVAN Raya    DIAGNOSTIC DATA:   Lab Results   Component Value Date    WBC 7.95 06/05/2020    HGB 10.9 (L) 06/05/2020    HCT 31.3 (L) 06/05/2020    .5 (H) 06/05/2020     06/05/2020     Lab Results   Component Value Date    GLUCOSE 95 06/05/2020    BUN 4 (L) 06/05/2020    CREATININE 0.43 (L) 06/05/2020    EGFRIFNONA >150 06/05/2020    BCR 9.3 06/05/2020    K 3.9 06/05/2020    CO2 27.0 06/05/2020    CALCIUM 9.0 06/05/2020    ALBUMIN 4.50 06/01/2020    AST 62 (H) 06/01/2020    ALT 29 06/01/2020       Imaging Results (Last 72 Hours)     ** No results found for the last 72 hours. **             HOSPITAL COURSE:   Patient was admitted to [facility] for acute alcohol induced pancreatitis.  CT imaging revealed \"there is moderate amount of peripancreatic fat stranding compatible with history of acute peritonitis.  No walled off fluid collection is seen.  Fluid is noted to track inferiorly into the abdomen to the level of the umbilicus.  The pancreas enhances somewhat heterogeneously, especially in the head.  Fatty liver.  \"As per Dr. Marie's CT report on 6/1/2020.  In the ED, patient had elevated d-dimer of 731-subsequent CTA chest revealed no evidence of PE.    Patient was made n.p.o., given IV fluids for rehydration, provided pain control.  Lipase on admission was 2645 and trended.  Diet was advanced on 6/30/2020 as lipase was 546.  Patient was started on clears " and advanced as tolerated.  Pain was adequately controlled throughout hospital course.  Lipase was 338 on day of discharge.    Due to patient's longstanding history of alcohol dependence, patient was placed on scheduled Ativan, CIWA Protocol and monitored for signs of withdrawal or delirium tremens.  Patient showed no signs of such during hospital course.  Patient was discharged home on Librium 25 mg taper.    Patient stable upon discharge home.  Patient was discharged with Librium 25 mg taper.  Folic acid p.o. and daily thiamine p.o.  Patient was advised to follow-up with PCP Dr. Almaguer in 1 week.    DISCHARGE CONDITION:   Stable     DISPOSITION:  Home or Self Care [1]  home    DISCHARGE MEDICATIONS     Discharge Medications      New Medications      Instructions Start Date   chlordiazePOXIDE 25 MG capsule  Commonly known as:  LIBRIUM   Take 1 capsule by mouth 2 (Two) Times a Day for 1 day, THEN 1 capsule Daily for 1 day.   Start Date:  June 5, 2020     chlordiazePOXIDE 25 MG capsule  Commonly known as:  LIBRIUM   Take 2 capsules by mouth Every 6 (Six) Hours for 1 day, THEN 1 capsule Every 6 (Six) Hours for 1 day.   Start Date:  June 6, 2020        Continue These Medications      Instructions Start Date   buprenorphine-naloxone 8-2 MG per SL tablet  Commonly known as:  SUBOXONE   3 tablets, Sublingual, 3 Times Daily, Patient takes one 8-2 tablet three times a day.      buPROPion  MG 12 hr tablet  Commonly known as:  WELLBUTRIN SR   300 mg, Oral, Nightly      cyanocobalamin 1000 MCG tablet  Commonly known as:  VITAMIN B-12   1,000 mcg, Oral, Daily      diazePAM 5 MG tablet  Commonly known as:  VALIUM   Oral, Every 8 Hours PRN      folic acid 1 MG tablet  Commonly known as:  FOLVITE   1 mg, Oral, Daily      gabapentin 600 MG tablet  Commonly known as:  NEURONTIN   300 mg, 3 Times Daily      guaiFENesin 600 MG 12 hr tablet  Commonly known as:  MUCINEX   600 mg, Oral, 2 Times Daily      ondansetron 4 MG  tablet  Commonly known as:  Zofran   4 mg, Oral, Every 8 Hours PRN      promethazine 25 MG suppository  Commonly known as:  PHENERGAN   25 mg, Rectal, Every 6 Hours PRN      thiamine 100 MG tablet  Commonly known as:  VITAMIN B1   100 mg, Oral, Daily             INSTRUCTIONS:  Activity:   Activity Instructions     Activity as Tolerated          Diet:   Diet Instructions     Diet: Regular      Discharge Diet:  Regular        Special instructions: Patient instructed to call MD or return to ED with worsening shortness of breath, chest pain, fever greater than 100.4 degrees F or any other medical concerns..    FOLLOW UP:   Additional Instructions for the Follow-ups that You Need to Schedule     Discharge Follow-up with PCP   As directed       Currently Documented PCP:    Yosi Almaguer MD    PCP Phone Number:    [phone]     Follow Up Details:  Follow-up with PCP in 1 week           Follow-up Information     Yosi Almaguer MD Follow up.    Specialties:  Family Medicine, Emergency Medicine  Why:  Follow-up with PCP in 1 week  June 10th with Dr Peña @ 11 am  Contact information:  [address] [place] [address]  [phone]                   PENDING TEST RESULTS AT DISCHARGE    Time: Discharge 30 min    Nadir Villarreal M.D. is the attending at time of discharge, is aware of the patient's status and agrees with the above discharge summary.    Signed,   Yosi Almaguer MD  Family Medicine Resident PGY2  [facility]        This document has been electronically signed by Yosi Almaguer MD on June 5, 2020 09:25                    Electronically signed by Nadir Villarreal MD at 06/07/20 1501       Discharge Order (From admission, onward)     Start     Ordered    06/05/20 0740  Discharge patient  Once     Expected Discharge Date:  06/05/20    Expected Discharge Time:  Morning    Discharge Disposition:  Home or Self Care    Physician of Record for Attribution - Please select from Treatment Team:   KIP CHRISTIANSON [[id number]]    Review needed by CMO to determine Physician of Record:  No       Question Answer Comment   Physician of Record for Attribution - Please select from Treatment Team KIP CHRISTIANSON    Review needed by CMO to determine Physician of Record No        06/05/20 0741

## 2020-06-10 ENCOUNTER — OFFICE VISIT (OUTPATIENT)
Dept: FAMILY MEDICINE CLINIC | Facility: CLINIC | Age: 35
End: 2020-06-10

## 2020-06-10 ENCOUNTER — LAB (OUTPATIENT)
Dept: LAB | Facility: HOSPITAL | Age: 35
End: 2020-06-10

## 2020-06-10 VITALS
BODY MASS INDEX: 20.58 KG/M2 | TEMPERATURE: 97.7 F | SYSTOLIC BLOOD PRESSURE: 102 MMHG | HEIGHT: 68 IN | HEART RATE: 87 BPM | WEIGHT: 135.8 LBS | OXYGEN SATURATION: 100 % | DIASTOLIC BLOOD PRESSURE: 70 MMHG

## 2020-06-10 DIAGNOSIS — K85.20 ALCOHOL-INDUCED ACUTE PANCREATITIS, UNSPECIFIED COMPLICATION STATUS: Primary | ICD-10-CM

## 2020-06-10 DIAGNOSIS — D53.1 MEGALOBLASTIC ANEMIA: ICD-10-CM

## 2020-06-10 DIAGNOSIS — F10.21 ALCOHOL DEPENDENCE IN REMISSION (HCC): ICD-10-CM

## 2020-06-10 LAB
ALBUMIN SERPL-MCNC: 4.1 G/DL (ref 3.5–5.2)
ALBUMIN/GLOB SERPL: 1.4 G/DL
ALP SERPL-CCNC: 61 U/L (ref 39–117)
ALT SERPL W P-5'-P-CCNC: 20 U/L (ref 1–33)
ANION GAP SERPL CALCULATED.3IONS-SCNC: 10.3 MMOL/L (ref 5–15)
ANISOCYTOSIS BLD QL: NORMAL
AST SERPL-CCNC: 22 U/L (ref 1–32)
BASOPHILS # BLD MANUAL: 0.07 10*3/MM3 (ref 0–0.2)
BASOPHILS NFR BLD AUTO: 1 % (ref 0–1.5)
BILIRUB SERPL-MCNC: <0.2 MG/DL (ref 0.2–1.2)
BUN BLD-MCNC: 13 MG/DL (ref 6–20)
BUN/CREAT SERPL: 18.8 (ref 7–25)
CALCIUM SPEC-SCNC: 10.2 MG/DL (ref 8.6–10.5)
CHLORIDE SERPL-SCNC: 97 MMOL/L (ref 98–107)
CO2 SERPL-SCNC: 26.7 MMOL/L (ref 22–29)
CREAT BLD-MCNC: 0.69 MG/DL (ref 0.57–1)
DEPRECATED RDW RBC AUTO: 48.5 FL (ref 37–54)
EOSINOPHIL # BLD MANUAL: 0.15 10*3/MM3 (ref 0–0.4)
EOSINOPHIL NFR BLD MANUAL: 2 % (ref 0.3–6.2)
ERYTHROCYTE [DISTWIDTH] IN BLOOD BY AUTOMATED COUNT: 12.3 % (ref 12.3–15.4)
GFR SERPL CREATININE-BSD FRML MDRD: 97 ML/MIN/1.73
GLOBULIN UR ELPH-MCNC: 3 GM/DL
GLUCOSE BLD-MCNC: 73 MG/DL (ref 65–99)
HCT VFR BLD AUTO: 34.6 % (ref 34–46.6)
HGB BLD-MCNC: 11.8 G/DL (ref 12–15.9)
LIPASE SERPL-CCNC: 217 U/L (ref 13–60)
LYMPHOCYTES # BLD MANUAL: 2.49 10*3/MM3 (ref 0.7–3.1)
LYMPHOCYTES NFR BLD MANUAL: 33.3 % (ref 19.6–45.3)
LYMPHOCYTES NFR BLD MANUAL: 6.1 % (ref 5–12)
MACROCYTES BLD QL SMEAR: NORMAL
MCH RBC QN AUTO: 36.5 PG (ref 26.6–33)
MCHC RBC AUTO-ENTMCNC: 34.1 G/DL (ref 31.5–35.7)
MCV RBC AUTO: 107.1 FL (ref 79–97)
MONOCYTES # BLD AUTO: 0.46 10*3/MM3 (ref 0.1–0.9)
NEUTROPHILS # BLD AUTO: 4.23 10*3/MM3 (ref 1.7–7)
NEUTROPHILS NFR BLD MANUAL: 56.6 % (ref 42.7–76)
PLAT MORPH BLD: NORMAL
PLATELET # BLD AUTO: 318 10*3/MM3 (ref 140–450)
PMV BLD AUTO: 10.3 FL (ref 6–12)
POTASSIUM BLD-SCNC: 4.6 MMOL/L (ref 3.5–5.2)
PROT SERPL-MCNC: 7.1 G/DL (ref 6–8.5)
RBC # BLD AUTO: 3.23 10*6/MM3 (ref 3.77–5.28)
SODIUM BLD-SCNC: 134 MMOL/L (ref 136–145)
VARIANT LYMPHS NFR BLD MANUAL: 1 % (ref 0–5)
WBC MORPH BLD: NORMAL
WBC NRBC COR # BLD: 7.48 10*3/MM3 (ref 3.4–10.8)

## 2020-06-10 PROCEDURE — 80053 COMPREHEN METABOLIC PANEL: CPT | Performed by: STUDENT IN AN ORGANIZED HEALTH CARE EDUCATION/TRAINING PROGRAM

## 2020-06-10 PROCEDURE — 99213 OFFICE O/P EST LOW 20 MIN: CPT | Performed by: STUDENT IN AN ORGANIZED HEALTH CARE EDUCATION/TRAINING PROGRAM

## 2020-06-10 PROCEDURE — 85007 BL SMEAR W/DIFF WBC COUNT: CPT | Performed by: STUDENT IN AN ORGANIZED HEALTH CARE EDUCATION/TRAINING PROGRAM

## 2020-06-10 PROCEDURE — 36415 COLL VENOUS BLD VENIPUNCTURE: CPT | Performed by: STUDENT IN AN ORGANIZED HEALTH CARE EDUCATION/TRAINING PROGRAM

## 2020-06-10 PROCEDURE — 83690 ASSAY OF LIPASE: CPT | Performed by: STUDENT IN AN ORGANIZED HEALTH CARE EDUCATION/TRAINING PROGRAM

## 2020-06-10 PROCEDURE — 85025 COMPLETE CBC W/AUTO DIFF WBC: CPT | Performed by: STUDENT IN AN ORGANIZED HEALTH CARE EDUCATION/TRAINING PROGRAM

## 2020-06-10 RX ORDER — LANOLIN ALCOHOL/MO/W.PET/CERES
100 CREAM (GRAM) TOPICAL DAILY
Qty: 30 TABLET | Refills: 5 | Status: SHIPPED | OUTPATIENT
Start: 2020-06-10 | End: 2021-01-19 | Stop reason: SDUPTHER

## 2020-06-10 RX ORDER — LANOLIN ALCOHOL/MO/W.PET/CERES
400 CREAM (GRAM) TOPICAL DAILY
Qty: 30 TABLET | Refills: 5 | Status: SHIPPED | OUTPATIENT
Start: 2020-06-10 | End: 2021-01-10 | Stop reason: HOSPADM

## 2020-06-10 RX ORDER — LANOLIN ALCOHOL/MO/W.PET/CERES
100 CREAM (GRAM) TOPICAL DAILY
Qty: 30 TABLET | Refills: 0 | Status: SHIPPED | OUTPATIENT
Start: 2020-06-10 | End: 2020-06-10

## 2020-06-10 NOTE — PROGRESS NOTES
I have spoken with the patient.  I have reviewed the notes, assessments, and/or procedures performed by Dr. Naeem Raya, I concur with his  documentation and assessment and plan for Nata Bain.          This document has been electronically signed by Eladio Vizcarra MD on Renetta 10, 2020 13:51

## 2020-06-10 NOTE — PROGRESS NOTES
Family Medicine Residency  Naeem Raya MD    Subjective:     Nata Bain is a 35 y.o. female who presents for alcohol induced pancreatitis hospitalization.  Patient is feeling much better.  When she was discharged she still had some epigastric pain with constipation.  At this time the constipation has resolved and she is only having some dull epigastric pain occasionally.  Overall she is doing much better.  Spoke about alcohol use and she has not had any alcohol over the past 8 days.  She stated that this will be the last episode of pancreatitis.  Patient is otherwise feeling well    The following portions of the patient's history were reviewed and updated as appropriate: allergies, current medications, past family history, past medical history, past social history, past surgical history and problem list.    Past Medical Hx:  Past Medical History:   Diagnosis Date   • Abnormal Pap smear of cervix    • Alcoholism (CMS/[facility])    • Anxiety    • Cervical dysplasia    • Depression    • Fibrocystic breast    • GERD (gastroesophageal reflux disease)    • Seizures (CMS/[facility]) 2017   • Substance abuse (CMS/[facility])    • Substance abuse (CMS/[facility])        Past Surgical Hx:  Past Surgical History:   Procedure Laterality Date   • RHINOPLASTY         Current Meds:    Current Outpatient Medications:   •  buprenorphine-naloxone (SUBOXONE) 8-2 MG per SL tablet, Place 3 tablets under the tongue 3 (Three) Times a Day. Patient takes one 8-2 tablet three times a day., Disp: , Rfl:   •  buPROPion SR (WELLBUTRIN SR) 150 MG 12 hr tablet, Take 300 mg by mouth Every Night., Disp: , Rfl:   •  cyanocobalamin (VITAMIN B-12) 1000 MCG tablet, Take 1 tablet by mouth Daily., Disp: 30 tablet, Rfl: 5  •  diazePAM (VALIUM) 5 MG tablet, Take  by mouth Every 8 (Eight) Hours As Needed., Disp: , Rfl:   •  gabapentin (NEURONTIN) 600 MG tablet, 300 mg 3 (Three) Times a Day., Disp: , Rfl:   •  guaiFENesin (MUCINEX) 600 MG 12 hr tablet, Take 1 tablet  by mouth 2 (Two) Times a Day., Disp: 30 tablet, Rfl: 0  •  ondansetron (ZOFRAN) 4 MG tablet, Take 1 tablet by mouth Every 8 (Eight) Hours As Needed for Nausea or Vomiting., Disp: 21 tablet, Rfl: 0  •  promethazine (PHENERGAN) 25 MG suppository, Insert 1 suppository into the rectum Every 6 (Six) Hours As Needed for Nausea or Vomiting., Disp: 12 suppository, Rfl: 0  •  thiamine (VITAMIN B1) 100 MG tablet, Take 1 tablet by mouth Daily., Disp: 30 tablet, Rfl: 5  •  folic acid (Folate) 400 MCG tablet, Take 1 tablet by mouth Daily., Disp: 30 tablet, Rfl: 5    Allergies:  No Known Allergies    Family Hx:  Family History   Problem Relation Age of Onset   • Breast cancer Mother    • Cancer Mother    • COPD Mother    • Breast cancer Maternal Grandmother    • Breast cancer Paternal Grandmother    • Breast cancer Maternal Aunt    • Hypertension Father    • Heart disease Father         Social History:  Social History     Socioeconomic History   • Marital status:      Spouse name: Not on file   • Number of children: Not on file   • Years of education: Not on file   • Highest education level: Not on file   Tobacco Use   • Smoking status: Current Every Day Smoker     Packs/day: 1.00     Years: 20.00     Pack years: 20.00     Types: Cigarettes   • Smokeless tobacco: Never Used   Substance and Sexual Activity   • Alcohol use: Not Currently     Frequency: Never     Comment: Pt reports drinking a 5th of vodka daily   • Drug use: No     Types: Fentanyl, Oxycodone   • Sexual activity: Yes     Partners: Male     Birth control/protection: None       Review of Systems  Review of Systems   Constitutional: Negative for chills and fever.   HENT: Negative for sore throat and voice change.    Eyes: Negative for pain and redness.   Respiratory: Negative for chest tightness and shortness of breath.    Cardiovascular: Negative for chest pain and leg swelling.   Gastrointestinal: Positive for abdominal pain (occasional). Negative for  "abdominal distention.   Endocrine: Negative for cold intolerance and heat intolerance.   Genitourinary: Negative for difficulty urinating and dysuria.   Musculoskeletal: Negative for arthralgias and myalgias.   Skin: Negative for color change and rash.   Neurological: Negative for dizziness and headaches.   Psychiatric/Behavioral: Negative for agitation and confusion.       Objective:     /70   Pulse 87   Temp 97.7 °F (36.5 °C) (Tympanic)   Ht 172.7 cm (68\")   Wt 61.6 kg (135 lb 12.8 oz)   SpO2 100%   BMI 20.65 kg/m²   Physical Exam   Constitutional: She is oriented to person, place, and time. She appears well-developed and well-nourished.   HENT:   Head: Normocephalic and atraumatic.   Right Ear: External ear normal.   Left Ear: External ear normal.   Eyes: Pupils are equal, round, and reactive to light. Conjunctivae and EOM are normal.   Neck: Normal range of motion. Neck supple.   Cardiovascular: Normal rate, regular rhythm and normal heart sounds.   Pulmonary/Chest: Effort normal and breath sounds normal. No respiratory distress.   Abdominal: Soft. There is tenderness (with deep palpation in epigastric area).   Musculoskeletal: Normal range of motion. She exhibits no edema.   Neurological: She is alert and oriented to person, place, and time.   Skin: Skin is warm and dry.   Psychiatric: She has a normal mood and affect. Her behavior is normal.        Assessment/Plan:     Nata was seen today for alcohol induced pancreatitis.    Diagnoses and all orders for this visit:    Alcohol-induced acute pancreatitis, unspecified complication status  -     Cancel: CBC w AUTO Differential  -     Cancel: Comprehensive metabolic panel  -     Cancel: Lipase  -     CBC w AUTO Differential  -     Comprehensive metabolic panel  -     Lipase  -     cyanocobalamin (VITAMIN B-12) 1000 MCG tablet; Take 1 tablet by mouth Daily.  -     thiamine (VITAMIN B1) 100 MG tablet; Take 1 tablet by mouth Daily.  -     folic acid " (Folate) 400 MCG tablet; Take 1 tablet by mouth Daily.    Megaloblastic anemia  -     cyanocobalamin (VITAMIN B-12) 1000 MCG tablet; Take 1 tablet by mouth Daily.  -     thiamine (VITAMIN B1) 100 MG tablet; Take 1 tablet by mouth Daily.  -     folic acid (Folate) 400 MCG tablet; Take 1 tablet by mouth Daily.    Alcohol dependence in remission (CMS/HCC)  Stable.  Patient stated she will abstain from alcohol    Other orders  -     Discontinue: cyanocobalamin (VITAMIN B-12) 1000 MCG tablet; Take 1 tablet by mouth Daily.  -     Discontinue: thiamine (VITAMIN B1) 100 MG tablet; Take 1 tablet by mouth Daily.        · Rx changes: Start vitamin B1, B12, folate  · Patient Education: Abstain from alcohol  · Compliance at present is estimated to be excellent.     FUTURE  #Ask about abdominal pain  #Check labs    Follow-up:     Return in about 4 weeks (around 7/8/2020) for Recheck.    Preventative:  Health Maintenance   Topic Date Due   • ANNUAL PHYSICAL  02/04/1989   • TDAP/TD VACCINES (2 - Tdap) 02/04/1997   • PNEUMOCOCCAL VACCINE (19-64 MEDIUM RISK) (1 of 1 - PPSV23) 02/04/2005   • INFLUENZA VACCINE  08/01/2020   • PAP SMEAR  10/10/2021   • HEPATITIS C SCREENING  Completed         Weight  -Class: Normal: 18.5-24.9kg/m2  -Patient's Body mass index is 20.65 kg/m². BMI is within normal parameters. No follow-up required..       Alcohol use:  reports that she drank alcohol.  Nicotine status  reports that she has been smoking cigarettes. She has a 20.00 pack-year smoking history. She has never used smokeless tobacco.    Goals    Abstain from alcohol and avoid further episodes of pancreatitis         RISK SCORE: 3      This document has been electronically signed by Naeem Raya MD on Renetta 10, 2020 11:57      EMR Dragon/Transcription disclaimer:   Much of this encounter note is an electronic transcription/translation of spoken language to printed text. The electronic translation of spoken language may permit erroneous, or at  times, nonsensical words or phrases to be inadvertently transcribed; Although I have reviewed the note for such errors, some may still exist.

## 2020-06-16 ENCOUNTER — READMISSION MANAGEMENT (OUTPATIENT)
Dept: CALL CENTER | Facility: HOSPITAL | Age: 35
End: 2020-06-16

## 2020-06-23 ENCOUNTER — READMISSION MANAGEMENT (OUTPATIENT)
Dept: CALL CENTER | Facility: HOSPITAL | Age: 35
End: 2020-06-23

## 2020-06-23 NOTE — OUTREACH NOTE
Medical Week 3 Survey      Responses   [facility] patient discharged from?  [place]   COVID-19 Test Status  Negative   Does the patient have one of the following disease processes/diagnoses(primary or secondary)?  Other   Week 3 attempt successful?  Yes   Call start time  1026   Call end time  1029   Meds reviewed with patient/caregiver?  Yes   Is the patient taking all medications as directed (includes completed medication regime)?  Yes   Has the patient kept scheduled appointments due by today?  Yes   What is the patient's perception of their health status since discharge?  Improving   Week 3 Call Completed?  Yes   Graduated  Yes   Did the patient feel the follow up calls were helpful during their recovery period?  Yes   Was the number of calls appropriate?  Yes   Graduated/Revoked comments  Back to baseline.  Denies needs or questions at this time.          Adele Peters, RN

## 2021-01-07 ENCOUNTER — APPOINTMENT (OUTPATIENT)
Dept: CT IMAGING | Facility: HOSPITAL | Age: 36
End: 2021-01-07

## 2021-01-07 ENCOUNTER — APPOINTMENT (OUTPATIENT)
Dept: GENERAL RADIOLOGY | Facility: HOSPITAL | Age: 36
End: 2021-01-07

## 2021-01-07 ENCOUNTER — HOSPITAL ENCOUNTER (INPATIENT)
Facility: HOSPITAL | Age: 36
LOS: 3 days | Discharge: HOME | End: 2021-01-10
Attending: STUDENT IN AN ORGANIZED HEALTH CARE EDUCATION/TRAINING PROGRAM | Admitting: FAMILY MEDICINE

## 2021-01-07 DIAGNOSIS — K22.89 THICKENING OF ESOPHAGUS: ICD-10-CM

## 2021-01-07 DIAGNOSIS — R11.2 NON-INTRACTABLE VOMITING WITH NAUSEA, UNSPECIFIED VOMITING TYPE: ICD-10-CM

## 2021-01-07 DIAGNOSIS — R10.13 EPIGASTRIC PAIN: Primary | ICD-10-CM

## 2021-01-07 DIAGNOSIS — E87.1 HYPONATREMIA: ICD-10-CM

## 2021-01-07 DIAGNOSIS — R74.8 ELEVATED LIPASE: ICD-10-CM

## 2021-01-07 PROBLEM — K85.90 ACUTE ON CHRONIC PANCREATITIS (HCC): Status: ACTIVE | Noted: 2021-01-07

## 2021-01-07 PROBLEM — E83.52 HYPERCALCEMIA: Status: ACTIVE | Noted: 2021-01-07

## 2021-01-07 PROBLEM — K86.1 ACUTE ON CHRONIC PANCREATITIS (HCC): Status: ACTIVE | Noted: 2021-01-07

## 2021-01-07 LAB
ALBUMIN SERPL-MCNC: 4.8 G/DL (ref 3.5–5.2)
ALBUMIN/GLOB SERPL: 1.2 G/DL
ALP SERPL-CCNC: 136 U/L (ref 39–117)
ALT SERPL W P-5'-P-CCNC: 84 U/L (ref 1–33)
AMPHET+METHAMPHET UR QL: NEGATIVE
AMPHETAMINES UR QL: NEGATIVE
ANION GAP SERPL CALCULATED.3IONS-SCNC: 21 MMOL/L (ref 5–15)
AST SERPL-CCNC: 69 U/L (ref 1–32)
BACTERIA UR QL AUTO: ABNORMAL /HPF
BARBITURATES UR QL SCN: NEGATIVE
BASOPHILS # BLD AUTO: 0.03 10*3/MM3 (ref 0–0.2)
BASOPHILS NFR BLD AUTO: 0.3 % (ref 0–1.5)
BENZODIAZ UR QL SCN: POSITIVE
BILIRUB SERPL-MCNC: 1.2 MG/DL (ref 0–1.2)
BILIRUB UR QL STRIP: ABNORMAL
BUN SERPL-MCNC: 22 MG/DL (ref 6–20)
BUN/CREAT SERPL: 22.2 (ref 7–25)
BUPRENORPHINE SERPL-MCNC: POSITIVE NG/ML
CALCIUM SPEC-SCNC: 11.4 MG/DL (ref 8.6–10.5)
CANNABINOIDS SERPL QL: POSITIVE
CHLORIDE SERPL-SCNC: 93 MMOL/L (ref 98–107)
CLARITY UR: CLEAR
CO2 SERPL-SCNC: 15 MMOL/L (ref 22–29)
COCAINE UR QL: NEGATIVE
COLOR UR: YELLOW
CREAT SERPL-MCNC: 0.99 MG/DL (ref 0.57–1)
DEPRECATED RDW RBC AUTO: 46.2 FL (ref 37–54)
EOSINOPHIL # BLD AUTO: 0 10*3/MM3 (ref 0–0.4)
EOSINOPHIL NFR BLD AUTO: 0 % (ref 0.3–6.2)
ERYTHROCYTE [DISTWIDTH] IN BLOOD BY AUTOMATED COUNT: 12.3 % (ref 12.3–15.4)
ETHANOL BLD-MCNC: <10 MG/DL (ref 0–10)
ETHANOL UR QL: <0.01 %
FLUAV RNA RESP QL NAA+PROBE: NOT DETECTED
FLUBV RNA RESP QL NAA+PROBE: NOT DETECTED
GFR SERPL CREATININE-BSD FRML MDRD: 64 ML/MIN/1.73
GLOBULIN UR ELPH-MCNC: 4 GM/DL
GLUCOSE SERPL-MCNC: 136 MG/DL (ref 65–99)
GLUCOSE UR STRIP-MCNC: NEGATIVE MG/DL
HCG INTACT+B SERPL-ACNC: <0.5 MIU/ML
HCT VFR BLD AUTO: 44.7 % (ref 34–46.6)
HGB BLD-MCNC: 16.2 G/DL (ref 12–15.9)
HGB UR QL STRIP.AUTO: ABNORMAL
HOLD SPECIMEN: NORMAL
HYALINE CASTS UR QL AUTO: ABNORMAL /LPF
IMM GRANULOCYTES # BLD AUTO: 0.03 10*3/MM3 (ref 0–0.05)
IMM GRANULOCYTES NFR BLD AUTO: 0.3 % (ref 0–0.5)
KETONES UR QL STRIP: ABNORMAL
LEUKOCYTE ESTERASE UR QL STRIP.AUTO: NEGATIVE
LIPASE SERPL-CCNC: 1446 U/L (ref 13–60)
LYMPHOCYTES # BLD AUTO: 1.08 10*3/MM3 (ref 0.7–3.1)
LYMPHOCYTES NFR BLD AUTO: 9.8 % (ref 19.6–45.3)
MAGNESIUM SERPL-MCNC: 2.1 MG/DL (ref 1.6–2.6)
MCH RBC QN AUTO: 36.6 PG (ref 26.6–33)
MCHC RBC AUTO-ENTMCNC: 36.2 G/DL (ref 31.5–35.7)
MCV RBC AUTO: 100.9 FL (ref 79–97)
METHADONE UR QL SCN: NEGATIVE
MONOCYTES # BLD AUTO: 0.9 10*3/MM3 (ref 0.1–0.9)
MONOCYTES NFR BLD AUTO: 8.1 % (ref 5–12)
NEUTROPHILS NFR BLD AUTO: 81.5 % (ref 42.7–76)
NEUTROPHILS NFR BLD AUTO: 9.02 10*3/MM3 (ref 1.7–7)
NITRITE UR QL STRIP: NEGATIVE
NRBC BLD AUTO-RTO: 0 /100 WBC (ref 0–0.2)
OPIATES UR QL: POSITIVE
OXYCODONE UR QL SCN: NEGATIVE
PCP UR QL SCN: NEGATIVE
PH UR STRIP.AUTO: 6 [PH] (ref 5–9)
PLATELET # BLD AUTO: 136 10*3/MM3 (ref 140–450)
PMV BLD AUTO: 10.2 FL (ref 6–12)
POTASSIUM SERPL-SCNC: 3.6 MMOL/L (ref 3.5–5.2)
PROPOXYPH UR QL: NEGATIVE
PROT SERPL-MCNC: 8.8 G/DL (ref 6–8.5)
PROT UR QL STRIP: ABNORMAL
PTH-INTACT SERPL-MCNC: 20.9 PG/ML (ref 15–65)
QT INTERVAL: 348 MS
QTC INTERVAL: 477 MS
RBC # BLD AUTO: 4.43 10*6/MM3 (ref 3.77–5.28)
RBC # UR: ABNORMAL /HPF
REF LAB TEST METHOD: ABNORMAL
SARS-COV-2 RNA RESP QL NAA+PROBE: NOT DETECTED
SODIUM SERPL-SCNC: 129 MMOL/L (ref 136–145)
SP GR UR STRIP: 1.05 (ref 1–1.03)
SQUAMOUS #/AREA URNS HPF: ABNORMAL /HPF
TRICYCLICS UR QL SCN: NEGATIVE
TRIGL SERPL-MCNC: 142 MG/DL (ref 0–150)
TROPONIN T SERPL-MCNC: <0.01 NG/ML (ref 0–0.03)
TSH SERPL DL<=0.05 MIU/L-ACNC: 2.28 UIU/ML (ref 0.27–4.2)
UROBILINOGEN UR QL STRIP: ABNORMAL
WBC # BLD AUTO: 11.06 10*3/MM3 (ref 3.4–10.8)
WBC UR QL AUTO: ABNORMAL /HPF
WHOLE BLOOD HOLD SPECIMEN: NORMAL

## 2021-01-07 PROCEDURE — 82077 ASSAY SPEC XCP UR&BREATH IA: CPT | Performed by: STUDENT IN AN ORGANIZED HEALTH CARE EDUCATION/TRAINING PROGRAM

## 2021-01-07 PROCEDURE — 93010 ELECTROCARDIOGRAM REPORT: CPT | Performed by: INTERNAL MEDICINE

## 2021-01-07 PROCEDURE — 83690 ASSAY OF LIPASE: CPT | Performed by: STUDENT IN AN ORGANIZED HEALTH CARE EDUCATION/TRAINING PROGRAM

## 2021-01-07 PROCEDURE — 85025 COMPLETE CBC W/AUTO DIFF WBC: CPT | Performed by: STUDENT IN AN ORGANIZED HEALTH CARE EDUCATION/TRAINING PROGRAM

## 2021-01-07 PROCEDURE — 74177 CT ABD & PELVIS W/CONTRAST: CPT

## 2021-01-07 PROCEDURE — 80053 COMPREHEN METABOLIC PANEL: CPT | Performed by: STUDENT IN AN ORGANIZED HEALTH CARE EDUCATION/TRAINING PROGRAM

## 2021-01-07 PROCEDURE — 93005 ELECTROCARDIOGRAM TRACING: CPT | Performed by: STUDENT IN AN ORGANIZED HEALTH CARE EDUCATION/TRAINING PROGRAM

## 2021-01-07 PROCEDURE — 84702 CHORIONIC GONADOTROPIN TEST: CPT | Performed by: STUDENT IN AN ORGANIZED HEALTH CARE EDUCATION/TRAINING PROGRAM

## 2021-01-07 PROCEDURE — 83735 ASSAY OF MAGNESIUM: CPT | Performed by: STUDENT IN AN ORGANIZED HEALTH CARE EDUCATION/TRAINING PROGRAM

## 2021-01-07 PROCEDURE — 36415 COLL VENOUS BLD VENIPUNCTURE: CPT | Performed by: STUDENT IN AN ORGANIZED HEALTH CARE EDUCATION/TRAINING PROGRAM

## 2021-01-07 PROCEDURE — 81001 URINALYSIS AUTO W/SCOPE: CPT | Performed by: STUDENT IN AN ORGANIZED HEALTH CARE EDUCATION/TRAINING PROGRAM

## 2021-01-07 PROCEDURE — 99285 EMERGENCY DEPT VISIT HI MDM: CPT

## 2021-01-07 PROCEDURE — 25010000002 ONDANSETRON PER 1 MG: Performed by: STUDENT IN AN ORGANIZED HEALTH CARE EDUCATION/TRAINING PROGRAM

## 2021-01-07 PROCEDURE — 99254 IP/OBS CNSLTJ NEW/EST MOD 60: CPT | Performed by: INTERNAL MEDICINE

## 2021-01-07 PROCEDURE — 82306 VITAMIN D 25 HYDROXY: CPT | Performed by: STUDENT IN AN ORGANIZED HEALTH CARE EDUCATION/TRAINING PROGRAM

## 2021-01-07 PROCEDURE — 84443 ASSAY THYROID STIM HORMONE: CPT | Performed by: STUDENT IN AN ORGANIZED HEALTH CARE EDUCATION/TRAINING PROGRAM

## 2021-01-07 PROCEDURE — 87636 SARSCOV2 & INF A&B AMP PRB: CPT | Performed by: STUDENT IN AN ORGANIZED HEALTH CARE EDUCATION/TRAINING PROGRAM

## 2021-01-07 PROCEDURE — 99222 1ST HOSP IP/OBS MODERATE 55: CPT | Performed by: STUDENT IN AN ORGANIZED HEALTH CARE EDUCATION/TRAINING PROGRAM

## 2021-01-07 PROCEDURE — 83970 ASSAY OF PARATHORMONE: CPT | Performed by: STUDENT IN AN ORGANIZED HEALTH CARE EDUCATION/TRAINING PROGRAM

## 2021-01-07 PROCEDURE — 25010000002 IOPAMIDOL 61 % SOLUTION: Performed by: STUDENT IN AN ORGANIZED HEALTH CARE EDUCATION/TRAINING PROGRAM

## 2021-01-07 PROCEDURE — 25010000002 MORPHINE PER 10 MG: Performed by: STUDENT IN AN ORGANIZED HEALTH CARE EDUCATION/TRAINING PROGRAM

## 2021-01-07 PROCEDURE — 84478 ASSAY OF TRIGLYCERIDES: CPT | Performed by: STUDENT IN AN ORGANIZED HEALTH CARE EDUCATION/TRAINING PROGRAM

## 2021-01-07 PROCEDURE — 82652 VIT D 1 25-DIHYDROXY: CPT | Performed by: STUDENT IN AN ORGANIZED HEALTH CARE EDUCATION/TRAINING PROGRAM

## 2021-01-07 PROCEDURE — 71046 X-RAY EXAM CHEST 2 VIEWS: CPT

## 2021-01-07 PROCEDURE — 80306 DRUG TEST PRSMV INSTRMNT: CPT | Performed by: STUDENT IN AN ORGANIZED HEALTH CARE EDUCATION/TRAINING PROGRAM

## 2021-01-07 PROCEDURE — 84484 ASSAY OF TROPONIN QUANT: CPT | Performed by: STUDENT IN AN ORGANIZED HEALTH CARE EDUCATION/TRAINING PROGRAM

## 2021-01-07 RX ORDER — SODIUM CHLORIDE 9 MG/ML
125 INJECTION, SOLUTION INTRAVENOUS CONTINUOUS
Status: DISCONTINUED | OUTPATIENT
Start: 2021-01-07 | End: 2021-01-10 | Stop reason: HOSPADM

## 2021-01-07 RX ORDER — LANOLIN ALCOHOL/MO/W.PET/CERES
1000 CREAM (GRAM) TOPICAL DAILY
Status: DISCONTINUED | OUTPATIENT
Start: 2021-01-07 | End: 2021-01-10 | Stop reason: HOSPADM

## 2021-01-07 RX ORDER — NALOXONE HCL 0.4 MG/ML
0.4 VIAL (ML) INJECTION
Status: DISCONTINUED | OUTPATIENT
Start: 2021-01-07 | End: 2021-01-10 | Stop reason: HOSPADM

## 2021-01-07 RX ORDER — SODIUM CHLORIDE 0.9 % (FLUSH) 0.9 %
10 SYRINGE (ML) INJECTION EVERY 12 HOURS SCHEDULED
Status: DISCONTINUED | OUTPATIENT
Start: 2021-01-07 | End: 2021-01-10 | Stop reason: HOSPADM

## 2021-01-07 RX ORDER — LORAZEPAM 2 MG/1
2 TABLET ORAL
Status: DISCONTINUED | OUTPATIENT
Start: 2021-01-07 | End: 2021-01-10 | Stop reason: HOSPADM

## 2021-01-07 RX ORDER — LANOLIN ALCOHOL/MO/W.PET/CERES
5 CREAM (GRAM) TOPICAL NIGHTLY PRN
Status: DISCONTINUED | OUTPATIENT
Start: 2021-01-07 | End: 2021-01-10 | Stop reason: HOSPADM

## 2021-01-07 RX ORDER — HYDROCODONE BITARTRATE AND ACETAMINOPHEN 5; 325 MG/1; MG/1
1 TABLET ORAL EVERY 4 HOURS PRN
Status: DISCONTINUED | OUTPATIENT
Start: 2021-01-07 | End: 2021-01-10 | Stop reason: HOSPADM

## 2021-01-07 RX ORDER — FLUOXETINE HYDROCHLORIDE 20 MG/1
40 CAPSULE ORAL DAILY
COMMUNITY
End: 2021-03-12

## 2021-01-07 RX ORDER — HYDRALAZINE HYDROCHLORIDE 20 MG/ML
10 INJECTION INTRAMUSCULAR; INTRAVENOUS EVERY 6 HOURS PRN
Status: DISCONTINUED | OUTPATIENT
Start: 2021-01-07 | End: 2021-01-10 | Stop reason: HOSPADM

## 2021-01-07 RX ORDER — LORAZEPAM 2 MG/ML
2 INJECTION INTRAMUSCULAR
Status: DISCONTINUED | OUTPATIENT
Start: 2021-01-07 | End: 2021-01-10 | Stop reason: HOSPADM

## 2021-01-07 RX ORDER — DIAZEPAM 5 MG/1
5 TABLET ORAL EVERY 8 HOURS PRN
Status: DISCONTINUED | OUTPATIENT
Start: 2021-01-07 | End: 2021-01-10 | Stop reason: HOSPADM

## 2021-01-07 RX ORDER — CALCIUM CARBONATE 200(500)MG
2 TABLET,CHEWABLE ORAL 2 TIMES DAILY PRN
Status: DISCONTINUED | OUTPATIENT
Start: 2021-01-07 | End: 2021-01-10 | Stop reason: HOSPADM

## 2021-01-07 RX ORDER — ONDANSETRON 2 MG/ML
4 INJECTION INTRAMUSCULAR; INTRAVENOUS EVERY 6 HOURS PRN
Status: DISCONTINUED | OUTPATIENT
Start: 2021-01-07 | End: 2021-01-10 | Stop reason: HOSPADM

## 2021-01-07 RX ORDER — FLUOXETINE HYDROCHLORIDE 20 MG/1
20 CAPSULE ORAL DAILY
Status: DISCONTINUED | OUTPATIENT
Start: 2021-01-07 | End: 2021-01-10 | Stop reason: HOSPADM

## 2021-01-07 RX ORDER — ONDANSETRON 2 MG/ML
4 INJECTION INTRAMUSCULAR; INTRAVENOUS ONCE
Status: COMPLETED | OUTPATIENT
Start: 2021-01-07 | End: 2021-01-07

## 2021-01-07 RX ORDER — NICOTINE 21 MG/24HR
1 PATCH, TRANSDERMAL 24 HOURS TRANSDERMAL EVERY 24 HOURS
Status: DISCONTINUED | OUTPATIENT
Start: 2021-01-07 | End: 2021-01-10 | Stop reason: HOSPADM

## 2021-01-07 RX ORDER — FOLIC ACID 1 MG/1
500 TABLET ORAL DAILY
Status: DISCONTINUED | OUTPATIENT
Start: 2021-01-07 | End: 2021-01-10 | Stop reason: HOSPADM

## 2021-01-07 RX ORDER — LORAZEPAM 0.5 MG/1
1 TABLET ORAL
Status: DISCONTINUED | OUTPATIENT
Start: 2021-01-07 | End: 2021-01-10 | Stop reason: HOSPADM

## 2021-01-07 RX ORDER — SODIUM CHLORIDE 0.9 % (FLUSH) 0.9 %
10 SYRINGE (ML) INJECTION AS NEEDED
Status: DISCONTINUED | OUTPATIENT
Start: 2021-01-07 | End: 2021-01-10 | Stop reason: HOSPADM

## 2021-01-07 RX ORDER — GABAPENTIN 300 MG/1
600 CAPSULE ORAL NIGHTLY
Status: DISCONTINUED | OUTPATIENT
Start: 2021-01-07 | End: 2021-01-10 | Stop reason: HOSPADM

## 2021-01-07 RX ORDER — MORPHINE SULFATE 2 MG/ML
2 INJECTION, SOLUTION INTRAMUSCULAR; INTRAVENOUS EVERY 4 HOURS PRN
Status: DISCONTINUED | OUTPATIENT
Start: 2021-01-07 | End: 2021-01-10 | Stop reason: HOSPADM

## 2021-01-07 RX ORDER — LORAZEPAM 2 MG/ML
1 INJECTION INTRAMUSCULAR
Status: DISCONTINUED | OUTPATIENT
Start: 2021-01-07 | End: 2021-01-10 | Stop reason: HOSPADM

## 2021-01-07 RX ADMIN — Medication 500 MCG: at 14:11

## 2021-01-07 RX ADMIN — CYANOCOBALAMIN TAB 1000 MCG 1000 MCG: 1000 TAB at 14:11

## 2021-01-07 RX ADMIN — MORPHINE SULFATE 2 MG: 2 INJECTION, SOLUTION INTRAMUSCULAR; INTRAVENOUS at 18:44

## 2021-01-07 RX ADMIN — SODIUM CHLORIDE, POTASSIUM CHLORIDE, SODIUM LACTATE AND CALCIUM CHLORIDE 1000 ML: 600; 310; 30; 20 INJECTION, SOLUTION INTRAVENOUS at 08:04

## 2021-01-07 RX ADMIN — FLUOXETINE 20 MG: 20 CAPSULE ORAL at 14:11

## 2021-01-07 RX ADMIN — GABAPENTIN 600 MG: 300 CAPSULE ORAL at 20:07

## 2021-01-07 RX ADMIN — DIAZEPAM 5 MG: 5 TABLET ORAL at 20:07

## 2021-01-07 RX ADMIN — SODIUM CHLORIDE 125 ML/HR: 900 INJECTION, SOLUTION INTRAVENOUS at 14:04

## 2021-01-07 RX ADMIN — MORPHINE SULFATE 2 MG: 2 INJECTION, SOLUTION INTRAMUSCULAR; INTRAVENOUS at 22:36

## 2021-01-07 RX ADMIN — ONDANSETRON 4 MG: 2 INJECTION, SOLUTION INTRAMUSCULAR; INTRAVENOUS at 18:03

## 2021-01-07 RX ADMIN — MORPHINE SULFATE 4 MG: 4 INJECTION INTRAVENOUS at 08:04

## 2021-01-07 RX ADMIN — CALCIUM CARBONATE (ANTACID) CHEW TAB 500 MG 2 TABLET: 500 CHEW TAB at 22:36

## 2021-01-07 RX ADMIN — NICOTINE 1 PATCH: 21 PATCH, EXTENDED RELEASE TRANSDERMAL at 14:10

## 2021-01-07 RX ADMIN — HYDROCODONE BITARTRATE AND ACETAMINOPHEN 1 TABLET: 5; 325 TABLET ORAL at 20:07

## 2021-01-07 RX ADMIN — ONDANSETRON 4 MG: 2 INJECTION INTRAMUSCULAR; INTRAVENOUS at 08:04

## 2021-01-07 RX ADMIN — THIAMINE HCL TAB 100 MG 100 MG: 100 TAB at 14:11

## 2021-01-07 RX ADMIN — MORPHINE SULFATE 4 MG: 4 INJECTION INTRAVENOUS at 11:58

## 2021-01-07 RX ADMIN — MORPHINE SULFATE 2 MG: 2 INJECTION, SOLUTION INTRAMUSCULAR; INTRAVENOUS at 15:47

## 2021-01-07 RX ADMIN — SODIUM CHLORIDE 125 ML/HR: 900 INJECTION, SOLUTION INTRAVENOUS at 20:55

## 2021-01-07 RX ADMIN — MELATONIN 5.25 MG: at 21:26

## 2021-01-07 RX ADMIN — ONDANSETRON 4 MG: 2 INJECTION, SOLUTION INTRAMUSCULAR; INTRAVENOUS at 23:51

## 2021-01-07 RX ADMIN — IOPAMIDOL 75 ML: 612 INJECTION, SOLUTION INTRAVENOUS at 09:52

## 2021-01-07 RX ADMIN — HYDROCODONE BITARTRATE AND ACETAMINOPHEN 1 TABLET: 5; 325 TABLET ORAL at 23:51

## 2021-01-07 NOTE — CONSULTS
SUBJECTIVE:   1/7/2021    Name: Nata Bain  DOD: 1985    REASON FOR CONSULT: nausea, vomiting and abnormal CT     Chief Complaint:     Chief Complaint   Patient presents with   • Chest Pain   • Nausea   • Vomiting       Subjective     Patient is 35 y.o. female with PMH of seizure disorder, UTIs, alcohol abuse, acute pancreatitis, substance abuse presents with 4 day H/O nausea, vomiting and upper abdominal pain. Also has chest pain. deneid diarrhea,c onstipation, rectal bleeidng or weight loss. Last alcohol usage was 4 days ago. Noted to have distal esophageal wall  Thickening, dilated CBD 9mm, and normal pancreas  upon CT abd and pelvis. .tox screen was positive for opiates, benzos, Buprenorphone and THC. Taking phenergan prn and denied NSAID usage. Has mild LFT elevation AST 69 ALT 84  Bili was normal 1.2. Lipase 1446, WBC 11.06.       ROS/HISTORY/ CURRENT MEDICATIONS/OBJECTIVE/VS/PE:   Review of Systems:   Review of Systems   Constitutional: Negative for chills, fatigue, fever and unexpected weight change.   HENT: Negative for congestion, ear discharge, hearing loss, nosebleeds and sore throat.    Eyes: Negative for pain, discharge and redness.   Respiratory: Negative for cough, chest tightness, shortness of breath and wheezing.    Cardiovascular: Positive for chest pain. Negative for palpitations.   Gastrointestinal: Positive for abdominal pain, nausea and vomiting. Negative for abdominal distention, blood in stool, constipation and diarrhea.   Endocrine: Negative for cold intolerance, polydipsia, polyphagia and polyuria.   Genitourinary: Negative for dysuria, flank pain, frequency, hematuria and urgency.   Musculoskeletal: Negative for arthralgias, back pain, joint swelling and myalgias.   Skin: Negative for color change, pallor and rash.   Neurological: Negative for tremors, seizures, syncope, weakness and headaches.   Hematological: Negative for adenopathy. Does not bruise/bleed easily.    Psychiatric/Behavioral: Negative for behavioral problems, confusion, dysphoric mood, hallucinations and suicidal ideas. The patient is not nervous/anxious.        History:     Past Medical History:   Diagnosis Date   • Abnormal Pap smear of cervix    • Alcoholism (CMS/[facility])    • Anxiety    • Cervical dysplasia    • Depression    • Fibrocystic breast    • GERD (gastroesophageal reflux disease)    • Seizures (CMS/[facility]) 2017   • Substance abuse (CMS/[facility])    • Substance abuse (CMS/[facility])      Past Surgical History:   Procedure Laterality Date   • RHINOPLASTY       Family History   Problem Relation Age of Onset   • Breast cancer Mother    • Cancer Mother    • COPD Mother    • Breast cancer Maternal Grandmother    • COPD Maternal Grandmother    • Heart disease Maternal Grandmother    • Breast cancer Paternal Grandmother    • Breast cancer Maternal Aunt    • Hypertension Father    • Heart disease Father      Social History     Tobacco Use   • Smoking status: Current Every Day Smoker     Packs/day: 1.00     Years: 20.00     Pack years: 20.00     Types: Cigarettes   • Smokeless tobacco: Never Used   Substance Use Topics   • Alcohol use: Yes     Frequency: Never     Comment: Pt reports drinking a 5th of vodka daily, last drink 1/3/21   • Drug use: No     Types: Fentanyl, Oxycodone     (Not in a hospital admission)    Allergies:  Patient has no known allergies.    I have reviewed the patient's medical history, surgical history and family history in the available medical record system.     Current Medications:     Current Facility-Administered Medications   Medication Dose Route Frequency Provider Last Rate Last Admin   • calcium carbonate (TUMS) chewable tablet 500 mg (200 mg elemental)  2 tablet Oral BID PRN Erica Jacob MD       • diazePAM (VALIUM) tablet 5 mg  5 mg Oral Q8H PRN Erica Jacob MD       • FLUoxetine (PROzac) capsule 20 mg  20 mg Oral Daily Erica Jacob MD   20 mg at 01/07/21 1411   • folic acid (FOLVITE) half  tablet 500 mcg  500 mcg Oral Daily Erica Jacob MD   500 mcg at 01/07/21 1411   • gabapentin (NEURONTIN) capsule 600 mg  600 mg Oral Nightly rEica Jacob MD       • hydrALAZINE (APRESOLINE) injection 10 mg  10 mg Intravenous Q6H PRN Nadir Villarreal MD       • HYDROcodone-acetaminophen (NORCO) 5-325 MG per tablet 1 tablet  1 tablet Oral Q4H PRN Erica Jacob MD       • LORazepam (ATIVAN) tablet 1 mg  1 mg Oral Q2H PRN Erica Jacob MD        Or   • LORazepam (ATIVAN) injection 1 mg  1 mg Intravenous Q2H PRN Erica Jacob MD        Or   • LORazepam (ATIVAN) tablet 2 mg  2 mg Oral Q1H PRN Erica Jacob MD        Or   • LORazepam (ATIVAN) injection 2 mg  2 mg Intravenous Q1H PRN Erica Jacob MD        Or   • LORazepam (ATIVAN) injection 2 mg  2 mg Intravenous Q15 Min PRN Erica Jacob MD        Or   • LORazepam (ATIVAN) injection 2 mg  2 mg Intramuscular Q15 Min PRN Erica Jacob MD       • melatonin tablet 5.25 mg  5.25 mg Oral Nightly PRN Erica Jacob MD       • morphine injection 2 mg  2 mg Intravenous Q4H PRN Erica Jacob MD   2 mg at 01/07/21 1547    And   • naloxone (NARCAN) injection 0.4 mg  0.4 mg Intravenous Q5 Min PRN Erica Jacob MD       • nicotine (NICODERM CQ) 21 MG/24HR patch 1 patch  1 patch Transdermal Q24H Erica Jacob MD   1 patch at 01/07/21 1410   • ondansetron (ZOFRAN) injection 4 mg  4 mg Intravenous Q6H PRN Erica Jacob MD       • sodium chloride 0.9 % flush 10 mL  10 mL Intravenous Q12H Erica Jacob MD       • sodium chloride 0.9 % flush 10 mL  10 mL Intravenous PRN Erica Jacob MD       • sodium chloride 0.9 % infusion  125 mL/hr Intravenous Continuous Erica Jacob  mL/hr at 01/07/21 1404 125 mL/hr at 01/07/21 1404   • thiamine (VITAMIN B-1) tablet 100 mg  100 mg Oral Daily Erica Jacob MD   100 mg at 01/07/21 1411   • vitamin B-12 (CYANOCOBALAMIN) tablet 1,000 mcg  1,000 mcg Oral Daily Erica Jacob MD   1,000 mcg at 01/07/21 1411     Current Outpatient Medications    Medication Sig Dispense Refill   • buprenorphine-naloxone (SUBOXONE) 8-2 MG per SL tablet Place 3 tablets under the tongue 3 (Three) Times a Day. Patient takes one 8-2 tablet three times a day.     • diazePAM (VALIUM) 5 MG tablet Take  by mouth Every 8 (Eight) Hours As Needed.     • FLUoxetine (PROzac) 20 MG capsule Take 20 mg by mouth Daily.     • gabapentin (NEURONTIN) 600 MG tablet 300 mg 3 (Three) Times a Day.     • promethazine (PHENERGAN) 25 MG suppository Insert 1 suppository into the rectum Every 6 (Six) Hours As Needed for Nausea or Vomiting. 12 suppository 0   • cyanocobalamin (VITAMIN B-12) 1000 MCG tablet Take 1 tablet by mouth Daily. 30 tablet 5   • folic acid (Folate) 400 MCG tablet Take 1 tablet by mouth Daily. 30 tablet 5   • guaiFENesin (MUCINEX) 600 MG 12 hr tablet Take 1 tablet by mouth 2 (Two) Times a Day. 30 tablet 0   • ondansetron (ZOFRAN) 4 MG tablet Take 1 tablet by mouth Every 8 (Eight) Hours As Needed for Nausea or Vomiting. 21 tablet 0   • thiamine (VITAMIN B1) 100 MG tablet Take 1 tablet by mouth Daily. 30 tablet 5       Objective     Physical Exam:   Temp:  [97.9 °F (36.6 °C)] 97.9 °F (36.6 °C)  Heart Rate:  [] 92  Resp:  [18-20] 18  BP: (130-191)/() 139/99    Physical Exam:  General Appearance:    Alert, cooperative, in no acute distress   Head:    Normocephalic, without obvious abnormality, atraumatic   Eyes:            Lids and lashes normal, conjunctivae and sclerae normal, no   icterus, no pallor, corneas clear, PERRLA   Ears:    Ears appear intact with no abnormalities noted   Throat:   No oral lesions, no thrush, oral mucosa moist   Neck:   No adenopathy, supple, trachea midline, no thyromegaly, no     carotid bruit, no JVD   Back:     No kyphosis present, no scoliosis present, no skin lesions,       erythema or scars, no tenderness to percussion or                   palpation,   range of motion normal   Lungs:     Clear to auscultation,respirations regular, even  and                   unlabored    Heart:    Regular rhythm and normal rate, normal S1 and S2, no            murmur, no gallop, no rub, no click   Breast Exam:    Deferred   Abdomen:     Normal bowel sounds, no masses, no organomegaly, soft        nontender, nondistended, no guarding, no rebound                 tenderness   Genitalia:    Deferred   Extremities:   Moves all extremities well, no edema, no cyanosis, no              redness   Pulses:   Pulses palpable and equal bilaterally   Skin:   No bleeding, bruising or rash   Lymph nodes:   No palpable adenopathy   Neurologic:   Cranial nerves 2 - 12 grossly intact, sensation intact, DTR        present and equal bilaterally      Results Review:     Lab Results   Component Value Date    WBC 11.06 (H) 01/07/2021    WBC 7.48 06/10/2020    WBC 7.95 06/05/2020    HGB 16.2 (H) 01/07/2021    HGB 11.8 (L) 06/10/2020    HGB 10.9 (L) 06/05/2020    HCT 44.7 01/07/2021    HCT 34.6 06/10/2020    HCT 31.3 (L) 06/05/2020     (L) 01/07/2021     06/10/2020     06/05/2020     Results from last 7 days   Lab Units 01/07/21  0756   ALK PHOS U/L 136*   ALT (SGPT) U/L 84*   AST (SGOT) U/L 69*     Results from last 7 days   Lab Units 01/07/21  0756   BILIRUBIN mg/dL 1.2   ALK PHOS U/L 136*     Lipase   Date Value Ref Range Status   01/07/2021 1,446 (H) 13 - 60 U/L Final     Lab Results   Component Value Date    INR 0.98 03/16/2019    INR 0.84 10/07/2017    INR 1.08 09/29/2017         Radiology Review:  Imaging Results (Last 72 Hours)     Procedure Component Value Units Date/Time    CT Abdomen Pelvis With Contrast [[id number]] Collected: 01/07/21 0947     Updated: 01/07/21 1034    Narrative:      CT abdomen, pelvis with contrast.         CLINICAL INDICATION: Epigastric pain. History of pancreatitis.    COMPARISON: CT abdomen September 9, 2019.    TECHNIQUE: 75 mL Isovue-300  nonionic IV contrast. Helical  scanning with axial and coronal reformations.  Soft tissue,  lung,  and bone windows reviewed.    This exam was performed according to our departmental  dose-optimization program, which includes automated exposure  control, adjustment of the mA and/or kV according to patient size  and/or use of iterative reconstruction technique.    ABDOMEN CT FINDINGS: The visualized lung bases show right lower  lobe infiltrative changes either atelectasis or pneumonitis.  Interval development of thickening of the distal esophagus just  above the gastroesophageal junction. Consider upper GI,  esophagram or endoscopy for further evaluation.    Liver,  spleen, pancreas, adrenal glands and kidneys are normal  in appearance. Phrygian cap in the gallbladder fundus,  gallbladder otherwise unremarkable. Common bile duct is somewhat  dilated 0.9 cm. This however is similar to  prior CT examination  September 9, 2019. Bowel grossly unremarkable.    No evidence of pathologically enlarged nodes, free air, or free  fluid. Degenerative changes of the spine.  The bones are grossly unremarkable.    PELVIS CT FINDINGS: Simple 2.55 cm right ovarian cyst. No  follow-up is necessary. No evidence of pathologically enlarged  nodes, free air, or free fluid.     The bones are grossly unremarkable.      Impression:      Thickening of the distal esophagus just above the  gastroesophageal junction. This demonstrates a change since prior  CT examination. Consider upper GI examination, esophagram or  endoscopy for further evaluation.      Normal pancreas. No findings suggesting acute pancreatitis    Phrygian cap gallbladder fundus. This is usually a normal  variant.    Dilated common bile duct 0.9 cm. This however is unchanged since  prior CT exam. Normal pancreas. No findings suggesting acute  pancreatitis.    2.55 cm right ovarian cyst. No follow-up is necessary. CT  abdomen, pelvis with contrast is otherwise unremarkable.    Electronically signed by:  Jorge Mullen MD  1/7/2021 10:33 AM [facility]  Workstation: [id number]     XR Chest 2 View [[id number]] Collected: 01/07/21 0834     Updated: 01/07/21 0859    Narrative:      PROCEDURE: Two-view chest x-ray    COMPARISON: None.    HISTORY: palp    FINDINGS: Frontal and lateral views of the chest are obtained.     Devices: None    Lungs/Pleura: The lungs are clear    Cardiomediastinal structures: Normal         Impression:      CONCLUSION:    No acute cardiopulmonary disease    Electronically signed by:  Ranjit Marie MD  1/7/2021 8:58 AM CST  Workstation: [id number]          I reviewed the patient's new clinical results.    I reviewed the patient's new imaging results and agree with the interpretation.     ASSESSMENT/PLAN:   ASSESSMENT: 1. uppe rabdominal pain with nausea and vomiting likely due to resolving acute pancreatitis  2. Elevated LFTs likely alcohol induced, need to T/O choledocholithiasis given the presence of CBD dilatation upon CT  3. Esophageal wall thickening R/O esophagitis  4. Alcohol, opiate and THC abuse  5. Chest pain likely due to esophagitis    PLAN: 1. Cont bowel rest, antibiotics, pain management, anti-emetics and PPI  2. Follow Lipase and WBC closely  3. EGD in AM for further evaluation of esophageal wall thickening  4. CIWA protocol  5. Obtain MRCP  6. Follow LFTs closely  The risks, benefits, and alternatives of this procedure have been discussed with the patient or the responsible party. The patient understands and agrees to proceed.         Mirian Mills MD  01/07/21  17:00 CST

## 2021-01-07 NOTE — H&P
HISTORY AND PHYSICAL  NAME: Nata Bain  : 1985  MRN: [id number]    DATE OF ADMISSION:  2021     DATE & TIME SEEN: 21 at 1130 AM     PCP: Provider, No Known    CODE STATUS: Full code    CHIEF COMPLAINT:  Bilateral upper quadrant abdominal pain with 4 days of vomiting.     HPI:  Nata Bain is a 35 y.o. female with a CMH of chronic pancreatitis, alcohol dependence, seizure disorder (last seizure was 3 yrs ago), recurrent UTIs who presents today complaining of 4 days history of worsening bilious vomiting with constant sharp/ throbbing bilateral upper quadrant abdominal pain that started last night. Patient also complains of some shortness of breath, lightheadedness associated with her pain. Patient denies any bloody vomitus, sick contacts, fevers/chills, urinary urgency/frequency/dysuria. She had diarrhea initially for the first two days which has now improved. Patient reports she has had pancreatitis about 2-3 times previously, most recent was last year. She is alcoholic and reports her last drink was 4 days ago. She has been using Phenergan suppository at home which seem to have helped her nausea some.  Patient reports she has not been able to take any of her oral medications including subxone over the last 4 days due to nausea and vomiting.        CONCURRENT MEDICAL HISTORY:  Past Medical History:   Diagnosis Date   • Abnormal Pap smear of cervix    • Alcoholism (CMS/[facility])    • Anxiety    • Cervical dysplasia    • Depression    • Fibrocystic breast    • GERD (gastroesophageal reflux disease)    • Seizures (CMS/[facility]) 2017   • Substance abuse (CMS/[facility])    • Substance abuse (CMS/[facility])        PAST SURGICAL HISTORY:  Past Surgical History:   Procedure Laterality Date   • RHINOPLASTY         FAMILY HISTORY:  Family History   Problem Relation Age of Onset   • Breast cancer Mother    • Cancer Mother    • COPD Mother    • Breast cancer Maternal Grandmother    • COPD Maternal Grandmother      • Heart disease Maternal Grandmother    • Breast cancer Paternal Grandmother    • Breast cancer Maternal Aunt    • Hypertension Father    • Heart disease Father         SOCIAL HISTORY:  Social History     Socioeconomic History   • Marital status:      Spouse name: Not on file   • Number of children: Not on file   • Years of education: Not on file   • Highest education level: Not on file   Tobacco Use   • Smoking status: Current Every Day Smoker     Packs/day: 1.00     Years: 20.00     Pack years: 20.00     Types: Cigarettes   • Smokeless tobacco: Never Used   Substance and Sexual Activity   • Alcohol use: Yes     Frequency: Never     Comment: Pt reports drinking a 5th of vodka daily, last drink 1/3/21   • Drug use: No     Types: Fentanyl, Oxycodone   • Sexual activity: Yes     Partners: Male     Birth control/protection: None       HOME MEDICATIONS:  Prior to Admission medications    Medication Sig Start Date End Date Taking? Authorizing Provider   buprenorphine-naloxone (SUBOXONE) 8-2 MG per SL tablet Place 3 tablets under the tongue 3 (Three) Times a Day. Patient takes one 8-2 tablet three times a day. 2/20/20  Yes Caty Yepez MD   diazePAM (VALIUM) 5 MG tablet Take  by mouth Every 8 (Eight) Hours As Needed. 1/16/20  Yes Caty Yepez MD   FLUoxetine (PROzac) 20 MG capsule Take 20 mg by mouth Daily.   Yes ProviderCaty MD   gabapentin (NEURONTIN) 600 MG tablet 300 mg 3 (Three) Times a Day. 3/26/20  Yes Caty Yepez MD   promethazine (PHENERGAN) 25 MG suppository Insert 1 suppository into the rectum Every 6 (Six) Hours As Needed for Nausea or Vomiting. 3/8/20  Yes Jeannie Painter APRN   cyanocobalamin (VITAMIN B-12) 1000 MCG tablet Take 1 tablet by mouth Daily. 6/10/20   Eladio Vizcarra MD   folic acid (Folate) 400 MCG tablet Take 1 tablet by mouth Daily. 6/10/20   Eladio Vizcarra MD   guaiFENesin (MUCINEX) 600 MG 12 hr tablet Take 1 tablet by mouth 2  (Two) Times a Day. 4/9/20   Rayna De La Torre APRN   ondansetron (ZOFRAN) 4 MG tablet Take 1 tablet by mouth Every 8 (Eight) Hours As Needed for Nausea or Vomiting. 1/14/20   Mckenzie Young MD   thiamine (VITAMIN B1) 100 MG tablet Take 1 tablet by mouth Daily. 6/10/20   Eladio Vizcarra MD   buPROPion SR (WELLBUTRIN SR) 150 MG 12 hr tablet Take 300 mg by mouth Every Night.  1/7/21  Provider, MD Caty       ALLERGIES:  Patient has no known allergies.    REVIEW OF SYSTEMS  Review of Systems   Constitutional: Positive for activity change and appetite change. Negative for chills, fever and unexpected weight change.   HENT: Negative for congestion and trouble swallowing.    Eyes: Negative for visual disturbance.   Respiratory: Negative for cough, chest tightness, shortness of breath (with abdominal pain ) and wheezing.    Cardiovascular: Negative for chest pain, palpitations and leg swelling.   Gastrointestinal: Positive for abdominal pain, nausea and vomiting. Negative for abdominal distention, anal bleeding, blood in stool and diarrhea.   Genitourinary: Positive for flank pain (right side). Negative for difficulty urinating, dysuria, frequency, hematuria and urgency.   Musculoskeletal: Positive for arthralgias and myalgias.   Skin: Negative for rash.   Neurological: Positive for dizziness, weakness and light-headedness. Negative for syncope and headaches.   Psychiatric/Behavioral: Positive for sleep disturbance. Negative for behavioral problems, confusion and decreased concentration. The patient is nervous/anxious.        PHYSICAL EXAM:  Temp:  [97.9 °F (36.6 °C)] 97.9 °F (36.6 °C)  Heart Rate:  [] 102  Resp:  [18-20] 18  BP: (135-191)/() 136/97  Body mass index is 18.17 kg/m².  Physical Exam  Vitals signs and nursing note reviewed.   Constitutional:       General: She is not in acute distress.     Appearance: She is normal weight. She is ill-appearing. She is not toxic-appearing.   HENT:        Head: Normocephalic and atraumatic.      Right Ear: Tympanic membrane, ear canal and external ear normal.      Left Ear: Tympanic membrane, ear canal and external ear normal.      Nose: Nose normal. No congestion.      Mouth/Throat:      Mouth: Mucous membranes are dry.      Pharynx: Oropharynx is clear.   Eyes:      General: Scleral icterus (mild ) present.      Pupils: Pupils are equal, round, and reactive to light.   Neck:      Musculoskeletal: Normal range of motion and neck supple.   Cardiovascular:      Rate and Rhythm: Normal rate and regular rhythm.      Pulses: Normal pulses.      Heart sounds: Normal heart sounds.   Pulmonary:      Effort: Pulmonary effort is normal.      Breath sounds: Normal breath sounds. No wheezing.   Chest:      Chest wall: No tenderness.   Abdominal:      General: Abdomen is flat. Bowel sounds are normal. There is no distension.      Palpations: Abdomen is soft.      Tenderness: There is abdominal tenderness (bilateral upper quadrant abdomin and epigastric region  ). There is no guarding or rebound.   Musculoskeletal:         General: No swelling.      Right lower leg: No edema.      Left lower leg: No edema.   Skin:     General: Skin is warm and dry.      Capillary Refill: Capillary refill takes less than 2 seconds.      Coloration: Skin is pale. Skin is not jaundiced.      Findings: No rash.   Neurological:      General: No focal deficit present.      Mental Status: She is alert and oriented to person, place, and time.   Psychiatric:         Mood and Affect: Mood normal.         Behavior: Behavior normal.         DIAGNOSTIC DATA:   Lab Results (last 24 hours)     Procedure Component Value Units Date/Time    Urinalysis With Culture If Indicated - Urine, Clean Catch [[id number]]  (Abnormal) Collected: 01/07/21 1143    Specimen: Urine, Clean Catch Updated: 01/07/21 1244     Color, UA Yellow     Appearance, UA Clear     pH, UA 6.0     Specific Gravity, UA 1.046     Comment:  Confirmed by refractometer.        Glucose, UA Negative     Ketones, UA 80 mg/dL (3+)     Bilirubin, UA Moderate (2+)     Blood, UA Trace     Protein,  mg/dL (2+)     Leuk Esterase, UA Negative     Nitrite, UA Negative     Urobilinogen, UA 0.2 E.U./dL    Urinalysis, Microscopic Only - Urine, Clean Catch [[id number]]  (Abnormal) Collected: 01/07/21 1143    Specimen: Urine, Clean Catch Updated: 01/07/21 1240     RBC, UA 0-2 /HPF      WBC, UA 0-2 /HPF      Bacteria, UA 3+ /HPF      Squamous Epithelial Cells, UA 3-5 /HPF      Hyaline Casts, UA None Seen /LPF      Methodology Manual Light Microscopy    Urine Drug Screen - Urine, Clean Catch [[id number]]  (Abnormal) Collected: 01/07/21 1143    Specimen: Urine, Clean Catch Updated: 01/07/21 1226     THC, Screen, Urine Positive     Phencyclidine (PCP), Urine Negative     Cocaine Screen, Urine Negative     Methamphetamine, Ur Negative     Opiate Screen Positive     Amphetamine Screen, Urine Negative     Benzodiazepine Screen, Urine Positive     Tricyclic Antidepressants Screen Negative     Methadone Screen, Urine Negative     Barbiturates Screen, Urine Negative     Oxycodone Screen, Urine Negative     Propoxyphene Screen Negative     Buprenorphine, Screen, Urine Positive    Narrative:      Cutoff For Drugs Screened:    Amphetamines               500 ng/ml  Barbiturates               200 ng/ml  Benzodiazepines            150 ng/ml  Cocaine                    150 ng/ml  Methadone                  200 ng/ml  Opiates                    100 ng/ml  Phencyclidine               25 ng/ml  THC                            50 ng/ml  Methamphetamine            500 ng/ml  Tricyclic Antidepressants  300 ng/ml  Oxycodone                  100 ng/ml  Propoxyphene               300 ng/ml  Buprenorphine               10 ng/ml    The normal value for all drugs tested is negative. This report includes unconfirmed screening results, with the cutoff values listed, to be used for medical  treatment purposes only.  Unconfirmed results must not be used for non-medical purposes such as employment or legal testing.  Clinical consideration should be applied to any drug of abuse test, particularly when unconfirmed results are used.      Extra Tubes [[id number]] Collected: 01/07/21 0836    Specimen: Blood, Venous Line Updated: 01/07/21 0945    Narrative:      The following orders were created for panel order Extra Tubes.  Procedure                               Abnormality         Status                     ---------                               -----------         ------                     Light Blue Top[[id number]]                                   Final result               Gold Top - SST[[id number]]                                   Final result               Green Top (Gel)[[id number]]                                  Final result                 Please view results for these tests on the individual orders.    Gold Top - SST [[id number]] Collected: 01/07/21 0836    Specimen: Blood Updated: 01/07/21 0945     Extra Tube Hold for add-ons.     Comment: Auto resulted.       Green Top (Gel) [[id number]] Collected: 01/07/21 0836    Specimen: Blood Updated: 01/07/21 0945     Extra Tube Hold for add-ons.     Comment: Auto resulted.       Light Blue Top [[id number]] Collected: 01/07/21 0836    Specimen: Blood Updated: 01/07/21 0945     Extra Tube hold for add-on     Comment: Auto resulted       hCG, Quantitative, Pregnancy [[id number]] Collected: 01/07/21 0756    Specimen: Blood Updated: 01/07/21 0940     HCG Quantitative <0.50 mIU/mL     Narrative:      HCG Ranges by Gestational Age    Females - non-pregnant premenopausal   </= 1mIU/mL HCG  Females - postmenopausal               </= 7mIU/mL HCG    3 Weeks         5.8 -    71.2 mIU/mL  4 Weeks         9.5 -     750 mIU/mL  5 Weeks         217 -   7,138 mIU/mL  6 Weeks         158 -  31,795 mIU/mL  7 Weeks       3,697 - 163,563 mIU/mL  8 Weeks      32,065 - 149,571  mIU/mL  9 Weeks      63,803 - 151,410 mIU/mL  10 Weeks     46,509 - 186,977 mIU/mL  12 Weeks     27,832 - 210,612 mIU/mL  14 Weeks     13,950 -  62,530 mIU/mL  15 Weeks     12,039 -  70,971 mIU/mL  16 Weeks      9,040 -  56,451 mIU/mL  17 Weeks      8,175 -  55,868 mIU/mL  18 Weeks      8,099 -  58,176 mIU/mL  Results may be falsely decreased if patient taking Biotin.      [place] Draw [[id number]] Collected: 01/07/21 0756    Specimen: Blood Updated: 01/07/21 0901    Narrative:      The following orders were created for panel order [place] Draw.  Procedure                               Abnormality         Status                     ---------                               -----------         ------                     Light Blue Top[[id number]]                                   Final result               Green Top (Gel)[[id number]]                                  Final result               Lavender Top[[id number]]                                     Final result               Gold Top - SST[[id number]]                                                                Please view results for these tests on the individual orders.    Light Blue Top [[id number]] Collected: 01/07/21 0756    Specimen: Blood Updated: 01/07/21 0901     Extra Tube hold for add-on     Comment: Auto resulted       Green Top (Gel) [[id number]] Collected: 01/07/21 0756    Specimen: Blood Updated: 01/07/21 0901     Extra Tube Hold for add-ons.     Comment: Auto resulted.       Lavender Top [[id number]] Collected: 01/07/21 0756    Specimen: Blood Updated: 01/07/21 0901     Extra Tube hold for add-on     Comment: Auto resulted       Lipase [[id number]]  (Abnormal) Collected: 01/07/21 0756    Specimen: Blood Updated: 01/07/21 0854     Lipase 1,446 U/L     CBC & Differential [[id number]]  (Abnormal) Collected: 01/07/21 0834    Specimen: Blood Updated: 01/07/21 0852    Narrative:      The following orders were created for panel order CBC & Differential.  Procedure                                Abnormality         Status                     ---------                               -----------         ------                     Scan Slide[[id number]]                                                                  CBC Auto Differential[[id number]]        Abnormal            Final result                 Please view results for these tests on the individual orders.    CBC Auto Differential [[id number]]  (Abnormal) Collected: 01/07/21 0834    Specimen: Blood Updated: 01/07/21 0851     WBC 11.06 10*3/mm3      RBC 4.43 10*6/mm3      Hemoglobin 16.2 g/dL      Comment: SPECIMEN RECOLLECTED/REANALYZED TO CONFIRM HGB RESULTS        Hematocrit 44.7 %      .9 fL      MCH 36.6 pg      MCHC 36.2 g/dL      RDW 12.3 %      RDW-SD 46.2 fl      MPV 10.2 fL      Platelets 136 10*3/mm3      Neutrophil % 81.5 %      Lymphocyte % 9.8 %      Monocyte % 8.1 %      Eosinophil % 0.0 %      Basophil % 0.3 %      Immature Grans % 0.3 %      Neutrophils, Absolute 9.02 10*3/mm3      Lymphocytes, Absolute 1.08 10*3/mm3      Monocytes, Absolute 0.90 10*3/mm3      Eosinophils, Absolute 0.00 10*3/mm3      Basophils, Absolute 0.03 10*3/mm3      Immature Grans, Absolute 0.03 10*3/mm3      nRBC 0.0 /100 WBC     COVID-19 and FLU A/B PCR - Swab, Nasopharynx [[id number]]  (Normal) Collected: 01/07/21 0816    Specimen: Swab from Nasopharynx Updated: 01/07/21 0850     COVID19 Not Detected     Influenza A PCR Not Detected     Influenza B PCR Not Detected    Narrative:      Fact sheet for providers: https://www.fda.gov/media/005819/download    Fact sheet for patients: https://www.fda.gov/media/018385/download    Test performed by PCR.    Comprehensive Metabolic Panel [[id number]]  (Abnormal) Collected: 01/07/21 0756    Specimen: Blood Updated: 01/07/21 0850     Glucose 136 mg/dL      BUN 22 mg/dL      Creatinine 0.99 mg/dL      Sodium 129 mmol/L      Potassium 3.6 mmol/L      Comment: Slight hemolysis detected by  analyzer. Results may be affected.        Chloride 93 mmol/L      CO2 15.0 mmol/L      Calcium 11.4 mg/dL      Total Protein 8.8 g/dL      Albumin 4.80 g/dL      ALT (SGPT) 84 U/L      AST (SGOT) 69 U/L      Comment: Slight hemolysis detected by analyzer. Results may be affected.        Alkaline Phosphatase 136 U/L      Total Bilirubin 1.2 mg/dL      eGFR Non African Amer 64 mL/min/1.73      Globulin 4.0 gm/dL      A/G Ratio 1.2 g/dL      BUN/Creatinine Ratio 22.2     Anion Gap 21.0 mmol/L     Narrative:      GFR Normal >60  Chronic Kidney Disease <60  Kidney Failure <15      TSH [[id number]]  (Normal) Collected: 01/07/21 0756    Specimen: Blood Updated: 01/07/21 0849     TSH 2.280 uIU/mL     Troponin [[id number]]  (Normal) Collected: 01/07/21 0756    Specimen: Blood Updated: 01/07/21 0849     Troponin T <0.010 ng/mL     Narrative:      Troponin T Reference Range:  <= 0.03 ng/mL-   Negative for AMI  >0.03 ng/mL-     Abnormal for myocardial necrosis.  Clinicians would have to utilize clinical acumen, EKG, Troponin and serial changes to determine if it is an Acute Myocardial Infarction or myocardial injury due to an underlying chronic condition.       Results may be falsely decreased if patient taking Biotin.      Magnesium [[id number]]  (Normal) Collected: 01/07/21 0756    Specimen: Blood Updated: 01/07/21 0844     Magnesium 2.1 mg/dL     Ethanol [[id number]] Collected: 01/07/21 0756    Specimen: Blood Updated: 01/07/21 0844     Ethanol <10 mg/dL      Ethanol % <0.010 %            Imaging Results (Last 24 Hours)     Procedure Component Value Units Date/Time    CT Abdomen Pelvis With Contrast [[id number]] Collected: 01/07/21 0947     Updated: 01/07/21 1034    Narrative:      CT abdomen, pelvis with contrast.         CLINICAL INDICATION: Epigastric pain. History of pancreatitis.    COMPARISON: CT abdomen September 9, 2019.    TECHNIQUE: 75 mL Isovue-300  nonionic IV contrast. Helical  scanning with axial and coronal  reformations.  Soft tissue, lung,  and bone windows reviewed.    This exam was performed according to our departmental  dose-optimization program, which includes automated exposure  control, adjustment of the mA and/or kV according to patient size  and/or use of iterative reconstruction technique.    ABDOMEN CT FINDINGS: The visualized lung bases show right lower  lobe infiltrative changes either atelectasis or pneumonitis.  Interval development of thickening of the distal esophagus just  above the gastroesophageal junction. Consider upper GI,  esophagram or endoscopy for further evaluation.    Liver,  spleen, pancreas, adrenal glands and kidneys are normal  in appearance. Phrygian cap in the gallbladder fundus,  gallbladder otherwise unremarkable. Common bile duct is somewhat  dilated 0.9 cm. This however is similar to  prior CT examination  September 9, 2019. Bowel grossly unremarkable.    No evidence of pathologically enlarged nodes, free air, or free  fluid. Degenerative changes of the spine.  The bones are grossly unremarkable.    PELVIS CT FINDINGS: Simple 2.55 cm right ovarian cyst. No  follow-up is necessary. No evidence of pathologically enlarged  nodes, free air, or free fluid.     The bones are grossly unremarkable.      Impression:      Thickening of the distal esophagus just above the  gastroesophageal junction. This demonstrates a change since prior  CT examination. Consider upper GI examination, esophagram or  endoscopy for further evaluation.      Normal pancreas. No findings suggesting acute pancreatitis    Phrygian cap gallbladder fundus. This is usually a normal  variant.    Dilated common bile duct 0.9 cm. This however is unchanged since  prior CT exam. Normal pancreas. No findings suggesting acute  pancreatitis.    2.55 cm right ovarian cyst. No follow-up is necessary. CT  abdomen, pelvis with contrast is otherwise unremarkable.    Electronically signed by:  Jorge Mullen MD  1/7/2021 10:33 AM  CST  Workstation: [id number]    XR Chest 2 View [[id number]] Collected: 01/07/21 0834     Updated: 01/07/21 0859    Narrative:      PROCEDURE: Two-view chest x-ray    COMPARISON: None.    HISTORY: palp    FINDINGS: Frontal and lateral views of the chest are obtained.     Devices: None    Lungs/Pleura: The lungs are clear    Cardiomediastinal structures: Normal         Impression:      CONCLUSION:    No acute cardiopulmonary disease    Electronically signed by:  Ranjit Marie MD  1/7/2021 8:58 AM CST  Workstation: [id number]            I reviewed the patient's new clinical results.    ASSESSMENT AND PLAN: This is a 35 y.o. female with:    Active Hospital Problems    Diagnosis POA   • **Acute on chronic pancreatitis (CMS/HCC) [K85.90, K86.1] Yes     Priority: High     Patient has history of alcohol abuse, has several episodes of pancreatitis in the past, last one being last year.  Lipase elevated at 1446 in the ED , will trend the levels  CT abdomen and pelvis showed thickening of the distal esophagus just above the   gastroesophageal junction. There is no findings suggesting acute pancreatitis. -  consulted, will follow recommendations.   IV fluids at 125 Ml/hr         • Alcohol dependence (CMS/HCC) [F10.20] Yes     Last drink was 4 days ago  Alcohol cessation counseling was provided  Ativan and CIWA protocol-  Ativan 2 mg Q6H   -IV thiamine  -Continuing folate and B12     • Opioid use disorder (CMS/HCC) [F11.99] Yes     -Continue gabapentin  - Cannot give Suboxone in the hospital as she is getting opoid pain medication for abdominal pain.      • Anxiety and depression [F41.9, F32.9] Yes     Will continue current home medications  -Diazepam 5 mg tablet  - Fluoxetine 20 mg capsule      • Dependence on nicotine from cigarettes [F17.210] Yes     - Nicotine patches          DVT prophylaxis: SCDs/TEDs     Nata Bain and I have discussed pain goals for this hospitalization after reviewing her current  clinical condition, medical history and prior pain experiences.  The goal is to keep the pain level 0-5/10.  To help achieve this, I plan to treat her pain with Morphine.     PDMP, reviewed and consistent with patient reported medications.    Expected Length of Stay: Where: home and When:  1-2days after resolution of her current symptoms.     I discussed the patient's findings and my recommendations with patient.     Nadir Villarreal MD is the attending on record at time of admission, He is aware of the patient's status and agrees with the above history and physical.        Erica Jacob MD PGY-1  [facility]   This document has been electronically signed by Erica Jacob MD on January 7, 2021 13:22 CST

## 2021-01-07 NOTE — ED PROVIDER NOTES
"Subjective   35-year-old female PMH pancreatitis from alcohol comes to the ER for the history of nausea and vomiting.  She usually drinks a pint of alcohol daily.  Last drink was 4 days ago.  She has abdominal pain that started a few days ago.  She feels her heart racing and palpitations since yesterday.  She is feeling very anxious.  No tremors or seizures.  She endorses a strong \"urine smell\" but no dysuria or frequency.  She has not been tolerating p.o. intake at all.  No diarrhea.      History provided by:  Patient and medical records   used: No        Review of Systems   Constitutional: Positive for activity change, appetite change and chills. Negative for diaphoresis, fatigue and fever.   HENT: Negative for congestion and rhinorrhea.    Respiratory: Negative for cough, shortness of breath and wheezing.    Cardiovascular: Positive for palpitations. Negative for chest pain and leg swelling.   Gastrointestinal: Positive for abdominal pain, nausea and vomiting. Negative for diarrhea.   Genitourinary: Negative for dysuria and flank pain.        Urine smell   Skin: Negative for color change and rash.   Neurological: Negative for dizziness, tremors, seizures, weakness, numbness and headaches.   Psychiatric/Behavioral: Negative for agitation. The patient is not nervous/anxious.        Past Medical History:   Diagnosis Date   • Abnormal Pap smear of cervix    • Alcoholism (CMS/[facility])    • Anxiety    • Cervical dysplasia    • Depression    • Fibrocystic breast    • GERD (gastroesophageal reflux disease)    • Seizures (CMS/[facility]) 2017   • Substance abuse (CMS/[facility])    • Substance abuse (CMS/[facility])        No Known Allergies    Past Surgical History:   Procedure Laterality Date   • RHINOPLASTY         Family History   Problem Relation Age of Onset   • Breast cancer Mother    • Cancer Mother    • COPD Mother    • Breast cancer Maternal Grandmother    • Breast cancer Paternal Grandmother    • Breast cancer " Maternal Aunt    • Hypertension Father    • Heart disease Father        Social History     Socioeconomic History   • Marital status:      Spouse name: Not on file   • Number of children: Not on file   • Years of education: Not on file   • Highest education level: Not on file   Tobacco Use   • Smoking status: Current Every Day Smoker     Packs/day: 1.00     Years: 20.00     Pack years: 20.00     Types: Cigarettes   • Smokeless tobacco: Never Used   Substance and Sexual Activity   • Alcohol use: Not Currently     Frequency: Never     Comment: Pt reports drinking a 5th of vodka daily, last drink 1/3/21   • Drug use: No     Types: Fentanyl, Oxycodone   • Sexual activity: Yes     Partners: Male     Birth control/protection: None           Objective    Vitals:    01/07/21 0852 01/07/21 0907 01/07/21 0937 01/07/21 1035   BP: (!) 157/112 (!) 155/110 (!) 169/115 135/97   BP Location:    Left arm   Patient Position:    Lying   Pulse: 106 94 90 104   Resp:  18 18 18   Temp:       TempSrc:       SpO2: 97% 99% 98% 98%   Weight:       Height:           Physical Exam  Vitals signs and nursing note reviewed.   Constitutional:       General: She is in acute distress.      Appearance: She is well-developed. She is ill-appearing. She is not toxic-appearing or diaphoretic.   HENT:      Head: Normocephalic.      Right Ear: External ear normal.      Left Ear: External ear normal.   Cardiovascular:      Rate and Rhythm: Tachycardia present.   Pulmonary:      Effort: Pulmonary effort is normal. No accessory muscle usage or respiratory distress.      Breath sounds: No decreased breath sounds or wheezing.   Chest:      Chest wall: No tenderness.   Abdominal:      General: Bowel sounds are normal.      Palpations: Abdomen is soft. Abdomen is not rigid.      Tenderness: There is abdominal tenderness in the epigastric area.   Musculoskeletal: Normal range of motion.         General: No swelling.   Skin:     General: Skin is warm and dry.       Capillary Refill: Capillary refill takes less than 2 seconds.   Neurological:      Mental Status: She is alert and oriented to person, place, and time. Mental status is at baseline. She is not disoriented.   Psychiatric:         Behavior: Behavior normal.         ECG 12 Lead      Date/Time: 1/7/2021 8:01 AM  Performed by: Ponce Farmer MD  Authorized by: Ponce Farmer MD   Interpreted by physician  Rhythm: sinus tachycardia  Rate: tachycardic  QRS axis: normal  ST Segments: ST segments normal  T Waves: T waves normal                   ED Course      Results for orders placed or performed during the hospital encounter of 01/07/21   COVID-19 and FLU A/B PCR - Swab, Nasopharynx    Specimen: Nasopharynx; Swab   Result Value Ref Range    COVID19 Not Detected Not Detected - Ref. Range    Influenza A PCR Not Detected Not Detected    Influenza B PCR Not Detected Not Detected   Comprehensive Metabolic Panel    Specimen: Blood   Result Value Ref Range    Glucose 136 (H) 65 - 99 mg/dL    BUN 22 (H) 6 - 20 mg/dL    Creatinine 0.99 0.57 - 1.00 mg/dL    Sodium 129 (L) 136 - 145 mmol/L    Potassium 3.6 3.5 - 5.2 mmol/L    Chloride 93 (L) 98 - 107 mmol/L    CO2 15.0 (L) 22.0 - 29.0 mmol/L    Calcium 11.4 (H) 8.6 - 10.5 mg/dL    Total Protein 8.8 (H) 6.0 - 8.5 g/dL    Albumin 4.80 3.50 - 5.20 g/dL    ALT (SGPT) 84 (H) 1 - 33 U/L    AST (SGOT) 69 (H) 1 - 32 U/L    Alkaline Phosphatase 136 (H) 39 - 117 U/L    Total Bilirubin 1.2 0.0 - 1.2 mg/dL    eGFR Non African Amer 64 >60 mL/min/1.73    Globulin 4.0 gm/dL    A/G Ratio 1.2 g/dL    BUN/Creatinine Ratio 22.2 7.0 - 25.0    Anion Gap 21.0 (H) 5.0 - 15.0 mmol/L   Lipase    Specimen: Blood   Result Value Ref Range    Lipase 1,446 (H) 13 - 60 U/L   hCG, Quantitative, Pregnancy    Specimen: Blood   Result Value Ref Range    HCG Quantitative <0.50 mIU/mL   Magnesium    Specimen: Blood   Result Value Ref Range    Magnesium 2.1 1.6 - 2.6 mg/dL   TSH    Specimen: Blood   Result  Value Ref Range    TSH 2.280 0.270 - 4.200 uIU/mL   CBC Auto Differential    Specimen: Blood   Result Value Ref Range    WBC 11.06 (H) 3.40 - 10.80 10*3/mm3    RBC 4.43 3.77 - 5.28 10*6/mm3    Hemoglobin 16.2 (H) 12.0 - 15.9 g/dL    Hematocrit 44.7 34.0 - 46.6 %    .9 (H) 79.0 - 97.0 fL    MCH 36.6 (H) 26.6 - 33.0 pg    MCHC 36.2 (H) 31.5 - 35.7 g/dL    RDW 12.3 12.3 - 15.4 %    RDW-SD 46.2 37.0 - 54.0 fl    MPV 10.2 6.0 - 12.0 fL    Platelets 136 (L) 140 - 450 10*3/mm3    Neutrophil % 81.5 (H) 42.7 - 76.0 %    Lymphocyte % 9.8 (L) 19.6 - 45.3 %    Monocyte % 8.1 5.0 - 12.0 %    Eosinophil % 0.0 (L) 0.3 - 6.2 %    Basophil % 0.3 0.0 - 1.5 %    Immature Grans % 0.3 0.0 - 0.5 %    Neutrophils, Absolute 9.02 (H) 1.70 - 7.00 10*3/mm3    Lymphocytes, Absolute 1.08 0.70 - 3.10 10*3/mm3    Monocytes, Absolute 0.90 0.10 - 0.90 10*3/mm3    Eosinophils, Absolute 0.00 0.00 - 0.40 10*3/mm3    Basophils, Absolute 0.03 0.00 - 0.20 10*3/mm3    Immature Grans, Absolute 0.03 0.00 - 0.05 10*3/mm3    nRBC 0.0 0.0 - 0.2 /100 WBC   Ethanol    Specimen: Blood   Result Value Ref Range    Ethanol <10 0 - 10 mg/dL    Ethanol % <0.010 %   Troponin    Specimen: Blood   Result Value Ref Range    Troponin T <0.010 0.000 - 0.030 ng/mL   Light Blue Top   Result Value Ref Range    Extra Tube hold for add-on    Green Top (Gel)   Result Value Ref Range    Extra Tube Hold for add-ons.    Lavender Top   Result Value Ref Range    Extra Tube hold for add-on    Light Blue Top   Result Value Ref Range    Extra Tube hold for add-on    Gold Top - SST   Result Value Ref Range    Extra Tube Hold for add-ons.    Green Top (Gel)   Result Value Ref Range    Extra Tube Hold for add-ons.      CT Abdomen Pelvis With Contrast   Final Result   Thickening of the distal esophagus just above the   gastroesophageal junction. This demonstrates a change since prior   CT examination. Consider upper GI examination, esophagram or   endoscopy for further evaluation.          Normal pancreas. No findings suggesting acute pancreatitis      Phrygian cap gallbladder fundus. This is usually a normal   variant.      Dilated common bile duct 0.9 cm. This however is unchanged since   prior CT exam. Normal pancreas. No findings suggesting acute   pancreatitis.      2.55 cm right ovarian cyst. No follow-up is necessary. CT   abdomen, pelvis with contrast is otherwise unremarkable.      Electronically signed by:  Jorge Mullen MD  1/7/2021 10:33 AM CST   Workstation: [id number]      XR Chest 2 View   Final Result   CONCLUSION:     No acute cardiopulmonary disease      Electronically signed by:  Ranjit Marie MD  1/7/2021 8:58 AM CST   Workstation: [id number]              MDM  Number of Diagnoses or Management Options  Elevated lipase: new and requires workup  Epigastric pain: new and requires workup  Hyponatremia: new and requires workup  Non-intractable vomiting with nausea, unspecified vomiting type: new and requires workup  Thickening of esophagus: new and requires workup  Diagnosis management comments: Vital signs are stable, afebrile.  Labs remarkable for a lipase of 1400, sodium 129.  Alcohol level is 0.  Chest x-ray shows no acute cardiopulmonary processes.  CT abdomen pelvis negative for acute pancreatic findings.  Does show thickening of the distal esophagus and radiology recommends evaluation by scope.  Patient received fluids, pain medicine.  Heart rate improved.  Spoke with the on-call resident who agrees to admit.       Amount and/or Complexity of Data Reviewed  Clinical lab tests: reviewed and ordered  Tests in the radiology section of CPT®: reviewed and ordered  Tests in the medicine section of CPT®: reviewed and ordered  Decide to obtain previous medical records or to obtain history from someone other than the patient: yes  Review and summarize past medical records: yes  Discuss the patient with other providers: yes    Patient Progress  Patient progress: improved      Final  diagnoses:   Epigastric pain   Non-intractable vomiting with nausea, unspecified vomiting type   Elevated lipase   Thickening of esophagus            Ponce Farmer MD  01/07/21 6317

## 2021-01-07 NOTE — ED NOTES
Patient not able to give urine at this time, will check back later     Martine Bravo, RN  01/07/21 0854

## 2021-01-07 NOTE — ACP (ADVANCE CARE PLANNING)
Patient wishes to have CPR performed in an event her heart stops and wishes to be intubated in an event she is unable to breath on her own. Patient has enlisted her  Jorge Sanz phone no [phone] as power of .        Erica Jacob MD PGY-1  [facility]   This document has been electronically signed by Erica Jacob MD on January 7, 2021 12:30 CST

## 2021-01-08 ENCOUNTER — ANESTHESIA EVENT (OUTPATIENT)
Dept: GASTROENTEROLOGY | Facility: HOSPITAL | Age: 36
End: 2021-01-08

## 2021-01-08 ENCOUNTER — APPOINTMENT (OUTPATIENT)
Dept: MRI IMAGING | Facility: HOSPITAL | Age: 36
End: 2021-01-08

## 2021-01-08 ENCOUNTER — ANESTHESIA (OUTPATIENT)
Dept: GASTROENTEROLOGY | Facility: HOSPITAL | Age: 36
End: 2021-01-08

## 2021-01-08 PROBLEM — K22.89 THICKENING OF ESOPHAGUS: Status: ACTIVE | Noted: 2021-01-07

## 2021-01-08 LAB
25(OH)D3 SERPL-MCNC: 16 NG/ML (ref 30–100)
ALBUMIN SERPL-MCNC: 3.4 G/DL (ref 3.5–5.2)
ALBUMIN/GLOB SERPL: 1.3 G/DL
ALP SERPL-CCNC: 86 U/L (ref 39–117)
ALT SERPL W P-5'-P-CCNC: 52 U/L (ref 1–33)
AMYLASE SERPL-CCNC: 172 U/L (ref 28–100)
ANION GAP SERPL CALCULATED.3IONS-SCNC: 12 MMOL/L (ref 5–15)
AST SERPL-CCNC: 59 U/L (ref 1–32)
BASOPHILS # BLD AUTO: 0.03 10*3/MM3 (ref 0–0.2)
BASOPHILS NFR BLD AUTO: 0.4 % (ref 0–1.5)
BILIRUB SERPL-MCNC: 0.9 MG/DL (ref 0–1.2)
BUN SERPL-MCNC: 10 MG/DL (ref 6–20)
BUN/CREAT SERPL: 19.6 (ref 7–25)
CA-I BLD-MCNC: 4.6 MG/DL (ref 4.6–5.6)
CALCIUM SPEC-SCNC: 8.9 MG/DL (ref 8.6–10.5)
CHLORIDE SERPL-SCNC: 103 MMOL/L (ref 98–107)
CO2 SERPL-SCNC: 21 MMOL/L (ref 22–29)
CREAT SERPL-MCNC: 0.51 MG/DL (ref 0.57–1)
DEPRECATED RDW RBC AUTO: 49 FL (ref 37–54)
EOSINOPHIL # BLD AUTO: 0.03 10*3/MM3 (ref 0–0.4)
EOSINOPHIL NFR BLD AUTO: 0.4 % (ref 0.3–6.2)
ERYTHROCYTE [DISTWIDTH] IN BLOOD BY AUTOMATED COUNT: 12.5 % (ref 12.3–15.4)
FOLATE SERPL-MCNC: 6.61 NG/ML (ref 4.78–24.2)
GFR SERPL CREATININE-BSD FRML MDRD: 138 ML/MIN/1.73
GLOBULIN UR ELPH-MCNC: 2.7 GM/DL
GLUCOSE SERPL-MCNC: 83 MG/DL (ref 65–99)
HBA1C MFR BLD: 4.4 % (ref 4.8–5.6)
HCT VFR BLD AUTO: 37.3 % (ref 34–46.6)
HGB BLD-MCNC: 13.1 G/DL (ref 12–15.9)
IMM GRANULOCYTES # BLD AUTO: 0.02 10*3/MM3 (ref 0–0.05)
IMM GRANULOCYTES NFR BLD AUTO: 0.3 % (ref 0–0.5)
LIPASE SERPL-CCNC: 802 U/L (ref 13–60)
LYMPHOCYTES # BLD AUTO: 1.73 10*3/MM3 (ref 0.7–3.1)
LYMPHOCYTES NFR BLD AUTO: 21.7 % (ref 19.6–45.3)
MAGNESIUM SERPL-MCNC: 1.6 MG/DL (ref 1.6–2.6)
MCH RBC QN AUTO: 36.8 PG (ref 26.6–33)
MCHC RBC AUTO-ENTMCNC: 35.1 G/DL (ref 31.5–35.7)
MCV RBC AUTO: 104.8 FL (ref 79–97)
MONOCYTES # BLD AUTO: 0.64 10*3/MM3 (ref 0.1–0.9)
MONOCYTES NFR BLD AUTO: 8 % (ref 5–12)
NEUTROPHILS NFR BLD AUTO: 5.53 10*3/MM3 (ref 1.7–7)
NEUTROPHILS NFR BLD AUTO: 69.2 % (ref 42.7–76)
NRBC BLD AUTO-RTO: 0 /100 WBC (ref 0–0.2)
PLATELET # BLD AUTO: 110 10*3/MM3 (ref 140–450)
PMV BLD AUTO: 10.3 FL (ref 6–12)
POTASSIUM SERPL-SCNC: 3 MMOL/L (ref 3.5–5.2)
PROT SERPL-MCNC: 6.1 G/DL (ref 6–8.5)
RBC # BLD AUTO: 3.56 10*6/MM3 (ref 3.77–5.28)
SODIUM SERPL-SCNC: 136 MMOL/L (ref 136–145)
VIT B12 BLD-MCNC: >2000 PG/ML (ref 211–946)
WBC # BLD AUTO: 7.98 10*3/MM3 (ref 3.4–10.8)

## 2021-01-08 PROCEDURE — 36415 COLL VENOUS BLD VENIPUNCTURE: CPT | Performed by: STUDENT IN AN ORGANIZED HEALTH CARE EDUCATION/TRAINING PROGRAM

## 2021-01-08 PROCEDURE — 25010000002 PROPOFOL 10 MG/ML EMULSION: Performed by: NURSE ANESTHETIST, CERTIFIED REGISTERED

## 2021-01-08 PROCEDURE — 99232 SBSQ HOSP IP/OBS MODERATE 35: CPT | Performed by: STUDENT IN AN ORGANIZED HEALTH CARE EDUCATION/TRAINING PROGRAM

## 2021-01-08 PROCEDURE — 0DB68ZX EXCISION OF STOMACH, VIA NATURAL OR ARTIFICIAL OPENING ENDOSCOPIC, DIAGNOSTIC: ICD-10-PCS | Performed by: INTERNAL MEDICINE

## 2021-01-08 PROCEDURE — 0DB58ZX EXCISION OF ESOPHAGUS, VIA NATURAL OR ARTIFICIAL OPENING ENDOSCOPIC, DIAGNOSTIC: ICD-10-PCS | Performed by: INTERNAL MEDICINE

## 2021-01-08 PROCEDURE — 25010000002 MORPHINE PER 10 MG: Performed by: INTERNAL MEDICINE

## 2021-01-08 PROCEDURE — 25010000002 MORPHINE PER 10 MG: Performed by: STUDENT IN AN ORGANIZED HEALTH CARE EDUCATION/TRAINING PROGRAM

## 2021-01-08 PROCEDURE — 83036 HEMOGLOBIN GLYCOSYLATED A1C: CPT | Performed by: STUDENT IN AN ORGANIZED HEALTH CARE EDUCATION/TRAINING PROGRAM

## 2021-01-08 PROCEDURE — 25010000002 LORAZEPAM PER 2 MG: Performed by: INTERNAL MEDICINE

## 2021-01-08 PROCEDURE — 82330 ASSAY OF CALCIUM: CPT | Performed by: STUDENT IN AN ORGANIZED HEALTH CARE EDUCATION/TRAINING PROGRAM

## 2021-01-08 PROCEDURE — 25010000002 ONDANSETRON PER 1 MG: Performed by: STUDENT IN AN ORGANIZED HEALTH CARE EDUCATION/TRAINING PROGRAM

## 2021-01-08 PROCEDURE — 25010000002 HYDRALAZINE PER 20 MG: Performed by: FAMILY MEDICINE

## 2021-01-08 PROCEDURE — 74181 MRI ABDOMEN W/O CONTRAST: CPT

## 2021-01-08 PROCEDURE — 82746 ASSAY OF FOLIC ACID SERUM: CPT | Performed by: STUDENT IN AN ORGANIZED HEALTH CARE EDUCATION/TRAINING PROGRAM

## 2021-01-08 PROCEDURE — 83690 ASSAY OF LIPASE: CPT | Performed by: STUDENT IN AN ORGANIZED HEALTH CARE EDUCATION/TRAINING PROGRAM

## 2021-01-08 PROCEDURE — 82150 ASSAY OF AMYLASE: CPT | Performed by: STUDENT IN AN ORGANIZED HEALTH CARE EDUCATION/TRAINING PROGRAM

## 2021-01-08 PROCEDURE — 80053 COMPREHEN METABOLIC PANEL: CPT | Performed by: STUDENT IN AN ORGANIZED HEALTH CARE EDUCATION/TRAINING PROGRAM

## 2021-01-08 PROCEDURE — 83735 ASSAY OF MAGNESIUM: CPT | Performed by: STUDENT IN AN ORGANIZED HEALTH CARE EDUCATION/TRAINING PROGRAM

## 2021-01-08 PROCEDURE — 25010000003 POTASSIUM CHLORIDE 10 MEQ/100ML SOLUTION: Performed by: STUDENT IN AN ORGANIZED HEALTH CARE EDUCATION/TRAINING PROGRAM

## 2021-01-08 PROCEDURE — 85025 COMPLETE CBC W/AUTO DIFF WBC: CPT | Performed by: STUDENT IN AN ORGANIZED HEALTH CARE EDUCATION/TRAINING PROGRAM

## 2021-01-08 PROCEDURE — 43239 EGD BIOPSY SINGLE/MULTIPLE: CPT | Performed by: INTERNAL MEDICINE

## 2021-01-08 PROCEDURE — 82607 VITAMIN B-12: CPT | Performed by: STUDENT IN AN ORGANIZED HEALTH CARE EDUCATION/TRAINING PROGRAM

## 2021-01-08 PROCEDURE — 88305 TISSUE EXAM BY PATHOLOGIST: CPT

## 2021-01-08 RX ORDER — PROPOFOL 10 MG/ML
VIAL (ML) INTRAVENOUS AS NEEDED
Status: DISCONTINUED | OUTPATIENT
Start: 2021-01-08 | End: 2021-01-08 | Stop reason: SURG

## 2021-01-08 RX ORDER — POTASSIUM CHLORIDE 7.45 MG/ML
10 INJECTION INTRAVENOUS
Status: DISCONTINUED | OUTPATIENT
Start: 2021-01-08 | End: 2021-01-08

## 2021-01-08 RX ORDER — DEXTROSE AND SODIUM CHLORIDE 5; .45 G/100ML; G/100ML
30 INJECTION, SOLUTION INTRAVENOUS CONTINUOUS PRN
Status: DISCONTINUED | OUTPATIENT
Start: 2021-01-08 | End: 2021-01-10 | Stop reason: HOSPADM

## 2021-01-08 RX ORDER — PANTOPRAZOLE SODIUM 40 MG/10ML
40 INJECTION, POWDER, LYOPHILIZED, FOR SOLUTION INTRAVENOUS
Status: DISCONTINUED | OUTPATIENT
Start: 2021-01-08 | End: 2021-01-10 | Stop reason: HOSPADM

## 2021-01-08 RX ORDER — POTASSIUM CHLORIDE 7.45 MG/ML
10 INJECTION INTRAVENOUS
Status: COMPLETED | OUTPATIENT
Start: 2021-01-08 | End: 2021-01-08

## 2021-01-08 RX ORDER — LIDOCAINE HYDROCHLORIDE 20 MG/ML
INJECTION, SOLUTION INTRAVENOUS AS NEEDED
Status: DISCONTINUED | OUTPATIENT
Start: 2021-01-08 | End: 2021-01-08 | Stop reason: SURG

## 2021-01-08 RX ADMIN — DIAZEPAM 5 MG: 5 TABLET ORAL at 04:07

## 2021-01-08 RX ADMIN — HYDROCODONE BITARTRATE AND ACETAMINOPHEN 1 TABLET: 5; 325 TABLET ORAL at 04:06

## 2021-01-08 RX ADMIN — MORPHINE SULFATE 2 MG: 2 INJECTION, SOLUTION INTRAMUSCULAR; INTRAVENOUS at 16:48

## 2021-01-08 RX ADMIN — LORAZEPAM 1 MG: 2 INJECTION, SOLUTION INTRAMUSCULAR; INTRAVENOUS at 14:51

## 2021-01-08 RX ADMIN — SODIUM CHLORIDE 125 ML/HR: 900 INJECTION, SOLUTION INTRAVENOUS at 14:49

## 2021-01-08 RX ADMIN — SODIUM CHLORIDE 125 ML/HR: 900 INJECTION, SOLUTION INTRAVENOUS at 09:18

## 2021-01-08 RX ADMIN — LORAZEPAM 1 MG: 0.5 TABLET ORAL at 07:47

## 2021-01-08 RX ADMIN — POTASSIUM CHLORIDE 10 MEQ: 7.46 INJECTION, SOLUTION INTRAVENOUS at 09:09

## 2021-01-08 RX ADMIN — SODIUM CHLORIDE, PRESERVATIVE FREE 10 ML: 5 INJECTION INTRAVENOUS at 20:44

## 2021-01-08 RX ADMIN — POTASSIUM CHLORIDE 10 MEQ: 7.46 INJECTION, SOLUTION INTRAVENOUS at 17:42

## 2021-01-08 RX ADMIN — NICOTINE 1 PATCH: 21 PATCH, EXTENDED RELEASE TRANSDERMAL at 14:51

## 2021-01-08 RX ADMIN — POTASSIUM CHLORIDE 10 MEQ: 7.46 INJECTION, SOLUTION INTRAVENOUS at 15:02

## 2021-01-08 RX ADMIN — PANTOPRAZOLE SODIUM 40 MG: 40 INJECTION, POWDER, FOR SOLUTION INTRAVENOUS at 09:17

## 2021-01-08 RX ADMIN — MORPHINE SULFATE 2 MG: 2 INJECTION, SOLUTION INTRAMUSCULAR; INTRAVENOUS at 06:45

## 2021-01-08 RX ADMIN — LIDOCAINE HYDROCHLORIDE 60 MG: 20 INJECTION, SOLUTION INTRAVENOUS at 13:08

## 2021-01-08 RX ADMIN — ONDANSETRON 4 MG: 2 INJECTION, SOLUTION INTRAMUSCULAR; INTRAVENOUS at 06:45

## 2021-01-08 RX ADMIN — PROPOFOL 40 MG: 10 INJECTION, EMULSION INTRAVENOUS at 13:10

## 2021-01-08 RX ADMIN — HYDRALAZINE HYDROCHLORIDE 10 MG: 20 INJECTION INTRAMUSCULAR; INTRAVENOUS at 11:53

## 2021-01-08 RX ADMIN — GABAPENTIN 600 MG: 300 CAPSULE ORAL at 20:43

## 2021-01-08 RX ADMIN — MORPHINE SULFATE 2 MG: 2 INJECTION, SOLUTION INTRAMUSCULAR; INTRAVENOUS at 11:05

## 2021-01-08 RX ADMIN — HYDROCODONE BITARTRATE AND ACETAMINOPHEN 1 TABLET: 5; 325 TABLET ORAL at 19:17

## 2021-01-08 RX ADMIN — POTASSIUM CHLORIDE 10 MEQ: 7.46 INJECTION, SOLUTION INTRAVENOUS at 18:41

## 2021-01-08 RX ADMIN — MORPHINE SULFATE 2 MG: 2 INJECTION, SOLUTION INTRAMUSCULAR; INTRAVENOUS at 02:12

## 2021-01-08 RX ADMIN — PROPOFOL 60 MG: 10 INJECTION, EMULSION INTRAVENOUS at 13:08

## 2021-01-08 RX ADMIN — POTASSIUM CHLORIDE 10 MEQ: 7.46 INJECTION, SOLUTION INTRAVENOUS at 19:55

## 2021-01-08 RX ADMIN — MORPHINE SULFATE 2 MG: 2 INJECTION, SOLUTION INTRAMUSCULAR; INTRAVENOUS at 20:46

## 2021-01-08 RX ADMIN — POTASSIUM CHLORIDE 10 MEQ: 7.46 INJECTION, SOLUTION INTRAVENOUS at 16:48

## 2021-01-08 RX ADMIN — HYDROCODONE BITARTRATE AND ACETAMINOPHEN 1 TABLET: 5; 325 TABLET ORAL at 15:02

## 2021-01-08 RX ADMIN — SODIUM CHLORIDE 125 ML/HR: 900 INJECTION, SOLUTION INTRAVENOUS at 03:50

## 2021-01-08 RX ADMIN — SODIUM CHLORIDE, PRESERVATIVE FREE 10 ML: 5 INJECTION INTRAVENOUS at 09:17

## 2021-01-08 NOTE — ANESTHESIA POSTPROCEDURE EVALUATION
Patient: Nata Bain    Procedure Summary     Date: 01/08/21 Room / Location: [facility] ENDOSCOPY 2 / [facility] ENDOSCOPY    Anesthesia Start: 1305 Anesthesia Stop: 1313    Procedure: ESOPHAGOGASTRODUODENOSCOPY (N/A ) Diagnosis:       Non-intractable vomiting with nausea, unspecified vomiting type      Thickening of esophagus      (Non-intractable vomiting with nausea, unspecified vomiting type [R11.2])      (Thickening of esophagus [K22.8])    Surgeon: Mirian Mills MD Provider: Karina Jennings CRNA    Anesthesia Type: MAC ASA Status: 3          Anesthesia Type: MAC    Vitals  No vitals data found for the desired time range.          Post Anesthesia Care and Evaluation    Patient location during evaluation: bedside  Patient participation: complete - patient participated  Level of consciousness: sleepy but conscious  Pain score: 0  Pain management: adequate  Airway patency: patent  Anesthetic complications: No anesthetic complications  PONV Status: none  Cardiovascular status: hemodynamically stable  Respiratory status: spontaneous ventilation and room air  Hydration status: acceptable

## 2021-01-08 NOTE — PROGRESS NOTES
FAMILY MEDICINE DAILY PROGRESS NOTE  NAME: Nata Bain  : 1985  MRN: [id number]     LOS: 1 day     PROVIDER OF SERVICE: Erica Jacob MD    Chief Complaint: Acute on chronic pancreatitis (CMS/HCC)    Subjective:     Interval History:  History taken from: patient chart family  Patient Complaints: bilateral upper quadrant abdominal pain  and nausea  Patient Denies:  Vomiting, fevers/chills, dizziness, headaches, chest pain, palpitations, diarrhea/constipation.     Patient was awake and alert at the time of examination. Patient's  was present in the room. There were no overnight events. She is scheduled for EGD this morning and is aware of the plan. Patient reports she is having alcohol withdrawal symptoms, with shakiness, and abdominal pain.     Review of Systems:   Review of Systems   Constitutional: Positive for activity change, chills and fatigue. Negative for appetite change, diaphoresis, fever and unexpected weight change.   HENT: Negative for trouble swallowing and voice change.    Eyes: Negative for visual disturbance.   Respiratory: Negative for cough, choking, chest tightness, shortness of breath and wheezing.    Cardiovascular: Negative for chest pain, palpitations and leg swelling.   Gastrointestinal: Positive for abdominal pain and nausea. Negative for abdominal distention, blood in stool, constipation, diarrhea and vomiting.   Genitourinary: Negative for difficulty urinating, enuresis, frequency and urgency.   Musculoskeletal: Negative for arthralgias and myalgias.   Skin: Negative for rash.   Neurological: Positive for weakness. Negative for dizziness, light-headedness and headaches.   Psychiatric/Behavioral: Positive for sleep disturbance. Negative for agitation, confusion and decreased concentration. The patient is nervous/anxious.        Objective:     Vital Signs  Temp:  [95.9 °F (35.5 °C)] 95.9 °F (35.5 °C)  Heart Rate:  [] 71  Resp:  [18-19] 19  BP: (106-169)/()  Quality 111:Pneumonia Vaccination Status For Older Adults: Pneumococcal Vaccination Previously Received 157/93    Physical Exam  Physical Exam  Vitals signs and nursing note reviewed.   Constitutional:       Appearance: Normal appearance.   HENT:      Head: Normocephalic and atraumatic.      Right Ear: External ear normal.      Left Ear: External ear normal.      Nose: Nose normal.      Mouth/Throat:      Mouth: Mucous membranes are dry.      Pharynx: Oropharynx is clear.   Eyes:      Conjunctiva/sclera: Conjunctivae normal.      Pupils: Pupils are equal, round, and reactive to light.   Neck:      Musculoskeletal: Normal range of motion and neck supple.   Cardiovascular:      Rate and Rhythm: Normal rate and regular rhythm.      Pulses: Normal pulses.      Heart sounds: Normal heart sounds.   Pulmonary:      Effort: Pulmonary effort is normal.      Breath sounds: Normal breath sounds.   Abdominal:      General: Abdomen is flat. Bowel sounds are normal. There is no distension.      Palpations: Abdomen is soft.      Tenderness: There is abdominal tenderness (bilateral upper quadrants ). There is no guarding.   Musculoskeletal: Normal range of motion.      Right lower leg: No edema.      Left lower leg: No edema.   Skin:     General: Skin is warm and dry.      Capillary Refill: Capillary refill takes less than 2 seconds.   Neurological:      General: No focal deficit present.      Mental Status: She is alert and oriented to person, place, and time.   Psychiatric:         Mood and Affect: Mood normal.         Behavior: Behavior normal.         Thought Content: Thought content normal.         Judgment: Judgment normal.         Medication Review    Current Facility-Administered Medications:   •  calcium carbonate (TUMS) chewable tablet 500 mg (200 mg elemental), 2 tablet, Oral, BID PRN, Erica Jacob MD, 2 tablet at 01/07/21 2576  •  dextrose 5 % and sodium chloride 0.45 % infusion, 30 mL/hr, Intravenous, Continuous PRN, Mirian Mills MD  •  diazePAM (VALIUM) tablet 5 mg, 5 mg, Oral, Q8H PRN, Erica Jacob MD, 5 mg at  Quality 130: Documentation Of Current Medications In The Medical Record: Current Medications Documented 01/08/21 0407  •  FLUoxetine (PROzac) capsule 20 mg, 20 mg, Oral, Daily, Erica Jacob MD, 20 mg at 01/07/21 1411  •  folic acid (FOLVITE) half tablet 500 mcg, 500 mcg, Oral, Daily, Erica Jacob MD, 500 mcg at 01/07/21 1411  •  gabapentin (NEURONTIN) capsule 600 mg, 600 mg, Oral, Nightly, Erica Jacob MD, 600 mg at 01/07/21 2007  •  hydrALAZINE (APRESOLINE) injection 10 mg, 10 mg, Intravenous, Q6H PRN, Nadir Villarreal MD  •  HYDROcodone-acetaminophen (NORCO) 5-325 MG per tablet 1 tablet, 1 tablet, Oral, Q4H PRN, Erica Jacob MD, 1 tablet at 01/08/21 0406  •  LORazepam (ATIVAN) tablet 1 mg, 1 mg, Oral, Q2H PRN, 1 mg at 01/08/21 0747 **OR** LORazepam (ATIVAN) injection 1 mg, 1 mg, Intravenous, Q2H PRN **OR** LORazepam (ATIVAN) tablet 2 mg, 2 mg, Oral, Q1H PRN **OR** LORazepam (ATIVAN) injection 2 mg, 2 mg, Intravenous, Q1H PRN **OR** LORazepam (ATIVAN) injection 2 mg, 2 mg, Intravenous, Q15 Min PRN **OR** LORazepam (ATIVAN) injection 2 mg, 2 mg, Intramuscular, Q15 Min PRN, Erica Jacob MD  •  melatonin tablet 5.25 mg, 5.25 mg, Oral, Nightly PRN, Erica Jacob MD, 5.25 mg at 01/07/21 2126  •  morphine injection 2 mg, 2 mg, Intravenous, Q4H PRN, 2 mg at 01/08/21 0645 **AND** naloxone (NARCAN) injection 0.4 mg, 0.4 mg, Intravenous, Q5 Min PRN, Erica Jacob MD  •  nicotine (NICODERM CQ) 21 MG/24HR patch 1 patch, 1 patch, Transdermal, Q24H, Erica Jacob MD, 1 patch at 01/07/21 1410  •  ondansetron (ZOFRAN) injection 4 mg, 4 mg, Intravenous, Q6H PRN, Erica Jacob MD, 4 mg at 01/08/21 0645  •  potassium chloride 10 mEq in 100 mL IVPB, 10 mEq, Intravenous, Q1H PRN, Christos Vyas MD  •  sodium chloride 0.9 % flush 10 mL, 10 mL, Intravenous, Q12H, Erica Jacob MD  •  sodium chloride 0.9 % flush 10 mL, 10 mL, Intravenous, PRN, Erica Jacob MD  •  sodium chloride 0.9 % infusion, 125 mL/hr, Intravenous, Continuous, Erica Jacob MD, Last Rate: 125 mL/hr at 01/08/21 0350, 125 mL/hr at 01/08/21 0350  •   Detail Level: Detailed Quality 431: Preventive Care And Screening: Unhealthy Alcohol Use - Screening: Patient screened for unhealthy alcohol use using a single question and scores less than 2 times per year thiamine (VITAMIN B-1) tablet 100 mg, 100 mg, Oral, Daily, Erica Jacob MD, 100 mg at 01/07/21 1411  •  vitamin B-12 (CYANOCOBALAMIN) tablet 1,000 mcg, 1,000 mcg, Oral, Daily, Erica Jacob MD, 1,000 mcg at 01/07/21 1411    Current Outpatient Medications:   •  buprenorphine-naloxone (SUBOXONE) 8-2 MG per SL tablet, Place 3 tablets under the tongue 3 (Three) Times a Day. Patient takes one 8-2 tablet three times a day., Disp: , Rfl:   •  diazePAM (VALIUM) 5 MG tablet, Take  by mouth Every 8 (Eight) Hours As Needed., Disp: , Rfl:   •  FLUoxetine (PROzac) 20 MG capsule, Take 20 mg by mouth Daily., Disp: , Rfl:   •  gabapentin (NEURONTIN) 600 MG tablet, 300 mg 3 (Three) Times a Day., Disp: , Rfl:   •  promethazine (PHENERGAN) 25 MG suppository, Insert 1 suppository into the rectum Every 6 (Six) Hours As Needed for Nausea or Vomiting., Disp: 12 suppository, Rfl: 0  •  cyanocobalamin (VITAMIN B-12) 1000 MCG tablet, Take 1 tablet by mouth Daily., Disp: 30 tablet, Rfl: 5  •  folic acid (Folate) 400 MCG tablet, Take 1 tablet by mouth Daily., Disp: 30 tablet, Rfl: 5  •  guaiFENesin (MUCINEX) 600 MG 12 hr tablet, Take 1 tablet by mouth 2 (Two) Times a Day., Disp: 30 tablet, Rfl: 0  •  ondansetron (ZOFRAN) 4 MG tablet, Take 1 tablet by mouth Every 8 (Eight) Hours As Needed for Nausea or Vomiting., Disp: 21 tablet, Rfl: 0  •  thiamine (VITAMIN B1) 100 MG tablet, Take 1 tablet by mouth Daily., Disp: 30 tablet, Rfl: 5     Diagnostic Data    Lab Results (last 24 hours)     Procedure Component Value Units Date/Time    Lipase [[id number]]  (Abnormal) Collected: 01/08/21 0720    Specimen: Blood Updated: 01/08/21 0802     Lipase 802 U/L     Comprehensive Metabolic Panel [[id number]]  (Abnormal) Collected: 01/08/21 0720    Specimen: Blood Updated: 01/08/21 0758     Glucose 83 mg/dL      BUN 10 mg/dL      Creatinine 0.51 mg/dL      Sodium 136 mmol/L      Potassium 3.0 mmol/L      Chloride 103 mmol/L      CO2 21.0 mmol/L      Calcium 8.9  Quality 47: Advance Care Plan: Advance Care Planning discussed and documented; advance care plan or surrogate decision maker documented in the medical record. mg/dL      Total Protein 6.1 g/dL      Albumin 3.40 g/dL      ALT (SGPT) 52 U/L      AST (SGOT) 59 U/L      Alkaline Phosphatase 86 U/L      Total Bilirubin 0.9 mg/dL      eGFR Non African Amer 138 mL/min/1.73      Globulin 2.7 gm/dL      A/G Ratio 1.3 g/dL      BUN/Creatinine Ratio 19.6     Anion Gap 12.0 mmol/L     Narrative:      GFR Normal >60  Chronic Kidney Disease <60  Kidney Failure <15      Amylase [[id number]]  (Abnormal) Collected: 01/08/21 0720    Specimen: Blood Updated: 01/08/21 0754     Amylase 172 U/L     Hemoglobin A1c [[id number]]  (Abnormal) Collected: 01/08/21 0720    Specimen: Blood Updated: 01/08/21 0750     Hemoglobin A1C 4.40 %     Narrative:      Hemoglobin A1C Ranges:    Increased Risk for Diabetes  5.7% to 6.4%  Diabetes                     >= 6.5%  Diabetic Goal                < 7.0%    Calcium, Ionized [[id number]]  (Normal) Collected: 01/08/21 0720    Specimen: Blood Updated: 01/08/21 0728     Ionized Calcium 4.60 mg/dL     CBC & Differential [[id number]]  (Abnormal) Collected: 01/08/21 0720    Specimen: Blood Updated: 01/08/21 0727    Narrative:      The following orders were created for panel order CBC & Differential.  Procedure                               Abnormality         Status                     ---------                               -----------         ------                     CBC Auto Differential[[id number]]        Abnormal            Final result                 Please view results for these tests on the individual orders.    CBC Auto Differential [[id number]]  (Abnormal) Collected: 01/08/21 0720    Specimen: Blood Updated: 01/08/21 0727     WBC 7.98 10*3/mm3      RBC 3.56 10*6/mm3      Hemoglobin 13.1 g/dL      Hematocrit 37.3 %      .8 fL      MCH 36.8 pg      MCHC 35.1 g/dL      RDW 12.5 %      RDW-SD 49.0 fl      MPV 10.3 fL      Platelets 110 10*3/mm3      Neutrophil % 69.2 %      Lymphocyte % 21.7 %      Monocyte % 8.0 %      Eosinophil % 0.4 %      Basophil %  0.4 %      Immature Grans % 0.3 %      Neutrophils, Absolute 5.53 10*3/mm3      Lymphocytes, Absolute 1.73 10*3/mm3      Monocytes, Absolute 0.64 10*3/mm3      Eosinophils, Absolute 0.03 10*3/mm3      Basophils, Absolute 0.03 10*3/mm3      Immature Grans, Absolute 0.02 10*3/mm3      nRBC 0.0 /100 WBC     [place] Draw [[id number]] Collected: 01/07/21 0756    Specimen: Blood Updated: 01/08/21 0725    Narrative:      The following orders were created for panel order [place] Draw.  Procedure                               Abnormality         Status                     ---------                               -----------         ------                     Light Blue Top[[id number]]                                   Final result               Green Top (Gel)[[id number]]                                  Final result               Lavender Top[[id number]]                                     Final result               Gold Top - SST[[id number]]                                                                Please view results for these tests on the individual orders.    Vitamin D 25 Hydroxy [[id number]]  (Abnormal) Collected: 01/07/21 1837    Specimen: Blood Updated: 01/08/21 0427     25 Hydroxy, Vitamin D 16.0 ng/ml     Narrative:      Reference Range for Total Vitamin D 25(OH)     Deficiency <20.0 ng/mL   Insufficiency 21-29 ng/mL   Sufficiency  ng/mL  Toxicity >100 ng/ml    Results may be falsely increased if patient taking Biotin.      PTH, Intact [[id number]]  (Normal) Collected: 01/07/21 1837    Specimen: Blood Updated: 01/07/21 1959     PTH, Intact 20.9 pg/mL     Narrative:      Results may be falsely decreased if patient taking Biotin.      Vitamin D 1,25 Dihydroxy [[id number]] Collected: 01/07/21 1837    Specimen: Blood Updated: 01/07/21 1837    Triglycerides [[id number]]  (Normal) Collected: 01/07/21 0756    Specimen: Blood Updated: 01/07/21 1359     Triglycerides 142 mg/dL     Urinalysis With Culture If Indicated -  Quality 110: Preventive Care And Screening: Influenza Immunization: Influenza Immunization Administered during Influenza season Quality 226: Preventive Care And Screening: Tobacco Use: Screening And Cessation Intervention: Patient screened for tobacco use and is an ex/non-smoker Urine, Clean Catch [[id number]]  (Abnormal) Collected: 01/07/21 1143    Specimen: Urine, Clean Catch Updated: 01/07/21 1244     Color, UA Yellow     Appearance, UA Clear     pH, UA 6.0     Specific Gravity, UA 1.046     Comment: Confirmed by refractometer.        Glucose, UA Negative     Ketones, UA 80 mg/dL (3+)     Bilirubin, UA Moderate (2+)     Blood, UA Trace     Protein,  mg/dL (2+)     Leuk Esterase, UA Negative     Nitrite, UA Negative     Urobilinogen, UA 0.2 E.U./dL    Urinalysis, Microscopic Only - Urine, Clean Catch [[id number]]  (Abnormal) Collected: 01/07/21 1143    Specimen: Urine, Clean Catch Updated: 01/07/21 1240     RBC, UA 0-2 /HPF      WBC, UA 0-2 /HPF      Bacteria, UA 3+ /HPF      Squamous Epithelial Cells, UA 3-5 /HPF      Hyaline Casts, UA None Seen /LPF      Methodology Manual Light Microscopy    Urine Drug Screen - Urine, Clean Catch [[id number]]  (Abnormal) Collected: 01/07/21 1143    Specimen: Urine, Clean Catch Updated: 01/07/21 1226     THC, Screen, Urine Positive     Phencyclidine (PCP), Urine Negative     Cocaine Screen, Urine Negative     Methamphetamine, Ur Negative     Opiate Screen Positive     Amphetamine Screen, Urine Negative     Benzodiazepine Screen, Urine Positive     Tricyclic Antidepressants Screen Negative     Methadone Screen, Urine Negative     Barbiturates Screen, Urine Negative     Oxycodone Screen, Urine Negative     Propoxyphene Screen Negative     Buprenorphine, Screen, Urine Positive    Narrative:      Cutoff For Drugs Screened:    Amphetamines               500 ng/ml  Barbiturates               200 ng/ml  Benzodiazepines            150 ng/ml  Cocaine                    150 ng/ml  Methadone                  200 ng/ml  Opiates                    100 ng/ml  Phencyclidine               25 ng/ml  THC                            50 ng/ml  Methamphetamine            500 ng/ml  Tricyclic Antidepressants  300 ng/ml  Oxycodone                  100 ng/ml  Propoxyphene                300 ng/ml  Buprenorphine               10 ng/ml    The normal value for all drugs tested is negative. This report includes unconfirmed screening results, with the cutoff values listed, to be used for medical treatment purposes only.  Unconfirmed results must not be used for non-medical purposes such as employment or legal testing.  Clinical consideration should be applied to any drug of abuse test, particularly when unconfirmed results are used.      Extra Tubes [[id number]] Collected: 01/07/21 0836    Specimen: Blood, Venous Line Updated: 01/07/21 0945    Narrative:      The following orders were created for panel order Extra Tubes.  Procedure                               Abnormality         Status                     ---------                               -----------         ------                     Light Blue Top[[id number]]                                   Final result               Gold Top - SST[[id number]]                                   Final result               Green Top (Gel)[[id number]]                                  Final result                 Please view results for these tests on the individual orders.    Gold Top - SST [[id number]] Collected: 01/07/21 0836    Specimen: Blood Updated: 01/07/21 0945     Extra Tube Hold for add-ons.     Comment: Auto resulted.       Green Top (Gel) [[id number]] Collected: 01/07/21 0836    Specimen: Blood Updated: 01/07/21 0945     Extra Tube Hold for add-ons.     Comment: Auto resulted.       Light Blue Top [[id number]] Collected: 01/07/21 0836    Specimen: Blood Updated: 01/07/21 0945     Extra Tube hold for add-on     Comment: Auto resulted       hCG, Quantitative, Pregnancy [[id number]] Collected: 01/07/21 0756    Specimen: Blood Updated: 01/07/21 0940     HCG Quantitative <0.50 mIU/mL     Narrative:      HCG Ranges by Gestational Age    Females - non-pregnant premenopausal   </= 1mIU/mL HCG  Females - postmenopausal               </= 7mIU/mL HCG    3  Weeks         5.8 -    71.2 mIU/mL  4 Weeks         9.5 -     750 mIU/mL  5 Weeks         217 -   7,138 mIU/mL  6 Weeks         158 -  31,795 mIU/mL  7 Weeks       3,697 - 163,563 mIU/mL  8 Weeks      32,065 - 149,571 mIU/mL  9 Weeks      63,803 - 151,410 mIU/mL  10 Weeks     46,509 - 186,977 mIU/mL  12 Weeks     27,832 - 210,612 mIU/mL  14 Weeks     13,950 -  62,530 mIU/mL  15 Weeks     12,039 -  70,971 mIU/mL  16 Weeks      9,040 -  56,451 mIU/mL  17 Weeks      8,175 -  55,868 mIU/mL  18 Weeks      8,099 -  58,176 mIU/mL  Results may be falsely decreased if patient taking Biotin.      Light Blue Top [[id number]] Collected: 01/07/21 0756    Specimen: Blood Updated: 01/07/21 0901     Extra Tube hold for add-on     Comment: Auto resulted       Green Top (Gel) [[id number]] Collected: 01/07/21 0756    Specimen: Blood Updated: 01/07/21 0901     Extra Tube Hold for add-ons.     Comment: Auto resulted.       Lavender Top [[id number]] Collected: 01/07/21 0756    Specimen: Blood Updated: 01/07/21 0901     Extra Tube hold for add-on     Comment: Auto resulted       Lipase [[id number]]  (Abnormal) Collected: 01/07/21 0756    Specimen: Blood Updated: 01/07/21 0854     Lipase 1,446 U/L     CBC & Differential [[id number]]  (Abnormal) Collected: 01/07/21 0834    Specimen: Blood Updated: 01/07/21 0852    Narrative:      The following orders were created for panel order CBC & Differential.  Procedure                               Abnormality         Status                     ---------                               -----------         ------                     Scan Slide[[id number]]                                                                  CBC Auto Differential[[id number]]        Abnormal            Final result                 Please view results for these tests on the individual orders.    CBC Auto Differential [[id number]]  (Abnormal) Collected: 01/07/21 0834    Specimen: Blood Updated: 01/07/21 0851     WBC 11.06 10*3/mm3       RBC 4.43 10*6/mm3      Hemoglobin 16.2 g/dL      Comment: SPECIMEN RECOLLECTED/REANALYZED TO CONFIRM HGB RESULTS        Hematocrit 44.7 %      .9 fL      MCH 36.6 pg      MCHC 36.2 g/dL      RDW 12.3 %      RDW-SD 46.2 fl      MPV 10.2 fL      Platelets 136 10*3/mm3      Neutrophil % 81.5 %      Lymphocyte % 9.8 %      Monocyte % 8.1 %      Eosinophil % 0.0 %      Basophil % 0.3 %      Immature Grans % 0.3 %      Neutrophils, Absolute 9.02 10*3/mm3      Lymphocytes, Absolute 1.08 10*3/mm3      Monocytes, Absolute 0.90 10*3/mm3      Eosinophils, Absolute 0.00 10*3/mm3      Basophils, Absolute 0.03 10*3/mm3      Immature Grans, Absolute 0.03 10*3/mm3      nRBC 0.0 /100 WBC     COVID-19 and FLU A/B PCR - Swab, Nasopharynx [[id number]]  (Normal) Collected: 01/07/21 0816    Specimen: Swab from Nasopharynx Updated: 01/07/21 0850     COVID19 Not Detected     Influenza A PCR Not Detected     Influenza B PCR Not Detected    Narrative:      Fact sheet for providers: https://www.fda.gov/media/450666/download    Fact sheet for patients: https://www.fda.gov/media/593450/download    Test performed by PCR.    Comprehensive Metabolic Panel [[id number]]  (Abnormal) Collected: 01/07/21 0756    Specimen: Blood Updated: 01/07/21 0850     Glucose 136 mg/dL      BUN 22 mg/dL      Creatinine 0.99 mg/dL      Sodium 129 mmol/L      Potassium 3.6 mmol/L      Comment: Slight hemolysis detected by analyzer. Results may be affected.        Chloride 93 mmol/L      CO2 15.0 mmol/L      Calcium 11.4 mg/dL      Total Protein 8.8 g/dL      Albumin 4.80 g/dL      ALT (SGPT) 84 U/L      AST (SGOT) 69 U/L      Comment: Slight hemolysis detected by analyzer. Results may be affected.        Alkaline Phosphatase 136 U/L      Total Bilirubin 1.2 mg/dL      eGFR Non African Amer 64 mL/min/1.73      Globulin 4.0 gm/dL      A/G Ratio 1.2 g/dL      BUN/Creatinine Ratio 22.2     Anion Gap 21.0 mmol/L     Narrative:      GFR Normal >60  Chronic Kidney  Disease <60  Kidney Failure <15      TSH [[id number]]  (Normal) Collected: 01/07/21 0756    Specimen: Blood Updated: 01/07/21 0849     TSH 2.280 uIU/mL     Troponin [[id number]]  (Normal) Collected: 01/07/21 0756    Specimen: Blood Updated: 01/07/21 0849     Troponin T <0.010 ng/mL     Narrative:      Troponin T Reference Range:  <= 0.03 ng/mL-   Negative for AMI  >0.03 ng/mL-     Abnormal for myocardial necrosis.  Clinicians would have to utilize clinical acumen, EKG, Troponin and serial changes to determine if it is an Acute Myocardial Infarction or myocardial injury due to an underlying chronic condition.       Results may be falsely decreased if patient taking Biotin.      Magnesium [[id number]]  (Normal) Collected: 01/07/21 0756    Specimen: Blood Updated: 01/07/21 0844     Magnesium 2.1 mg/dL     Ethanol [[id number]] Collected: 01/07/21 0756    Specimen: Blood Updated: 01/07/21 0844     Ethanol <10 mg/dL      Ethanol % <0.010 %            Imaging Results (Last 24 Hours)     Procedure Component Value Units Date/Time    CT Abdomen Pelvis With Contrast [[id number]] Collected: 01/07/21 0947     Updated: 01/07/21 1034    Narrative:      CT abdomen, pelvis with contrast.         CLINICAL INDICATION: Epigastric pain. History of pancreatitis.    COMPARISON: CT abdomen September 9, 2019.    TECHNIQUE: 75 mL Isovue-300  nonionic IV contrast. Helical  scanning with axial and coronal reformations.  Soft tissue, lung,  and bone windows reviewed.    This exam was performed according to our departmental  dose-optimization program, which includes automated exposure  control, adjustment of the mA and/or kV according to patient size  and/or use of iterative reconstruction technique.    ABDOMEN CT FINDINGS: The visualized lung bases show right lower  lobe infiltrative changes either atelectasis or pneumonitis.  Interval development of thickening of the distal esophagus just  above the gastroesophageal junction. Consider upper  GI,  esophagram or endoscopy for further evaluation.    Liver,  spleen, pancreas, adrenal glands and kidneys are normal  in appearance. Phrygian cap in the gallbladder fundus,  gallbladder otherwise unremarkable. Common bile duct is somewhat  dilated 0.9 cm. This however is similar to  prior CT examination  September 9, 2019. Bowel grossly unremarkable.    No evidence of pathologically enlarged nodes, free air, or free  fluid. Degenerative changes of the spine.  The bones are grossly unremarkable.    PELVIS CT FINDINGS: Simple 2.55 cm right ovarian cyst. No  follow-up is necessary. No evidence of pathologically enlarged  nodes, free air, or free fluid.     The bones are grossly unremarkable.      Impression:      Thickening of the distal esophagus just above the  gastroesophageal junction. This demonstrates a change since prior  CT examination. Consider upper GI examination, esophagram or  endoscopy for further evaluation.      Normal pancreas. No findings suggesting acute pancreatitis    Phrygian cap gallbladder fundus. This is usually a normal  variant.    Dilated common bile duct 0.9 cm. This however is unchanged since  prior CT exam. Normal pancreas. No findings suggesting acute  pancreatitis.    2.55 cm right ovarian cyst. No follow-up is necessary. CT  abdomen, pelvis with contrast is otherwise unremarkable.    Electronically signed by:  Jorge Mullen MD  1/7/2021 10:33 AM CST  Workstation: [id number]    XR Chest 2 View [[id number]] Collected: 01/07/21 0834     Updated: 01/07/21 0859    Narrative:      PROCEDURE: Two-view chest x-ray    COMPARISON: None.    HISTORY: palp    FINDINGS: Frontal and lateral views of the chest are obtained.     Devices: None    Lungs/Pleura: The lungs are clear    Cardiomediastinal structures: Normal         Impression:      CONCLUSION:    No acute cardiopulmonary disease    Electronically signed by:  Ranjit Marie MD  1/7/2021 8:58 AM CST  Workstation: [id number]          I reviewed  the patient's new clinical results.  I reviewed the patient's new imaging results and agree with the interpretation.  I reviewed the patient's other test results and agree with the interpretation    Assessment/Plan:     Active Hospital Problems    Diagnosis   • **Acute on chronic pancreatitis (CMS/HCC)     Patient has history of alcohol abuse, has several episodes of pancreatitis in the past, last one being last year.  Lipase elevated at 1446 in the ED , will trend the levels  CT abdomen and pelvis showed thickening of the distal esophagus just above the   gastroesophageal junction. There is no findings suggesting acute pancreatitis. -  consulted, will be getting EGD and MRCP today.   IV fluids at 125 Ml/hr         • Hypercalcemia     -Ca 11.4 on admission  -Vitamin D and PTH levels ordered     • Alcohol dependence (CMS/HCC)     Last drink was 4 days ago  Alcohol cessation counseling was provided  Ativan and CIWA protocol-  Ativan 1 mg Q6H   -IV thiamine  -Continuing folate and B12     • Opioid use disorder (CMS/HCC)     -Continue gabapentin  - Cannot give Suboxone in the hospital as she is getting opoid pain medication for abdominal pain.      • Anxiety and depression     Will continue current home medications  -Diazepam 5 mg tablet  - Fluoxetine 20 mg capsule      • Dependence on nicotine from cigarettes     - Nicotine patches      • Hyponatremia     -Na 129 on admission. Corrected to 130 with glucose 138  -Continue to monitor     • Hypokalemia     - Mg++, K+ runs x6  -We will monitor and replace as needed    Results from last 7 days   Lab Units 01/08/21  0720 01/07/21  0756   POTASSIUM mmol/L 3.0* 3.6   ]           DVT prophylaxis: SCDs/TEDs  Code Status and Medical Interventions:   Ordered at: 01/07/21 1151     Level Of Support Discussed With:    Patient     Code Status:    CPR     Medical Interventions (Level of Support Prior to Arrest):    Full       Plan for disposition:Where: home and When:  1-2days  based on endoscopy results.       Time: 15 mins         Erica Jacob MD PGY-1  [facility]   This document has been electronically signed by Erica Jacob MD on January 8, 2021 08:19 CST

## 2021-01-08 NOTE — ANESTHESIA PREPROCEDURE EVALUATION
Anesthesia Evaluation     Patient summary reviewed and Nursing notes reviewed   no history of anesthetic complications:  NPO Solid Status: > 8 hours  NPO Liquid Status: > 8 hours           Airway   Mallampati: III  TM distance: >3 FB  Neck ROM: full  Possible difficult intubation  Dental - normal exam     Pulmonary - normal exam   (+) a smoker Current Abstained day of surgery,   Cardiovascular - normal exam  Exercise tolerance: excellent (>7 METS)    (-) angina, JORDAN      Neuro/Psych  (+) seizures well controlled, psychiatric history Anxiety and Depression,     GI/Hepatic/Renal/Endo    (+)  GERD poorly controlled,      Musculoskeletal     Abdominal  - normal exam   Substance History   (+) alcohol use,   (-) drug use     OB/GYN          Other                        Anesthesia Plan    ASA 3     MAC     intravenous induction     Anesthetic plan, all risks, benefits, and alternatives have been provided, discussed and informed consent has been obtained with: patient.

## 2021-01-09 PROBLEM — E83.52 HYPERCALCEMIA: Status: RESOLVED | Noted: 2021-01-07 | Resolved: 2021-01-09

## 2021-01-09 PROBLEM — E87.1 HYPONATREMIA: Status: RESOLVED | Noted: 2020-06-01 | Resolved: 2021-01-09

## 2021-01-09 LAB
ALBUMIN SERPL-MCNC: 3.2 G/DL (ref 3.5–5.2)
ALBUMIN/GLOB SERPL: 1.5 G/DL
ALP SERPL-CCNC: 81 U/L (ref 39–117)
ALT SERPL W P-5'-P-CCNC: 41 U/L (ref 1–33)
ANION GAP SERPL CALCULATED.3IONS-SCNC: 10 MMOL/L (ref 5–15)
AST SERPL-CCNC: 43 U/L (ref 1–32)
BASOPHILS # BLD AUTO: 0.04 10*3/MM3 (ref 0–0.2)
BASOPHILS NFR BLD AUTO: 0.7 % (ref 0–1.5)
BILIRUB SERPL-MCNC: 0.6 MG/DL (ref 0–1.2)
BUN SERPL-MCNC: 5 MG/DL (ref 6–20)
BUN/CREAT SERPL: 14.3 (ref 7–25)
CA-I BLD-MCNC: 4.87 MG/DL (ref 4.6–5.6)
CALCIUM SPEC-SCNC: 9.2 MG/DL (ref 8.6–10.5)
CHLORIDE SERPL-SCNC: 103 MMOL/L (ref 98–107)
CO2 SERPL-SCNC: 23 MMOL/L (ref 22–29)
CREAT SERPL-MCNC: 0.35 MG/DL (ref 0.57–1)
DEPRECATED RDW RBC AUTO: 48.7 FL (ref 37–54)
EOSINOPHIL # BLD AUTO: 0.07 10*3/MM3 (ref 0–0.4)
EOSINOPHIL NFR BLD AUTO: 1.2 % (ref 0.3–6.2)
ERYTHROCYTE [DISTWIDTH] IN BLOOD BY AUTOMATED COUNT: 12.4 % (ref 12.3–15.4)
GFR SERPL CREATININE-BSD FRML MDRD: >150 ML/MIN/1.73
GLOBULIN UR ELPH-MCNC: 2.2 GM/DL
GLUCOSE SERPL-MCNC: 68 MG/DL (ref 65–99)
HCT VFR BLD AUTO: 34.6 % (ref 34–46.6)
HGB BLD-MCNC: 12 G/DL (ref 12–15.9)
IMM GRANULOCYTES # BLD AUTO: 0.02 10*3/MM3 (ref 0–0.05)
IMM GRANULOCYTES NFR BLD AUTO: 0.3 % (ref 0–0.5)
LIPASE SERPL-CCNC: 500 U/L (ref 13–60)
LYMPHOCYTES # BLD AUTO: 1.91 10*3/MM3 (ref 0.7–3.1)
LYMPHOCYTES NFR BLD AUTO: 32.2 % (ref 19.6–45.3)
MCH RBC QN AUTO: 36.6 PG (ref 26.6–33)
MCHC RBC AUTO-ENTMCNC: 34.7 G/DL (ref 31.5–35.7)
MCV RBC AUTO: 105.5 FL (ref 79–97)
MONOCYTES # BLD AUTO: 0.43 10*3/MM3 (ref 0.1–0.9)
MONOCYTES NFR BLD AUTO: 7.2 % (ref 5–12)
NEUTROPHILS NFR BLD AUTO: 3.47 10*3/MM3 (ref 1.7–7)
NEUTROPHILS NFR BLD AUTO: 58.4 % (ref 42.7–76)
NRBC BLD AUTO-RTO: 0 /100 WBC (ref 0–0.2)
PLATELET # BLD AUTO: 113 10*3/MM3 (ref 140–450)
PMV BLD AUTO: 10.7 FL (ref 6–12)
POTASSIUM SERPL-SCNC: 2.9 MMOL/L (ref 3.5–5.2)
PROT SERPL-MCNC: 5.4 G/DL (ref 6–8.5)
RBC # BLD AUTO: 3.28 10*6/MM3 (ref 3.77–5.28)
SODIUM SERPL-SCNC: 136 MMOL/L (ref 136–145)
WBC # BLD AUTO: 5.94 10*3/MM3 (ref 3.4–10.8)

## 2021-01-09 PROCEDURE — 82330 ASSAY OF CALCIUM: CPT | Performed by: INTERNAL MEDICINE

## 2021-01-09 PROCEDURE — 80053 COMPREHEN METABOLIC PANEL: CPT | Performed by: INTERNAL MEDICINE

## 2021-01-09 PROCEDURE — 85025 COMPLETE CBC W/AUTO DIFF WBC: CPT | Performed by: INTERNAL MEDICINE

## 2021-01-09 PROCEDURE — 25010000002 MORPHINE PER 10 MG: Performed by: INTERNAL MEDICINE

## 2021-01-09 PROCEDURE — 25010000002 MAGNESIUM SULFATE IN D5W 1G/100ML (PREMIX) 1-5 GM/100ML-% SOLUTION: Performed by: STUDENT IN AN ORGANIZED HEALTH CARE EDUCATION/TRAINING PROGRAM

## 2021-01-09 PROCEDURE — 83690 ASSAY OF LIPASE: CPT | Performed by: STUDENT IN AN ORGANIZED HEALTH CARE EDUCATION/TRAINING PROGRAM

## 2021-01-09 PROCEDURE — 99232 SBSQ HOSP IP/OBS MODERATE 35: CPT | Performed by: STUDENT IN AN ORGANIZED HEALTH CARE EDUCATION/TRAINING PROGRAM

## 2021-01-09 RX ORDER — POTASSIUM CHLORIDE 750 MG/1
40 CAPSULE, EXTENDED RELEASE ORAL
Status: COMPLETED | OUTPATIENT
Start: 2021-01-09 | End: 2021-01-10

## 2021-01-09 RX ORDER — POTASSIUM CHLORIDE 7.45 MG/ML
10 INJECTION INTRAVENOUS
Status: DISCONTINUED | OUTPATIENT
Start: 2021-01-09 | End: 2021-01-09

## 2021-01-09 RX ORDER — MAGNESIUM SULFATE 1 G/100ML
1 INJECTION INTRAVENOUS ONCE
Status: COMPLETED | OUTPATIENT
Start: 2021-01-09 | End: 2021-01-09

## 2021-01-09 RX ADMIN — SODIUM CHLORIDE 125 ML/HR: 900 INJECTION, SOLUTION INTRAVENOUS at 14:23

## 2021-01-09 RX ADMIN — LORAZEPAM 1 MG: 0.5 TABLET ORAL at 00:14

## 2021-01-09 RX ADMIN — MELATONIN 5.25 MG: at 22:04

## 2021-01-09 RX ADMIN — POTASSIUM CHLORIDE 40 MEQ: 10 CAPSULE, COATED, EXTENDED RELEASE ORAL at 17:03

## 2021-01-09 RX ADMIN — THIAMINE HCL TAB 100 MG 100 MG: 100 TAB at 08:25

## 2021-01-09 RX ADMIN — MORPHINE SULFATE 2 MG: 2 INJECTION, SOLUTION INTRAMUSCULAR; INTRAVENOUS at 11:33

## 2021-01-09 RX ADMIN — CYANOCOBALAMIN TAB 1000 MCG 1000 MCG: 1000 TAB at 08:25

## 2021-01-09 RX ADMIN — MORPHINE SULFATE 2 MG: 2 INJECTION, SOLUTION INTRAMUSCULAR; INTRAVENOUS at 21:05

## 2021-01-09 RX ADMIN — LORAZEPAM 1 MG: 0.5 TABLET ORAL at 22:04

## 2021-01-09 RX ADMIN — NICOTINE 1 PATCH: 21 PATCH, EXTENDED RELEASE TRANSDERMAL at 14:25

## 2021-01-09 RX ADMIN — SODIUM CHLORIDE 125 ML/HR: 900 INJECTION, SOLUTION INTRAVENOUS at 21:06

## 2021-01-09 RX ADMIN — PANTOPRAZOLE SODIUM 40 MG: 40 INJECTION, POWDER, FOR SOLUTION INTRAVENOUS at 05:23

## 2021-01-09 RX ADMIN — LORAZEPAM 1 MG: 0.5 TABLET ORAL at 08:26

## 2021-01-09 RX ADMIN — MORPHINE SULFATE 2 MG: 2 INJECTION, SOLUTION INTRAMUSCULAR; INTRAVENOUS at 07:34

## 2021-01-09 RX ADMIN — MORPHINE SULFATE 2 MG: 2 INJECTION, SOLUTION INTRAMUSCULAR; INTRAVENOUS at 01:02

## 2021-01-09 RX ADMIN — Medication 500 MCG: at 09:57

## 2021-01-09 RX ADMIN — POTASSIUM CHLORIDE 40 MEQ: 10 CAPSULE, COATED, EXTENDED RELEASE ORAL at 11:33

## 2021-01-09 RX ADMIN — HYDROCODONE BITARTRATE AND ACETAMINOPHEN 1 TABLET: 5; 325 TABLET ORAL at 18:00

## 2021-01-09 RX ADMIN — GABAPENTIN 600 MG: 300 CAPSULE ORAL at 21:05

## 2021-01-09 RX ADMIN — SODIUM CHLORIDE 125 ML/HR: 900 INJECTION, SOLUTION INTRAVENOUS at 00:14

## 2021-01-09 RX ADMIN — FLUOXETINE 20 MG: 20 CAPSULE ORAL at 08:25

## 2021-01-09 RX ADMIN — MORPHINE SULFATE 2 MG: 2 INJECTION, SOLUTION INTRAMUSCULAR; INTRAVENOUS at 17:03

## 2021-01-09 RX ADMIN — SODIUM CHLORIDE, PRESERVATIVE FREE 10 ML: 5 INJECTION INTRAVENOUS at 08:25

## 2021-01-09 RX ADMIN — MELATONIN 5.25 MG: at 02:16

## 2021-01-09 RX ADMIN — HYDROCODONE BITARTRATE AND ACETAMINOPHEN 1 TABLET: 5; 325 TABLET ORAL at 08:26

## 2021-01-09 RX ADMIN — POTASSIUM CHLORIDE 40 MEQ: 10 CAPSULE, COATED, EXTENDED RELEASE ORAL at 09:56

## 2021-01-09 RX ADMIN — MAGNESIUM SULFATE HEPTAHYDRATE 1 G: 1 INJECTION, SOLUTION INTRAVENOUS at 09:57

## 2021-01-09 RX ADMIN — SODIUM CHLORIDE 125 ML/HR: 900 INJECTION, SOLUTION INTRAVENOUS at 06:20

## 2021-01-09 RX ADMIN — LORAZEPAM 1 MG: 0.5 TABLET ORAL at 18:01

## 2021-01-09 NOTE — CONSULTS
Adult Nutrition  Assessment    Patient Name:  Nata Bain  YOB: 1985  MRN: [id number]  Admit Date:  1/7/2021    Assessment Date:  1/9/2021    Comments: Chart review revealed 34WF admitted due to acute on chronic pancreatitis/alcohol dependence. Ht 67 in. Wt 124 lb--down from 136 year ago. BMI 19.4 prompted RDN assessment. Alb 3.2. Attempted phone contact--no answer.   Calorie needs 2714-3273  Protein needs 56-67 gm/day  Fluid needs 1680 ml/day  Adding Boost Plus and/or Magic Cup to Full Liquid trays.    Reason for Assessment     Row Name 01/09/21 1646          Reason for Assessment    Reason For Assessment  per organizational policy     Diagnosis  endocrine conditions pancreatitis     Identified At Risk by Screening Criteria  BMI             Labs/Tests/Procedures/Meds     Row Name 01/09/21 8465          Labs/Procedures/Meds    Lab Results Reviewed  reviewed, pertinent        Diagnostic Tests/Procedures    Diagnostic Test/Procedure Reviewed  reviewed, pertinent        Medications    Pertinent Medications Reviewed  reviewed, pertinent                       Electronically signed by:  Selena Reis RD  01/09/21 16:49 CST

## 2021-01-09 NOTE — PLAN OF CARE
Goal Outcome Evaluation:  Plan of Care Reviewed With: patient  Progress: improving  Outcome Summary: prn pain meds given; pt was sleeping well until got phone call from home that made her more anxious. ciwa scale followed and noted tremors. gave po ativan. monitoring bp. will continue to monitor.

## 2021-01-09 NOTE — PLAN OF CARE
Goal Outcome Evaluation:  Plan of Care Reviewed With: patient  Progress: improving  Outcome Summary: Patient came up from ER. BP elevated upon arriving to the floor, PRN hydralazine given with effectiveness. Went for EGD today. Remain NPO except with meds. Scheduled IV potassium given per order d/t potassium 3.0 today. PRN pain medication given per order. Will continue to monitor this pt until shift change report.

## 2021-01-09 NOTE — PLAN OF CARE
Problem: Nutrition Impaired (Pancreatitis)  Goal: Optimal Nutrition Intake  Outcome: Ongoing, Progressing   Goal Outcome Evaluation:  Plan of Care Reviewed With: patient  Progress: improving     Problem: Nutrition Impaired (Pancreatitis)  Goal: Optimal Nutrition Intake  Outcome: Ongoing, Progressing   Encounter Information     Department Encounter #   1/7/2021  7:45 AM [address] [id number]   Selena Reis RD   Registered Dietitian   Specialty:  Nutrition   Consults   Signed   Date of Service:  01/09/21 1648   Creation Time:  01/09/21 1648            Signed             Show:Clear all  [x]Manual[x]Template[]Copied    Added by:  [x]Selena Reis RD    []Maicol for details  Adult Nutrition  Assessment     Patient Name:  Nata Bain  YOB: 1985  MRN: [id number]  Admit Date:  1/7/2021     Assessment Date:  1/9/2021     Comments: Chart review revealed 34WF admitted due to acute on chronic pancreatitis/alcohol dependence. Ht 67 in. Wt 124 lb--down from 136 year ago. BMI 19.4 prompted RDN assessment. Alb 3.2. Attempted phone contact--no answer.   Calorie needs 1935-6200  Protein needs 56-67 gm/day  Fluid needs 1680 ml/day  Adding Boost Plus and/or Magic Cup to Full Liquid trays.           Reason for Assessment      Row Name 01/09/21 1641                 Reason for Assessment     Reason For Assessment  per organizational policy       Diagnosis  endocrine conditions pancreatitis       Identified At Risk by Screening Criteria  BMI                        Labs/Tests/Procedures/Meds      Row Name 01/09/21 5250                 Labs/Procedures/Meds     Lab Results Reviewed  reviewed, pertinent              Diagnostic Tests/Procedures     Diagnostic Test/Procedure Reviewed  reviewed, pertinent              Medications     Pertinent Medications Reviewed  reviewed, pertinent                                 Electronically signed by:  Selena Reis RD  01/09/21 16:49 CST              ED to  Hosp-Admission (Current) on 1/7/2021      Detailed Report

## 2021-01-09 NOTE — PROGRESS NOTES
Discharge Planning Assessment  [facility]     Patient Name: Nata Bain  MRN: [id number]  Today's Date: 1/9/2021    Admit Date: 1/7/2021    Discharge Needs Assessment    No documentation.       Discharge Plan     Row Name 01/09/21 0912       Plan    Plan Comments  Consult complete. Delivered resource list for substance abuse to patient's room. Tearful but talkative. She stated she has a lot of issues right now. Grandmother and Aunt recently passed. She and  are having some marital issues. She stated that  drinks quite a bit. He works at a liquor store so it is easy access and he brings it home for her everyday and it is hard to turn it down.  She stated that she does go to a suboxone clinic and has a counselor there she can talk to also. ABI Pederson, [facility]        Continued Care and Services - Admitted Since 1/7/2021    Coordination has not been started for this encounter.         Demographic Summary    No documentation.       Functional Status    No documentation.       Psychosocial    No documentation.       Abuse/Neglect    No documentation.       Legal    No documentation.       Substance Abuse    No documentation.       Patient Forms    No documentation.           Jennifer Lemons RN

## 2021-01-09 NOTE — PLAN OF CARE
Goal Outcome Evaluation:  Plan of Care Reviewed With: patient  Progress: improving  Outcome Summary: Patient up ad prashanth. Vss. PRN pain medications given. Remains on CIWA scale, PO ativan given. Replaced magnesium per one time dose. Upgraded to a regular, GI soft diet tonight. Will continue to monitor this pt until shift chagobey report.

## 2021-01-09 NOTE — PROGRESS NOTES
FAMILY MEDICINE DAILY PROGRESS NOTE    NAME: Nata Bain  : 1985  MRN: [id number]      LOS: 2 days     PROVIDER OF SERVICE: Christos Vyas MD    Chief Complaint: Acute on chronic pancreatitis (CMS/HCC)    Subjective:     Interval History:  History taken from: patient chart    Vital signs stable overnight with no acute events.  Afebrile.  EGD yesterday with biopsies taken.  Feels well today and denies any nausea, vomiting, abdominal pain.    Review of Systems:   Review of Systems   Constitutional: Negative for appetite change, diaphoresis, fatigue and fever.   HENT: Negative for ear pain, postnasal drip, rhinorrhea and sore throat.    Eyes: Negative for pain and visual disturbance.   Respiratory: Negative for cough, chest tightness and shortness of breath.    Cardiovascular: Negative for chest pain, palpitations and leg swelling.   Gastrointestinal: Negative for abdominal pain, constipation, diarrhea, nausea and vomiting.   Genitourinary: Negative for dysuria, flank pain, frequency and urgency.   Musculoskeletal: Negative for arthralgias, back pain and myalgias.   Skin: Negative for pallor and rash.   Neurological: Negative for dizziness and syncope.       Objective:     Vital Signs  Temp:  [97 °F (36.1 °C)-98.6 °F (37 °C)] 97 °F (36.1 °C)  Heart Rate:  [67-89] 68  Resp:  [16-18] 18  BP: ()/() 136/83  Body mass index is 19.42 kg/m².    Physical Exam  Physical Exam  Constitutional:       General: She is not in acute distress.     Appearance: She is well-developed. She is not ill-appearing, toxic-appearing or diaphoretic.   HENT:      Head: Normocephalic and atraumatic.      Nose: Nose normal.      Mouth/Throat:      Mouth: Mucous membranes are moist.   Eyes:      Conjunctiva/sclera: Conjunctivae normal.      Pupils: Pupils are equal, round, and reactive to light.   Neck:      Musculoskeletal: Normal range of motion and neck supple.      Thyroid: No thyromegaly.      Trachea: No  tracheal deviation.   Cardiovascular:      Rate and Rhythm: Normal rate and regular rhythm.      Heart sounds: Normal heart sounds. No murmur. No friction rub. No gallop.    Pulmonary:      Effort: Pulmonary effort is normal. No respiratory distress.      Breath sounds: Normal breath sounds. No wheezing.   Abdominal:      General: Bowel sounds are normal.      Palpations: Abdomen is soft.      Tenderness: There is no abdominal tenderness. There is no guarding.   Musculoskeletal: Normal range of motion.      Right lower leg: No edema.      Left lower leg: No edema.   Skin:     General: Skin is warm and dry.      Capillary Refill: Capillary refill takes less than 2 seconds.   Neurological:      General: No focal deficit present.      Mental Status: She is alert. Mental status is at baseline.      Motor: No abnormal muscle tone.         Medication Review    Current Facility-Administered Medications:   •  calcium carbonate (TUMS) chewable tablet 500 mg (200 mg elemental), 2 tablet, Oral, BID PRN, Mirian Mills MD, 2 tablet at 01/07/21 2236  •  dextrose 5 % and sodium chloride 0.45 % infusion, 30 mL/hr, Intravenous, Continuous PRN, Mirian Mills MD  •  diazePAM (VALIUM) tablet 5 mg, 5 mg, Oral, Q8H PRN, Mirian Mills MD, 5 mg at 01/08/21 0407  •  FLUoxetine (PROzac) capsule 20 mg, 20 mg, Oral, Daily, Mirian Mills MD, 20 mg at 01/09/21 0825  •  folic acid (FOLVITE) half tablet 500 mcg, 500 mcg, Oral, Daily, Mirian Mills MD, Stopped at 01/08/21 0917  •  gabapentin (NEURONTIN) capsule 600 mg, 600 mg, Oral, Nightly, Mirian Mills MD, 600 mg at 01/08/21 2043  •  hydrALAZINE (APRESOLINE) injection 10 mg, 10 mg, Intravenous, Q6H PRN, Mirian Mills MD, 10 mg at 01/08/21 1153  •  HYDROcodone-acetaminophen (NORCO) 5-325 MG per tablet 1 tablet, 1 tablet, Oral, Q4H PRN, Mirian Mills MD, 1 tablet at 01/09/21 0826  •  LORazepam (ATIVAN) tablet 1 mg, 1 mg, Oral, Q2H PRN, 1 mg at 01/09/21 0822  **OR** LORazepam (ATIVAN) injection 1 mg, 1 mg, Intravenous, Q2H PRN, 1 mg at 01/08/21 1451 **OR** LORazepam (ATIVAN) tablet 2 mg, 2 mg, Oral, Q1H PRN **OR** LORazepam (ATIVAN) injection 2 mg, 2 mg, Intravenous, Q1H PRN **OR** LORazepam (ATIVAN) injection 2 mg, 2 mg, Intravenous, Q15 Min PRN **OR** LORazepam (ATIVAN) injection 2 mg, 2 mg, Intramuscular, Q15 Min PRN, Mirian Mills MD  •  magnesium sulfate in D5W 1g/100mL (PREMIX), 1 g, Intravenous, Once, Christos Vyas MD  •  melatonin tablet 5.25 mg, 5.25 mg, Oral, Nightly PRN, Mirian Mills MD, 5.25 mg at 01/09/21 0216  •  morphine injection 2 mg, 2 mg, Intravenous, Q4H PRN, 2 mg at 01/09/21 0734 **AND** naloxone (NARCAN) injection 0.4 mg, 0.4 mg, Intravenous, Q5 Min PRN, Mirian Mills MD  •  nicotine (NICODERM CQ) 21 MG/24HR patch 1 patch, 1 patch, Transdermal, Q24H, Mirian Mills MD, 1 patch at 01/08/21 1451  •  ondansetron (ZOFRAN) injection 4 mg, 4 mg, Intravenous, Q6H PRN, Mirian Mills MD, 4 mg at 01/08/21 0645  •  pantoprazole (PROTONIX) injection 40 mg, 40 mg, Intravenous, Q AM, Mirian Mills MD, 40 mg at 01/09/21 0523  •  potassium chloride (MICRO-K) CR capsule 40 mEq, 40 mEq, Oral, TID With Meals, Christos Vyas MD  •  sodium chloride 0.9 % flush 10 mL, 10 mL, Intravenous, Q12H, Mirian Mills MD, 10 mL at 01/09/21 0825  •  sodium chloride 0.9 % flush 10 mL, 10 mL, Intravenous, PRN, Mirian Mills MD  •  sodium chloride 0.9 % infusion, 125 mL/hr, Intravenous, Continuous, Mirian Mills MD, Last Rate: 125 mL/hr at 01/09/21 0620, 125 mL/hr at 01/09/21 0620  •  thiamine (VITAMIN B-1) tablet 100 mg, 100 mg, Oral, Daily, Mirian Mills MD, 100 mg at 01/09/21 0825  •  vitamin B-12 (CYANOCOBALAMIN) tablet 1,000 mcg, 1,000 mcg, Oral, Daily, Mirian Mills MD, 1,000 mcg at 01/09/21 0825     Diagnostic Data    Lab Results (last 24 hours)     Procedure Component Value Units Date/Time    Comprehensive  Metabolic Panel [[id number]]  (Abnormal) Collected: 01/09/21 0514    Specimen: Blood Updated: 01/09/21 0628     Glucose 68 mg/dL      BUN 5 mg/dL      Creatinine 0.35 mg/dL      Sodium 136 mmol/L      Potassium 2.9 mmol/L      Chloride 103 mmol/L      CO2 23.0 mmol/L      Calcium 9.2 mg/dL      Total Protein 5.4 g/dL      Albumin 3.20 g/dL      ALT (SGPT) 41 U/L      AST (SGOT) 43 U/L      Alkaline Phosphatase 81 U/L      Total Bilirubin 0.6 mg/dL      eGFR Non African Amer >150 mL/min/1.73      Globulin 2.2 gm/dL      A/G Ratio 1.5 g/dL      BUN/Creatinine Ratio 14.3     Anion Gap 10.0 mmol/L     Narrative:      GFR Normal >60  Chronic Kidney Disease <60  Kidney Failure <15      CBC & Differential [[id number]]  (Abnormal) Collected: 01/09/21 0514    Specimen: Blood Updated: 01/09/21 0606    Narrative:      The following orders were created for panel order CBC & Differential.  Procedure                               Abnormality         Status                     ---------                               -----------         ------                     Scan Slide[[id number]]                                                                  CBC Auto Differential[[id number]]        Abnormal            Final result                 Please view results for these tests on the individual orders.    CBC Auto Differential [[id number]]  (Abnormal) Collected: 01/09/21 0514    Specimen: Blood Updated: 01/09/21 0606     WBC 5.94 10*3/mm3      RBC 3.28 10*6/mm3      Hemoglobin 12.0 g/dL      Hematocrit 34.6 %      .5 fL      MCH 36.6 pg      MCHC 34.7 g/dL      RDW 12.4 %      RDW-SD 48.7 fl      MPV 10.7 fL      Platelets 113 10*3/mm3      Neutrophil % 58.4 %      Lymphocyte % 32.2 %      Monocyte % 7.2 %      Eosinophil % 1.2 %      Basophil % 0.7 %      Immature Grans % 0.3 %      Neutrophils, Absolute 3.47 10*3/mm3      Lymphocytes, Absolute 1.91 10*3/mm3      Monocytes, Absolute 0.43 10*3/mm3      Eosinophils, Absolute 0.07  10*3/mm3      Basophils, Absolute 0.04 10*3/mm3      Immature Grans, Absolute 0.02 10*3/mm3      nRBC 0.0 /100 WBC     Calcium, Ionized [[id number]]  (Normal) Collected: 01/09/21 0514    Specimen: Blood Updated: 01/09/21 0530     Ionized Calcium 4.87 mg/dL     Folate [[id number]]  (Normal) Collected: 01/08/21 0842    Specimen: Blood Updated: 01/08/21 1933     Folate 6.61 ng/mL     Narrative:      Results may be falsely increased if patient taking Biotin.      Vitamin B12 [[id number]]  (Abnormal) Collected: 01/08/21 0842    Specimen: Blood Updated: 01/08/21 1933     Vitamin B-12 >2,000 pg/mL     Narrative:      Results may be falsely increased if patient taking Biotin.      Magnesium [[id number]]  (Normal) Collected: 01/08/21 0720    Specimen: Blood Updated: 01/08/21 0838     Magnesium 1.6 mg/dL             I reviewed the patient's new clinical results.    Assessment/Plan:     Active Hospital Problems    Diagnosis POA   • **Acute on chronic pancreatitis (CMS/HCC) [K85.90, K86.1] Yes     Patient has history of alcohol abuse, has several episodes of pancreatitis in the past, last one being last year.  Lipase elevated at 1446 in the ED , will trend the levels  CT abdomen and pelvis showed thickening of the distal esophagus just above the   gastroesophageal junction. There is no findings suggesting acute pancreatitis. -  consulted, will be getting EGD and MRCP today.   IV fluids at 125 Ml/hr         • Thickening of esophagus [K22.8] Yes     EGD with Dr. Mills with biopsies taken.  Follow-up biopsies     • Alcohol dependence (CMS/HCC) [F10.20] Yes     Last drink was 4 days ago  Alcohol cessation counseling was provided  Ativan and CIWA protocol-  Ativan 1 mg Q6H   -IV thiamine  -Continuing folate and B12     • Opioid use disorder (CMS/HCC) [F11.99] Yes     -Continue gabapentin  - Cannot give Suboxone in the hospital as she is getting opoid pain medication for abdominal pain.      • Anxiety and depression [F41.9,  F32.9] Yes     Will continue current home medications  -Diazepam 5 mg tablet  - Fluoxetine 20 mg capsule      • Dependence on nicotine from cigarettes [F17.210] Yes     - Nicotine patches      • Hypokalemia [E87.6] Yes     -Replace magnesium 1 g IV; replace potassium 40 mEq 3 times daily for 4 doses  -We will monitor and replace as needed    Results from last 7 days   Lab Units 01/09/21  0514 01/08/21  0720 01/07/21  0756   POTASSIUM mmol/L 2.9* 3.0* 3.6   ]       • Non-intractable vomiting [R11.10] Yes       DVT prophylaxis: SCDs/TEDs  Code status is   Code Status and Medical Interventions:   Ordered at: 01/07/21 1151     Level Of Support Discussed With:    Patient     Code Status:    CPR     Medical Interventions (Level of Support Prior to Arrest):    Full       Plan for disposition:Where: home and When:  1-2 days      Time: 20 mins       Christos Vyas M.D. PGY3  [facility]  [address] [place] [address]  Office: [phone]    This document has been electronically signed by Christos Vyas MD on January 9, 2021 08:31 CST

## 2021-01-10 VITALS
OXYGEN SATURATION: 99 % | BODY MASS INDEX: 19.46 KG/M2 | TEMPERATURE: 97.1 F | HEIGHT: 67 IN | DIASTOLIC BLOOD PRESSURE: 90 MMHG | WEIGHT: 124 LBS | RESPIRATION RATE: 18 BRPM | HEART RATE: 64 BPM | SYSTOLIC BLOOD PRESSURE: 148 MMHG

## 2021-01-10 PROBLEM — E87.6 HYPOKALEMIA: Status: RESOLVED | Noted: 2020-06-01 | Resolved: 2021-01-10

## 2021-01-10 PROBLEM — K86.1 ACUTE ON CHRONIC PANCREATITIS (HCC): Status: RESOLVED | Noted: 2021-01-07 | Resolved: 2021-01-10

## 2021-01-10 PROBLEM — K29.00 ACUTE GASTRITIS WITHOUT HEMORRHAGE: Status: ACTIVE | Noted: 2021-01-10

## 2021-01-10 PROBLEM — R11.10 NON-INTRACTABLE VOMITING: Status: RESOLVED | Noted: 2020-01-06 | Resolved: 2021-01-10

## 2021-01-10 PROBLEM — K85.90 ACUTE ON CHRONIC PANCREATITIS (HCC): Status: RESOLVED | Noted: 2021-01-07 | Resolved: 2021-01-10

## 2021-01-10 LAB
ALBUMIN SERPL-MCNC: 2.9 G/DL (ref 3.5–5.2)
ALBUMIN/GLOB SERPL: 1.1 G/DL
ALP SERPL-CCNC: 81 U/L (ref 39–117)
ALT SERPL W P-5'-P-CCNC: 40 U/L (ref 1–33)
ANION GAP SERPL CALCULATED.3IONS-SCNC: 10 MMOL/L (ref 5–15)
AST SERPL-CCNC: 45 U/L (ref 1–32)
BASOPHILS # BLD AUTO: 0.05 10*3/MM3 (ref 0–0.2)
BASOPHILS NFR BLD AUTO: 0.8 % (ref 0–1.5)
BILIRUB SERPL-MCNC: 0.3 MG/DL (ref 0–1.2)
BUN SERPL-MCNC: 3 MG/DL (ref 6–20)
BUN/CREAT SERPL: 8.8 (ref 7–25)
CA-I BLD-MCNC: 4.71 MG/DL (ref 4.6–5.6)
CALCIUM SPEC-SCNC: 9.8 MG/DL (ref 8.6–10.5)
CHLORIDE SERPL-SCNC: 103 MMOL/L (ref 98–107)
CO2 SERPL-SCNC: 21 MMOL/L (ref 22–29)
CREAT SERPL-MCNC: 0.34 MG/DL (ref 0.57–1)
DEPRECATED RDW RBC AUTO: 47.4 FL (ref 37–54)
EOSINOPHIL # BLD AUTO: 0.09 10*3/MM3 (ref 0–0.4)
EOSINOPHIL NFR BLD AUTO: 1.4 % (ref 0.3–6.2)
ERYTHROCYTE [DISTWIDTH] IN BLOOD BY AUTOMATED COUNT: 12.3 % (ref 12.3–15.4)
GFR SERPL CREATININE-BSD FRML MDRD: >150 ML/MIN/1.73
GLOBULIN UR ELPH-MCNC: 2.6 GM/DL
GLUCOSE SERPL-MCNC: 100 MG/DL (ref 65–99)
HCT VFR BLD AUTO: 35.4 % (ref 34–46.6)
HGB BLD-MCNC: 12.5 G/DL (ref 12–15.9)
IMM GRANULOCYTES # BLD AUTO: 0.07 10*3/MM3 (ref 0–0.05)
IMM GRANULOCYTES NFR BLD AUTO: 1.1 % (ref 0–0.5)
LIPASE SERPL-CCNC: 467 U/L (ref 13–60)
LYMPHOCYTES # BLD AUTO: 2.12 10*3/MM3 (ref 0.7–3.1)
LYMPHOCYTES NFR BLD AUTO: 32.6 % (ref 19.6–45.3)
MCH RBC QN AUTO: 36.8 PG (ref 26.6–33)
MCHC RBC AUTO-ENTMCNC: 35.3 G/DL (ref 31.5–35.7)
MCV RBC AUTO: 104.1 FL (ref 79–97)
MONOCYTES # BLD AUTO: 0.49 10*3/MM3 (ref 0.1–0.9)
MONOCYTES NFR BLD AUTO: 7.5 % (ref 5–12)
NEUTROPHILS NFR BLD AUTO: 3.68 10*3/MM3 (ref 1.7–7)
NEUTROPHILS NFR BLD AUTO: 56.6 % (ref 42.7–76)
NRBC BLD AUTO-RTO: 0 /100 WBC (ref 0–0.2)
PLATELET # BLD AUTO: 127 10*3/MM3 (ref 140–450)
PMV BLD AUTO: 10.6 FL (ref 6–12)
POTASSIUM SERPL-SCNC: 4.3 MMOL/L (ref 3.5–5.2)
PROT SERPL-MCNC: 5.5 G/DL (ref 6–8.5)
RBC # BLD AUTO: 3.4 10*6/MM3 (ref 3.77–5.28)
SODIUM SERPL-SCNC: 134 MMOL/L (ref 136–145)
WBC # BLD AUTO: 6.5 10*3/MM3 (ref 3.4–10.8)

## 2021-01-10 PROCEDURE — 80053 COMPREHEN METABOLIC PANEL: CPT | Performed by: INTERNAL MEDICINE

## 2021-01-10 PROCEDURE — 25010000002 MORPHINE PER 10 MG: Performed by: INTERNAL MEDICINE

## 2021-01-10 PROCEDURE — 83690 ASSAY OF LIPASE: CPT | Performed by: STUDENT IN AN ORGANIZED HEALTH CARE EDUCATION/TRAINING PROGRAM

## 2021-01-10 PROCEDURE — 82330 ASSAY OF CALCIUM: CPT | Performed by: INTERNAL MEDICINE

## 2021-01-10 PROCEDURE — 85025 COMPLETE CBC W/AUTO DIFF WBC: CPT | Performed by: INTERNAL MEDICINE

## 2021-01-10 PROCEDURE — 99232 SBSQ HOSP IP/OBS MODERATE 35: CPT | Performed by: STUDENT IN AN ORGANIZED HEALTH CARE EDUCATION/TRAINING PROGRAM

## 2021-01-10 RX ORDER — PANTOPRAZOLE SODIUM 40 MG/1
40 TABLET, DELAYED RELEASE ORAL DAILY
Qty: 30 TABLET | Refills: 1 | Status: SHIPPED | OUTPATIENT
Start: 2021-01-10 | End: 2022-01-26

## 2021-01-10 RX ORDER — FOLIC ACID 1 MG/1
500 TABLET ORAL DAILY
Qty: 15 TABLET | Refills: 0 | Status: SHIPPED | OUTPATIENT
Start: 2021-01-11 | End: 2021-01-19 | Stop reason: SDUPTHER

## 2021-01-10 RX ADMIN — SODIUM CHLORIDE, PRESERVATIVE FREE 10 ML: 5 INJECTION INTRAVENOUS at 08:36

## 2021-01-10 RX ADMIN — THIAMINE HCL TAB 100 MG 100 MG: 100 TAB at 08:36

## 2021-01-10 RX ADMIN — POTASSIUM CHLORIDE 40 MEQ: 10 CAPSULE, COATED, EXTENDED RELEASE ORAL at 08:36

## 2021-01-10 RX ADMIN — MORPHINE SULFATE 2 MG: 2 INJECTION, SOLUTION INTRAMUSCULAR; INTRAVENOUS at 07:27

## 2021-01-10 RX ADMIN — LORAZEPAM 1 MG: 0.5 TABLET ORAL at 05:10

## 2021-01-10 RX ADMIN — SODIUM CHLORIDE 125 ML/HR: 900 INJECTION, SOLUTION INTRAVENOUS at 05:08

## 2021-01-10 RX ADMIN — FLUOXETINE 20 MG: 20 CAPSULE ORAL at 08:36

## 2021-01-10 RX ADMIN — MORPHINE SULFATE 2 MG: 2 INJECTION, SOLUTION INTRAMUSCULAR; INTRAVENOUS at 02:54

## 2021-01-10 RX ADMIN — NICOTINE 1 PATCH: 21 PATCH, EXTENDED RELEASE TRANSDERMAL at 13:41

## 2021-01-10 RX ADMIN — MORPHINE SULFATE 2 MG: 2 INJECTION, SOLUTION INTRAMUSCULAR; INTRAVENOUS at 11:36

## 2021-01-10 RX ADMIN — HYDROCODONE BITARTRATE AND ACETAMINOPHEN 1 TABLET: 5; 325 TABLET ORAL at 05:09

## 2021-01-10 RX ADMIN — CYANOCOBALAMIN TAB 1000 MCG 1000 MCG: 1000 TAB at 08:36

## 2021-01-10 RX ADMIN — HYDROCODONE BITARTRATE AND ACETAMINOPHEN 1 TABLET: 5; 325 TABLET ORAL at 10:34

## 2021-01-10 RX ADMIN — PANTOPRAZOLE SODIUM 40 MG: 40 INJECTION, POWDER, FOR SOLUTION INTRAVENOUS at 05:14

## 2021-01-10 RX ADMIN — Medication 500 MCG: at 08:36

## 2021-01-10 RX ADMIN — LORAZEPAM 1 MG: 0.5 TABLET ORAL at 08:36

## 2021-01-10 NOTE — PLAN OF CARE
Goal Outcome Evaluation:  Plan of Care Reviewed With: patient  Progress: improving  Outcome Summary: vss. had some diarrhea yesterday. potassium was low yesterday and was given scheduled kcl. pt had a snack last night and then fell asleep and stated she felt like that made her abd pain worse with her eating before bed. will continue to monitor.

## 2021-01-10 NOTE — PROGRESS NOTES
FAMILY MEDICINE DAILY PROGRESS NOTE  NAME: Nata Bain  : 1985  MRN: [id number]     LOS: 3 days     PROVIDER OF SERVICE: Erica Jacob MD    Chief Complaint: Acute gastritis without hemorrhage    Subjective:     Interval History:  History taken from: patient chart  Patient Complaints: none  Patient Denies:  Abdominal pain, fevers/chills, dizziness, nausea/vomiting, headaches, chest pain, palpitations, shortness of breath    Patient was awake and alert at the time of examination. There were no overnight events and patient reports significant improvement in her nausea/vomiting and upper quadrant abdominal pain. She does report some pain in the left side of the abdomen which is well controlled. She did have some diarrhea last night after she had some milk and ice cream.     Review of Systems:   Review of Systems   Constitutional: Negative for activity change, appetite change, chills, fatigue and fever.   HENT: Negative for congestion, trouble swallowing and voice change.    Eyes: Negative for visual disturbance.   Respiratory: Negative for cough, choking, chest tightness, shortness of breath and wheezing.    Cardiovascular: Negative for chest pain and palpitations.   Gastrointestinal: Positive for abdominal pain (left sided 4/10 ) and diarrhea. Negative for abdominal distention, blood in stool, nausea and vomiting.   Genitourinary: Negative for difficulty urinating, flank pain and urgency.   Musculoskeletal: Negative for arthralgias and myalgias.   Skin: Negative for rash.   Neurological: Negative for dizziness, syncope, weakness, light-headedness and headaches.   Psychiatric/Behavioral: Negative for behavioral problems, confusion, decreased concentration and sleep disturbance.       Objective:     Vital Signs  Temp:  [97.1 °F (36.2 °C)-98.5 °F (36.9 °C)] 97.1 °F (36.2 °C)  Heart Rate:  [64-80] 64  Resp:  [16-18] 18  BP: (111-148)/(72-92) 148/90    Physical Exam  Physical Exam  Vitals signs and nursing  note reviewed.   Constitutional:       Appearance: Normal appearance. She is not ill-appearing or toxic-appearing.   HENT:      Head: Normocephalic and atraumatic.      Right Ear: External ear normal.      Left Ear: External ear normal.      Nose: Nose normal.      Mouth/Throat:      Mouth: Mucous membranes are moist.      Pharynx: Oropharynx is clear.   Eyes:      Conjunctiva/sclera: Conjunctivae normal.      Pupils: Pupils are equal, round, and reactive to light.   Cardiovascular:      Rate and Rhythm: Normal rate and regular rhythm.      Pulses: Normal pulses.      Heart sounds: Normal heart sounds.   Pulmonary:      Effort: Pulmonary effort is normal.      Breath sounds: Normal breath sounds.   Abdominal:      General: Abdomen is flat. Bowel sounds are normal. There is no distension.      Palpations: Abdomen is soft.      Tenderness: There is no abdominal tenderness. There is no guarding.   Musculoskeletal: Normal range of motion.      Right lower leg: No edema.      Left lower leg: No edema.   Skin:     General: Skin is warm and dry.      Capillary Refill: Capillary refill takes less than 2 seconds.   Neurological:      General: No focal deficit present.      Mental Status: She is alert and oriented to person, place, and time.   Psychiatric:         Mood and Affect: Mood normal.         Behavior: Behavior normal.         Thought Content: Thought content normal.         Judgment: Judgment normal.         Medication Review    Current Facility-Administered Medications:   •  calcium carbonate (TUMS) chewable tablet 500 mg (200 mg elemental), 2 tablet, Oral, BID PRN, Mirian Mills MD, 2 tablet at 01/07/21 8836  •  dextrose 5 % and sodium chloride 0.45 % infusion, 30 mL/hr, Intravenous, Continuous PRN, Mirian Mills MD  •  diazePAM (VALIUM) tablet 5 mg, 5 mg, Oral, Q8H PRN, Mirian Mills MD, 5 mg at 01/08/21 0407  •  FLUoxetine (PROzac) capsule 20 mg, 20 mg, Oral, Daily, Mirian Mills MD, 20 mg at  01/10/21 0836  •  folic acid (FOLVITE) half tablet 500 mcg, 500 mcg, Oral, Daily, Mirian Mills MD, 500 mcg at 01/10/21 0836  •  gabapentin (NEURONTIN) capsule 600 mg, 600 mg, Oral, Nightly, Mirian Mills MD, 600 mg at 01/09/21 2105  •  hydrALAZINE (APRESOLINE) injection 10 mg, 10 mg, Intravenous, Q6H PRN, Mirian Mills MD, 10 mg at 01/08/21 1153  •  HYDROcodone-acetaminophen (NORCO) 5-325 MG per tablet 1 tablet, 1 tablet, Oral, Q4H PRN, Mirian Mills MD, 1 tablet at 01/10/21 1034  •  LORazepam (ATIVAN) tablet 1 mg, 1 mg, Oral, Q2H PRN, 1 mg at 01/10/21 0836 **OR** LORazepam (ATIVAN) injection 1 mg, 1 mg, Intravenous, Q2H PRN, 1 mg at 01/08/21 1451 **OR** LORazepam (ATIVAN) tablet 2 mg, 2 mg, Oral, Q1H PRN **OR** LORazepam (ATIVAN) injection 2 mg, 2 mg, Intravenous, Q1H PRN **OR** LORazepam (ATIVAN) injection 2 mg, 2 mg, Intravenous, Q15 Min PRN **OR** LORazepam (ATIVAN) injection 2 mg, 2 mg, Intramuscular, Q15 Min PRN, Mirian Mills MD  •  melatonin tablet 5.25 mg, 5.25 mg, Oral, Nightly PRN, Mirian Mills MD, 5.25 mg at 01/09/21 2204  •  morphine injection 2 mg, 2 mg, Intravenous, Q4H PRN, 2 mg at 01/10/21 1136 **AND** naloxone (NARCAN) injection 0.4 mg, 0.4 mg, Intravenous, Q5 Min PRN, Mirian Mills MD  •  nicotine (NICODERM CQ) 21 MG/24HR patch 1 patch, 1 patch, Transdermal, Q24H, Mirian Mills MD, 1 patch at 01/10/21 1341  •  ondansetron (ZOFRAN) injection 4 mg, 4 mg, Intravenous, Q6H PRN, Mirian Mills MD, 4 mg at 01/08/21 0645  •  pantoprazole (PROTONIX) injection 40 mg, 40 mg, Intravenous, Q AM, Mirian Mills MD, 40 mg at 01/10/21 0514  •  sodium chloride 0.9 % flush 10 mL, 10 mL, Intravenous, Q12H, Mirian Mills MD, 10 mL at 01/10/21 0836  •  sodium chloride 0.9 % flush 10 mL, 10 mL, Intravenous, PRN, Mirian Mills MD  •  sodium chloride 0.9 % infusion, 125 mL/hr, Intravenous, Continuous, Mirian Mills MD, Last Rate: 125 mL/hr at 01/10/21  0508, 125 mL/hr at 01/10/21 0508  •  thiamine (VITAMIN B-1) tablet 100 mg, 100 mg, Oral, Daily, Mirian Mills MD, 100 mg at 01/10/21 0836  •  vitamin B-12 (CYANOCOBALAMIN) tablet 1,000 mcg, 1,000 mcg, Oral, Daily, Mirian Mills MD, 1,000 mcg at 01/10/21 0836     Diagnostic Data    Lab Results (last 24 hours)     Procedure Component Value Units Date/Time    Comprehensive Metabolic Panel [[id number]]  (Abnormal) Collected: 01/10/21 0447    Specimen: Blood Updated: 01/10/21 0536     Glucose 100 mg/dL      BUN 3 mg/dL      Creatinine 0.34 mg/dL      Sodium 134 mmol/L      Potassium 4.3 mmol/L      Comment: Slight hemolysis detected by analyzer. Results may be affected.        Chloride 103 mmol/L      CO2 21.0 mmol/L      Calcium 9.8 mg/dL      Total Protein 5.5 g/dL      Albumin 2.90 g/dL      ALT (SGPT) 40 U/L      AST (SGOT) 45 U/L      Comment: Slight hemolysis detected by analyzer. Results may be affected.        Alkaline Phosphatase 81 U/L      Total Bilirubin 0.3 mg/dL      eGFR Non African Amer >150 mL/min/1.73      Globulin 2.6 gm/dL      A/G Ratio 1.1 g/dL      BUN/Creatinine Ratio 8.8     Anion Gap 10.0 mmol/L     Narrative:      GFR Normal >60  Chronic Kidney Disease <60  Kidney Failure <15      Lipase [[id number]]  (Abnormal) Collected: 01/10/21 0447    Specimen: Blood Updated: 01/10/21 0525     Lipase 467 U/L     CBC & Differential [[id number]]  (Abnormal) Collected: 01/10/21 0447    Specimen: Blood Updated: 01/10/21 0502    Narrative:      The following orders were created for panel order CBC & Differential.  Procedure                               Abnormality         Status                     ---------                               -----------         ------                     CBC Auto Differential[[id number]]        Abnormal            Final result                 Please view results for these tests on the individual orders.    CBC Auto Differential [[id number]]  (Abnormal) Collected: 01/10/21  0447    Specimen: Blood Updated: 01/10/21 0502     WBC 6.50 10*3/mm3      RBC 3.40 10*6/mm3      Hemoglobin 12.5 g/dL      Hematocrit 35.4 %      .1 fL      MCH 36.8 pg      MCHC 35.3 g/dL      RDW 12.3 %      RDW-SD 47.4 fl      MPV 10.6 fL      Platelets 127 10*3/mm3      Neutrophil % 56.6 %      Lymphocyte % 32.6 %      Monocyte % 7.5 %      Eosinophil % 1.4 %      Basophil % 0.8 %      Immature Grans % 1.1 %      Neutrophils, Absolute 3.68 10*3/mm3      Lymphocytes, Absolute 2.12 10*3/mm3      Monocytes, Absolute 0.49 10*3/mm3      Eosinophils, Absolute 0.09 10*3/mm3      Basophils, Absolute 0.05 10*3/mm3      Immature Grans, Absolute 0.07 10*3/mm3      nRBC 0.0 /100 WBC     Calcium, Ionized [[id number]]  (Normal) Collected: 01/10/21 0447    Specimen: Blood Updated: 01/10/21 0500     Ionized Calcium 4.71 mg/dL            Imaging Results (Last 24 Hours)     ** No results found for the last 24 hours. **          I reviewed the patient's new clinical results.  I reviewed the patient's new imaging results and agree with the interpretation.  I reviewed the patient's other test results and agree with the interpretation    Assessment/Plan:     Active Hospital Problems    Diagnosis   • **Acute gastritis without hemorrhage     Continued protonix 40 mg daily  EGD showed acute gastritis, Follow up with GI outpatient in 1 week for biopsy results      • Thickening of esophagus     EGD with Dr. Mills with biopsies taken.  Follow-up biopsies     • Alcohol dependence (CMS/HCC)     Last drink was 4 days ago  Alcohol cessation counseling was provided  Ativan and CIWA protocol-  Ativan 1 mg Q6H   -IV thiamine  -Continuing folate and B12     • Opioid use disorder (CMS/HCC)     -Continue gabapentin  - Cannot give Suboxone in the hospital as she is getting opoid pain medication for abdominal pain.      • Anxiety and depression     Will continue current home medications  -Diazepam 5 mg tablet  - Fluoxetine 20 mg capsule      •  Dependence on nicotine from cigarettes     - Nicotine patches            DVT prophylaxis: SCDs/TEDs  Code Status and Medical Interventions:   Ordered at: 01/07/21 1151     Level Of Support Discussed With:    Patient     Code Status:    CPR     Medical Interventions (Level of Support Prior to Arrest):    Full       Plan for disposition:Where: home and When:  today      Time: 15 mins           Erica Jacob MD PGY-1  [facility]   This document has been electronically signed by Erica Jacob MD on January 10, 2021 13:50 CST

## 2021-01-10 NOTE — DISCHARGE INSTR - LAB
FOLLOW UP IN 1 WEEK. IF OFFICE HAS NOT CALLED BY Tuesday 1/12/21, PLEASE CALL AND MAKE APPOINTMENT

## 2021-01-10 NOTE — DISCHARGE SUMMARY
DISCHARGE SUMMARY    PATIENT NAME: Nata Bain       PHYSICIAN: Erica Jacob MD  : 1985  MRN: [id number]    ADMITTED: 2021     DISCHARGED: 1/10/2021     ADMISSION DIAGNOSES:  Active Hospital Problems    Diagnosis  POA   • **Acute gastritis without hemorrhage [K29.00]  Unknown     Priority: Medium   • Thickening of esophagus [K22.8]  Yes   • Alcohol dependence (CMS/HCC) [F10.20]  Yes   • Opioid use disorder (CMS/HCC) [F11.99]  Yes   • Anxiety and depression [F41.9, F32.9]  Yes   • Dependence on nicotine from cigarettes [F17.210]  Yes      Resolved Hospital Problems    Diagnosis Date Resolved POA   • Acute on chronic pancreatitis (CMS/HCC) [K85.90, K86.1] 01/10/2021 Yes     Priority: High   • Hypercalcemia [E83.52] 2021 Yes   • Hyponatremia [E87.1] 2021 Unknown   • Hypokalemia [E87.6] 01/10/2021 Yes   • Non-intractable vomiting [R11.10] 01/10/2021 Yes     DISCHARGE DIAGNOSES:   Active Hospital Problems    Diagnosis  POA   • **Acute gastritis without hemorrhage [K29.00]  Unknown     Priority: Medium   • Thickening of esophagus [K22.8]  Yes   • Alcohol dependence (CMS/HCC) [F10.20]  Yes   • Opioid use disorder (CMS/HCC) [F11.99]  Yes   • Anxiety and depression [F41.9, F32.9]  Yes   • Dependence on nicotine from cigarettes [F17.210]  Yes      Resolved Hospital Problems    Diagnosis Date Resolved POA   • Acute on chronic pancreatitis (CMS/HCC) [K85.90, K86.1] 01/10/2021 Yes     Priority: High   • Hypercalcemia [E83.52] 2021 Yes   • Hyponatremia [E87.1] 2021 Unknown   • Hypokalemia [E87.6] 01/10/2021 Yes   • Non-intractable vomiting [R11.10] 01/10/2021 Yes       SERVICE: Family Medicine Residency  Attending: Nadir Villarreal MD  Resident: Erica Jacob MD    CONSULTS:   Consult Orders (all) (From admission, onward)     Start     Ordered    21 0827  Inpatient Case Management  Consult  Once     Provider:  (Not yet assigned)    21 0826    21 1326   Inpatient Gastroenterology Consult  Once     Specialty:  Gastroenterology  Provider:  Mirian Mills MD    01/07/21 1322                PROCEDURES:   EGD and MRCP     HISTORY OF PRESENT ILLNESS:   Taken from my H&P at 1130 AM   Patient is a 35 y.o. female presented with a H of chronic pancreatitis, alcohol dependence, seizure disorder (last seizure was 3 yrs ago), recurrent UTIs who presents today complaining of 4 days history of worsening bilious vomiting with constant sharp/ throbbing bilateral upper quadrant abdominal pain that started last night. Patient also complains of some shortness of breath, lightheadedness associated with her pain. Patient denies any bloody vomitus, sick contacts, fevers/chills, urinary urgency/frequency/dysuria. She had diarrhea initially for the first two days which has now improved. Patient reports she has had pancreatitis about 2-3 times previously, most recent was last year. She is alcoholic and reports her last drink was 4 days ago. She has been using Phenergan suppository at home which seem to have helped her nausea some.  Patient reports she has not been able to take any of her oral medications including subxone over the last 4 days due to nausea and vomiting.      DIAGNOSTIC DATA:   Lab Results (last 48 hours)     Procedure Component Value Units Date/Time    Comprehensive Metabolic Panel [[id number]]  (Abnormal) Collected: 01/10/21 0447    Specimen: Blood Updated: 01/10/21 0536     Glucose 100 mg/dL      BUN 3 mg/dL      Creatinine 0.34 mg/dL      Sodium 134 mmol/L      Potassium 4.3 mmol/L      Comment: Slight hemolysis detected by analyzer. Results may be affected.        Chloride 103 mmol/L      CO2 21.0 mmol/L      Calcium 9.8 mg/dL      Total Protein 5.5 g/dL      Albumin 2.90 g/dL      ALT (SGPT) 40 U/L      AST (SGOT) 45 U/L      Comment: Slight hemolysis detected by analyzer. Results may be affected.        Alkaline Phosphatase 81 U/L      Total Bilirubin 0.3 mg/dL        eGFR Non African Amer >150 mL/min/1.73      Globulin 2.6 gm/dL      A/G Ratio 1.1 g/dL      BUN/Creatinine Ratio 8.8     Anion Gap 10.0 mmol/L     Narrative:      GFR Normal >60  Chronic Kidney Disease <60  Kidney Failure <15      Lipase [[id number]]  (Abnormal) Collected: 01/10/21 0447    Specimen: Blood Updated: 01/10/21 0525     Lipase 467 U/L     CBC & Differential [[id number]]  (Abnormal) Collected: 01/10/21 0447    Specimen: Blood Updated: 01/10/21 0502    Narrative:      The following orders were created for panel order CBC & Differential.  Procedure                               Abnormality         Status                     ---------                               -----------         ------                     CBC Auto Differential[[id number]]        Abnormal            Final result                 Please view results for these tests on the individual orders.    CBC Auto Differential [[id number]]  (Abnormal) Collected: 01/10/21 0447    Specimen: Blood Updated: 01/10/21 0502     WBC 6.50 10*3/mm3      RBC 3.40 10*6/mm3      Hemoglobin 12.5 g/dL      Hematocrit 35.4 %      .1 fL      MCH 36.8 pg      MCHC 35.3 g/dL      RDW 12.3 %      RDW-SD 47.4 fl      MPV 10.6 fL      Platelets 127 10*3/mm3      Neutrophil % 56.6 %      Lymphocyte % 32.6 %      Monocyte % 7.5 %      Eosinophil % 1.4 %      Basophil % 0.8 %      Immature Grans % 1.1 %      Neutrophils, Absolute 3.68 10*3/mm3      Lymphocytes, Absolute 2.12 10*3/mm3      Monocytes, Absolute 0.49 10*3/mm3      Eosinophils, Absolute 0.09 10*3/mm3      Basophils, Absolute 0.05 10*3/mm3      Immature Grans, Absolute 0.07 10*3/mm3      nRBC 0.0 /100 WBC     Calcium, Ionized [[id number]]  (Normal) Collected: 01/10/21 0447    Specimen: Blood Updated: 01/10/21 0500     Ionized Calcium 4.71 mg/dL     Lipase [[id number]]  (Abnormal) Collected: 01/09/21 0514    Specimen: Blood Updated: 01/09/21 0907     Lipase 500 U/L     Comprehensive Metabolic Panel [[id number]]   (Abnormal) Collected: 01/09/21 0514    Specimen: Blood Updated: 01/09/21 0628     Glucose 68 mg/dL      BUN 5 mg/dL      Creatinine 0.35 mg/dL      Sodium 136 mmol/L      Potassium 2.9 mmol/L      Chloride 103 mmol/L      CO2 23.0 mmol/L      Calcium 9.2 mg/dL      Total Protein 5.4 g/dL      Albumin 3.20 g/dL      ALT (SGPT) 41 U/L      AST (SGOT) 43 U/L      Alkaline Phosphatase 81 U/L      Total Bilirubin 0.6 mg/dL      eGFR Non African Amer >150 mL/min/1.73      Globulin 2.2 gm/dL      A/G Ratio 1.5 g/dL      BUN/Creatinine Ratio 14.3     Anion Gap 10.0 mmol/L     Narrative:      GFR Normal >60  Chronic Kidney Disease <60  Kidney Failure <15      CBC & Differential [[id number]]  (Abnormal) Collected: 01/09/21 0514    Specimen: Blood Updated: 01/09/21 0606    Narrative:      The following orders were created for panel order CBC & Differential.  Procedure                               Abnormality         Status                     ---------                               -----------         ------                     Scan Slide[[id number]]                                                                  CBC Auto Differential[[id number]]        Abnormal            Final result                 Please view results for these tests on the individual orders.    CBC Auto Differential [[id number]]  (Abnormal) Collected: 01/09/21 0514    Specimen: Blood Updated: 01/09/21 0606     WBC 5.94 10*3/mm3      RBC 3.28 10*6/mm3      Hemoglobin 12.0 g/dL      Hematocrit 34.6 %      .5 fL      MCH 36.6 pg      MCHC 34.7 g/dL      RDW 12.4 %      RDW-SD 48.7 fl      MPV 10.7 fL      Platelets 113 10*3/mm3      Neutrophil % 58.4 %      Lymphocyte % 32.2 %      Monocyte % 7.2 %      Eosinophil % 1.2 %      Basophil % 0.7 %      Immature Grans % 0.3 %      Neutrophils, Absolute 3.47 10*3/mm3      Lymphocytes, Absolute 1.91 10*3/mm3      Monocytes, Absolute 0.43 10*3/mm3      Eosinophils, Absolute 0.07 10*3/mm3      Basophils, Absolute  0.04 10*3/mm3      Immature Grans, Absolute 0.02 10*3/mm3      nRBC 0.0 /100 WBC     Calcium, Ionized [[id number]]  (Normal) Collected: 01/09/21 0514    Specimen: Blood Updated: 01/09/21 0530     Ionized Calcium 4.87 mg/dL     Folate [[id number]]  (Normal) Collected: 01/08/21 0842    Specimen: Blood Updated: 01/08/21 1933     Folate 6.61 ng/mL     Narrative:      Results may be falsely increased if patient taking Biotin.      Vitamin B12 [[id number]]  (Abnormal) Collected: 01/08/21 0842    Specimen: Blood Updated: 01/08/21 1933     Vitamin B-12 >2,000 pg/mL     Narrative:      Results may be falsely increased if patient taking Biotin.          Xr Chest 2 View    Result Date: 1/7/2021  CONCLUSION:  No acute cardiopulmonary disease Electronically signed by:  Ranjit Marie MD  1/7/2021 8:58 AM CST Workstation: [id number]    Ct Abdomen Pelvis With Contrast    Result Date: 1/7/2021  Thickening of the distal esophagus just above the gastroesophageal junction. This demonstrates a change since prior CT examination. Consider upper GI examination, esophagram or endoscopy for further evaluation. Normal pancreas. No findings suggesting acute pancreatitis Phrygian cap gallbladder fundus. This is usually a normal variant. Dilated common bile duct 0.9 cm. This however is unchanged since prior CT exam. Normal pancreas. No findings suggesting acute pancreatitis. 2.55 cm right ovarian cyst. No follow-up is necessary. CT abdomen, pelvis with contrast is otherwise unremarkable. Electronically signed by:  Jorge Mullen MD  1/7/2021 10:33 AM CST Workstation: [id number]    Mri Abdomen Wo Contrast Mrcp    Result Date: 1/8/2021  As above. Electronically signed by:  Jorge Mullen MD  1/8/2021 11:30 AM CST Workstation: [id number]         HOSPITAL COURSE:  Patient was admitted to the floor in stable condition with concerns for pancreatitis vs gastritis and alcohol withdrawals. GI was consulted due to elevated lipase of 1446 and CT abdomen pelvis  showed thickening of the distal esophagus above gastroesophageal junction. Due to the abnormal findings, EGD was performed which showed acute gastritis, biopsy was taken and sent for studies. Patient's CT abdomen also showed dilation of CBD therefore MRCP was done which did not show any gallstones. Patient remained on IV Protonix and started on CIWA protocol for alcohol withdrawal symptoms. Lipase levels were tended down, and patient's symptoms improved significantly. She was initially on NPO diet later progressed to liquid diet and advanced to regular diet prior to discharge. She did have one reading of low potassium therefore was replaced it with oral potassium supplement, improving her potassium level. She was instructed to avoid NSAIDs and counseled on alcohol cessation as it can worsen her gastritis and sent home on oral protonix. Patient was agreeable with the plan and discharged in stable condition.     DISCHARGE CONDITION:   Stable and symptom free     DISPOSITION:  Home or Self Care    DISCHARGE MEDICATIONS     Discharge Medications      New Medications      Instructions Start Date   pantoprazole 40 MG EC tablet  Commonly known as: Protonix   40 mg, Oral, Daily         Changes to Medications      Instructions Start Date   folic acid 1 MG tablet  Commonly known as: FOLVITE  What changed:   · medication strength  · how much to take   500 mcg, Oral, Daily   Start Date: January 11, 2021        Continue These Medications      Instructions Start Date   buprenorphine-naloxone 8-2 MG per SL tablet  Commonly known as: SUBOXONE   3 tablets, Sublingual, 3 Times Daily, Patient takes one 8-2 tablet three times a day.      cyanocobalamin 1000 MCG tablet  Commonly known as: VITAMIN B-12   1,000 mcg, Oral, Daily      diazePAM 5 MG tablet  Commonly known as: VALIUM   Oral, Every 8 Hours PRN      FLUoxetine 20 MG capsule  Commonly known as: PROzac   20 mg, Oral, Daily      gabapentin 600 MG tablet  Commonly known as:  NEURONTIN   300 mg, 3 Times Daily      guaiFENesin 600 MG 12 hr tablet  Commonly known as: MUCINEX   600 mg, Oral, 2 Times Daily      ondansetron 4 MG tablet  Commonly known as: Zofran   4 mg, Oral, Every 8 Hours PRN      promethazine 25 MG suppository  Commonly known as: PHENERGAN   25 mg, Rectal, Every 6 Hours PRN      thiamine 100 MG tablet tablet  Commonly known as: VITAMIN B-1   100 mg, Oral, Daily             INSTRUCTIONS:  Activity:   Activity Instructions     Activity as Tolerated          Diet:   Diet Instructions     Diet: Regular      Discharge Diet: Regular    [place] diet          FOLLOW UP:   Additional Instructions for the Follow-ups that You Need to Schedule     Call MD With Problems / Concerns   As directed      Instructions: worsening abdominal pain, chest pain, nausea/vomiting, fevers/chills, dizziness, shortness of breath.    Order Comments: Instructions: worsening abdominal pain, chest pain, nausea/vomiting, fevers/chills, dizziness, shortness of breath.          Discharge Follow-up with PCP   As directed       Currently Documented PCP:    Provider, No Known    PCP Phone Number:    None     Follow Up Details: Follow up with Dr. Jacob or Dr. Almaguer         Discharge Follow-up with Specified Provider: Dr. Mills in 1 week; 1 Week   As directed      To: Dr. Mills in 1 week    Follow Up: 1 Week           Follow-up Information     Provider, No Known .    Why: Follow up with Dr. Jacob or Dr. Almaguer  Contact information:  [facility] [id number]                   PENDING TEST RESULTS AT DISCHARGE  Pending Labs     Order Current Status    Tissue Pathology Exam Collected (01/08/21 1313)    Vitamin D 1,25 Dihydroxy In process          Time: >30 minutes was spent in discharge planning, medication reconciliation and coordination of care for this patient.    Nadir Villarreal MD is the attending at time of discharge, He is aware of the patient's status and agrees with the above discharge  summary.        Erica Jacob MD PGY-1  [facility]   This document has been electronically signed by Erica Jacob MD on January 10, 2021 13:51 CST

## 2021-01-11 LAB — 1,25(OH)2D SERPL-MCNC: 87.9 PG/ML (ref 19.9–79.3)

## 2021-01-11 NOTE — PAYOR COMM NOTE
"Lena Rucker  [facility]  P: [phone]  F: [phone]    [facility]#[id number]    Springer, Shawnee Avis (35 y.o. Female)     Date of Birth Social Security Number Address Home Phone MRN    1985  [address] [address] [phone] [id number]    Restorationist Marital Status          Yazidism        Admission Date Admission Type Admitting Provider Attending Provider Department, Room/Bed    21 Emergency Nadir Villarreal MD  [facility] 3 EAST, 352/1    Discharge Date Discharge Disposition Discharge Destination        1/10/2021 Home or Self Care              Attending Provider: (none)   Allergies: No Known Allergies    Isolation: None   Infection: None   Code Status: Prior    Ht: 170.2 cm (67\")   Wt: 56.2 kg (124 lb)    Admission Cmt: None   Principal Problem: Acute gastritis without hemorrhage [K29.00] More...                 Active Insurance as of 2021     Primary Coverage     Payor Plan Insurance Group Employer/Plan Group    [place] [id number]     Payor Plan Address Payor Plan Phone Number Payor Plan Fax Number Effective Dates    [employer] Claims Office - [address] [address] [phone]  2020 - None Entered    [facility] [id number]       Subscriber Name Subscriber Birth Date Member ID       SHAWNEE SPRINGER 1985 [id number]                 Emergency Contacts      (Rel.) Home Phone Work Phone Mobile Phone    Jorge Sanz (Spouse) [phone] -- [phone]    OdellJoanna (Mother) [phone] -- [phone]    Hilario Springer (Step Parent) [phone] -- [phone]               Discharge Summary      Erica Jacob MD at 01/10/21 1350              DISCHARGE SUMMARY    PATIENT NAME: Shawnee Avis Springer       PHYSICIAN: Erica Jacob MD  : 1985  MRN: [id number]    ADMITTED: 2021     DISCHARGED: 1/10/2021     ADMISSION DIAGNOSES:  Active Hospital Problems    " Diagnosis  POA   • **Acute gastritis without hemorrhage [K29.00]  Unknown     Priority: Medium   • Thickening of esophagus [K22.8]  Yes   • Alcohol dependence (CMS/HCC) [F10.20]  Yes   • Opioid use disorder (CMS/HCC) [F11.99]  Yes   • Anxiety and depression [F41.9, F32.9]  Yes   • Dependence on nicotine from cigarettes [F17.210]  Yes      Resolved Hospital Problems    Diagnosis Date Resolved POA   • Acute on chronic pancreatitis (CMS/HCC) [K85.90, K86.1] 01/10/2021 Yes     Priority: High   • Hypercalcemia [E83.52] 01/09/2021 Yes   • Hyponatremia [E87.1] 01/09/2021 Unknown   • Hypokalemia [E87.6] 01/10/2021 Yes   • Non-intractable vomiting [R11.10] 01/10/2021 Yes     DISCHARGE DIAGNOSES:   Active Hospital Problems    Diagnosis  POA   • **Acute gastritis without hemorrhage [K29.00]  Unknown     Priority: Medium   • Thickening of esophagus [K22.8]  Yes   • Alcohol dependence (CMS/HCC) [F10.20]  Yes   • Opioid use disorder (CMS/HCC) [F11.99]  Yes   • Anxiety and depression [F41.9, F32.9]  Yes   • Dependence on nicotine from cigarettes [F17.210]  Yes      Resolved Hospital Problems    Diagnosis Date Resolved POA   • Acute on chronic pancreatitis (CMS/HCC) [K85.90, K86.1] 01/10/2021 Yes     Priority: High   • Hypercalcemia [E83.52] 01/09/2021 Yes   • Hyponatremia [E87.1] 01/09/2021 Unknown   • Hypokalemia [E87.6] 01/10/2021 Yes   • Non-intractable vomiting [R11.10] 01/10/2021 Yes       SERVICE: Family Medicine Residency  Attending: Nadir Villarreal MD  Resident: Erica Jacob MD    CONSULTS:   Consult Orders (all) (From admission, onward)     Start     Ordered    01/09/21 0827  Inpatient Case Management  Consult  Once     Provider:  (Not yet assigned)    01/09/21 0826    01/07/21 1322  Inpatient Gastroenterology Consult  Once     Specialty:  Gastroenterology  Provider:  Mirian Mills MD    01/07/21 1322                PROCEDURES:   EGD and MRCP     HISTORY OF PRESENT ILLNESS:   Taken from my H&P at 1130 AM    Patient is a 35 y.o. female presented with a H of chronic pancreatitis, alcohol dependence, seizure disorder (last seizure was 3 yrs ago), recurrent UTIs who presents today complaining of 4 days history of worsening bilious vomiting with constant sharp/ throbbing bilateral upper quadrant abdominal pain that started last night. Patient also complains of some shortness of breath, lightheadedness associated with her pain. Patient denies any bloody vomitus, sick contacts, fevers/chills, urinary urgency/frequency/dysuria. She had diarrhea initially for the first two days which has now improved. Patient reports she has had pancreatitis about 2-3 times previously, most recent was last year. She is alcoholic and reports her last drink was 4 days ago. She has been using Phenergan suppository at home which seem to have helped her nausea some.  Patient reports she has not been able to take any of her oral medications including subxone over the last 4 days due to nausea and vomiting.      DIAGNOSTIC DATA:   Lab Results (last 48 hours)     Procedure Component Value Units Date/Time    Comprehensive Metabolic Panel [[id number]]  (Abnormal) Collected: 01/10/21 0447    Specimen: Blood Updated: 01/10/21 0536     Glucose 100 mg/dL      BUN 3 mg/dL      Creatinine 0.34 mg/dL      Sodium 134 mmol/L      Potassium 4.3 mmol/L      Comment: Slight hemolysis detected by analyzer. Results may be affected.        Chloride 103 mmol/L      CO2 21.0 mmol/L      Calcium 9.8 mg/dL      Total Protein 5.5 g/dL      Albumin 2.90 g/dL      ALT (SGPT) 40 U/L      AST (SGOT) 45 U/L      Comment: Slight hemolysis detected by analyzer. Results may be affected.        Alkaline Phosphatase 81 U/L      Total Bilirubin 0.3 mg/dL      eGFR Non African Amer >150 mL/min/1.73      Globulin 2.6 gm/dL      A/G Ratio 1.1 g/dL      BUN/Creatinine Ratio 8.8     Anion Gap 10.0 mmol/L     Narrative:      GFR Normal >60  Chronic Kidney Disease <60  Kidney Failure  <15      Lipase [[id number]]  (Abnormal) Collected: 01/10/21 0447    Specimen: Blood Updated: 01/10/21 0525     Lipase 467 U/L     CBC & Differential [[id number]]  (Abnormal) Collected: 01/10/21 0447    Specimen: Blood Updated: 01/10/21 0502    Narrative:      The following orders were created for panel order CBC & Differential.  Procedure                               Abnormality         Status                     ---------                               -----------         ------                     CBC Auto Differential[[id number]]        Abnormal            Final result                 Please view results for these tests on the individual orders.    CBC Auto Differential [[id number]]  (Abnormal) Collected: 01/10/21 0447    Specimen: Blood Updated: 01/10/21 0502     WBC 6.50 10*3/mm3      RBC 3.40 10*6/mm3      Hemoglobin 12.5 g/dL      Hematocrit 35.4 %      .1 fL      MCH 36.8 pg      MCHC 35.3 g/dL      RDW 12.3 %      RDW-SD 47.4 fl      MPV 10.6 fL      Platelets 127 10*3/mm3      Neutrophil % 56.6 %      Lymphocyte % 32.6 %      Monocyte % 7.5 %      Eosinophil % 1.4 %      Basophil % 0.8 %      Immature Grans % 1.1 %      Neutrophils, Absolute 3.68 10*3/mm3      Lymphocytes, Absolute 2.12 10*3/mm3      Monocytes, Absolute 0.49 10*3/mm3      Eosinophils, Absolute 0.09 10*3/mm3      Basophils, Absolute 0.05 10*3/mm3      Immature Grans, Absolute 0.07 10*3/mm3      nRBC 0.0 /100 WBC     Calcium, Ionized [[id number]]  (Normal) Collected: 01/10/21 0447    Specimen: Blood Updated: 01/10/21 0500     Ionized Calcium 4.71 mg/dL     Lipase [[id number]]  (Abnormal) Collected: 01/09/21 0514    Specimen: Blood Updated: 01/09/21 0907     Lipase 500 U/L     Comprehensive Metabolic Panel [[id number]]  (Abnormal) Collected: 01/09/21 0514    Specimen: Blood Updated: 01/09/21 0628     Glucose 68 mg/dL      BUN 5 mg/dL      Creatinine 0.35 mg/dL      Sodium 136 mmol/L      Potassium 2.9 mmol/L      Chloride 103 mmol/L      CO2  23.0 mmol/L      Calcium 9.2 mg/dL      Total Protein 5.4 g/dL      Albumin 3.20 g/dL      ALT (SGPT) 41 U/L      AST (SGOT) 43 U/L      Alkaline Phosphatase 81 U/L      Total Bilirubin 0.6 mg/dL      eGFR Non African Amer >150 mL/min/1.73      Globulin 2.2 gm/dL      A/G Ratio 1.5 g/dL      BUN/Creatinine Ratio 14.3     Anion Gap 10.0 mmol/L     Narrative:      GFR Normal >60  Chronic Kidney Disease <60  Kidney Failure <15      CBC & Differential [[id number]]  (Abnormal) Collected: 01/09/21 0514    Specimen: Blood Updated: 01/09/21 0606    Narrative:      The following orders were created for panel order CBC & Differential.  Procedure                               Abnormality         Status                     ---------                               -----------         ------                     Scan Slide[[id number]]                                                                  CBC Auto Differential[[id number]]        Abnormal            Final result                 Please view results for these tests on the individual orders.    CBC Auto Differential [[id number]]  (Abnormal) Collected: 01/09/21 0514    Specimen: Blood Updated: 01/09/21 0606     WBC 5.94 10*3/mm3      RBC 3.28 10*6/mm3      Hemoglobin 12.0 g/dL      Hematocrit 34.6 %      .5 fL      MCH 36.6 pg      MCHC 34.7 g/dL      RDW 12.4 %      RDW-SD 48.7 fl      MPV 10.7 fL      Platelets 113 10*3/mm3      Neutrophil % 58.4 %      Lymphocyte % 32.2 %      Monocyte % 7.2 %      Eosinophil % 1.2 %      Basophil % 0.7 %      Immature Grans % 0.3 %      Neutrophils, Absolute 3.47 10*3/mm3      Lymphocytes, Absolute 1.91 10*3/mm3      Monocytes, Absolute 0.43 10*3/mm3      Eosinophils, Absolute 0.07 10*3/mm3      Basophils, Absolute 0.04 10*3/mm3      Immature Grans, Absolute 0.02 10*3/mm3      nRBC 0.0 /100 WBC     Calcium, Ionized [[id number]]  (Normal) Collected: 01/09/21 0514    Specimen: Blood Updated: 01/09/21 0530     Ionized Calcium 4.87 mg/dL      Folate [[id number]]  (Normal) Collected: 01/08/21 0842    Specimen: Blood Updated: 01/08/21 1933     Folate 6.61 ng/mL     Narrative:      Results may be falsely increased if patient taking Biotin.      Vitamin B12 [[id number]]  (Abnormal) Collected: 01/08/21 0842    Specimen: Blood Updated: 01/08/21 1933     Vitamin B-12 >2,000 pg/mL     Narrative:      Results may be falsely increased if patient taking Biotin.          Xr Chest 2 View    Result Date: 1/7/2021  CONCLUSION:  No acute cardiopulmonary disease Electronically signed by:  Ranjit Marie MD  1/7/2021 8:58 AM CST Workstation: [id number]    Ct Abdomen Pelvis With Contrast    Result Date: 1/7/2021  Thickening of the distal esophagus just above the gastroesophageal junction. This demonstrates a change since prior CT examination. Consider upper GI examination, esophagram or endoscopy for further evaluation. Normal pancreas. No findings suggesting acute pancreatitis Phrygian cap gallbladder fundus. This is usually a normal variant. Dilated common bile duct 0.9 cm. This however is unchanged since prior CT exam. Normal pancreas. No findings suggesting acute pancreatitis. 2.55 cm right ovarian cyst. No follow-up is necessary. CT abdomen, pelvis with contrast is otherwise unremarkable. Electronically signed by:  Jorge Mullen MD  1/7/2021 10:33 AM CST Workstation: [id number]    Mri Abdomen Wo Contrast Mrcp    Result Date: 1/8/2021  As above. Electronically signed by:  Jorge Mullen MD  1/8/2021 11:30 AM CST Workstation: [id number]         HOSPITAL COURSE:  Patient was admitted to the floor in stable condition with concerns for pancreatitis vs gastritis and alcohol withdrawals. GI was consulted due to elevated lipase of 1446 and CT abdomen pelvis showed thickening of the distal esophagus above gastroesophageal junction. Due to the abnormal findings, EGD was performed which showed acute gastritis, biopsy was taken and sent for studies. Patient's CT abdomen also showed  dilation of CBD therefore MRCP was done which did not show any gallstones. Patient remained on IV Protonix and started on CIWA protocol for alcohol withdrawal symptoms. Lipase levels were tended down, and patient's symptoms improved significantly. She was initially on NPO diet later progressed to liquid diet and advanced to regular diet prior to discharge. She did have one reading of low potassium therefore was replaced it with oral potassium supplement, improving her potassium level. She was instructed to avoid NSAIDs and counseled on alcohol cessation as it can worsen her gastritis and sent home on oral protonix. Patient was agreeable with the plan and discharged in stable condition.     DISCHARGE CONDITION:   Stable and symptom free     DISPOSITION:  Home or Self Care    DISCHARGE MEDICATIONS     Discharge Medications      New Medications      Instructions Start Date   pantoprazole 40 MG EC tablet  Commonly known as: Protonix   40 mg, Oral, Daily         Changes to Medications      Instructions Start Date   folic acid 1 MG tablet  Commonly known as: FOLVITE  What changed:   · medication strength  · how much to take   500 mcg, Oral, Daily   Start Date: January 11, 2021        Continue These Medications      Instructions Start Date   buprenorphine-naloxone 8-2 MG per SL tablet  Commonly known as: SUBOXONE   3 tablets, Sublingual, 3 Times Daily, Patient takes one 8-2 tablet three times a day.      cyanocobalamin 1000 MCG tablet  Commonly known as: VITAMIN B-12   1,000 mcg, Oral, Daily      diazePAM 5 MG tablet  Commonly known as: VALIUM   Oral, Every 8 Hours PRN      FLUoxetine 20 MG capsule  Commonly known as: PROzac   20 mg, Oral, Daily      gabapentin 600 MG tablet  Commonly known as: NEURONTIN   300 mg, 3 Times Daily      guaiFENesin 600 MG 12 hr tablet  Commonly known as: MUCINEX   600 mg, Oral, 2 Times Daily      ondansetron 4 MG tablet  Commonly known as: Zofran   4 mg, Oral, Every 8 Hours PRN         promethazine 25 MG suppository  Commonly known as: PHENERGAN   25 mg, Rectal, Every 6 Hours PRN      thiamine 100 MG tablet tablet  Commonly known as: VITAMIN B-1   100 mg, Oral, Daily             INSTRUCTIONS:  Activity:   Activity Instructions     Activity as Tolerated          Diet:   Diet Instructions     Diet: Regular      Discharge Diet: Regular    Baxter diet          FOLLOW UP:   Additional Instructions for the Follow-ups that You Need to Schedule     Call MD With Problems / Concerns   As directed      Instructions: worsening abdominal pain, chest pain, nausea/vomiting, fevers/chills, dizziness, shortness of breath.    Order Comments: Instructions: worsening abdominal pain, chest pain, nausea/vomiting, fevers/chills, dizziness, shortness of breath.          Discharge Follow-up with PCP   As directed       Currently Documented PCP:    Provider, No Known    PCP Phone Number:    None     Follow Up Details: Follow up with Dr. Jacob or Dr. Almaguer         Discharge Follow-up with Specified Provider: Dr. Mills in 1 week; 1 Week   As directed      To: Dr. Mills in 1 week    Follow Up: 1 Week           Follow-up Information     Provider, No Known .    Why: Follow up with Dr. Jacob or Dr. Almaguer  Contact information:  [facility] [address]                   PENDING TEST RESULTS AT DISCHARGE  Pending Labs     Order Current Status    Tissue Pathology Exam Collected (01/08/21 1313)    Vitamin D 1,25 Dihydroxy In process          Time: >30 minutes was spent in discharge planning, medication reconciliation and coordination of care for this patient.    Nadir Villarreal MD is the attending at time of discharge, He is aware of the patient's status and agrees with the above discharge summary.        Erica Jacob MD PGY-1  [facility] Family Medicine Residency   This document has been electronically signed by Erica Jacob MD on January 10, 2021 13:51 CST                  Electronically signed by  Erica Jacob MD at 01/10/21 1527       Discharge Order (From admission, onward)     Start     Ordered    01/10/21 1308  Discharge patient  Once     Expected Discharge Date: 01/10/21    Discharge Disposition: Home or Self Care    Physician of Record for Attribution - Please select from Treatment Team: KIP CHRISTIANSON [[id number]]    Review needed by CMO to determine Physician of Record: No       Question Answer Comment   Physician of Record for Attribution - Please select from Treatment Team KIP CHRISTIANSON    Review needed by CMO to determine Physician of Record No        01/10/21 3800

## 2021-01-12 LAB
LAB AP CASE REPORT: NORMAL
PATH REPORT.FINAL DX SPEC: NORMAL

## 2021-01-13 ENCOUNTER — OFFICE VISIT (OUTPATIENT)
Dept: GASTROENTEROLOGY | Facility: CLINIC | Age: 36
End: 2021-01-13

## 2021-01-13 VITALS
DIASTOLIC BLOOD PRESSURE: 61 MMHG | WEIGHT: 131.8 LBS | HEIGHT: 67 IN | SYSTOLIC BLOOD PRESSURE: 98 MMHG | HEART RATE: 119 BPM | BODY MASS INDEX: 20.69 KG/M2

## 2021-01-13 DIAGNOSIS — R10.12 LEFT UPPER QUADRANT ABDOMINAL PAIN: Primary | ICD-10-CM

## 2021-01-13 DIAGNOSIS — K59.09 OTHER CONSTIPATION: ICD-10-CM

## 2021-01-13 PROCEDURE — 99214 OFFICE O/P EST MOD 30 MIN: CPT | Performed by: INTERNAL MEDICINE

## 2021-01-13 RX ORDER — SUCRALFATE 1 G/1
1 TABLET ORAL 4 TIMES DAILY
Qty: 120 TABLET | Refills: 3 | Status: SHIPPED | OUTPATIENT
Start: 2021-01-13 | End: 2021-01-18

## 2021-01-13 RX ORDER — GABAPENTIN 800 MG/1
800 TABLET ORAL 3 TIMES DAILY
COMMUNITY
Start: 2020-12-31

## 2021-01-13 RX ORDER — DEXTROSE AND SODIUM CHLORIDE 5; .45 G/100ML; G/100ML
30 INJECTION, SOLUTION INTRAVENOUS CONTINUOUS PRN
Status: CANCELLED | OUTPATIENT
Start: 2021-01-22

## 2021-01-13 RX ORDER — PRAZOSIN HYDROCHLORIDE 5 MG/1
5 CAPSULE ORAL NIGHTLY
COMMUNITY
Start: 2021-01-07

## 2021-01-13 RX ORDER — POLYETHYLENE GLYCOL 3350 17 G/17G
17 POWDER, FOR SOLUTION ORAL DAILY
Qty: 30 PACKET | Refills: 3 | Status: SHIPPED | OUTPATIENT
Start: 2021-01-13 | End: 2021-01-27

## 2021-01-13 NOTE — PROGRESS NOTES
Chief Complaint   Patient presents with   • [facility] ED - Hospital Admision     01/10/2021 - 2021.  Acure Gastritis Without Hemorrhage.  Thickening Of Esophagus.  Alcohol Dependence.   • EGD Performed 2021       Subjective    Nata Bain is a 35 y.o. female.    History of Present Illness  Patient presented to GI clinic for follow-up visit today post hospital admission for acute pancreatitis and esophagitis.  Has intermittent bouts of left upper quadrant pain with alcohol usage.  Has intermittent alcohol usage since discharge.  Also has chronic constipation and denied nausea, vomiting, rectal bleeding or weight loss.  Taking Protonix daily.  On Suboxone therapy for opiate dependence.  Recent EGD was consistent with grade 4 ulcerative esophagitis of the distal esophagus and gastritis.  Path was consistent with reactive gastropathy and esophageal ulcer.       The following portions of the patient's history were reviewed and updated as appropriate:   Past Medical History:   Diagnosis Date   • Abnormal Pap smear of cervix    • Alcoholism (CMS/formerly [facility])    • Anxiety    • Cervical dysplasia    • Depression    • Fibrocystic breast    • GERD (gastroesophageal reflux disease)    • Seizures (CMS/formerly [facility]) 2017   • Substance abuse (CMS/formerly [facility])    • Substance abuse (CMS/formerly [facility])      Past Surgical History:   Procedure Laterality Date   • RHINOPLASTY       Family History   Problem Relation Age of Onset   • Breast cancer Mother    • Cancer Mother    • COPD Mother    • Breast cancer Maternal Grandmother    • COPD Maternal Grandmother    • Heart disease Maternal Grandmother    • Breast cancer Paternal Grandmother    • Breast cancer Maternal Aunt    • Hypertension Father    • Heart disease Father      OB History        2    Para   1    Term   1            AB   1    Living   1       SAB   1    TAB        Ectopic        Molar        Multiple        Live Births   1              Prior to Admission medications    Medication  Sig Start Date End Date Taking? Authorizing Provider   buprenorphine-naloxone (SUBOXONE) 8-2 MG per SL tablet Place 3 tablets under the tongue 3 (Three) Times a Day. Patient takes one 8-2 tablet three times a day. 2/20/20  Yes Caty Yepez MD   diazePAM (VALIUM) 5 MG tablet Take  by mouth Every 8 (Eight) Hours As Needed. 1/16/20  Yes Caty Yepez MD   FLUoxetine (PROzac) 20 MG capsule Take 20 mg by mouth Daily.   Yes Caty Yepez MD   gabapentin (NEURONTIN) 800 MG tablet  12/31/20  Yes Caty Yepez MD   pantoprazole (Protonix) 40 MG EC tablet Take 1 tablet by mouth Daily. 1/10/21  Yes Maryam Galdamez MD   prazosin (MINIPRESS) 5 MG capsule  1/7/21  Yes Caty Yepez MD   cyanocobalamin (VITAMIN B-12) 1000 MCG tablet Take 1 tablet by mouth Daily. 6/10/20   Eladio Vizcarra MD   folic acid (FOLVITE) 1 MG tablet Take 0.5 tablets by mouth Daily for 30 days. 1/11/21 2/10/21  Maryam Galdamez MD   guaiFENesin (MUCINEX) 600 MG 12 hr tablet Take 1 tablet by mouth 2 (Two) Times a Day. 4/9/20   Rayna De La Torre APRN   ondansetron (ZOFRAN) 4 MG tablet Take 1 tablet by mouth Every 8 (Eight) Hours As Needed for Nausea or Vomiting. 1/14/20   Mckenzie Young MD   polyethylene glycol (MIRALAX) 17 g packet Take 17 g by mouth Daily for 30 days. 1/13/21 2/12/21  Mirian Mills MD   promethazine (PHENERGAN) 25 MG suppository Insert 1 suppository into the rectum Every 6 (Six) Hours As Needed for Nausea or Vomiting. 3/8/20   Jeannie Painter APRN   thiamine (VITAMIN B1) 100 MG tablet Take 1 tablet by mouth Daily. 6/10/20   Eladio Vizcarra MD   gabapentin (NEURONTIN) 600 MG tablet 300 mg 3 (Three) Times a Day. 3/26/20 1/13/21  Caty Yepez MD     No Known Allergies  Social History     Socioeconomic History   • Marital status:      Spouse name: Not on file   • Number of children: Not on file   • Years of education: Not on file   • Highest education  "level: Not on file   Tobacco Use   • Smoking status: Current Every Day Smoker     Packs/day: 1.00     Years: 20.00     Pack years: 20.00     Types: Cigarettes   • Smokeless tobacco: Never Used   Substance and Sexual Activity   • Alcohol use: Yes     Frequency: Never     Comment: Pt reports drinking a 5th of vodka daily, last drink 1/3/21   • Drug use: No     Types: Fentanyl, Oxycodone   • Sexual activity: Yes     Partners: Male     Birth control/protection: None       Review of Systems  Review of Systems   Constitutional: Negative for chills, fatigue, fever and unexpected weight change.   HENT: Negative for congestion, ear discharge, hearing loss, nosebleeds and sore throat.    Eyes: Negative for pain, discharge and redness.   Respiratory: Negative for cough, chest tightness, shortness of breath and wheezing.    Cardiovascular: Negative for chest pain and palpitations.   Gastrointestinal: Positive for abdominal pain and constipation. Negative for abdominal distention, blood in stool, diarrhea, nausea and vomiting.   Endocrine: Negative for cold intolerance, polydipsia, polyphagia and polyuria.   Genitourinary: Negative for dysuria, flank pain, frequency, hematuria and urgency.   Musculoskeletal: Negative for arthralgias, back pain, joint swelling and myalgias.   Skin: Negative for color change, pallor and rash.   Neurological: Negative for tremors, seizures, syncope, weakness and headaches.   Hematological: Negative for adenopathy. Does not bruise/bleed easily.   Psychiatric/Behavioral: Negative for behavioral problems, confusion, dysphoric mood, hallucinations and suicidal ideas. The patient is not nervous/anxious.         BP 98/61   Pulse 119   Ht 170.2 cm (67\")   Wt 59.8 kg (131 lb 12.8 oz)   LMP 01/08/2021   BMI 20.64 kg/m²     Objective    Physical Exam  Constitutional:       Appearance: She is well-developed.   HENT:      Head: Normocephalic and atraumatic.   Eyes:      Conjunctiva/sclera: Conjunctivae " normal.      Pupils: Pupils are equal, round, and reactive to light.   Neck:      Musculoskeletal: Normal range of motion and neck supple.      Thyroid: No thyromegaly.   Cardiovascular:      Rate and Rhythm: Normal rate and regular rhythm.      Heart sounds: Normal heart sounds. No murmur.   Pulmonary:      Effort: Pulmonary effort is normal.      Breath sounds: Normal breath sounds. No wheezing.   Abdominal:      General: Bowel sounds are normal. There is no distension.      Palpations: Abdomen is soft. There is no mass.      Tenderness: There is no abdominal tenderness.      Hernia: No hernia is present.   Genitourinary:     Comments: No lesions noted  Musculoskeletal: Normal range of motion.         General: No tenderness.   Lymphadenopathy:      Cervical: No cervical adenopathy.   Skin:     General: Skin is warm and dry.      Findings: No rash.   Neurological:      Mental Status: She is alert and oriented to person, place, and time.      Cranial Nerves: No cranial nerve deficit.   Psychiatric:         Thought Content: Thought content normal.       No results displayed because visit has over 200 results.        Assessment/Plan      1. Left upper quadrant abdominal pain    2. Other constipation    1.  Left upper quadrant pain, likely due to pancreatitis.  Could also be due to reactive gastropathy or colonic pathology.  Increase Protonix to twice daily.  Add MiraLAX 17 g p.o. daily.  Obtain repeat lipase and CMP.  Add Carafate 1 g p.o. 4 times daily for reactive gastropathy.  2.  Constipation, likely due to medications.  Could also be due to IBD or colorectal neoplasia.  Add high-fiber diet and increase p.o. water intake.  Add MiraLAX 17 g p.o. daily.  Schedule colonoscopy for further evaluation.  3.  Acute pancreatitis, improved.  Repeat lipase level.  Discussed with patient regarding medical alcohol abstinence.  4.  Nausea and vomiting, resolved.  5.  Ulcerated esophagitis, continue PPI and increase to twice  daily.  Add Carafate 1 g p.o. 4 times daily.  Repeat EGD in 2 months to document healing and evaluate for Steiner's.  6.  Tobacco use and THC usage, recommend cessation.    Orders placed during this encounter include:  Orders Placed This Encounter   Procedures   • Lipase   • Comprehensive Metabolic Panel   • Follow Anesthesia Guidelines / Standing Orders     Standing Status:   Future   • Obtain Informed Consent     Standing Status:   Future     Order Specific Question:   Informed Consent Given For     Answer:   colonoscopy       COLONOSCOPY (N/A)    Review and/or summary of lab tests, radiology, procedures, medications. Review and summary of old records and obtaining of history. The risks and benefits of my recommendations, as well as other treatment options were discussed with the patient and her mother today. Questions were answered.    New Medications Ordered This Visit   Medications   • polyethylene glycol (MIRALAX) 17 g packet     Sig: Take 17 g by mouth Daily for 30 days.     Dispense:  30 packet     Refill:  3       Follow-up: Return in about 1 month (around 2/13/2021).               Results for orders placed or performed during the hospital encounter of 01/07/21   Gold Top - SST   Result Value Ref Range    Extra Tube Hold for add-ons.    Green Top (Gel)   Result Value Ref Range    Extra Tube Hold for add-ons.    Green Top (Gel)   Result Value Ref Range    Extra Tube Hold for add-ons.    COVID-19 and FLU A/B PCR - Swab, Nasopharynx    Specimen: Nasopharynx; Swab   Result Value Ref Range    COVID19 Not Detected Not Detected - Ref. Range    Influenza A PCR Not Detected Not Detected    Influenza B PCR Not Detected Not Detected   Urinalysis, Microscopic Only - Urine, Clean Catch    Specimen: Urine, Clean Catch   Result Value Ref Range    RBC, UA 0-2 (A) None Seen /HPF    WBC, UA 0-2 None Seen, 0-2, 3-5 /HPF    Bacteria, UA 3+ (A) None Seen /HPF    Squamous Epithelial Cells, UA 3-5 (A) None Seen, 0-2 /HPF     Hyaline Casts, UA None Seen None Seen /LPF    Methodology Manual Light Microscopy    Urinalysis With Culture If Indicated - Urine, Clean Catch    Specimen: Urine, Clean Catch   Result Value Ref Range    Color, UA Yellow Yellow, Straw, Dark Yellow, Rhonda    Appearance, UA Clear Clear    pH, UA 6.0 5.0 - 9.0    Specific Gravity, UA 1.046 (H) 1.003 - 1.030    Glucose, UA Negative Negative    Ketones, UA 80 mg/dL (3+) (A) Negative    Bilirubin, UA Moderate (2+) (A) Negative    Blood, UA Trace (A) Negative    Protein,  mg/dL (2+) (A) Negative    Leuk Esterase, UA Negative Negative    Nitrite, UA Negative Negative    Urobilinogen, UA 0.2 E.U./dL 0.2 - 1.0 E.U./dL   CBC Auto Differential    Specimen: Blood   Result Value Ref Range    WBC 6.50 3.40 - 10.80 10*3/mm3    RBC 3.40 (L) 3.77 - 5.28 10*6/mm3    Hemoglobin 12.5 12.0 - 15.9 g/dL    Hematocrit 35.4 34.0 - 46.6 %    .1 (H) 79.0 - 97.0 fL    MCH 36.8 (H) 26.6 - 33.0 pg    MCHC 35.3 31.5 - 35.7 g/dL    RDW 12.3 12.3 - 15.4 %    RDW-SD 47.4 37.0 - 54.0 fl    MPV 10.6 6.0 - 12.0 fL    Platelets 127 (L) 140 - 450 10*3/mm3    Neutrophil % 56.6 42.7 - 76.0 %    Lymphocyte % 32.6 19.6 - 45.3 %    Monocyte % 7.5 5.0 - 12.0 %    Eosinophil % 1.4 0.3 - 6.2 %    Basophil % 0.8 0.0 - 1.5 %    Immature Grans % 1.1 (H) 0.0 - 0.5 %    Neutrophils, Absolute 3.68 1.70 - 7.00 10*3/mm3    Lymphocytes, Absolute 2.12 0.70 - 3.10 10*3/mm3    Monocytes, Absolute 0.49 0.10 - 0.90 10*3/mm3    Eosinophils, Absolute 0.09 0.00 - 0.40 10*3/mm3    Basophils, Absolute 0.05 0.00 - 0.20 10*3/mm3    Immature Grans, Absolute 0.07 (H) 0.00 - 0.05 10*3/mm3    nRBC 0.0 0.0 - 0.2 /100 WBC   CBC Auto Differential    Specimen: Blood   Result Value Ref Range    WBC 5.94 3.40 - 10.80 10*3/mm3    RBC 3.28 (L) 3.77 - 5.28 10*6/mm3    Hemoglobin 12.0 12.0 - 15.9 g/dL    Hematocrit 34.6 34.0 - 46.6 %    .5 (H) 79.0 - 97.0 fL    MCH 36.6 (H) 26.6 - 33.0 pg    MCHC 34.7 31.5 - 35.7 g/dL    RDW  12.4 12.3 - 15.4 %    RDW-SD 48.7 37.0 - 54.0 fl    MPV 10.7 6.0 - 12.0 fL    Platelets 113 (L) 140 - 450 10*3/mm3    Neutrophil % 58.4 42.7 - 76.0 %    Lymphocyte % 32.2 19.6 - 45.3 %    Monocyte % 7.2 5.0 - 12.0 %    Eosinophil % 1.2 0.3 - 6.2 %    Basophil % 0.7 0.0 - 1.5 %    Immature Grans % 0.3 0.0 - 0.5 %    Neutrophils, Absolute 3.47 1.70 - 7.00 10*3/mm3    Lymphocytes, Absolute 1.91 0.70 - 3.10 10*3/mm3    Monocytes, Absolute 0.43 0.10 - 0.90 10*3/mm3    Eosinophils, Absolute 0.07 0.00 - 0.40 10*3/mm3    Basophils, Absolute 0.04 0.00 - 0.20 10*3/mm3    Immature Grans, Absolute 0.02 0.00 - 0.05 10*3/mm3    nRBC 0.0 0.0 - 0.2 /100 WBC   CBC Auto Differential    Specimen: Blood   Result Value Ref Range    WBC 7.98 3.40 - 10.80 10*3/mm3    RBC 3.56 (L) 3.77 - 5.28 10*6/mm3    Hemoglobin 13.1 12.0 - 15.9 g/dL    Hematocrit 37.3 34.0 - 46.6 %    .8 (H) 79.0 - 97.0 fL    MCH 36.8 (H) 26.6 - 33.0 pg    MCHC 35.1 31.5 - 35.7 g/dL    RDW 12.5 12.3 - 15.4 %    RDW-SD 49.0 37.0 - 54.0 fl    MPV 10.3 6.0 - 12.0 fL    Platelets 110 (L) 140 - 450 10*3/mm3    Neutrophil % 69.2 42.7 - 76.0 %    Lymphocyte % 21.7 19.6 - 45.3 %    Monocyte % 8.0 5.0 - 12.0 %    Eosinophil % 0.4 0.3 - 6.2 %    Basophil % 0.4 0.0 - 1.5 %    Immature Grans % 0.3 0.0 - 0.5 %    Neutrophils, Absolute 5.53 1.70 - 7.00 10*3/mm3    Lymphocytes, Absolute 1.73 0.70 - 3.10 10*3/mm3    Monocytes, Absolute 0.64 0.10 - 0.90 10*3/mm3    Eosinophils, Absolute 0.03 0.00 - 0.40 10*3/mm3    Basophils, Absolute 0.03 0.00 - 0.20 10*3/mm3    Immature Grans, Absolute 0.02 0.00 - 0.05 10*3/mm3    nRBC 0.0 0.0 - 0.2 /100 WBC   CBC Auto Differential    Specimen: Blood   Result Value Ref Range    WBC 11.06 (H) 3.40 - 10.80 10*3/mm3    RBC 4.43 3.77 - 5.28 10*6/mm3    Hemoglobin 16.2 (H) 12.0 - 15.9 g/dL    Hematocrit 44.7 34.0 - 46.6 %    .9 (H) 79.0 - 97.0 fL    MCH 36.6 (H) 26.6 - 33.0 pg    MCHC 36.2 (H) 31.5 - 35.7 g/dL    RDW 12.3 12.3 - 15.4 %     RDW-SD 46.2 37.0 - 54.0 fl    MPV 10.2 6.0 - 12.0 fL    Platelets 136 (L) 140 - 450 10*3/mm3    Neutrophil % 81.5 (H) 42.7 - 76.0 %    Lymphocyte % 9.8 (L) 19.6 - 45.3 %    Monocyte % 8.1 5.0 - 12.0 %    Eosinophil % 0.0 (L) 0.3 - 6.2 %    Basophil % 0.3 0.0 - 1.5 %    Immature Grans % 0.3 0.0 - 0.5 %    Neutrophils, Absolute 9.02 (H) 1.70 - 7.00 10*3/mm3    Lymphocytes, Absolute 1.08 0.70 - 3.10 10*3/mm3    Monocytes, Absolute 0.90 0.10 - 0.90 10*3/mm3    Eosinophils, Absolute 0.00 0.00 - 0.40 10*3/mm3    Basophils, Absolute 0.03 0.00 - 0.20 10*3/mm3    Immature Grans, Absolute 0.03 0.00 - 0.05 10*3/mm3    nRBC 0.0 0.0 - 0.2 /100 WBC     *Note: Due to a large number of results and/or encounters for the requested time period, some results have not been displayed. A complete set of results can be found in Results Review.         This document has been electronically signed by Mirian Mlils MD on January 13, 2021 11:17 CST

## 2021-01-13 NOTE — PATIENT INSTRUCTIONS
MyPlate from USDA    MyPlate is an outline of a general healthy diet based on the 2010 Dietary Guidelines for Americans, from the U.[place]. Department of Agriculture (USDA). It sets guidelines for how much food you should eat from each food group based on your age, sex, and level of physical activity.  What are tips for following MyPlate?  To follow MyPlate recommendations:  · Eat a wide variety of fruits and vegetables, grains, and protein foods.  · Serve smaller portions and eat less food throughout the day.  · Limit portion sizes to avoid overeating.  · Enjoy your food.  · Get at least 150 minutes of exercise every week. This is about 30 minutes each day, 5 or more days per week.  It can be difficult to have every meal look like MyPlate. Think about MyPlate as eating guidelines for an entire day, rather than each individual meal.  Fruits and vegetables  · Make half of your plate fruits and vegetables.  · Eat many different colors of fruits and vegetables each day.  · For a 2,000 calorie daily food plan, eat:  ? 2½ cups of vegetables every day.  ? 2 cups of fruit every day.  · 1 cup is equal to:  ? 1 cup raw or cooked vegetables.  ? 1 cup raw fruit.  ? 1 medium-sized orange, apple, or banana.  ? 1 cup 100% fruit or vegetable juice.  ? 2 cups raw leafy greens, such as lettuce, spinach, or kale.  ? ½ cup dried fruit.  Grains  · One fourth of your plate should be grains.  · Make at least half of the grains you eat each day whole grains.  · For a 2,000 calorie daily food plan, eat 6 oz of grains every day.  · 1 oz is equal to:  ? 1 slice bread.  ? 1 cup cereal.  ? ½ cup cooked rice, cereal, or pasta.  Protein  · One fourth of your plate should be protein.  · Eat a wide variety of protein foods, including meat, poultry, fish, eggs, beans, nuts, and tofu.  · For a 2,000 calorie daily food plan, eat 5½ oz of protein every day.  · 1 oz is equal to:  ? 1 oz meat, poultry, or fish.  ? ¼ cup cooked beans.  ? 1 egg.  ? ½ oz nuts  or seeds.  ? 1 Tbsp peanut butter.  Dairy  · Drink fat-free or low-fat (1%) milk.  · Eat or drink dairy as a side to meals.  · For a 2,000 calorie daily food plan, eat or drink 3 cups of dairy every day.  · 1 cup is equal to:  ? 1 cup milk, yogurt, cottage cheese, or soy milk (soy beverage).  ? 2 oz processed cheese.  ? 1½ oz natural cheese.  Fats, oils, salt, and sugars  · Only small amounts of oils are recommended.  · Avoid foods that are high in calories and low in nutritional value (empty calories), like foods high in fat or added sugars.  · Choose foods that are low in salt (sodium). Choose foods that have less than 140 milligrams (mg) of sodium per serving.  · Drink water instead of sugary drinks. Drink enough water each day to keep your urine pale yellow.  Where to find support  · Work with your health care provider or a nutrition specialist (dietitian) to develop a customized eating plan that is right for you.  · Download an heena (mobile application) to help you track your daily food intake.  Where to find more information  · Go to ChooseMyPlate.gov for more information.  Summary  · MyPlate is a general guideline for healthy eating from the USDA. It is based on the 2010 Dietary Guidelines for Americans.  · In general, fruits and vegetables should take up ½ of your plate, grains should take up ¼ of your plate, and protein should take up ¼ of your plate.  This information is not intended to replace advice given to you by your health care provider. Make sure you discuss any questions you have with your health care provider.  Document Revised: 05/21/2020 Document Reviewed: 03/19/2018  Elsevier Patient Education © 2020 [employer]

## 2021-01-18 VITALS — BODY MASS INDEX: 20.56 KG/M2 | WEIGHT: 131 LBS | HEIGHT: 67 IN

## 2021-01-19 ENCOUNTER — OFFICE VISIT (OUTPATIENT)
Dept: FAMILY MEDICINE CLINIC | Facility: CLINIC | Age: 36
End: 2021-01-19

## 2021-01-19 ENCOUNTER — LAB (OUTPATIENT)
Dept: LAB | Facility: HOSPITAL | Age: 36
End: 2021-01-19

## 2021-01-19 VITALS
OXYGEN SATURATION: 97 % | WEIGHT: 133.6 LBS | HEIGHT: 67 IN | DIASTOLIC BLOOD PRESSURE: 60 MMHG | SYSTOLIC BLOOD PRESSURE: 90 MMHG | HEART RATE: 107 BPM | BODY MASS INDEX: 20.97 KG/M2
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
## 2021-01-19 DIAGNOSIS — Z01.818 PREOP TESTING: Primary | ICD-10-CM

## 2021-01-19 DIAGNOSIS — K85.20 ALCOHOL-INDUCED ACUTE PANCREATITIS, UNSPECIFIED COMPLICATION STATUS: ICD-10-CM

## 2021-01-19 DIAGNOSIS — K59.00 CONSTIPATION, UNSPECIFIED CONSTIPATION TYPE: ICD-10-CM

## 2021-01-19 DIAGNOSIS — D53.1 MEGALOBLASTIC ANEMIA: ICD-10-CM

## 2021-01-19 PROCEDURE — U0004 COV-19 TEST NON-CDC HGH THRU: HCPCS

## 2021-01-19 PROCEDURE — C9803 HOPD COVID-19 SPEC COLLECT: HCPCS

## 2021-01-19 PROCEDURE — 99213 OFFICE O/P EST LOW 20 MIN: CPT | Performed by: STUDENT IN AN ORGANIZED HEALTH CARE EDUCATION/TRAINING PROGRAM

## 2021-01-19 RX ORDER — FOLIC ACID 1 MG/1
500 TABLET ORAL DAILY
Qty: 15 TABLET | Refills: 0 | Status: SHIPPED | OUTPATIENT
Start: 2021-01-19 | End: 2021-02-18

## 2021-01-19 RX ORDER — PROMETHAZINE HYDROCHLORIDE 25 MG/1
25 SUPPOSITORY RECTAL EVERY 6 HOURS PRN
Qty: 12 SUPPOSITORY | Refills: 0 | Status: SHIPPED | OUTPATIENT
Start: 2021-01-19 | End: 2021-01-22 | Stop reason: SDUPTHER

## 2021-01-19 RX ORDER — METHION/INOS/CHOL BT/B COM/LIV 110MG-86MG
100 CAPSULE ORAL DAILY
Qty: 30 TABLET | Refills: 5 | Status: SHIPPED | OUTPATIENT
Start: 2021-01-19 | End: 2022-01-26

## 2021-01-19 NOTE — PROGRESS NOTES
Subjective   Nata Bain is a 35 y.o. female who presents for hospital follow up for abdominal pain, nausea, and vomiting. Pt had EGD performed that showed gastritis. She is scheduled for a colonoscopy this Friday 1/21/21. PT states she has not had a bowel movement in 7 days. Seh is passing lots of gas. She states she has also felt dizzy and had a fall onto her head. She continues to have headache.       Past Medical Hx:  Past Medical History:   Diagnosis Date   • Abnormal Pap smear of cervix    • Alcoholism (CMS/HCC)    • Anxiety    • Cervical dysplasia    • Depression    • Fibrocystic breast    • GERD (gastroesophageal reflux disease)    • Seizures (CMS/HCC) 2017   • Substance abuse (CMS/HCC)    • Substance abuse (CMS/HCC)        Past Surgical Hx:  Past Surgical History:   Procedure Laterality Date   • ENDOSCOPY N/A 1/8/2021    Procedure: ESOPHAGOGASTRODUODENOSCOPY;  Surgeon: Mirian Mills MD;  Location: [facility] ENDOSCOPY;  Service: Gastroenterology;  Laterality: N/A;   • RHINOPLASTY         Health Maintenance:  Health Maintenance   Topic Date Due   • ANNUAL PHYSICAL  02/04/1989   • Pneumococcal Vaccine 0-64 (1 of 1 - PPSV23) 02/04/1992   • TDAP/TD VACCINES (2 - Tdap) 02/04/1997   • INFLUENZA VACCINE  08/01/2020   • PAP SMEAR  10/10/2021   • HEPATITIS C SCREENING  Completed   • MENINGOCOCCAL VACCINE  Aged Out       Current Meds:    Current Outpatient Medications:   •  buprenorphine-naloxone (SUBOXONE) 8-2 MG per SL tablet, Place 3 tablets under the tongue 3 (Three) Times a Day. Patient takes one 8-2 tablet three times a day., Disp: , Rfl:   •  cyanocobalamin (VITAMIN B-12) 1000 MCG tablet, Take 1 tablet by mouth Daily., Disp: 30 tablet, Rfl: 5  •  diazePAM (VALIUM) 5 MG tablet, Take  by mouth Every 8 (Eight) Hours As Needed., Disp: , Rfl:   •  FLUoxetine (PROzac) 20 MG capsule, Take 20 mg by mouth Daily., Disp: , Rfl:   •  folic acid (FOLVITE) 1 MG tablet, Take 0.5 tablets by  mouth Daily for 30 days., Disp: 15 tablet, Rfl: 0  •  gabapentin (NEURONTIN) 800 MG tablet, Take 800 mg by mouth 3 (Three) Times a Day., Disp: , Rfl:   •  pantoprazole (Protonix) 40 MG EC tablet, Take 1 tablet by mouth Daily., Disp: 30 tablet, Rfl: 1  •  polyethylene glycol (GoLYTELY) 236 g solution, Utilize as directed per instructions received from office of Dr. Mirian Mills M.D., Disp: 4000 mL, Rfl: 0  •  polyethylene glycol (MIRALAX) 17 g packet, Take 17 g by mouth Daily for 30 days., Disp: 30 packet, Rfl: 3  •  prazosin (MINIPRESS) 5 MG capsule, Take 5 mg by mouth Every Night., Disp: , Rfl:   •  promethazine (PHENERGAN) 25 MG suppository, Insert 1 suppository into the rectum Every 6 (Six) Hours As Needed for Nausea or Vomiting., Disp: 12 suppository, Rfl: 0  •  thiamine (thiamine) 100 MG tablet tablet, Take 1 tablet by mouth Daily., Disp: 30 tablet, Rfl: 5  •  magnesium citrate solution, Take 296 mL by mouth 1 (One) Time for 1 dose., Disp: 296 mL, Rfl: 0    Allergies:  Patient has no known allergies.    Family Hx:  Family History   Problem Relation Age of Onset   • Breast cancer Mother    • Cancer Mother    • COPD Mother    • Breast cancer Maternal Grandmother    • COPD Maternal Grandmother    • Heart disease Maternal Grandmother    • Breast cancer Paternal Grandmother    • Breast cancer Maternal Aunt    • Hypertension Father    • Heart disease Father         Social History:  Social History     Socioeconomic History   • Marital status:      Spouse name: Not on file   • Number of children: Not on file   • Years of education: Not on file   • Highest education level: Not on file   Tobacco Use   • Smoking status: Current Every Day Smoker     Packs/day: 1.00     Years: 20.00     Pack years: 20.00     Types: Cigarettes   • Smokeless tobacco: Never Used   Substance and Sexual Activity   • Alcohol use: Yes     Frequency: Never     Comment: 50ml of vodka 2-3 daily    • Drug use: No     Types: Fentanyl,  "Oxycodone   • Sexual activity: Yes     Partners: Male     Birth control/protection: None       Review of Systems  Review of Systems   Constitutional: Negative for activity change, chills, diaphoresis and fever.   HENT: Negative for dental problem, drooling, ear discharge, ear pain, facial swelling, mouth sores, nosebleeds, rhinorrhea, sinus pressure, sinus pain, sneezing and sore throat.    Eyes: Negative for pain, discharge and visual disturbance.   Respiratory: Negative for apnea, cough, shortness of breath, wheezing and stridor.    Cardiovascular: Negative for chest pain, palpitations and leg swelling.   Gastrointestinal: Positive for abdominal pain and constipation. Negative for abdominal distention, diarrhea, nausea and vomiting.   Genitourinary: Negative for dysuria, frequency and urgency.   Musculoskeletal: Negative for arthralgias and back pain.   Skin: Negative for rash and wound.   Neurological: Positive for headaches. Negative for dizziness, facial asymmetry and light-headedness.   Psychiatric/Behavioral: Negative for agitation and decreased concentration. The patient is not nervous/anxious.             Objective:     BP 90/60   Pulse 107   Ht 170.2 cm (67\")   Wt 60.6 kg (133 lb 9.6 oz)   LMP 01/08/2021   SpO2 97%   BMI 20.92 kg/m²       Physical Exam  Constitutional:       Appearance: She is well-developed.   HENT:      Head: Normocephalic and atraumatic.      Right Ear: External ear normal.      Left Ear: External ear normal.      Nose: Nose normal.      Mouth/Throat:      Pharynx: No oropharyngeal exudate.   Eyes:      General: No scleral icterus.        Right eye: No discharge.         Left eye: No discharge.      Conjunctiva/sclera: Conjunctivae normal.      Pupils: Pupils are equal, round, and reactive to light.   Neck:      Musculoskeletal: Normal range of motion and neck supple.      Vascular: No JVD.   Cardiovascular:      Rate and Rhythm: Normal rate and regular rhythm.      Heart sounds: " Normal heart sounds. No murmur. No friction rub. No gallop.    Pulmonary:      Effort: Pulmonary effort is normal. No respiratory distress.      Breath sounds: Normal breath sounds. No stridor. No wheezing or rales.   Abdominal:      General: Bowel sounds are normal. There is distension.      Palpations: Abdomen is soft.      Tenderness: There is abdominal tenderness.   Musculoskeletal: Normal range of motion.         General: No tenderness or deformity.   Skin:     General: Skin is warm and dry.      Capillary Refill: Capillary refill takes less than 2 seconds.      Coloration: Skin is not pale.      Findings: No erythema or rash.   Neurological:      Mental Status: She is alert and oriented to person, place, and time.   Psychiatric:         Behavior: Behavior normal.         Assessment/Plan:     Diagnoses and all orders for this visit:    1. Fall against object (Primary)  -     CT Head Without Contrast; Future    2. Alcohol-induced acute pancreatitis, unspecified complication status  -     cyanocobalamin (VITAMIN B-12) 1000 MCG tablet; Take 1 tablet by mouth Daily.  Dispense: 30 tablet; Refill: 5  -     promethazine (PHENERGAN) 25 MG suppository; Insert 1 suppository into the rectum Every 6 (Six) Hours As Needed for Nausea or Vomiting.  Dispense: 12 suppository; Refill: 0  -     thiamine (thiamine) 100 MG tablet tablet; Take 1 tablet by mouth Daily.  Dispense: 30 tablet; Refill: 5    3. Megaloblastic anemia  -     cyanocobalamin (VITAMIN B-12) 1000 MCG tablet; Take 1 tablet by mouth Daily.  Dispense: 30 tablet; Refill: 5  -     folic acid (FOLVITE) 1 MG tablet; Take 0.5 tablets by mouth Daily for 30 days.  Dispense: 15 tablet; Refill: 0  -     thiamine (thiamine) 100 MG tablet tablet; Take 1 tablet by mouth Daily.  Dispense: 30 tablet; Refill: 5    4. Constipation, unspecified constipation type  -     magnesium citrate solution; Take 296 mL by mouth 1 (One) Time for 1 dose.  Dispense: 296 mL; Refill: 0  -      polyethylene glycol (GoLYTELY) 236 g solution; Utilize as directed per instructions received from office of Dr. Mirian Mills M.D.  Dispense: 4000 mL; Refill: 0    Other orders  -     Cancel: polyethylene glycol (GoLYTELY) 236 g solution; Utilize as directed per instructions received from office of Dr. Mirian Mills M.D.  Dispense: 4000 mL; Refill: 0       Have advised Pt she can take Fleet enema that she has bought for her constipation. Will also prescribe Mag citrate. She has follow up for colonoscopy scheduled this week. Will get CT Head to rule out any bleed from fall to head. No focal deficits.  Follow-up:     Return in about 3 months (around 4/19/2021).      Preventative:    Vaccines Recommended at this visit:   No Vaccines recommended today. Patient is up to date on all vaccines.     Vaccines Received at this visit:  No Vaccines recommended today. Patient is up to date on all vaccines.     Screenings Recommended at this visit:  No Screenings offered today. Patient is up to date on all screenings at this time.     Screenings Ordered at this visit:  No screenings were offered today. Patient is up to date on all screenings.     Smoking Status:  Patient is current smoker. Patient is not interested in smoking cessation.    Alcohol Intake:  Regular (moderate)    Patient's Body mass index is 20.92 kg/m². BMI is below normal parameters. Recommendations include: increase caloric intake.         RISK SCORE: 3          This document has been electronically signed by Zeeshan Wiggins MD on January 19, 2021 16:49 CST

## 2021-01-20 ENCOUNTER — LAB (OUTPATIENT)
Dept: LAB | Facility: HOSPITAL | Age: 36
End: 2021-01-20

## 2021-01-20 LAB
ALBUMIN SERPL-MCNC: 4.2 G/DL (ref 3.5–5.2)
ALBUMIN/GLOB SERPL: 1.6 G/DL
ALP SERPL-CCNC: 79 U/L (ref 39–117)
ALT SERPL W P-5'-P-CCNC: 25 U/L (ref 1–33)
ANION GAP SERPL CALCULATED.3IONS-SCNC: 13.1 MMOL/L (ref 5–15)
AST SERPL-CCNC: 20 U/L (ref 1–32)
BILIRUB SERPL-MCNC: <0.2 MG/DL (ref 0–1.2)
BUN SERPL-MCNC: 12 MG/DL (ref 6–20)
BUN/CREAT SERPL: 18.8 (ref 7–25)
CALCIUM SPEC-SCNC: 9.4 MG/DL (ref 8.6–10.5)
CHLORIDE SERPL-SCNC: 102 MMOL/L (ref 98–107)
CO2 SERPL-SCNC: 21.9 MMOL/L (ref 22–29)
CREAT SERPL-MCNC: 0.64 MG/DL (ref 0.57–1)
GFR SERPL CREATININE-BSD FRML MDRD: 106 ML/MIN/1.73
GLOBULIN UR ELPH-MCNC: 2.6 GM/DL
GLUCOSE SERPL-MCNC: 111 MG/DL (ref 65–99)
LIPASE SERPL-CCNC: 93 U/L (ref 13–60)
POTASSIUM SERPL-SCNC: 3.9 MMOL/L (ref 3.5–5.2)
PROT SERPL-MCNC: 6.8 G/DL (ref 6–8.5)
SARS-COV-2 ORF1AB RESP QL NAA+PROBE: NOT DETECTED
SODIUM SERPL-SCNC: 137 MMOL/L (ref 136–145)

## 2021-01-20 PROCEDURE — 83690 ASSAY OF LIPASE: CPT | Performed by: INTERNAL MEDICINE

## 2021-01-20 PROCEDURE — 80053 COMPREHEN METABOLIC PANEL: CPT | Performed by: INTERNAL MEDICINE

## 2021-01-20 PROCEDURE — 36415 COLL VENOUS BLD VENIPUNCTURE: CPT | Performed by: INTERNAL MEDICINE

## 2021-01-22 ENCOUNTER — PATIENT OUTREACH (OUTPATIENT)
Dept: FAMILY MEDICINE CLINIC | Facility: CLINIC | Age: 36
End: 2021-01-22

## 2021-01-22 ENCOUNTER — HOSPITAL ENCOUNTER (OUTPATIENT)
Facility: HOSPITAL | Age: 36
Setting detail: HOSPITAL OUTPATIENT SURGERY
Discharge: HOME | End: 2021-01-22
Attending: INTERNAL MEDICINE | Admitting: INTERNAL MEDICINE

## 2021-01-22 DIAGNOSIS — K59.09 OTHER CONSTIPATION: ICD-10-CM

## 2021-01-22 DIAGNOSIS — K85.20 ALCOHOL-INDUCED ACUTE PANCREATITIS, UNSPECIFIED COMPLICATION STATUS: ICD-10-CM

## 2021-01-22 DIAGNOSIS — R10.12 LEFT UPPER QUADRANT ABDOMINAL PAIN: ICD-10-CM

## 2021-01-22 PROCEDURE — G0463 HOSPITAL OUTPT CLINIC VISIT: HCPCS | Performed by: INTERNAL MEDICINE

## 2021-01-22 RX ORDER — POLYETHYLENE GLYCOL 3350 17 G/17G
1 POWDER, FOR SOLUTION ORAL ONCE
Status: DISCONTINUED | OUTPATIENT
Start: 2021-01-22 | End: 2021-01-22 | Stop reason: HOSPADM

## 2021-01-22 RX ORDER — DEXTROSE AND SODIUM CHLORIDE 5; .45 G/100ML; G/100ML
30 INJECTION, SOLUTION INTRAVENOUS CONTINUOUS PRN
Status: CANCELLED | OUTPATIENT
Start: 2021-02-02

## 2021-01-22 RX ORDER — PROMETHAZINE HYDROCHLORIDE 25 MG/1
25 SUPPOSITORY RECTAL EVERY 6 HOURS PRN
Qty: 12 SUPPOSITORY | Refills: 0 | Status: SHIPPED | OUTPATIENT
Start: 2021-01-22 | End: 2023-01-25

## 2021-01-22 RX ORDER — DEXTROSE AND SODIUM CHLORIDE 5; .45 G/100ML; G/100ML
30 INJECTION, SOLUTION INTRAVENOUS CONTINUOUS PRN
Status: DISCONTINUED | OUTPATIENT
Start: 2021-01-22 | End: 2021-01-22 | Stop reason: HOSPADM

## 2021-01-22 NOTE — NURSING NOTE
Pt arrived stating that she only drink half of prep no bm's last night only 1 bm today with formed stool.  Explained to dr grace she suggest we reschedule and prep for 2 days.  Clear liquids x2 days and 2 dulcolax tabs and 1 bottle mag citrate. Then golytley prep.  I discussed in great detail the forms of clear liquids and how important it was to drink all of the prep.  I explained to pt if no results the morning of her test please call and she would come in early for enemas.  She verbalized understanding. New appt given with hand written instructions.

## 2021-01-28 NOTE — PROGRESS NOTES
I have seen the patient.  I have reviewed the notes, assessments, and/or procedures performed by Dr. MILVIA Wiggins, I concur with her/his documentation and assessment and plan for Nata Bain.               This document has been electronically signed by Humble Hernandez MD on January 28, 2021 10:13 CST

## 2021-01-29 ENCOUNTER — APPOINTMENT (OUTPATIENT)
Dept: CT IMAGING | Facility: HOSPITAL | Age: 36
End: 2021-01-29

## 2021-01-30 ENCOUNTER — LAB (OUTPATIENT)
Dept: LAB | Facility: HOSPITAL | Age: 36
End: 2021-01-30

## 2021-01-30 DIAGNOSIS — Z01.818 PREOP TESTING: Primary | ICD-10-CM

## 2021-01-30 PROCEDURE — C9803 HOPD COVID-19 SPEC COLLECT: HCPCS

## 2021-01-30 PROCEDURE — U0004 COV-19 TEST NON-CDC HGH THRU: HCPCS

## 2021-01-31 LAB — SARS-COV-2 ORF1AB RESP QL NAA+PROBE: NOT DETECTED

## 2021-02-01 ENCOUNTER — HOSPITAL ENCOUNTER (OUTPATIENT)
Dept: CT IMAGING | Facility: HOSPITAL | Age: 36
Discharge: HOME | End: 2021-02-01
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
## 2021-02-01 PROCEDURE — 70450 CT HEAD/BRAIN W/O DYE: CPT

## 2021-02-02 ENCOUNTER — ANESTHESIA (OUTPATIENT)
Dept: GASTROENTEROLOGY | Facility: HOSPITAL | Age: 36
End: 2021-02-02

## 2021-02-02 ENCOUNTER — ANESTHESIA EVENT (OUTPATIENT)
Dept: GASTROENTEROLOGY | Facility: HOSPITAL | Age: 36
End: 2021-02-02

## 2021-02-02 ENCOUNTER — HOSPITAL ENCOUNTER (OUTPATIENT)
Facility: HOSPITAL | Age: 36
Setting detail: HOSPITAL OUTPATIENT SURGERY
Discharge: HOME | End: 2021-02-02
Attending: INTERNAL MEDICINE | Admitting: INTERNAL MEDICINE

## 2021-02-02 VITALS
HEIGHT: 67 IN | WEIGHT: 130.95 LBS | RESPIRATION RATE: 16 BRPM | OXYGEN SATURATION: 96 % | TEMPERATURE: 97.6 F | SYSTOLIC BLOOD PRESSURE: 103 MMHG | BODY MASS INDEX: 20.55 KG/M2 | DIASTOLIC BLOOD PRESSURE: 76 MMHG | HEART RATE: 83 BPM

## 2021-02-02 LAB — B-HCG UR QL: NEGATIVE

## 2021-02-02 PROCEDURE — 25010000002 PROPOFOL 10 MG/ML EMULSION: Performed by: NURSE ANESTHETIST, CERTIFIED REGISTERED

## 2021-02-02 PROCEDURE — 45378 DIAGNOSTIC COLONOSCOPY: CPT | Performed by: INTERNAL MEDICINE

## 2021-02-02 PROCEDURE — 81025 URINE PREGNANCY TEST: CPT | Performed by: INTERNAL MEDICINE

## 2021-02-02 RX ORDER — LIDOCAINE HYDROCHLORIDE 20 MG/ML
INJECTION, SOLUTION INTRAVENOUS AS NEEDED
Status: DISCONTINUED | OUTPATIENT
Start: 2021-02-02 | End: 2021-02-02 | Stop reason: SURG

## 2021-02-02 RX ORDER — PROPOFOL 10 MG/ML
VIAL (ML) INTRAVENOUS AS NEEDED
Status: DISCONTINUED | OUTPATIENT
Start: 2021-02-02 | End: 2021-02-02 | Stop reason: SURG

## 2021-02-02 RX ORDER — DEXTROSE AND SODIUM CHLORIDE 5; .45 G/100ML; G/100ML
30 INJECTION, SOLUTION INTRAVENOUS CONTINUOUS PRN
Status: DISCONTINUED | OUTPATIENT
Start: 2021-02-02 | End: 2021-02-02 | Stop reason: HOSPADM

## 2021-02-02 RX ADMIN — PROPOFOL 20 MG: 10 INJECTION, EMULSION INTRAVENOUS at 15:23

## 2021-02-02 RX ADMIN — PROPOFOL 20 MG: 10 INJECTION, EMULSION INTRAVENOUS at 15:25

## 2021-02-02 RX ADMIN — LIDOCAINE HYDROCHLORIDE 50 MG: 20 INJECTION, SOLUTION INTRAVENOUS at 15:21

## 2021-02-02 RX ADMIN — PROPOFOL 20 MG: 10 INJECTION, EMULSION INTRAVENOUS at 15:27

## 2021-02-02 RX ADMIN — PROPOFOL 10 MG: 10 INJECTION, EMULSION INTRAVENOUS at 15:31

## 2021-02-02 RX ADMIN — PROPOFOL 60 MG: 10 INJECTION, EMULSION INTRAVENOUS at 15:21

## 2021-02-02 RX ADMIN — PROPOFOL 20 MG: 10 INJECTION, EMULSION INTRAVENOUS at 15:29

## 2021-02-02 RX ADMIN — DEXTROSE AND SODIUM CHLORIDE 30 ML/HR: 5; 450 INJECTION, SOLUTION INTRAVENOUS at 15:03

## 2021-02-02 NOTE — ANESTHESIA PREPROCEDURE EVALUATION
Anesthesia Evaluation     NPO Solid Status: > 8 hours  NPO Liquid Status: > 6 hours           Airway   Mallampati: II  TM distance: >3 FB  Neck ROM: full  No difficulty expected  Dental      Pulmonary     breath sounds clear to auscultation  (+) a smoker Current Smoked day of surgery,   (-) asthma, sleep apnea  Cardiovascular     Rhythm: regular  Rate: normal    (+) hypertension well controlled,   (-) DVT      Neuro/Psych  (+) seizures, psychiatric history Anxiety and Depression,       ROS Comment: 1 seizure 3 years ago  GI/Hepatic/Renal/Endo    (+)  GERD well controlled,    (-) liver disease, no renal disease, diabetes, no thyroid disorder    Musculoskeletal     (+) back pain,   Abdominal    Substance History - negative use     OB/GYN          Other - negative ROS                       Anesthesia Plan    ASA 2     MAC     intravenous induction     Anesthetic plan, all risks, benefits, and alternatives have been provided, discussed and informed consent has been obtained with: patient.

## 2021-02-02 NOTE — ANESTHESIA POSTPROCEDURE EVALUATION
Patient: Nata Bain    Procedure Summary     Date: 02/02/21 Room / Location: [facility] ENDOSCOPY 1 / [facility] ENDOSCOPY    Anesthesia Start: 1518 Anesthesia Stop: 1535    Procedure: COLONOSCOPY (N/A ) Diagnosis:       Left upper quadrant abdominal pain      Other constipation      (Left upper quadrant abdominal pain [R10.12])      (Other constipation [K59.09])    Surgeon: Mirian Mills MD Provider: Aletha Herrera CRNA    Anesthesia Type: MAC ASA Status: 2          Anesthesia Type: MAC    Vitals  Vitals Value Taken Time   /76 02/02/21 1551   Temp 97.6 °F (36.4 °C) 02/02/21 1535   Pulse 83 02/02/21 1551   Resp 16 02/02/21 1551   SpO2 96 % 02/02/21 1551           Post Anesthesia Care and Evaluation    Patient location during evaluation: bedside  Patient participation: waiting for patient participation  Level of consciousness: sleepy but conscious  Pain score: 0  Pain management: adequate  Airway patency: patent  Anesthetic complications: No anesthetic complications  PONV Status: none  Cardiovascular status: acceptable  Respiratory status: acceptable  Hydration status: acceptable

## 2021-02-27 ENCOUNTER — APPOINTMENT (OUTPATIENT)
Dept: CT IMAGING | Facility: HOSPITAL | Age: 36
End: 2021-02-27

## 2021-02-27 ENCOUNTER — HOSPITAL ENCOUNTER (INPATIENT)
Facility: HOSPITAL | Age: 36
LOS: 5 days | Discharge: HOME | End: 2021-03-04
Attending: EMERGENCY MEDICINE | Admitting: HOSPITALIST

## 2021-02-27 ENCOUNTER — APPOINTMENT (OUTPATIENT)
Dept: GENERAL RADIOLOGY | Facility: HOSPITAL | Age: 36
End: 2021-02-27

## 2021-02-27 DIAGNOSIS — A49.9 UTI (URINARY TRACT INFECTION), BACTERIAL: ICD-10-CM

## 2021-02-27 DIAGNOSIS — K29.20 ACUTE ALCOHOLIC GASTRITIS WITHOUT HEMORRHAGE: ICD-10-CM

## 2021-02-27 DIAGNOSIS — N39.0 UTI (URINARY TRACT INFECTION), BACTERIAL: ICD-10-CM

## 2021-02-27 DIAGNOSIS — R11.2 INTRACTABLE NAUSEA AND VOMITING: ICD-10-CM

## 2021-02-27 DIAGNOSIS — R10.13 EPIGASTRIC PAIN: Primary | ICD-10-CM

## 2021-02-27 PROBLEM — N30.00 ACUTE CYSTITIS: Status: ACTIVE | Noted: 2021-02-27

## 2021-02-27 LAB
ALBUMIN SERPL-MCNC: 4.5 G/DL (ref 3.5–5.2)
ALBUMIN/GLOB SERPL: 1.6 G/DL
ALP SERPL-CCNC: 114 U/L (ref 39–117)
ALT SERPL W P-5'-P-CCNC: 31 U/L (ref 1–33)
AMPHET+METHAMPHET UR QL: NEGATIVE
AMPHETAMINES UR QL: NEGATIVE
AMYLASE SERPL-CCNC: 53 U/L (ref 28–100)
ANION GAP SERPL CALCULATED.3IONS-SCNC: 18 MMOL/L (ref 5–15)
AST SERPL-CCNC: 123 U/L (ref 1–32)
BACTERIA UR QL AUTO: ABNORMAL /HPF
BARBITURATES UR QL SCN: NEGATIVE
BASOPHILS # BLD AUTO: 0.05 10*3/MM3 (ref 0–0.2)
BASOPHILS NFR BLD AUTO: 0.5 % (ref 0–1.5)
BENZODIAZ UR QL SCN: POSITIVE
BILIRUB SERPL-MCNC: 0.6 MG/DL (ref 0–1.2)
BILIRUB UR QL STRIP: ABNORMAL
BUN SERPL-MCNC: 9 MG/DL (ref 6–20)
BUN/CREAT SERPL: 14.5 (ref 7–25)
BUPRENORPHINE SERPL-MCNC: POSITIVE NG/ML
CALCIUM SPEC-SCNC: 9.4 MG/DL (ref 8.6–10.5)
CANNABINOIDS SERPL QL: NEGATIVE
CHLORIDE SERPL-SCNC: 98 MMOL/L (ref 98–107)
CLARITY UR: ABNORMAL
CO2 SERPL-SCNC: 23 MMOL/L (ref 22–29)
COCAINE UR QL: NEGATIVE
COLOR UR: ABNORMAL
CREAT SERPL-MCNC: 0.62 MG/DL (ref 0.57–1)
D-LACTATE SERPL-SCNC: 2 MMOL/L (ref 0.5–2)
D-LACTATE SERPL-SCNC: 3.8 MMOL/L (ref 0.5–2)
DEPRECATED RDW RBC AUTO: 46 FL (ref 37–54)
EOSINOPHIL # BLD AUTO: 0 10*3/MM3 (ref 0–0.4)
EOSINOPHIL NFR BLD AUTO: 0 % (ref 0.3–6.2)
ERYTHROCYTE [DISTWIDTH] IN BLOOD BY AUTOMATED COUNT: 12.9 % (ref 12.3–15.4)
ETHANOL BLD-MCNC: <10 MG/DL (ref 0–10)
ETHANOL UR QL: <0.01 %
FLUAV RNA RESP QL NAA+PROBE: NOT DETECTED
FLUBV RNA RESP QL NAA+PROBE: NOT DETECTED
GFR SERPL CREATININE-BSD FRML MDRD: 110 ML/MIN/1.73
GLOBULIN UR ELPH-MCNC: 2.8 GM/DL
GLUCOSE SERPL-MCNC: 126 MG/DL (ref 65–99)
GLUCOSE UR STRIP-MCNC: NEGATIVE MG/DL
HCG SERPL QL: NEGATIVE
HCT VFR BLD AUTO: 38 % (ref 34–46.6)
HGB BLD-MCNC: 13.9 G/DL (ref 12–15.9)
HGB UR QL STRIP.AUTO: NEGATIVE
HOLD SPECIMEN: NORMAL
HYALINE CASTS UR QL AUTO: ABNORMAL /LPF
IMM GRANULOCYTES # BLD AUTO: 0.04 10*3/MM3 (ref 0–0.05)
IMM GRANULOCYTES NFR BLD AUTO: 0.4 % (ref 0–0.5)
KETONES UR QL STRIP: ABNORMAL
LACTATE HOLD SPECIMEN: NORMAL
LDH SERPL-CCNC: 264 U/L (ref 135–214)
LEUKOCYTE ESTERASE UR QL STRIP.AUTO: ABNORMAL
LIPASE SERPL-CCNC: 65 U/L (ref 13–60)
LYMPHOCYTES # BLD AUTO: 1.31 10*3/MM3 (ref 0.7–3.1)
LYMPHOCYTES NFR BLD AUTO: 13.4 % (ref 19.6–45.3)
MCH RBC QN AUTO: 36.1 PG (ref 26.6–33)
MCHC RBC AUTO-ENTMCNC: 36.6 G/DL (ref 31.5–35.7)
MCV RBC AUTO: 98.7 FL (ref 79–97)
METHADONE UR QL SCN: NEGATIVE
MONOCYTES # BLD AUTO: 0.4 10*3/MM3 (ref 0.1–0.9)
MONOCYTES NFR BLD AUTO: 4.1 % (ref 5–12)
NEUTROPHILS NFR BLD AUTO: 7.99 10*3/MM3 (ref 1.7–7)
NEUTROPHILS NFR BLD AUTO: 81.6 % (ref 42.7–76)
NITRITE UR QL STRIP: POSITIVE
NRBC BLD AUTO-RTO: 0 /100 WBC (ref 0–0.2)
OPIATES UR QL: NEGATIVE
OXYCODONE UR QL SCN: NEGATIVE
PCP UR QL SCN: NEGATIVE
PH UR STRIP.AUTO: 6.5 [PH] (ref 5–9)
PLATELET # BLD AUTO: 195 10*3/MM3 (ref 140–450)
PMV BLD AUTO: 9.8 FL (ref 6–12)
POTASSIUM SERPL-SCNC: 3.9 MMOL/L (ref 3.5–5.2)
PROPOXYPH UR QL: NEGATIVE
PROT SERPL-MCNC: 7.3 G/DL (ref 6–8.5)
PROT UR QL STRIP: ABNORMAL
RBC # BLD AUTO: 3.85 10*6/MM3 (ref 3.77–5.28)
RBC # UR: ABNORMAL /HPF
REF LAB TEST METHOD: ABNORMAL
SARS-COV-2 RNA RESP QL NAA+PROBE: NOT DETECTED
SODIUM SERPL-SCNC: 139 MMOL/L (ref 136–145)
SP GR UR STRIP: 1.02 (ref 1–1.03)
SQUAMOUS #/AREA URNS HPF: ABNORMAL /HPF
TRICYCLICS UR QL SCN: NEGATIVE
UROBILINOGEN UR QL STRIP: ABNORMAL
WBC # BLD AUTO: 9.79 10*3/MM3 (ref 3.4–10.8)
WBC UR QL AUTO: ABNORMAL /HPF
WHOLE BLOOD HOLD SPECIMEN: NORMAL
WHOLE BLOOD HOLD SPECIMEN: NORMAL

## 2021-02-27 PROCEDURE — 25010000002 ONDANSETRON PER 1 MG: Performed by: STUDENT IN AN ORGANIZED HEALTH CARE EDUCATION/TRAINING PROGRAM

## 2021-02-27 PROCEDURE — 25010000002 ENOXAPARIN PER 10 MG: Performed by: STUDENT IN AN ORGANIZED HEALTH CARE EDUCATION/TRAINING PROGRAM

## 2021-02-27 PROCEDURE — 25010000002 IOPAMIDOL 61 % SOLUTION: Performed by: STUDENT IN AN ORGANIZED HEALTH CARE EDUCATION/TRAINING PROGRAM

## 2021-02-27 PROCEDURE — 25010000002 THIAMINE PER 100 MG: Performed by: EMERGENCY MEDICINE

## 2021-02-27 PROCEDURE — 83605 ASSAY OF LACTIC ACID: CPT | Performed by: EMERGENCY MEDICINE

## 2021-02-27 PROCEDURE — 82150 ASSAY OF AMYLASE: CPT | Performed by: EMERGENCY MEDICINE

## 2021-02-27 PROCEDURE — 87186 SC STD MICRODIL/AGAR DIL: CPT | Performed by: STUDENT IN AN ORGANIZED HEALTH CARE EDUCATION/TRAINING PROGRAM

## 2021-02-27 PROCEDURE — 84703 CHORIONIC GONADOTROPIN ASSAY: CPT | Performed by: EMERGENCY MEDICINE

## 2021-02-27 PROCEDURE — 80306 DRUG TEST PRSMV INSTRMNT: CPT | Performed by: EMERGENCY MEDICINE

## 2021-02-27 PROCEDURE — 83690 ASSAY OF LIPASE: CPT | Performed by: EMERGENCY MEDICINE

## 2021-02-27 PROCEDURE — 99284 EMERGENCY DEPT VISIT MOD MDM: CPT

## 2021-02-27 PROCEDURE — G0378 HOSPITAL OBSERVATION PER HR: HCPCS

## 2021-02-27 PROCEDURE — 25010000002 CEFTRIAXONE PER 250 MG: Performed by: EMERGENCY MEDICINE

## 2021-02-27 PROCEDURE — 87636 SARSCOV2 & INF A&B AMP PRB: CPT | Performed by: EMERGENCY MEDICINE

## 2021-02-27 PROCEDURE — 25010000002 HYDROMORPHONE 1 MG/ML SOLUTION: Performed by: EMERGENCY MEDICINE

## 2021-02-27 PROCEDURE — 80053 COMPREHEN METABOLIC PANEL: CPT | Performed by: EMERGENCY MEDICINE

## 2021-02-27 PROCEDURE — 82077 ASSAY SPEC XCP UR&BREATH IA: CPT | Performed by: EMERGENCY MEDICINE

## 2021-02-27 PROCEDURE — 74177 CT ABD & PELVIS W/CONTRAST: CPT

## 2021-02-27 PROCEDURE — 81001 URINALYSIS AUTO W/SCOPE: CPT | Performed by: EMERGENCY MEDICINE

## 2021-02-27 PROCEDURE — 87086 URINE CULTURE/COLONY COUNT: CPT | Performed by: STUDENT IN AN ORGANIZED HEALTH CARE EDUCATION/TRAINING PROGRAM

## 2021-02-27 PROCEDURE — 25010000002 MORPHINE PER 10 MG: Performed by: STUDENT IN AN ORGANIZED HEALTH CARE EDUCATION/TRAINING PROGRAM

## 2021-02-27 PROCEDURE — 25010000002 LORAZEPAM PER 2 MG: Performed by: STUDENT IN AN ORGANIZED HEALTH CARE EDUCATION/TRAINING PROGRAM

## 2021-02-27 PROCEDURE — 85025 COMPLETE CBC W/AUTO DIFF WBC: CPT | Performed by: EMERGENCY MEDICINE

## 2021-02-27 PROCEDURE — 25010000002 LORAZEPAM PER 2 MG: Performed by: EMERGENCY MEDICINE

## 2021-02-27 PROCEDURE — 99219 PR INITIAL OBSERVATION CARE/DAY 50 MINUTES: CPT | Performed by: STUDENT IN AN ORGANIZED HEALTH CARE EDUCATION/TRAINING PROGRAM

## 2021-02-27 PROCEDURE — 74022 RADEX COMPL AQT ABD SERIES: CPT

## 2021-02-27 PROCEDURE — 25010000002 ONDANSETRON PER 1 MG: Performed by: EMERGENCY MEDICINE

## 2021-02-27 PROCEDURE — 83615 LACTATE (LD) (LDH) ENZYME: CPT | Performed by: EMERGENCY MEDICINE

## 2021-02-27 RX ORDER — SODIUM CHLORIDE 0.9 % (FLUSH) 0.9 %
10 SYRINGE (ML) INJECTION AS NEEDED
Status: DISCONTINUED | OUTPATIENT
Start: 2021-02-27 | End: 2021-03-04 | Stop reason: HOSPADM

## 2021-02-27 RX ORDER — MORPHINE SULFATE 2 MG/ML
1 INJECTION, SOLUTION INTRAMUSCULAR; INTRAVENOUS EVERY 4 HOURS PRN
Status: DISCONTINUED | OUTPATIENT
Start: 2021-02-27 | End: 2021-02-28

## 2021-02-27 RX ORDER — LORAZEPAM 2 MG/1
2 TABLET ORAL
Status: DISCONTINUED | OUTPATIENT
Start: 2021-02-27 | End: 2021-02-28

## 2021-02-27 RX ORDER — LORAZEPAM 2 MG/ML
4 INJECTION INTRAMUSCULAR EVERY 4 HOURS
Status: DISCONTINUED | OUTPATIENT
Start: 2021-02-27 | End: 2021-02-28

## 2021-02-27 RX ORDER — SODIUM CHLORIDE 9 MG/ML
125 INJECTION, SOLUTION INTRAVENOUS CONTINUOUS
Status: DISCONTINUED | OUTPATIENT
Start: 2021-02-27 | End: 2021-02-28

## 2021-02-27 RX ORDER — PRAZOSIN HYDROCHLORIDE 5 MG/1
5 CAPSULE ORAL NIGHTLY
Status: DISCONTINUED | OUTPATIENT
Start: 2021-02-27 | End: 2021-03-04 | Stop reason: HOSPADM

## 2021-02-27 RX ORDER — LORAZEPAM 2 MG/ML
4 INJECTION INTRAMUSCULAR EVERY 6 HOURS
Status: DISCONTINUED | OUTPATIENT
Start: 2021-02-28 | End: 2021-02-28

## 2021-02-27 RX ORDER — LORAZEPAM 2 MG/ML
1 INJECTION INTRAMUSCULAR EVERY 6 HOURS
Status: DISCONTINUED | OUTPATIENT
Start: 2021-02-27 | End: 2021-02-27

## 2021-02-27 RX ORDER — LORAZEPAM 2 MG/ML
2 INJECTION INTRAMUSCULAR
Status: DISCONTINUED | OUTPATIENT
Start: 2021-02-27 | End: 2021-02-28

## 2021-02-27 RX ORDER — FLUOXETINE HYDROCHLORIDE 20 MG/1
20 CAPSULE ORAL DAILY
Status: DISCONTINUED | OUTPATIENT
Start: 2021-02-28 | End: 2021-03-01

## 2021-02-27 RX ORDER — PANTOPRAZOLE SODIUM 40 MG/10ML
40 INJECTION, POWDER, LYOPHILIZED, FOR SOLUTION INTRAVENOUS ONCE
Status: COMPLETED | OUTPATIENT
Start: 2021-02-27 | End: 2021-02-27

## 2021-02-27 RX ORDER — LORAZEPAM 2 MG/ML
1 INJECTION INTRAMUSCULAR
Status: DISCONTINUED | OUTPATIENT
Start: 2021-02-27 | End: 2021-02-28

## 2021-02-27 RX ORDER — PANTOPRAZOLE SODIUM 40 MG/1
40 TABLET, DELAYED RELEASE ORAL DAILY
Status: DISCONTINUED | OUTPATIENT
Start: 2021-02-28 | End: 2021-03-01 | Stop reason: ALTCHOICE

## 2021-02-27 RX ORDER — ACETAMINOPHEN 325 MG/1
650 TABLET ORAL EVERY 4 HOURS PRN
Status: DISCONTINUED | OUTPATIENT
Start: 2021-02-27 | End: 2021-02-28

## 2021-02-27 RX ORDER — ONDANSETRON 2 MG/ML
4 INJECTION INTRAMUSCULAR; INTRAVENOUS ONCE
Status: COMPLETED | OUTPATIENT
Start: 2021-02-27 | End: 2021-02-27

## 2021-02-27 RX ORDER — GABAPENTIN 400 MG/1
800 CAPSULE ORAL EVERY 8 HOURS SCHEDULED
Status: DISCONTINUED | OUTPATIENT
Start: 2021-02-27 | End: 2021-03-04 | Stop reason: HOSPADM

## 2021-02-27 RX ORDER — ONDANSETRON 4 MG/1
4 TABLET, FILM COATED ORAL EVERY 6 HOURS PRN
Status: DISCONTINUED | OUTPATIENT
Start: 2021-02-27 | End: 2021-03-04 | Stop reason: HOSPADM

## 2021-02-27 RX ORDER — LORAZEPAM 2 MG/ML
2 INJECTION INTRAMUSCULAR ONCE
Status: COMPLETED | OUTPATIENT
Start: 2021-02-27 | End: 2021-02-27

## 2021-02-27 RX ORDER — BUPRENORPHINE HYDROCHLORIDE AND NALOXONE HYDROCHLORIDE DIHYDRATE 8; 2 MG/1; MG/1
3 TABLET SUBLINGUAL 3 TIMES DAILY
Status: DISCONTINUED | OUTPATIENT
Start: 2021-02-27 | End: 2021-02-27 | Stop reason: ALTCHOICE

## 2021-02-27 RX ORDER — HYDRALAZINE HYDROCHLORIDE 20 MG/ML
10 INJECTION INTRAMUSCULAR; INTRAVENOUS EVERY 6 HOURS PRN
Status: DISCONTINUED | OUTPATIENT
Start: 2021-02-27 | End: 2021-03-04 | Stop reason: HOSPADM

## 2021-02-27 RX ORDER — LORAZEPAM 0.5 MG/1
1 TABLET ORAL
Status: DISCONTINUED | OUTPATIENT
Start: 2021-02-27 | End: 2021-02-28

## 2021-02-27 RX ORDER — ONDANSETRON 2 MG/ML
4 INJECTION INTRAMUSCULAR; INTRAVENOUS EVERY 6 HOURS PRN
Status: DISCONTINUED | OUTPATIENT
Start: 2021-02-27 | End: 2021-03-04 | Stop reason: HOSPADM

## 2021-02-27 RX ORDER — DIAZEPAM 5 MG/1
10 TABLET ORAL 2 TIMES DAILY
Status: DISCONTINUED | OUTPATIENT
Start: 2021-02-27 | End: 2021-02-28

## 2021-02-27 RX ORDER — LORAZEPAM 2 MG/ML
1 INJECTION INTRAMUSCULAR EVERY 8 HOURS
Status: DISCONTINUED | OUTPATIENT
Start: 2021-02-28 | End: 2021-02-27

## 2021-02-27 RX ORDER — AMOXICILLIN 250 MG
2 CAPSULE ORAL 2 TIMES DAILY
Status: DISCONTINUED | OUTPATIENT
Start: 2021-02-27 | End: 2021-02-28

## 2021-02-27 RX ORDER — SODIUM CHLORIDE 0.9 % (FLUSH) 0.9 %
10 SYRINGE (ML) INJECTION EVERY 12 HOURS SCHEDULED
Status: DISCONTINUED | OUTPATIENT
Start: 2021-02-27 | End: 2021-03-04 | Stop reason: HOSPADM

## 2021-02-27 RX ADMIN — HYDROMORPHONE HYDROCHLORIDE 1 MG: 1 INJECTION, SOLUTION INTRAMUSCULAR; INTRAVENOUS; SUBCUTANEOUS at 15:04

## 2021-02-27 RX ADMIN — MORPHINE SULFATE 1 MG: 2 INJECTION, SOLUTION INTRAMUSCULAR; INTRAVENOUS at 19:05

## 2021-02-27 RX ADMIN — PRAZOSIN HYDROCHLORIDE 5 MG: 5 CAPSULE ORAL at 22:30

## 2021-02-27 RX ADMIN — IOPAMIDOL 90 ML: 612 INJECTION, SOLUTION INTRAVENOUS at 20:30

## 2021-02-27 RX ADMIN — GABAPENTIN 800 MG: 400 CAPSULE ORAL at 22:21

## 2021-02-27 RX ADMIN — CEFTRIAXONE SODIUM 1 G: 1 INJECTION, POWDER, FOR SOLUTION INTRAMUSCULAR; INTRAVENOUS at 14:15

## 2021-02-27 RX ADMIN — ONDANSETRON 4 MG: 2 INJECTION INTRAMUSCULAR; INTRAVENOUS at 22:21

## 2021-02-27 RX ADMIN — LORAZEPAM 2 MG: 2 INJECTION INTRAMUSCULAR; INTRAVENOUS at 13:46

## 2021-02-27 RX ADMIN — ACETAMINOPHEN 650 MG: 325 TABLET, FILM COATED ORAL at 22:29

## 2021-02-27 RX ADMIN — LORAZEPAM 4 MG: 2 INJECTION INTRAMUSCULAR at 22:21

## 2021-02-27 RX ADMIN — SODIUM CHLORIDE, PRESERVATIVE FREE 10 ML: 5 INJECTION INTRAVENOUS at 22:20

## 2021-02-27 RX ADMIN — PANTOPRAZOLE SODIUM 40 MG: 40 INJECTION, POWDER, FOR SOLUTION INTRAVENOUS at 13:37

## 2021-02-27 RX ADMIN — DIAZEPAM 10 MG: 5 TABLET ORAL at 22:21

## 2021-02-27 RX ADMIN — ENOXAPARIN SODIUM 40 MG: 40 INJECTION SUBCUTANEOUS at 22:30

## 2021-02-27 RX ADMIN — THIAMINE HYDROCHLORIDE 1000 ML/HR: 100 INJECTION, SOLUTION INTRAMUSCULAR; INTRAVENOUS at 14:48

## 2021-02-27 RX ADMIN — ONDANSETRON 4 MG: 2 INJECTION INTRAMUSCULAR; INTRAVENOUS at 13:37

## 2021-02-27 RX ADMIN — MORPHINE SULFATE 1 MG: 2 INJECTION, SOLUTION INTRAMUSCULAR; INTRAVENOUS at 22:35

## 2021-02-27 RX ADMIN — LORAZEPAM 4 MG: 2 INJECTION INTRAMUSCULAR at 18:28

## 2021-02-27 RX ADMIN — SODIUM CHLORIDE 1000 ML: 9 INJECTION, SOLUTION INTRAVENOUS at 13:36

## 2021-02-27 RX ADMIN — SODIUM CHLORIDE 125 ML/HR: 9 INJECTION, SOLUTION INTRAVENOUS at 16:59

## 2021-02-28 PROBLEM — K85.20 ALCOHOL-INDUCED ACUTE PANCREATITIS: Status: RESOLVED | Noted: 2020-06-01 | Resolved: 2021-02-28

## 2021-02-28 PROBLEM — R19.7 DIARRHEA OF PRESUMED INFECTIOUS ORIGIN: Status: ACTIVE | Noted: 2021-02-28

## 2021-02-28 PROBLEM — K52.9 COLITIS: Status: ACTIVE | Noted: 2021-02-28

## 2021-02-28 PROBLEM — F10.239 ALCOHOL DEPENDENCE WITH WITHDRAWAL (HCC): Status: ACTIVE | Noted: 2020-06-01

## 2021-02-28 PROBLEM — E83.51 HYPOCALCEMIA: Status: ACTIVE | Noted: 2021-02-28

## 2021-02-28 LAB
ALBUMIN SERPL-MCNC: 3.6 G/DL (ref 3.5–5.2)
ALBUMIN/GLOB SERPL: 1.6 G/DL
ALP SERPL-CCNC: 92 U/L (ref 39–117)
ALT SERPL W P-5'-P-CCNC: 22 U/L (ref 1–33)
ANION GAP SERPL CALCULATED.3IONS-SCNC: 7 MMOL/L (ref 5–15)
AST SERPL-CCNC: 81 U/L (ref 1–32)
BASOPHILS # BLD AUTO: 0.03 10*3/MM3 (ref 0–0.2)
BASOPHILS NFR BLD AUTO: 0.5 % (ref 0–1.5)
BILIRUB SERPL-MCNC: 0.8 MG/DL (ref 0–1.2)
BUN SERPL-MCNC: 5 MG/DL (ref 6–20)
BUN/CREAT SERPL: 10.4 (ref 7–25)
C DIFF TOX GENS STL QL NAA+PROBE: POSITIVE
CALCIUM SPEC-SCNC: 7.6 MG/DL (ref 8.6–10.5)
CHLORIDE SERPL-SCNC: 105 MMOL/L (ref 98–107)
CO2 SERPL-SCNC: 25 MMOL/L (ref 22–29)
CREAT SERPL-MCNC: 0.48 MG/DL (ref 0.57–1)
DEPRECATED RDW RBC AUTO: 48.5 FL (ref 37–54)
EOSINOPHIL # BLD AUTO: 0.02 10*3/MM3 (ref 0–0.4)
EOSINOPHIL NFR BLD AUTO: 0.3 % (ref 0.3–6.2)
ERYTHROCYTE [DISTWIDTH] IN BLOOD BY AUTOMATED COUNT: 12.9 % (ref 12.3–15.4)
GFR SERPL CREATININE-BSD FRML MDRD: 147 ML/MIN/1.73
GLOBULIN UR ELPH-MCNC: 2.3 GM/DL
GLUCOSE SERPL-MCNC: 88 MG/DL (ref 65–99)
HCT VFR BLD AUTO: 33 % (ref 34–46.6)
HGB BLD-MCNC: 11.8 G/DL (ref 12–15.9)
IMM GRANULOCYTES # BLD AUTO: 0.01 10*3/MM3 (ref 0–0.05)
IMM GRANULOCYTES NFR BLD AUTO: 0.2 % (ref 0–0.5)
LYMPHOCYTES # BLD AUTO: 2.67 10*3/MM3 (ref 0.7–3.1)
LYMPHOCYTES NFR BLD AUTO: 44.1 % (ref 19.6–45.3)
MAGNESIUM SERPL-MCNC: 1.3 MG/DL (ref 1.6–2.6)
MCH RBC QN AUTO: 36.3 PG (ref 26.6–33)
MCHC RBC AUTO-ENTMCNC: 35.8 G/DL (ref 31.5–35.7)
MCV RBC AUTO: 101.5 FL (ref 79–97)
MONOCYTES # BLD AUTO: 0.32 10*3/MM3 (ref 0.1–0.9)
MONOCYTES NFR BLD AUTO: 5.3 % (ref 5–12)
NEUTROPHILS NFR BLD AUTO: 3.01 10*3/MM3 (ref 1.7–7)
NEUTROPHILS NFR BLD AUTO: 49.6 % (ref 42.7–76)
NRBC BLD AUTO-RTO: 0 /100 WBC (ref 0–0.2)
PLATELET # BLD AUTO: 163 10*3/MM3 (ref 140–450)
PMV BLD AUTO: 10.5 FL (ref 6–12)
POTASSIUM SERPL-SCNC: 2.9 MMOL/L (ref 3.5–5.2)
POTASSIUM SERPL-SCNC: 4.1 MMOL/L (ref 3.5–5.2)
PROT SERPL-MCNC: 5.9 G/DL (ref 6–8.5)
RBC # BLD AUTO: 3.25 10*6/MM3 (ref 3.77–5.28)
SODIUM SERPL-SCNC: 137 MMOL/L (ref 136–145)
WBC # BLD AUTO: 6.06 10*3/MM3 (ref 3.4–10.8)

## 2021-02-28 PROCEDURE — 25010000003 POTASSIUM CHLORIDE 10 MEQ/100ML SOLUTION: Performed by: STUDENT IN AN ORGANIZED HEALTH CARE EDUCATION/TRAINING PROGRAM

## 2021-02-28 PROCEDURE — 25010000002 CALCIUM GLUCONATE PER 10 ML: Performed by: STUDENT IN AN ORGANIZED HEALTH CARE EDUCATION/TRAINING PROGRAM

## 2021-02-28 PROCEDURE — 25010000002 HYDRALAZINE PER 20 MG: Performed by: STUDENT IN AN ORGANIZED HEALTH CARE EDUCATION/TRAINING PROGRAM

## 2021-02-28 PROCEDURE — G0378 HOSPITAL OBSERVATION PER HR: HCPCS

## 2021-02-28 PROCEDURE — 25810000003 SODIUM CHLORIDE 0.9 % WITH KCL 20 MEQ 20-0.9 MEQ/L-% SOLUTION: Performed by: STUDENT IN AN ORGANIZED HEALTH CARE EDUCATION/TRAINING PROGRAM

## 2021-02-28 PROCEDURE — 84132 ASSAY OF SERUM POTASSIUM: CPT | Performed by: STUDENT IN AN ORGANIZED HEALTH CARE EDUCATION/TRAINING PROGRAM

## 2021-02-28 PROCEDURE — 87493 C DIFF AMPLIFIED PROBE: CPT | Performed by: STUDENT IN AN ORGANIZED HEALTH CARE EDUCATION/TRAINING PROGRAM

## 2021-02-28 PROCEDURE — 25010000002 LEVOFLOXACIN PER 250 MG: Performed by: STUDENT IN AN ORGANIZED HEALTH CARE EDUCATION/TRAINING PROGRAM

## 2021-02-28 PROCEDURE — 83735 ASSAY OF MAGNESIUM: CPT | Performed by: STUDENT IN AN ORGANIZED HEALTH CARE EDUCATION/TRAINING PROGRAM

## 2021-02-28 PROCEDURE — 85025 COMPLETE CBC W/AUTO DIFF WBC: CPT | Performed by: STUDENT IN AN ORGANIZED HEALTH CARE EDUCATION/TRAINING PROGRAM

## 2021-02-28 PROCEDURE — 99224 PR SBSQ OBSERVATION CARE/DAY 15 MINUTES: CPT | Performed by: STUDENT IN AN ORGANIZED HEALTH CARE EDUCATION/TRAINING PROGRAM

## 2021-02-28 PROCEDURE — 25010000002 LORAZEPAM PER 2 MG: Performed by: STUDENT IN AN ORGANIZED HEALTH CARE EDUCATION/TRAINING PROGRAM

## 2021-02-28 PROCEDURE — 25010000002 ENOXAPARIN PER 10 MG: Performed by: STUDENT IN AN ORGANIZED HEALTH CARE EDUCATION/TRAINING PROGRAM

## 2021-02-28 PROCEDURE — 80053 COMPREHEN METABOLIC PANEL: CPT | Performed by: STUDENT IN AN ORGANIZED HEALTH CARE EDUCATION/TRAINING PROGRAM

## 2021-02-28 PROCEDURE — 25010000002 MORPHINE PER 10 MG: Performed by: STUDENT IN AN ORGANIZED HEALTH CARE EDUCATION/TRAINING PROGRAM

## 2021-02-28 PROCEDURE — 25010000002 MAGNESIUM SULFATE 2 GM/50ML SOLUTION: Performed by: STUDENT IN AN ORGANIZED HEALTH CARE EDUCATION/TRAINING PROGRAM

## 2021-02-28 PROCEDURE — 25010000002 ONDANSETRON PER 1 MG: Performed by: STUDENT IN AN ORGANIZED HEALTH CARE EDUCATION/TRAINING PROGRAM

## 2021-02-28 RX ORDER — LORAZEPAM 2 MG/ML
6 INJECTION INTRAMUSCULAR EVERY 4 HOURS
Status: COMPLETED | OUTPATIENT
Start: 2021-02-28 | End: 2021-02-28

## 2021-02-28 RX ORDER — POTASSIUM CHLORIDE 750 MG/1
40 CAPSULE, EXTENDED RELEASE ORAL 2 TIMES DAILY WITH MEALS
Status: DISCONTINUED | OUTPATIENT
Start: 2021-02-28 | End: 2021-03-02

## 2021-02-28 RX ORDER — LORAZEPAM 2 MG/ML
6 INJECTION INTRAMUSCULAR
Status: DISCONTINUED | OUTPATIENT
Start: 2021-02-28 | End: 2021-03-04 | Stop reason: HOSPADM

## 2021-02-28 RX ORDER — LOSARTAN POTASSIUM 25 MG/1
25 TABLET ORAL EVERY 24 HOURS
Status: DISCONTINUED | OUTPATIENT
Start: 2021-02-28 | End: 2021-03-04 | Stop reason: HOSPADM

## 2021-02-28 RX ORDER — LORAZEPAM 2 MG/ML
6 INJECTION INTRAMUSCULAR EVERY 6 HOURS
Status: DISCONTINUED | OUTPATIENT
Start: 2021-02-28 | End: 2021-02-28

## 2021-02-28 RX ORDER — LORAZEPAM 0.5 MG/1
1 TABLET ORAL
Status: DISCONTINUED | OUTPATIENT
Start: 2021-02-28 | End: 2021-03-04 | Stop reason: HOSPADM

## 2021-02-28 RX ORDER — HYDROCODONE BITARTRATE AND ACETAMINOPHEN 7.5; 325 MG/1; MG/1
1 TABLET ORAL EVERY 6 HOURS PRN
Status: DISCONTINUED | OUTPATIENT
Start: 2021-02-28 | End: 2021-03-04 | Stop reason: HOSPADM

## 2021-02-28 RX ORDER — LORAZEPAM 2 MG/1
4 TABLET ORAL
Status: DISCONTINUED | OUTPATIENT
Start: 2021-02-28 | End: 2021-03-04 | Stop reason: HOSPADM

## 2021-02-28 RX ORDER — BUPRENORPHINE HYDROCHLORIDE AND NALOXONE HYDROCHLORIDE DIHYDRATE 8; 2 MG/1; MG/1
1 TABLET SUBLINGUAL 3 TIMES DAILY
Status: DISCONTINUED | OUTPATIENT
Start: 2021-02-28 | End: 2021-03-04 | Stop reason: HOSPADM

## 2021-02-28 RX ORDER — LORAZEPAM 2 MG/ML
1 INJECTION INTRAMUSCULAR
Status: DISCONTINUED | OUTPATIENT
Start: 2021-02-28 | End: 2021-03-04 | Stop reason: HOSPADM

## 2021-02-28 RX ORDER — SODIUM CHLORIDE AND POTASSIUM CHLORIDE 150; 900 MG/100ML; MG/100ML
125 INJECTION, SOLUTION INTRAVENOUS CONTINUOUS
Status: DISCONTINUED | OUTPATIENT
Start: 2021-02-28 | End: 2021-03-02

## 2021-02-28 RX ORDER — DIAZEPAM 5 MG/1
10 TABLET ORAL 3 TIMES DAILY
Status: DISCONTINUED | OUTPATIENT
Start: 2021-02-28 | End: 2021-03-04 | Stop reason: HOSPADM

## 2021-02-28 RX ORDER — ACETAMINOPHEN 325 MG/1
650 TABLET ORAL EVERY 8 HOURS
Status: DISCONTINUED | OUTPATIENT
Start: 2021-03-01 | End: 2021-03-03

## 2021-02-28 RX ORDER — LORAZEPAM 2 MG/ML
10 INJECTION INTRAMUSCULAR EVERY 4 HOURS
Status: DISPENSED | OUTPATIENT
Start: 2021-02-28 | End: 2021-03-01

## 2021-02-28 RX ORDER — LORAZEPAM 2 MG/ML
10 INJECTION INTRAMUSCULAR EVERY 8 HOURS
Status: COMPLETED | OUTPATIENT
Start: 2021-03-02 | End: 2021-03-03

## 2021-02-28 RX ORDER — MAGNESIUM SULFATE HEPTAHYDRATE 40 MG/ML
2 INJECTION, SOLUTION INTRAVENOUS ONCE
Status: COMPLETED | OUTPATIENT
Start: 2021-02-28 | End: 2021-02-28

## 2021-02-28 RX ORDER — PROCHLORPERAZINE EDISYLATE 5 MG/ML
10 INJECTION INTRAMUSCULAR; INTRAVENOUS EVERY 6 HOURS PRN
Status: DISCONTINUED | OUTPATIENT
Start: 2021-02-28 | End: 2021-03-04 | Stop reason: HOSPADM

## 2021-02-28 RX ORDER — FOLIC ACID 1 MG/1
1 TABLET ORAL DAILY
Status: DISCONTINUED | OUTPATIENT
Start: 2021-02-28 | End: 2021-03-04 | Stop reason: HOSPADM

## 2021-02-28 RX ORDER — HYDROCODONE BITARTRATE AND ACETAMINOPHEN 5; 325 MG/1; MG/1
1 TABLET ORAL EVERY 8 HOURS PRN
Status: DISCONTINUED | OUTPATIENT
Start: 2021-02-28 | End: 2021-02-28

## 2021-02-28 RX ORDER — LEVOFLOXACIN 5 MG/ML
500 INJECTION, SOLUTION INTRAVENOUS EVERY 24 HOURS
Status: DISCONTINUED | OUTPATIENT
Start: 2021-02-28 | End: 2021-03-01

## 2021-02-28 RX ORDER — LORAZEPAM 2 MG/ML
10 INJECTION INTRAMUSCULAR EVERY 6 HOURS
Status: COMPLETED | OUTPATIENT
Start: 2021-03-01 | End: 2021-03-02

## 2021-02-28 RX ORDER — HYDROCODONE BITARTRATE AND ACETAMINOPHEN 5; 325 MG/1; MG/1
1 TABLET ORAL EVERY 6 HOURS PRN
Status: DISCONTINUED | OUTPATIENT
Start: 2021-02-28 | End: 2021-02-28

## 2021-02-28 RX ORDER — LORAZEPAM 2 MG/ML
4 INJECTION INTRAMUSCULAR
Status: DISCONTINUED | OUTPATIENT
Start: 2021-02-28 | End: 2021-03-04 | Stop reason: HOSPADM

## 2021-02-28 RX ORDER — CLONIDINE HYDROCHLORIDE 0.1 MG/1
0.1 TABLET ORAL EVERY 6 HOURS SCHEDULED
Status: DISCONTINUED | OUTPATIENT
Start: 2021-02-28 | End: 2021-03-04 | Stop reason: HOSPADM

## 2021-02-28 RX ORDER — ACETAMINOPHEN 325 MG/1
650 TABLET ORAL EVERY 6 HOURS PRN
Status: DISCONTINUED | OUTPATIENT
Start: 2021-02-28 | End: 2021-03-04 | Stop reason: HOSPADM

## 2021-02-28 RX ORDER — ACETAMINOPHEN 500 MG
1000 TABLET ORAL EVERY 8 HOURS
Status: DISCONTINUED | OUTPATIENT
Start: 2021-02-28 | End: 2021-02-28

## 2021-02-28 RX ORDER — NICOTINE 21 MG/24HR
1 PATCH, TRANSDERMAL 24 HOURS TRANSDERMAL
Status: DISCONTINUED | OUTPATIENT
Start: 2021-02-28 | End: 2021-03-04 | Stop reason: HOSPADM

## 2021-02-28 RX ORDER — MORPHINE SULFATE 2 MG/ML
2 INJECTION, SOLUTION INTRAMUSCULAR; INTRAVENOUS EVERY 4 HOURS PRN
Status: DISCONTINUED | OUTPATIENT
Start: 2021-02-28 | End: 2021-02-28

## 2021-02-28 RX ORDER — DIPHENOXYLATE HYDROCHLORIDE AND ATROPINE SULFATE 2.5; .025 MG/1; MG/1
1 TABLET ORAL DAILY
Status: COMPLETED | OUTPATIENT
Start: 2021-03-01 | End: 2021-03-03

## 2021-02-28 RX ORDER — POTASSIUM CHLORIDE 7.45 MG/ML
10 INJECTION INTRAVENOUS
Status: COMPLETED | OUTPATIENT
Start: 2021-02-28 | End: 2021-02-28

## 2021-02-28 RX ORDER — POTASSIUM CHLORIDE 750 MG/1
40 CAPSULE, EXTENDED RELEASE ORAL
Status: DISCONTINUED | OUTPATIENT
Start: 2021-02-28 | End: 2021-02-28

## 2021-02-28 RX ADMIN — LORAZEPAM 6 MG: 2 INJECTION INTRAMUSCULAR; INTRAVENOUS at 14:04

## 2021-02-28 RX ADMIN — POTASSIUM CHLORIDE AND SODIUM CHLORIDE 125 ML/HR: 900; 150 INJECTION, SOLUTION INTRAVENOUS at 21:44

## 2021-02-28 RX ADMIN — LORAZEPAM 6 MG: 2 INJECTION INTRAMUSCULAR at 22:53

## 2021-02-28 RX ADMIN — LORAZEPAM 6 MG: 2 INJECTION INTRAMUSCULAR; INTRAVENOUS at 10:52

## 2021-02-28 RX ADMIN — HYDROCODONE BITARTRATE AND ACETAMINOPHEN 1 TABLET: 7.5; 325 TABLET ORAL at 17:53

## 2021-02-28 RX ADMIN — LORAZEPAM 6 MG: 2 INJECTION INTRAMUSCULAR at 23:24

## 2021-02-28 RX ADMIN — MORPHINE SULFATE 2 MG: 2 INJECTION, SOLUTION INTRAMUSCULAR; INTRAVENOUS at 10:53

## 2021-02-28 RX ADMIN — DIAZEPAM 10 MG: 5 TABLET ORAL at 08:51

## 2021-02-28 RX ADMIN — SODIUM CHLORIDE, PRESERVATIVE FREE 10 ML: 5 INJECTION INTRAVENOUS at 20:13

## 2021-02-28 RX ADMIN — MORPHINE SULFATE 4 MG: 4 INJECTION INTRAVENOUS at 16:39

## 2021-02-28 RX ADMIN — BUPRENORPHINE HYDROCHLORIDE AND NALOXONE HYDROCHLORIDE DIHYDRATE 1 TABLET: 8; 2 TABLET SUBLINGUAL at 20:11

## 2021-02-28 RX ADMIN — DIAZEPAM 10 MG: 5 TABLET ORAL at 16:55

## 2021-02-28 RX ADMIN — Medication 1 PATCH: at 11:41

## 2021-02-28 RX ADMIN — POTASSIUM CHLORIDE 40 MEQ: 10 CAPSULE, COATED, EXTENDED RELEASE ORAL at 11:41

## 2021-02-28 RX ADMIN — LEVOFLOXACIN 500 MG: 5 INJECTION, SOLUTION INTRAVENOUS at 10:53

## 2021-02-28 RX ADMIN — POTASSIUM CHLORIDE AND SODIUM CHLORIDE 125 ML/HR: 900; 150 INJECTION, SOLUTION INTRAVENOUS at 09:49

## 2021-02-28 RX ADMIN — CLONIDINE HYDROCHLORIDE 0.1 MG: 0.1 TABLET ORAL at 23:31

## 2021-02-28 RX ADMIN — METRONIDAZOLE 500 MG: 500 INJECTION, SOLUTION INTRAVENOUS at 20:09

## 2021-02-28 RX ADMIN — MAGNESIUM SULFATE HEPTAHYDRATE 2 G: 40 INJECTION, SOLUTION INTRAVENOUS at 17:53

## 2021-02-28 RX ADMIN — LORAZEPAM 6 MG: 2 INJECTION INTRAMUSCULAR at 15:33

## 2021-02-28 RX ADMIN — ACETAMINOPHEN 1000 MG: 500 TABLET ORAL at 08:50

## 2021-02-28 RX ADMIN — DOCUSATE SODIUM 50 MG AND SENNOSIDES 8.6 MG 2 TABLET: 8.6; 5 TABLET, FILM COATED ORAL at 08:51

## 2021-02-28 RX ADMIN — BUPRENORPHINE HYDROCHLORIDE AND NALOXONE HYDROCHLORIDE DIHYDRATE 1 TABLET: 8; 2 TABLET SUBLINGUAL at 08:50

## 2021-02-28 RX ADMIN — GABAPENTIN 800 MG: 400 CAPSULE ORAL at 14:02

## 2021-02-28 RX ADMIN — POTASSIUM CHLORIDE 10 MEQ: 7.46 INJECTION, SOLUTION INTRAVENOUS at 12:50

## 2021-02-28 RX ADMIN — LORAZEPAM 10 MG: 2 INJECTION INTRAMUSCULAR; INTRAVENOUS at 16:56

## 2021-02-28 RX ADMIN — BUPRENORPHINE HYDROCHLORIDE AND NALOXONE HYDROCHLORIDE DIHYDRATE 1 TABLET: 8; 2 TABLET SUBLINGUAL at 15:22

## 2021-02-28 RX ADMIN — ENOXAPARIN SODIUM 40 MG: 40 INJECTION SUBCUTANEOUS at 17:54

## 2021-02-28 RX ADMIN — POTASSIUM CHLORIDE 10 MEQ: 7.46 INJECTION, SOLUTION INTRAVENOUS at 16:37

## 2021-02-28 RX ADMIN — MORPHINE SULFATE 1 MG: 2 INJECTION, SOLUTION INTRAMUSCULAR; INTRAVENOUS at 06:57

## 2021-02-28 RX ADMIN — LORAZEPAM 4 MG: 2 INJECTION INTRAMUSCULAR at 06:57

## 2021-02-28 RX ADMIN — LORAZEPAM 6 MG: 2 INJECTION INTRAMUSCULAR at 16:02

## 2021-02-28 RX ADMIN — MORPHINE SULFATE 4 MG: 4 INJECTION INTRAVENOUS at 20:56

## 2021-02-28 RX ADMIN — FLUOXETINE 20 MG: 20 CAPSULE ORAL at 08:51

## 2021-02-28 RX ADMIN — CLONIDINE HYDROCHLORIDE 0.1 MG: 0.1 TABLET ORAL at 17:53

## 2021-02-28 RX ADMIN — LORAZEPAM 6 MG: 2 INJECTION INTRAMUSCULAR at 20:12

## 2021-02-28 RX ADMIN — SODIUM CHLORIDE, PRESERVATIVE FREE 10 ML: 5 INJECTION INTRAVENOUS at 08:51

## 2021-02-28 RX ADMIN — HYDROCODONE BITARTRATE AND ACETAMINOPHEN 1 TABLET: 7.5; 325 TABLET ORAL at 23:31

## 2021-02-28 RX ADMIN — POTASSIUM CHLORIDE 10 MEQ: 7.46 INJECTION, SOLUTION INTRAVENOUS at 10:52

## 2021-02-28 RX ADMIN — GABAPENTIN 800 MG: 400 CAPSULE ORAL at 21:25

## 2021-02-28 RX ADMIN — SODIUM CHLORIDE 125 ML/HR: 9 INJECTION, SOLUTION INTRAVENOUS at 03:08

## 2021-02-28 RX ADMIN — THIAMINE HCL TAB 100 MG 100 MG: 100 TAB at 08:51

## 2021-02-28 RX ADMIN — LORAZEPAM 1 MG: 2 INJECTION INTRAMUSCULAR; INTRAVENOUS at 12:50

## 2021-02-28 RX ADMIN — HYDRALAZINE HYDROCHLORIDE 10 MG: 20 INJECTION INTRAMUSCULAR; INTRAVENOUS at 14:12

## 2021-02-28 RX ADMIN — LORAZEPAM 4 MG: 2 INJECTION INTRAMUSCULAR at 02:59

## 2021-02-28 RX ADMIN — MORPHINE SULFATE 2 MG: 2 INJECTION, SOLUTION INTRAMUSCULAR; INTRAVENOUS at 14:51

## 2021-02-28 RX ADMIN — FOLIC ACID 1 MG: 1 TABLET ORAL at 17:53

## 2021-02-28 RX ADMIN — HYDROCODONE BITARTRATE AND ACETAMINOPHEN 1 TABLET: 5; 325 TABLET ORAL at 09:49

## 2021-02-28 RX ADMIN — LORAZEPAM 6 MG: 2 INJECTION INTRAMUSCULAR at 21:06

## 2021-02-28 RX ADMIN — MORPHINE SULFATE 1 MG: 2 INJECTION, SOLUTION INTRAMUSCULAR; INTRAVENOUS at 02:59

## 2021-02-28 RX ADMIN — POTASSIUM CHLORIDE 10 MEQ: 7.46 INJECTION, SOLUTION INTRAVENOUS at 15:22

## 2021-02-28 RX ADMIN — DIAZEPAM 10 MG: 5 TABLET ORAL at 20:11

## 2021-02-28 RX ADMIN — POTASSIUM CHLORIDE 40 MEQ: 10 CAPSULE, COATED, EXTENDED RELEASE ORAL at 17:52

## 2021-02-28 RX ADMIN — LORAZEPAM 2 MG: 2 INJECTION INTRAMUSCULAR at 15:12

## 2021-02-28 RX ADMIN — ONDANSETRON 4 MG: 2 INJECTION INTRAMUSCULAR; INTRAVENOUS at 03:00

## 2021-02-28 RX ADMIN — CALCIUM GLUCONATE 1 G: 98 INJECTION, SOLUTION INTRAVENOUS at 11:00

## 2021-02-28 RX ADMIN — ACETAMINOPHEN 1000 MG: 500 TABLET ORAL at 16:37

## 2021-02-28 RX ADMIN — GABAPENTIN 800 MG: 400 CAPSULE ORAL at 06:57

## 2021-02-28 RX ADMIN — LORAZEPAM 6 MG: 2 INJECTION INTRAMUSCULAR at 17:24

## 2021-02-28 RX ADMIN — METRONIDAZOLE 500 MG: 500 INJECTION, SOLUTION INTRAVENOUS at 09:49

## 2021-02-28 RX ADMIN — POTASSIUM CHLORIDE 10 MEQ: 7.46 INJECTION, SOLUTION INTRAVENOUS at 14:03

## 2021-02-28 RX ADMIN — PANTOPRAZOLE SODIUM 40 MG: 40 TABLET, DELAYED RELEASE ORAL at 08:51

## 2021-02-28 RX ADMIN — LOSARTAN POTASSIUM 25 MG: 25 TABLET, FILM COATED ORAL at 12:49

## 2021-03-01 LAB
ALBUMIN SERPL-MCNC: 3.7 G/DL (ref 3.5–5.2)
ALBUMIN/GLOB SERPL: 1.4 G/DL
ALP SERPL-CCNC: 93 U/L (ref 39–117)
ALT SERPL W P-5'-P-CCNC: 22 U/L (ref 1–33)
ANION GAP SERPL CALCULATED.3IONS-SCNC: 7 MMOL/L (ref 5–15)
AST SERPL-CCNC: 146 U/L (ref 1–32)
BASOPHILS # BLD AUTO: 0.03 10*3/MM3 (ref 0–0.2)
BASOPHILS NFR BLD AUTO: 0.5 % (ref 0–1.5)
BILIRUB SERPL-MCNC: 0.5 MG/DL (ref 0–1.2)
BUN SERPL-MCNC: 3 MG/DL (ref 6–20)
BUN/CREAT SERPL: 6.8 (ref 7–25)
CALCIUM SPEC-SCNC: 8 MG/DL (ref 8.6–10.5)
CHLORIDE SERPL-SCNC: 107 MMOL/L (ref 98–107)
CO2 SERPL-SCNC: 21 MMOL/L (ref 22–29)
CREAT SERPL-MCNC: 0.44 MG/DL (ref 0.57–1)
DEPRECATED RDW RBC AUTO: 48 FL (ref 37–54)
EOSINOPHIL # BLD AUTO: 0.05 10*3/MM3 (ref 0–0.4)
EOSINOPHIL NFR BLD AUTO: 0.9 % (ref 0.3–6.2)
ERYTHROCYTE [DISTWIDTH] IN BLOOD BY AUTOMATED COUNT: 12.8 % (ref 12.3–15.4)
GFR SERPL CREATININE-BSD FRML MDRD: >150 ML/MIN/1.73
GLOBULIN UR ELPH-MCNC: 2.7 GM/DL
GLUCOSE SERPL-MCNC: 90 MG/DL (ref 65–99)
HCT VFR BLD AUTO: 32.5 % (ref 34–46.6)
HGB BLD-MCNC: 11.7 G/DL (ref 12–15.9)
IMM GRANULOCYTES # BLD AUTO: 0.01 10*3/MM3 (ref 0–0.05)
IMM GRANULOCYTES NFR BLD AUTO: 0.2 % (ref 0–0.5)
LYMPHOCYTES # BLD AUTO: 1.95 10*3/MM3 (ref 0.7–3.1)
LYMPHOCYTES NFR BLD AUTO: 34 % (ref 19.6–45.3)
MAGNESIUM SERPL-MCNC: 1.9 MG/DL (ref 1.6–2.6)
MCH RBC QN AUTO: 36.7 PG (ref 26.6–33)
MCHC RBC AUTO-ENTMCNC: 36 G/DL (ref 31.5–35.7)
MCV RBC AUTO: 101.9 FL (ref 79–97)
MONOCYTES # BLD AUTO: 0.25 10*3/MM3 (ref 0.1–0.9)
MONOCYTES NFR BLD AUTO: 4.4 % (ref 5–12)
NEUTROPHILS NFR BLD AUTO: 3.44 10*3/MM3 (ref 1.7–7)
NEUTROPHILS NFR BLD AUTO: 60 % (ref 42.7–76)
NRBC BLD AUTO-RTO: 0 /100 WBC (ref 0–0.2)
PLATELET # BLD AUTO: 141 10*3/MM3 (ref 140–450)
PMV BLD AUTO: 10.2 FL (ref 6–12)
POTASSIUM SERPL-SCNC: 4.4 MMOL/L (ref 3.5–5.2)
PROT SERPL-MCNC: 6.4 G/DL (ref 6–8.5)
RBC # BLD AUTO: 3.19 10*6/MM3 (ref 3.77–5.28)
SODIUM SERPL-SCNC: 135 MMOL/L (ref 136–145)
WBC # BLD AUTO: 5.73 10*3/MM3 (ref 3.4–10.8)

## 2021-03-01 PROCEDURE — 85025 COMPLETE CBC W/AUTO DIFF WBC: CPT | Performed by: STUDENT IN AN ORGANIZED HEALTH CARE EDUCATION/TRAINING PROGRAM

## 2021-03-01 PROCEDURE — 25010000002 MORPHINE PER 10 MG: Performed by: STUDENT IN AN ORGANIZED HEALTH CARE EDUCATION/TRAINING PROGRAM

## 2021-03-01 PROCEDURE — 25810000003 SODIUM CHLORIDE 0.9 % WITH KCL 20 MEQ 20-0.9 MEQ/L-% SOLUTION: Performed by: STUDENT IN AN ORGANIZED HEALTH CARE EDUCATION/TRAINING PROGRAM

## 2021-03-01 PROCEDURE — 99233 SBSQ HOSP IP/OBS HIGH 50: CPT | Performed by: STUDENT IN AN ORGANIZED HEALTH CARE EDUCATION/TRAINING PROGRAM

## 2021-03-01 PROCEDURE — 83735 ASSAY OF MAGNESIUM: CPT | Performed by: STUDENT IN AN ORGANIZED HEALTH CARE EDUCATION/TRAINING PROGRAM

## 2021-03-01 PROCEDURE — 25010000002 LEVOFLOXACIN PER 250 MG: Performed by: STUDENT IN AN ORGANIZED HEALTH CARE EDUCATION/TRAINING PROGRAM

## 2021-03-01 PROCEDURE — 80053 COMPREHEN METABOLIC PANEL: CPT | Performed by: STUDENT IN AN ORGANIZED HEALTH CARE EDUCATION/TRAINING PROGRAM

## 2021-03-01 PROCEDURE — 25010000002 LORAZEPAM PER 2 MG: Performed by: STUDENT IN AN ORGANIZED HEALTH CARE EDUCATION/TRAINING PROGRAM

## 2021-03-01 PROCEDURE — 25010000002 ENOXAPARIN PER 10 MG: Performed by: STUDENT IN AN ORGANIZED HEALTH CARE EDUCATION/TRAINING PROGRAM

## 2021-03-01 RX ORDER — BACLOFEN 10 MG/1
10 TABLET ORAL 3 TIMES DAILY
COMMUNITY
End: 2021-03-26

## 2021-03-01 RX ORDER — CLONIDINE HYDROCHLORIDE 0.1 MG/1
0.1 TABLET ORAL 3 TIMES DAILY
COMMUNITY
End: 2021-09-01

## 2021-03-01 RX ORDER — FLUOXETINE HYDROCHLORIDE 20 MG/1
40 CAPSULE ORAL DAILY
Status: DISCONTINUED | OUTPATIENT
Start: 2021-03-02 | End: 2021-03-04 | Stop reason: HOSPADM

## 2021-03-01 RX ORDER — IBUPROFEN 800 MG/1
800 TABLET ORAL 3 TIMES DAILY PRN
COMMUNITY
End: 2021-03-26

## 2021-03-01 RX ORDER — LORAZEPAM 1 MG/1
1 TABLET ORAL AS NEEDED
COMMUNITY
End: 2021-03-04 | Stop reason: HOSPADM

## 2021-03-01 RX ORDER — LEVOFLOXACIN 5 MG/ML
750 INJECTION, SOLUTION INTRAVENOUS EVERY 24 HOURS
Status: DISCONTINUED | OUTPATIENT
Start: 2021-03-01 | End: 2021-03-02

## 2021-03-01 RX ORDER — FAMOTIDINE 20 MG/1
20 TABLET, FILM COATED ORAL
Status: DISCONTINUED | OUTPATIENT
Start: 2021-03-01 | End: 2021-03-04 | Stop reason: HOSPADM

## 2021-03-01 RX ORDER — CLONAZEPAM 1 MG/1
1 TABLET ORAL DAILY
COMMUNITY
End: 2021-03-04 | Stop reason: HOSPADM

## 2021-03-01 RX ADMIN — LORAZEPAM 6 MG: 2 INJECTION INTRAMUSCULAR at 08:38

## 2021-03-01 RX ADMIN — CLONIDINE HYDROCHLORIDE 0.1 MG: 0.1 TABLET ORAL at 17:28

## 2021-03-01 RX ADMIN — ENOXAPARIN SODIUM 40 MG: 40 INJECTION SUBCUTANEOUS at 17:51

## 2021-03-01 RX ADMIN — BUPRENORPHINE HYDROCHLORIDE AND NALOXONE HYDROCHLORIDE DIHYDRATE 1 TABLET: 8; 2 TABLET SUBLINGUAL at 15:09

## 2021-03-01 RX ADMIN — FAMOTIDINE 20 MG: 20 TABLET, FILM COATED ORAL at 16:31

## 2021-03-01 RX ADMIN — POTASSIUM CHLORIDE AND SODIUM CHLORIDE 125 ML/HR: 900; 150 INJECTION, SOLUTION INTRAVENOUS at 15:14

## 2021-03-01 RX ADMIN — BUPRENORPHINE HYDROCHLORIDE AND NALOXONE HYDROCHLORIDE DIHYDRATE 1 TABLET: 8; 2 TABLET SUBLINGUAL at 20:20

## 2021-03-01 RX ADMIN — MORPHINE SULFATE 4 MG: 4 INJECTION INTRAVENOUS at 12:18

## 2021-03-01 RX ADMIN — SODIUM CHLORIDE, PRESERVATIVE FREE 10 ML: 5 INJECTION INTRAVENOUS at 08:02

## 2021-03-01 RX ADMIN — DIAZEPAM 10 MG: 5 TABLET ORAL at 15:09

## 2021-03-01 RX ADMIN — CLONIDINE HYDROCHLORIDE 0.1 MG: 0.1 TABLET ORAL at 05:36

## 2021-03-01 RX ADMIN — Medication 125 MG: at 17:28

## 2021-03-01 RX ADMIN — LOSARTAN POTASSIUM 25 MG: 25 TABLET, FILM COATED ORAL at 13:12

## 2021-03-01 RX ADMIN — CLONIDINE HYDROCHLORIDE 0.1 MG: 0.1 TABLET ORAL at 11:30

## 2021-03-01 RX ADMIN — ACETAMINOPHEN 650 MG: 325 TABLET, FILM COATED ORAL at 16:30

## 2021-03-01 RX ADMIN — LORAZEPAM 4 MG: 2 INJECTION INTRAMUSCULAR at 16:49

## 2021-03-01 RX ADMIN — PANTOPRAZOLE SODIUM 40 MG: 40 TABLET, DELAYED RELEASE ORAL at 08:02

## 2021-03-01 RX ADMIN — MORPHINE SULFATE 4 MG: 4 INJECTION INTRAVENOUS at 16:49

## 2021-03-01 RX ADMIN — MORPHINE SULFATE 4 MG: 4 INJECTION INTRAVENOUS at 21:30

## 2021-03-01 RX ADMIN — LORAZEPAM 6 MG: 2 INJECTION INTRAMUSCULAR at 03:08

## 2021-03-01 RX ADMIN — LORAZEPAM 6 MG: 2 INJECTION INTRAMUSCULAR at 00:12

## 2021-03-01 RX ADMIN — DIAZEPAM 10 MG: 5 TABLET ORAL at 20:20

## 2021-03-01 RX ADMIN — GABAPENTIN 800 MG: 400 CAPSULE ORAL at 13:12

## 2021-03-01 RX ADMIN — LORAZEPAM 4 MG: 2 INJECTION INTRAMUSCULAR at 13:09

## 2021-03-01 RX ADMIN — SODIUM CHLORIDE, PRESERVATIVE FREE 10 ML: 5 INJECTION INTRAVENOUS at 20:20

## 2021-03-01 RX ADMIN — THIAMINE HCL TAB 100 MG 200 MG: 100 TAB at 08:02

## 2021-03-01 RX ADMIN — POTASSIUM CHLORIDE 40 MEQ: 10 CAPSULE, COATED, EXTENDED RELEASE ORAL at 08:02

## 2021-03-01 RX ADMIN — BUPRENORPHINE HYDROCHLORIDE AND NALOXONE HYDROCHLORIDE DIHYDRATE 1 TABLET: 8; 2 TABLET SUBLINGUAL at 08:02

## 2021-03-01 RX ADMIN — ACETAMINOPHEN 650 MG: 325 TABLET, FILM COATED ORAL at 13:12

## 2021-03-01 RX ADMIN — Medication 125 MG: at 23:52

## 2021-03-01 RX ADMIN — Medication 1 PATCH: at 08:02

## 2021-03-01 RX ADMIN — DIAZEPAM 10 MG: 5 TABLET ORAL at 08:02

## 2021-03-01 RX ADMIN — GABAPENTIN 800 MG: 400 CAPSULE ORAL at 05:36

## 2021-03-01 RX ADMIN — MORPHINE SULFATE 4 MG: 4 INJECTION INTRAVENOUS at 01:03

## 2021-03-01 RX ADMIN — POTASSIUM CHLORIDE 40 MEQ: 10 CAPSULE, COATED, EXTENDED RELEASE ORAL at 17:28

## 2021-03-01 RX ADMIN — THERA TABS 1 TABLET: TAB at 08:02

## 2021-03-01 RX ADMIN — POTASSIUM CHLORIDE AND SODIUM CHLORIDE 125 ML/HR: 900; 150 INJECTION, SOLUTION INTRAVENOUS at 23:59

## 2021-03-01 RX ADMIN — ACETAMINOPHEN 650 MG: 325 TABLET, FILM COATED ORAL at 05:44

## 2021-03-01 RX ADMIN — MORPHINE SULFATE 4 MG: 4 INJECTION INTRAVENOUS at 08:38

## 2021-03-01 RX ADMIN — MORPHINE SULFATE 4 MG: 4 INJECTION INTRAVENOUS at 04:56

## 2021-03-01 RX ADMIN — METRONIDAZOLE 500 MG: 500 INJECTION, SOLUTION INTRAVENOUS at 05:00

## 2021-03-01 RX ADMIN — LORAZEPAM 6 MG: 2 INJECTION INTRAMUSCULAR at 05:36

## 2021-03-01 RX ADMIN — PRAZOSIN HYDROCHLORIDE 5 MG: 5 CAPSULE ORAL at 22:15

## 2021-03-01 RX ADMIN — LORAZEPAM 6 MG: 2 INJECTION INTRAMUSCULAR at 02:33

## 2021-03-01 RX ADMIN — Medication 125 MG: at 11:01

## 2021-03-01 RX ADMIN — LORAZEPAM 4 MG: 2 INJECTION INTRAMUSCULAR at 22:16

## 2021-03-01 RX ADMIN — FOLIC ACID 1 MG: 1 TABLET ORAL at 08:02

## 2021-03-01 RX ADMIN — GABAPENTIN 800 MG: 400 CAPSULE ORAL at 21:30

## 2021-03-01 RX ADMIN — LORAZEPAM 6 MG: 2 INJECTION INTRAMUSCULAR at 11:01

## 2021-03-01 RX ADMIN — LEVOFLOXACIN 500 MG: 5 INJECTION, SOLUTION INTRAVENOUS at 08:38

## 2021-03-01 RX ADMIN — LEVOFLOXACIN 750 MG: 5 INJECTION, SOLUTION INTRAVENOUS at 15:10

## 2021-03-01 RX ADMIN — LORAZEPAM 10 MG: 2 INJECTION, SOLUTION INTRAMUSCULAR; INTRAVENOUS at 20:32

## 2021-03-01 RX ADMIN — CLONIDINE HYDROCHLORIDE 0.1 MG: 0.1 TABLET ORAL at 23:52

## 2021-03-01 RX ADMIN — FLUOXETINE 20 MG: 20 CAPSULE ORAL at 08:01

## 2021-03-01 RX ADMIN — POTASSIUM CHLORIDE AND SODIUM CHLORIDE 125 ML/HR: 900; 150 INJECTION, SOLUTION INTRAVENOUS at 05:37

## 2021-03-01 RX ADMIN — HYDROCODONE BITARTRATE AND ACETAMINOPHEN 1 TABLET: 7.5; 325 TABLET ORAL at 05:37

## 2021-03-01 RX ADMIN — LORAZEPAM 10 MG: 2 INJECTION, SOLUTION INTRAMUSCULAR; INTRAVENOUS at 15:09

## 2021-03-01 RX ADMIN — LORAZEPAM 10 MG: 2 INJECTION, SOLUTION INTRAMUSCULAR; INTRAVENOUS at 10:32

## 2021-03-01 RX ADMIN — LORAZEPAM 6 MG: 2 INJECTION INTRAMUSCULAR at 04:56

## 2021-03-01 NOTE — PROGRESS NOTES
FAMILY MEDICINE DAILY PROGRESS NOTE  NAME: Nata Bain  : 1985  MRN: [id number]     LOS: 0 days     PROVIDER OF SERVICE: Zeeshan Wiggins MD    Chief Complaint: Alcohol dependence with withdrawal (CMS/[facility])    Subjective:   VSS. Notes improvement in nausea, vomiting. States diarrhea improving. Continues to complain of LUQ abdominal pain. Continues to require Ativan per CIWA.   Interval History:  History taken from: patient chart RN    Review of Systems:   Review of Systems   Constitutional: Positive for fatigue. Negative for activity change, chills, diaphoresis and fever.   HENT: Negative for dental problem, drooling, ear discharge, ear pain, facial swelling, mouth sores, nosebleeds, rhinorrhea, sinus pressure, sinus pain, sneezing and sore throat.    Eyes: Negative for pain, discharge and visual disturbance.   Respiratory: Negative for apnea, cough, shortness of breath, wheezing and stridor.    Cardiovascular: Negative for chest pain, palpitations and leg swelling.   Gastrointestinal: Positive for abdominal pain. Negative for abdominal distention, constipation, diarrhea, nausea and vomiting.   Genitourinary: Positive for dysuria. Negative for frequency and urgency.   Musculoskeletal: Negative for arthralgias and back pain.   Skin: Negative for rash and wound.   Neurological: Negative for dizziness, facial asymmetry, light-headedness and headaches.   Psychiatric/Behavioral: Negative for agitation and decreased concentration. The patient is nervous/anxious.        Objective:     Vital Signs  Temp:  [96.5 °F (35.8 °C)-98.3 °F (36.8 °C)] 98 °F (36.7 °C)  Heart Rate:  [] 57  Resp:  [20] 20  BP: (101-172)/() 139/91    Physical Exam  Physical Exam  Constitutional:       Appearance: She is well-developed.   HENT:      Head: Normocephalic and atraumatic.      Right Ear: External ear normal.      Left Ear: External ear normal.      Nose: Nose normal.      Mouth/Throat:      Pharynx: No  oropharyngeal exudate.   Eyes:      General: No scleral icterus.        Right eye: No discharge.         Left eye: No discharge.      Conjunctiva/sclera: Conjunctivae normal.      Pupils: Pupils are equal, round, and reactive to light.   Neck:      Musculoskeletal: Normal range of motion and neck supple.      Vascular: No JVD.   Cardiovascular:      Rate and Rhythm: Normal rate and regular rhythm.      Heart sounds: Normal heart sounds. No murmur. No friction rub. No gallop.    Pulmonary:      Effort: Pulmonary effort is normal. No respiratory distress.      Breath sounds: Normal breath sounds. No stridor. No wheezing or rales.   Abdominal:      General: Bowel sounds are normal. There is no distension.      Palpations: Abdomen is soft.      Tenderness: There is abdominal tenderness (Epigastric).   Musculoskeletal: Normal range of motion.         General: No tenderness or deformity.   Skin:     General: Skin is warm and dry.      Capillary Refill: Capillary refill takes less than 2 seconds.      Coloration: Skin is not pale.      Findings: No erythema or rash.   Neurological:      Mental Status: She is alert and oriented to person, place, and time.   Psychiatric:         Mood and Affect: Mood is anxious.         Medication Review    Current Facility-Administered Medications:   •  acetaminophen (TYLENOL) tablet 650 mg, 650 mg, Oral, Q6H PRN, Christos Vyas MD  •  acetaminophen (TYLENOL) tablet 650 mg, 650 mg, Oral, Q8H, Christos Vyas MD, 650 mg at 03/01/21 0544  •  buprenorphine-naloxone (SUBOXONE) 8-2 MG per SL tablet 1 tablet, 1 tablet, Sublingual, TID, Christos Vyas MD, 1 tablet at 03/01/21 0802  •  cloNIDine (CATAPRES) tablet 0.1 mg, 0.1 mg, Oral, Q6H, Christos Vyas MD, 0.1 mg at 03/01/21 0536  •  diazePAM (VALIUM) tablet 10 mg, 10 mg, Oral, TID, Christos Vyas MD, 10 mg at 03/01/21 0802  •  enoxaparin (LOVENOX) syringe 40 mg, 40 mg, Subcutaneous, Q24H, Yosi Almaguer MD, 40  mg at 02/28/21 1754  •  FLUoxetine (PROzac) capsule 20 mg, 20 mg, Oral, Daily, Yosi Almaguer MD, 20 mg at 03/01/21 0801  •  folic acid (FOLVITE) tablet 1 mg, 1 mg, Oral, Daily, Christos Vyas MD, 1 mg at 03/01/21 0802  •  gabapentin (NEURONTIN) capsule 800 mg, 800 mg, Oral, Q8H, Yosi Almaguer MD, 800 mg at 03/01/21 0536  •  hydrALAZINE (APRESOLINE) injection 10 mg, 10 mg, Intravenous, Q6H PRN, Yosi Almaguer MD, 10 mg at 02/28/21 1412  •  HYDROcodone-acetaminophen (NORCO) 7.5-325 MG per tablet 1 tablet, 1 tablet, Oral, Q6H PRN, Christos Vyas MD, 1 tablet at 03/01/21 0537  •  levoFLOXacin (LEVAQUIN) 500 mg/100 mL D5W (premix) (LEVAQUIN) 500 mg, 500 mg, Intravenous, Q24H, Pily Pike MD, Stopped at 02/28/21 1153  •  LORazepam (ATIVAN) tablet 1 mg, 1 mg, Oral, Q2H PRN **OR** LORazepam (ATIVAN) injection 1 mg, 1 mg, Intravenous, Q2H PRN **OR** LORazepam (ATIVAN) tablet 4 mg, 4 mg, Oral, Q1H PRN **OR** LORazepam (ATIVAN) injection 4 mg, 4 mg, Intravenous, Q1H PRN **OR** LORazepam (ATIVAN) injection 6 mg, 6 mg, Intravenous, Q15 Min PRN, 6 mg at 03/01/21 0536 **OR** LORazepam (ATIVAN) injection 6 mg, 6 mg, Intramuscular, Q15 Min PRN, Christos Vyas MD  •  LORazepam (ATIVAN) injection 10 mg, 10 mg, Intravenous, Q4H, 10 mg at 02/28/21 1656 **FOLLOWED BY** LORazepam (ATIVAN) injection 10 mg, 10 mg, Intravenous, Q6H **FOLLOWED BY** [START ON 3/2/2021] LORazepam (ATIVAN) injection 10 mg, 10 mg, Intravenous, Q8H, Christos Vyas MD  •  losartan (COZAAR) tablet 25 mg, 25 mg, Oral, Q24H, Christos Vyas MD, 25 mg at 02/28/21 1249  •  metroNIDAZOLE (FLAGYL) 500 mg/100mL IVPB, 500 mg, Intravenous, Q8H, Pily Pike MD, Stopped at 03/01/21 0731  •  morphine injection 4 mg, 4 mg, Intravenous, Q4H PRN, Christos Vyas MD, 4 mg at 03/01/21 0456  •  multivitamin (THERAGRAN) tablet 1 tablet, 1 tablet, Oral, Daily, Christos Vyas MD, 1 tablet at 03/01/21 0802  •  nicotine  (NICODERM CQ) 21 MG/24HR patch 1 patch, 1 patch, Transdermal, Q24H, Pily Pike MD, 1 patch at 03/01/21 0802  •  ondansetron (ZOFRAN) tablet 4 mg, 4 mg, Oral, Q6H PRN **OR** ondansetron (ZOFRAN) injection 4 mg, 4 mg, Intravenous, Q6H PRN, Yosi Almaguer MD, 4 mg at 02/28/21 0300  •  pantoprazole (PROTONIX) EC tablet 40 mg, 40 mg, Oral, Daily, Yosi Almaguer MD, 40 mg at 03/01/21 0802  •  potassium chloride (MICRO-K) CR capsule 40 mEq, 40 mEq, Oral, BID With Meals, Christos Vyas MD, 40 mEq at 03/01/21 0802  •  prazosin (MINIPRESS) capsule 5 mg, 5 mg, Oral, Nightly, Yosi Almaguer MD, 5 mg at 02/27/21 2230  •  prochlorperazine (COMPAZINE) injection 10 mg, 10 mg, Intravenous, Q6H PRN, Christos Vyas MD  •  sodium chloride 0.9 % flush 10 mL, 10 mL, Intravenous, PRN, Yosi Almaguer MD  •  sodium chloride 0.9 % flush 10 mL, 10 mL, Intravenous, Q12H, Yosi Almaguer MD, 10 mL at 03/01/21 0802  •  sodium chloride 0.9 % flush 10 mL, 10 mL, Intravenous, PRN, Yosi Almaguer MD  •  sodium chloride 0.9 % with KCl 20 mEq/L infusion, 125 mL/hr, Intravenous, Continuous, Pily Pike MD, Last Rate: 125 mL/hr at 03/01/21 0730, 125 mL/hr at 03/01/21 0730  •  thiamine (VITAMIN B-1) tablet 200 mg, 200 mg, Oral, Daily, Christos Vyas MD, 200 mg at 03/01/21 0802     Diagnostic Data    Lab Results (last 24 hours)     Procedure Component Value Units Date/Time    Magnesium [[id number]]  (Normal) Collected: 03/01/21 0633    Specimen: Blood Updated: 03/01/21 0717     Magnesium 1.9 mg/dL     Comprehensive Metabolic Panel [[id number]]  (Abnormal) Collected: 03/01/21 0633    Specimen: Blood Updated: 03/01/21 0711     Glucose 90 mg/dL      BUN 3 mg/dL      Creatinine 0.44 mg/dL      Sodium 135 mmol/L      Potassium 4.4 mmol/L      Comment: Slight hemolysis detected by analyzer. Results may be affected.        Chloride 107 mmol/L      CO2 21.0 mmol/L      Calcium 8.0 mg/dL      Total Protein 6.4 g/dL      Albumin  3.70 g/dL      ALT (SGPT) 22 U/L      AST (SGOT) 146 U/L      Alkaline Phosphatase 93 U/L      Total Bilirubin 0.5 mg/dL      eGFR Non African Amer >150 mL/min/1.73      Globulin 2.7 gm/dL      A/G Ratio 1.4 g/dL      BUN/Creatinine Ratio 6.8     Anion Gap 7.0 mmol/L     Narrative:      GFR Normal >60  Chronic Kidney Disease <60  Kidney Failure <15      CBC Auto Differential [[id number]]  (Abnormal) Collected: 03/01/21 0633    Specimen: Blood Updated: 03/01/21 0648     WBC 5.73 10*3/mm3      RBC 3.19 10*6/mm3      Hemoglobin 11.7 g/dL      Hematocrit 32.5 %      .9 fL      MCH 36.7 pg      MCHC 36.0 g/dL      RDW 12.8 %      RDW-SD 48.0 fl      MPV 10.2 fL      Platelets 141 10*3/mm3      Neutrophil % 60.0 %      Lymphocyte % 34.0 %      Monocyte % 4.4 %      Eosinophil % 0.9 %      Basophil % 0.5 %      Immature Grans % 0.2 %      Neutrophils, Absolute 3.44 10*3/mm3      Lymphocytes, Absolute 1.95 10*3/mm3      Monocytes, Absolute 0.25 10*3/mm3      Eosinophils, Absolute 0.05 10*3/mm3      Basophils, Absolute 0.03 10*3/mm3      Immature Grans, Absolute 0.01 10*3/mm3      nRBC 0.0 /100 WBC     Clostridium Difficile Toxin - Stool, Per Rectum [[id number]]  (Abnormal) Collected: 02/28/21 1504    Specimen: Stool from Per Rectum Updated: 02/28/21 1704    Narrative:      The following orders were created for panel order Clostridium Difficile Toxin - Stool, Per Rectum.  Procedure                               Abnormality         Status                     ---------                               -----------         ------                     Clostridium Difficile To...[[id number]]  Abnormal            Final result                 Please view results for these tests on the individual orders.    Clostridium Difficile Toxin, PCR - Stool, Per Rectum [[id number]]  (Abnormal) Collected: 02/28/21 1504    Specimen: Stool from Per Rectum Updated: 02/28/21 1704     C. Difficile Toxins by PCR Positive     Comment: CONTACT  PRECAUTIONS        Narrative:      Performed by real-time polymerase chain reaction (qPCR).    Potassium [[id number]]  (Normal) Collected: 02/28/21 1633    Specimen: Blood Updated: 02/28/21 1651     Potassium 4.1 mmol/L     Magnesium [[id number]]  (Abnormal) Collected: 02/28/21 1633    Specimen: Blood Updated: 02/28/21 1651     Magnesium 1.3 mg/dL     Urine Culture - Urine, Urine, Clean Catch [[id number]]  (Normal) Collected: 02/27/21 1455    Specimen: Urine, Clean Catch Updated: 02/28/21 1206     Urine Culture Culture in progress           Imaging Results (Last 24 Hours)     ** No results found for the last 24 hours. **          I reviewed the patient's new clinical results.    Assessment/Plan:     Active Hospital Problems    Diagnosis   • **Alcohol dependence with withdrawal (CMS/HCC)     Last drink was on 2/26/21  Alcohol cessation counseling was provided  Ativan and CIWA protocol-  Ativan 6 mg Q4H   -will also give home dose valium 5 mg   -PO thiamine replacement      • Colitis     CT abdomen pelvis showed colitis  -C. Diff toxin positive. Consider switch to Vancomycin  -We will start Levaquin and Flagyl        • Hypocalcemia     Calcium 8  Albumin 3.7  Corrected calcium 8.2         • Diarrhea of presumed infectious origin     Patient reports diarrhea  Patient does not meet the full criteria (no WBC>64921), however she is on chronic PPI use.  C. difficile stool ordered       • Acute cystitis     Brief Urine Lab Results  (Last result in the past 365 days)      Color   Clarity   Blood   Leuk Est   Nitrite   Protein   CREAT   Urine HCG        02/27/21 1322 Dark Yellow Cloudy Negative Trace Positive 30 mg/dL (1+)           -follow culture for sensitivities:pending  -Rocephin 1 g discontinued  -Currently on Levaquin for colitis.  Will cover for cystitis       • Anxiety and depression     Will continue current home medications  -prozavc  - valium       • Opioid use disorder (CMS/HCC)     -Continue gabapentin  -Scheduled  Tylenol 1000 mg every 8 hours  -will give pts home medication of suboxone, and control pain with morphine and Norco       • Hypokalemia     -We will monitor and replace as needed    Results from last 7 days   Lab Units 03/01/21  0633 02/28/21  1633 02/28/21  0502 02/27/21  1342   POTASSIUM mmol/L 4.4 4.1 2.9* 3.9                DVT prophylaxis: Lovenox  Code Status and Medical Interventions:   Ordered at: 02/27/21 1616     Level Of Support Discussed With:    Patient     Code Status:    CPR     Medical Interventions (Level of Support Prior to Arrest):    Full       Plan for disposition:Where: home and When:  3-4days      Time: 30 minutes        This document has been electronically signed by Zeeshan Wiggins MD on March 1, 2021 08:22 CST

## 2021-03-01 NOTE — CONSULTS
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  Pt with a hx of Alcohol/Opioid abuse.  She is on CIWA protocol.  Also dxed with CDiff--on Flagyl.  She is receiving Thiamine and Folic Acid for probable deficiencies.  Pt meets criteria for Chronic severe malnutrition.  She reports poor oral intake for the last several months with resulting wt loss of ~15# which is ~11% of her UBW.  BMI of 17.74/  Per Nutrition Focused Physical assessment she has Fat loss and muscle wasting present.  RD will continue to monitor tx plans.      Reason for Assessment     Row Name 03/01/21 3581          Reason for Assessment    Reason For Assessment  per organizational policy;identified at risk by screening criteria     Diagnosis  substance use/abuse;gastrointestinal disease     Identified At Risk by Screening Criteria  BMI;MST SCORE 2+;unintentional loss of 10 lbs or more in the past 2 mos;reduced oral intake over the last month         Nutrition/Diet History     Row Name 03/01/21 153          Nutrition/Diet History    Typical Food/Fluid Intake  Pt reports poor oral intake for the past month.  She reports a wt loss of ~15#.  Still with diarrhea.           Labs/Tests/Procedures/Meds     Row Name 03/01/21 0742          Labs/Procedures/Meds    Lab Results Reviewed  reviewed, pertinent        Diagnostic Tests/Procedures    Diagnostic Test/Procedure Reviewed  reviewed, pertinent        Medications    Pertinent Medications Reviewed  reviewed, pertinent         Physical Findings     Row Name 03/01/21 6738          Physical Findings    Overall Physical Appearance  loss of muscle mass;loss of subcutaneous fat;on oxygen therapy;underweight     Gastrointestinal  diarrhea         Estimated/Assessed Needs     Row Name 03/01/21 1544          Calculation Measurements     Weight Used For Calculations  54.4 kg (120 lb)        Estimated/Assessed Needs    Additional Documentation  Additional Requirements (Group);Fluid Requirements (Group);[place]-St. Jeor Equation (Group);Protein Requirements (Group);Calorie Requirements (Group);KCAL/KG (Group)        Calorie Requirements    Estimated Calorie Need Method  [place]-[facility];kcal/kg        KCAL/KG    KCAL/KG  --     30 Kcal/Kg (kcal)  1632.96        [place]-Stmenuvox Jeor Equation    RMR ([place]-St. Jeor Equation)  1303.695     [place]-St. Jeor Activity Factors  1.4 - 1.5     Activity Factors ([place]-St. Jeor)  1825.173 - 1951.5497        Protein Requirements    Weight Used For Protein Calculations  54.4 kg (120 lb)     Est Protein Requirement Amount (gms/kg)  1.2 gm protein     Estimated Protein Requirements (gms/day)  65.32        Fluid Requirements    Fluid Requirements (mL/day)  1800     Estimated Fluid Requirement Method  RDA Method     RDA Method (mL)  1800         Nutrition Prescription Ordered     Row Name 03/01/21 1543          Nutrition Prescription PO    Current PO Diet  NPO             Malnutrition Severity Assessment     Row Name 03/01/21 1543          Malnutrition Severity Assessment    Malnutrition Type  Chronic Disease - Related Malnutrition        Insufficient Energy Intake     Insufficient Energy Intake   <75% of est. energy requirement for > or equal to 1 month        Unintentional Weight Loss     Unintentional Weight Loss   Weight loss greater than 7.5% in three months        Muscle Loss    Loss of Muscle Mass Findings  Severe     [place]  Severe - deep hollowing/scooping, lack of muscle to touch, facial bones well defined     Clavicle Bone Region  Severe - protruding prominent bone     Acromion Bone Region  Severe - squared shoulders, bones, and acromion process protrusion prominent     Scapular Bone Region  Severe - prominent bones, depressions easily visible between ribs, scapula, spine, shoulders        Fat Loss     Subcutaneous Fat Loss Findings  Severe     Orbital Region   Severe - pronounced hollowness/depression, dark circles, loose saggy skin     Upper Arm Region  Severe - mostly skin, very little space between folds, fingers touch     Thoracic & Lumbar Region  Severe - ribs visible with prominent depressions, iliac crest very prominent        Criteria Met (Must meet criteria for severity in at least 2 of these categories: M Wasting, Fat Loss, Fluid, Secondary Signs, Wt. Status, Intake)    Patient meets criteria for   Severe Malnutrition           Electronically signed by:  Gavi Ng RD  03/01/21 15:50 CST

## 2021-03-01 NOTE — PLAN OF CARE
Goal Outcome Evaluation:  Plan of Care Reviewed With: patient  Progress: no change  Outcome Summary: Pt. VSS and CIWA managed by use of Ativan Q15 for CIWA averaging 19.  Pt is lucid to remember administration times of every PRN dose of pain medication, but continues to complain of unbearable abdomenal pain.  Multiple doses of PRN ativan given for CIWA. UOP adequate.  Will continue to monitor.

## 2021-03-01 NOTE — NURSING NOTE
"Pt noted to be crying in room et on phone at this time. Pt states that she does not understand why she is not getting \"the ordered dose\" of ativan each time it is administered. Pt has been educated numerous times of the use of the CIWA scale and how it works. At this time, the only criteria she meets is anxiety and agitation. No other symptoms related to CIWA scale noted at this time. Pt also reeducated on the use of IVP morphine 4mg q4h with her history of opiate abuse. She states \"I just wont take the morphine anymore, then. I only need ativan.\" Dr. Delmy Jarrett paged at this time. Will come speak with pt.  "

## 2021-03-01 NOTE — NURSING NOTE
Pt has made frequent calls to staff requesting medications. When she dozes off and wakes up, she is immediately asking for pain medications. Pt reeducated that amount of ativan is given based off of a CIWA scale and that the morphine IVP is given PRN pain. Will continue to monitor. Pt asleep at this time. VSS.

## 2021-03-01 NOTE — PLAN OF CARE
Goal Outcome Evaluation:  Plan of Care Reviewed With: caregiver, patient, spouse  Progress: no change  Outcome Summary: PT is currently NPO for Alcohol Induced pancreatitis.  Also dxed with CDishawn.  On CIWA protocol.  Meets criteria for severe chronic malnutrition.  Will monitor tx plans.

## 2021-03-01 NOTE — NURSING NOTE
MD paged d/t patient's pain and CIWA scale a 20 a this time. Pt is currently having am anxiety attack. PRN ativan given per CIWA scale. Md also made aware of patient's pain a 10/10. Charge nurse made aware. Charge nurse, Rayna, also paged at this time. After Charge Nurse paged MD LALO came to the floor to see patient and ordered to transfer pt to stepdown level of care. See new orders, and some new orders given.

## 2021-03-01 NOTE — NURSING NOTE
Pt noted to be asleep upon entering room. When pt was awoken, she immediately began asking if she had any medications she could get at this time. She states she is in generalized pain and what we are giving is not enough. Will notify family practice.

## 2021-03-02 ENCOUNTER — APPOINTMENT (OUTPATIENT)
Dept: INTERVENTIONAL RADIOLOGY/VASCULAR | Facility: HOSPITAL | Age: 36
End: 2021-03-02

## 2021-03-02 ENCOUNTER — APPOINTMENT (OUTPATIENT)
Dept: ULTRASOUND IMAGING | Facility: HOSPITAL | Age: 36
End: 2021-03-02

## 2021-03-02 PROBLEM — N30.90 KLEBSIELLA CYSTITIS: Status: ACTIVE | Noted: 2021-03-02

## 2021-03-02 PROBLEM — N39.0 E. COLI UTI: Status: ACTIVE | Noted: 2021-02-27

## 2021-03-02 PROBLEM — E43 SEVERE MALNUTRITION (HCC): Status: ACTIVE | Noted: 2021-03-02

## 2021-03-02 PROBLEM — Z16.12 UTI DUE TO EXTENDED-SPECTRUM BETA LACTAMASE (ESBL) PRODUCING ESCHERICHIA COLI: Status: ACTIVE | Noted: 2021-02-27

## 2021-03-02 PROBLEM — E87.6 HYPOKALEMIA: Status: RESOLVED | Noted: 2020-06-01 | Resolved: 2021-03-02

## 2021-03-02 PROBLEM — B96.20 E. COLI UTI: Status: ACTIVE | Noted: 2021-02-27

## 2021-03-02 PROBLEM — A04.72 C. DIFFICILE COLITIS: Status: ACTIVE | Noted: 2021-02-28

## 2021-03-02 PROBLEM — B96.29 UTI DUE TO EXTENDED-SPECTRUM BETA LACTAMASE (ESBL) PRODUCING ESCHERICHIA COLI: Status: ACTIVE | Noted: 2021-02-27

## 2021-03-02 PROBLEM — B96.1 KLEBSIELLA CYSTITIS: Status: ACTIVE | Noted: 2021-03-02

## 2021-03-02 LAB
ALBUMIN SERPL-MCNC: 3.7 G/DL (ref 3.5–5.2)
ALBUMIN/GLOB SERPL: 1.6 G/DL
ALP SERPL-CCNC: 94 U/L (ref 39–117)
ALT SERPL W P-5'-P-CCNC: 30 U/L (ref 1–33)
ANION GAP SERPL CALCULATED.3IONS-SCNC: 8 MMOL/L (ref 5–15)
AST SERPL-CCNC: 157 U/L (ref 1–32)
BACTERIA SPEC AEROBE CULT: ABNORMAL
BACTERIA SPEC AEROBE CULT: ABNORMAL
BASOPHILS # BLD AUTO: 0.04 10*3/MM3 (ref 0–0.2)
BASOPHILS NFR BLD AUTO: 0.7 % (ref 0–1.5)
BILIRUB SERPL-MCNC: 0.6 MG/DL (ref 0–1.2)
BUN SERPL-MCNC: 4 MG/DL (ref 6–20)
BUN/CREAT SERPL: 8.3 (ref 7–25)
CALCIUM SPEC-SCNC: 8.9 MG/DL (ref 8.6–10.5)
CHLORIDE SERPL-SCNC: 107 MMOL/L (ref 98–107)
CO2 SERPL-SCNC: 22 MMOL/L (ref 22–29)
CREAT SERPL-MCNC: 0.48 MG/DL (ref 0.57–1)
DEPRECATED RDW RBC AUTO: 48 FL (ref 37–54)
EOSINOPHIL # BLD AUTO: 0.07 10*3/MM3 (ref 0–0.4)
EOSINOPHIL NFR BLD AUTO: 1.2 % (ref 0.3–6.2)
ERYTHROCYTE [DISTWIDTH] IN BLOOD BY AUTOMATED COUNT: 12.7 % (ref 12.3–15.4)
FOLATE SERPL-MCNC: 16.7 NG/ML (ref 4.78–24.2)
GFR SERPL CREATININE-BSD FRML MDRD: 147 ML/MIN/1.73
GLOBULIN UR ELPH-MCNC: 2.3 GM/DL
GLUCOSE SERPL-MCNC: 81 MG/DL (ref 65–99)
HCT VFR BLD AUTO: 33.8 % (ref 34–46.6)
HGB BLD-MCNC: 11.9 G/DL (ref 12–15.9)
IMM GRANULOCYTES # BLD AUTO: 0.01 10*3/MM3 (ref 0–0.05)
IMM GRANULOCYTES NFR BLD AUTO: 0.2 % (ref 0–0.5)
LYMPHOCYTES # BLD AUTO: 2.09 10*3/MM3 (ref 0.7–3.1)
LYMPHOCYTES NFR BLD AUTO: 34.9 % (ref 19.6–45.3)
MCH RBC QN AUTO: 36 PG (ref 26.6–33)
MCHC RBC AUTO-ENTMCNC: 35.2 G/DL (ref 31.5–35.7)
MCV RBC AUTO: 102.1 FL (ref 79–97)
MONOCYTES # BLD AUTO: 0.31 10*3/MM3 (ref 0.1–0.9)
MONOCYTES NFR BLD AUTO: 5.2 % (ref 5–12)
NEUTROPHILS NFR BLD AUTO: 3.47 10*3/MM3 (ref 1.7–7)
NEUTROPHILS NFR BLD AUTO: 57.8 % (ref 42.7–76)
NRBC BLD AUTO-RTO: 0 /100 WBC (ref 0–0.2)
PLATELET # BLD AUTO: 141 10*3/MM3 (ref 140–450)
PMV BLD AUTO: 10.8 FL (ref 6–12)
POTASSIUM SERPL-SCNC: 4.6 MMOL/L (ref 3.5–5.2)
PROT SERPL-MCNC: 6 G/DL (ref 6–8.5)
RBC # BLD AUTO: 3.31 10*6/MM3 (ref 3.77–5.28)
SODIUM SERPL-SCNC: 137 MMOL/L (ref 136–145)
VIT B12 BLD-MCNC: >2000 PG/ML (ref 211–946)
WBC # BLD AUTO: 5.99 10*3/MM3 (ref 3.4–10.8)

## 2021-03-02 PROCEDURE — 25010000002 MORPHINE PER 10 MG: Performed by: STUDENT IN AN ORGANIZED HEALTH CARE EDUCATION/TRAINING PROGRAM

## 2021-03-02 PROCEDURE — 76937 US GUIDE VASCULAR ACCESS: CPT

## 2021-03-02 PROCEDURE — 25010000002 LORAZEPAM PER 2 MG: Performed by: STUDENT IN AN ORGANIZED HEALTH CARE EDUCATION/TRAINING PROGRAM

## 2021-03-02 PROCEDURE — 82746 ASSAY OF FOLIC ACID SERUM: CPT | Performed by: FAMILY MEDICINE

## 2021-03-02 PROCEDURE — 80053 COMPREHEN METABOLIC PANEL: CPT | Performed by: STUDENT IN AN ORGANIZED HEALTH CARE EDUCATION/TRAINING PROGRAM

## 2021-03-02 PROCEDURE — C1751 CATH, INF, PER/CENT/MIDLINE: HCPCS

## 2021-03-02 PROCEDURE — 99233 SBSQ HOSP IP/OBS HIGH 50: CPT | Performed by: STUDENT IN AN ORGANIZED HEALTH CARE EDUCATION/TRAINING PROGRAM

## 2021-03-02 PROCEDURE — B54NZZA ULTRASONOGRAPHY OF LEFT UPPER EXTREMITY VEINS, GUIDANCE: ICD-10-PCS | Performed by: STUDENT IN AN ORGANIZED HEALTH CARE EDUCATION/TRAINING PROGRAM

## 2021-03-02 PROCEDURE — 25010000002 MEROPENEM PER 100 MG: Performed by: STUDENT IN AN ORGANIZED HEALTH CARE EDUCATION/TRAINING PROGRAM

## 2021-03-02 PROCEDURE — 36410 VNPNXR 3YR/> PHY/QHP DX/THER: CPT

## 2021-03-02 PROCEDURE — 25010000002 ENOXAPARIN PER 10 MG: Performed by: STUDENT IN AN ORGANIZED HEALTH CARE EDUCATION/TRAINING PROGRAM

## 2021-03-02 PROCEDURE — 82607 VITAMIN B-12: CPT | Performed by: FAMILY MEDICINE

## 2021-03-02 PROCEDURE — 05HC33Z INSERTION OF INFUSION DEVICE INTO LEFT BASILIC VEIN, PERCUTANEOUS APPROACH: ICD-10-PCS | Performed by: STUDENT IN AN ORGANIZED HEALTH CARE EDUCATION/TRAINING PROGRAM

## 2021-03-02 PROCEDURE — 85025 COMPLETE CBC W/AUTO DIFF WBC: CPT | Performed by: STUDENT IN AN ORGANIZED HEALTH CARE EDUCATION/TRAINING PROGRAM

## 2021-03-02 RX ORDER — SODIUM CHLORIDE 9 MG/ML
125 INJECTION, SOLUTION INTRAVENOUS CONTINUOUS
Status: DISCONTINUED | OUTPATIENT
Start: 2021-03-02 | End: 2021-03-04 | Stop reason: HOSPADM

## 2021-03-02 RX ORDER — MORPHINE SULFATE 2 MG/ML
2 INJECTION, SOLUTION INTRAMUSCULAR; INTRAVENOUS EVERY 4 HOURS PRN
Status: DISCONTINUED | OUTPATIENT
Start: 2021-03-02 | End: 2021-03-03

## 2021-03-02 RX ADMIN — ACETAMINOPHEN 650 MG: 325 TABLET, FILM COATED ORAL at 08:42

## 2021-03-02 RX ADMIN — MORPHINE SULFATE 2 MG: 2 INJECTION, SOLUTION INTRAMUSCULAR; INTRAVENOUS at 10:33

## 2021-03-02 RX ADMIN — BUPRENORPHINE HYDROCHLORIDE AND NALOXONE HYDROCHLORIDE DIHYDRATE 1 TABLET: 8; 2 TABLET SUBLINGUAL at 21:22

## 2021-03-02 RX ADMIN — DIAZEPAM 10 MG: 5 TABLET ORAL at 20:06

## 2021-03-02 RX ADMIN — ENOXAPARIN SODIUM 40 MG: 40 INJECTION SUBCUTANEOUS at 17:45

## 2021-03-02 RX ADMIN — GABAPENTIN 800 MG: 400 CAPSULE ORAL at 14:08

## 2021-03-02 RX ADMIN — GABAPENTIN 800 MG: 400 CAPSULE ORAL at 22:32

## 2021-03-02 RX ADMIN — PRAZOSIN HYDROCHLORIDE 5 MG: 5 CAPSULE ORAL at 20:06

## 2021-03-02 RX ADMIN — LORAZEPAM 1 MG: 2 INJECTION INTRAMUSCULAR at 13:59

## 2021-03-02 RX ADMIN — GABAPENTIN 800 MG: 400 CAPSULE ORAL at 06:35

## 2021-03-02 RX ADMIN — MORPHINE SULFATE 2 MG: 2 INJECTION, SOLUTION INTRAMUSCULAR; INTRAVENOUS at 16:23

## 2021-03-02 RX ADMIN — MORPHINE SULFATE 4 MG: 4 INJECTION INTRAVENOUS at 04:14

## 2021-03-02 RX ADMIN — THIAMINE HCL TAB 100 MG 200 MG: 100 TAB at 08:14

## 2021-03-02 RX ADMIN — FAMOTIDINE 20 MG: 20 TABLET, FILM COATED ORAL at 06:35

## 2021-03-02 RX ADMIN — DIAZEPAM 10 MG: 5 TABLET ORAL at 15:01

## 2021-03-02 RX ADMIN — FLUOXETINE 40 MG: 20 CAPSULE ORAL at 08:14

## 2021-03-02 RX ADMIN — ACETAMINOPHEN 650 MG: 325 TABLET, FILM COATED ORAL at 16:30

## 2021-03-02 RX ADMIN — LORAZEPAM 1 MG: 2 INJECTION INTRAMUSCULAR at 09:58

## 2021-03-02 RX ADMIN — Medication 125 MG: at 12:20

## 2021-03-02 RX ADMIN — MORPHINE SULFATE 2 MG: 2 INJECTION, SOLUTION INTRAMUSCULAR; INTRAVENOUS at 20:31

## 2021-03-02 RX ADMIN — Medication 125 MG: at 17:07

## 2021-03-02 RX ADMIN — LORAZEPAM 10 MG: 2 INJECTION, SOLUTION INTRAMUSCULAR; INTRAVENOUS at 02:37

## 2021-03-02 RX ADMIN — CLONIDINE HYDROCHLORIDE 0.1 MG: 0.1 TABLET ORAL at 06:35

## 2021-03-02 RX ADMIN — CLONIDINE HYDROCHLORIDE 0.1 MG: 0.1 TABLET ORAL at 17:14

## 2021-03-02 RX ADMIN — LORAZEPAM 4 MG: 2 INJECTION INTRAMUSCULAR at 00:04

## 2021-03-02 RX ADMIN — FOLIC ACID 1 MG: 1 TABLET ORAL at 08:13

## 2021-03-02 RX ADMIN — Medication 1 PATCH: at 08:14

## 2021-03-02 RX ADMIN — Medication 125 MG: at 06:35

## 2021-03-02 RX ADMIN — Medication 125 MG: at 23:53

## 2021-03-02 RX ADMIN — BUPRENORPHINE HYDROCHLORIDE AND NALOXONE HYDROCHLORIDE DIHYDRATE 1 TABLET: 8; 2 TABLET SUBLINGUAL at 15:01

## 2021-03-02 RX ADMIN — LORAZEPAM 10 MG: 2 INJECTION INTRAMUSCULAR at 16:23

## 2021-03-02 RX ADMIN — SODIUM CHLORIDE, PRESERVATIVE FREE 10 ML: 5 INJECTION INTRAVENOUS at 20:07

## 2021-03-02 RX ADMIN — BUPRENORPHINE HYDROCHLORIDE AND NALOXONE HYDROCHLORIDE DIHYDRATE 1 TABLET: 8; 2 TABLET SUBLINGUAL at 08:16

## 2021-03-02 RX ADMIN — LORAZEPAM 10 MG: 2 INJECTION INTRAMUSCULAR at 08:13

## 2021-03-02 RX ADMIN — DIAZEPAM 10 MG: 5 TABLET ORAL at 08:14

## 2021-03-02 RX ADMIN — FAMOTIDINE 20 MG: 20 TABLET, FILM COATED ORAL at 16:30

## 2021-03-02 RX ADMIN — LORAZEPAM 4 MG: 2 INJECTION INTRAMUSCULAR at 05:38

## 2021-03-02 RX ADMIN — MEROPENEM 1 G: 1 INJECTION, POWDER, FOR SOLUTION INTRAVENOUS at 19:26

## 2021-03-02 RX ADMIN — SODIUM CHLORIDE 125 ML/HR: 9 INJECTION, SOLUTION INTRAVENOUS at 20:29

## 2021-03-02 RX ADMIN — SODIUM CHLORIDE 125 ML/HR: 9 INJECTION, SOLUTION INTRAVENOUS at 09:57

## 2021-03-02 RX ADMIN — SODIUM CHLORIDE, PRESERVATIVE FREE 10 ML: 5 INJECTION INTRAVENOUS at 08:15

## 2021-03-02 RX ADMIN — THERA TABS 1 TABLET: TAB at 08:16

## 2021-03-02 NOTE — NURSING NOTE
"Had good therapeutic discussion with pt at this time. Pt states that she is becoming more clear minded and realizes that she has been using these withdrawal medications as a coping mechanism to block out all thoughts related to using/drinking again. She stated to this nurse \"I have been wanting the most medication I can get so that I can just sleep through everything. I dont want to think, I dont want to have to process the thoughts in my mind, right now.\" I reeducated the pt that this is not what these medications are ordered and/or administered for. I explained that it is ok to feel like she doesn't want to have to face or cope with these things but it is an important part of her journey to recovery. Pt was very understanding and seemed to agree with the things we discussed. Will continue to monitor. VSS.  "

## 2021-03-02 NOTE — PROGRESS NOTES
FAMILY MEDICINE DAILY PROGRESS NOTE  NAME: Nata Bain  : 1985  MRN: [id number]     LOS: 1 day     PROVIDER OF SERVICE: Zeeshan Wiggins MD    Chief Complaint: Alcohol dependence with withdrawal (CMS/HCC)    Subjective:   Pt found up and comfortable in bed this AM. States abdomial pain is improving. Denies diarrhea for 24 hours at this pint. Expresses urge to eat.   Interval History:  History taken from: patient chart RN      Review of Systems:   Review of Systems   Constitutional: Negative for activity change, chills, diaphoresis and fever.   HENT: Negative for dental problem, drooling, ear discharge, ear pain, facial swelling, mouth sores, nosebleeds, rhinorrhea, sinus pressure, sinus pain, sneezing and sore throat.    Eyes: Negative for pain, discharge and visual disturbance.   Respiratory: Negative for apnea, cough, shortness of breath, wheezing and stridor.    Cardiovascular: Negative for chest pain, palpitations and leg swelling.   Gastrointestinal: Positive for abdominal pain. Negative for abdominal distention, constipation, diarrhea, nausea and vomiting.   Genitourinary: Negative for dysuria, frequency and urgency.   Musculoskeletal: Negative for arthralgias and back pain.   Skin: Negative for rash and wound.   Neurological: Negative for dizziness, facial asymmetry, light-headedness and headaches.   Psychiatric/Behavioral: Negative for agitation and decreased concentration. The patient is not nervous/anxious.        Objective:     Vital Signs  Temp:  [97.6 °F (36.4 °C)-97.7 °F (36.5 °C)] 97.6 °F (36.4 °C)  Heart Rate:  [50-86] 68  Resp:  [17-20] 17  BP: (108-178)/() 126/74    Physical Exam  Physical Exam  Constitutional:       Appearance: She is well-developed.   HENT:      Head: Normocephalic and atraumatic.      Right Ear: External ear normal.      Left Ear: External ear normal.      Nose: Nose normal.      Mouth/Throat:      Pharynx: No oropharyngeal exudate.   Eyes:      General:  No scleral icterus.        Right eye: No discharge.         Left eye: No discharge.      Conjunctiva/sclera: Conjunctivae normal.      Pupils: Pupils are equal, round, and reactive to light.   Neck:      Musculoskeletal: Normal range of motion and neck supple.      Vascular: No JVD.   Cardiovascular:      Rate and Rhythm: Normal rate and regular rhythm.      Heart sounds: Normal heart sounds. No murmur. No friction rub. No gallop.    Pulmonary:      Effort: Pulmonary effort is normal. No respiratory distress.      Breath sounds: Normal breath sounds. No stridor. No wheezing or rales.   Abdominal:      General: Bowel sounds are normal. There is no distension.      Palpations: Abdomen is soft.      Tenderness: There is abdominal tenderness (LUq).   Musculoskeletal: Normal range of motion.         General: No tenderness or deformity.   Skin:     General: Skin is warm and dry.      Capillary Refill: Capillary refill takes less than 2 seconds.      Coloration: Skin is not pale.      Findings: No erythema or rash.   Neurological:      Mental Status: She is alert and oriented to person, place, and time.   Psychiatric:         Behavior: Behavior normal.         Medication Review    Current Facility-Administered Medications:   •  acetaminophen (TYLENOL) tablet 650 mg, 650 mg, Oral, Q6H PRN, Christos Vyas MD  •  acetaminophen (TYLENOL) tablet 650 mg, 650 mg, Oral, Q8H, Christos Vyas MD, 650 mg at 03/01/21 1630  •  buprenorphine-naloxone (SUBOXONE) 8-2 MG per SL tablet 1 tablet, 1 tablet, Sublingual, TID, Christos Vyas MD, 1 tablet at 03/01/21 2020  •  cloNIDine (CATAPRES) tablet 0.1 mg, 0.1 mg, Oral, Q6H, Christos Vyas MD, 0.1 mg at 03/02/21 0635  •  diazePAM (VALIUM) tablet 10 mg, 10 mg, Oral, TID, Christos Vyas MD, 10 mg at 03/01/21 2020  •  enoxaparin (LOVENOX) syringe 40 mg, 40 mg, Subcutaneous, Q24H, Yosi Almaguer MD, 40 mg at 03/01/21 1751  •  famotidine (PEPCID) tablet 20 mg, 20  mg, Oral, BID AC, Delmy Jarrett MD, 20 mg at 21 0635  •  FLUoxetine (PROzac) capsule 40 mg, 40 mg, Oral, Daily, Delmy Jarrett MD  •  folic acid (FOLVITE) tablet 1 mg, 1 mg, Oral, Daily, Christos Vyas MD, 1 mg at 21 0802  •  gabapentin (NEURONTIN) capsule 800 mg, 800 mg, Oral, Q8H, Yosi Almaguer MD, 800 mg at 21 0635  •  hydrALAZINE (APRESOLINE) injection 10 mg, 10 mg, Intravenous, Q6H PRN, Yosi Almaguer MD, 10 mg at 21 1412  •  HYDROcodone-acetaminophen (NORCO) 7.5-325 MG per tablet 1 tablet, 1 tablet, Oral, Q6H PRN, Christos Vyas MD, 1 tablet at 21 0537  •  levoFLOXacin (LEVAQUIN) 750 mg/150 mL D5W (premix) (LEVAQUIN) 750 mg, 750 mg, Intravenous, Q24H, Delmy Jarrett MD, 750 mg at 21 1510  •  LORazepam (ATIVAN) tablet 1 mg, 1 mg, Oral, Q2H PRN **OR** LORazepam (ATIVAN) injection 1 mg, 1 mg, Intravenous, Q2H PRN **OR** LORazepam (ATIVAN) tablet 4 mg, 4 mg, Oral, Q1H PRN **OR** LORazepam (ATIVAN) injection 4 mg, 4 mg, Intravenous, Q1H PRN, 4 mg at 21 0538 **OR** LORazepam (ATIVAN) injection 6 mg, 6 mg, Intravenous, Q15 Min PRN, 6 mg at 21 1101 **OR** LORazepam (ATIVAN) injection 6 mg, 6 mg, Intramuscular, Q15 Min PRN, Christos Vyas MD  •  [] LORazepam (ATIVAN) injection 10 mg, 10 mg, Intravenous, Q4H, 10 mg at 21 1656 **FOLLOWED BY** [COMPLETED] LORazepam (ATIVAN) injection 10 mg, 10 mg, Intravenous, Q6H, 10 mg at 21 0237 **FOLLOWED BY** LORazepam (ATIVAN) injection 10 mg, 10 mg, Intravenous, Q8H, Christos Vyas MD  •  losartan (COZAAR) tablet 25 mg, 25 mg, Oral, Q24H, Christos Vyas MD, 25 mg at 21 1312  •  morphine injection 4 mg, 4 mg, Intravenous, Q4H PRN, Christos Vyas MD, 4 mg at 21 0414  •  multivitamin (THERAGRAN) tablet 1 tablet, 1 tablet, Oral, Daily, Christos Vyas MD, 1 tablet at 21 0802  •  nicotine (NICODERM CQ) 21 MG/24HR patch 1 patch, 1 patch, Transdermal,  Q24H, Pily Pike MD, 1 patch at 03/01/21 0802  •  ondansetron (ZOFRAN) tablet 4 mg, 4 mg, Oral, Q6H PRN **OR** ondansetron (ZOFRAN) injection 4 mg, 4 mg, Intravenous, Q6H PRN, Yosi Almaguer MD, 4 mg at 02/28/21 0300  •  Pharmacy Consult, , Does not apply, Continuous PRN, Delmy Jarrett MD  •  prazosin (MINIPRESS) capsule 5 mg, 5 mg, Oral, Nightly, Yosi Almaguer MD, 5 mg at 03/01/21 2215  •  prochlorperazine (COMPAZINE) injection 10 mg, 10 mg, Intravenous, Q6H PRN, Christos Vyas MD  •  sodium chloride 0.9 % flush 10 mL, 10 mL, Intravenous, PRN, Yosi Amlaguer MD  •  sodium chloride 0.9 % flush 10 mL, 10 mL, Intravenous, Q12H, Yosi Almaguer MD, 10 mL at 03/01/21 2020  •  sodium chloride 0.9 % flush 10 mL, 10 mL, Intravenous, PRN, Yosi Almaguer MD  •  sodium chloride 0.9 % infusion, 125 mL/hr, Intravenous, Continuous, Zeeshan Wiggins MD  •  thiamine (VITAMIN B-1) tablet 200 mg, 200 mg, Oral, Daily, Christos Vyas MD, 200 mg at 03/01/21 0802  •  vancomycin oral solution reconstituted 125 mg, 125 mg, Oral, Q6H, Delmy Jarrett MD, 125 mg at 03/02/21 0635     Diagnostic Data    Lab Results (last 24 hours)     Procedure Component Value Units Date/Time    Comprehensive Metabolic Panel [[id number]]  (Abnormal) Collected: 03/02/21 0355    Specimen: Blood Updated: 03/02/21 0518     Glucose 81 mg/dL      BUN 4 mg/dL      Creatinine 0.48 mg/dL      Sodium 137 mmol/L      Potassium 4.6 mmol/L      Chloride 107 mmol/L      CO2 22.0 mmol/L      Calcium 8.9 mg/dL      Total Protein 6.0 g/dL      Albumin 3.70 g/dL      ALT (SGPT) 30 U/L      AST (SGOT) 157 U/L      Alkaline Phosphatase 94 U/L      Total Bilirubin 0.6 mg/dL      eGFR Non African Amer 147 mL/min/1.73      Globulin 2.3 gm/dL      A/G Ratio 1.6 g/dL      BUN/Creatinine Ratio 8.3     Anion Gap 8.0 mmol/L     Narrative:      GFR Normal >60  Chronic Kidney Disease <60  Kidney Failure <15      CBC Auto Differential [[id number]]  (Abnormal)  Collected: 03/02/21 0355    Specimen: Blood Updated: 03/02/21 0455     WBC 5.99 10*3/mm3      RBC 3.31 10*6/mm3      Hemoglobin 11.9 g/dL      Hematocrit 33.8 %      .1 fL      MCH 36.0 pg      MCHC 35.2 g/dL      RDW 12.7 %      RDW-SD 48.0 fl      MPV 10.8 fL      Platelets 141 10*3/mm3      Neutrophil % 57.8 %      Lymphocyte % 34.9 %      Monocyte % 5.2 %      Eosinophil % 1.2 %      Basophil % 0.7 %      Immature Grans % 0.2 %      Neutrophils, Absolute 3.47 10*3/mm3      Lymphocytes, Absolute 2.09 10*3/mm3      Monocytes, Absolute 0.31 10*3/mm3      Eosinophils, Absolute 0.07 10*3/mm3      Basophils, Absolute 0.04 10*3/mm3      Immature Grans, Absolute 0.01 10*3/mm3      nRBC 0.0 /100 WBC     Urine Culture - Urine, Urine, Clean Catch [[id number]]  (Abnormal) Collected: 02/27/21 1455    Specimen: Urine, Clean Catch Updated: 03/02/21 0134     Urine Culture >100,000 CFU/mL Escherichia coli     Comment: Possible ESBL, confirmation in process         50,000 CFU/mL Gram Negative Bacilli           Imaging Results (Last 24 Hours)     ** No results found for the last 24 hours. **          I reviewed the patient's new clinical results.    Assessment/Plan:     Active Hospital Problems    Diagnosis   • **Alcohol dependence with withdrawal (CMS/HCC)     Last drink was on 2/26/21  Alcohol cessation counseling was provided  Ativan and CIWA protocol-  Ativan 6 mg Q4H, has not required for CIWA >15 since 3/1/2021  -will also give home dose valium 5 mg   -PO thiamine replacement      • Severe malnutrition (CMS/HCC)     -Dietary consult ordered: Appreciate recs  -Recommend Ensure supplementation  -Advancing diet as tolerated  Body mass index is 17.74 kg/m².       • Hypocalcemia     Calcium 8.9  Albumin 3.7  Corrected calcium 9.1         • C. difficile colitis     Patient reports diarrhea  Patient does not meet the full criteria (no WBC>88266), however she is on chronic PPI use.  C. difficile stool ordered:  Positive  -Currently receiving Vancomycin       • E. coli UTI       Urine Culture   Date Value Ref Range Status   02/27/2021 >100,000 CFU/mL Escherichia coli (A)  Preliminary     Comment:     Possible ESBL, confirmation in process   02/27/2021 50,000 CFU/mL Gram Negative Bacilli (A)  Preliminary   - currently on levaquin         • Epigastric pain   • Anxiety and depression     Will continue current home medications  -prozac  - valium       • Opioid use disorder (CMS/HCC)     -Continue gabapentin  -Scheduled Tylenol 1000 mg every 8 hours  -will give pts home medication of suboxone, and control pain with morphine and Norco             DVT prophylaxis: Lovenox  Code Status and Medical Interventions:   Ordered at: 02/27/21 1616     Level Of Support Discussed With:    Patient     Code Status:    CPR     Medical Interventions (Level of Support Prior to Arrest):    Full       Plan for disposition:Where: home and When:  2-3days      Time: 30 minutes        This document has been electronically signed by Zeeshan Wiggins MD on March 2, 2021 08:14 CST

## 2021-03-02 NOTE — NURSING NOTE
Due to increased amount of IVP medication, pt PIV lines continue to infiltrated. Dr. Rosalie vuong. Order for midline placed as STAT as pt does not have any vascular access at this time. VSS.

## 2021-03-02 NOTE — NURSING NOTE
Discussed possibly need to decrease pt morphine 4mg with Dr. Wiggins this am et stated he would discuss with his team the need to decrease.

## 2021-03-02 NOTE — PROGRESS NOTES
TWO PATIENT IDENTIFIERS WERE USED. THE PATIENT WAS DRAPED WITH A FULL BODY DRAPE AND THE PATIENT'S LEFT ARM WAS PREPPED WITH CHLORA PREP. ULTRASOUND WAS USED TO LOCALIZE THELEFT BASILIC VEIN. SUBCUTANEOUS TISSUE AT THE CATHETER SITE WAS INFILTRATED WITH 2% LIDOCAINE. UNDER ULTRASOUND GUIDANCE, THE VEIN WAS ACCESSED WITH A 21 GAUGE  NEEDLE. AN 0.018 WIRE WAS THEN THREADED THROUGH THE NEEDLE. THE 21 GAUGE NEEDLE WAS REMOVED AND A 4 Hungarian SHEATH WAS PLACED OVER THE WIRE INTO THE VEIN.THE MIDLINE CATHETER WAS TRIMMED TO 20CM. THE MIDLINE CATHETER WAS THEN PLACED OVER THE WIRE INTO THE VEIN, THE SHEATH WAS PEELED AWAY, WIRE WAS REMOVED. CATHETER WAS FLUSHED WITH NORMAL SALINE AND CATHETER TIP APPLIED. BIOPATCH PLACED. CATHETER SECURED WITH STAT LOCK AND TEGADERM. PATIENT TOLERATED PROCEDURE WELL. THIS WAS DONE AT BEDSIDE      IMPRESSION:SUCCESSFUL PLACEMENT OF DUAL LUMEN MIDLINE.           Joanna Pineda  3/2/2021  10:04 CST

## 2021-03-02 NOTE — NURSING NOTE
Dr. Jarrett paged at this time r/t follow up on decreasing pt IVP morphine 4mg q4h PRN. She gave a telephone order to decrease to 2mg q4h PRN. She states the pt controlled medication orders will be reviewed again tomorrow and adjusted appropriately.

## 2021-03-02 NOTE — PLAN OF CARE
Goal Outcome Evaluation:  Plan of Care Reviewed With: patient  Progress: improving  Outcome Summary: Patient resting throughout the night; remains NPO with ice chips. VSS. Continuing to monitor.

## 2021-03-03 LAB
ALBUMIN SERPL-MCNC: 3.5 G/DL (ref 3.5–5.2)
ALBUMIN/GLOB SERPL: 1.3 G/DL
ALP SERPL-CCNC: 88 U/L (ref 39–117)
ALT SERPL W P-5'-P-CCNC: 25 U/L (ref 1–33)
ANION GAP SERPL CALCULATED.3IONS-SCNC: 8 MMOL/L (ref 5–15)
AST SERPL-CCNC: 76 U/L (ref 1–32)
BASOPHILS # BLD AUTO: 0.04 10*3/MM3 (ref 0–0.2)
BASOPHILS NFR BLD AUTO: 0.6 % (ref 0–1.5)
BILIRUB SERPL-MCNC: 0.4 MG/DL (ref 0–1.2)
BUN SERPL-MCNC: 4 MG/DL (ref 6–20)
BUN/CREAT SERPL: 8.9 (ref 7–25)
CALCIUM SPEC-SCNC: 8.5 MG/DL (ref 8.6–10.5)
CHLORIDE SERPL-SCNC: 107 MMOL/L (ref 98–107)
CO2 SERPL-SCNC: 25 MMOL/L (ref 22–29)
CREAT SERPL-MCNC: 0.45 MG/DL (ref 0.57–1)
DEPRECATED RDW RBC AUTO: 49.4 FL (ref 37–54)
EOSINOPHIL # BLD AUTO: 0.08 10*3/MM3 (ref 0–0.4)
EOSINOPHIL NFR BLD AUTO: 1.1 % (ref 0.3–6.2)
ERYTHROCYTE [DISTWIDTH] IN BLOOD BY AUTOMATED COUNT: 12.9 % (ref 12.3–15.4)
GFR SERPL CREATININE-BSD FRML MDRD: >150 ML/MIN/1.73
GLOBULIN UR ELPH-MCNC: 2.6 GM/DL
GLUCOSE SERPL-MCNC: 85 MG/DL (ref 65–99)
HCT VFR BLD AUTO: 34.5 % (ref 34–46.6)
HGB BLD-MCNC: 11.9 G/DL (ref 12–15.9)
IMM GRANULOCYTES # BLD AUTO: 0.04 10*3/MM3 (ref 0–0.05)
IMM GRANULOCYTES NFR BLD AUTO: 0.6 % (ref 0–0.5)
LYMPHOCYTES # BLD AUTO: 2.57 10*3/MM3 (ref 0.7–3.1)
LYMPHOCYTES NFR BLD AUTO: 35.9 % (ref 19.6–45.3)
MCH RBC QN AUTO: 35.8 PG (ref 26.6–33)
MCHC RBC AUTO-ENTMCNC: 34.5 G/DL (ref 31.5–35.7)
MCV RBC AUTO: 103.9 FL (ref 79–97)
MONOCYTES # BLD AUTO: 0.36 10*3/MM3 (ref 0.1–0.9)
MONOCYTES NFR BLD AUTO: 5 % (ref 5–12)
NEUTROPHILS NFR BLD AUTO: 4.07 10*3/MM3 (ref 1.7–7)
NEUTROPHILS NFR BLD AUTO: 56.8 % (ref 42.7–76)
NRBC BLD AUTO-RTO: 0 /100 WBC (ref 0–0.2)
PLATELET # BLD AUTO: 145 10*3/MM3 (ref 140–450)
PMV BLD AUTO: 10.4 FL (ref 6–12)
POTASSIUM SERPL-SCNC: 4 MMOL/L (ref 3.5–5.2)
PROT SERPL-MCNC: 6.1 G/DL (ref 6–8.5)
RBC # BLD AUTO: 3.32 10*6/MM3 (ref 3.77–5.28)
SODIUM SERPL-SCNC: 140 MMOL/L (ref 136–145)
WBC # BLD AUTO: 7.16 10*3/MM3 (ref 3.4–10.8)

## 2021-03-03 PROCEDURE — 25010000002 MEROPENEM PER 100 MG: Performed by: STUDENT IN AN ORGANIZED HEALTH CARE EDUCATION/TRAINING PROGRAM

## 2021-03-03 PROCEDURE — 99233 SBSQ HOSP IP/OBS HIGH 50: CPT | Performed by: STUDENT IN AN ORGANIZED HEALTH CARE EDUCATION/TRAINING PROGRAM

## 2021-03-03 PROCEDURE — 25010000002 MORPHINE PER 10 MG: Performed by: STUDENT IN AN ORGANIZED HEALTH CARE EDUCATION/TRAINING PROGRAM

## 2021-03-03 PROCEDURE — 25010000002 ENOXAPARIN PER 10 MG: Performed by: STUDENT IN AN ORGANIZED HEALTH CARE EDUCATION/TRAINING PROGRAM

## 2021-03-03 PROCEDURE — 25010000002 LORAZEPAM PER 2 MG: Performed by: STUDENT IN AN ORGANIZED HEALTH CARE EDUCATION/TRAINING PROGRAM

## 2021-03-03 PROCEDURE — 80053 COMPREHEN METABOLIC PANEL: CPT | Performed by: STUDENT IN AN ORGANIZED HEALTH CARE EDUCATION/TRAINING PROGRAM

## 2021-03-03 PROCEDURE — 85025 COMPLETE CBC W/AUTO DIFF WBC: CPT | Performed by: STUDENT IN AN ORGANIZED HEALTH CARE EDUCATION/TRAINING PROGRAM

## 2021-03-03 RX ORDER — HYDROXYZINE HYDROCHLORIDE 25 MG/1
25 TABLET, FILM COATED ORAL 3 TIMES DAILY PRN
Status: DISCONTINUED | OUTPATIENT
Start: 2021-03-03 | End: 2021-03-03

## 2021-03-03 RX ORDER — MORPHINE SULFATE 2 MG/ML
1 INJECTION, SOLUTION INTRAMUSCULAR; INTRAVENOUS EVERY 4 HOURS PRN
Status: DISCONTINUED | OUTPATIENT
Start: 2021-03-03 | End: 2021-03-03

## 2021-03-03 RX ORDER — HYDROXYZINE HYDROCHLORIDE 25 MG/1
25 TABLET, FILM COATED ORAL 3 TIMES DAILY
Status: DISCONTINUED | OUTPATIENT
Start: 2021-03-03 | End: 2021-03-04 | Stop reason: HOSPADM

## 2021-03-03 RX ADMIN — SODIUM CHLORIDE 125 ML/HR: 9 INJECTION, SOLUTION INTRAVENOUS at 04:03

## 2021-03-03 RX ADMIN — HYDROXYZINE HYDROCHLORIDE 25 MG: 25 TABLET, FILM COATED ORAL at 20:23

## 2021-03-03 RX ADMIN — GABAPENTIN 800 MG: 400 CAPSULE ORAL at 23:49

## 2021-03-03 RX ADMIN — Medication 125 MG: at 23:48

## 2021-03-03 RX ADMIN — BUPRENORPHINE HYDROCHLORIDE AND NALOXONE HYDROCHLORIDE DIHYDRATE 1 TABLET: 8; 2 TABLET SUBLINGUAL at 15:00

## 2021-03-03 RX ADMIN — FLUOXETINE 40 MG: 20 CAPSULE ORAL at 08:26

## 2021-03-03 RX ADMIN — BUPRENORPHINE HYDROCHLORIDE AND NALOXONE HYDROCHLORIDE DIHYDRATE 1 TABLET: 8; 2 TABLET SUBLINGUAL at 08:26

## 2021-03-03 RX ADMIN — MEROPENEM 1 G: 1 INJECTION, POWDER, FOR SOLUTION INTRAVENOUS at 04:03

## 2021-03-03 RX ADMIN — HYDROXYZINE HYDROCHLORIDE 25 MG: 25 TABLET, FILM COATED ORAL at 08:27

## 2021-03-03 RX ADMIN — FAMOTIDINE 20 MG: 20 TABLET, FILM COATED ORAL at 08:26

## 2021-03-03 RX ADMIN — DIAZEPAM 10 MG: 5 TABLET ORAL at 23:49

## 2021-03-03 RX ADMIN — SODIUM CHLORIDE, PRESERVATIVE FREE 10 ML: 5 INJECTION INTRAVENOUS at 08:28

## 2021-03-03 RX ADMIN — FAMOTIDINE 20 MG: 20 TABLET, FILM COATED ORAL at 17:21

## 2021-03-03 RX ADMIN — DIAZEPAM 10 MG: 5 TABLET ORAL at 15:00

## 2021-03-03 RX ADMIN — CLONIDINE HYDROCHLORIDE 0.1 MG: 0.1 TABLET ORAL at 12:04

## 2021-03-03 RX ADMIN — FOLIC ACID 1 MG: 1 TABLET ORAL at 08:27

## 2021-03-03 RX ADMIN — HYDROXYZINE HYDROCHLORIDE 25 MG: 25 TABLET, FILM COATED ORAL at 15:00

## 2021-03-03 RX ADMIN — LORAZEPAM 10 MG: 2 INJECTION INTRAMUSCULAR at 01:13

## 2021-03-03 RX ADMIN — DIAZEPAM 10 MG: 5 TABLET ORAL at 08:27

## 2021-03-03 RX ADMIN — LORAZEPAM 1 MG: 2 INJECTION INTRAMUSCULAR at 16:15

## 2021-03-03 RX ADMIN — ENOXAPARIN SODIUM 40 MG: 40 INJECTION SUBCUTANEOUS at 17:22

## 2021-03-03 RX ADMIN — LORAZEPAM 1 MG: 2 INJECTION INTRAMUSCULAR at 19:39

## 2021-03-03 RX ADMIN — CLONIDINE HYDROCHLORIDE 0.1 MG: 0.1 TABLET ORAL at 06:01

## 2021-03-03 RX ADMIN — CLONIDINE HYDROCHLORIDE 0.1 MG: 0.1 TABLET ORAL at 17:21

## 2021-03-03 RX ADMIN — MEROPENEM 1 G: 1 INJECTION, POWDER, FOR SOLUTION INTRAVENOUS at 20:19

## 2021-03-03 RX ADMIN — THERA TABS 1 TABLET: TAB at 08:26

## 2021-03-03 RX ADMIN — Medication 125 MG: at 17:21

## 2021-03-03 RX ADMIN — LOSARTAN POTASSIUM 25 MG: 25 TABLET, FILM COATED ORAL at 12:31

## 2021-03-03 RX ADMIN — LORAZEPAM 10 MG: 2 INJECTION INTRAMUSCULAR at 08:31

## 2021-03-03 RX ADMIN — Medication 125 MG: at 12:05

## 2021-03-03 RX ADMIN — ACETAMINOPHEN 650 MG: 325 TABLET, FILM COATED ORAL at 08:32

## 2021-03-03 RX ADMIN — GABAPENTIN 800 MG: 400 CAPSULE ORAL at 13:06

## 2021-03-03 RX ADMIN — MORPHINE SULFATE 2 MG: 2 INJECTION, SOLUTION INTRAMUSCULAR; INTRAVENOUS at 02:04

## 2021-03-03 RX ADMIN — Medication 1 PATCH: at 08:31

## 2021-03-03 RX ADMIN — SODIUM CHLORIDE, PRESERVATIVE FREE 10 ML: 5 INJECTION INTRAVENOUS at 20:23

## 2021-03-03 RX ADMIN — BUPRENORPHINE HYDROCHLORIDE AND NALOXONE HYDROCHLORIDE DIHYDRATE 1 TABLET: 8; 2 TABLET SUBLINGUAL at 20:23

## 2021-03-03 RX ADMIN — HYDROCODONE BITARTRATE AND ACETAMINOPHEN 1 TABLET: 7.5; 325 TABLET ORAL at 11:52

## 2021-03-03 RX ADMIN — ACETAMINOPHEN 650 MG: 325 TABLET, FILM COATED ORAL at 01:12

## 2021-03-03 RX ADMIN — MEROPENEM 1 G: 1 INJECTION, POWDER, FOR SOLUTION INTRAVENOUS at 12:04

## 2021-03-03 RX ADMIN — GABAPENTIN 800 MG: 400 CAPSULE ORAL at 06:01

## 2021-03-03 RX ADMIN — Medication 125 MG: at 06:02

## 2021-03-03 RX ADMIN — THIAMINE HCL TAB 100 MG 200 MG: 100 TAB at 08:40

## 2021-03-03 RX ADMIN — PRAZOSIN HYDROCHLORIDE 5 MG: 5 CAPSULE ORAL at 20:23

## 2021-03-03 RX ADMIN — MORPHINE SULFATE 2 MG: 2 INJECTION, SOLUTION INTRAMUSCULAR; INTRAVENOUS at 06:28

## 2021-03-03 NOTE — PLAN OF CARE
Goal Outcome Evaluation:  Plan of Care Reviewed With: caregiver  Progress: improving  Outcome Summary: PO diet just advanced to Regular.  No Gi distress. Diarrhea is better.  Will monitor and add supplements for optimal nutrition.

## 2021-03-03 NOTE — NURSING NOTE
"Pt requesting morphine at this time. Pt was told that she still had approximately two hours before she can receive more morphine et pt states, \"well, I dont want to get any ativan right now because that is going to keep me from being able to move upstairs and I will be here longer.\" Pt educated that it is not appropriate to administer pain medication when she is not having any pain and that we should not substitute ativan for morphine just to speed up her hospital stay. Ativan 10mg et morphine 2mg IVP given at 1623.  "

## 2021-03-03 NOTE — CONSULTS
"Adult Nutrition  Assessment    Patient Name:  Nata Bain  YOB: 1985  MRN: [id number]  Admit Date:  2/27/2021    Assessment Date:  3/3/2021    Comments:  Pt sleeping soundly when I visited. Diarrhea resolved per staff.  No Gi distress and diet has been advanced to regular.  She continues to be managed for Alcohol dependence with withdrawal; CDiff; UTI; and Cystitis. CIWA protocol continues.  RD will add supplements twice daily and monitor po intake.          Anthropometrics     Row Name 03/03/21 1351          Anthropometrics    Height  172.7 cm (68\")     Weight  61.6 kg (135 lb 12.8 oz)        Ideal Body Weight (IBW)    Ideal Body Weight (IBW) (kg)  64.15     % Ideal Body Weight  96.02        Body Mass Index (BMI)    BMI (kg/m2)  20.69         Labs/Tests/Procedures/Meds     Row Name 03/03/21 1350          Labs/Procedures/Meds    Lab Results Reviewed  reviewed, pertinent     Lab Results Comments  Bun 4; Creat 0.45        Diagnostic Tests/Procedures    Diagnostic Test/Procedure Reviewed  reviewed, pertinent     Diagnostic Test/Procedures Comments  CIWA; IVF        Medications    Pertinent Medications Reviewed  reviewed, pertinent     Pertinent Medications Comments  Folic Acid; Ativan; Vanc; MVI; Kcl; Thiamine; NaCl 125cc/hr         Physical Findings     Row Name 03/03/21 1352          Physical Findings    Overall Physical Appearance  loss of muscle mass;loss of subcutaneous fat;on oxygen therapy;underweight         Estimated/Assessed Needs     Row Name 03/03/21 1351          Calculation Measurements    Height  172.7 cm (68\")         Nutrition Prescription Ordered     Row Name 03/03/21 1355          Nutrition Prescription PO    Current PO Diet  Regular     Fluid Consistency  Thin                 Electronically signed by:  Gavi Ng RD  03/03/21 13:58 CST  "

## 2021-03-03 NOTE — PROGRESS NOTES
FAMILY MEDICINE DAILY PROGRESS NOTE  NAME: Nata Bain  : 1985  MRN: [id number]     LOS: 2 days     PROVIDER OF SERVICE: Zeeshan Wiggins MD    Chief Complaint: Alcohol dependence with withdrawal (CMS/[facility])    Subjective:   Pt did well overnight. Continues to decrease Ativan usage. Required 1 PRN admission for elevated CIWA. Continues to ask for pain medication, however states abdominal pain is resolving. Diarrhea has improved at this time.   Interval History:  History taken from: patient chart RN    Review of Systems:   Review of Systems   Constitutional: Negative for activity change, chills, diaphoresis and fever.   HENT: Negative for dental problem, drooling, ear discharge, ear pain, facial swelling, mouth sores, nosebleeds, rhinorrhea, sinus pressure, sinus pain, sneezing and sore throat.    Eyes: Negative for pain, discharge and visual disturbance.   Respiratory: Negative for apnea, cough, shortness of breath, wheezing and stridor.    Cardiovascular: Negative for chest pain, palpitations and leg swelling.   Gastrointestinal: Positive for abdominal pain. Negative for abdominal distention, constipation, diarrhea, nausea and vomiting.   Genitourinary: Negative for dysuria, frequency and urgency.   Musculoskeletal: Negative for arthralgias and back pain.   Skin: Negative for rash and wound.   Neurological: Negative for dizziness, facial asymmetry, light-headedness and headaches.   Psychiatric/Behavioral: Negative for agitation and decreased concentration. The patient is nervous/anxious.        Objective:     Vital Signs  Temp:  [97.3 °F (36.3 °C)-98.2 °F (36.8 °C)] 97.3 °F (36.3 °C)  Heart Rate:  [59-84] 78  BP: ()/(53-85) 119/62    Physical Exam  Physical Exam  Constitutional:       Appearance: She is well-developed.   HENT:      Head: Normocephalic and atraumatic.      Right Ear: External ear normal.      Left Ear: External ear normal.      Nose: Nose normal.      Mouth/Throat:      Pharynx:  No oropharyngeal exudate.   Eyes:      General: No scleral icterus.        Right eye: No discharge.         Left eye: No discharge.      Conjunctiva/sclera: Conjunctivae normal.      Pupils: Pupils are equal, round, and reactive to light.   Neck:      Musculoskeletal: Normal range of motion and neck supple.      Vascular: No JVD.   Cardiovascular:      Rate and Rhythm: Normal rate and regular rhythm.      Heart sounds: Normal heart sounds. No murmur. No friction rub. No gallop.    Pulmonary:      Effort: Pulmonary effort is normal. No respiratory distress.      Breath sounds: Normal breath sounds. No stridor. No wheezing or rales.   Abdominal:      General: Bowel sounds are normal. There is no distension.      Palpations: Abdomen is soft.      Tenderness: There is abdominal tenderness (Left sided).   Musculoskeletal: Normal range of motion.         General: No tenderness or deformity.   Skin:     General: Skin is warm and dry.      Capillary Refill: Capillary refill takes less than 2 seconds.      Coloration: Skin is not pale.      Findings: No erythema or rash.   Neurological:      Mental Status: She is alert and oriented to person, place, and time.   Psychiatric:         Behavior: Behavior normal.         Medication Review    Current Facility-Administered Medications:   •  acetaminophen (TYLENOL) tablet 650 mg, 650 mg, Oral, Q6H PRN, Christos Vyas MD  •  acetaminophen (TYLENOL) tablet 650 mg, 650 mg, Oral, Q8H, Christos Vyas MD, 650 mg at 03/03/21 0112  •  buprenorphine-naloxone (SUBOXONE) 8-2 MG per SL tablet 1 tablet, 1 tablet, Sublingual, TID, Christos Vyas MD, 1 tablet at 03/02/21 2122  •  cloNIDine (CATAPRES) tablet 0.1 mg, 0.1 mg, Oral, Q6H, Christos Vyas MD, 0.1 mg at 03/03/21 0601  •  diazePAM (VALIUM) tablet 10 mg, 10 mg, Oral, TID, Christos Vyas MD, 10 mg at 03/02/21 2006  •  enoxaparin (LOVENOX) syringe 40 mg, 40 mg, Subcutaneous, Q24H, Yosi Almaguer MD, 40 mg at  21 1745  •  famotidine (PEPCID) tablet 20 mg, 20 mg, Oral, BID AC, Delmy Jarrett MD, 20 mg at 21 1630  •  FLUoxetine (PROzac) capsule 40 mg, 40 mg, Oral, Daily, Delmy Jarrett MD, 40 mg at 21 0814  •  folic acid (FOLVITE) tablet 1 mg, 1 mg, Oral, Daily, Christos Vyas MD, 1 mg at 21 0813  •  gabapentin (NEURONTIN) capsule 800 mg, 800 mg, Oral, Q8H, Yosi Almaguer MD, 800 mg at 21 0601  •  hydrALAZINE (APRESOLINE) injection 10 mg, 10 mg, Intravenous, Q6H PRN, Yosi Almaguer MD, 10 mg at 21 1412  •  HYDROcodone-acetaminophen (NORCO) 7.5-325 MG per tablet 1 tablet, 1 tablet, Oral, Q6H PRN, Christos Vyas MD, 1 tablet at 21 0537  •  hydrOXYzine (ATARAX) tablet 25 mg, 25 mg, Oral, TID, Delmy Jarrett MD  •  LORazepam (ATIVAN) tablet 1 mg, 1 mg, Oral, Q2H PRN **OR** LORazepam (ATIVAN) injection 1 mg, 1 mg, Intravenous, Q2H PRN, 1 mg at 21 1359 **OR** LORazepam (ATIVAN) tablet 4 mg, 4 mg, Oral, Q1H PRN **OR** LORazepam (ATIVAN) injection 4 mg, 4 mg, Intravenous, Q1H PRN, 4 mg at 21 0538 **OR** LORazepam (ATIVAN) injection 6 mg, 6 mg, Intravenous, Q15 Min PRN, 6 mg at 21 1101 **OR** LORazepam (ATIVAN) injection 6 mg, 6 mg, Intramuscular, Q15 Min PRN, Christos Vyas MD  •  [] LORazepam (ATIVAN) injection 10 mg, 10 mg, Intravenous, Q4H, 10 mg at 21 1656 **FOLLOWED BY** [COMPLETED] LORazepam (ATIVAN) injection 10 mg, 10 mg, Intravenous, Q6H, 10 mg at 21 0237 **FOLLOWED BY** LORazepam (ATIVAN) injection 10 mg, 10 mg, Intravenous, Q8H, Christos Vyas MD, 10 mg at 21 0113  •  losartan (COZAAR) tablet 25 mg, 25 mg, Oral, Q24H, Christos Vyas MD, Stopped at 21 1403  •  meropenem (MERREM) 1 g/100 mL 0.9% NS VTB (mbp), 1 g, Intravenous, Once, Delmy Jarrett MD, Currently Infusing at 21 1343  •  meropenem (MERREM) 1 g/100 mL 0.9% NS VTB (mbp), 1 g, Intravenous, Q8H, Delmy Jarrett MD, 1 g at  03/03/21 0403  •  morphine injection 1 mg, 1 mg, Intravenous, Q4H PRN, Zeeshan Wiggins MD  •  multivitamin (THERAGRAN) tablet 1 tablet, 1 tablet, Oral, Daily, Christos Vyas MD, 1 tablet at 03/02/21 0816  •  nicotine (NICODERM CQ) 21 MG/24HR patch 1 patch, 1 patch, Transdermal, Q24H, Pily Pike MD, 1 patch at 03/02/21 0814  •  ondansetron (ZOFRAN) tablet 4 mg, 4 mg, Oral, Q6H PRN **OR** ondansetron (ZOFRAN) injection 4 mg, 4 mg, Intravenous, Q6H PRN, Yosi Almaguer MD, 4 mg at 02/28/21 0300  •  Pharmacy Consult, , Does not apply, Continuous PRN, Delmy Jarrett MD  •  prazosin (MINIPRESS) capsule 5 mg, 5 mg, Oral, Nightly, Yosi Almaguer MD, 5 mg at 03/02/21 2006  •  prochlorperazine (COMPAZINE) injection 10 mg, 10 mg, Intravenous, Q6H PRN, Christos Vyas MD  •  sodium chloride 0.9 % flush 10 mL, 10 mL, Intravenous, PRN, Yosi Almaguer MD  •  sodium chloride 0.9 % flush 10 mL, 10 mL, Intravenous, Q12H, Yosi Almaguer MD, 10 mL at 03/02/21 2007  •  sodium chloride 0.9 % flush 10 mL, 10 mL, Intravenous, PRN, Yosi Almaguer MD  •  sodium chloride 0.9 % infusion, 125 mL/hr, Intravenous, Continuous, Zeeshan Wiggins MD, Last Rate: 125 mL/hr at 03/03/21 0403, 125 mL/hr at 03/03/21 0403  •  thiamine (VITAMIN B-1) tablet 200 mg, 200 mg, Oral, Daily, Christos Vyas MD, 200 mg at 03/02/21 0814  •  vancomycin oral solution reconstituted 125 mg, 125 mg, Oral, Q6H, Delmy Jarrett MD, 125 mg at 03/03/21 0602     Diagnostic Data    Lab Results (last 24 hours)     Procedure Component Value Units Date/Time    Comprehensive Metabolic Panel [[id number]]  (Abnormal) Collected: 03/03/21 0236    Specimen: Blood Updated: 03/03/21 0314     Glucose 85 mg/dL      BUN 4 mg/dL      Creatinine 0.45 mg/dL      Sodium 140 mmol/L      Potassium 4.0 mmol/L      Chloride 107 mmol/L      CO2 25.0 mmol/L      Calcium 8.5 mg/dL      Total Protein 6.1 g/dL      Albumin 3.50 g/dL      ALT (SGPT) 25 U/L      AST (SGOT) 76  U/L      Alkaline Phosphatase 88 U/L      Total Bilirubin 0.4 mg/dL      eGFR Non African Amer >150 mL/min/1.73      Globulin 2.6 gm/dL      A/G Ratio 1.3 g/dL      BUN/Creatinine Ratio 8.9     Anion Gap 8.0 mmol/L     Narrative:      GFR Normal >60  Chronic Kidney Disease <60  Kidney Failure <15      CBC Auto Differential [[id number]]  (Abnormal) Collected: 03/03/21 0236    Specimen: Blood Updated: 03/03/21 0241     WBC 7.16 10*3/mm3      RBC 3.32 10*6/mm3      Hemoglobin 11.9 g/dL      Hematocrit 34.5 %      .9 fL      MCH 35.8 pg      MCHC 34.5 g/dL      RDW 12.9 %      RDW-SD 49.4 fl      MPV 10.4 fL      Platelets 145 10*3/mm3      Neutrophil % 56.8 %      Lymphocyte % 35.9 %      Monocyte % 5.0 %      Eosinophil % 1.1 %      Basophil % 0.6 %      Immature Grans % 0.6 %      Neutrophils, Absolute 4.07 10*3/mm3      Lymphocytes, Absolute 2.57 10*3/mm3      Monocytes, Absolute 0.36 10*3/mm3      Eosinophils, Absolute 0.08 10*3/mm3      Basophils, Absolute 0.04 10*3/mm3      Immature Grans, Absolute 0.04 10*3/mm3      nRBC 0.0 /100 WBC     Vitamin B12 [[id number]]  (Abnormal) Collected: 03/02/21 0816    Specimen: Blood Updated: 03/02/21 1306     Vitamin B-12 >2,000 pg/mL     Narrative:      Results may be falsely increased if patient taking Biotin.      Folate [[id number]]  (Normal) Collected: 03/02/21 0816    Specimen: Blood Updated: 03/02/21 1306     Folate 16.70 ng/mL     Narrative:      Results may be falsely increased if patient taking Biotin.      Urine Culture - Urine, Urine, Clean Catch [[id number]]  (Abnormal)  (Susceptibility) Collected: 02/27/21 1455    Specimen: Urine, Clean Catch Updated: 03/02/21 1147     Urine Culture >100,000 CFU/mL Escherichia coli ESBL     Comment: Consider infectious disease consult.  Susceptibility results may not correlate to clinical outcomes.         50,000 CFU/mL Klebsiella pneumoniae ssp pneumoniae    Susceptibility      Escherichia coli ESBL     JORGE     Ertapenem  Susceptible     Gentamicin Susceptible     Levofloxacin Intermediate     Meropenem Susceptible     Nitrofurantoin Susceptible     Piperacillin + Tazobactam Susceptible     Tetracycline Susceptible     Trimethoprim + Sulfamethoxazole Susceptible                Susceptibility      Klebsiella pneumoniae ssp pneumoniae     JORGE     Ampicillin Resistant     Ampicillin + Sulbactam Susceptible     Cefazolin Susceptible     Cefepime Susceptible     Ceftazidime Susceptible     Ceftriaxone Susceptible     Gentamicin Susceptible     Levofloxacin Susceptible     Nitrofurantoin Intermediate     Piperacillin + Tazobactam Susceptible     Tetracycline Susceptible     Trimethoprim + Sulfamethoxazole Susceptible                           Imaging Results (Last 24 Hours)     Procedure Component Value Units Date/Time    US Guided Vascular Access [[id number]] Collected: 03/02/21 0923     Updated: 03/02/21 1116    Narrative:      PROCEDURE: Ultrasound Guidance Vascular Access      Ordering physician(s): CARIDAD GABRIEL    Clinical Indication: Midline placement.         Findings:    The vessel was sonographically evaluated and determined to be  patent. Concurrent realtime ultrasound was used to visualize  needle entry into theLeft basilic vein and a permanent image was  obtained    for permanent recording and reporting.         Impression:      Impression: Ultrasound-guided venous access left basilic vein for  midline catheter placement.    Electronically signed by:  Jorge Mullen MD  3/2/2021 11:15 AM CST  Workstation: [id number]    IR Insert Midline Without Port Pump 5 Plus [[id number]] Resulted: 03/02/21 1005     Updated: 03/02/21 1005    Narrative:      This procedure was auto-finalized with no dictation required.          I reviewed the patient's new clinical results.    Assessment/Plan:     Active Hospital Problems    Diagnosis   • **Alcohol dependence with withdrawal (CMS/HCC)     Last drink was on 2/26/21  Alcohol cessation counseling was  provided  Ativan and CIWA protocol-  Ativan 8 mg Q4H, has  required for CIWA >15 x1  3/2/2021  -will also give home dose valium 5 mg   -PO thiamine replacement      • Severe malnutrition (CMS/HCC)     -Dietary consult ordered: Appreciate recs  -Recommend Ensure supplementation  -Advancing diet as tolerated, currently on Hello and Broth, will transition to soft  Body mass index is 20.93 kg/m².       • Klebsiella cystitis     Urine Culture - Urine, Urine, Clean Catch [[id number]]    (Abnormal)   Urine, Clean Catch    Final result Component Value   Urine Culture >100,000 CFU/mL Escherichia coli ESBLAbnormal     Consider infectious disease consult.  Susceptibility results may not correlate to clinical outcomes.    50,000 CFU/mL Klebsiella pneumoniae ssp pneumoniaeAbnormal         -Currently on Merepenem     • Hypocalcemia     Calcium 8.5  Albumin 3.5  Corrected calcium 8.9         • C. difficile colitis     Patient reports diarrhea  Patient does not meet the full criteria (no WBC>[id number]), however she is on chronic PPI use.  C. difficile stool ordered: Positive  -Currently receiving Vancomycin       • UTI due to extended-spectrum beta lactamase (ESBL) producing Escherichia coli       Microbiology Results (last 10 days)     Lab Status: Final result Specimen: Urine, Clean Catch Updated: 03/02/21 1147     Urine Culture >100,000 CFU/mL Escherichia coli ESBL     Comment: Consider infectious disease consult.  Susceptibility results may not correlate to clinical outcomes.         50,000 CFU/mL Klebsiella pneumoniae ssp pneumoniae        -Have switched Abx to Merepenem     • Epigastric pain   • Anxiety and depression     Will continue current home medications  -prozac  - valium    -Will Add Atarax     • Opioid use disorder (CMS/HCC)     -Continue gabapentin  -Scheduled Tylenol 1000 mg every 8 hours  -will give pts home medication of suboxone, and control pain with morphine and Norco             DVT prophylaxis: Lovenox  Code  Status and Medical Interventions:   Ordered at: 02/27/21 1616     Level Of Support Discussed With:    Patient     Code Status:    CPR     Medical Interventions (Level of Support Prior to Arrest):    Full       Plan for disposition:Where: home and When:  2-3days      Time: 30 minutes        This document has been electronically signed by Zeeshan Wiggins MD on March 3, 2021 08:27 CST

## 2021-03-03 NOTE — PLAN OF CARE
Goal Outcome Evaluation:     Progress: improving  Outcome Summary: transfer from CCU.  No c/o pain or discomfort at this time.  Independent in room.  VSS.  Will continue to monitor.

## 2021-03-03 NOTE — PLAN OF CARE
Goal Outcome Evaluation:  Plan of Care Reviewed With: patient  Progress: improving   Alert and oriented, periods of anxiousness noted. Fixated on medications, frequently asking/worrying over times and doses. Cooperative with staff, education given frequently. Abdominal discomfort persisted throughout night, abdomen rounded/tender to the touch/soft, prn Morphine given with good results noted. Up to BSC through shift, independent with transferring.

## 2021-03-04 ENCOUNTER — READMISSION MANAGEMENT (OUTPATIENT)
Dept: CALL CENTER | Facility: HOSPITAL | Age: 36
End: 2021-03-04

## 2021-03-04 VITALS
BODY MASS INDEX: 20.11 KG/M2 | WEIGHT: 132.7 LBS | HEIGHT: 68 IN | HEART RATE: 76 BPM | DIASTOLIC BLOOD PRESSURE: 85 MMHG | RESPIRATION RATE: 18 BRPM | TEMPERATURE: 98.4 F | OXYGEN SATURATION: 97 % | SYSTOLIC BLOOD PRESSURE: 119 MMHG

## 2021-03-04 PROBLEM — F10.239 ALCOHOL DEPENDENCE WITH WITHDRAWAL: Status: RESOLVED | Noted: 2020-06-01 | Resolved: 2021-03-04

## 2021-03-04 PROBLEM — R10.13 EPIGASTRIC PAIN: Status: RESOLVED | Noted: 2021-01-07 | Resolved: 2021-03-04

## 2021-03-04 LAB
ALBUMIN SERPL-MCNC: 3.7 G/DL (ref 3.5–5.2)
ALBUMIN/GLOB SERPL: 1.5 G/DL
ALP SERPL-CCNC: 97 U/L (ref 39–117)
ALT SERPL W P-5'-P-CCNC: 31 U/L (ref 1–33)
ANION GAP SERPL CALCULATED.3IONS-SCNC: 7 MMOL/L (ref 5–15)
AST SERPL-CCNC: 70 U/L (ref 1–32)
BASOPHILS # BLD AUTO: 0.04 10*3/MM3 (ref 0–0.2)
BASOPHILS NFR BLD AUTO: 0.6 % (ref 0–1.5)
BILIRUB SERPL-MCNC: 0.3 MG/DL (ref 0–1.2)
BUN SERPL-MCNC: 3 MG/DL (ref 6–20)
BUN/CREAT SERPL: 6.7 (ref 7–25)
CALCIUM SPEC-SCNC: 9.4 MG/DL (ref 8.6–10.5)
CHLORIDE SERPL-SCNC: 103 MMOL/L (ref 98–107)
CO2 SERPL-SCNC: 28 MMOL/L (ref 22–29)
CREAT SERPL-MCNC: 0.45 MG/DL (ref 0.57–1)
DEPRECATED RDW RBC AUTO: 47 FL (ref 37–54)
EOSINOPHIL # BLD AUTO: 0.07 10*3/MM3 (ref 0–0.4)
EOSINOPHIL NFR BLD AUTO: 1.1 % (ref 0.3–6.2)
ERYTHROCYTE [DISTWIDTH] IN BLOOD BY AUTOMATED COUNT: 12.7 % (ref 12.3–15.4)
GFR SERPL CREATININE-BSD FRML MDRD: >150 ML/MIN/1.73
GLOBULIN UR ELPH-MCNC: 2.5 GM/DL
GLUCOSE SERPL-MCNC: 92 MG/DL (ref 65–99)
HCT VFR BLD AUTO: 31.7 % (ref 34–46.6)
HGB BLD-MCNC: 11.3 G/DL (ref 12–15.9)
IMM GRANULOCYTES # BLD AUTO: 0.01 10*3/MM3 (ref 0–0.05)
IMM GRANULOCYTES NFR BLD AUTO: 0.2 % (ref 0–0.5)
LYMPHOCYTES # BLD AUTO: 2.68 10*3/MM3 (ref 0.7–3.1)
LYMPHOCYTES NFR BLD AUTO: 41 % (ref 19.6–45.3)
MCH RBC QN AUTO: 36.3 PG (ref 26.6–33)
MCHC RBC AUTO-ENTMCNC: 35.6 G/DL (ref 31.5–35.7)
MCV RBC AUTO: 101.9 FL (ref 79–97)
MONOCYTES # BLD AUTO: 0.45 10*3/MM3 (ref 0.1–0.9)
MONOCYTES NFR BLD AUTO: 6.9 % (ref 5–12)
NEUTROPHILS NFR BLD AUTO: 3.29 10*3/MM3 (ref 1.7–7)
NEUTROPHILS NFR BLD AUTO: 50.2 % (ref 42.7–76)
NRBC BLD AUTO-RTO: 0 /100 WBC (ref 0–0.2)
PLATELET # BLD AUTO: 164 10*3/MM3 (ref 140–450)
PMV BLD AUTO: 10.8 FL (ref 6–12)
POTASSIUM SERPL-SCNC: 4.2 MMOL/L (ref 3.5–5.2)
PROT SERPL-MCNC: 6.2 G/DL (ref 6–8.5)
RBC # BLD AUTO: 3.11 10*6/MM3 (ref 3.77–5.28)
SODIUM SERPL-SCNC: 138 MMOL/L (ref 136–145)
WBC # BLD AUTO: 6.54 10*3/MM3 (ref 3.4–10.8)

## 2021-03-04 PROCEDURE — 25010000002 MEROPENEM PER 100 MG: Performed by: STUDENT IN AN ORGANIZED HEALTH CARE EDUCATION/TRAINING PROGRAM

## 2021-03-04 PROCEDURE — 85025 COMPLETE CBC W/AUTO DIFF WBC: CPT | Performed by: STUDENT IN AN ORGANIZED HEALTH CARE EDUCATION/TRAINING PROGRAM

## 2021-03-04 PROCEDURE — 80053 COMPREHEN METABOLIC PANEL: CPT | Performed by: STUDENT IN AN ORGANIZED HEALTH CARE EDUCATION/TRAINING PROGRAM

## 2021-03-04 PROCEDURE — 25010000002 LORAZEPAM PER 2 MG

## 2021-03-04 PROCEDURE — 63710000001 ONDANSETRON PER 8 MG: Performed by: STUDENT IN AN ORGANIZED HEALTH CARE EDUCATION/TRAINING PROGRAM

## 2021-03-04 PROCEDURE — 25010000002 LORAZEPAM PER 2 MG: Performed by: STUDENT IN AN ORGANIZED HEALTH CARE EDUCATION/TRAINING PROGRAM

## 2021-03-04 PROCEDURE — 99233 SBSQ HOSP IP/OBS HIGH 50: CPT | Performed by: STUDENT IN AN ORGANIZED HEALTH CARE EDUCATION/TRAINING PROGRAM

## 2021-03-04 RX ORDER — SULFAMETHOXAZOLE AND TRIMETHOPRIM 800; 160 MG/1; MG/1
1 TABLET ORAL 2 TIMES DAILY
Qty: 14 TABLET | Refills: 0 | Status: SHIPPED | OUTPATIENT
Start: 2021-03-04 | End: 2021-03-11

## 2021-03-04 RX ORDER — LORAZEPAM 2 MG/ML
INJECTION INTRAMUSCULAR
Status: COMPLETED
Start: 2021-03-04 | End: 2021-03-04

## 2021-03-04 RX ORDER — HYDROXYZINE HYDROCHLORIDE 25 MG/1
25 TABLET, FILM COATED ORAL 3 TIMES DAILY
Qty: 90 TABLET | Refills: 0 | Status: SHIPPED | OUTPATIENT
Start: 2021-03-04 | End: 2021-03-26

## 2021-03-04 RX ORDER — FOLIC ACID 1 MG/1
1 TABLET ORAL DAILY
Qty: 30 TABLET | Refills: 0 | Status: SHIPPED | OUTPATIENT
Start: 2021-03-05 | End: 2022-01-26

## 2021-03-04 RX ORDER — LOSARTAN POTASSIUM 25 MG/1
25 TABLET ORAL EVERY 24 HOURS
Qty: 30 TABLET | Refills: 0 | Status: SHIPPED | OUTPATIENT
Start: 2021-03-04 | End: 2021-03-26

## 2021-03-04 RX ADMIN — SODIUM CHLORIDE, PRESERVATIVE FREE 10 ML: 5 INJECTION INTRAVENOUS at 08:34

## 2021-03-04 RX ADMIN — GABAPENTIN 800 MG: 400 CAPSULE ORAL at 05:20

## 2021-03-04 RX ADMIN — DIAZEPAM 10 MG: 5 TABLET ORAL at 08:33

## 2021-03-04 RX ADMIN — HYDROXYZINE HYDROCHLORIDE 25 MG: 25 TABLET, FILM COATED ORAL at 08:33

## 2021-03-04 RX ADMIN — FOLIC ACID 1 MG: 1 TABLET ORAL at 08:33

## 2021-03-04 RX ADMIN — LORAZEPAM 1 MG: 2 INJECTION INTRAMUSCULAR at 03:15

## 2021-03-04 RX ADMIN — MEROPENEM 1 G: 1 INJECTION, POWDER, FOR SOLUTION INTRAVENOUS at 02:47

## 2021-03-04 RX ADMIN — Medication 125 MG: at 05:20

## 2021-03-04 RX ADMIN — BUPRENORPHINE HYDROCHLORIDE AND NALOXONE HYDROCHLORIDE DIHYDRATE 1 TABLET: 8; 2 TABLET SUBLINGUAL at 08:33

## 2021-03-04 RX ADMIN — SODIUM CHLORIDE 125 ML/HR: 9 INJECTION, SOLUTION INTRAVENOUS at 02:44

## 2021-03-04 RX ADMIN — LORAZEPAM 1 MG: 2 INJECTION INTRAMUSCULAR; INTRAVENOUS at 05:20

## 2021-03-04 RX ADMIN — ONDANSETRON HYDROCHLORIDE 4 MG: 4 TABLET, FILM COATED ORAL at 09:23

## 2021-03-04 RX ADMIN — FLUOXETINE 40 MG: 20 CAPSULE ORAL at 08:34

## 2021-03-04 RX ADMIN — THIAMINE HCL TAB 100 MG 200 MG: 100 TAB at 08:33

## 2021-03-04 RX ADMIN — LORAZEPAM 1 MG: 2 INJECTION INTRAMUSCULAR at 05:20

## 2021-03-04 RX ADMIN — Medication 125 MG: at 11:00

## 2021-03-04 RX ADMIN — Medication 1 PATCH: at 08:36

## 2021-03-04 RX ADMIN — MEROPENEM 1 G: 1 INJECTION, POWDER, FOR SOLUTION INTRAVENOUS at 10:54

## 2021-03-04 RX ADMIN — LORAZEPAM 4 MG: 2 TABLET ORAL at 09:23

## 2021-03-04 RX ADMIN — FAMOTIDINE 20 MG: 20 TABLET, FILM COATED ORAL at 05:20

## 2021-03-04 NOTE — NURSING NOTE
Patient called me to her room stating that the ativan 1 hour ago did not help her go to sleep. No tremor noted. She does seem slightly anxious. No other withdrawal signs noted.  I told her she has to wait for a little while. Will continue to assess.

## 2021-03-04 NOTE — PLAN OF CARE
Goal Outcome Evaluation:  Plan of Care Reviewed With: patient     Outcome Summary: pt was very tearful/agitated at the start of the shift. slept well thereafter prn and scheduled meds given. bp is lower than it has been yesterday. will continue to monitor.

## 2021-03-04 NOTE — PROGRESS NOTES
FAMILY MEDICINE DAILY PROGRESS NOTE  NAME: Nata Bain  : 1985  MRN: [id number]     LOS: 3 days     PROVIDER OF SERVICE: Zeeshan Wiggins MD    Chief Complaint: Alcohol dependence with withdrawal (CMS/[facility])    Subjective:   VSS. Denies any dysuria, abdominal pain, nausea/vomiting. States had some difficulty sleeping last night but doing better this AM. Expresses desire for discharge.  Interval History:  History taken from: patient chart RN      Review of Systems:   Review of Systems   Constitutional: Negative for activity change, chills, diaphoresis and fever.   HENT: Negative for dental problem, drooling, ear discharge, ear pain, facial swelling, mouth sores, nosebleeds, rhinorrhea, sinus pressure, sinus pain, sneezing and sore throat.    Eyes: Negative for pain, discharge and visual disturbance.   Respiratory: Negative for apnea, cough, shortness of breath, wheezing and stridor.    Cardiovascular: Negative for chest pain, palpitations and leg swelling.   Gastrointestinal: Negative for abdominal distention, abdominal pain, constipation, diarrhea, nausea and vomiting.   Genitourinary: Negative for dysuria, frequency and urgency.   Musculoskeletal: Negative for arthralgias and back pain.   Skin: Negative for rash and wound.   Neurological: Negative for dizziness, facial asymmetry, light-headedness and headaches.   Psychiatric/Behavioral: Negative for agitation and decreased concentration. The patient is not nervous/anxious.        Objective:     Vital Signs  Temp:  [97.5 °F (36.4 °C)-98.7 °F (37.1 °C)] 98.4 °F (36.9 °C)  Heart Rate:  [57-94] 61  Resp:  [15-22] 16  BP: (121-167)/(68-92) 121/81    Physical Exam  Physical Exam  Constitutional:       Appearance: She is well-developed.   HENT:      Head: Normocephalic and atraumatic.      Right Ear: External ear normal.      Left Ear: External ear normal.      Nose: Nose normal.      Mouth/Throat:      Pharynx: No oropharyngeal exudate.   Eyes:       General: No scleral icterus.        Right eye: No discharge.         Left eye: No discharge.      Conjunctiva/sclera: Conjunctivae normal.      Pupils: Pupils are equal, round, and reactive to light.   Neck:      Musculoskeletal: Normal range of motion and neck supple.      Vascular: No JVD.   Cardiovascular:      Rate and Rhythm: Normal rate and regular rhythm.      Heart sounds: Normal heart sounds. No murmur. No friction rub. No gallop.    Pulmonary:      Effort: Pulmonary effort is normal. No respiratory distress.      Breath sounds: Normal breath sounds. No stridor. No wheezing or rales.   Abdominal:      General: Bowel sounds are normal. There is no distension.      Palpations: Abdomen is soft.      Tenderness: There is no abdominal tenderness.   Musculoskeletal: Normal range of motion.         General: No tenderness or deformity.   Skin:     General: Skin is warm and dry.      Capillary Refill: Capillary refill takes less than 2 seconds.      Coloration: Skin is not pale.      Findings: No erythema or rash.   Neurological:      Mental Status: She is alert and oriented to person, place, and time.   Psychiatric:         Behavior: Behavior normal.         Medication Review    Current Facility-Administered Medications:   •  acetaminophen (TYLENOL) tablet 650 mg, 650 mg, Oral, Q6H PRN, Christos Vyas MD  •  buprenorphine-naloxone (SUBOXONE) 8-2 MG per SL tablet 1 tablet, 1 tablet, Sublingual, TID, Christos Vyas MD, 1 tablet at 03/03/21 2023  •  cloNIDine (CATAPRES) tablet 0.1 mg, 0.1 mg, Oral, Q6H, Christos Vyas MD, 0.1 mg at 03/03/21 1721  •  diazePAM (VALIUM) tablet 10 mg, 10 mg, Oral, TID, Christos Vyas MD, 10 mg at 03/03/21 2349  •  enoxaparin (LOVENOX) syringe 40 mg, 40 mg, Subcutaneous, Q24H, Yosi Almaguer MD, 40 mg at 03/03/21 1722  •  famotidine (PEPCID) tablet 20 mg, 20 mg, Oral, BID AC, Delmy Jarrett MD, 20 mg at 03/04/21 0520  •  FLUoxetine (PROzac) capsule 40 mg, 40  mg, Oral, Daily, Delmy Jarrett MD, 40 mg at 03/03/21 0826  •  folic acid (FOLVITE) tablet 1 mg, 1 mg, Oral, Daily, Christos Vyas MD, 1 mg at 03/03/21 0827  •  gabapentin (NEURONTIN) capsule 800 mg, 800 mg, Oral, Q8H, Yosi Almaguer MD, 800 mg at 03/04/21 0520  •  hydrALAZINE (APRESOLINE) injection 10 mg, 10 mg, Intravenous, Q6H PRN, Yosi Almaguer MD, 10 mg at 02/28/21 1412  •  HYDROcodone-acetaminophen (NORCO) 7.5-325 MG per tablet 1 tablet, 1 tablet, Oral, Q6H PRN, Christos Vyas MD, 1 tablet at 03/03/21 1152  •  hydrOXYzine (ATARAX) tablet 25 mg, 25 mg, Oral, TID, Delmy Jarrett MD, 25 mg at 03/03/21 2023  •  LORazepam (ATIVAN) tablet 1 mg, 1 mg, Oral, Q2H PRN **OR** LORazepam (ATIVAN) injection 1 mg, 1 mg, Intravenous, Q2H PRN, 1 mg at 03/04/21 0520 **OR** LORazepam (ATIVAN) tablet 4 mg, 4 mg, Oral, Q1H PRN **OR** LORazepam (ATIVAN) injection 4 mg, 4 mg, Intravenous, Q1H PRN, 4 mg at 03/02/21 0538 **OR** LORazepam (ATIVAN) injection 6 mg, 6 mg, Intravenous, Q15 Min PRN, 6 mg at 03/01/21 1101 **OR** LORazepam (ATIVAN) injection 6 mg, 6 mg, Intramuscular, Q15 Min PRN, Christos Vyas MD  •  losartan (COZAAR) tablet 25 mg, 25 mg, Oral, Q24H, Christos Vyas MD, 25 mg at 03/03/21 1231  •  meropenem (MERREM) 1 g/100 mL 0.9% NS VTB (mbp), 1 g, Intravenous, Once, Delmy Jarrett MD, Currently Infusing at 03/02/21 1343  •  meropenem (MERREM) 1 g/100 mL 0.9% NS VTB (mbp), 1 g, Intravenous, Q8H, Delmy Jarrett MD, 1 g at 03/04/21 0247  •  nicotine (NICODERM CQ) 21 MG/24HR patch 1 patch, 1 patch, Transdermal, Q24H, Pily Pike MD, 1 patch at 03/03/21 0831  •  ondansetron (ZOFRAN) tablet 4 mg, 4 mg, Oral, Q6H PRN **OR** ondansetron (ZOFRAN) injection 4 mg, 4 mg, Intravenous, Q6H PRN, Yosi Almaguer MD, 4 mg at 02/28/21 0300  •  Pharmacy Consult, , Does not apply, Continuous PRN, Delmy Jarrett MD  •  prazosin (MINIPRESS) capsule 5 mg, 5 mg, Oral, Nightly, Yosi Almaguer MD, 5 mg at  03/03/21 2023  •  prochlorperazine (COMPAZINE) injection 10 mg, 10 mg, Intravenous, Q6H PRN, Christos Vyas MD  •  sodium chloride 0.9 % flush 10 mL, 10 mL, Intravenous, PRN, Yosi Almaguer MD  •  sodium chloride 0.9 % flush 10 mL, 10 mL, Intravenous, Q12H, Yosi Almaguer MD, 10 mL at 03/03/21 2023  •  sodium chloride 0.9 % flush 10 mL, 10 mL, Intravenous, PRN, Yosi Almaguer MD  •  sodium chloride 0.9 % infusion, 125 mL/hr, Intravenous, Continuous, Zeeshan Wiggins MD, Last Rate: 125 mL/hr at 03/04/21 0244, 125 mL/hr at 03/04/21 0244  •  thiamine (VITAMIN B-1) tablet 200 mg, 200 mg, Oral, Daily, Christos Vyas MD, 200 mg at 03/03/21 0840  •  vancomycin oral solution reconstituted 125 mg, 125 mg, Oral, Q6H, Delmy Jarrett MD, 125 mg at 03/04/21 0520     Diagnostic Data    Lab Results (last 24 hours)     Procedure Component Value Units Date/Time    Comprehensive Metabolic Panel [[id number]]  (Abnormal) Collected: 03/04/21 0509    Specimen: Blood Updated: 03/04/21 0651     Glucose 92 mg/dL      BUN 3 mg/dL      Creatinine 0.45 mg/dL      Sodium 138 mmol/L      Potassium 4.2 mmol/L      Chloride 103 mmol/L      CO2 28.0 mmol/L      Calcium 9.4 mg/dL      Total Protein 6.2 g/dL      Albumin 3.70 g/dL      ALT (SGPT) 31 U/L      AST (SGOT) 70 U/L      Alkaline Phosphatase 97 U/L      Total Bilirubin 0.3 mg/dL      eGFR Non African Amer >150 mL/min/1.73      Globulin 2.5 gm/dL      A/G Ratio 1.5 g/dL      BUN/Creatinine Ratio 6.7     Anion Gap 7.0 mmol/L     Narrative:      GFR Normal >60  Chronic Kidney Disease <60  Kidney Failure <15      CBC Auto Differential [[id number]]  (Abnormal) Collected: 03/04/21 0509    Specimen: Blood Updated: 03/04/21 0615     WBC 6.54 10*3/mm3      RBC 3.11 10*6/mm3      Hemoglobin 11.3 g/dL      Hematocrit 31.7 %      .9 fL      MCH 36.3 pg      MCHC 35.6 g/dL      RDW 12.7 %      RDW-SD 47.0 fl      MPV 10.8 fL      Platelets 164 10*3/mm3      Neutrophil % 50.2 %       Lymphocyte % 41.0 %      Monocyte % 6.9 %      Eosinophil % 1.1 %      Basophil % 0.6 %      Immature Grans % 0.2 %      Neutrophils, Absolute 3.29 10*3/mm3      Lymphocytes, Absolute 2.68 10*3/mm3      Monocytes, Absolute 0.45 10*3/mm3      Eosinophils, Absolute 0.07 10*3/mm3      Basophils, Absolute 0.04 10*3/mm3      Immature Grans, Absolute 0.01 10*3/mm3      nRBC 0.0 /100 WBC            Imaging Results (Last 24 Hours)     ** No results found for the last 24 hours. **          I reviewed the patient's new clinical results.    Assessment/Plan:     Active Hospital Problems    Diagnosis   • **Alcohol dependence with withdrawal (CMS/HCC)     Last drink was on 2/26/21  Alcohol cessation counseling was provided  Ativan and CIWA protocol-  Ativan 8 mg Q4H, has  required for CIWA >15 x1  3/2/2021  -will also give home dose valium 5 mg   -PO thiamine replacement      • Severe malnutrition (CMS/HCC)     -Dietary consult ordered: Appreciate recs  -Recommend Ensure supplementation  -Advancing diet as tolerated, currently on Hello and Broth, will transition to soft  Body mass index is 20.93 kg/m².       • Klebsiella cystitis     Urine Culture - Urine, Urine, Clean Catch [[id number]]    (Abnormal)   Urine, Clean Catch    Final result Component Value   Urine Culture >100,000 CFU/mL Escherichia coli ESBLAbnormal     Consider infectious disease consult.  Susceptibility results may not correlate to clinical outcomes.    50,000 CFU/mL Klebsiella pneumoniae ssp pneumoniaeAbnormal         -Currently on Merepenem; transition to oral Abx prior to discharge     • Hypocalcemia     Calcium 8.5  Albumin 3.5  Corrected calcium 8.9         • C. difficile colitis     Patient reports diarrhea  Patient does not meet the full criteria (no WBC>[id number]), however she is on chronic PPI use.  C. difficile stool ordered: Positive  -Currently receiving Vancomycin       • UTI due to extended-spectrum beta lactamase (ESBL) producing Escherichia coli          Microbiology Results (last 10 days)     Lab Status: Final result Specimen: Urine, Clean Catch Updated: 03/02/21 1147     Urine Culture >100,000 CFU/mL Escherichia coli ESBL     Comment: Consider infectious disease consult.  Susceptibility results may not correlate to clinical outcomes.         50,000 CFU/mL Klebsiella pneumoniae ssp pneumoniae        -Have switched Abx to Merepenem; will transition to oral Abx prior to discharge     • Epigastric pain   • Anxiety and depression     Will continue current home medications  -prozac  - valium    -Will Add Atarax     • Opioid use disorder (CMS/HCC)     -Continue gabapentin  -Scheduled Tylenol 1000 mg every 8 hours  -will give pts home medication of suboxone, and control pain with morphine and Norco             DVT prophylaxis: Lovenox  Code Status and Medical Interventions:   Ordered at: 02/27/21 1616     Level Of Support Discussed With:    Patient     Code Status:    CPR     Medical Interventions (Level of Support Prior to Arrest):    Full       Plan for disposition:Where: home and When:  0-1days      Time: 30 minutes        This document has been electronically signed by Zeeshan Wiggins MD on March 4, 2021 08:12 CST

## 2021-03-04 NOTE — DISCHARGE SUMMARY
DISCHARGE SUMMARY    PATIENT NAME: Nata Bain       PHYSICIAN: Zeeshan Wiggins MD  : 1985  MRN: [id number]    ADMITTED: 2021     DISCHARGED: 3/4/2021    ADMISSION DIAGNOSES:  Active Hospital Problems    Diagnosis  POA   • Severe malnutrition (CMS/HCC) [E43]  Yes   • Klebsiella cystitis [N30.90, B96.89]  Yes   • Hypocalcemia [E83.51]  Yes   • C. difficile colitis [A04.72]  Yes   • UTI due to extended-spectrum beta lactamase (ESBL) producing Escherichia coli [N39.0, B96.29, Z16.12]  Yes   • Anxiety and depression [F41.9, F32.9]  Yes   • Opioid use disorder (CMS/HCC) [F11.99]  Yes      Resolved Hospital Problems    Diagnosis Date Resolved POA   • **Alcohol dependence with withdrawal (CMS/HCC) [F10.239] 2021 Yes   • Epigastric pain [R10.13] 2021 Yes   • Alcohol-induced acute pancreatitis [K85.20] 2021 Yes   • Hypokalemia [E87.6] 2021 No     DISCHARGE DIAGNOSES:   Active Hospital Problems    Diagnosis  POA   • Severe malnutrition (CMS/HCC) [E43]  Yes   • Klebsiella cystitis [N30.90, B96.89]  Yes   • Hypocalcemia [E83.51]  Yes   • C. difficile colitis [A04.72]  Yes   • UTI due to extended-spectrum beta lactamase (ESBL) producing Escherichia coli [N39.0, B96.29, Z16.12]  Yes   • Anxiety and depression [F41.9, F32.9]  Yes   • Opioid use disorder (CMS/HCC) [F11.99]  Yes      Resolved Hospital Problems    Diagnosis Date Resolved POA   • **Alcohol dependence with withdrawal (CMS/HCC) [F10.239] 2021 Yes   • Epigastric pain [R10.13] 2021 Yes   • Alcohol-induced acute pancreatitis [K85.20] 2021 Yes   • Hypokalemia [E87.6] 2021 No       SERVICE: Family Medicine Residency  Attending: Ankur Sullivan MD  Resident: Zeeshan Wiggins MD    CONSULTS:   Consult Orders (all) (From admission, onward)     Start     Ordered    21 1532  Family Practice - Resident (on-call MD unless specified)  Once     Specialty:  Family Medicine  Provider:  (Not yet  "assigned)    02/27/21 1531                PROCEDURES:   None    HISTORY OF PRESENT ILLNESS:   Per Dr. Herrera H&P at admission on 2/27/2021:  \"Nata Bain is a 36 y.o. female with a CMH of alcohol dependence and abuse, HTN, anxiety, chronic pancreatitis who presents complaining of worsening abdominal pain x1 day.  Patient reported symptoms started early this morning, describes generalized abdominal pain worse in the epigastric region.  Rates pain at a 10 out of 10, ports pain radiates to the left side.  Pain is associated with significant nausea and vomiting that was not alleviated by Phenergan suppositoryx1.  Does report dysuriax2 days.   Of note, patient reports that she was at the [facility] inpatient rehabilitation center in [facility] for alcohol abuse a few weeks ago.  However patient reports that she left voluntarily after 2 days due to \"medication issues\".  Patient reported that the inpatient staff would not give her her chronic medications as she did not bring them in with her to rehabilitation.  After leaving [facility] rehab, patient reports drinking 750ml of vodka daily.  Last alcohol intake was last night, pt reported drinking a 750ml bottle of vodka. \"    DIAGNOSTIC DATA:   Lab Results (all)     Procedure Component Value Units Date/Time    Comprehensive Metabolic Panel [[id number]]  (Abnormal) Collected: 03/04/21 0509    Specimen: Blood Updated: 03/04/21 0651     Glucose 92 mg/dL      BUN 3 mg/dL      Creatinine 0.45 mg/dL      Sodium 138 mmol/L      Potassium 4.2 mmol/L      Chloride 103 mmol/L      CO2 28.0 mmol/L      Calcium 9.4 mg/dL      Total Protein 6.2 g/dL      Albumin 3.70 g/dL      ALT (SGPT) 31 U/L      AST (SGOT) 70 U/L      Alkaline Phosphatase 97 U/L      Total Bilirubin 0.3 mg/dL      eGFR Non African Amer >150 mL/min/1.73      Globulin 2.5 gm/dL      A/G Ratio 1.5 g/dL      BUN/Creatinine Ratio 6.7     Anion Gap 7.0 mmol/L     Narrative:      GFR Normal >60  Chronic " Kidney Disease <60  Kidney Failure <15      CBC Auto Differential [[id number]]  (Abnormal) Collected: 03/04/21 0509    Specimen: Blood Updated: 03/04/21 0615     WBC 6.54 10*3/mm3      RBC 3.11 10*6/mm3      Hemoglobin 11.3 g/dL      Hematocrit 31.7 %      .9 fL      MCH 36.3 pg      MCHC 35.6 g/dL      RDW 12.7 %      RDW-SD 47.0 fl      MPV 10.8 fL      Platelets 164 10*3/mm3      Neutrophil % 50.2 %      Lymphocyte % 41.0 %      Monocyte % 6.9 %      Eosinophil % 1.1 %      Basophil % 0.6 %      Immature Grans % 0.2 %      Neutrophils, Absolute 3.29 10*3/mm3      Lymphocytes, Absolute 2.68 10*3/mm3      Monocytes, Absolute 0.45 10*3/mm3      Eosinophils, Absolute 0.07 10*3/mm3      Basophils, Absolute 0.04 10*3/mm3      Immature Grans, Absolute 0.01 10*3/mm3      nRBC 0.0 /100 WBC     Comprehensive Metabolic Panel [[id number]]  (Abnormal) Collected: 03/03/21 0236    Specimen: Blood Updated: 03/03/21 0314     Glucose 85 mg/dL      BUN 4 mg/dL      Creatinine 0.45 mg/dL      Sodium 140 mmol/L      Potassium 4.0 mmol/L      Chloride 107 mmol/L      CO2 25.0 mmol/L      Calcium 8.5 mg/dL      Total Protein 6.1 g/dL      Albumin 3.50 g/dL      ALT (SGPT) 25 U/L      AST (SGOT) 76 U/L      Alkaline Phosphatase 88 U/L      Total Bilirubin 0.4 mg/dL      eGFR Non African Amer >150 mL/min/1.73      Globulin 2.6 gm/dL      A/G Ratio 1.3 g/dL      BUN/Creatinine Ratio 8.9     Anion Gap 8.0 mmol/L     Narrative:      GFR Normal >60  Chronic Kidney Disease <60  Kidney Failure <15      CBC Auto Differential [[id number]]  (Abnormal) Collected: 03/03/21 0236    Specimen: Blood Updated: 03/03/21 0241     WBC 7.16 10*3/mm3      RBC 3.32 10*6/mm3      Hemoglobin 11.9 g/dL      Hematocrit 34.5 %      .9 fL      MCH 35.8 pg      MCHC 34.5 g/dL      RDW 12.9 %      RDW-SD 49.4 fl      MPV 10.4 fL      Platelets 145 10*3/mm3      Neutrophil % 56.8 %      Lymphocyte % 35.9 %      Monocyte % 5.0 %      Eosinophil % 1.1 %       Basophil % 0.6 %      Immature Grans % 0.6 %      Neutrophils, Absolute 4.07 10*3/mm3      Lymphocytes, Absolute 2.57 10*3/mm3      Monocytes, Absolute 0.36 10*3/mm3      Eosinophils, Absolute 0.08 10*3/mm3      Basophils, Absolute 0.04 10*3/mm3      Immature Grans, Absolute 0.04 10*3/mm3      nRBC 0.0 /100 WBC     Vitamin B12 [[id number]]  (Abnormal) Collected: 03/02/21 0816    Specimen: Blood Updated: 03/02/21 1306     Vitamin B-12 >2,000 pg/mL     Narrative:      Results may be falsely increased if patient taking Biotin.      Folate [[id number]]  (Normal) Collected: 03/02/21 0816    Specimen: Blood Updated: 03/02/21 1306     Folate 16.70 ng/mL     Narrative:      Results may be falsely increased if patient taking Biotin.      Urine Culture - Urine, Urine, Clean Catch [[id number]]  (Abnormal)  (Susceptibility) Collected: 02/27/21 1455    Specimen: Urine, Clean Catch Updated: 03/02/21 1147     Urine Culture >100,000 CFU/mL Escherichia coli ESBL     Comment: Consider infectious disease consult.  Susceptibility results may not correlate to clinical outcomes.         50,000 CFU/mL Klebsiella pneumoniae ssp pneumoniae    Susceptibility      Escherichia coli ESBL Klebsiella pneumoniae ssp pneumoniae      JORGE JORGE      Ampicillin  Resistant      Ampicillin + Sulbactam  Susceptible      Cefazolin  Susceptible      Cefepime  Susceptible      Ceftazidime  Susceptible      Ceftriaxone  Susceptible      Ertapenem Susceptible       Gentamicin Susceptible Susceptible      Levofloxacin Intermediate Susceptible      Meropenem Susceptible       Nitrofurantoin Susceptible Intermediate      Piperacillin + Tazobactam Susceptible Susceptible      Tetracycline Susceptible Susceptible      Trimethoprim + Sulfamethoxazole Susceptible Susceptible                 Linear View                   Comprehensive Metabolic Panel [[id number]]  (Abnormal) Collected: 03/02/21 0355    Specimen: Blood Updated: 03/02/21 0518     Glucose 81 mg/dL      BUN 4  mg/dL      Creatinine 0.48 mg/dL      Sodium 137 mmol/L      Potassium 4.6 mmol/L      Chloride 107 mmol/L      CO2 22.0 mmol/L      Calcium 8.9 mg/dL      Total Protein 6.0 g/dL      Albumin 3.70 g/dL      ALT (SGPT) 30 U/L      AST (SGOT) 157 U/L      Alkaline Phosphatase 94 U/L      Total Bilirubin 0.6 mg/dL      eGFR Non African Amer 147 mL/min/1.73      Globulin 2.3 gm/dL      A/G Ratio 1.6 g/dL      BUN/Creatinine Ratio 8.3     Anion Gap 8.0 mmol/L     Narrative:      GFR Normal >60  Chronic Kidney Disease <60  Kidney Failure <15      CBC Auto Differential [[id number]]  (Abnormal) Collected: 03/02/21 0355    Specimen: Blood Updated: 03/02/21 0455     WBC 5.99 10*3/mm3      RBC 3.31 10*6/mm3      Hemoglobin 11.9 g/dL      Hematocrit 33.8 %      .1 fL      MCH 36.0 pg      MCHC 35.2 g/dL      RDW 12.7 %      RDW-SD 48.0 fl      MPV 10.8 fL      Platelets 141 10*3/mm3      Neutrophil % 57.8 %      Lymphocyte % 34.9 %      Monocyte % 5.2 %      Eosinophil % 1.2 %      Basophil % 0.7 %      Immature Grans % 0.2 %      Neutrophils, Absolute 3.47 10*3/mm3      Lymphocytes, Absolute 2.09 10*3/mm3      Monocytes, Absolute 0.31 10*3/mm3      Eosinophils, Absolute 0.07 10*3/mm3      Basophils, Absolute 0.04 10*3/mm3      Immature Grans, Absolute 0.01 10*3/mm3      nRBC 0.0 /100 WBC     Magnesium [[id number]]  (Normal) Collected: 03/01/21 0633    Specimen: Blood Updated: 03/01/21 0717     Magnesium 1.9 mg/dL     Comprehensive Metabolic Panel [[id number]]  (Abnormal) Collected: 03/01/21 0633    Specimen: Blood Updated: 03/01/21 0711     Glucose 90 mg/dL      BUN 3 mg/dL      Creatinine 0.44 mg/dL      Sodium 135 mmol/L      Potassium 4.4 mmol/L      Comment: Slight hemolysis detected by analyzer. Results may be affected.        Chloride 107 mmol/L      CO2 21.0 mmol/L      Calcium 8.0 mg/dL      Total Protein 6.4 g/dL      Albumin 3.70 g/dL      ALT (SGPT) 22 U/L      AST (SGOT) 146 U/L      Alkaline Phosphatase 93 U/L       Total Bilirubin 0.5 mg/dL      eGFR Non African Amer >150 mL/min/1.73      Globulin 2.7 gm/dL      A/G Ratio 1.4 g/dL      BUN/Creatinine Ratio 6.8     Anion Gap 7.0 mmol/L     Narrative:      GFR Normal >60  Chronic Kidney Disease <60  Kidney Failure <15      CBC Auto Differential [[id number]]  (Abnormal) Collected: 03/01/21 0633    Specimen: Blood Updated: 03/01/21 0648     WBC 5.73 10*3/mm3      RBC 3.19 10*6/mm3      Hemoglobin 11.7 g/dL      Hematocrit 32.5 %      .9 fL      MCH 36.7 pg      MCHC 36.0 g/dL      RDW 12.8 %      RDW-SD 48.0 fl      MPV 10.2 fL      Platelets 141 10*3/mm3      Neutrophil % 60.0 %      Lymphocyte % 34.0 %      Monocyte % 4.4 %      Eosinophil % 0.9 %      Basophil % 0.5 %      Immature Grans % 0.2 %      Neutrophils, Absolute 3.44 10*3/mm3      Lymphocytes, Absolute 1.95 10*3/mm3      Monocytes, Absolute 0.25 10*3/mm3      Eosinophils, Absolute 0.05 10*3/mm3      Basophils, Absolute 0.03 10*3/mm3      Immature Grans, Absolute 0.01 10*3/mm3      nRBC 0.0 /100 WBC     Clostridium Difficile Toxin - Stool, Per Rectum [[id number]]  (Abnormal) Collected: 02/28/21 1504    Specimen: Stool from Per Rectum Updated: 02/28/21 1704    Narrative:      The following orders were created for panel order Clostridium Difficile Toxin - Stool, Per Rectum.  Procedure                               Abnormality         Status                     ---------                               -----------         ------                     Clostridium Difficile To...[[id number]]  Abnormal            Final result                 Please view results for these tests on the individual orders.    Clostridium Difficile Toxin, PCR - Stool, Per Rectum [[id number]]  (Abnormal) Collected: 02/28/21 1504    Specimen: Stool from Per Rectum Updated: 02/28/21 1704     C. Difficile Toxins by PCR Positive     Comment: CONTACT PRECAUTIONS        Narrative:      Performed by real-time polymerase chain reaction (qPCR).     Potassium [[id number]]  (Normal) Collected: 02/28/21 1633    Specimen: Blood Updated: 02/28/21 1651     Potassium 4.1 mmol/L     Magnesium [[id number]]  (Abnormal) Collected: 02/28/21 1633    Specimen: Blood Updated: 02/28/21 1651     Magnesium 1.3 mg/dL     Comprehensive Metabolic Panel [[id number]]  (Abnormal) Collected: 02/28/21 0502    Specimen: Blood Updated: 02/28/21 0636     Glucose 88 mg/dL      BUN 5 mg/dL      Creatinine 0.48 mg/dL      Sodium 137 mmol/L      Potassium 2.9 mmol/L      Chloride 105 mmol/L      CO2 25.0 mmol/L      Calcium 7.6 mg/dL      Total Protein 5.9 g/dL      Albumin 3.60 g/dL      ALT (SGPT) 22 U/L      AST (SGOT) 81 U/L      Alkaline Phosphatase 92 U/L      Total Bilirubin 0.8 mg/dL      eGFR Non African Amer 147 mL/min/1.73      Globulin 2.3 gm/dL      A/G Ratio 1.6 g/dL      BUN/Creatinine Ratio 10.4     Anion Gap 7.0 mmol/L     Narrative:      GFR Normal >60  Chronic Kidney Disease <60  Kidney Failure <15      CBC Auto Differential [[id number]]  (Abnormal) Collected: 02/28/21 0502    Specimen: Blood Updated: 02/28/21 0620     WBC 6.06 10*3/mm3      RBC 3.25 10*6/mm3      Hemoglobin 11.8 g/dL      Hematocrit 33.0 %      .5 fL      MCH 36.3 pg      MCHC 35.8 g/dL      RDW 12.9 %      RDW-SD 48.5 fl      MPV 10.5 fL      Platelets 163 10*3/mm3      Neutrophil % 49.6 %      Lymphocyte % 44.1 %      Monocyte % 5.3 %      Eosinophil % 0.3 %      Basophil % 0.5 %      Immature Grans % 0.2 %      Neutrophils, Absolute 3.01 10*3/mm3      Lymphocytes, Absolute 2.67 10*3/mm3      Monocytes, Absolute 0.32 10*3/mm3      Eosinophils, Absolute 0.02 10*3/mm3      Basophils, Absolute 0.03 10*3/mm3      Immature Grans, Absolute 0.01 10*3/mm3      nRBC 0.0 /100 WBC     Lactic Acid, Reflex [[id number]]  (Normal) Collected: 02/27/21 1756    Specimen: Blood Updated: 02/27/21 1824     Lactate 2.0 mmol/L     Lactic Acid, Reflex Timer (This will reflex a repeat order 3-3:15 hours after ordered.)  [[id number]] Collected: 02/27/21 1351    Specimen: Blood Updated: 02/27/21 1730     Hold Tube Hold for add-ons.     Comment: Auto resulted.       COVID-19 and FLU A/B PCR - Swab, Nasopharynx [[id number]]  (Normal) Collected: 02/27/21 1549    Specimen: Swab from Nasopharynx Updated: 02/27/21 1627     COVID19 Not Detected     Influenza A PCR Not Detected     Influenza B PCR Not Detected    Narrative:      Fact sheet for providers: https://www.fda.gov/media/474723/download    Fact sheet for patients: https://www.fda.gov/media/686396/download    Test performed by PCR.    Urine Drug Screen - Urine, Clean Catch [[id number]]  (Abnormal) Collected: 02/27/21 1455    Specimen: Urine, Clean Catch Updated: 02/27/21 1541     THC, Screen, Urine Negative     Phencyclidine (PCP), Urine Negative     Cocaine Screen, Urine Negative     Methamphetamine, Ur Negative     Opiate Screen Negative     Amphetamine Screen, Urine Negative     Benzodiazepine Screen, Urine Positive     Tricyclic Antidepressants Screen Negative     Methadone Screen, Urine Negative     Barbiturates Screen, Urine Negative     Oxycodone Screen, Urine Negative     Propoxyphene Screen Negative     Buprenorphine, Screen, Urine Positive    Narrative:      Cutoff For Drugs Screened:    Amphetamines               500 ng/ml  Barbiturates               200 ng/ml  Benzodiazepines            150 ng/ml  Cocaine                    150 ng/ml  Methadone                  200 ng/ml  Opiates                    100 ng/ml  Phencyclidine               25 ng/ml  THC                            50 ng/ml  Methamphetamine            500 ng/ml  Tricyclic Antidepressants  300 ng/ml  Oxycodone                  100 ng/ml  Propoxyphene               300 ng/ml  Buprenorphine               10 ng/ml    The normal value for all drugs tested is negative. This report includes unconfirmed screening results, with the cutoff values listed, to be used for medical treatment purposes only.  Unconfirmed results  must not be used for non-medical purposes such as employment or legal testing.  Clinical consideration should be applied to any drug of abuse test, particularly when unconfirmed results are used.      Comprehensive Metabolic Panel [[id number]]  (Abnormal) Collected: 02/27/21 1342    Specimen: Blood Updated: 02/27/21 1507     Glucose 126 mg/dL      BUN 9 mg/dL      Creatinine 0.62 mg/dL      Sodium 139 mmol/L      Potassium 3.9 mmol/L      Chloride 98 mmol/L      CO2 23.0 mmol/L      Calcium 9.4 mg/dL      Total Protein 7.3 g/dL      Albumin 4.50 g/dL      ALT (SGPT) 31 U/L      AST (SGOT) 123 U/L      Alkaline Phosphatase 114 U/L      Total Bilirubin 0.6 mg/dL      eGFR Non African Amer 110 mL/min/1.73      Globulin 2.8 gm/dL      A/G Ratio 1.6 g/dL      BUN/Creatinine Ratio 14.5     Anion Gap 18.0 mmol/L     Narrative:      GFR Normal >60  Chronic Kidney Disease <60  Kidney Failure <15      Lipase [[id number]]  (Abnormal) Collected: 02/27/21 1342    Specimen: Blood Updated: 02/27/21 1507     Lipase 65 U/L     Amylase [[id number]]  (Normal) Collected: 02/27/21 1342    Specimen: Blood Updated: 02/27/21 1507     Amylase 53 U/L     Ethanol [[id number]] Collected: 02/27/21 1342    Specimen: Blood Updated: 02/27/21 1507     Ethanol <10 mg/dL      Ethanol % <0.010 %     Lactate Dehydrogenase [[id number]]  (Abnormal) Collected: 02/27/21 1342    Specimen: Blood Updated: 02/27/21 1507      U/L     [place] Draw [[id number]] Collected: 02/27/21 1342    Specimen: Blood Updated: 02/27/21 1500    Narrative:      The following orders were created for panel order [place] Draw.  Procedure                               Abnormality         Status                     ---------                               -----------         ------                     Light Blue Top[[id number]]                                   Final result               Green Top (Gel)[[id number]]                                  Final result               Lavender  Top[[id number]]                                     Final result               Gold Top - SST[[id number]]                                                                Please view results for these tests on the individual orders.    Green Top (Gel) [[id number]] Collected: 02/27/21 1342    Specimen: Blood Updated: 02/27/21 1500     Extra Tube Hold for add-ons.     Comment: Auto resulted.       Light Blue Top [[id number]] Collected: 02/27/21 1342    Specimen: Blood Updated: 02/27/21 1445     Extra Tube hold for add-on     Comment: Auto resulted       Lavender Top [[id number]] Collected: 02/27/21 1342    Specimen: Blood Updated: 02/27/21 1445     Extra Tube hold for add-on     Comment: Auto resulted       Lactic Acid, Plasma [[id number]]  (Abnormal) Collected: 02/27/21 1351    Specimen: Blood Updated: 02/27/21 1426     Lactate 3.8 mmol/L     hCG, Serum, Qualitative [[id number]]  (Normal) Collected: 02/27/21 1342    Specimen: Blood Updated: 02/27/21 1401     HCG Qualitative Negative    CBC & Differential [[id number]]  (Abnormal) Collected: 02/27/21 1342    Specimen: Blood Updated: 02/27/21 1349    Narrative:      The following orders were created for panel order CBC & Differential.  Procedure                               Abnormality         Status                     ---------                               -----------         ------                     CBC Auto Differential[[id number]]        Abnormal            Final result                 Please view results for these tests on the individual orders.    CBC Auto Differential [[id number]]  (Abnormal) Collected: 02/27/21 1342    Specimen: Blood Updated: 02/27/21 1349     WBC 9.79 10*3/mm3      RBC 3.85 10*6/mm3      Hemoglobin 13.9 g/dL      Hematocrit 38.0 %      MCV 98.7 fL      MCH 36.1 pg      MCHC 36.6 g/dL      RDW 12.9 %      RDW-SD 46.0 fl      MPV 9.8 fL      Platelets 195 10*3/mm3      Neutrophil % 81.6 %      Lymphocyte % 13.4 %      Monocyte % 4.1 %      Eosinophil %  0.0 %      Basophil % 0.5 %      Immature Grans % 0.4 %      Neutrophils, Absolute 7.99 10*3/mm3      Lymphocytes, Absolute 1.31 10*3/mm3      Monocytes, Absolute 0.40 10*3/mm3      Eosinophils, Absolute 0.00 10*3/mm3      Basophils, Absolute 0.05 10*3/mm3      Immature Grans, Absolute 0.04 10*3/mm3      nRBC 0.0 /100 WBC     Urinalysis With Microscopic If Indicated (No Culture) - Urine, Clean Catch [[id number]]  (Abnormal) Collected: 02/27/21 1322    Specimen: Urine, Clean Catch Updated: 02/27/21 1333     Color, UA Dark Yellow     Appearance, UA Cloudy     pH, UA 6.5     Specific Gravity, UA 1.023     Glucose, UA Negative     Ketones, UA Trace     Bilirubin, UA Small (1+)     Blood, UA Negative     Protein, UA 30 mg/dL (1+)     Leuk Esterase, UA Trace     Nitrite, UA Positive     Urobilinogen, UA 1.0 E.U./dL    Urinalysis, Microscopic Only - Urine, Clean Catch [[id number]]  (Abnormal) Collected: 02/27/21 1322    Specimen: Urine, Clean Catch Updated: 02/27/21 1333     RBC, UA 3-5 /HPF      WBC, UA 21-30 /HPF      Bacteria, UA 4+ /HPF      Squamous Epithelial Cells, UA None Seen /HPF      Hyaline Casts, UA 7-12 /LPF      Methodology Automated Microscopy             HOSPITAL COURSE:  Pt was initially maintained on IV fluids for her pancreatitis. She was advanced to enteral consumption early as pain allowed. She also complained of watery diarrhea and C. Diff panel was ordered which was positive. She was started on Oral vancomycin and her symptoms continued to improve during her stay. Labs also normalized and Wbc continued to decrease. She was advised to follow up with PCP on discharge to ensure resolution of her symptoms. Prior to discharge she was no longer complaining of any abdominal pain, nausea, or vomiting. She was deemed medically cleared for discharge with close PCP follow up. Advised to return to ED if symptoms worsen or fail to improve. She expressed understanding and agreement with this plan and was  subsequently discharged.     DISCHARGE CONDITION:   stable    DISPOSITION:  Home or Self Care    DISCHARGE MEDICATIONS     Discharge Medications      New Medications      Instructions Start Date   folic acid 1 MG tablet  Commonly known as: FOLVITE   1 mg, Oral, Daily   Start Date: March 5, 2021     hydrOXYzine 25 MG tablet  Commonly known as: ATARAX   25 mg, Oral, 3 Times Daily      losartan 25 MG tablet  Commonly known as: COZAAR   25 mg, Oral, Every 24 Hours      sulfamethoxazole-trimethoprim 800-160 MG per tablet  Commonly known as: Bactrim DS   160 mg, Oral, 2 Times Daily      vancomycin 50 MG/ML reconstituted solution oral solution reconstituted   125 mg, Oral, Every 6 Hours Scheduled         Continue These Medications      Instructions Start Date   baclofen 10 MG tablet  Commonly known as: LIORESAL   10 mg, Oral, 3 Times Daily      buprenorphine-naloxone 8-2 MG per SL tablet  Commonly known as: SUBOXONE   1 tablet, Sublingual, 3 Times Daily, Patient takes one 8-2 tablet three times a day.      cloNIDine 0.1 MG tablet  Commonly known as: CATAPRES   0.1 mg, Oral, 3 Times Daily      cyanocobalamin 1000 MCG tablet  Commonly known as: VITAMIN B-12   1,000 mcg, Oral, Daily      diazePAM 5 MG tablet  Commonly known as: VALIUM   10 mg, Oral, Every 8 Hours PRN      FLUoxetine 20 MG capsule  Commonly known as: PROzac   40 mg, Oral, Daily      gabapentin 800 MG tablet  Commonly known as: NEURONTIN   800 mg, Oral, 3 Times Daily      ibuprofen 800 MG tablet  Commonly known as: ADVIL,MOTRIN   800 mg, Oral, 3 Times Daily PRN      pantoprazole 40 MG EC tablet  Commonly known as: Protonix   40 mg, Oral, Daily      prazosin 5 MG capsule  Commonly known as: MINIPRESS   5 mg, Oral, Nightly      promethazine 25 MG suppository  Commonly known as: PHENERGAN   25 mg, Rectal, Every 6 Hours PRN      thiamine 100 MG tablet tablet  Commonly known as: VITAMIN B-1   100 mg, Oral, Daily         Stop These Medications    clonazePAM 1 MG  tablet  Commonly known as: KlonoPIN     LORazepam 1 MG tablet  Commonly known as: ATIVAN            INSTRUCTIONS:  Activity:   Activity Instructions     Activity as Tolerated          Diet:   Diet Instructions     Diet: Regular      Discharge Diet: Regular          FOLLOW UP:   Additional Instructions for the Follow-ups that You Need to Schedule     Discharge Follow-up with PCP   As directed       Currently Documented PCP:    Zeeshan Wiggins MD    PCP Phone Number:    [phone]     Follow Up Details: within one week           Follow-up Information     Zeeshan Wiggins MD Follow up.    Specialties: Family Medicine, Emergency Medicine  Contact information:  [address] [place] [address] [phone]             Zeeshan Wiggins MD .    Specialties: Family Medicine, Emergency Medicine  Why: within one week  Contact information:  [address] [place] [address] [phone]                   PENDING TEST RESULTS AT DISCHARGE      Time: >30 minutes was spent in discharge planning, medication reconciliation and coordination of care for this patient.    Ankur Sullivan MD is the attending at time of discharge, He is aware of the patient's status and agrees with the above discharge summary.          This document has been electronically signed by Zeeshan Wiggins MD on March 4, 2021 13:39 CST

## 2021-03-04 NOTE — PAYOR COMM NOTE
"    Milena Farris RN [facility]  [phone]      Phone  [phone]      Fax  Cont stay review      Shawnee Springer (36 y.o. Female)     Date of Birth Social Security Number Address Home Phone MRN    1985  [address] [address] [phone] [id number]    [employer] Marital Status          Hindu        Admission Date Admission Type Admitting Provider Attending Provider Department, Room/Bed    2/27/21 Emergency Danielle Holloway MD Mohammed, Umar S, MD [facility] 3 EAST, 352/1    Discharge Date Discharge Disposition Discharge Destination                       Attending Provider: Zeeshan Wiggins MD    Allergies: No Known Allergies    Isolation: Spore, Contact   Infection: C.difficile (02/28/21), ESBL E coli (03/02/21)   Code Status: CPR    Ht: 172.7 cm (68\")   Wt: 60.2 kg (132 lb 11.2 oz)    Admission Cmt: None   Principal Problem: Alcohol dependence with withdrawal (CMS/[facility]) [F10.239] More...                 Active Insurance as of 2/27/2021     Primary Coverage     Payor Plan Insurance Group Employer/Plan Group    [place] [id number]     Payor Plan Address Payor Plan Phone Number Payor Plan Fax Number Effective Dates    [employer] [address] [address] [phone]  1/1/2020 - None Entered    [facility] [id number]       Subscriber Name Subscriber Birth Date Member ID       SHAWNEE SPRINGER 1985 [id number]                 Emergency Contacts      (Rel.) Home Phone Work Phone Mobile Phone    Jorge Sanz (Spouse) [phone] -- [phone]    OdellJoanna (Mother) [phone] -- [phone]    Odell Hilario (Father) [phone] -- [phone]            Vital Signs (last day)     Date/Time   Temp   Temp src   Pulse   Resp   BP   Patient Position   SpO2    03/04/21 0727   98.4 (36.9)   Temporal   61   16   121/81   Sitting   98    03/04/21 0717   --   --   " 61   --   --   --   --    03/04/21 0300   --   --   60   18   122/68   Lying   97    03/04/21 0042   --   --   60   --   --   --   --    03/03/21 2300   97.5 (36.4)   Temporal   61   18   122/71   Lying   98    03/03/21 1900   97.9 (36.6)   Temporal   65   18   149/91   Sitting   99    03/03/21 1514   --   --   67   --   --   --   --    03/03/21 1406   --   --   94   --   --   --   --    03/03/21 1351   98.7 (37.1)   Temporal   73   18   140/90   Sitting   97    03/03/21 1231   --   --   78   --   164/92   --   --    03/03/21 1200   98.5 (36.9)   Oral   61   22   164/92   Sitting   98    03/03/21 1100   --   --   57   15   150/71   Lying   96    03/03/21 1000   --   --   57   16   163/79   Lying   98    03/03/21 0900   --   --   73   19   167/87   Lying   99    03/03/21 0800   98.2 (36.8)   Oral   64   20   164/96   Sitting   98    03/03/21 0700   --   --   60   16   166/78   Sitting   99    03/03/21 0601   --   --   78   --   --   --   --    03/03/21 0600   --   --   63   --   119/62   --   96    03/03/21 0500   --   --   60   --   123/68   --   96    03/03/21 0400   97.3 (36.3)   --   69   --   100/55   --   95    03/03/21 0300   --   --   62   --   109/55   --   97    03/03/21 0200   --   --   67   --   144/85   --   97    03/03/21 0100   --   --   63   --   116/70   --   97    03/03/21 0000   98.2 (36.8)   --   78   --   107/66   --   96              Current Facility-Administered Medications   Medication Dose Route Frequency Provider Last Rate Last Admin   • acetaminophen (TYLENOL) tablet 650 mg  650 mg Oral Q6H PRN Christos Vyas MD       • buprenorphine-naloxone (SUBOXONE) 8-2 MG per SL tablet 1 tablet  1 tablet Sublingual TID Christos Vyas MD   1 tablet at 03/04/21 0833   • cloNIDine (CATAPRES) tablet 0.1 mg  0.1 mg Oral Q6H Christos Vyas MD   0.1 mg at 03/03/21 1721   • diazePAM (VALIUM) tablet 10 mg  10 mg Oral TID Christos Vyas MD   10 mg at 03/04/21 0833   • enoxaparin  (LOVENOX) syringe 40 mg  40 mg Subcutaneous Q24H Yosi Almaguer MD   40 mg at 03/03/21 1722   • famotidine (PEPCID) tablet 20 mg  20 mg Oral BID AC Delmy Jarrett MD   20 mg at 03/04/21 0520   • FLUoxetine (PROzac) capsule 40 mg  40 mg Oral Daily Delmy Jarrett MD   40 mg at 03/04/21 0834   • folic acid (FOLVITE) tablet 1 mg  1 mg Oral Daily Christos Vyas MD   1 mg at 03/04/21 0833   • gabapentin (NEURONTIN) capsule 800 mg  800 mg Oral Q8H Yosi Almaguer MD   800 mg at 03/04/21 0520   • hydrALAZINE (APRESOLINE) injection 10 mg  10 mg Intravenous Q6H PRN Yosi Almaguer MD   10 mg at 02/28/21 1412   • HYDROcodone-acetaminophen (NORCO) 7.5-325 MG per tablet 1 tablet  1 tablet Oral Q6H PRN Christos Vyas MD   1 tablet at 03/03/21 1152   • hydrOXYzine (ATARAX) tablet 25 mg  25 mg Oral TID Delmy Jarrett MD   25 mg at 03/04/21 0833   • LORazepam (ATIVAN) injection 4 mg  4 mg Intravenous Q1H PRN Christos Vyas MD   4 mg at 03/02/21 0538    Or   • LORazepam (ATIVAN) tablet 1 mg  1 mg Oral Q2H PRN Christos Vyas MD        Or   • LORazepam (ATIVAN) injection 1 mg  1 mg Intravenous Q2H PRN Christos Vyas MD   1 mg at 03/04/21 0520    Or   • LORazepam (ATIVAN) tablet 4 mg  4 mg Oral Q1H PRN Christos Vyas MD        Or   • LORazepam (ATIVAN) injection 6 mg  6 mg Intravenous Q15 Min PRN Christos Vyas MD   6 mg at 03/01/21 1101    Or   • LORazepam (ATIVAN) injection 6 mg  6 mg Intramuscular Q15 Min PRN Christos Vyas MD       • losartan (COZAAR) tablet 25 mg  25 mg Oral Q24H Christos Vyas MD   25 mg at 03/03/21 1231   • meropenem (MERREM) 1 g/100 mL 0.9% NS VTB (mbp)  1 g Intravenous Once Delmy Jarrett MD   Currently Infusing at 03/02/21 1343   • meropenem (MERREM) 1 g/100 mL 0.9% NS VTB (mbp)  1 g Intravenous Q8H Delmy Jarrett MD   1 g at 03/04/21 0247   • nicotine (NICODERM CQ) 21 MG/24HR patch 1 patch  1 patch Transdermal Q24H Pily Pike MD   1  patch at 21 0836   • ondansetron (ZOFRAN) tablet 4 mg  4 mg Oral Q6H PRN Yosi Almaguer MD        Or   • ondansetron (ZOFRAN) injection 4 mg  4 mg Intravenous Q6H PRN Yosi Almaguer MD   4 mg at 21 0300   • Pharmacy Consult   Does not apply Continuous PRN Delmy Jarrett MD       • prazosin (MINIPRESS) capsule 5 mg  5 mg Oral Nightly Yosi Almaguer MD   5 mg at 21 2023   • prochlorperazine (COMPAZINE) injection 10 mg  10 mg Intravenous Q6H PRN Christos Vyas MD       • sodium chloride 0.9 % flush 10 mL  10 mL Intravenous PRN Yosi Almaguer MD       • sodium chloride 0.9 % flush 10 mL  10 mL Intravenous Q12H Yosi Almaguer MD   10 mL at 21 0834   • sodium chloride 0.9 % flush 10 mL  10 mL Intravenous PRN Yosi Almaguer MD       • sodium chloride 0.9 % infusion  125 mL/hr Intravenous Continuous Zeeshan Wiggins  mL/hr at 21 0244 125 mL/hr at 21 0244   • thiamine (VITAMIN B-1) tablet 200 mg  200 mg Oral Daily Christos Vyas MD   200 mg at 21 0833   • vancomycin oral solution reconstituted 125 mg  125 mg Oral Q6H Delmy Jarrett MD   125 mg at 21 0520        Physician Progress Notes (last 24 hours) (Notes from 21 0852 through 21 0852)      Zeeshan Wiggins MD at 21 0809          FAMILY MEDICINE DAILY PROGRESS NOTE  NAME: Nata Bain  : 1985  MRN: [id number]     LOS: 3 days     PROVIDER OF SERVICE: Zeeshan Wiggins MD    Chief Complaint: Alcohol dependence with withdrawal (CMS/HCC)    Subjective:   VSS. Denies any dysuria, abdominal pain, nausea/vomiting. States had some difficulty sleeping last night but doing better this AM. Expresses desire for discharge.  Interval History:  History taken from: patient chart RN      Review of Systems:   Review of Systems   Constitutional: Negative for activity change, chills, diaphoresis and fever.   HENT: Negative for dental problem, drooling, ear discharge, ear pain, facial  swelling, mouth sores, nosebleeds, rhinorrhea, sinus pressure, sinus pain, sneezing and sore throat.    Eyes: Negative for pain, discharge and visual disturbance.   Respiratory: Negative for apnea, cough, shortness of breath, wheezing and stridor.    Cardiovascular: Negative for chest pain, palpitations and leg swelling.   Gastrointestinal: Negative for abdominal distention, abdominal pain, constipation, diarrhea, nausea and vomiting.   Genitourinary: Negative for dysuria, frequency and urgency.   Musculoskeletal: Negative for arthralgias and back pain.   Skin: Negative for rash and wound.   Neurological: Negative for dizziness, facial asymmetry, light-headedness and headaches.   Psychiatric/Behavioral: Negative for agitation and decreased concentration. The patient is not nervous/anxious.        Objective:     Vital Signs  Temp:  [97.5 °F (36.4 °C)-98.7 °F (37.1 °C)] 98.4 °F (36.9 °C)  Heart Rate:  [57-94] 61  Resp:  [15-22] 16  BP: (121-167)/(68-92) 121/81    Physical Exam  Physical Exam  Constitutional:       Appearance: She is well-developed.   HENT:      Head: Normocephalic and atraumatic.      Right Ear: External ear normal.      Left Ear: External ear normal.      Nose: Nose normal.      Mouth/Throat:      Pharynx: No oropharyngeal exudate.   Eyes:      General: No scleral icterus.        Right eye: No discharge.         Left eye: No discharge.      Conjunctiva/sclera: Conjunctivae normal.      Pupils: Pupils are equal, round, and reactive to light.   Neck:      Musculoskeletal: Normal range of motion and neck supple.      Vascular: No JVD.   Cardiovascular:      Rate and Rhythm: Normal rate and regular rhythm.      Heart sounds: Normal heart sounds. No murmur. No friction rub. No gallop.    Pulmonary:      Effort: Pulmonary effort is normal. No respiratory distress.      Breath sounds: Normal breath sounds. No stridor. No wheezing or rales.   Abdominal:      General: Bowel sounds are normal. There is no  distension.      Palpations: Abdomen is soft.      Tenderness: There is no abdominal tenderness.   Musculoskeletal: Normal range of motion.         General: No tenderness or deformity.   Skin:     General: Skin is warm and dry.      Capillary Refill: Capillary refill takes less than 2 seconds.      Coloration: Skin is not pale.      Findings: No erythema or rash.   Neurological:      Mental Status: She is alert and oriented to person, place, and time.   Psychiatric:         Behavior: Behavior normal.         Medication Review    Current Facility-Administered Medications:   •  acetaminophen (TYLENOL) tablet 650 mg, 650 mg, Oral, Q6H PRN, Christos Vyas MD  •  buprenorphine-naloxone (SUBOXONE) 8-2 MG per SL tablet 1 tablet, 1 tablet, Sublingual, TID, Christos Vyas MD, 1 tablet at 03/03/21 2023  •  cloNIDine (CATAPRES) tablet 0.1 mg, 0.1 mg, Oral, Q6H, Christos Vyas MD, 0.1 mg at 03/03/21 1721  •  diazePAM (VALIUM) tablet 10 mg, 10 mg, Oral, TID, Christos Vyas MD, 10 mg at 03/03/21 2349  •  enoxaparin (LOVENOX) syringe 40 mg, 40 mg, Subcutaneous, Q24H, Yosi Almaguer MD, 40 mg at 03/03/21 1722  •  famotidine (PEPCID) tablet 20 mg, 20 mg, Oral, BID AC, Delmy Jarrett MD, 20 mg at 03/04/21 0520  •  FLUoxetine (PROzac) capsule 40 mg, 40 mg, Oral, Daily, Delmy Jarrett MD, 40 mg at 03/03/21 0826  •  folic acid (FOLVITE) tablet 1 mg, 1 mg, Oral, Daily, Christos Vyas MD, 1 mg at 03/03/21 0827  •  gabapentin (NEURONTIN) capsule 800 mg, 800 mg, Oral, Q8H, Yosi Almaguer MD, 800 mg at 03/04/21 0520  •  hydrALAZINE (APRESOLINE) injection 10 mg, 10 mg, Intravenous, Q6H PRN, Yosi Almaguer MD, 10 mg at 02/28/21 1412  •  HYDROcodone-acetaminophen (NORCO) 7.5-325 MG per tablet 1 tablet, 1 tablet, Oral, Q6H PRN, Christos Vyas MD, 1 tablet at 03/03/21 1152  •  hydrOXYzine (ATARAX) tablet 25 mg, 25 mg, Oral, TID, Delmy Jarrett MD, 25 mg at 03/03/21 2023  •  LORazepam (ATIVAN) tablet 1  mg, 1 mg, Oral, Q2H PRN **OR** LORazepam (ATIVAN) injection 1 mg, 1 mg, Intravenous, Q2H PRN, 1 mg at 03/04/21 0520 **OR** LORazepam (ATIVAN) tablet 4 mg, 4 mg, Oral, Q1H PRN **OR** LORazepam (ATIVAN) injection 4 mg, 4 mg, Intravenous, Q1H PRN, 4 mg at 03/02/21 0538 **OR** LORazepam (ATIVAN) injection 6 mg, 6 mg, Intravenous, Q15 Min PRN, 6 mg at 03/01/21 1101 **OR** LORazepam (ATIVAN) injection 6 mg, 6 mg, Intramuscular, Q15 Min PRN, Christos Vyas MD  •  losartan (COZAAR) tablet 25 mg, 25 mg, Oral, Q24H, Christos Vyas MD, 25 mg at 03/03/21 1231  •  meropenem (MERREM) 1 g/100 mL 0.9% NS VTB (mbp), 1 g, Intravenous, Once, Delmy Jarrett MD, Currently Infusing at 03/02/21 1343  •  meropenem (MERREM) 1 g/100 mL 0.9% NS VTB (mbp), 1 g, Intravenous, Q8H, Delmy Jarrett MD, 1 g at 03/04/21 0247  •  nicotine (NICODERM CQ) 21 MG/24HR patch 1 patch, 1 patch, Transdermal, Q24H, Pily Pike MD, 1 patch at 03/03/21 0831  •  ondansetron (ZOFRAN) tablet 4 mg, 4 mg, Oral, Q6H PRN **OR** ondansetron (ZOFRAN) injection 4 mg, 4 mg, Intravenous, Q6H PRN, Yosi Almaguer MD, 4 mg at 02/28/21 0300  •  Pharmacy Consult, , Does not apply, Continuous PRN, Delmy Jarrett MD  •  prazosin (MINIPRESS) capsule 5 mg, 5 mg, Oral, Nightly, Yosi Almaguer MD, 5 mg at 03/03/21 2023  •  prochlorperazine (COMPAZINE) injection 10 mg, 10 mg, Intravenous, Q6H PRN, Christos Vysa MD  •  sodium chloride 0.9 % flush 10 mL, 10 mL, Intravenous, PRN, Yosi Almaguer MD  •  sodium chloride 0.9 % flush 10 mL, 10 mL, Intravenous, Q12H, Yosi Almaguer MD, 10 mL at 03/03/21 2023  •  sodium chloride 0.9 % flush 10 mL, 10 mL, Intravenous, PRN, Yosi Almaguer MD  •  sodium chloride 0.9 % infusion, 125 mL/hr, Intravenous, Continuous, Zeeshan Wiggins MD, Last Rate: 125 mL/hr at 03/04/21 0244, 125 mL/hr at 03/04/21 0244  •  thiamine (VITAMIN B-1) tablet 200 mg, 200 mg, Oral, Daily, Christos Vyas MD, 200 mg at 03/03/21 0840  •   vancomycin oral solution reconstituted 125 mg, 125 mg, Oral, Q6H, Delmy Jarrett MD, 125 mg at 03/04/21 0520     Diagnostic Data    Lab Results (last 24 hours)     Procedure Component Value Units Date/Time    Comprehensive Metabolic Panel [[id number]]  (Abnormal) Collected: 03/04/21 0509    Specimen: Blood Updated: 03/04/21 0651     Glucose 92 mg/dL      BUN 3 mg/dL      Creatinine 0.45 mg/dL      Sodium 138 mmol/L      Potassium 4.2 mmol/L      Chloride 103 mmol/L      CO2 28.0 mmol/L      Calcium 9.4 mg/dL      Total Protein 6.2 g/dL      Albumin 3.70 g/dL      ALT (SGPT) 31 U/L      AST (SGOT) 70 U/L      Alkaline Phosphatase 97 U/L      Total Bilirubin 0.3 mg/dL      eGFR Non African Amer >150 mL/min/1.73      Globulin 2.5 gm/dL      A/G Ratio 1.5 g/dL      BUN/Creatinine Ratio 6.7     Anion Gap 7.0 mmol/L     Narrative:      GFR Normal >60  Chronic Kidney Disease <60  Kidney Failure <15      CBC Auto Differential [[id number]]  (Abnormal) Collected: 03/04/21 0509    Specimen: Blood Updated: 03/04/21 0615     WBC 6.54 10*3/mm3      RBC 3.11 10*6/mm3      Hemoglobin 11.3 g/dL      Hematocrit 31.7 %      .9 fL      MCH 36.3 pg      MCHC 35.6 g/dL      RDW 12.7 %      RDW-SD 47.0 fl      MPV 10.8 fL      Platelets 164 10*3/mm3      Neutrophil % 50.2 %      Lymphocyte % 41.0 %      Monocyte % 6.9 %      Eosinophil % 1.1 %      Basophil % 0.6 %      Immature Grans % 0.2 %      Neutrophils, Absolute 3.29 10*3/mm3      Lymphocytes, Absolute 2.68 10*3/mm3      Monocytes, Absolute 0.45 10*3/mm3      Eosinophils, Absolute 0.07 10*3/mm3      Basophils, Absolute 0.04 10*3/mm3      Immature Grans, Absolute 0.01 10*3/mm3      nRBC 0.0 /100 WBC            Imaging Results (Last 24 Hours)     ** No results found for the last 24 hours. **          I reviewed the patient's new clinical results.    Assessment/Plan:     Active Hospital Problems    Diagnosis   • **Alcohol dependence with withdrawal (CMS/HCC)     Last drink was  on 2/26/21  Alcohol cessation counseling was provided  Ativan and CIWA protocol-  Ativan 8 mg Q4H, has  required for CIWA >15 x1  3/2/2021  -will also give home dose valium 5 mg   -PO thiamine replacement      • Severe malnutrition (CMS/HCC)     -Dietary consult ordered: Appreciate recs  -Recommend Ensure supplementation  -Advancing diet as tolerated, currently on Hello and Broth, will transition to soft  Body mass index is 20.93 kg/m².       • Klebsiella cystitis     Urine Culture - Urine, Urine, Clean Catch [[id number]]    (Abnormal)   Urine, Clean Catch    Final result Component Value   Urine Culture >100,000 CFU/mL Escherichia coli ESBLAbnormal     Consider infectious disease consult.  Susceptibility results may not correlate to clinical outcomes.    50,000 CFU/mL Klebsiella pneumoniae ssp pneumoniaeAbnormal         -Currently on Merepenem; transition to oral Abx prior to discharge     • Hypocalcemia     Calcium 8.5  Albumin 3.5  Corrected calcium 8.9         • C. difficile colitis     Patient reports diarrhea  Patient does not meet the full criteria (no WBC>48317), however she is on chronic PPI use.  C. difficile stool ordered: Positive  -Currently receiving Vancomycin       • UTI due to extended-spectrum beta lactamase (ESBL) producing Escherichia coli       Microbiology Results (last 10 days)     Lab Status: Final result Specimen: Urine, Clean Catch Updated: 03/02/21 1147     Urine Culture >100,000 CFU/mL Escherichia coli ESBL     Comment: Consider infectious disease consult.  Susceptibility results may not correlate to clinical outcomes.         50,000 CFU/mL Klebsiella pneumoniae ssp pneumoniae        -Have switched Abx to Merepenem; will transition to oral Abx prior to discharge     • Epigastric pain   • Anxiety and depression     Will continue current home medications  -prozac  - valium    -Will Add Atarax     • Opioid use disorder (CMS/HCC)     -Continue gabapentin  -Scheduled Tylenol 1000 mg every 8  hours  -will give pts home medication of suboxone, and control pain with morphine and Norco             DVT prophylaxis: Lovenox  Code Status and Medical Interventions:   Ordered at: 02/27/21 1616     Level Of Support Discussed With:    Patient     Code Status:    CPR     Medical Interventions (Level of Support Prior to Arrest):    Full       Plan for disposition:Where: home and When:  0-1days      Time: 30 minutes        This document has been electronically signed by Zeeshan Wiggins MD on March 4, 2021 08:12 CST              Electronically signed by Zeeshan Wgigins MD at 03/04/21 0812       Medical Student Notes (last 24 hours) (Notes from 03/03/21 0852 through 03/04/21 0852)    No notes of this type exist for this encounter.         Consult Notes (last 24 hours) (Notes from 03/03/21 0852 through 03/04/21 0852)    No notes of this type exist for this encounter.

## 2021-03-05 ENCOUNTER — TRANSITIONAL CARE MANAGEMENT TELEPHONE ENCOUNTER (OUTPATIENT)
Dept: CALL CENTER | Facility: HOSPITAL | Age: 36
End: 2021-03-05

## 2021-03-05 NOTE — OUTREACH NOTE
Prep Survey      Responses   [facility] facility patient discharged from?  [facility]   Is LACE score < 7 ?  No   Emergency Room discharge w/ pulse ox?  No   Eligibility  [facility]   Date of Admission  02/27/21   Date of Discharge  03/04/21   Discharge Disposition  Home or Self Care   Discharge diagnosis  abdominal pain, ETOH withdrawal, severe malnutrition   Does the patient have one of the following disease processes/diagnoses(primary or secondary)?  Other   Does the patient have Home health ordered?  No   Is there a DME ordered?  No   Prep survey completed?  Yes          Aneta Ceballos RN

## 2021-03-05 NOTE — OUTREACH NOTE
Call Center TCM Note      Responses   [facility] patient discharged from?  [place]   Does the patient have one of the following disease processes/diagnoses(primary or secondary)?  Other   TCM attempt successful?  No   Unsuccessful attempts  Attempt 2          Estrella Parr LPN    3/5/2021, 15:45 EST

## 2021-03-05 NOTE — PAYOR COMM NOTE
"Lena Rucker  [facility]  P: [phone]  F: [phone]    [facility]#[id number]    Shawnee Springer (36 y.o. Female)     Date of Birth Social Security Number Address Home Phone MRN    1985  [address] [address] [phone] [id number]    [employer] Marital Status          Mormon        Admission Date Admission Type Admitting Provider Attending Provider Department, Room/Bed    2/27/21 Emergency Danielle Holloway MD  [facility] 3 EAST, 352/1    Discharge Date Discharge Disposition Discharge Destination        3/4/2021 Home or Self Care              Attending Provider: (none)   Allergies: No Known Allergies    Isolation: Spore, Contact   Infection: C.difficile (02/28/21), ESBL E coli (03/02/21)   Code Status: Prior    Ht: 172.7 cm (68\")   Wt: 60.2 kg (132 lb 11.2 oz)    Admission Cmt: None   Principal Problem: Alcohol dependence with withdrawal (CMS/HCC) [F10.239] More...                 Active Insurance as of 2/27/2021     Primary Coverage     Payor Plan Insurance Group Employer/Plan Group    [place] [id number]     Payor Plan Address Payor Plan Phone Number Payor Plan Fax Number Effective Dates    [employer] [address] [address] [phone]  1/1/2020 - None Entered    [facility] [id number]       Subscriber Name Subscriber Birth Date Member ID       SHAWNEE SPRINGER 1985 [id number]                 Emergency Contacts      (Rel.) Home Phone Work Phone Mobile Phone    Jorge Sanz (Spouse) [phone] -- [phone]    OdellJoanna (Mother) [phone] -- [phone]    Odell Hilario (Father) [phone] -- [phone]            Discharge Summary    No notes of this type exist for this encounter.         Discharge Order (From admission, onward)     Start     Ordered    03/04/21 1132  Discharge patient  Once     Expected Discharge Date: 03/04/21    Expected Discharge " 576 ml of . 9NS wasted, IV switched to .9 NS with 40 meQ potassium. Time: Afternoon    Discharge Disposition: Home or Self Care    Physician of Record for Attribution - Please select from Treatment Team: FABY SUAREZ [[id number]]    Review needed by CMO to determine Physician of Record: No       Question Answer Comment   Physician of Record for Attribution - Please select from Treatment Team FABY SUAREZ    Review needed by CMO to determine Physician of Record No        03/04/21 1133

## 2021-03-05 NOTE — OUTREACH NOTE
Call Center TCM Note      Responses   [facility] [place] patient discharged from?  [place]   Does the patient have one of the following disease processes/diagnoses(primary or secondary)?  Other   TCM attempt successful?  No   Unsuccessful attempts  Attempt 1          Estrella Parr LPN    3/5/2021, 10:57 EST

## 2021-03-06 ENCOUNTER — TRANSITIONAL CARE MANAGEMENT TELEPHONE ENCOUNTER (OUTPATIENT)
Dept: CALL CENTER | Facility: HOSPITAL | Age: 36
End: 2021-03-06

## 2021-03-06 NOTE — OUTREACH NOTE
Call Center TCM Note      Responses   [facility] patient discharged from?  [place]   Does the patient have one of the following disease processes/diagnoses(primary or secondary)?  Other   TCM attempt successful?  No   Unsuccessful attempts  Attempt 3          Estrella Parr LPN    3/6/2021, 11:38 EST

## 2021-03-12 ENCOUNTER — LAB (OUTPATIENT)
Dept: LAB | Facility: HOSPITAL | Age: 36
End: 2021-03-12

## 2021-03-12 ENCOUNTER — OFFICE VISIT (OUTPATIENT)
Dept: FAMILY MEDICINE CLINIC | Facility: CLINIC | Age: 36
End: 2021-03-12

## 2021-03-12 VITALS
SYSTOLIC BLOOD PRESSURE: 118 MMHG | HEART RATE: 105 BPM | OXYGEN SATURATION: 97 % | BODY MASS INDEX: 20.26 KG/M2 | DIASTOLIC BLOOD PRESSURE: 80 MMHG | HEIGHT: 67 IN | WEIGHT: 129.1 LBS

## 2021-03-12 DIAGNOSIS — F10.20 ALCOHOL USE DISORDER, MODERATE, DEPENDENCE (HCC): ICD-10-CM

## 2021-03-12 DIAGNOSIS — Z09 HOSPITAL DISCHARGE FOLLOW-UP: Primary | ICD-10-CM

## 2021-03-12 DIAGNOSIS — Z00.00 HEALTHCARE MAINTENANCE: ICD-10-CM

## 2021-03-12 LAB
ALBUMIN SERPL-MCNC: 4.5 G/DL (ref 3.5–5.2)
ALBUMIN/GLOB SERPL: 1.6 G/DL
ALP SERPL-CCNC: 120 U/L (ref 39–117)
ALT SERPL W P-5'-P-CCNC: 32 U/L (ref 1–33)
ANION GAP SERPL CALCULATED.3IONS-SCNC: 12.8 MMOL/L (ref 5–15)
AST SERPL-CCNC: 76 U/L (ref 1–32)
BASOPHILS # BLD AUTO: 0.09 10*3/MM3 (ref 0–0.2)
BASOPHILS NFR BLD AUTO: 1.5 % (ref 0–1.5)
BILIRUB SERPL-MCNC: 0.5 MG/DL (ref 0–1.2)
BUN SERPL-MCNC: 10 MG/DL (ref 6–20)
BUN/CREAT SERPL: 16.1 (ref 7–25)
CALCIUM SPEC-SCNC: 9.1 MG/DL (ref 8.6–10.5)
CHLORIDE SERPL-SCNC: 100 MMOL/L (ref 98–107)
CO2 SERPL-SCNC: 23.2 MMOL/L (ref 22–29)
CREAT SERPL-MCNC: 0.62 MG/DL (ref 0.57–1)
DEPRECATED RDW RBC AUTO: 49.3 FL (ref 37–54)
EOSINOPHIL # BLD AUTO: 0.04 10*3/MM3 (ref 0–0.4)
EOSINOPHIL NFR BLD AUTO: 0.7 % (ref 0.3–6.2)
ERYTHROCYTE [DISTWIDTH] IN BLOOD BY AUTOMATED COUNT: 13.3 % (ref 12.3–15.4)
GFR SERPL CREATININE-BSD FRML MDRD: 110 ML/MIN/1.73
GLOBULIN UR ELPH-MCNC: 2.9 GM/DL
GLUCOSE SERPL-MCNC: 75 MG/DL (ref 65–99)
HCT VFR BLD AUTO: 37 % (ref 34–46.6)
HGB BLD-MCNC: 12.7 G/DL (ref 12–15.9)
IMM GRANULOCYTES # BLD AUTO: 0.02 10*3/MM3 (ref 0–0.05)
IMM GRANULOCYTES NFR BLD AUTO: 0.3 % (ref 0–0.5)
LYMPHOCYTES # BLD AUTO: 2.5 10*3/MM3 (ref 0.7–3.1)
LYMPHOCYTES NFR BLD AUTO: 41.7 % (ref 19.6–45.3)
MCH RBC QN AUTO: 35.1 PG (ref 26.6–33)
MCHC RBC AUTO-ENTMCNC: 34.3 G/DL (ref 31.5–35.7)
MCV RBC AUTO: 102.2 FL (ref 79–97)
MONOCYTES # BLD AUTO: 0.41 10*3/MM3 (ref 0.1–0.9)
MONOCYTES NFR BLD AUTO: 6.8 % (ref 5–12)
NEUTROPHILS NFR BLD AUTO: 2.94 10*3/MM3 (ref 1.7–7)
NEUTROPHILS NFR BLD AUTO: 49 % (ref 42.7–76)
NRBC BLD AUTO-RTO: 0 /100 WBC (ref 0–0.2)
PLATELET # BLD AUTO: 275 10*3/MM3 (ref 140–450)
PMV BLD AUTO: 10.1 FL (ref 6–12)
POTASSIUM SERPL-SCNC: 4.3 MMOL/L (ref 3.5–5.2)
PROT SERPL-MCNC: 7.4 G/DL (ref 6–8.5)
RBC # BLD AUTO: 3.62 10*6/MM3 (ref 3.77–5.28)
SODIUM SERPL-SCNC: 136 MMOL/L (ref 136–145)
WBC # BLD AUTO: 6 10*3/MM3 (ref 3.4–10.8)

## 2021-03-12 PROCEDURE — 85025 COMPLETE CBC W/AUTO DIFF WBC: CPT

## 2021-03-12 PROCEDURE — 99213 OFFICE O/P EST LOW 20 MIN: CPT | Performed by: STUDENT IN AN ORGANIZED HEALTH CARE EDUCATION/TRAINING PROGRAM

## 2021-03-12 PROCEDURE — 80053 COMPREHEN METABOLIC PANEL: CPT

## 2021-03-12 PROCEDURE — 36415 COLL VENOUS BLD VENIPUNCTURE: CPT

## 2021-03-12 RX ORDER — FLUOXETINE HYDROCHLORIDE 40 MG/1
40 CAPSULE ORAL DAILY
COMMUNITY
Start: 2021-03-09 | End: 2023-01-25

## 2021-03-12 RX ORDER — SUCRALFATE 1 G/1
TABLET ORAL
COMMUNITY
Start: 2021-02-15 | End: 2022-01-26

## 2021-03-12 NOTE — PROGRESS NOTES
Subjective   Nata Bain is a 36 y.o. female who presents for hospital follow up for C. Diff colitis, UTI, and alcohol abuse.       Past Medical Hx:  Past Medical History:   Diagnosis Date   • Abnormal Pap smear of cervix    • Alcohol-induced acute pancreatitis 6/1/2020    Results From Last 14 Days Lab Units 02/27/21 1342 LIPASE U/L 65*  -advance diet as tolerated starting with clears -IV fluids   -will cont suboxone and give morphine for breakthrough pain  -Zofran for nausea as needed -Scheduled Ativan, and CIWA protocol - f/u on CT abdomen     • Alcoholism (CMS/HCC)    • Anxiety    • Cervical dysplasia    • Depression    • Fibrocystic breast    • GERD (gastroesophageal reflux disease)    • Hypertension    • Seizures (CMS/HCC) 2017   • Substance abuse (CMS/HCC)    • Substance abuse (CMS/HCC)        Past Surgical Hx:  Past Surgical History:   Procedure Laterality Date   • COLONOSCOPY N/A 2/2/2021    Procedure: COLONOSCOPY;  Surgeon: Mirian Mills MD;  Location: [facility] ENDOSCOPY;  Service: Gastroenterology;  Laterality: N/A;   • ENDOSCOPY N/A 1/8/2021    Procedure: ESOPHAGOGASTRODUODENOSCOPY;  Surgeon: Mirian Mills MD;  Location: [facility] ENDOSCOPY;  Service: Gastroenterology;  Laterality: N/A;   • RHINOPLASTY         Health Maintenance:  Health Maintenance   Topic Date Due   • ANNUAL PHYSICAL  Never done   • Pneumococcal Vaccine 0-64 (1 of 1 - PPSV23) Never done   • TDAP/TD VACCINES (2 - Tdap) 02/04/1997   • INFLUENZA VACCINE  08/01/2020   • PAP SMEAR  10/10/2021   • COLONOSCOPY  02/02/2031   • HEPATITIS C SCREENING  Completed   • MENINGOCOCCAL VACCINE  Aged Out       Current Meds:    Current Outpatient Medications:   •  baclofen (LIORESAL) 10 MG tablet, Take 10 mg by mouth 3 (Three) Times a Day., Disp: , Rfl:   •  buprenorphine-naloxone (SUBOXONE) 8-2 MG per SL tablet, Place 1 tablet under the tongue 3 (Three) Times a Day. Patient takes one 8-2 tablet three times a day., Disp:  , Rfl:   •  cloNIDine (CATAPRES) 0.1 MG tablet, Take 0.1 mg by mouth 3 (Three) Times a Day., Disp: , Rfl:   •  cyanocobalamin (VITAMIN B-12) 1000 MCG tablet, Take 1 tablet by mouth Daily., Disp: 30 tablet, Rfl: 5  •  diazePAM (VALIUM) 5 MG tablet, Take 10 mg by mouth Every 8 (Eight) Hours As Needed., Disp: , Rfl:   •  folic acid (FOLVITE) 1 MG tablet, Take 1 tablet by mouth Daily., Disp: 30 tablet, Rfl: 0  •  gabapentin (NEURONTIN) 800 MG tablet, Take 800 mg by mouth 3 (Three) Times a Day., Disp: , Rfl:   •  hydrOXYzine (ATARAX) 25 MG tablet, Take 1 tablet by mouth 3 (Three) Times a Day., Disp: 90 tablet, Rfl: 0  •  ibuprofen (ADVIL,MOTRIN) 800 MG tablet, Take 800 mg by mouth 3 (Three) Times a Day As Needed for Mild Pain ., Disp: , Rfl:   •  losartan (COZAAR) 25 MG tablet, Take 1 tablet by mouth Daily., Disp: 30 tablet, Rfl: 0  •  pantoprazole (Protonix) 40 MG EC tablet, Take 1 tablet by mouth Daily., Disp: 30 tablet, Rfl: 1  •  prazosin (MINIPRESS) 5 MG capsule, Take 5 mg by mouth Every Night., Disp: , Rfl:   •  promethazine (PHENERGAN) 25 MG suppository, Insert 1 suppository into the rectum Every 6 (Six) Hours As Needed for Nausea or Vomiting., Disp: 12 suppository, Rfl: 0  •  sucralfate (CARAFATE) 1 g tablet, , Disp: , Rfl:   •  thiamine (thiamine) 100 MG tablet tablet, Take 1 tablet by mouth Daily., Disp: 30 tablet, Rfl: 5  •  FLUoxetine (PROzac) 40 MG capsule, Take 40 mg by mouth Daily., Disp: , Rfl:     Allergies:  Patient has no known allergies.    Family Hx:  Family History   Problem Relation Age of Onset   • Breast cancer Mother    • Cancer Mother    • COPD Mother    • Breast cancer Maternal Grandmother    • COPD Maternal Grandmother    • Heart disease Maternal Grandmother    • Breast cancer Paternal Grandmother    • Breast cancer Maternal Aunt    • Hypertension Father    • Heart disease Father         Social History:  Social History     Socioeconomic History   • Marital status:      Spouse name: Not  "on file   • Number of children: Not on file   • Years of education: Not on file   • Highest education level: Not on file   Tobacco Use   • Smoking status: Current Every Day Smoker     Packs/day: 1.00     Years: 20.00     Pack years: 20.00     Types: Cigarettes   • Smokeless tobacco: Never Used   Substance and Sexual Activity   • Alcohol use: Yes     Alcohol/week: 6.0 standard drinks     Types: 6 Shots of liquor per week     Comment: daily   • Drug use: Not Currently     Types: Fentanyl, Oxycodone     Comment: hx of abuse   • Sexual activity: Defer       Review of Systems  Review of Systems   Constitutional: Negative for activity change, chills, diaphoresis and fever.   HENT: Negative for dental problem, drooling, ear discharge, ear pain, facial swelling, mouth sores, nosebleeds, rhinorrhea, sinus pressure, sinus pain, sneezing and sore throat.    Eyes: Negative for pain, discharge and visual disturbance.   Respiratory: Negative for apnea, cough, shortness of breath, wheezing and stridor.    Cardiovascular: Negative for chest pain, palpitations and leg swelling.   Gastrointestinal: Negative for abdominal distention, abdominal pain, constipation, diarrhea, nausea and vomiting.   Genitourinary: Negative for dysuria, frequency and urgency.   Musculoskeletal: Negative for arthralgias and back pain.   Skin: Negative for rash and wound.   Neurological: Negative for dizziness, facial asymmetry, light-headedness and headaches.   Psychiatric/Behavioral: Negative for agitation and decreased concentration. The patient is nervous/anxious.             Objective:     /80   Pulse 105   Ht 170.2 cm (67\")   Wt 58.6 kg (129 lb 1.6 oz)   LMP 02/26/2021   SpO2 97%   BMI 20.22 kg/m²       Physical Exam  Constitutional:       Appearance: She is well-developed.   HENT:      Head: Normocephalic and atraumatic.      Right Ear: External ear normal.      Left Ear: External ear normal.      Nose: Nose normal.      Mouth/Throat:      " Pharynx: No oropharyngeal exudate.   Eyes:      General: No scleral icterus.        Right eye: No discharge.         Left eye: No discharge.      Conjunctiva/sclera: Conjunctivae normal.      Pupils: Pupils are equal, round, and reactive to light.   Neck:      Vascular: No JVD.   Cardiovascular:      Rate and Rhythm: Normal rate and regular rhythm.      Heart sounds: Normal heart sounds. No murmur. No friction rub. No gallop.    Pulmonary:      Effort: Pulmonary effort is normal. No respiratory distress.      Breath sounds: Normal breath sounds. No stridor. No wheezing or rales.   Abdominal:      General: Bowel sounds are normal. There is no distension.      Palpations: Abdomen is soft.      Tenderness: There is no abdominal tenderness.   Musculoskeletal:         General: No tenderness or deformity. Normal range of motion.      Cervical back: Normal range of motion and neck supple.   Skin:     General: Skin is warm and dry.      Capillary Refill: Capillary refill takes less than 2 seconds.      Coloration: Skin is not pale.      Findings: No erythema or rash.   Neurological:      Mental Status: She is alert and oriented to person, place, and time.   Psychiatric:         Behavior: Behavior normal.         Assessment/Plan:     Diagnoses and all orders for this visit:    1. Hospital discharge follow-up (Primary)    2. Alcohol use disorder, moderate, dependence (CMS/HCC)    3. Healthcare maintenance  -     CBC w AUTO Differential; Future  -     Comprehensive metabolic panel; Future       Will order labs for Pt due to her being currently treated for C. Diff and still having watery diarrhea until yesterday 3/11/2021. States stools are well formed at this time. Pt requesting medication to ease withdrawal symptoms of her alcohol dependence. She is currently in a substance abuse program so requesting clearance from her physician that this will not interfere with her program. Will schedule follow up appointment in 10 days.     Follow-up:     Return in about 11 days (around 3/23/2021).    Preventative:    Vaccines Recommended at this visit:   No Vaccines recommended today. Patient is up to date on all vaccines.     Vaccines Received at this visit:  No Vaccines recommended today. Patient is up to date on all vaccines.     Screenings Recommended at this visit:  No Screenings offered today. Patient is up to date on all screenings at this time.     Screenings Ordered at this visit:  No screenings were offered today. Patient is up to date on all screenings.     Smoking Status:  Patient is current smoker. Patient is not interested in smoking cessation.    Alcohol Intake:  Regular (heavy)    Patient's Body mass index is 20.22 kg/m². BMI is below normal parameters. Recommendations include: increase caloric intake.         RISK SCORE: 3          This document has been electronically signed by Zeeshan Wiggins MD on March 12, 2021 14:30 CST

## 2021-03-15 ENCOUNTER — READMISSION MANAGEMENT (OUTPATIENT)
Dept: CALL CENTER | Facility: HOSPITAL | Age: 36
End: 2021-03-15

## 2021-03-15 NOTE — OUTREACH NOTE
Medical Week 2 Survey      Responses   [facility] patient discharged from?  [place]   Does the patient have one of the following disease processes/diagnoses(primary or secondary)?  Other   Week 2 attempt successful?  No   Unsuccessful attempts  Attempt 1          Katerine Goldstein RN  
JOINT PAIN/right ankle pain and swelling

## 2021-03-16 ENCOUNTER — READMISSION MANAGEMENT (OUTPATIENT)
Dept: CALL CENTER | Facility: HOSPITAL | Age: 36
End: 2021-03-16

## 2021-03-16 RX ORDER — PANTOPRAZOLE SODIUM 40 MG/1
40 TABLET, DELAYED RELEASE ORAL DAILY
Qty: 30 TABLET | Refills: 1 | OUTPATIENT
Start: 2021-03-16

## 2021-03-16 NOTE — PROGRESS NOTES
I have seen the patient.  I have reviewed the notes, assessments, and/or procedures performed by Dr. Wiggins, I concur with her/his documentation and assessment and plan for Nata Bain.       This document has been electronically signed by Nadir Villarreal MD on March 15, 2021 22:55 CDT

## 2021-03-16 NOTE — OUTREACH NOTE
Medical Week 2 Survey      Responses   [facility] patient discharged from?  [place]   Does the patient have one of the following disease processes/diagnoses(primary or secondary)?  Other   Week 2 attempt successful?  No   Call start time  1604   Unsuccessful attempts  Attempt 2   General alerts for this patient  Call patient's cell phone for follow up calls.          Freda Giles RN

## 2021-03-23 ENCOUNTER — READMISSION MANAGEMENT (OUTPATIENT)
Dept: CALL CENTER | Facility: HOSPITAL | Age: 36
End: 2021-03-23

## 2021-03-23 NOTE — OUTREACH NOTE
Medical Week 3 Survey      Responses   [facility] [place] patient discharged from?  [place]   Does the patient have one of the following disease processes/diagnoses(primary or secondary)?  Other   Week 3 attempt successful?  No   Unsuccessful attempts  Attempt 1          Maria Eugenia Fierro RN

## 2021-03-26 ENCOUNTER — LAB (OUTPATIENT)
Dept: LAB | Facility: HOSPITAL | Age: 36
End: 2021-03-26

## 2021-03-26 ENCOUNTER — OFFICE VISIT (OUTPATIENT)
Dept: FAMILY MEDICINE CLINIC | Facility: CLINIC | Age: 36
End: 2021-03-26

## 2021-03-26 VITALS
WEIGHT: 130.1 LBS | BODY MASS INDEX: 20.42 KG/M2 | OXYGEN SATURATION: 97 % | HEIGHT: 67 IN | SYSTOLIC BLOOD PRESSURE: 122 MMHG | HEART RATE: 116 BPM | DIASTOLIC BLOOD PRESSURE: 82 MMHG

## 2021-03-26 DIAGNOSIS — A04.72 C. DIFFICILE COLITIS: Primary | ICD-10-CM

## 2021-03-26 DIAGNOSIS — F10.10 ALCOHOL ABUSE: ICD-10-CM

## 2021-03-26 DIAGNOSIS — A04.72 C. DIFFICILE COLITIS: ICD-10-CM

## 2021-03-26 PROCEDURE — 85025 COMPLETE CBC W/AUTO DIFF WBC: CPT

## 2021-03-26 PROCEDURE — 36415 COLL VENOUS BLD VENIPUNCTURE: CPT

## 2021-03-26 PROCEDURE — 99213 OFFICE O/P EST LOW 20 MIN: CPT | Performed by: STUDENT IN AN ORGANIZED HEALTH CARE EDUCATION/TRAINING PROGRAM

## 2021-03-26 RX ORDER — ACAMPROSATE CALCIUM 333 MG/1
333 TABLET, DELAYED RELEASE ORAL DAILY
COMMUNITY
Start: 2021-03-23 | End: 2022-01-26

## 2021-03-26 RX ORDER — VANCOMYCIN HYDROCHLORIDE 125 MG/1
CAPSULE ORAL
Qty: 56 CAPSULE | Refills: 0 | Status: SHIPPED | OUTPATIENT
Start: 2021-03-26 | End: 2021-09-01

## 2021-03-26 NOTE — PROGRESS NOTES
Subjective   Nata Bain is a 36 y.o. female who presents for follow up for C diff colitis. Inititally treated with Vancomycin 125 QID for 10 days. States she is still having diffuse unformed watery diarrhea. Has 12 BMs last night. Denies any fevers. Recently stopped drinking alcohol.       Past Medical Hx:  Past Medical History:   Diagnosis Date   • Abnormal Pap smear of cervix    • Alcohol-induced acute pancreatitis 6/1/2020    Results From Last 14 Days Lab Units 02/27/21 1342 LIPASE U/L 65*  -advance diet as tolerated starting with clears -IV fluids   -will cont suboxone and give morphine for breakthrough pain  -Zofran for nausea as needed -Scheduled Ativan, and CIWA protocol - f/u on CT abdomen     • Alcoholism (CMS/HCC)    • Anxiety    • Cervical dysplasia    • Depression    • Fibrocystic breast    • GERD (gastroesophageal reflux disease)    • Hypertension    • Seizures (CMS/HCC) 2017   • Substance abuse (CMS/HCC)    • Substance abuse (CMS/HCC)        Past Surgical Hx:  Past Surgical History:   Procedure Laterality Date   • COLONOSCOPY N/A 2/2/2021    Procedure: COLONOSCOPY;  Surgeon: Mirian Mills MD;  Location: [facility] ENDOSCOPY;  Service: Gastroenterology;  Laterality: N/A;   • ENDOSCOPY N/A 1/8/2021    Procedure: ESOPHAGOGASTRODUODENOSCOPY;  Surgeon: Mirian Mills MD;  Location: [facility] ENDOSCOPY;  Service: Gastroenterology;  Laterality: N/A;   • RHINOPLASTY         Health Maintenance:  Health Maintenance   Topic Date Due   • ANNUAL PHYSICAL  Never done   • Pneumococcal Vaccine 0-64 (1 of 1 - PPSV23) Never done   • TDAP/TD VACCINES (2 - Tdap) 08/02/2011   • INFLUENZA VACCINE  08/01/2020   • PAP SMEAR  10/10/2021   • COLONOSCOPY  02/02/2031   • HEPATITIS C SCREENING  Completed   • MENINGOCOCCAL VACCINE  Aged Out       Current Meds:    Current Outpatient Medications:   •  acamprosate (CAMPRAL) 333 MG EC tablet, , Disp: , Rfl:   •  buprenorphine-naloxone (SUBOXONE) 8-2 MG  per SL tablet, Place 1 tablet under the tongue 3 (Three) Times a Day. Patient takes one 8-2 tablet three times a day., Disp: , Rfl:   •  cloNIDine (CATAPRES) 0.1 MG tablet, Take 0.1 mg by mouth 3 (Three) Times a Day., Disp: , Rfl:   •  cyanocobalamin (VITAMIN B-12) 1000 MCG tablet, Take 1 tablet by mouth Daily., Disp: 30 tablet, Rfl: 5  •  diazePAM (VALIUM) 5 MG tablet, Take 10 mg by mouth Every 8 (Eight) Hours As Needed., Disp: , Rfl:   •  FLUoxetine (PROzac) 40 MG capsule, Take 40 mg by mouth Daily., Disp: , Rfl:   •  folic acid (FOLVITE) 1 MG tablet, Take 1 tablet by mouth Daily., Disp: 30 tablet, Rfl: 0  •  gabapentin (NEURONTIN) 800 MG tablet, Take 800 mg by mouth 3 (Three) Times a Day., Disp: , Rfl:   •  pantoprazole (Protonix) 40 MG EC tablet, Take 1 tablet by mouth Daily., Disp: 30 tablet, Rfl: 1  •  prazosin (MINIPRESS) 5 MG capsule, Take 5 mg by mouth Every Night., Disp: , Rfl:   •  promethazine (PHENERGAN) 25 MG suppository, Insert 1 suppository into the rectum Every 6 (Six) Hours As Needed for Nausea or Vomiting., Disp: 12 suppository, Rfl: 0  •  sucralfate (CARAFATE) 1 g tablet, , Disp: , Rfl:   •  thiamine (thiamine) 100 MG tablet tablet, Take 1 tablet by mouth Daily., Disp: 30 tablet, Rfl: 5  •  vancomycin (VANCOCIN) 125 MG capsule, 1 capsule by mouth four times a day for 14 days. Then 1 capsule 2 times a day for 7 days. Then 1 capsule daily for 7 days.  1 capsule every 3 days for 28 days., Disp: 56 capsule, Rfl: 0    Allergies:  Patient has no known allergies.    Family Hx:  Family History   Problem Relation Age of Onset   • Breast cancer Mother    • Cancer Mother    • COPD Mother    • Breast cancer Maternal Grandmother    • COPD Maternal Grandmother    • Heart disease Maternal Grandmother    • Breast cancer Paternal Grandmother    • Breast cancer Maternal Aunt    • Hypertension Father    • Heart disease Father         Social History:  Social History     Socioeconomic History   • Marital status:  "     Spouse name: Not on file   • Number of children: Not on file   • Years of education: Not on file   • Highest education level: Not on file   Tobacco Use   • Smoking status: Current Every Day Smoker     Packs/day: 1.00     Years: 20.00     Pack years: 20.00     Types: Cigarettes   • Smokeless tobacco: Never Used   Substance and Sexual Activity   • Alcohol use: Yes     Alcohol/week: 6.0 standard drinks     Types: 6 Shots of liquor per week     Comment: daily   • Drug use: Not Currently     Types: Fentanyl, Oxycodone     Comment: hx of abuse   • Sexual activity: Defer       Review of Systems  Review of Systems   Constitutional: Negative for activity change, chills, diaphoresis and fever.   HENT: Negative for dental problem, drooling, ear discharge, ear pain, facial swelling, mouth sores, nosebleeds, rhinorrhea, sinus pressure, sinus pain, sneezing and sore throat.    Eyes: Negative for pain, discharge and visual disturbance.   Respiratory: Negative for apnea, cough, shortness of breath, wheezing and stridor.    Cardiovascular: Negative for chest pain, palpitations and leg swelling.   Gastrointestinal: Positive for abdominal pain and diarrhea. Negative for abdominal distention, constipation, nausea and vomiting.   Genitourinary: Negative for dysuria, frequency and urgency.   Musculoskeletal: Negative for arthralgias and back pain.   Skin: Negative for rash and wound.   Neurological: Positive for weakness. Negative for dizziness, facial asymmetry, light-headedness and headaches.   Psychiatric/Behavioral: Negative for agitation and decreased concentration. The patient is not nervous/anxious.             Objective:     /82   Pulse 116   Ht 170.2 cm (67\")   Wt 59 kg (130 lb 1.6 oz)   LMP 02/26/2021   SpO2 97%   BMI 20.38 kg/m²       Physical Exam  Constitutional:       Appearance: She is well-developed. She is ill-appearing.   HENT:      Head: Normocephalic and atraumatic.      Right Ear: External ear " normal.      Left Ear: External ear normal.      Nose: Nose normal.      Mouth/Throat:      Pharynx: No oropharyngeal exudate.   Eyes:      General: No scleral icterus.        Right eye: No discharge.         Left eye: No discharge.      Conjunctiva/sclera: Conjunctivae normal.      Pupils: Pupils are equal, round, and reactive to light.   Neck:      Vascular: No JVD.   Cardiovascular:      Rate and Rhythm: Normal rate and regular rhythm.      Heart sounds: Normal heart sounds. No murmur heard.   No friction rub. No gallop.    Pulmonary:      Effort: Pulmonary effort is normal. No respiratory distress.      Breath sounds: Normal breath sounds. No stridor. No wheezing or rales.   Abdominal:      General: Bowel sounds are normal. There is no distension.      Palpations: Abdomen is soft.      Tenderness: There is abdominal tenderness (Generalized. Increase pain to palpation in epigastric).   Musculoskeletal:         General: No tenderness or deformity. Normal range of motion.      Cervical back: Normal range of motion and neck supple.   Skin:     General: Skin is warm and dry.      Capillary Refill: Capillary refill takes less than 2 seconds.      Coloration: Skin is not pale.      Findings: No erythema or rash.   Neurological:      Mental Status: She is alert and oriented to person, place, and time.   Psychiatric:         Behavior: Behavior normal.         Assessment/Plan:     Diagnoses and all orders for this visit:    1. C. difficile colitis (Primary)  -     vancomycin (VANCOCIN) 125 MG capsule; 1 capsule by mouth four times a day for 14 days. Then 1 capsule 2 times a day for 7 days. Then 1 capsule daily for 7 days.  1 capsule every 3 days for 28 days.  Dispense: 56 capsule; Refill: 0  -     CBC w AUTO Differential; Future  -     Comprehensive metabolic panel; Future  -     Lipase; Future    2. Alcohol abuse     Pt deemed to have treatment failure of her C. Diff with original dosing of Vancomycin. We will treat her  "as a failure with an extended dosing regimen of Vancomycin. Pt also recently quit drinking within the last 10 days after drinking a pint a day \"for years\". Advised that some of her symptoms may be due to this. Will order labs today to evaluate for any metabolic abnormalities in setting of profuse diarrhea. Will reevaluate at follow up appointment for treatment response. If not doing better will pursue other causes of her symptoms.     Follow-up:     No follow-ups on file.    Goals    None         Preventative:    Vaccines Recommended at this visit:   No Vaccines recommended today. Patient is up to date on all vaccines.     Vaccines Received at this visit:  No Vaccines recommended today. Patient is up to date on all vaccines.     Screenings Recommended at this visit:  No Screenings offered today. Patient is up to date on all screenings at this time.     Screenings Ordered at this visit:  No screenings were offered today. Patient is up to date on all screenings.     Smoking Status:  Patient is current smoker. Patient is not interested in smoking cessation.    Alcohol Intake:  Former heavy use    Patient's Body mass index is 20.38 kg/m². BMI is below normal parameters. Recommendations include: increae caloric intake.         RISK SCORE: 3          This document has been electronically signed by Zeeshan Wiggins MD on March 26, 2021 15:01 CDT            "

## 2021-03-27 LAB
BASOPHILS # BLD AUTO: 0.05 10*3/MM3 (ref 0–0.2)
BASOPHILS NFR BLD AUTO: 0.9 % (ref 0–1.5)
DEPRECATED RDW RBC AUTO: 53.1 FL (ref 37–54)
EOSINOPHIL # BLD AUTO: 0.04 10*3/MM3 (ref 0–0.4)
EOSINOPHIL NFR BLD AUTO: 0.7 % (ref 0.3–6.2)
ERYTHROCYTE [DISTWIDTH] IN BLOOD BY AUTOMATED COUNT: 14.2 % (ref 12.3–15.4)
HCT VFR BLD AUTO: 38.2 % (ref 34–46.6)
HGB BLD-MCNC: 13.3 G/DL (ref 12–15.9)
IMM GRANULOCYTES # BLD AUTO: 0.02 10*3/MM3 (ref 0–0.05)
IMM GRANULOCYTES NFR BLD AUTO: 0.3 % (ref 0–0.5)
LYMPHOCYTES # BLD AUTO: 2.57 10*3/MM3 (ref 0.7–3.1)
LYMPHOCYTES NFR BLD AUTO: 44.6 % (ref 19.6–45.3)
MCH RBC QN AUTO: 35.8 PG (ref 26.6–33)
MCHC RBC AUTO-ENTMCNC: 34.8 G/DL (ref 31.5–35.7)
MCV RBC AUTO: 102.7 FL (ref 79–97)
MONOCYTES # BLD AUTO: 0.31 10*3/MM3 (ref 0.1–0.9)
MONOCYTES NFR BLD AUTO: 5.4 % (ref 5–12)
NEUTROPHILS NFR BLD AUTO: 2.77 10*3/MM3 (ref 1.7–7)
NEUTROPHILS NFR BLD AUTO: 48.1 % (ref 42.7–76)
NRBC BLD AUTO-RTO: 0 /100 WBC (ref 0–0.2)
PLATELET # BLD AUTO: 203 10*3/MM3 (ref 140–450)
PMV BLD AUTO: 11 FL (ref 6–12)
RBC # BLD AUTO: 3.72 10*6/MM3 (ref 3.77–5.28)
WBC # BLD AUTO: 5.76 10*3/MM3 (ref 3.4–10.8)

## 2021-04-05 ENCOUNTER — APPOINTMENT (OUTPATIENT)
Dept: CT IMAGING | Facility: HOSPITAL | Age: 36
End: 2021-04-05

## 2021-04-05 ENCOUNTER — HOSPITAL ENCOUNTER (INPATIENT)
Facility: HOSPITAL | Age: 36
LOS: 7 days | Discharge: HOME | End: 2021-04-12
Attending: FAMILY MEDICINE | Admitting: FAMILY MEDICINE

## 2021-04-05 ENCOUNTER — OFFICE VISIT (OUTPATIENT)
Dept: FAMILY MEDICINE CLINIC | Facility: CLINIC | Age: 36
End: 2021-04-05

## 2021-04-05 VITALS
BODY MASS INDEX: 20.09 KG/M2 | OXYGEN SATURATION: 97 % | SYSTOLIC BLOOD PRESSURE: 128 MMHG | DIASTOLIC BLOOD PRESSURE: 80 MMHG | HEIGHT: 67 IN | WEIGHT: 128 LBS | HEART RATE: 41 BPM

## 2021-04-05 DIAGNOSIS — R10.84 GENERALIZED ABDOMINAL PAIN: Primary | ICD-10-CM

## 2021-04-05 DIAGNOSIS — K85.90 ACUTE PANCREATITIS, UNSPECIFIED COMPLICATION STATUS, UNSPECIFIED PANCREATITIS TYPE: Primary | ICD-10-CM

## 2021-04-05 DIAGNOSIS — F10.230 ALCOHOL DEPENDENCE WITH UNCOMPLICATED WITHDRAWAL (HCC): ICD-10-CM

## 2021-04-05 PROBLEM — Z72.0 TOBACCO ABUSE: Status: ACTIVE | Noted: 2021-04-05

## 2021-04-05 PROBLEM — K85.20 ACUTE ALCOHOLIC PANCREATITIS: Status: RESOLVED | Noted: 2021-04-05 | Resolved: 2021-04-05

## 2021-04-05 PROBLEM — K85.20 ACUTE ALCOHOLIC PANCREATITIS: Status: ACTIVE | Noted: 2021-04-05

## 2021-04-05 PROBLEM — F10.10 ALCOHOL ABUSE: Status: ACTIVE | Noted: 2021-04-05

## 2021-04-05 LAB
ALBUMIN SERPL-MCNC: 4 G/DL (ref 3.5–5.2)
ALBUMIN/GLOB SERPL: 1.3 G/DL
ALP SERPL-CCNC: 142 U/L (ref 39–117)
ALT SERPL W P-5'-P-CCNC: 44 U/L (ref 1–33)
ANION GAP SERPL CALCULATED.3IONS-SCNC: 9 MMOL/L (ref 5–15)
AST SERPL-CCNC: 83 U/L (ref 1–32)
BASOPHILS # BLD AUTO: 0.05 10*3/MM3 (ref 0–0.2)
BASOPHILS NFR BLD AUTO: 0.5 % (ref 0–1.5)
BILIRUB SERPL-MCNC: 0.5 MG/DL (ref 0–1.2)
BILIRUB UR QL STRIP: NEGATIVE
BUN SERPL-MCNC: 9 MG/DL (ref 6–20)
BUN/CREAT SERPL: 18 (ref 7–25)
CALCIUM SPEC-SCNC: 9.5 MG/DL (ref 8.6–10.5)
CHLORIDE SERPL-SCNC: 101 MMOL/L (ref 98–107)
CLARITY UR: CLEAR
CO2 SERPL-SCNC: 24 MMOL/L (ref 22–29)
COLOR UR: YELLOW
CREAT SERPL-MCNC: 0.5 MG/DL (ref 0.57–1)
D-LACTATE SERPL-SCNC: 1.9 MMOL/L (ref 0.5–2)
DEPRECATED RDW RBC AUTO: 49.6 FL (ref 37–54)
EOSINOPHIL # BLD AUTO: 0.03 10*3/MM3 (ref 0–0.4)
EOSINOPHIL NFR BLD AUTO: 0.3 % (ref 0.3–6.2)
ERYTHROCYTE [DISTWIDTH] IN BLOOD BY AUTOMATED COUNT: 13.1 % (ref 12.3–15.4)
ETHANOL BLD-MCNC: 118 MG/DL (ref 0–10)
ETHANOL UR QL: 0.12 %
FLUAV RNA RESP QL NAA+PROBE: NOT DETECTED
FLUBV RNA RESP QL NAA+PROBE: NOT DETECTED
GFR SERPL CREATININE-BSD FRML MDRD: 140 ML/MIN/1.73
GLOBULIN UR ELPH-MCNC: 3.1 GM/DL
GLUCOSE SERPL-MCNC: 93 MG/DL (ref 65–99)
GLUCOSE UR STRIP-MCNC: NEGATIVE MG/DL
HCG SERPL QL: NEGATIVE
HCT VFR BLD AUTO: 37.3 % (ref 34–46.6)
HGB BLD-MCNC: 13.3 G/DL (ref 12–15.9)
HGB UR QL STRIP.AUTO: NEGATIVE
HOLD SPECIMEN: NORMAL
IMM GRANULOCYTES # BLD AUTO: 0.04 10*3/MM3 (ref 0–0.05)
IMM GRANULOCYTES NFR BLD AUTO: 0.4 % (ref 0–0.5)
KETONES UR QL STRIP: NEGATIVE
LEUKOCYTE ESTERASE UR QL STRIP.AUTO: NEGATIVE
LIPASE SERPL-CCNC: 1087 U/L (ref 13–60)
LYMPHOCYTES # BLD AUTO: 2.04 10*3/MM3 (ref 0.7–3.1)
LYMPHOCYTES NFR BLD AUTO: 20.6 % (ref 19.6–45.3)
MCH RBC QN AUTO: 36.3 PG (ref 26.6–33)
MCHC RBC AUTO-ENTMCNC: 35.7 G/DL (ref 31.5–35.7)
MCV RBC AUTO: 101.9 FL (ref 79–97)
MONOCYTES # BLD AUTO: 0.3 10*3/MM3 (ref 0.1–0.9)
MONOCYTES NFR BLD AUTO: 3 % (ref 5–12)
NEUTROPHILS NFR BLD AUTO: 7.44 10*3/MM3 (ref 1.7–7)
NEUTROPHILS NFR BLD AUTO: 75.2 % (ref 42.7–76)
NITRITE UR QL STRIP: NEGATIVE
NRBC BLD AUTO-RTO: 0 /100 WBC (ref 0–0.2)
PH UR STRIP.AUTO: 6.5 [PH] (ref 5–9)
PLATELET # BLD AUTO: 176 10*3/MM3 (ref 140–450)
PMV BLD AUTO: 10 FL (ref 6–12)
POTASSIUM SERPL-SCNC: 3.6 MMOL/L (ref 3.5–5.2)
PROT SERPL-MCNC: 7.1 G/DL (ref 6–8.5)
PROT UR QL STRIP: NEGATIVE
RBC # BLD AUTO: 3.66 10*6/MM3 (ref 3.77–5.28)
SARS-COV-2 RNA RESP QL NAA+PROBE: NOT DETECTED
SODIUM SERPL-SCNC: 134 MMOL/L (ref 136–145)
SP GR UR STRIP: 1.01 (ref 1–1.03)
UROBILINOGEN UR QL STRIP: NORMAL
WBC # BLD AUTO: 9.9 10*3/MM3 (ref 3.4–10.8)
WHOLE BLOOD HOLD SPECIMEN: NORMAL
WHOLE BLOOD HOLD SPECIMEN: NORMAL

## 2021-04-05 PROCEDURE — 25010000002 HYDROMORPHONE PER 4 MG: Performed by: STUDENT IN AN ORGANIZED HEALTH CARE EDUCATION/TRAINING PROGRAM

## 2021-04-05 PROCEDURE — 83690 ASSAY OF LIPASE: CPT | Performed by: FAMILY MEDICINE

## 2021-04-05 PROCEDURE — 25010000002 MORPHINE PER 10 MG: Performed by: STUDENT IN AN ORGANIZED HEALTH CARE EDUCATION/TRAINING PROGRAM

## 2021-04-05 PROCEDURE — 93005 ELECTROCARDIOGRAM TRACING: CPT | Performed by: FAMILY MEDICINE

## 2021-04-05 PROCEDURE — 25010000002 ONDANSETRON PER 1 MG: Performed by: STUDENT IN AN ORGANIZED HEALTH CARE EDUCATION/TRAINING PROGRAM

## 2021-04-05 PROCEDURE — 36415 COLL VENOUS BLD VENIPUNCTURE: CPT | Performed by: STUDENT IN AN ORGANIZED HEALTH CARE EDUCATION/TRAINING PROGRAM

## 2021-04-05 PROCEDURE — 25010000002 IOPAMIDOL 61 % SOLUTION: Performed by: STUDENT IN AN ORGANIZED HEALTH CARE EDUCATION/TRAINING PROGRAM

## 2021-04-05 PROCEDURE — 99212 OFFICE O/P EST SF 10 MIN: CPT | Performed by: STUDENT IN AN ORGANIZED HEALTH CARE EDUCATION/TRAINING PROGRAM

## 2021-04-05 PROCEDURE — 93010 ELECTROCARDIOGRAM REPORT: CPT | Performed by: INTERNAL MEDICINE

## 2021-04-05 PROCEDURE — 81003 URINALYSIS AUTO W/O SCOPE: CPT | Performed by: FAMILY MEDICINE

## 2021-04-05 PROCEDURE — 80053 COMPREHEN METABOLIC PANEL: CPT | Performed by: FAMILY MEDICINE

## 2021-04-05 PROCEDURE — 85025 COMPLETE CBC W/AUTO DIFF WBC: CPT | Performed by: FAMILY MEDICINE

## 2021-04-05 PROCEDURE — 82077 ASSAY SPEC XCP UR&BREATH IA: CPT | Performed by: STUDENT IN AN ORGANIZED HEALTH CARE EDUCATION/TRAINING PROGRAM

## 2021-04-05 PROCEDURE — 99285 EMERGENCY DEPT VISIT HI MDM: CPT

## 2021-04-05 PROCEDURE — 25010000002 THIAMINE PER 100 MG: Performed by: STUDENT IN AN ORGANIZED HEALTH CARE EDUCATION/TRAINING PROGRAM

## 2021-04-05 PROCEDURE — 74177 CT ABD & PELVIS W/CONTRAST: CPT

## 2021-04-05 PROCEDURE — 25010000002 HYDROMORPHONE 1 MG/ML SOLUTION: Performed by: STUDENT IN AN ORGANIZED HEALTH CARE EDUCATION/TRAINING PROGRAM

## 2021-04-05 PROCEDURE — 84703 CHORIONIC GONADOTROPIN ASSAY: CPT | Performed by: FAMILY MEDICINE

## 2021-04-05 PROCEDURE — 83605 ASSAY OF LACTIC ACID: CPT | Performed by: STUDENT IN AN ORGANIZED HEALTH CARE EDUCATION/TRAINING PROGRAM

## 2021-04-05 PROCEDURE — 25010000002 LORAZEPAM PER 2 MG: Performed by: STUDENT IN AN ORGANIZED HEALTH CARE EDUCATION/TRAINING PROGRAM

## 2021-04-05 PROCEDURE — 87636 SARSCOV2 & INF A&B AMP PRB: CPT | Performed by: STUDENT IN AN ORGANIZED HEALTH CARE EDUCATION/TRAINING PROGRAM

## 2021-04-05 RX ORDER — LORAZEPAM 2 MG/ML
2 INJECTION INTRAMUSCULAR
Status: DISCONTINUED | OUTPATIENT
Start: 2021-04-05 | End: 2021-04-12 | Stop reason: HOSPADM

## 2021-04-05 RX ORDER — LABETALOL HYDROCHLORIDE 5 MG/ML
10 INJECTION, SOLUTION INTRAVENOUS
Status: DISCONTINUED | OUTPATIENT
Start: 2021-04-05 | End: 2021-04-12 | Stop reason: HOSPADM

## 2021-04-05 RX ORDER — LORAZEPAM 2 MG/ML
1 INJECTION INTRAMUSCULAR EVERY 8 HOURS
Status: DISCONTINUED | OUTPATIENT
Start: 2021-04-06 | End: 2021-04-06

## 2021-04-05 RX ORDER — SODIUM CHLORIDE 0.9 % (FLUSH) 0.9 %
10 SYRINGE (ML) INJECTION AS NEEDED
Status: DISCONTINUED | OUTPATIENT
Start: 2021-04-05 | End: 2021-04-12 | Stop reason: HOSPADM

## 2021-04-05 RX ORDER — LORAZEPAM 0.5 MG/1
1 TABLET ORAL
Status: DISCONTINUED | OUTPATIENT
Start: 2021-04-05 | End: 2021-04-12 | Stop reason: HOSPADM

## 2021-04-05 RX ORDER — LORAZEPAM 2 MG/ML
1 INJECTION INTRAMUSCULAR
Status: DISCONTINUED | OUTPATIENT
Start: 2021-04-05 | End: 2021-04-12 | Stop reason: HOSPADM

## 2021-04-05 RX ORDER — LORAZEPAM 2 MG/ML
1 INJECTION INTRAMUSCULAR EVERY 6 HOURS
Status: DISCONTINUED | OUTPATIENT
Start: 2021-04-05 | End: 2021-04-06

## 2021-04-05 RX ORDER — HYDROMORPHONE HCL 110MG/55ML
2 PATIENT CONTROLLED ANALGESIA SYRINGE INTRAVENOUS
Status: DISCONTINUED | OUTPATIENT
Start: 2021-04-05 | End: 2021-04-08

## 2021-04-05 RX ORDER — LORAZEPAM 2 MG/1
2 TABLET ORAL
Status: DISCONTINUED | OUTPATIENT
Start: 2021-04-05 | End: 2021-04-12 | Stop reason: HOSPADM

## 2021-04-05 RX ORDER — LORAZEPAM 2 MG/ML
1 INJECTION INTRAMUSCULAR ONCE
Status: COMPLETED | OUTPATIENT
Start: 2021-04-05 | End: 2021-04-05

## 2021-04-05 RX ORDER — NICOTINE 21 MG/24HR
1 PATCH, TRANSDERMAL 24 HOURS TRANSDERMAL EVERY 24 HOURS
Status: DISCONTINUED | OUTPATIENT
Start: 2021-04-05 | End: 2021-04-12 | Stop reason: HOSPADM

## 2021-04-05 RX ORDER — HYDROMORPHONE HCL 110MG/55ML
2 PATIENT CONTROLLED ANALGESIA SYRINGE INTRAVENOUS ONCE
Status: COMPLETED | OUTPATIENT
Start: 2021-04-05 | End: 2021-04-05

## 2021-04-05 RX ORDER — SODIUM CHLORIDE 9 MG/ML
150 INJECTION, SOLUTION INTRAVENOUS CONTINUOUS
Status: DISCONTINUED | OUTPATIENT
Start: 2021-04-05 | End: 2021-04-06

## 2021-04-05 RX ORDER — ONDANSETRON 2 MG/ML
4 INJECTION INTRAMUSCULAR; INTRAVENOUS ONCE
Status: COMPLETED | OUTPATIENT
Start: 2021-04-05 | End: 2021-04-05

## 2021-04-05 RX ORDER — SODIUM CHLORIDE 9 MG/ML
150 INJECTION, SOLUTION INTRAVENOUS CONTINUOUS
Status: DISCONTINUED | OUTPATIENT
Start: 2021-04-05 | End: 2021-04-05 | Stop reason: SDUPTHER

## 2021-04-05 RX ORDER — SODIUM CHLORIDE 0.9 % (FLUSH) 0.9 %
10 SYRINGE (ML) INJECTION EVERY 12 HOURS SCHEDULED
Status: DISCONTINUED | OUTPATIENT
Start: 2021-04-05 | End: 2021-04-12 | Stop reason: HOSPADM

## 2021-04-05 RX ORDER — PANTOPRAZOLE SODIUM 40 MG/10ML
40 INJECTION, POWDER, LYOPHILIZED, FOR SOLUTION INTRAVENOUS
Status: DISCONTINUED | OUTPATIENT
Start: 2021-04-06 | End: 2021-04-12

## 2021-04-05 RX ORDER — ONDANSETRON 2 MG/ML
4 INJECTION INTRAMUSCULAR; INTRAVENOUS EVERY 6 HOURS PRN
Status: DISCONTINUED | OUTPATIENT
Start: 2021-04-05 | End: 2021-04-12 | Stop reason: HOSPADM

## 2021-04-05 RX ADMIN — SODIUM CHLORIDE 150 ML/HR: 9 INJECTION, SOLUTION INTRAVENOUS at 23:02

## 2021-04-05 RX ADMIN — MORPHINE SULFATE 4 MG: 4 INJECTION INTRAVENOUS at 16:06

## 2021-04-05 RX ADMIN — NICOTINE 1 PATCH: 21 PATCH, EXTENDED RELEASE TRANSDERMAL at 20:45

## 2021-04-05 RX ADMIN — LORAZEPAM 1 MG: 2 INJECTION INTRAMUSCULAR; INTRAVENOUS at 19:22

## 2021-04-05 RX ADMIN — HYDROMORPHONE HYDROCHLORIDE 2 MG: 2 INJECTION, SOLUTION INTRAMUSCULAR; INTRAVENOUS; SUBCUTANEOUS at 23:01

## 2021-04-05 RX ADMIN — SODIUM CHLORIDE, PRESERVATIVE FREE 10 ML: 5 INJECTION INTRAVENOUS at 23:02

## 2021-04-05 RX ADMIN — HYDROMORPHONE HYDROCHLORIDE 2 MG: 2 INJECTION, SOLUTION INTRAMUSCULAR; INTRAVENOUS; SUBCUTANEOUS at 19:09

## 2021-04-05 RX ADMIN — SODIUM CHLORIDE 1000 ML: 900 INJECTION, SOLUTION INTRAVENOUS at 17:23

## 2021-04-05 RX ADMIN — IOPAMIDOL 80 ML: 612 INJECTION, SOLUTION INTRAVENOUS at 18:07

## 2021-04-05 RX ADMIN — ONDANSETRON HYDROCHLORIDE 4 MG: 2 INJECTION, SOLUTION INTRAMUSCULAR; INTRAVENOUS at 16:06

## 2021-04-05 RX ADMIN — LORAZEPAM 1 MG: 2 INJECTION INTRAMUSCULAR; INTRAVENOUS at 20:45

## 2021-04-05 RX ADMIN — LORAZEPAM 1 MG: 2 INJECTION INTRAMUSCULAR; INTRAVENOUS at 23:14

## 2021-04-05 RX ADMIN — HYDROMORPHONE HYDROCHLORIDE 1 MG: 1 INJECTION, SOLUTION INTRAMUSCULAR; INTRAVENOUS; SUBCUTANEOUS at 17:24

## 2021-04-05 RX ADMIN — HYDROMORPHONE HYDROCHLORIDE 2 MG: 2 INJECTION, SOLUTION INTRAMUSCULAR; INTRAVENOUS; SUBCUTANEOUS at 20:45

## 2021-04-05 RX ADMIN — FOLIC ACID 1000 ML/HR: 5 INJECTION, SOLUTION INTRAMUSCULAR; INTRAVENOUS; SUBCUTANEOUS at 20:06

## 2021-04-05 NOTE — ED TRIAGE NOTES
Pt comes in today via EMS after being picked up outside of the clinic. She is being treated for C DIFF and has a hx of pancreatitis. She reports nausea and abdominal pain.       She is alert and oriented x4. Anxious and crying c/o severe abdominal pain.

## 2021-04-05 NOTE — ED PROVIDER NOTES
Subjective   History of Present Illness  36 yr old female with history of alcoholism, GERD and HTN presents today from the [facility] clinic with complains of few days history of worsening abdominal pain and distention. The pain is mainly located in the epigastric region, radiating bilaterally. Patient also complains of nausea associated with it. Patient drinks one pint of Rum a day and her last drink was yesterday. She reports she is C.Diff positive and has been having watery diarrhea.       Review of Systems   Constitutional: Positive for activity change, appetite change, fatigue and fever. Negative for chills, diaphoresis and unexpected weight change.   Eyes: Negative for visual disturbance.   Respiratory: Negative for cough, shortness of breath and wheezing.    Cardiovascular: Negative for chest pain and palpitations.   Gastrointestinal: Positive for abdominal distention, abdominal pain, diarrhea and nausea. Negative for blood in stool, constipation and vomiting.   Genitourinary: Negative for difficulty urinating, flank pain, frequency and urgency.   Musculoskeletal: Negative for arthralgias and myalgias.   Skin: Negative for color change.   Neurological: Negative for dizziness, weakness and light-headedness.   Psychiatric/Behavioral: Positive for sleep disturbance. Negative for confusion. The patient is nervous/anxious.        Past Medical History:   Diagnosis Date   • Abnormal Pap smear of cervix    • Alcohol-induced acute pancreatitis 6/1/2020    Results From Last 14 Days Lab Units 02/27/21 1342 LIPASE U/L 65*  -advance diet as tolerated starting with clears -IV fluids   -will cont suboxone and give morphine for breakthrough pain  -Zofran for nausea as needed -Scheduled Ativan, and CIWA protocol - f/u on CT abdomen     • Alcoholism (CMS/HCC)    • Anxiety    • Cervical dysplasia    • Depression    • Fibrocystic breast    • GERD (gastroesophageal reflux disease)    • Hypertension    • Seizures (CMS/HCC)  2017   • Substance abuse (CMS/[facility])    • Substance abuse (CMS/[facility])        No Known Allergies    Past Surgical History:   Procedure Laterality Date   • COLONOSCOPY N/A 2/2/2021    Procedure: COLONOSCOPY;  Surgeon: Mirian Mills MD;  Location: [facility] ENDOSCOPY;  Service: Gastroenterology;  Laterality: N/A;   • ENDOSCOPY N/A 1/8/2021    Procedure: ESOPHAGOGASTRODUODENOSCOPY;  Surgeon: Mirian Mills MD;  Location: [facility] ENDOSCOPY;  Service: Gastroenterology;  Laterality: N/A;   • RHINOPLASTY         Family History   Problem Relation Age of Onset   • Breast cancer Mother    • Cancer Mother    • COPD Mother    • Breast cancer Maternal Grandmother    • COPD Maternal Grandmother    • Heart disease Maternal Grandmother    • Breast cancer Paternal Grandmother    • Breast cancer Maternal Aunt    • Hypertension Father    • Heart disease Father        Social History     Socioeconomic History   • Marital status:      Spouse name: Not on file   • Number of children: Not on file   • Years of education: Not on file   • Highest education level: Not on file   Tobacco Use   • Smoking status: Current Every Day Smoker     Packs/day: 1.00     Years: 20.00     Pack years: 20.00     Types: Cigarettes   • Smokeless tobacco: Never Used   Substance and Sexual Activity   • Alcohol use: Yes     Alcohol/week: 6.0 standard drinks     Types: 6 Shots of liquor per week     Comment: daily   • Drug use: Not Currently     Types: Fentanyl, Oxycodone     Comment: hx of abuse   • Sexual activity: Defer           Objective   Physical Exam  Vitals and nursing note reviewed.   Constitutional:       General: She is in acute distress.      Appearance: She is well-developed and normal weight. She is not ill-appearing or toxic-appearing.   HENT:      Head: Normocephalic and atraumatic.   Eyes:      Extraocular Movements: Extraocular movements intact.      Pupils: Pupils are equal, round, and reactive to light.   Cardiovascular:      Rate and  Rhythm: Normal rate and regular rhythm.      Heart sounds: Normal heart sounds. No murmur heard.     Pulmonary:      Effort: Pulmonary effort is normal. No respiratory distress.      Breath sounds: Normal breath sounds. No wheezing.   Abdominal:      General: Abdomen is flat. Bowel sounds are normal. There is distension.      Palpations: Abdomen is soft. There is no fluid wave.      Tenderness: There is generalized abdominal tenderness and tenderness in the epigastric area. There is no right CVA tenderness or left CVA tenderness. Negative signs include Majano's sign.   Skin:     General: Skin is warm and dry.      Capillary Refill: Capillary refill takes less than 2 seconds.   Neurological:      General: No focal deficit present.      Mental Status: She is alert and oriented to person, place, and time.   Psychiatric:         Mood and Affect: Mood is anxious.      Comments: Teary          Procedures           ED Course  ED Course as of Apr 05 1808   Mon Apr 05, 2021 1714 Patient continues to have abdominal pain. Ordered dilaudid along with bolus fluids and banana bag due to chronic alcohol use.     [LN]      ED Course User Index  [LN] Erica Jacob MD      Results for orders placed or performed during the hospital encounter of 04/05/21   Comprehensive Metabolic Panel    Specimen: Blood   Result Value Ref Range    Glucose 93 65 - 99 mg/dL    BUN 9 6 - 20 mg/dL    Creatinine 0.50 (L) 0.57 - 1.00 mg/dL    Sodium 134 (L) 136 - 145 mmol/L    Potassium 3.6 3.5 - 5.2 mmol/L    Chloride 101 98 - 107 mmol/L    CO2 24.0 22.0 - 29.0 mmol/L    Calcium 9.5 8.6 - 10.5 mg/dL    Total Protein 7.1 6.0 - 8.5 g/dL    Albumin 4.00 3.50 - 5.20 g/dL    ALT (SGPT) 44 (H) 1 - 33 U/L    AST (SGOT) 83 (H) 1 - 32 U/L    Alkaline Phosphatase 142 (H) 39 - 117 U/L    Total Bilirubin 0.5 0.0 - 1.2 mg/dL    eGFR Non African Amer 140 >60 mL/min/1.73    Globulin 3.1 gm/dL    A/G Ratio 1.3 g/dL    BUN/Creatinine Ratio 18.0 7.0 - 25.0    Anion Gap  9.0 5.0 - 15.0 mmol/L   Lipase    Specimen: Blood   Result Value Ref Range    Lipase 1,087 (H) 13 - 60 U/L   Urinalysis With Microscopic If Indicated (No Culture) - Urine, Clean Catch    Specimen: Urine, Clean Catch   Result Value Ref Range    Color, UA Yellow Yellow, Straw, Dark Yellow, Rhonda    Appearance, UA Clear Clear    pH, UA 6.5 5.0 - 9.0    Specific Gravity, UA 1.010 1.003 - 1.030    Glucose, UA Negative Negative    Ketones, UA Negative Negative    Bilirubin, UA Negative Negative    Blood, UA Negative Negative    Protein, UA Negative Negative    Leuk Esterase, UA Negative Negative    Nitrite, UA Negative Negative    Urobilinogen, UA 0.2 E.U./dL 0.2 - 1.0 E.U./dL   hCG, Serum, Qualitative    Specimen: Blood   Result Value Ref Range    HCG Qualitative Negative Negative   CBC Auto Differential    Specimen: Blood   Result Value Ref Range    WBC 9.90 3.40 - 10.80 10*3/mm3    RBC 3.66 (L) 3.77 - 5.28 10*6/mm3    Hemoglobin 13.3 12.0 - 15.9 g/dL    Hematocrit 37.3 34.0 - 46.6 %    .9 (H) 79.0 - 97.0 fL    MCH 36.3 (H) 26.6 - 33.0 pg    MCHC 35.7 31.5 - 35.7 g/dL    RDW 13.1 12.3 - 15.4 %    RDW-SD 49.6 37.0 - 54.0 fl    MPV 10.0 6.0 - 12.0 fL    Platelets 176 140 - 450 10*3/mm3    Neutrophil % 75.2 42.7 - 76.0 %    Lymphocyte % 20.6 19.6 - 45.3 %    Monocyte % 3.0 (L) 5.0 - 12.0 %    Eosinophil % 0.3 0.3 - 6.2 %    Basophil % 0.5 0.0 - 1.5 %    Immature Grans % 0.4 0.0 - 0.5 %    Neutrophils, Absolute 7.44 (H) 1.70 - 7.00 10*3/mm3    Lymphocytes, Absolute 2.04 0.70 - 3.10 10*3/mm3    Monocytes, Absolute 0.30 0.10 - 0.90 10*3/mm3    Eosinophils, Absolute 0.03 0.00 - 0.40 10*3/mm3    Basophils, Absolute 0.05 0.00 - 0.20 10*3/mm3    Immature Grans, Absolute 0.04 0.00 - 0.05 10*3/mm3    nRBC 0.0 0.0 - 0.2 /100 WBC   Lactic Acid, Plasma    Specimen: Blood   Result Value Ref Range    Lactate 1.9 0.5 - 2.0 mmol/L   Light Blue Top   Result Value Ref Range    Extra Tube hold for add-on    Green Top (Gel)   Result  Value Ref Range    Extra Tube Hold for add-ons.    Lavender Top   Result Value Ref Range    Extra Tube hold for add-on                                           MDM  Number of Diagnoses or Management Options  Diagnosis management comments: Patient presented to ED with stable vitals. Has prior history of pancreatitis, chronic alcohol use. Last drink was yesterday. CT abdomen ordered awaiting results. Laboratory work up showed elevated lipase. Admitting patient for treatment of acute pancreatitis.          Amount and/or Complexity of Data Reviewed  Clinical lab tests: reviewed  Tests in the medicine section of CPT®: reviewed        Final diagnoses:   None       ED Disposition  ED Disposition     ED Disposition Condition Comment    Decision to Admit  Level of Care: Med/Surg [1]   Diagnosis: Acute alcoholic pancreatitis [222056]   Admitting Physician: FLOYD DE LA ROSA [[id number]]   Attending Physician: FLOYD DE LA ROSA [[id number]]   Certification: I Certify That Inpatient Hospital Services Are Medically Necessary For Greater Than 2 Midnights            No follow-up provider specified.       Medication List      No changes were made to your prescriptions during this visit.          Erica Jacob MD  Resident  04/05/21 1331

## 2021-04-05 NOTE — H&P
HISTORY AND PHYSICAL  NAME: Nata Bain  : 1985  MRN: [id number]    DATE OF ADMISSION:  2021     DATE & TIME SEEN: 21 at 1837    PCP: Zeeshan Wiggins MD    CODE STATUS: Full code    CHIEF COMPLAINT:  Abdominal pain    HPI:  Nata Bain is a 36 y.o. female with a CMH of alcohol abuse, previous opioid use currently receiving Suboxone treatment, anxiety, depression who presents complaining of abdominal pain that started 2 days ago.  Describes it as a sharp, squeezing epigastric pain that shoots down to her periumbilical region and to her hips.  States that the slightest movement will exacerbate her abdominal pain.  She has been admitted for pancreatitis 5 times previously.  Patient states that she drinks a pint of rum daily.    CONCURRENT MEDICAL HISTORY:  Past Medical History:   Diagnosis Date   • Abnormal Pap smear of cervix    • Alcohol-induced acute pancreatitis 2020    Results From Last 14 Days Lab Units 21 1342 LIPASE U/L 65*  -advance diet as tolerated starting with clears -IV fluids   -will cont suboxone and give morphine for breakthrough pain  -Zofran for nausea as needed -Scheduled Ativan, and CIWA protocol - f/u on CT abdomen     • Alcoholism (CMS/HCC)    • Anxiety    • Cervical dysplasia    • Depression    • Fibrocystic breast    • GERD (gastroesophageal reflux disease)    • Hypertension    • Seizures (CMS/HCC) 2017   • Substance abuse (CMS/HCC)    • Substance abuse (CMS/HCC)        PAST SURGICAL HISTORY:  Past Surgical History:   Procedure Laterality Date   • COLONOSCOPY N/A 2021    Procedure: COLONOSCOPY;  Surgeon: Mirian Mills MD;  Location: [facility] ENDOSCOPY;  Service: Gastroenterology;  Laterality: N/A;   • ENDOSCOPY N/A 2021    Procedure: ESOPHAGOGASTRODUODENOSCOPY;  Surgeon: Mirian Mills MD;  Location: [facility] ENDOSCOPY;  Service: Gastroenterology;  Laterality: N/A;   • RHINOPLASTY         FAMILY HISTORY:  Family History   Problem  Relation Age of Onset   • Breast cancer Mother    • Cancer Mother    • COPD Mother    • Breast cancer Maternal Grandmother    • COPD Maternal Grandmother    • Heart disease Maternal Grandmother    • Breast cancer Paternal Grandmother    • Breast cancer Maternal Aunt    • Hypertension Father    • Heart disease Father         SOCIAL HISTORY:  Social History     Socioeconomic History   • Marital status:      Spouse name: Not on file   • Number of children: Not on file   • Years of education: Not on file   • Highest education level: Not on file   Tobacco Use   • Smoking status: Current Every Day Smoker     Packs/day: 1.00     Years: 20.00     Pack years: 20.00     Types: Cigarettes   • Smokeless tobacco: Never Used   Substance and Sexual Activity   • Alcohol use: Yes     Alcohol/week: 6.0 standard drinks     Types: 6 Shots of liquor per week     Comment: daily   • Drug use: Not Currently     Types: Fentanyl, Oxycodone     Comment: hx of abuse   • Sexual activity: Defer       HOME MEDICATIONS:  Prior to Admission medications    Medication Sig Start Date End Date Taking? Authorizing Provider   acamprosate (CAMPRAL) 333 MG EC tablet  3/23/21   Caty Yepez MD   buprenorphine-naloxone (SUBOXONE) 8-2 MG per SL tablet Place 1 tablet under the tongue 3 (Three) Times a Day. Patient takes one 8-2 tablet three times a day. 2/20/20   Caty Yepez MD   cloNIDine (CATAPRES) 0.1 MG tablet Take 0.1 mg by mouth 3 (Three) Times a Day.    Caty Yepez MD   cyanocobalamin (VITAMIN B-12) 1000 MCG tablet Take 1 tablet by mouth Daily. 1/19/21   Zeeshan Wiggins MD   diazePAM (VALIUM) 5 MG tablet Take 10 mg by mouth Every 8 (Eight) Hours As Needed. 1/16/20   Caty Yepez MD   FLUoxetine (PROzac) 40 MG capsule Take 40 mg by mouth Daily. 3/9/21   Caty Yepez MD   folic acid (FOLVITE) 1 MG tablet Take 1 tablet by mouth Daily. 3/5/21   Delmy Jarrett MD   gabapentin (NEURONTIN) 800 MG tablet  Take 800 mg by mouth 3 (Three) Times a Day. 12/31/20   Caty Yepez MD   pantoprazole (Protonix) 40 MG EC tablet Take 1 tablet by mouth Daily. 1/10/21   Maryam Galdamez MD   prazosin (MINIPRESS) 5 MG capsule Take 5 mg by mouth Every Night. 1/7/21   Caty Yepez MD   promethazine (PHENERGAN) 25 MG suppository Insert 1 suppository into the rectum Every 6 (Six) Hours As Needed for Nausea or Vomiting. 1/22/21   Zeeshan Wiggins MD   sucralfate (CARAFATE) 1 g tablet  2/15/21   Caty Yepez MD   thiamine (thiamine) 100 MG tablet tablet Take 1 tablet by mouth Daily. 1/19/21   Zeeshan Wiggins MD   vancomycin (VANCOCIN) 125 MG capsule 1 capsule by mouth four times a day for 14 days. Then 1 capsule 2 times a day for 7 days. Then 1 capsule daily for 7 days.  1 capsule every 3 days for 28 days. 3/26/21   Zeeshan Wiggins MD       ALLERGIES:  Patient has no known allergies.    REVIEW OF SYSTEMS  Review of Systems   Constitutional: Positive for activity change. Negative for fever.   Respiratory: Negative for shortness of breath and wheezing.    Cardiovascular: Negative for chest pain and leg swelling.   Gastrointestinal: Positive for abdominal pain and diarrhea. Negative for vomiting.   Genitourinary: Negative for dysuria and urgency.   Musculoskeletal: Negative for back pain.   Skin: Negative for color change.   Neurological: Negative for seizures and headaches.   Psychiatric/Behavioral: Positive for agitation. Negative for confusion.       PHYSICAL EXAM:  Temp:  [96.7 °F (35.9 °C)-98.4 °F (36.9 °C)] 96.7 °F (35.9 °C)  Heart Rate:  [] 93  Resp:  [20-21] 20  BP: ()/(55-83) 125/80  Body mass index is 19.46 kg/m².  Physical Exam  Constitutional:       General: She is in acute distress.   HENT:      Head: Normocephalic and atraumatic.      Right Ear: External ear normal.      Left Ear: External ear normal.      Nose: Nose normal.      Mouth/Throat:      Mouth: Mucous membranes are moist.        Pharynx: Oropharynx is clear.   Eyes:      Extraocular Movements: Extraocular movements intact.      Pupils: Pupils are equal, round, and reactive to light.   Cardiovascular:      Rate and Rhythm: Normal rate and regular rhythm.      Pulses: Normal pulses.      Heart sounds: Normal heart sounds.   Pulmonary:      Effort: Pulmonary effort is normal.      Breath sounds: Normal breath sounds.   Abdominal:      Tenderness: There is abdominal tenderness.   Musculoskeletal:         General: No swelling. Normal range of motion.      Cervical back: Normal range of motion and neck supple.   Skin:     General: Skin is warm.   Neurological:      General: No focal deficit present.      Mental Status: She is oriented to person, place, and time.   Psychiatric:         Mood and Affect: Affect is tearful.         Behavior: Behavior is agitated.         DIAGNOSTIC DATA:   Lab Results (last 24 hours)     Procedure Component Value Units Date/Time    COVID-19 and FLU A/B PCR - Swab, Nasopharynx [[id number]]  (Normal) Collected: 04/05/21 1659    Specimen: Swab from Nasopharynx Updated: 04/05/21 1921     COVID19 Not Detected     Influenza A PCR Not Detected     Influenza B PCR Not Detected    Narrative:      Fact sheet for providers: https://www.fda.gov/media/073296/download    Fact sheet for patients: https://www.fda.gov/media/302460/download    Test performed by PCR.    Ethanol [[id number]]  (Abnormal) Collected: 04/05/21 1645    Specimen: Blood Updated: 04/05/21 1825     Ethanol 118 mg/dL      Ethanol % 0.118 %     Trail Draw [[id number]] Collected: 04/05/21 1645    Specimen: Blood Updated: 04/05/21 1745    Narrative:      The following orders were created for panel order Trail Draw.  Procedure                               Abnormality         Status                     ---------                               -----------         ------                     Light Blue Top[[id number]]                                   Final result                Green Top (Gel)[[id number]]                                  Final result               Lavender Top[[id number]]                                     Final result               Gold Top - SST[[id number]]                                                                Please view results for these tests on the individual orders.    Lavender Top [[id number]] Collected: 04/05/21 1645    Specimen: Blood Updated: 04/05/21 1745     Extra Tube hold for add-on     Comment: Auto resulted       Light Blue Top [[id number]] Collected: 04/05/21 1645    Specimen: Blood Updated: 04/05/21 1745     Extra Tube hold for add-on     Comment: Auto resulted       Green Top (Gel) [[id number]] Collected: 04/05/21 1645    Specimen: Blood Updated: 04/05/21 1745     Extra Tube Hold for add-ons.     Comment: Auto resulted.       Lipase [[id number]]  (Abnormal) Collected: 04/05/21 1645    Specimen: Blood Updated: 04/05/21 1737     Lipase 1,087 U/L     Comprehensive Metabolic Panel [[id number]]  (Abnormal) Collected: 04/05/21 1645    Specimen: Blood Updated: 04/05/21 1730     Glucose 93 mg/dL      BUN 9 mg/dL      Creatinine 0.50 mg/dL      Sodium 134 mmol/L      Potassium 3.6 mmol/L      Chloride 101 mmol/L      CO2 24.0 mmol/L      Calcium 9.5 mg/dL      Total Protein 7.1 g/dL      Albumin 4.00 g/dL      ALT (SGPT) 44 U/L      AST (SGOT) 83 U/L      Alkaline Phosphatase 142 U/L      Total Bilirubin 0.5 mg/dL      eGFR Non African Amer 140 mL/min/1.73      Globulin 3.1 gm/dL      A/G Ratio 1.3 g/dL      BUN/Creatinine Ratio 18.0     Anion Gap 9.0 mmol/L     Narrative:      GFR Normal >60  Chronic Kidney Disease <60  Kidney Failure <15      Lactic Acid, Plasma [[id number]]  (Normal) Collected: 04/05/21 1645    Specimen: Blood Updated: 04/05/21 1728     Lactate 1.9 mmol/L     hCG, Serum, Qualitative [[id number]]  (Normal) Collected: 04/05/21 1645    Specimen: Blood Updated: 04/05/21 1728     HCG Qualitative Negative    CBC & Differential  [[id number]]  (Abnormal) Collected: 04/05/21 1645    Specimen: Blood Updated: 04/05/21 1654    Narrative:      The following orders were created for panel order CBC & Differential.  Procedure                               Abnormality         Status                     ---------                               -----------         ------                     CBC Auto Differential[[id number]]        Abnormal            Final result                 Please view results for these tests on the individual orders.    CBC Auto Differential [[id number]]  (Abnormal) Collected: 04/05/21 1645    Specimen: Blood Updated: 04/05/21 1654     WBC 9.90 10*3/mm3      RBC 3.66 10*6/mm3      Hemoglobin 13.3 g/dL      Hematocrit 37.3 %      .9 fL      MCH 36.3 pg      MCHC 35.7 g/dL      RDW 13.1 %      RDW-SD 49.6 fl      MPV 10.0 fL      Platelets 176 10*3/mm3      Neutrophil % 75.2 %      Lymphocyte % 20.6 %      Monocyte % 3.0 %      Eosinophil % 0.3 %      Basophil % 0.5 %      Immature Grans % 0.4 %      Neutrophils, Absolute 7.44 10*3/mm3      Lymphocytes, Absolute 2.04 10*3/mm3      Monocytes, Absolute 0.30 10*3/mm3      Eosinophils, Absolute 0.03 10*3/mm3      Basophils, Absolute 0.05 10*3/mm3      Immature Grans, Absolute 0.04 10*3/mm3      nRBC 0.0 /100 WBC     Urinalysis With Microscopic If Indicated (No Culture) - Urine, Clean Catch [[id number]]  (Normal) Collected: 04/05/21 1637    Specimen: Urine, Clean Catch Updated: 04/05/21 1646     Color, UA Yellow     Appearance, UA Clear     pH, UA 6.5     Specific Gravity, UA 1.010     Glucose, UA Negative     Ketones, UA Negative     Bilirubin, UA Negative     Blood, UA Negative     Protein, UA Negative     Leuk Esterase, UA Negative     Nitrite, UA Negative     Urobilinogen, UA 0.2 E.U./dL    Narrative:      Urine microscopic not indicated.           Imaging Results (Last 24 Hours)     Procedure Component Value Units Date/Time    CT Abdomen Pelvis With Contrast [[id number]]  Collected: 04/05/21 1805     Updated: 04/05/21 1827    Narrative:        Patient Name: SHAWNEE SPRINGER    ORDERING: VANDANA PUENTE    ATTENDING: THUY DUNNE     REFERRING: VANDANA PUENTE    -----------------------  EXAM DESCRIPTION: CT ABDOMEN PELVIS W CONTRAST    HISTORY: Abdominal pain, acute, nonlocalized    COMPARISON: 2/27/2021    Dose Length Product: 329.3.    This exam was performed according to our departmental dose  optimization program, which includes automated exposure control,  adjustment of the mA and/or KV according to patient size and/or  use of iterative reconstruction technique.      CONTRAST:   80 mL intravenous Isovue 300.      TECHNIQUE: Multiple contiguous contrast enhanced axial images are  obtained of the abdomen and pelvis with coronal and sagittal  reformat images provided.     FINDINGS:     LOWER CHEST: No pulmonary consolidation or pleural effusion. No  cardiac enlargement or pericardial effusion.    HEPATOBILIARY: Redemonstration of diffuse fatty liver  infiltration and hepatic enlargement. No development of a  suspicious hepatic lesion or ductal dilation. No calcified stones  or pericholecystic inflammatory change. There is note of a  phrygian cap. There is redemonstrated prominence of the  extrahepatic bile duct.  SPLEEN: Unremarkable.  PANCREAS: There is now inflammatory changes and fluid centered  about the pancreas greatest at its body and tail portions within  the anterior pararenal space left greater than right. Findings  are most consistent with acute pancreatitis. No abscess or  abnormal gas collection associated with the abnormality.  ADRENAL GLANDS: Unremarkable.  KIDNEYS/URETERS: No suspicious renal mass, renal/ureteral  calcification, or obstructive uropathy.    GASTROINTESTINAL: No evidence of bowel obstruction terminal ileum  and ileocecal junction are unremarkable. There is some liquid  stool within the cecum. No acute inflammatory changes  identified  otherwise.  REPRODUCTIVE ORGANS: Unremarkable.  URINARY BLADDER: Unremarkable    VASCULAR: Vascular calcification without abdominal aortic  aneurysm.  LYMPH NODES: No pathologically enlarged nodes by size criteria.  PERITONEUM/RETROPERITONEUM: No free air.     OSSEOUS STRUCTURES: No acute findings.    ADDITIONAL FINDINGS: None      Impression:      Fairly extensive inflammatory changes centered at the pancreas  left greater than right anterior pararenal space most consistent  with pancreatitis. No abscess or pseudocyst identified.    Redemonstration of diffuse fatty liver infiltration and hepatic  enlargement.    Electronically signed by:  Larry Ling MD  4/5/2021 6:26 PM CDT  Workstation: [id number]            I reviewed the patient's new clinical results.    ASSESSMENT AND PLAN: This is a 36 y.o. female with:    Active Hospital Problems    Diagnosis  POA   • **Pancreatitis [K85.90]  Yes     Priority: High     -CT abdomen/pelvis confirms pancreatitis  -Lipase >1000  -Given 4 mg IV morphine and 1 mg IV dilaudid in the ED  -NPO  -IVF at 150 cc/hour  -Advance diet as tolerated  -Dilaudid q2 hours prn for severe pain  -Scheduled and PRN Ativan (CIWA)       • Alcohol abuse [F10.10]  Unknown     Priority: High     -CIWA  -IV fluids, IV folate, IV thiamine     • Tobacco abuse [Z72.0]  Unknown     -Nicotine patch     • Anxiety and depression [F41.9, F32.9]  Yes     -Hold home oral antidepressants given n.p.o. status  -Scheduled and as needed Ativan(CIWA)       • Opioid use disorder (CMS/HCC) [F11.99]  Yes     -Hold home gabapentin and Suboxone  -As needed Dilaudid for abdominal pain secondary to pancreatitis           DVT prophylaxis: SCDs/TEDs     Nata Bain and I have discussed pain goals for this hospitalization after reviewing her current clinical condition, medical history and prior pain experiences.  The goal is to keep the pain level <2.  To help achieve this, I plan to use dilaudid.    JOELLE  # reviewed via PDMP and consistent with patient reported medications.    Expected Length of Stay: 1-2 days    I discussed the patient's findings and my recommendations with patient.     Brissa Jeffrey MD is the attending on record at time of admission, She is aware of the patient's status and agrees with the above history and physical.              This document has been electronically signed by Maryam Galdamez MD on April 5, 2021 22:38 CDT

## 2021-04-05 NOTE — ACP (ADVANCE CARE PLANNING)
Patient states that she would like to be full Code.  In the event that she cannot make any medical decisions, she would like to designate her mother and her  to make decisions on her behalf.  Her mother's name is Negra Bain and her 's name is Jorge Sanz.  His cell phone number is on the chart.              This document has been electronically signed by Maryam Galdamez MD on April 5, 2021 22:24 CDT

## 2021-04-05 NOTE — PROGRESS NOTES
Family Medicine Residency  Giovany Aponte MD    Subjective:     Nata Bain is a 36 y.o. female with a history of alcohol induced acute pancreatitis x6 and Clostridium difficile colitis currently being treated with vancomycin who presents with severe, diffuse abdominal pain.    I was called into the room to urgently evaluate Ms. Bain. She was in a wheelchair and in tears when I arrived.  She was tachycardic and normotensive and had diffuse abdominal tenderness in all 4 quadrants. She admitted to drinking alcohol overnight and continued to endorse frequent profuse watery diarrhea that has not changed since her recent discharge from [facility] in March of this year.    Based on the patient's presentation, I called for an ambulance to take the patient and her  to the emergency department for evaluation.    : Due to the urgent nature of the patient's presentation, I was unable to take a more thorough history her past medical history.    Past Medical Hx:  Past Medical History:   Diagnosis Date   • Abnormal Pap smear of cervix    • Alcohol-induced acute pancreatitis 6/1/2020    Results From Last 14 Days Lab Units 02/27/21 1342 LIPASE U/L 65*  -advance diet as tolerated starting with clears -IV fluids   -will cont suboxone and give morphine for breakthrough pain  -Zofran for nausea as needed -Scheduled Ativan, and CIWA protocol - f/u on CT abdomen     • Alcoholism (CMS/HCC)    • Anxiety    • Cervical dysplasia    • Depression    • Fibrocystic breast    • GERD (gastroesophageal reflux disease)    • Hypertension    • Seizures (CMS/HCC) 2017   • Substance abuse (CMS/HCC)    • Substance abuse (CMS/HCC)        Past Surgical Hx:  Past Surgical History:   Procedure Laterality Date   • COLONOSCOPY N/A 2/2/2021    Procedure: COLONOSCOPY;  Surgeon: Mirian Mills MD;  Location: [facility] ENDOSCOPY;  Service: Gastroenterology;  Laterality: N/A;   • ENDOSCOPY N/A 1/8/2021    Procedure:  ESOPHAGOGASTRODUODENOSCOPY;  Surgeon: Mirian Mills MD;  Location: [facility] ENDOSCOPY;  Service: Gastroenterology;  Laterality: N/A;   • RHINOPLASTY         Current Meds:    Current Outpatient Medications:   •  acamprosate (CAMPRAL) 333 MG EC tablet, , Disp: , Rfl:   •  buprenorphine-naloxone (SUBOXONE) 8-2 MG per SL tablet, Place 1 tablet under the tongue 3 (Three) Times a Day. Patient takes one 8-2 tablet three times a day., Disp: , Rfl:   •  cloNIDine (CATAPRES) 0.1 MG tablet, Take 0.1 mg by mouth 3 (Three) Times a Day., Disp: , Rfl:   •  cyanocobalamin (VITAMIN B-12) 1000 MCG tablet, Take 1 tablet by mouth Daily., Disp: 30 tablet, Rfl: 5  •  diazePAM (VALIUM) 5 MG tablet, Take 10 mg by mouth Every 8 (Eight) Hours As Needed., Disp: , Rfl:   •  FLUoxetine (PROzac) 40 MG capsule, Take 40 mg by mouth Daily., Disp: , Rfl:   •  folic acid (FOLVITE) 1 MG tablet, Take 1 tablet by mouth Daily., Disp: 30 tablet, Rfl: 0  •  gabapentin (NEURONTIN) 800 MG tablet, Take 800 mg by mouth 3 (Three) Times a Day., Disp: , Rfl:   •  pantoprazole (Protonix) 40 MG EC tablet, Take 1 tablet by mouth Daily., Disp: 30 tablet, Rfl: 1  •  prazosin (MINIPRESS) 5 MG capsule, Take 5 mg by mouth Every Night., Disp: , Rfl:   •  promethazine (PHENERGAN) 25 MG suppository, Insert 1 suppository into the rectum Every 6 (Six) Hours As Needed for Nausea or Vomiting., Disp: 12 suppository, Rfl: 0  •  sucralfate (CARAFATE) 1 g tablet, , Disp: , Rfl:   •  thiamine (thiamine) 100 MG tablet tablet, Take 1 tablet by mouth Daily., Disp: 30 tablet, Rfl: 5  •  vancomycin (VANCOCIN) 125 MG capsule, 1 capsule by mouth four times a day for 14 days. Then 1 capsule 2 times a day for 7 days. Then 1 capsule daily for 7 days.  1 capsule every 3 days for 28 days., Disp: 56 capsule, Rfl: 0    Allergies:  No Known Allergies    Family Hx:  Family History   Problem Relation Age of Onset   • Breast cancer Mother    • Cancer Mother    • COPD Mother    • Breast cancer  "Maternal Grandmother    • COPD Maternal Grandmother    • Heart disease Maternal Grandmother    • Breast cancer Paternal Grandmother    • Breast cancer Maternal Aunt    • Hypertension Father    • Heart disease Father         Social History:  Social History     Socioeconomic History   • Marital status:      Spouse name: Not on file   • Number of children: Not on file   • Years of education: Not on file   • Highest education level: Not on file   Tobacco Use   • Smoking status: Current Every Day Smoker     Packs/day: 1.00     Years: 20.00     Pack years: 20.00     Types: Cigarettes   • Smokeless tobacco: Never Used   Substance and Sexual Activity   • Alcohol use: Yes     Alcohol/week: 6.0 standard drinks     Types: 6 Shots of liquor per week     Comment: daily   • Drug use: Not Currently     Types: Fentanyl, Oxycodone     Comment: hx of abuse   • Sexual activity: Defer       Review of Systems  Review of Systems   Gastrointestinal: Positive for abdominal pain (severe - diffuse in all 4 quadrants), diarrhea and nausea.       Objective:     /80 (BP Location: Right arm, Patient Position: Sitting, Cuff Size: Adult)   Pulse (!) 41   Ht 170.2 cm (67\")   Wt 58.1 kg (128 lb)   SpO2 97%   BMI 20.05 kg/m²   Physical Exam  Constitutional:       General: She is in acute distress.      Appearance: She is ill-appearing and diaphoretic.   Eyes:      Comments: Tearful from pain   Cardiovascular:      Rate and Rhythm: Regular rhythm. Tachycardia present.   Abdominal:      General: There is no distension.      Palpations: Abdomen is soft.      Tenderness: There is abdominal tenderness (in all 4 quadrants). There is guarding and rebound.   Musculoskeletal:      Right lower leg: No edema.      Left lower leg: No edema.          Assessment/Plan:     1.  Severe, diffuse abdominal pain    This patient presented in urgent pain.  The differential diagnosis includes her continuing, uncontrolled C. difficile infection (based on " her history of no improvement in her diarrhea since discharge from [facility] last month on vancomycin) and various complications, such as a toxic megacolon or electrolyte abnormalities from profuse diarrhea.  However, her history of alcoholic pancreatitis x6 and recent history of drinking call overnight could also suggest an alcoholic pancreatitis.    Based on information and for me at the time, I made the decision to call for an ambulance and have the patient present to the emergency department for evaluation.  I believe she needs a CBC, CMP, CAT scan, and lipase, as well as a GI consult and management of her severe pain.        · Rx changes: None  · Patient Education: None     Follow-up:     No follow-ups on file.    Preventative:  Health Maintenance   Topic Date Due   • ANNUAL PHYSICAL  Never done   • Pneumococcal Vaccine 0-64 (1 of 1 - PPSV23) Never done   • TDAP/TD VACCINES (2 - Tdap) 08/02/2011   • INFLUENZA VACCINE  08/01/2021   • PAP SMEAR  10/10/2021   • COLONOSCOPY  02/02/2031   • HEPATITIS C SCREENING  Completed   • MENINGOCOCCAL VACCINE  Aged Out       Alcohol use:  reports current alcohol use of about 6.0 standard drinks of alcohol per week.  Nicotine status  reports that she has been smoking cigarettes. She has a 20.00 pack-year smoking history. She has never used smokeless tobacco.    Goals finding the etiology of the patient's condition and treating her acute presentation   Evaluation in the emergency department         RISK SCORE: 6      This document has been electronically signed by Giovany Aponte MD on April 5, 2021 16:26 CDT

## 2021-04-06 ENCOUNTER — APPOINTMENT (OUTPATIENT)
Dept: ULTRASOUND IMAGING | Facility: HOSPITAL | Age: 36
End: 2021-04-06

## 2021-04-06 ENCOUNTER — APPOINTMENT (OUTPATIENT)
Dept: INTERVENTIONAL RADIOLOGY/VASCULAR | Facility: HOSPITAL | Age: 36
End: 2021-04-06

## 2021-04-06 PROBLEM — E83.42 HYPOMAGNESEMIA: Status: ACTIVE | Noted: 2021-04-06

## 2021-04-06 PROBLEM — F13.20 BENZODIAZEPINE DEPENDENCE: Status: ACTIVE | Noted: 2021-04-06

## 2021-04-06 LAB
ALBUMIN SERPL-MCNC: 3.3 G/DL (ref 3.5–5.2)
ALBUMIN/GLOB SERPL: 1.2 G/DL
ALP SERPL-CCNC: 114 U/L (ref 39–117)
ALT SERPL W P-5'-P-CCNC: 32 U/L (ref 1–33)
ANION GAP SERPL CALCULATED.3IONS-SCNC: 7 MMOL/L (ref 5–15)
AST SERPL-CCNC: 54 U/L (ref 1–32)
BACTERIA UR QL AUTO: ABNORMAL /HPF
BASOPHILS # BLD AUTO: 0.02 10*3/MM3 (ref 0–0.2)
BASOPHILS NFR BLD AUTO: 0.2 % (ref 0–1.5)
BILIRUB SERPL-MCNC: 0.9 MG/DL (ref 0–1.2)
BILIRUB UR QL STRIP: NEGATIVE
BUN SERPL-MCNC: 6 MG/DL (ref 6–20)
BUN/CREAT SERPL: 13 (ref 7–25)
C DIFF TOX GENS STL QL NAA+PROBE: NEGATIVE
CALCIUM SPEC-SCNC: 8.2 MG/DL (ref 8.6–10.5)
CHLORIDE SERPL-SCNC: 101 MMOL/L (ref 98–107)
CHOLEST SERPL-MCNC: 181 MG/DL (ref 0–200)
CLARITY UR: CLEAR
CO2 SERPL-SCNC: 24 MMOL/L (ref 22–29)
COLOR UR: NORMAL
CREAT SERPL-MCNC: 0.46 MG/DL (ref 0.57–1)
DEPRECATED RDW RBC AUTO: 49.2 FL (ref 37–54)
EOSINOPHIL # BLD AUTO: 0.03 10*3/MM3 (ref 0–0.4)
EOSINOPHIL NFR BLD AUTO: 0.3 % (ref 0.3–6.2)
ERYTHROCYTE [DISTWIDTH] IN BLOOD BY AUTOMATED COUNT: 13.1 % (ref 12.3–15.4)
GFR SERPL CREATININE-BSD FRML MDRD: >150 ML/MIN/1.73
GLOBULIN UR ELPH-MCNC: 2.7 GM/DL
GLUCOSE SERPL-MCNC: 98 MG/DL (ref 65–99)
GLUCOSE UR STRIP-MCNC: NEGATIVE MG/DL
HCT VFR BLD AUTO: 32.5 % (ref 34–46.6)
HDLC SERPL-MCNC: 43 MG/DL (ref 40–60)
HGB BLD-MCNC: 11.2 G/DL (ref 12–15.9)
HGB UR QL STRIP.AUTO: NEGATIVE
HYALINE CASTS UR QL AUTO: ABNORMAL /LPF
IMM GRANULOCYTES # BLD AUTO: 0.03 10*3/MM3 (ref 0–0.05)
IMM GRANULOCYTES NFR BLD AUTO: 0.3 % (ref 0–0.5)
KETONES UR QL STRIP: NEGATIVE
LDLC SERPL CALC-MCNC: 91 MG/DL (ref 0–100)
LDLC/HDLC SERPL: 1.88 {RATIO}
LEUKOCYTE ESTERASE UR QL STRIP.AUTO: NEGATIVE
LIPASE SERPL-CCNC: 1307 U/L (ref 13–60)
LYMPHOCYTES # BLD AUTO: 2.31 10*3/MM3 (ref 0.7–3.1)
LYMPHOCYTES NFR BLD AUTO: 25.8 % (ref 19.6–45.3)
MAGNESIUM SERPL-MCNC: 1.1 MG/DL (ref 1.6–2.6)
MCH RBC QN AUTO: 35.4 PG (ref 26.6–33)
MCHC RBC AUTO-ENTMCNC: 34.5 G/DL (ref 31.5–35.7)
MCV RBC AUTO: 102.8 FL (ref 79–97)
MONOCYTES # BLD AUTO: 0.35 10*3/MM3 (ref 0.1–0.9)
MONOCYTES NFR BLD AUTO: 3.9 % (ref 5–12)
NEUTROPHILS NFR BLD AUTO: 6.23 10*3/MM3 (ref 1.7–7)
NEUTROPHILS NFR BLD AUTO: 69.5 % (ref 42.7–76)
NITRITE UR QL STRIP: NEGATIVE
NRBC BLD AUTO-RTO: 0 /100 WBC (ref 0–0.2)
PH UR STRIP.AUTO: 6 [PH] (ref 5–9)
PLATELET # BLD AUTO: 151 10*3/MM3 (ref 140–450)
PMV BLD AUTO: 10.2 FL (ref 6–12)
POTASSIUM SERPL-SCNC: 3.2 MMOL/L (ref 3.5–5.2)
PROT SERPL-MCNC: 6 G/DL (ref 6–8.5)
PROT UR QL STRIP: NEGATIVE
RBC # BLD AUTO: 3.16 10*6/MM3 (ref 3.77–5.28)
RBC # UR: ABNORMAL /HPF
REF LAB TEST METHOD: ABNORMAL
SODIUM SERPL-SCNC: 132 MMOL/L (ref 136–145)
SP GR UR STRIP: 1.02 (ref 1–1.03)
SQUAMOUS #/AREA URNS HPF: ABNORMAL /HPF
TRIGL SERPL-MCNC: 285 MG/DL (ref 0–150)
UROBILINOGEN UR QL STRIP: NORMAL
VLDLC SERPL-MCNC: 47 MG/DL (ref 5–40)
WBC # BLD AUTO: 8.97 10*3/MM3 (ref 3.4–10.8)
WBC UR QL AUTO: ABNORMAL /HPF

## 2021-04-06 PROCEDURE — 81001 URINALYSIS AUTO W/SCOPE: CPT | Performed by: STUDENT IN AN ORGANIZED HEALTH CARE EDUCATION/TRAINING PROGRAM

## 2021-04-06 PROCEDURE — C1751 CATH, INF, PER/CENT/MIDLINE: HCPCS

## 2021-04-06 PROCEDURE — 25010000002 THIAMINE PER 100 MG: Performed by: STUDENT IN AN ORGANIZED HEALTH CARE EDUCATION/TRAINING PROGRAM

## 2021-04-06 PROCEDURE — 25810000003 SODIUM CHLORIDE 0.9 % WITH KCL 40 MEQ/L 40-0.9 MEQ/L-% SOLUTION: Performed by: STUDENT IN AN ORGANIZED HEALTH CARE EDUCATION/TRAINING PROGRAM

## 2021-04-06 PROCEDURE — 25010000002 HYDROMORPHONE PER 4 MG: Performed by: STUDENT IN AN ORGANIZED HEALTH CARE EDUCATION/TRAINING PROGRAM

## 2021-04-06 PROCEDURE — 0097U HC BIOFIRE FILMARRAY GI PANEL: CPT | Performed by: STUDENT IN AN ORGANIZED HEALTH CARE EDUCATION/TRAINING PROGRAM

## 2021-04-06 PROCEDURE — 80053 COMPREHEN METABOLIC PANEL: CPT | Performed by: STUDENT IN AN ORGANIZED HEALTH CARE EDUCATION/TRAINING PROGRAM

## 2021-04-06 PROCEDURE — 25010000002 LORAZEPAM PER 2 MG: Performed by: STUDENT IN AN ORGANIZED HEALTH CARE EDUCATION/TRAINING PROGRAM

## 2021-04-06 PROCEDURE — 99233 SBSQ HOSP IP/OBS HIGH 50: CPT | Performed by: STUDENT IN AN ORGANIZED HEALTH CARE EDUCATION/TRAINING PROGRAM

## 2021-04-06 PROCEDURE — 76705 ECHO EXAM OF ABDOMEN: CPT

## 2021-04-06 PROCEDURE — 85025 COMPLETE CBC W/AUTO DIFF WBC: CPT | Performed by: STUDENT IN AN ORGANIZED HEALTH CARE EDUCATION/TRAINING PROGRAM

## 2021-04-06 PROCEDURE — 76937 US GUIDE VASCULAR ACCESS: CPT

## 2021-04-06 PROCEDURE — 83735 ASSAY OF MAGNESIUM: CPT | Performed by: STUDENT IN AN ORGANIZED HEALTH CARE EDUCATION/TRAINING PROGRAM

## 2021-04-06 PROCEDURE — 87493 C DIFF AMPLIFIED PROBE: CPT | Performed by: STUDENT IN AN ORGANIZED HEALTH CARE EDUCATION/TRAINING PROGRAM

## 2021-04-06 PROCEDURE — 36410 VNPNXR 3YR/> PHY/QHP DX/THER: CPT

## 2021-04-06 PROCEDURE — 83690 ASSAY OF LIPASE: CPT | Performed by: STUDENT IN AN ORGANIZED HEALTH CARE EDUCATION/TRAINING PROGRAM

## 2021-04-06 PROCEDURE — 25010000002 MAGNESIUM SULFATE 2 GM/50ML SOLUTION: Performed by: STUDENT IN AN ORGANIZED HEALTH CARE EDUCATION/TRAINING PROGRAM

## 2021-04-06 PROCEDURE — 80061 LIPID PANEL: CPT | Performed by: STUDENT IN AN ORGANIZED HEALTH CARE EDUCATION/TRAINING PROGRAM

## 2021-04-06 RX ORDER — SODIUM CHLORIDE AND POTASSIUM CHLORIDE 300; 900 MG/100ML; MG/100ML
250 INJECTION, SOLUTION INTRAVENOUS CONTINUOUS
Status: DISCONTINUED | OUTPATIENT
Start: 2021-04-06 | End: 2021-04-08

## 2021-04-06 RX ORDER — MAGNESIUM SULFATE HEPTAHYDRATE 40 MG/ML
2 INJECTION, SOLUTION INTRAVENOUS ONCE
Status: COMPLETED | OUTPATIENT
Start: 2021-04-06 | End: 2021-04-06

## 2021-04-06 RX ORDER — LORAZEPAM 2 MG/ML
2 INJECTION INTRAMUSCULAR EVERY 4 HOURS
Status: DISCONTINUED | OUTPATIENT
Start: 2021-04-06 | End: 2021-04-09

## 2021-04-06 RX ADMIN — FOLIC ACID 1 MG: 5 INJECTION, SOLUTION INTRAMUSCULAR; INTRAVENOUS; SUBCUTANEOUS at 08:39

## 2021-04-06 RX ADMIN — THIAMINE HYDROCHLORIDE 100 MG: 100 INJECTION, SOLUTION INTRAMUSCULAR; INTRAVENOUS at 08:39

## 2021-04-06 RX ADMIN — POTASSIUM CHLORIDE AND SODIUM CHLORIDE 250 ML/HR: 900; 300 INJECTION, SOLUTION INTRAVENOUS at 09:00

## 2021-04-06 RX ADMIN — LORAZEPAM 2 MG: 2 INJECTION INTRAMUSCULAR at 08:08

## 2021-04-06 RX ADMIN — LORAZEPAM 1 MG: 2 INJECTION INTRAMUSCULAR; INTRAVENOUS at 02:33

## 2021-04-06 RX ADMIN — LORAZEPAM 2 MG: 2 INJECTION INTRAMUSCULAR at 16:34

## 2021-04-06 RX ADMIN — HYDROMORPHONE HYDROCHLORIDE 2 MG: 2 INJECTION, SOLUTION INTRAMUSCULAR; INTRAVENOUS; SUBCUTANEOUS at 12:19

## 2021-04-06 RX ADMIN — HYDROMORPHONE HYDROCHLORIDE 2 MG: 2 INJECTION, SOLUTION INTRAMUSCULAR; INTRAVENOUS; SUBCUTANEOUS at 18:39

## 2021-04-06 RX ADMIN — HYDROMORPHONE HYDROCHLORIDE 2 MG: 2 INJECTION, SOLUTION INTRAMUSCULAR; INTRAVENOUS; SUBCUTANEOUS at 14:41

## 2021-04-06 RX ADMIN — POTASSIUM CHLORIDE AND SODIUM CHLORIDE 250 ML/HR: 900; 300 INJECTION, SOLUTION INTRAVENOUS at 21:45

## 2021-04-06 RX ADMIN — HYDROMORPHONE HYDROCHLORIDE 2 MG: 2 INJECTION, SOLUTION INTRAMUSCULAR; INTRAVENOUS; SUBCUTANEOUS at 02:33

## 2021-04-06 RX ADMIN — HYDROMORPHONE HYDROCHLORIDE 2 MG: 2 INJECTION, SOLUTION INTRAMUSCULAR; INTRAVENOUS; SUBCUTANEOUS at 05:04

## 2021-04-06 RX ADMIN — HYDROMORPHONE HYDROCHLORIDE 2 MG: 2 INJECTION, SOLUTION INTRAMUSCULAR; INTRAVENOUS; SUBCUTANEOUS at 10:00

## 2021-04-06 RX ADMIN — PANTOPRAZOLE SODIUM 40 MG: 40 INJECTION, POWDER, FOR SOLUTION INTRAVENOUS at 05:04

## 2021-04-06 RX ADMIN — Medication 125 MG: at 18:45

## 2021-04-06 RX ADMIN — HYDROMORPHONE HYDROCHLORIDE 2 MG: 2 INJECTION, SOLUTION INTRAMUSCULAR; INTRAVENOUS; SUBCUTANEOUS at 16:34

## 2021-04-06 RX ADMIN — NICOTINE 1 PATCH: 21 PATCH, EXTENDED RELEASE TRANSDERMAL at 20:36

## 2021-04-06 RX ADMIN — HYDROMORPHONE HYDROCHLORIDE 2 MG: 2 INJECTION, SOLUTION INTRAMUSCULAR; INTRAVENOUS; SUBCUTANEOUS at 08:07

## 2021-04-06 RX ADMIN — LORAZEPAM 2 MG: 2 INJECTION INTRAMUSCULAR at 13:48

## 2021-04-06 RX ADMIN — SODIUM CHLORIDE, PRESERVATIVE FREE 10 ML: 5 INJECTION INTRAVENOUS at 20:37

## 2021-04-06 RX ADMIN — MAGNESIUM SULFATE HEPTAHYDRATE 2 G: 40 INJECTION, SOLUTION INTRAVENOUS at 08:39

## 2021-04-06 RX ADMIN — Medication 125 MG: at 12:30

## 2021-04-06 RX ADMIN — LORAZEPAM 2 MG: 2 INJECTION INTRAMUSCULAR at 20:36

## 2021-04-06 RX ADMIN — HYDROMORPHONE HYDROCHLORIDE 2 MG: 2 INJECTION, SOLUTION INTRAMUSCULAR; INTRAVENOUS; SUBCUTANEOUS at 20:37

## 2021-04-06 RX ADMIN — LORAZEPAM 1 MG: 2 INJECTION INTRAMUSCULAR; INTRAVENOUS at 05:04

## 2021-04-06 RX ADMIN — LORAZEPAM 2 MG: 2 INJECTION INTRAMUSCULAR; INTRAVENOUS at 18:39

## 2021-04-06 RX ADMIN — LORAZEPAM 2 MG: 2 INJECTION INTRAMUSCULAR; INTRAVENOUS at 10:57

## 2021-04-06 NOTE — PROGRESS NOTES
TWO PATIENT IDENTIFIERS WERE USED. THE PATIENT WAS DRAPED WITH A FULL BODY DRAPE AND THE PATIENT'S LEFT ARM WAS PREPPED WITH CHLORA PREP. ULTRASOUND WAS USED TO LOCALIZE THELEFT BASILIC VEIN. SUBCUTANEOUS TISSUE AT THE CATHETER SITE WAS INFILTRATED WITH 2% LIDOCAINE. UNDER ULTRASOUND GUIDANCE, THE VEIN WAS ACCESSED WITH A 21 GAUGE  NEEDLE. AN 0.018 WIRE WAS THEN THREADED THROUGH THE NEEDLE. THE 21 GAUGE NEEDLE WAS REMOVED AND A 4 [place] SHEATH WAS PLACED OVER THE WIRE INTO THE VEIN.THE MIDLINE CATHETER WAS TRIMMED TO 20CM. THE MIDLINE CATHETER WAS THEN PLACED OVER THE WIRE INTO THE VEIN, THE SHEATH WAS PEELED AWAY, WIRE WAS REMOVED. CATHETER WAS FLUSHED WITH NORMAL SALINE AND CATHETER TIP APPLIED. BIOPATCH PLACED. CATHETER SECURED WITH STAT LOCK AND TEGADERM. PATIENT TOLERATED PROCEDURE WELL. THIS WAS DONE IN THE ANGIOSUITE      IMPRESSION:SUCCESSFUL PLACEMENT OF DUAL LUMEN MIDLINE.           Joanna Pineda  4/6/2021  10:35 CDT

## 2021-04-06 NOTE — PLAN OF CARE
Goal Outcome Evaluation:  Plan of Care Reviewed With: patient     Outcome Summary: Clear liquid diet, Ultrasound of abd/gall Bladder ordered. Midline placed in Left upper arm this am. Receives Ativan and Dilaudid as ordered. Will monitor.

## 2021-04-06 NOTE — PLAN OF CARE
Problem: Adult Inpatient Plan of Care  Goal: Plan of Care Review  4/6/2021 0611 by Radha Lemons, RN  Outcome: Ongoing, Progressing  Flowsheets (Taken 4/6/2021 0611)  Progress: no change  Plan of Care Reviewed With: patient   Goal Outcome Evaluation:  Plan of Care Reviewed With: patient  Progress: no change

## 2021-04-06 NOTE — SIGNIFICANT NOTE
04/06/21 1234   OTHER   Discipline occupational therapist;physical therapist   Rehab Time/Intention   Session Not Performed patient/family declined evaluation;other (see comments)   OT/PT evaluations attempted this date. Pt states that she has been doing all of her ADLs and ambulating on her own, and does not need skilled PT or OT at  this time. OT will discharge orders. Please reorder if pt status changes.

## 2021-04-06 NOTE — NURSING NOTE
"Patients  called to inform this nurse that Miss. Bain had a bottle of alcohol in her purse. I approached the patient and she willingly surrendered the partial bottle. She stated that I could \"throw it away\" \"It is killing me\". Security called and disposed of alcohol and glass bottle.  "

## 2021-04-06 NOTE — PROGRESS NOTES
FAMILY MEDICINE DAILY PROGRESS NOTE  NAME: Nata Bain  : 1985  MRN: [id number]     LOS: 1 day     PROVIDER OF SERVICE: Zeeshan Wiggins MD    Chief Complaint: Pancreatitis    Subjective:   Pt did well overnight. VSS. Continues to complain of epigastric pain but states it is improving. Would like to be able to drink liquids. Contiues to have watery diarrhea. Currently being treated with oral Vanc for C. Diff  Interval History:  History taken from: patient chart RN      Review of Systems:   Review of Systems   Constitutional: Negative for activity change, chills, diaphoresis and fever.   HENT: Negative for dental problem, drooling, ear discharge, ear pain, facial swelling, mouth sores, nosebleeds, rhinorrhea, sinus pressure, sinus pain, sneezing and sore throat.    Eyes: Negative for pain, discharge and visual disturbance.   Respiratory: Negative for apnea, cough, shortness of breath, wheezing and stridor.    Cardiovascular: Negative for chest pain, palpitations and leg swelling.   Gastrointestinal: Positive for abdominal pain and diarrhea. Negative for abdominal distention, constipation, nausea and vomiting.   Genitourinary: Negative for dysuria, frequency and urgency.   Musculoskeletal: Negative for arthralgias and back pain.   Skin: Negative for rash and wound.   Neurological: Negative for dizziness, facial asymmetry, light-headedness and headaches.   Psychiatric/Behavioral: Negative for agitation and decreased concentration. The patient is not nervous/anxious.        Objective:     Vital Signs  Temp:  [96.7 °F (35.9 °C)-98.4 °F (36.9 °C)] 97.3 °F (36.3 °C)  Heart Rate:  [] 96  Resp:  [18-21] 18  BP: ()/(55-83) 101/60    Physical Exam  Physical Exam  Constitutional:       Appearance: She is well-developed.   HENT:      Head: Normocephalic and atraumatic.      Right Ear: External ear normal.      Left Ear: External ear normal.      Nose: Nose normal.      Mouth/Throat:      Pharynx: No  oropharyngeal exudate.   Eyes:      General: No scleral icterus.        Right eye: No discharge.         Left eye: No discharge.      Conjunctiva/sclera: Conjunctivae normal.      Pupils: Pupils are equal, round, and reactive to light.   Neck:      Vascular: No JVD.   Cardiovascular:      Rate and Rhythm: Normal rate and regular rhythm.      Heart sounds: Normal heart sounds. No murmur heard.   No friction rub. No gallop.    Pulmonary:      Effort: Pulmonary effort is normal. No respiratory distress.      Breath sounds: Normal breath sounds. No stridor. No wheezing or rales.   Abdominal:      General: Bowel sounds are normal. There is no distension.      Palpations: Abdomen is soft.      Tenderness: There is abdominal tenderness (epigastric).   Musculoskeletal:         General: No tenderness or deformity. Normal range of motion.      Cervical back: Normal range of motion and neck supple.   Skin:     General: Skin is warm and dry.      Capillary Refill: Capillary refill takes less than 2 seconds.      Coloration: Skin is not pale.      Findings: No erythema or rash.   Neurological:      Mental Status: She is alert and oriented to person, place, and time.   Psychiatric:         Behavior: Behavior normal.         Medication Review    Current Facility-Administered Medications:   •  folic acid 1 mg in sodium chloride 0.9 % 50 mL IVPB, 1 mg, Intravenous, Daily, Maryam Galdamez MD, Last Rate: 100 mL/hr at 04/06/21 0839, 1 mg at 04/06/21 0839  •  HYDROmorphone (DILAUDID) injection 2 mg, 2 mg, Intravenous, Q2H PRN, Maryam Galdamez MD, 2 mg at 04/06/21 0807  •  labetalol (NORMODYNE,TRANDATE) injection 10 mg, 10 mg, Intravenous, Q2H PRN, Maryam Galdamez MD  •  LORazepam (ATIVAN) tablet 1 mg, 1 mg, Oral, Q2H PRN **OR** LORazepam (ATIVAN) injection 1 mg, 1 mg, Intravenous, Q2H PRN, 1 mg at 04/06/21 0504 **OR** LORazepam (ATIVAN) tablet 2 mg, 2 mg, Oral, Q1H PRN **OR** LORazepam (ATIVAN) injection 2 mg, 2 mg, Intravenous,  Q1H PRN **OR** LORazepam (ATIVAN) injection 2 mg, 2 mg, Intravenous, Q15 Min PRN **OR** LORazepam (ATIVAN) injection 2 mg, 2 mg, Intramuscular, Q15 Min PRN, Maryam Galdamez MD  •  LORazepam (ATIVAN) injection 2 mg, 2 mg, Intravenous, Q4H, Julian Hickey MD, 2 mg at 04/06/21 0808  •  nicotine (NICODERM CQ) 21 MG/24HR patch 1 patch, 1 patch, Transdermal, Q24H, Maryam Galdamez MD, 1 patch at 04/05/21 2045  •  ondansetron (ZOFRAN) injection 4 mg, 4 mg, Intravenous, Q6H PRN, Maryam Galdamez MD  •  pantoprazole (PROTONIX) injection 40 mg, 40 mg, Intravenous, Q AM, Maryam Galdamez MD, 40 mg at 04/06/21 0504  •  sodium chloride 0.9 % flush 10 mL, 10 mL, Intravenous, PRN, Han Perdomo MD  •  sodium chloride 0.9 % flush 10 mL, 10 mL, Intravenous, Q12H, Maryam Galdamez MD, 10 mL at 04/05/21 2302  •  sodium chloride 0.9 % flush 10 mL, 10 mL, Intravenous, PRN, Maryam Galdamez MD  •  sodium chloride 0.9 % with KCl 40 mEq/L infusion, 250 mL/hr, Intravenous, Continuous, Julian Hickey MD  •  thiamine (B-1) 100 mg in sodium chloride 0.9 % 50 mL IVPB, 100 mg, Intravenous, Daily, Maryam Galdamez MD, Last Rate: 100 mL/hr at 04/06/21 0839, 100 mg at 04/06/21 0839     Diagnostic Data    Lab Results (last 24 hours)     Procedure Component Value Units Date/Time    Magnesium [[id number]]  (Abnormal) Collected: 04/06/21 0534    Specimen: Blood Updated: 04/06/21 0637     Magnesium 1.1 mg/dL     Lipase [[id number]]  (Abnormal) Collected: 04/06/21 0534    Specimen: Blood Updated: 04/06/21 0627     Lipase 1,307 U/L     Comprehensive Metabolic Panel [[id number]]  (Abnormal) Collected: 04/06/21 0534    Specimen: Blood Updated: 04/06/21 0619     Glucose 98 mg/dL      BUN 6 mg/dL      Creatinine 0.46 mg/dL      Sodium 132 mmol/L      Potassium 3.2 mmol/L      Chloride 101 mmol/L      CO2 24.0 mmol/L      Calcium 8.2 mg/dL      Total Protein 6.0 g/dL      Albumin 3.30 g/dL      ALT (SGPT) 32 U/L      AST (SGOT) 54 U/L       Alkaline Phosphatase 114 U/L      Total Bilirubin 0.9 mg/dL      eGFR Non African Amer >150 mL/min/1.73      Globulin 2.7 gm/dL      A/G Ratio 1.2 g/dL      BUN/Creatinine Ratio 13.0     Anion Gap 7.0 mmol/L     Narrative:      GFR Normal >60  Chronic Kidney Disease <60  Kidney Failure <15      CBC & Differential [[id number]]  (Abnormal) Collected: 04/06/21 0534    Specimen: Blood Updated: 04/06/21 0606    Narrative:      The following orders were created for panel order CBC & Differential.  Procedure                               Abnormality         Status                     ---------                               -----------         ------                     CBC Auto Differential[[id number]]        Abnormal            Final result                 Please view results for these tests on the individual orders.    CBC Auto Differential [[id number]]  (Abnormal) Collected: 04/06/21 0534    Specimen: Blood Updated: 04/06/21 0606     WBC 8.97 10*3/mm3      RBC 3.16 10*6/mm3      Hemoglobin 11.2 g/dL      Hematocrit 32.5 %      .8 fL      MCH 35.4 pg      MCHC 34.5 g/dL      RDW 13.1 %      RDW-SD 49.2 fl      MPV 10.2 fL      Platelets 151 10*3/mm3      Neutrophil % 69.5 %      Lymphocyte % 25.8 %      Monocyte % 3.9 %      Eosinophil % 0.3 %      Basophil % 0.2 %      Immature Grans % 0.3 %      Neutrophils, Absolute 6.23 10*3/mm3      Lymphocytes, Absolute 2.31 10*3/mm3      Monocytes, Absolute 0.35 10*3/mm3      Eosinophils, Absolute 0.03 10*3/mm3      Basophils, Absolute 0.02 10*3/mm3      Immature Grans, Absolute 0.03 10*3/mm3      nRBC 0.0 /100 WBC     COVID-19 and FLU A/B PCR - Swab, Nasopharynx [[id number]]  (Normal) Collected: 04/05/21 1659    Specimen: Swab from Nasopharynx Updated: 04/05/21 1921     COVID19 Not Detected     Influenza A PCR Not Detected     Influenza B PCR Not Detected    Narrative:      Fact sheet for providers: https://www.fda.gov/media/374841/download    Fact sheet for patients:  https://www.fda.gov/media/476593/download    Test performed by PCR.    Ethanol [[id number]]  (Abnormal) Collected: 04/05/21 1645    Specimen: Blood Updated: 04/05/21 1825     Ethanol 118 mg/dL      Ethanol % 0.118 %     [facility]red Draw [[id number]] Collected: 04/05/21 1645    Specimen: Blood Updated: 04/05/21 1745    Narrative:      The following orders were created for panel order Allred Draw.  Procedure                               Abnormality         Status                     ---------                               -----------         ------                     Light Blue Top[[id number]]                                   Final result               Green Top (Gel)[[id number]]                                  Final result               Lavender Top[[id number]]                                     Final result               Gold Top - SST[[id number]]                                                                Please view results for these tests on the individual orders.    Lavender Top [[id number]] Collected: 04/05/21 1645    Specimen: Blood Updated: 04/05/21 1745     Extra Tube hold for add-on     Comment: Auto resulted       Light Blue Top [[id number]] Collected: 04/05/21 1645    Specimen: Blood Updated: 04/05/21 1745     Extra Tube hold for add-on     Comment: Auto resulted       Green Top (Gel) [[id number]] Collected: 04/05/21 1645    Specimen: Blood Updated: 04/05/21 1745     Extra Tube Hold for add-ons.     Comment: Auto resulted.       Lipase [[id number]]  (Abnormal) Collected: 04/05/21 1645    Specimen: Blood Updated: 04/05/21 1737     Lipase 1,087 U/L     Comprehensive Metabolic Panel [[id number]]  (Abnormal) Collected: 04/05/21 1645    Specimen: Blood Updated: 04/05/21 1730     Glucose 93 mg/dL      BUN 9 mg/dL      Creatinine 0.50 mg/dL      Sodium 134 mmol/L      Potassium 3.6 mmol/L      Chloride 101 mmol/L      CO2 24.0 mmol/L      Calcium 9.5 mg/dL      Total Protein 7.1 g/dL      Albumin 4.00 g/dL      ALT  (SGPT) 44 U/L      AST (SGOT) 83 U/L      Alkaline Phosphatase 142 U/L      Total Bilirubin 0.5 mg/dL      eGFR Non African Amer 140 mL/min/1.73      Globulin 3.1 gm/dL      A/G Ratio 1.3 g/dL      BUN/Creatinine Ratio 18.0     Anion Gap 9.0 mmol/L     Narrative:      GFR Normal >60  Chronic Kidney Disease <60  Kidney Failure <15      Lactic Acid, Plasma [[id number]]  (Normal) Collected: 04/05/21 1645    Specimen: Blood Updated: 04/05/21 1728     Lactate 1.9 mmol/L     hCG, Serum, Qualitative [[id number]]  (Normal) Collected: 04/05/21 1645    Specimen: Blood Updated: 04/05/21 1728     HCG Qualitative Negative    CBC & Differential [[id number]]  (Abnormal) Collected: 04/05/21 1645    Specimen: Blood Updated: 04/05/21 1654    Narrative:      The following orders were created for panel order CBC & Differential.  Procedure                               Abnormality         Status                     ---------                               -----------         ------                     CBC Auto Differential[[id number]]        Abnormal            Final result                 Please view results for these tests on the individual orders.    CBC Auto Differential [[id number]]  (Abnormal) Collected: 04/05/21 1645    Specimen: Blood Updated: 04/05/21 1654     WBC 9.90 10*3/mm3      RBC 3.66 10*6/mm3      Hemoglobin 13.3 g/dL      Hematocrit 37.3 %      .9 fL      MCH 36.3 pg      MCHC 35.7 g/dL      RDW 13.1 %      RDW-SD 49.6 fl      MPV 10.0 fL      Platelets 176 10*3/mm3      Neutrophil % 75.2 %      Lymphocyte % 20.6 %      Monocyte % 3.0 %      Eosinophil % 0.3 %      Basophil % 0.5 %      Immature Grans % 0.4 %      Neutrophils, Absolute 7.44 10*3/mm3      Lymphocytes, Absolute 2.04 10*3/mm3      Monocytes, Absolute 0.30 10*3/mm3      Eosinophils, Absolute 0.03 10*3/mm3      Basophils, Absolute 0.05 10*3/mm3      Immature Grans, Absolute 0.04 10*3/mm3      nRBC 0.0 /100 WBC     Urinalysis With Microscopic If Indicated  (No Culture) - Urine, Clean Catch [[id number]]  (Normal) Collected: 04/05/21 1637    Specimen: Urine, Clean Catch Updated: 04/05/21 1646     Color, UA Yellow     Appearance, UA Clear     pH, UA 6.5     Specific Gravity, UA 1.010     Glucose, UA Negative     Ketones, UA Negative     Bilirubin, UA Negative     Blood, UA Negative     Protein, UA Negative     Leuk Esterase, UA Negative     Nitrite, UA Negative     Urobilinogen, UA 0.2 E.U./dL    Narrative:      Urine microscopic not indicated.           Imaging Results (Last 24 Hours)     Procedure Component Value Units Date/Time    CT Abdomen Pelvis With Contrast [[id number]] Collected: 04/05/21 1805     Updated: 04/05/21 1827    Narrative:        Patient Name: SHAWNEE SPRINGER    ORDERING: VANDANA PUENTE    ATTENDING: THUY DUNNE     REFERRING: VANDANA PUENTE    -----------------------  EXAM DESCRIPTION: CT ABDOMEN PELVIS W CONTRAST    HISTORY: Abdominal pain, acute, nonlocalized    COMPARISON: 2/27/2021    Dose Length Product: 329.3.    This exam was performed according to our departmental dose  optimization program, which includes automated exposure control,  adjustment of the mA and/or KV according to patient size and/or  use of iterative reconstruction technique.      CONTRAST:   80 mL intravenous Isovue 300.      TECHNIQUE: Multiple contiguous contrast enhanced axial images are  obtained of the abdomen and pelvis with coronal and sagittal  reformat images provided.     FINDINGS:     LOWER CHEST: No pulmonary consolidation or pleural effusion. No  cardiac enlargement or pericardial effusion.    HEPATOBILIARY: Redemonstration of diffuse fatty liver  infiltration and hepatic enlargement. No development of a  suspicious hepatic lesion or ductal dilation. No calcified stones  or pericholecystic inflammatory change. There is note of a  phrygian cap. There is redemonstrated prominence of the  extrahepatic bile duct.  SPLEEN: Unremarkable.  PANCREAS: There is now  inflammatory changes and fluid centered  about the pancreas greatest at its body and tail portions within  the anterior pararenal space left greater than right. Findings  are most consistent with acute pancreatitis. No abscess or  abnormal gas collection associated with the abnormality.  ADRENAL GLANDS: Unremarkable.  KIDNEYS/URETERS: No suspicious renal mass, renal/ureteral  calcification, or obstructive uropathy.    GASTROINTESTINAL: No evidence of bowel obstruction terminal ileum  and ileocecal junction are unremarkable. There is some liquid  stool within the cecum. No acute inflammatory changes identified  otherwise.  REPRODUCTIVE ORGANS: Unremarkable.  URINARY BLADDER: Unremarkable    VASCULAR: Vascular calcification without abdominal aortic  aneurysm.  LYMPH NODES: No pathologically enlarged nodes by size criteria.  PERITONEUM/RETROPERITONEUM: No free air.     OSSEOUS STRUCTURES: No acute findings.    ADDITIONAL FINDINGS: None      Impression:      Fairly extensive inflammatory changes centered at the pancreas  left greater than right anterior pararenal space most consistent  with pancreatitis. No abscess or pseudocyst identified.    Redemonstration of diffuse fatty liver infiltration and hepatic  enlargement.    Electronically signed by:  Larry Ling MD  4/5/2021 6:26 PM CDT  Workstation: [id number]          I reviewed the patient's new clinical results.    Assessment/Plan:     Active Hospital Problems    Diagnosis    • **Pancreatitis      -CT abdomen/pelvis confirms pancreatitis  -Lipase 1000-->1307  -Given 4 mg IV morphine and 1 mg IV dilaudid in the ED  -Pt states epigastric pain is improving. Will trial clear liquids  -IVF at 250 cc/hour  -Advance diet as tolerated  -Dilaudid q2 hours prn for severe pain  -Scheduled and PRN Ativan (CIWA)       • Hypomagnesemia      -Mag 1.1. Replace and monitor daily     • Benzodiazepine dependence (CMS/HCC)      -Patient takes valium 10 mg bid (confirmed on krishan) and  gabapentin.  -Ativan 2 mg Q4H scheduled in addition to CIWA prn ativan dosing     • Alcohol abuse      -CIWA  -IV fluids, IV folate, IV thiamine     • Tobacco abuse      -Nicotine patch     • Anxiety and depression      -Hold home oral antidepressants given n.p.o. status  -Scheduled and as needed Ativan(CIWA)       • Opioid use disorder (CMS/HCC)      -Hold home gabapentin and Suboxone  -As needed Dilaudid for abdominal pain secondary to pancreatitis  -patient is on Suboxone 8/2 TID at home       • Hypokalemia      -We will monitor and replace as needed  -Receiving potassium 40 meq through fluids  -Mag 1.1, replaced with 2 grams this AM    Results from last 7 days   Lab Units 04/06/21  0534 04/05/21  1645   POTASSIUM mmol/L 3.2* 3.6                DVT prophylaxis: SCDs/TEDs  Code Status and Medical Interventions:   Ordered at: 04/05/21 2021     Code Status:    CPR     Medical Interventions (Level of Support Prior to Arrest):    Full       Plan for disposition:Where: home and When:  1-2days      Time: 30 minutes        This document has been electronically signed by Zeeshan Wiggins MD on April 6, 2021 08:47 CDT

## 2021-04-06 NOTE — CONSULTS
"Adult Nutrition  Assessment    Patient Name:  Nata Bain  YOB: 1985  MRN: [id number]  Admit Date:  4/5/2021    Assessment Date:  4/6/2021    Comments:   36 y.o. female admitted for acute alcohol-induced pancreatitis. Pt recovering from recent C. Diff dx. Pt has hx of recurrent alcohol-induced pancreatitis, alcohol abuse, and previous opioid abuse. Pt on Clear Liquid Diet and receiving folic acid and thiamine for probable deficiencies. Pt's lipase is 1307 and alb is 3.30.    Intern responded to MST Score of 2. Pt reports reduced intake recently d/t nausea, diarrhea (has persisted for 4 weeks), bloating, and abd pain. Pt reports eating 1-2 meals/day and snacking during day. Pt reports wt loss of \"a few pounds\" over the \"last couple weeks.\" BMI is 19.46 and pt is 91.4% IBW. NFPE found moderate muscle and fat wasting. Pt meets criteria for moderate chronic malnutrition. Pt receptive to receiving Boost Breeze 3x daily to increase caloric intake. Intern ordered supplement and will monitor po intake, labs, wt, I&O's, and symptoms for diet adequacy and progression.     Reason for Assessment     Row Name 04/06/21 1422          Reason for Assessment    Reason For Assessment  identified at risk by screening criteria  (Pended)      Diagnosis  infection/sepsis;gastrointestinal disease  (Pended)  acute alcoholic pancreatitis and C. diff     Identified At Risk by Screening Criteria  MST SCORE 2+;reduced oral intake over the last month  (Pended)          Nutrition/Diet History     Row Name 04/06/21 1440          Nutrition/Diet History    Typical Food/Fluid Intake  Pt reports good appetite PTA until she began feeling bloated and abdominal pain. These symptoms have decreased her appetite.  (Pended)      Food Preferences  Pt reports enjoying fish, chicken, pork, rice, soup, baked potatoes. Pt prefers grilling and baking meat/poultry.  (Pended)      Meal/Snack Patterns  Pt reports eating 1-2 meals/day, usually " "at night. Pt snacks during the day.  (Pended)      Supplemental Drinks/Foods/Additives  Pt sometimes drinks a Powerade-like beverage at home.  (Pended)      Food Allergies  --  (Pended)  No food allergies.     Factors Affecting Nutritional Intake  abdominal distension;abdominal pain;diarrhea;nausea;other (see comments)  (Pended)  bloating         Anthropometrics     Row Name 04/06/21 1446          Anthropometrics    Height  172.7 cm (68\")  (Pended)         Ideal Body Weight (IBW)    Ideal Body Weight (IBW) (kg)  64.15         Labs/Tests/Procedures/Meds     Row Name 04/06/21 1533          Labs/Procedures/Meds    Lab Results Reviewed  reviewed, pertinent  (Pended)      Lab Results Comments  Na 132, K 3.2, Cr 0.46, Ca 8.2, AST 54, Alb 3.30, VLDL 47, Triglycerides 285, Lipase 1307, Mg 1.1  (Pended)         Diagnostic Tests/Procedures    Diagnostic Test/Procedure Reviewed  reviewed  (Pended)         Medications    Pertinent Medications Reviewed  reviewed, pertinent  (Pended)      Pertinent Medications Comments  Folic acid, thiamine, NaCl w/ KCl, Protonix  (Pended)          Physical Findings     Row Name 04/06/21 1443          Physical Findings    Overall Physical Appearance  generalized wasting;loss of muscle mass;loss of subcutaneous fat;underweight  (Pended)      Gastrointestinal  abdominal distension;diarrhea;nausea;other (see comments)  (Pended)  bloating and abdominal pain     Oral/Mouth Cavity  xerostomia  (Pended)          Estimated/Assessed Needs     Row Name 04/06/21 1446          Calculation Measurements    Weight Used For Calculations  58.1 kg (128 lb)  (Pended)      Height  172.7 cm (68\")  (Pended)         Estimated/Assessed Needs    Additional Documentation  Fluid Requirements (Group);Protein Requirements (Group);KCAL/KG (Group);Calorie Requirements (Group)  (Pended)         Calorie Requirements    Weight Used For Calorie Calculations  58.1 kg (128 lb)  (Pended)      Estimated Calorie Requirement (kcal/day)  " "2000  (Pended)         KCAL/KG    KCAL/KG  30 Kcal/Kg (kcal);35 Kcal/Kg (kcal)  (Pended)      30 Kcal/Kg (kcal)  1741.8  (Pended)      35 Kcal/Kg (kcal)  2032.1  (Pended)         Protein Requirements    Weight Used For Protein Calculations  58.1 kg (128 lb)  (Pended)      Est Protein Requirement Amount (gms/kg)  1.5 gm protein  (Pended)  1.2-1.5 g/kg = 70-87 g PRO/day     Estimated Protein Requirements (gms/day)  87.09  (Pended)         Fluid Requirements    Fluid Requirements (mL/day)  2000  (Pended)      Estimated Fluid Requirement Method  --  (Pended)  1 mL/kcal     RDA Method (mL)  2000  (Pended)          Nutrition Prescription Ordered     Row Name 04/06/21 1449          Nutrition Prescription PO    Current PO Diet  Clear Liquid  (Pended)      Fluid Consistency  Thin  (Pended)              Malnutrition Severity Assessment     Row Name 04/06/21 1428          Malnutrition Severity Assessment    Malnutrition Type  Chronic Disease - Related Malnutrition  (Pended)         Insufficient Energy Intake     Insufficient Energy Intake Findings  Moderate  (Pended)      Insufficient Energy Intake   --  (Pended)  Pt reports eating 1-2 meals daily and snacking during day. Reduced intake PTA d/t bloating and pain        Unintentional Weight Loss     Unintentional Weight Loss Findings  Mild  (Pended)  Wt relatively stable, but reports losing a \"few pounds\" over a couple weeks. BMI is 19.46 and pt is 91.4% IBW        Muscle Loss    Loss of Muscle Mass Findings  Moderate  (Pended)  6 locations in which moderate muscle wasting was noted     Yarsanism Region  Moderate - slight depression  (Pended)  Slight depression, low muscle tone when pt opened and closed mouth     Clavicle Bone Region  Moderate - some protrusion in females, visible in males  (Pended)      Acromion Bone Region  Moderate - acromion may slightly protrude  (Pended)      Scapular Bone Region  --  (Pended)  Did not note, pt unable to sit forward d/t pain     Dorsal Hand " Region  --  (Pended)  Did not note     Patellar Region  Moderate - patella more prominent, less muscle definition around patella  (Pended)      Anterior Thigh Region  Moderate - mild depression on inner thigh  (Pended)      Posterior Calf Region  Moderate - some roundness, slight firmness  (Pended)         Fat Loss    Subcutaneous Fat Loss Findings  Moderate  (Pended)  1 location with mild fat wasting, 1 location with severe fat wasting, overall finding of moderate fat loss     Orbital Region   --  (Pended)  Mildly hollow look, no dark circles     Upper Arm Region  Severe - mostly skin, very little space between folds, fingers touch  (Pended)      Thoracic & Lumbar Region  None  (Pended)         Fluid Accumulation (Edema)    Fluid Acumulation Findings  --  (Pended)  No edema noted        Declining Functional Status    Declining Functional Status Findings  N/A  (Pended)  Not assessed        Criteria Met (Must meet criteria for severity in at least 2 of these categories: M Wasting, Fat Loss, Fluid, Secondary Signs, Wt. Status, Intake)    Patient meets criteria for   Moderate (non-severe) Malnutrition  (Pended)            Electronically signed by:  AGUSTO CHI  04/06/21 15:35 CDT

## 2021-04-06 NOTE — PLAN OF CARE
Goal Outcome Evaluation:        Outcome Summary: (P) Pt on Clear Liquid Diet for acute alcohol-induced pancreatitis. Pt meets criteria for moderate chronic malnutrition. Pt to receive Boost Breeze 3x/day. Will monitor for diet adequacy and progression.

## 2021-04-07 PROBLEM — E44.0 MODERATE MALNUTRITION: Status: ACTIVE | Noted: 2021-04-07

## 2021-04-07 PROBLEM — K92.1 MELENA: Status: ACTIVE | Noted: 2021-04-07

## 2021-04-07 LAB
ALBUMIN SERPL-MCNC: 3 G/DL (ref 3.5–5.2)
ALBUMIN/GLOB SERPL: 1.1 G/DL
ALP SERPL-CCNC: 112 U/L (ref 39–117)
ALT SERPL W P-5'-P-CCNC: 23 U/L (ref 1–33)
ANION GAP SERPL CALCULATED.3IONS-SCNC: 6 MMOL/L (ref 5–15)
ANISOCYTOSIS BLD QL: NORMAL
AST SERPL-CCNC: 38 U/L (ref 1–32)
BASOPHILS # BLD AUTO: 0.02 10*3/MM3 (ref 0–0.2)
BASOPHILS NFR BLD AUTO: 0.3 % (ref 0–1.5)
BILIRUB SERPL-MCNC: 0.6 MG/DL (ref 0–1.2)
BUN SERPL-MCNC: 3 MG/DL (ref 6–20)
BUN/CREAT SERPL: 7.5 (ref 7–25)
CALCIUM SPEC-SCNC: 7.6 MG/DL (ref 8.6–10.5)
CHLORIDE SERPL-SCNC: 108 MMOL/L (ref 98–107)
CO2 SERPL-SCNC: 23 MMOL/L (ref 22–29)
CREAT SERPL-MCNC: 0.4 MG/DL (ref 0.57–1)
DEPRECATED RDW RBC AUTO: 51.3 FL (ref 37–54)
EOSINOPHIL # BLD AUTO: 0.06 10*3/MM3 (ref 0–0.4)
EOSINOPHIL NFR BLD AUTO: 0.8 % (ref 0.3–6.2)
ERYTHROCYTE [DISTWIDTH] IN BLOOD BY AUTOMATED COUNT: 13.2 % (ref 12.3–15.4)
GFR SERPL CREATININE-BSD FRML MDRD: >150 ML/MIN/1.73
GLOBULIN UR ELPH-MCNC: 2.8 GM/DL
GLUCOSE SERPL-MCNC: 87 MG/DL (ref 65–99)
HCT VFR BLD AUTO: 29.8 % (ref 34–46.6)
HEMOCCULT STL QL: NEGATIVE
HGB BLD-MCNC: 10 G/DL (ref 12–15.9)
IMM GRANULOCYTES # BLD AUTO: 0.01 10*3/MM3 (ref 0–0.05)
IMM GRANULOCYTES NFR BLD AUTO: 0.1 % (ref 0–0.5)
LIPASE SERPL-CCNC: 1046 U/L (ref 13–60)
LIPASE SERPL-CCNC: 231 U/L (ref 13–60)
LYMPHOCYTES # BLD AUTO: 1.75 10*3/MM3 (ref 0.7–3.1)
LYMPHOCYTES NFR BLD AUTO: 24.8 % (ref 19.6–45.3)
MACROCYTES BLD QL SMEAR: NORMAL
MAGNESIUM SERPL-MCNC: 1.8 MG/DL (ref 1.6–2.6)
MCH RBC QN AUTO: 35.8 PG (ref 26.6–33)
MCHC RBC AUTO-ENTMCNC: 33.6 G/DL (ref 31.5–35.7)
MCV RBC AUTO: 106.8 FL (ref 79–97)
MONOCYTES # BLD AUTO: 0.3 10*3/MM3 (ref 0.1–0.9)
MONOCYTES NFR BLD AUTO: 4.2 % (ref 5–12)
NEUTROPHILS NFR BLD AUTO: 4.93 10*3/MM3 (ref 1.7–7)
NEUTROPHILS NFR BLD AUTO: 69.8 % (ref 42.7–76)
NRBC BLD AUTO-RTO: 0 /100 WBC (ref 0–0.2)
PLATELET # BLD AUTO: 133 10*3/MM3 (ref 140–450)
PMV BLD AUTO: 10.8 FL (ref 6–12)
POLYCHROMASIA BLD QL SMEAR: NORMAL
POTASSIUM SERPL-SCNC: 4.2 MMOL/L (ref 3.5–5.2)
PROT SERPL-MCNC: 5.8 G/DL (ref 6–8.5)
RBC # BLD AUTO: 2.79 10*6/MM3 (ref 3.77–5.28)
SMALL PLATELETS BLD QL SMEAR: NORMAL
SODIUM SERPL-SCNC: 137 MMOL/L (ref 136–145)
WBC # BLD AUTO: 7.07 10*3/MM3 (ref 3.4–10.8)
WBC MORPH BLD: NORMAL

## 2021-04-07 PROCEDURE — 83735 ASSAY OF MAGNESIUM: CPT | Performed by: STUDENT IN AN ORGANIZED HEALTH CARE EDUCATION/TRAINING PROGRAM

## 2021-04-07 PROCEDURE — 83690 ASSAY OF LIPASE: CPT | Performed by: STUDENT IN AN ORGANIZED HEALTH CARE EDUCATION/TRAINING PROGRAM

## 2021-04-07 PROCEDURE — 25810000003 SODIUM CHLORIDE 0.9 % WITH KCL 40 MEQ/L 40-0.9 MEQ/L-% SOLUTION: Performed by: STUDENT IN AN ORGANIZED HEALTH CARE EDUCATION/TRAINING PROGRAM

## 2021-04-07 PROCEDURE — 25010000002 THIAMINE PER 100 MG: Performed by: STUDENT IN AN ORGANIZED HEALTH CARE EDUCATION/TRAINING PROGRAM

## 2021-04-07 PROCEDURE — 85007 BL SMEAR W/DIFF WBC COUNT: CPT | Performed by: STUDENT IN AN ORGANIZED HEALTH CARE EDUCATION/TRAINING PROGRAM

## 2021-04-07 PROCEDURE — 25010000002 LORAZEPAM PER 2 MG: Performed by: STUDENT IN AN ORGANIZED HEALTH CARE EDUCATION/TRAINING PROGRAM

## 2021-04-07 PROCEDURE — 25010000002 HYDROMORPHONE PER 4 MG: Performed by: STUDENT IN AN ORGANIZED HEALTH CARE EDUCATION/TRAINING PROGRAM

## 2021-04-07 PROCEDURE — 80053 COMPREHEN METABOLIC PANEL: CPT | Performed by: STUDENT IN AN ORGANIZED HEALTH CARE EDUCATION/TRAINING PROGRAM

## 2021-04-07 PROCEDURE — 85025 COMPLETE CBC W/AUTO DIFF WBC: CPT | Performed by: STUDENT IN AN ORGANIZED HEALTH CARE EDUCATION/TRAINING PROGRAM

## 2021-04-07 PROCEDURE — 99233 SBSQ HOSP IP/OBS HIGH 50: CPT | Performed by: STUDENT IN AN ORGANIZED HEALTH CARE EDUCATION/TRAINING PROGRAM

## 2021-04-07 PROCEDURE — 82272 OCCULT BLD FECES 1-3 TESTS: CPT | Performed by: STUDENT IN AN ORGANIZED HEALTH CARE EDUCATION/TRAINING PROGRAM

## 2021-04-07 RX ORDER — KETOROLAC TROMETHAMINE 15 MG/ML
15 INJECTION, SOLUTION INTRAMUSCULAR; INTRAVENOUS ONCE
Status: DISCONTINUED | OUTPATIENT
Start: 2021-04-07 | End: 2021-04-07

## 2021-04-07 RX ORDER — DICYCLOMINE HCL 20 MG
20 TABLET ORAL 2 TIMES DAILY
Status: DISCONTINUED | OUTPATIENT
Start: 2021-04-07 | End: 2021-04-12 | Stop reason: HOSPADM

## 2021-04-07 RX ADMIN — NICOTINE 1 PATCH: 21 PATCH, EXTENDED RELEASE TRANSDERMAL at 21:07

## 2021-04-07 RX ADMIN — FOLIC ACID 1 MG: 5 INJECTION, SOLUTION INTRAMUSCULAR; INTRAVENOUS; SUBCUTANEOUS at 09:57

## 2021-04-07 RX ADMIN — LORAZEPAM 1 MG: 2 INJECTION INTRAMUSCULAR; INTRAVENOUS at 14:08

## 2021-04-07 RX ADMIN — LORAZEPAM 1 MG: 2 INJECTION INTRAMUSCULAR; INTRAVENOUS at 09:58

## 2021-04-07 RX ADMIN — THIAMINE HYDROCHLORIDE 100 MG: 100 INJECTION, SOLUTION INTRAMUSCULAR; INTRAVENOUS at 09:23

## 2021-04-07 RX ADMIN — THIAMINE HYDROCHLORIDE 100 MG: 100 INJECTION, SOLUTION INTRAMUSCULAR; INTRAVENOUS at 21:06

## 2021-04-07 RX ADMIN — HYDROMORPHONE HYDROCHLORIDE 2 MG: 2 INJECTION, SOLUTION INTRAMUSCULAR; INTRAVENOUS; SUBCUTANEOUS at 16:12

## 2021-04-07 RX ADMIN — HYDROMORPHONE HYDROCHLORIDE 2 MG: 2 INJECTION, SOLUTION INTRAMUSCULAR; INTRAVENOUS; SUBCUTANEOUS at 23:57

## 2021-04-07 RX ADMIN — SODIUM CHLORIDE, PRESERVATIVE FREE 10 ML: 5 INJECTION INTRAVENOUS at 23:58

## 2021-04-07 RX ADMIN — LORAZEPAM 2 MG: 2 INJECTION INTRAMUSCULAR at 23:57

## 2021-04-07 RX ADMIN — LORAZEPAM 2 MG: 2 INJECTION INTRAMUSCULAR at 16:13

## 2021-04-07 RX ADMIN — LORAZEPAM 1 MG: 2 INJECTION INTRAMUSCULAR; INTRAVENOUS at 18:15

## 2021-04-07 RX ADMIN — HYDROMORPHONE HYDROCHLORIDE 2 MG: 2 INJECTION, SOLUTION INTRAMUSCULAR; INTRAVENOUS; SUBCUTANEOUS at 05:15

## 2021-04-07 RX ADMIN — HYDROMORPHONE HYDROCHLORIDE 2 MG: 2 INJECTION, SOLUTION INTRAMUSCULAR; INTRAVENOUS; SUBCUTANEOUS at 07:39

## 2021-04-07 RX ADMIN — LORAZEPAM 2 MG: 2 INJECTION INTRAMUSCULAR at 05:14

## 2021-04-07 RX ADMIN — HYDROMORPHONE HYDROCHLORIDE 2 MG: 2 INJECTION, SOLUTION INTRAMUSCULAR; INTRAVENOUS; SUBCUTANEOUS at 02:30

## 2021-04-07 RX ADMIN — THIAMINE HYDROCHLORIDE 100 MG: 100 INJECTION, SOLUTION INTRAMUSCULAR; INTRAVENOUS at 16:13

## 2021-04-07 RX ADMIN — HYDROMORPHONE HYDROCHLORIDE 2 MG: 2 INJECTION, SOLUTION INTRAMUSCULAR; INTRAVENOUS; SUBCUTANEOUS at 14:08

## 2021-04-07 RX ADMIN — PANTOPRAZOLE SODIUM 40 MG: 40 INJECTION, POWDER, FOR SOLUTION INTRAVENOUS at 05:14

## 2021-04-07 RX ADMIN — HYDROMORPHONE HYDROCHLORIDE 2 MG: 2 INJECTION, SOLUTION INTRAMUSCULAR; INTRAVENOUS; SUBCUTANEOUS at 12:11

## 2021-04-07 RX ADMIN — HYDROMORPHONE HYDROCHLORIDE 2 MG: 2 INJECTION, SOLUTION INTRAMUSCULAR; INTRAVENOUS; SUBCUTANEOUS at 00:09

## 2021-04-07 RX ADMIN — HYDROMORPHONE HYDROCHLORIDE 2 MG: 2 INJECTION, SOLUTION INTRAMUSCULAR; INTRAVENOUS; SUBCUTANEOUS at 21:05

## 2021-04-07 RX ADMIN — HYDROMORPHONE HYDROCHLORIDE 2 MG: 2 INJECTION, SOLUTION INTRAMUSCULAR; INTRAVENOUS; SUBCUTANEOUS at 18:14

## 2021-04-07 RX ADMIN — POTASSIUM CHLORIDE AND SODIUM CHLORIDE 250 ML/HR: 900; 300 INJECTION, SOLUTION INTRAVENOUS at 07:52

## 2021-04-07 RX ADMIN — HYDROMORPHONE HYDROCHLORIDE 2 MG: 2 INJECTION, SOLUTION INTRAMUSCULAR; INTRAVENOUS; SUBCUTANEOUS at 09:58

## 2021-04-07 RX ADMIN — Medication 125 MG: at 12:11

## 2021-04-07 RX ADMIN — POTASSIUM CHLORIDE AND SODIUM CHLORIDE 250 ML/HR: 900; 300 INJECTION, SOLUTION INTRAVENOUS at 14:07

## 2021-04-07 RX ADMIN — Medication 125 MG: at 18:20

## 2021-04-07 RX ADMIN — LORAZEPAM 2 MG: 2 INJECTION INTRAMUSCULAR at 07:39

## 2021-04-07 RX ADMIN — POTASSIUM CHLORIDE AND SODIUM CHLORIDE 250 ML/HR: 900; 300 INJECTION, SOLUTION INTRAVENOUS at 02:31

## 2021-04-07 RX ADMIN — LORAZEPAM 2 MG: 2 INJECTION INTRAMUSCULAR at 12:11

## 2021-04-07 RX ADMIN — POTASSIUM CHLORIDE AND SODIUM CHLORIDE 250 ML/HR: 900; 300 INJECTION, SOLUTION INTRAVENOUS at 21:34

## 2021-04-07 RX ADMIN — Medication 125 MG: at 05:26

## 2021-04-07 RX ADMIN — LORAZEPAM 2 MG: 2 INJECTION INTRAMUSCULAR at 21:05

## 2021-04-07 RX ADMIN — LORAZEPAM 2 MG: 2 INJECTION INTRAMUSCULAR at 00:09

## 2021-04-07 RX ADMIN — SODIUM CHLORIDE, PRESERVATIVE FREE 10 ML: 5 INJECTION INTRAVENOUS at 12:12

## 2021-04-07 RX ADMIN — POTASSIUM CHLORIDE AND SODIUM CHLORIDE 250 ML/HR: 900; 300 INJECTION, SOLUTION INTRAVENOUS at 14:10

## 2021-04-07 RX ADMIN — DICYCLOMINE HYDROCHLORIDE 20 MG: 20 TABLET ORAL at 21:34

## 2021-04-07 RX ADMIN — Medication 125 MG: at 00:09

## 2021-04-07 NOTE — PROGRESS NOTES
FAMILY MEDICINE DAILY PROGRESS NOTE  NAME: Nata Bain  : 1985  MRN: [id number]     LOS: 2 days     PROVIDER OF SERVICE: Zeeshan Wiggins MD    Chief Complaint: Pancreatitis    Subjective:   Pt states doing well with currently advancing diet. Currently tolerating jello. Still complaining of diarrhea, states it is now black in color. C. Diff was negative.   Interval History:  History taken from: patient chart RN      Review of Systems:   Review of Systems   Constitutional: Negative for activity change, chills, diaphoresis and fever.   HENT: Negative for dental problem, drooling, ear discharge, ear pain, facial swelling, mouth sores, nosebleeds, rhinorrhea, sinus pressure, sinus pain, sneezing and sore throat.    Eyes: Negative for pain, discharge and visual disturbance.   Respiratory: Negative for apnea, cough, shortness of breath, wheezing and stridor.    Cardiovascular: Negative for chest pain, palpitations and leg swelling.   Gastrointestinal: Positive for abdominal pain, anal bleeding and diarrhea. Negative for abdominal distention, blood in stool, constipation, nausea and vomiting.   Genitourinary: Negative for dysuria, frequency and urgency.   Musculoskeletal: Negative for arthralgias and back pain.   Skin: Negative for rash and wound.   Neurological: Negative for dizziness, facial asymmetry, light-headedness and headaches.   Psychiatric/Behavioral: Negative for agitation and decreased concentration. The patient is not nervous/anxious.        Objective:     Vital Signs  Temp:  [97.3 °F (36.3 °C)-98.2 °F (36.8 °C)] 97.9 °F (36.6 °C)  Heart Rate:  [83-94] 84  Resp:  [16-18] 18  BP: ()/(55-73) 112/66    Physical Exam  Physical Exam  Constitutional:       Appearance: She is well-developed.   HENT:      Head: Normocephalic and atraumatic.      Right Ear: External ear normal.      Left Ear: External ear normal.      Nose: Nose normal.      Mouth/Throat:      Pharynx: No oropharyngeal exudate.      Eyes:      General: No scleral icterus.        Right eye: No discharge.         Left eye: No discharge.      Conjunctiva/sclera: Conjunctivae normal.      Pupils: Pupils are equal, round, and reactive to light.   Neck:      Vascular: No JVD.   Cardiovascular:      Rate and Rhythm: Normal rate and regular rhythm.      Heart sounds: Normal heart sounds. No murmur heard.   No friction rub. No gallop.    Pulmonary:      Effort: Pulmonary effort is normal. No respiratory distress.      Breath sounds: Normal breath sounds. No stridor. No wheezing or rales.   Abdominal:      General: Bowel sounds are normal. There is no distension.      Palpations: Abdomen is soft.      Tenderness: There is abdominal tenderness (epigastric).   Musculoskeletal:         General: No tenderness or deformity. Normal range of motion.      Cervical back: Normal range of motion and neck supple.   Skin:     General: Skin is warm and dry.      Capillary Refill: Capillary refill takes less than 2 seconds.      Coloration: Skin is not pale.      Findings: No erythema or rash.   Neurological:      Mental Status: She is alert and oriented to person, place, and time.   Psychiatric:         Behavior: Behavior normal.         Medication Review    Current Facility-Administered Medications:   •  folic acid 1 mg in sodium chloride 0.9 % 50 mL IVPB, 1 mg, Intravenous, Daily, Maryam Galdamez MD, Last Rate: 100 mL/hr at 04/06/21 0839, 1 mg at 04/06/21 0839  •  HYDROmorphone (DILAUDID) injection 2 mg, 2 mg, Intravenous, Q2H PRN, Maryam Galdamez MD, 2 mg at 04/07/21 0739  •  labetalol (NORMODYNE,TRANDATE) injection 10 mg, 10 mg, Intravenous, Q2H PRN, Maryam Galdamez MD  •  LORazepam (ATIVAN) tablet 1 mg, 1 mg, Oral, Q2H PRN **OR** LORazepam (ATIVAN) injection 1 mg, 1 mg, Intravenous, Q2H PRN, 1 mg at 04/06/21 0504 **OR** LORazepam (ATIVAN) tablet 2 mg, 2 mg, Oral, Q1H PRN **OR** LORazepam (ATIVAN) injection 2 mg, 2 mg, Intravenous, Q1H PRN, 2 mg at  04/06/21 1839 **OR** LORazepam (ATIVAN) injection 2 mg, 2 mg, Intravenous, Q15 Min PRN **OR** LORazepam (ATIVAN) injection 2 mg, 2 mg, Intramuscular, Q15 Min PRN, Maryam Galdamez MD  •  LORazepam (ATIVAN) injection 2 mg, 2 mg, Intravenous, Q4H, Julian Hickey MD, 2 mg at 04/07/21 0739  •  nicotine (NICODERM CQ) 21 MG/24HR patch 1 patch, 1 patch, Transdermal, Q24H, Maryam Galdamez MD, 1 patch at 04/06/21 2036  •  ondansetron (ZOFRAN) injection 4 mg, 4 mg, Intravenous, Q6H PRN, Maryam Galdamez MD  •  pantoprazole (PROTONIX) injection 40 mg, 40 mg, Intravenous, Q AM, Maryam Galdamez MD, 40 mg at 04/07/21 0514  •  sodium chloride 0.9 % flush 10 mL, 10 mL, Intravenous, PRN, Han Perdomo MD  •  sodium chloride 0.9 % flush 10 mL, 10 mL, Intravenous, Q12H, Maryam Galdamez MD, 10 mL at 04/06/21 2037  •  sodium chloride 0.9 % flush 10 mL, 10 mL, Intravenous, PRN, Mrayam Galdamez MD  •  sodium chloride 0.9 % with KCl 40 mEq/L infusion, 250 mL/hr, Intravenous, Continuous, Julian Hickey MD, Last Rate: 250 mL/hr at 04/07/21 0752, 250 mL/hr at 04/07/21 0752  •  thiamine (B-1) 100 mg in sodium chloride 0.9 % 50 mL IVPB, 100 mg, Intravenous, Daily, Maryam Galdamez MD, Last Rate: 100 mL/hr at 04/06/21 0839, 100 mg at 04/06/21 0839  •  vancomycin oral solution reconstituted 125 mg, 125 mg, Oral, Q6H, Julian Hickey MD, 125 mg at 04/07/21 0526     Diagnostic Data    Lab Results (last 24 hours)     Procedure Component Value Units Date/Time    CBC & Differential [[id number]]  (Abnormal) Collected: 04/07/21 0551    Specimen: Blood Updated: 04/07/21 0736    Narrative:      The following orders were created for panel order CBC & Differential.  Procedure                               Abnormality         Status                     ---------                               -----------         ------                     Scan Slide[[id number]]                                       Final result                CBC Auto Differential[[id number]]        Abnormal            Final result                 Please view results for these tests on the individual orders.    Scan Slide [[id number]] Collected: 04/07/21 0551    Specimen: Blood Updated: 04/07/21 0736     Anisocytosis Slight/1+     Macrocytes Slight/1+     Polychromasia Slight/1+     WBC Morphology Normal     Platelet Estimate Decreased    Magnesium [[id number]]  (Normal) Collected: 04/07/21 0551    Specimen: Blood Updated: 04/07/21 0734     Magnesium 1.8 mg/dL     Comprehensive Metabolic Panel [[id number]]  (Abnormal) Collected: 04/07/21 0551    Specimen: Blood Updated: 04/07/21 0734     Glucose 87 mg/dL      BUN 3 mg/dL      Creatinine 0.40 mg/dL      Sodium 137 mmol/L      Potassium 4.2 mmol/L      Chloride 108 mmol/L      CO2 23.0 mmol/L      Calcium 7.6 mg/dL      Total Protein 5.8 g/dL      Albumin 3.00 g/dL      ALT (SGPT) 23 U/L      AST (SGOT) 38 U/L      Alkaline Phosphatase 112 U/L      Total Bilirubin 0.6 mg/dL      eGFR Non African Amer >150 mL/min/1.73      Globulin 2.8 gm/dL      A/G Ratio 1.1 g/dL      BUN/Creatinine Ratio 7.5     Anion Gap 6.0 mmol/L     Narrative:      GFR Normal >60  Chronic Kidney Disease <60  Kidney Failure <15      CBC Auto Differential [[id number]]  (Abnormal) Collected: 04/07/21 0551    Specimen: Blood Updated: 04/07/21 0652     WBC 7.07 10*3/mm3      RBC 2.79 10*6/mm3      Hemoglobin 10.0 g/dL      Hematocrit 29.8 %      .8 fL      MCH 35.8 pg      MCHC 33.6 g/dL      RDW 13.2 %      RDW-SD 51.3 fl      MPV 10.8 fL      Platelets 133 10*3/mm3      Neutrophil % 69.8 %      Lymphocyte % 24.8 %      Monocyte % 4.2 %      Eosinophil % 0.8 %      Basophil % 0.3 %      Immature Grans % 0.1 %      Neutrophils, Absolute 4.93 10*3/mm3      Lymphocytes, Absolute 1.75 10*3/mm3      Monocytes, Absolute 0.30 10*3/mm3      Eosinophils, Absolute 0.06 10*3/mm3      Basophils, Absolute 0.02 10*3/mm3      Immature Grans, Absolute 0.01 10*3/mm3       nRBC 0.0 /100 WBC     Clostridium Difficile Toxin - Stool, Per Rectum [[id number]]  (Normal) Collected: 04/06/21 1827    Specimen: Stool from Per Rectum Updated: 04/06/21 1925    Narrative:      The following orders were created for panel order Clostridium Difficile Toxin - Stool, Per Rectum.  Procedure                               Abnormality         Status                     ---------                               -----------         ------                     Clostridium Difficile To...[[id number]]  Normal              Final result                 Please view results for these tests on the individual orders.    Clostridium Difficile Toxin, PCR - Stool, Per Rectum [[id number]]  (Normal) Collected: 04/06/21 1827    Specimen: Stool from Per Rectum Updated: 04/06/21 1925     C. Difficile Toxins by PCR Negative    Narrative:      Performed by real-time polymerase chain reaction (qPCR).           Imaging Results (Last 24 Hours)     Procedure Component Value Units Date/Time    US Guided Vascular Access [[id number]] Collected: 04/06/21 1008     Updated: 04/06/21 1654    Narrative:      PROCEDURE: Ultrasound Guidance Vascular Access      Ordering physician(s): FLOYD DE LA ROSA    Clinical Indication: Ultrasound-guided venous access. Left  basilic vein.      Findings:    The vessel was sonographically evaluated and determined to be  patent. Concurrent realtime ultrasound was used to visualize  needle entry into the left basilic     vein    and a permanent  image was obtained    for permanent recording and reporting.         Impression:      Impression:     Uneventful ultrasound-guided venous access left basilic vein.    Electronically signed by:  Jorge Mullen MD  4/6/2021 4:53 PM CDT  Workstation: [id number]    US Abdomen Limited [[id number]] Collected: 04/06/21 1450     Updated: 04/06/21 1653    Narrative:      PROCEDURE: US ABDOMEN LIMITED    INDICATION:  Abdominal pain, nausea, vomiting, abdominal  distention      COMPARISON:  None    TECHNIQUE:  Ultrasound, limited, right upper quadrant    FINDINGS:    Liver:      size: Enlarged, measuring 21 cm in greatest craniocaudal  dimension    echotexture coarsened echotexture and diffusely increased  echogenicity, consistent with diffuse fatty infiltration    no focal mass or intrahepatic biliary ductal dilatation     Biliary:    Gall bladder: Phrygian cap. No wall thickening or  cholelithiasis identified    Common bile duct: Dilated up to 0.99 cm    Pancreas (visualized portions):      Normal size and echotexture for age    No focal mass or ductal dilatation      Kidney, right (limited):      size:  Normal, measuring 12.4 x 4.8 x 4.0 cm    echotexture:  Normal    No nephrolithiasis, solid mass, or collecting system dilation    Vascular (visualized portions):      Aorta:  Normal caliber    IVC:  Normal caliber, no intraluminal defect(s)      Impression:      1. Hepatomegaly and hepatic steatosis  2. Phrygian cap, gallbladder  3. Dilation of the common bile duct up to 0.99 cm in diameter  without choledocholithiasis identified        Electronically signed by:  Bekah Ibarra MD  4/6/2021 4:52 PM CDT  Workstation: [id number]          I reviewed the patient's new clinical results.    Assessment/Plan:     Active Hospital Problems    Diagnosis    • **Pancreatitis      -CT abdomen/pelvis confirms pancreatitis  -Lipase 1000-->1307  -Given 4 mg IV morphine and 1 mg IV dilaudid in the ED  -Pt states epigastric pain is improving. Will trial clear liquids  -IVF at 250 cc/hour  -Advance diet as tolerated  -Dilaudid q2 hours prn for severe pain  -Scheduled and PRN Ativan (CIWA)       • Moderate malnutrition (CMS/HCC)    • Hypomagnesemia      -Mag 1.1. Replace and monitor daily     • Benzodiazepine dependence (CMS/HCC)      -Patient takes valium 10 mg bid (confirmed on krishan) and gabapentin.  -Ativan 2 mg Q4H scheduled in addition to CIWA prn ativan dosing     • Alcohol abuse      -CIWA  -IV  fluids, IV folate, IV thiamine     • Tobacco abuse      -Nicotine patch     • Anxiety and depression      -Hold home oral antidepressants given n.p.o. status  -Scheduled and as needed Ativan(CIWA)       • Opioid use disorder (CMS/HCC)      -Hold home gabapentin and Suboxone  -As needed Dilaudid for abdominal pain secondary to pancreatitis. Currently receiving more morphine equivalents than her morphine equivalent for suboxone  -patient is on Suboxone 8/2 TID at home       • Hypokalemia      -We will monitor and replace as needed  -Receiving potassium 40 meq through fluids  -Mag 1.1, replaced with 2 grams this AM    Results from last 7 days   Lab Units 04/06/21  0534 04/05/21  1645   POTASSIUM mmol/L 3.2* 3.6                DVT prophylaxis: SCDs/TEDs  Code Status and Medical Interventions:   Ordered at: 04/05/21 2021     Code Status:    CPR     Medical Interventions (Level of Support Prior to Arrest):    Full       Plan for disposition:Where: home and When:  1-2days      Time: 30 minutes        This document has been electronically signed by Zeeshan Wiggins MD on April 7, 2021 08:19 CDT

## 2021-04-07 NOTE — PLAN OF CARE
Problem: Adult Inpatient Plan of Care  Goal: Plan of Care Review  4/7/2021 1544 by Yoanna Zuniga RN  Outcome: Ongoing, Not Progressing  Flowsheets (Taken 4/7/2021 1544)  Progress: no change  Plan of Care Reviewed With: patient  Outcome Summary: Patient still requiring IV dilaudid and ativan. Continues on clear liquid diet. Labs improving. Vitals stable. Will continue to monitor.  4/7/2021 1540 by Yoanna Zuniga RN  Outcome: Ongoing, Progressing  Goal: Patient-Specific Goal (Individualized)  4/7/2021 1544 by Yoanna Zuniga RN  Outcome: Ongoing, Not Progressing  4/7/2021 1540 by Yoanna Zuniga RN  Outcome: Ongoing, Progressing  Goal: Absence of Hospital-Acquired Illness or Injury  4/7/2021 1544 by Yoanna Zuniga RN  Outcome: Ongoing, Not Progressing  4/7/2021 1540 by Yoanna Zuniga RN  Outcome: Ongoing, Progressing  Intervention: Identify and Manage Fall Risk  Recent Flowsheet Documentation  Taken 4/7/2021 1330 by Yoanna Zuniga RN  Safety Promotion/Fall Prevention: safety round/check completed  Taken 4/7/2021 1106 by Yoanna Zuniga RN  Safety Promotion/Fall Prevention: safety round/check completed  Taken 4/7/2021 0918 by Yoanna Zuniga RN  Safety Promotion/Fall Prevention: safety round/check completed  Taken 4/7/2021 0730 by Yoanna Zuniga RN  Safety Promotion/Fall Prevention: safety round/check completed  Intervention: Prevent Skin Injury  Recent Flowsheet Documentation  Taken 4/7/2021 1106 by Yoanna Zuniga RN  Body Position: sitting up in bed  Taken 4/7/2021 0730 by Yoanna Zuniga RN  Body Position: sitting up in bed  Intervention: Prevent and Manage VTE (venous thromboembolism) Risk  Recent Flowsheet Documentation  Taken 4/7/2021 0826 by Yoanna Zuniga RN  VTE Prevention/Management:   patient refused intervention   bleeding risk factor(s) identified  Goal: Optimal Comfort and Wellbeing  4/7/2021 1544 by Yoanna Zuniga RN  Outcome: Ongoing,  Not Progressing  4/7/2021 1540 by Yoanna Zuniga RN  Outcome: Ongoing, Progressing  Intervention: Provide Person-Centered Care  Recent Flowsheet Documentation  Taken 4/7/2021 0826 by Yoanna Zuniga RN  Trust Relationship/Rapport: care explained  Goal: Readiness for Transition of Care  4/7/2021 1544 by Yoanna Zuniga RN  Outcome: Ongoing, Not Progressing  4/7/2021 1540 by Yoanna Zuniga RN  Outcome: Ongoing, Progressing     Problem: Fluid Imbalance (Pancreatitis)  Goal: Fluid Balance  4/7/2021 1544 by Yoanna Zuniga RN  Outcome: Ongoing, Not Progressing  4/7/2021 1540 by Yoanna Zuniga RN  Outcome: Ongoing, Progressing     Problem: Infection (Pancreatitis)  Goal: Infection Symptom Resolution  4/7/2021 1544 by Yoanna Zuniga RN  Outcome: Ongoing, Not Progressing  4/7/2021 1540 by Yoanna Zuniga RN  Outcome: Ongoing, Progressing     Problem: Nutrition Impaired (Pancreatitis)  Goal: Optimal Nutrition Intake  4/7/2021 1544 by Yoanna Zuniga RN  Outcome: Ongoing, Not Progressing  4/7/2021 1540 by Yoanna Zuniga RN  Outcome: Ongoing, Progressing     Problem: Pain (Pancreatitis)  Goal: Acceptable Pain Control  4/7/2021 1544 by Yoanna Zuniga RN  Outcome: Ongoing, Not Progressing  4/7/2021 1540 by Yoanna Zuniga RN  Outcome: Ongoing, Progressing  Intervention: Monitor and Manage Pain  Recent Flowsheet Documentation  Taken 4/7/2021 0957 by Yoanna Zuniga RN  Pain Management Interventions: see MAR  Taken 4/7/2021 0826 by Yoanna Zuniga RN  Diversional Activities: television  Taken 4/7/2021 0739 by Yoanna Zuniga RN  Pain Management Interventions: see MAR     Problem: Respiratory Compromise (Pancreatitis)  Goal: Effective Oxygenation and Ventilation  4/7/2021 1544 by Yoanna Zuniga RN  Outcome: Ongoing, Not Progressing  4/7/2021 1540 by Yoanna Zuniga RN  Outcome: Ongoing, Progressing  Intervention: Promote Airway Secretion Clearance  Recent  Flowsheet Documentation  Taken 4/7/2021 1330 by Yoanna Zuniga RN  Activity Management: activity adjusted per tolerance  Taken 4/7/2021 1106 by Yoanna Zuniga RN  Activity Management: activity adjusted per tolerance  Taken 4/7/2021 0918 by Yoanna Zuniga RN  Activity Management: activity adjusted per tolerance  Taken 4/7/2021 0730 by Yoanna Zuniga RN  Activity Management: activity adjusted per tolerance  Intervention: Optimize Oxygenation and Ventilation  Recent Flowsheet Documentation  Taken 4/7/2021 1330 by Yoanna Zuniga RN  Activity Management: activity adjusted per tolerance  Taken 4/7/2021 1106 by Yoanna Zuniga RN  Activity Management: activity adjusted per tolerance  Taken 4/7/2021 0918 by Yoanna Zuniga RN  Activity Management: activity adjusted per tolerance  Taken 4/7/2021 0730 by Yoanna Zuniga RN  Activity Management: activity adjusted per tolerance     Problem: Activity and Energy Impairment (Excessive Substance Use)  Goal: Optimized Energy Level (Excessive Substance Use)  4/7/2021 1544 by Yoanna Zuniga RN  Outcome: Ongoing, Not Progressing  4/7/2021 1540 by Yoanna Zuniga RN  Outcome: Ongoing, Progressing     Problem: Behavior Regulation Impairment (Excessive Substance Use)  Goal: Improved Behavioral Control (Excessive Substance Use)  4/7/2021 1544 by Yoanna Zuniga RN  Outcome: Ongoing, Not Progressing  4/7/2021 1540 by Yoanna Zuniga RN  Outcome: Ongoing, Progressing     Problem: Decreased Participation and Engagement (Excessive Substance Use)  Goal: Increased Participation and Engagement (Excessive Substance Use)  4/7/2021 1544 by Yoanna Zuniga RN  Outcome: Ongoing, Not Progressing  4/7/2021 1540 by Yoanna Zuniga RN  Outcome: Ongoing, Progressing     Problem: Physiologic Impairment (Excessive Substance Use)  Goal: Improved Physiologic Symptoms (Excessive Substance Use)  4/7/2021 1544 by Yoanna Zuniga RN  Outcome:  Ongoing, Not Progressing  4/7/2021 1540 by Yoanna Zuniga RN  Outcome: Ongoing, Progressing     Problem: Social, Occupational or Functional Impairment (Excessive Substance Use)  Goal: Enhanced Social, Occupational or Functional Skills (Excessive Substance Use)  4/7/2021 1544 by Yoanna Zuniga RN  Outcome: Ongoing, Not Progressing  4/7/2021 1540 by Yoanna Zuniga RN  Outcome: Ongoing, Progressing  Intervention: Promote Social, Occupational and Functional Ability  Recent Flowsheet Documentation  Taken 4/7/2021 0826 by Yoanna Zuniga RN  Trust Relationship/Rapport: care explained

## 2021-04-07 NOTE — PLAN OF CARE
Goal Outcome Evaluation:     Progress: improving  Outcome Summary: vitals stable; pain medication given as needed for pain; patient rested well throughout shift; IV fluids continue at 250ml/hr; tolerating clear liquid diet

## 2021-04-07 NOTE — NURSING NOTE
Patient requesting something else for pain. Patient states the pain medication is not lasting but about 45 minutes. Received new order. See MAR

## 2021-04-07 NOTE — CONSULTS
Venancio Tompkins DO,[facility]  Gastroenterology  Hepatology  Endoscopy  Board Certified in Internal Medicine and gastroenterology  [address], [address]  [place]. [address]  - [phone]   F - [phone]     GASTROENTEROLOGY CONSULT NOTE   VENANCIO TOMPKINS DO.         SUBJECTIVE:   4/7/2021     Name: Nata Bain  DOD: 1985    REASON FOR CONSULT: Diarrhea with abdominal pain    Chief Complaint:     Chief Complaint   Patient presents with   • Abdominal Pain       Subjective : Recurrent diarrhea since February    Patient is 36 y.o. female, personal history of what has been described as being C. difficile treated with vancomycin on a declining basis since February 2021, presents with abdominal pain with evidence of pancreatitis.  This is the fifth episode of pancreatitis that the patient has.  It has been attributed to alcohol use.  She drinks 1 pint of vodka per night.  She continues to drink alcohol despite being told that she needs to avoid all alcohol.  This was reiterated today by myself    She describes abdominal pain is diffuse.  It is in the pelvic area as well as in the epigastric and left upper quadrant area.  I told her that I suspect a lot of the pain is related to acute pancreatitis.  Imaging study confirms this.  This is similar to the pattern that she has had in the past.    No bleeding has been noted.  Colonoscopy was done this year by Dr. Abhay george for the patient with left upper quadrant pain and constipation.  This was a normal examination.  She also had a upper endoscopy examination that showed esophagitis.  There was grade 4 esophagitis with ulceration and stricturing of the esophagus with no bleeding.  Biopsies confirmed the above.    I am called because of the patient's symptoms and to evaluate for the continuation of the vancomycin versus other etiologies to be concerned about.    Says that she is extremely bloated.  This is the most that she has been bloated since  she has had her child.  She had an ultrasound done recently that showed no gallstones with the Ptergan cap.   ROS/HISTORY/ CURRENT MEDICATIONS/OBJECTIVE/VS/PE:   Review of Systems:   Review of Systems   Constitutional: Positive for activity change, appetite change, fatigue and unexpected weight change.   HENT: Negative.    Eyes: Negative.    Respiratory: Negative.    Cardiovascular: Negative.    Gastrointestinal: Positive for abdominal pain, diarrhea and nausea.   Endocrine: Positive for cold intolerance and heat intolerance.   Genitourinary: Negative.    Musculoskeletal: Positive for arthralgias and back pain.   Skin: Positive for color change.   Allergic/Immunologic: Negative.    Neurological: Positive for dizziness, weakness and light-headedness.   Hematological: Negative.    Psychiatric/Behavioral: The patient is nervous/anxious.        History:     Past Medical History:   Diagnosis Date   • Abnormal Pap smear of cervix    • Alcohol-induced acute pancreatitis 6/1/2020    Results From Last 14 Days Lab Units 02/27/21 1342 LIPASE U/L 65*  -advance diet as tolerated starting with clears -IV fluids   -will cont suboxone and give morphine for breakthrough pain  -Zofran for nausea as needed -Scheduled Ativan, and CIWA protocol - f/u on CT abdomen     • Alcoholism (CMS/HCC)    • Anxiety    • Cervical dysplasia    • Depression    • Fibrocystic breast    • GERD (gastroesophageal reflux disease)    • Hypertension    • Seizures (CMS/HCC) 2017   • Substance abuse (CMS/HCC)    • Substance abuse (CMS/HCC)      Past Surgical History:   Procedure Laterality Date   • COLONOSCOPY N/A 2/2/2021    Procedure: COLONOSCOPY;  Surgeon: Mirian Mills MD;  Location: [facility] ENDOSCOPY;  Service: Gastroenterology;  Laterality: N/A;   • ENDOSCOPY N/A 1/8/2021    Procedure: ESOPHAGOGASTRODUODENOSCOPY;  Surgeon: Mirian Mills MD;  Location: [facility] ENDOSCOPY;  Service: Gastroenterology;  Laterality: N/A;   • RHINOPLASTY       Family  History   Problem Relation Age of Onset   • Breast cancer Mother    • Cancer Mother    • COPD Mother    • Breast cancer Maternal Grandmother    • COPD Maternal Grandmother    • Heart disease Maternal Grandmother    • Breast cancer Paternal Grandmother    • Breast cancer Maternal Aunt    • Hypertension Father    • Heart disease Father      Social History     Tobacco Use   • Smoking status: Current Every Day Smoker     Packs/day: 1.00     Years: 20.00     Pack years: 20.00     Types: Cigarettes   • Smokeless tobacco: Never Used   Substance Use Topics   • Alcohol use: Yes     Alcohol/week: 6.0 standard drinks     Types: 6 Shots of liquor per week     Comment: daily   • Drug use: Not Currently     Types: Fentanyl, Oxycodone     Comment: hx of abuse     Medications Prior to Admission   Medication Sig Dispense Refill Last Dose   • acamprosate (CAMPRAL) 333 MG EC tablet       • buprenorphine-naloxone (SUBOXONE) 8-2 MG per SL tablet Place 1 tablet under the tongue 3 (Three) Times a Day. Patient takes one 8-2 tablet three times a day.      • cloNIDine (CATAPRES) 0.1 MG tablet Take 0.1 mg by mouth 3 (Three) Times a Day.      • cyanocobalamin (VITAMIN B-12) 1000 MCG tablet Take 1 tablet by mouth Daily. 30 tablet 5    • diazePAM (VALIUM) 5 MG tablet Take 10 mg by mouth Every 8 (Eight) Hours As Needed.      • FLUoxetine (PROzac) 40 MG capsule Take 40 mg by mouth Daily.      • folic acid (FOLVITE) 1 MG tablet Take 1 tablet by mouth Daily. 30 tablet 0    • gabapentin (NEURONTIN) 800 MG tablet Take 800 mg by mouth 3 (Three) Times a Day.      • pantoprazole (Protonix) 40 MG EC tablet Take 1 tablet by mouth Daily. 30 tablet 1    • prazosin (MINIPRESS) 5 MG capsule Take 5 mg by mouth Every Night.      • promethazine (PHENERGAN) 25 MG suppository Insert 1 suppository into the rectum Every 6 (Six) Hours As Needed for Nausea or Vomiting. 12 suppository 0    • sucralfate (CARAFATE) 1 g tablet       • thiamine (thiamine) 100 MG tablet  tablet Take 1 tablet by mouth Daily. 30 tablet 5    • vancomycin (VANCOCIN) 125 MG capsule 1 capsule by mouth four times a day for 14 days. Then 1 capsule 2 times a day for 7 days. Then 1 capsule daily for 7 days.  1 capsule every 3 days for 28 days. 56 capsule 0      Allergies:  Patient has no known allergies.    I have reviewed the patient's medical history, surgical history and family history in the available medical record system.     Current Medications:     Current Facility-Administered Medications   Medication Dose Route Frequency Provider Last Rate Last Admin   • folic acid 1 mg in sodium chloride 0.9 % 50 mL IVPB  1 mg Intravenous Daily Maryam Galdamez  mL/hr at 04/07/21 0957 1 mg at 04/07/21 0957   • HYDROmorphone (DILAUDID) injection 2 mg  2 mg Intravenous Q2H PRN Maryam Galdamez MD   2 mg at 04/07/21 1612   • labetalol (NORMODYNE,TRANDATE) injection 10 mg  10 mg Intravenous Q2H PRN Maryam Galdamez MD       • LORazepam (ATIVAN) tablet 1 mg  1 mg Oral Q2H PRN Maryam Galdamez MD        Or   • LORazepam (ATIVAN) injection 1 mg  1 mg Intravenous Q2H PRN Maryam Galdamez MD   1 mg at 04/07/21 1408    Or   • LORazepam (ATIVAN) tablet 2 mg  2 mg Oral Q1H PRN Maryam Galdamez MD        Or   • LORazepam (ATIVAN) injection 2 mg  2 mg Intravenous Q1H PRN Maryam Galdamez MD   2 mg at 04/06/21 1839    Or   • LORazepam (ATIVAN) injection 2 mg  2 mg Intravenous Q15 Min PRN Maryam Galdamez MD        Or   • LORazepam (ATIVAN) injection 2 mg  2 mg Intramuscular Q15 Min PRN Maryam Galdamez MD       • LORazepam (ATIVAN) injection 2 mg  2 mg Intravenous Q4H Julian Hickey MD   2 mg at 04/07/21 1613   • nicotine (NICODERM CQ) 21 MG/24HR patch 1 patch  1 patch Transdermal Q24H Maryam Galdamez MD   1 patch at 04/06/21 2036   • ondansetron (ZOFRAN) injection 4 mg  4 mg Intravenous Q6H PRN Maryam Galdamez MD       • pantoprazole (PROTONIX) injection 40 mg  40 mg Intravenous Q AM Maryam Galdamez MD    40 mg at 04/07/21 0514   • sodium chloride 0.9 % flush 10 mL  10 mL Intravenous PRN Han Perdomo MD       • sodium chloride 0.9 % flush 10 mL  10 mL Intravenous Q12H Maryam Galdamez MD   10 mL at 04/07/21 1212   • sodium chloride 0.9 % flush 10 mL  10 mL Intravenous PRN Maryam Galdamez MD       • sodium chloride 0.9 % with KCl 40 mEq/L infusion  250 mL/hr Intravenous Continuous Julian Hickey  mL/hr at 04/07/21 1410 250 mL/hr at 04/07/21 1410   • thiamine (B-1) 100 mg in sodium chloride 0.9 % 50 mL IVPB  100 mg Intravenous TID Zeeshan Wiggins  mL/hr at 04/07/21 1613 100 mg at 04/07/21 1613   • vancomycin oral solution reconstituted 125 mg  125 mg Oral Q6H Julian Hickey MD   125 mg at 04/07/21 1211       Objective     Physical Exam:   Temp:  [96 °F (35.6 °C)-98.2 °F (36.8 °C)] 97 °F (36.1 °C)  Heart Rate:  [] 100  Resp:  [16-18] 18  BP: (100-118)/(59-73) 118/66    Physical Exam:  General Appearance:    Alert, cooperative, in no acute distress   Head:    Normocephalic, without obvious abnormality, atraumatic   Eyes:            Lids and lashes normal, conjunctivae and sclerae normal, no icterus, no pallor, corneas clear, PERRLA   Ears:    Ears appear intact with no abnormalities noted   Throat:   No oral lesions, no thrush, oral mucosa moist   Neck:   No adenopathy, supple, trachea midline, no thyromegaly, no carotid bruit, no JVD   Back:     No kyphosis present, no scoliosis present, no skin lesions,    erythema or scars, no tenderness to percussion or  palpation,   range of motion normal   Lungs:     Clear to auscultation,respirations regular, even and  unlabored    Heart:    Regular rhythm and normal rate, normal S1 and S2, no  murmur, no gallop, no rub, no click   Breast Exam:    Deferred   Abdomen:    Tenderness across the entire abdomen worse in the upper abdominal area.  No ascites   Genitalia:    Deferred   Extremities:   Moves all extremities well, no edema, no  cyanosis, no redness   Pulses:   Pulses palpable and equal bilaterally   Skin:   No bleeding, bruising or rash   Lymph nodes:   No palpable adenopathy   Neurologic:   Cranial nerves 2 - 12 grossly intact, sensation intact, DTR  present and equal bilaterally      Results Review:     Lab Results   Component Value Date    WBC 7.07 04/07/2021    WBC 8.97 04/06/2021    WBC 9.90 04/05/2021    HGB 10.0 (L) 04/07/2021    HGB 11.2 (L) 04/06/2021    HGB 13.3 04/05/2021    HCT 29.8 (L) 04/07/2021    HCT 32.5 (L) 04/06/2021    HCT 37.3 04/05/2021     (L) 04/07/2021     04/06/2021     04/05/2021     Results from last 7 days   Lab Units 04/07/21  0551 04/06/21  0534 04/05/21  1645   ALK PHOS U/L 112 114 142*   ALT (SGPT) U/L 23 32 44*   AST (SGOT) U/L 38* 54* 83*     Results from last 7 days   Lab Units 04/07/21  0551 04/06/21  0534 04/05/21  1645   BILIRUBIN mg/dL 0.6 0.9 0.5   ALK PHOS U/L 112 114 142*     Lipase   Date Value Ref Range Status   04/07/2021 231 (H) 13 - 60 U/L Final   04/06/2021 1,046 (H) 13 - 60 U/L Final   04/06/2021 1,307 (H) 13 - 60 U/L Final   04/05/2021 1,087 (H) 13 - 60 U/L Final     Lab Results   Component Value Date    INR 0.98 03/16/2019    INR 0.84 10/07/2017    INR 1.08 09/29/2017     No results found for: CULTURE    Radiology Review:  Imaging Results (Last 72 Hours)     Procedure Component Value Units Date/Time    US Guided Vascular Access [[id number]] Collected: 04/06/21 1008     Updated: 04/06/21 1654    Narrative:      PROCEDURE: Ultrasound Guidance Vascular Access      Ordering physician(s): FLOYD DE LA ROSA    Clinical Indication: Ultrasound-guided venous access. Left  basilic vein.      Findings:    The vessel was sonographically evaluated and determined to be  patent. Concurrent realtime ultrasound was used to visualize  needle entry into the left basilic     vein    and a permanent  image was obtained    for permanent recording and reporting.         Impression:       Impression:     Uneventful ultrasound-guided venous access left basilic vein.    Electronically signed by:  Jorge Mullen MD  4/6/2021 4:53 PM CDT  Workstation: [id number]    US Abdomen Limited [[id number]] Collected: 04/06/21 1450     Updated: 04/06/21 1653    Narrative:      PROCEDURE: US ABDOMEN LIMITED    INDICATION:  Abdominal pain, nausea, vomiting, abdominal  distention     COMPARISON:  None    TECHNIQUE:  Ultrasound, limited, right upper quadrant    FINDINGS:    Liver:      size: Enlarged, measuring 21 cm in greatest craniocaudal  dimension    echotexture coarsened echotexture and diffusely increased  echogenicity, consistent with diffuse fatty infiltration    no focal mass or intrahepatic biliary ductal dilatation     Biliary:    Gall bladder: Phrygian cap. No wall thickening or  cholelithiasis identified    Common bile duct: Dilated up to 0.99 cm    Pancreas (visualized portions):      Normal size and echotexture for age    No focal mass or ductal dilatation      Kidney, right (limited):      size:  Normal, measuring 12.4 x 4.8 x 4.0 cm    echotexture:  Normal    No nephrolithiasis, solid mass, or collecting system dilation    Vascular (visualized portions):      Aorta:  Normal caliber    IVC:  Normal caliber, no intraluminal defect(s)      Impression:      1. Hepatomegaly and hepatic steatosis  2. Phrygian cap, gallbladder  3. Dilation of the common bile duct up to 0.99 cm in diameter  without choledocholithiasis identified        Electronically signed by:  Bekah Ibarra MD  4/6/2021 4:52 PM CDT  Workstation: [id number]    IR Insert Midline Without Port Pump 5 Plus [[id number]] Resulted: 04/06/21 1035     Updated: 04/06/21 1035    Narrative:      This procedure was auto-finalized with no dictation required.    CT Abdomen Pelvis With Contrast [[id number]] Collected: 04/05/21 1805     Updated: 04/05/21 1827    Narrative:        Patient Name: SHAWNEE SPRINGER    ORDERING: VANDANA PUENTE    ATTENDING: THUY  A BUNCH     REFERRING: VANDANA PUENTE    -----------------------  EXAM DESCRIPTION: CT ABDOMEN PELVIS W CONTRAST    HISTORY: Abdominal pain, acute, nonlocalized    COMPARISON: 2/27/2021    Dose Length Product: 329.3.    This exam was performed according to our departmental dose  optimization program, which includes automated exposure control,  adjustment of the mA and/or KV according to patient size and/or  use of iterative reconstruction technique.      CONTRAST:   80 mL intravenous Isovue 300.      TECHNIQUE: Multiple contiguous contrast enhanced axial images are  obtained of the abdomen and pelvis with coronal and sagittal  reformat images provided.     FINDINGS:     LOWER CHEST: No pulmonary consolidation or pleural effusion. No  cardiac enlargement or pericardial effusion.    HEPATOBILIARY: Redemonstration of diffuse fatty liver  infiltration and hepatic enlargement. No development of a  suspicious hepatic lesion or ductal dilation. No calcified stones  or pericholecystic inflammatory change. There is note of a  phrygian cap. There is redemonstrated prominence of the  extrahepatic bile duct.  SPLEEN: Unremarkable.  PANCREAS: There is now inflammatory changes and fluid centered  about the pancreas greatest at its body and tail portions within  the anterior pararenal space left greater than right. Findings  are most consistent with acute pancreatitis. No abscess or  abnormal gas collection associated with the abnormality.  ADRENAL GLANDS: Unremarkable.  KIDNEYS/URETERS: No suspicious renal mass, renal/ureteral  calcification, or obstructive uropathy.    GASTROINTESTINAL: No evidence of bowel obstruction terminal ileum  and ileocecal junction are unremarkable. There is some liquid  stool within the cecum. No acute inflammatory changes identified  otherwise.  REPRODUCTIVE ORGANS: Unremarkable.  URINARY BLADDER: Unremarkable    VASCULAR: Vascular calcification without abdominal aortic  aneurysm.  LYMPH NODES: No  pathologically enlarged nodes by size criteria.  PERITONEUM/RETROPERITONEUM: No free air.     OSSEOUS STRUCTURES: No acute findings.    ADDITIONAL FINDINGS: None      Impression:      Fairly extensive inflammatory changes centered at the pancreas  left greater than right anterior pararenal space most consistent  with pancreatitis. No abscess or pseudocyst identified.    Redemonstration of diffuse fatty liver infiltration and hepatic  enlargement.    Electronically signed by:  Larry Ling MD  4/5/2021 6:26 PM CDT  Workstation: [id number]           I reviewed the patient's new clinical results.  I reviewed the patient's new imaging results and agree with the interpretation.     ASSESSMENT/PLAN:   ASSESSMENT:  1.  Diarrhea.  Do not feel that this is related to C. difficile.  Could be related to IBS, post infection associated.  Consider the possibility of malabsorption secondary to recurrent pancreatitis.  Doubt withdrawal from Suboxone  2.  Acute alcohol-relate pancreatitis without complications  3.  Pteragan  Cap of gallbladder.  Congenital abnormality  4.  Alcoholism  5.  Malnutrition  PLAN:  1.  I would recommend discontinuing the vancomycin  2.  Consider antispasmodics in the form of dicyclomine 20 mg twice daily  3.  Would recommend stool for fat as well as stool for GI panel to exclude other etiologies  4.  Do not feel repeat colonoscopy is necessary as one was done within the last 2 months  5.  I will notify Dr. Petersen of the admission to the hospital and she will assume care of the patient tomorrow  6.  The patient is told in no uncertain terms that if she continues to drink she will have multiple other complications related to this.       Jose Mayer DO  04/07/21  17:34 CDT

## 2021-04-08 PROBLEM — F10.230 ALCOHOL DEPENDENCE WITH UNCOMPLICATED WITHDRAWAL (HCC): Status: ACTIVE | Noted: 2021-04-05

## 2021-04-08 PROBLEM — E51.9 THIAMINE DEFICIENCY: Status: ACTIVE | Noted: 2021-04-08

## 2021-04-08 PROBLEM — K59.1 FUNCTIONAL DIARRHEA: Status: ACTIVE | Noted: 2021-04-08

## 2021-04-08 PROBLEM — K85.20 ALCOHOL-INDUCED ACUTE PANCREATITIS WITHOUT INFECTION OR NECROSIS: Status: ACTIVE | Noted: 2021-04-05

## 2021-04-08 PROBLEM — K92.1 MELENA: Status: RESOLVED | Noted: 2021-04-07 | Resolved: 2021-04-08

## 2021-04-08 PROBLEM — E44.0 MODERATE MALNUTRITION: Status: RESOLVED | Noted: 2021-04-07 | Resolved: 2021-04-08

## 2021-04-08 LAB
ADV 40+41 DNA STL QL NAA+NON-PROBE: NOT DETECTED
ALBUMIN SERPL-MCNC: 3 G/DL (ref 3.5–5.2)
ALBUMIN/GLOB SERPL: 1 G/DL
ALP SERPL-CCNC: 121 U/L (ref 39–117)
ALT SERPL W P-5'-P-CCNC: 28 U/L (ref 1–33)
ANION GAP SERPL CALCULATED.3IONS-SCNC: 8 MMOL/L (ref 5–15)
AST SERPL-CCNC: 82 U/L (ref 1–32)
ASTRO TYP 1-8 RNA STL QL NAA+NON-PROBE: NOT DETECTED
BASOPHILS # BLD AUTO: 0.03 10*3/MM3 (ref 0–0.2)
BASOPHILS NFR BLD AUTO: 0.5 % (ref 0–1.5)
BILIRUB SERPL-MCNC: 0.4 MG/DL (ref 0–1.2)
BUN SERPL-MCNC: 3 MG/DL (ref 6–20)
BUN/CREAT SERPL: 7.5 (ref 7–25)
C CAYETANENSIS DNA STL QL NAA+NON-PROBE: NOT DETECTED
C COLI+JEJ+UPSA DNA STL QL NAA+NON-PROBE: NOT DETECTED
CALCIUM SPEC-SCNC: 8.9 MG/DL (ref 8.6–10.5)
CHLORIDE SERPL-SCNC: 106 MMOL/L (ref 98–107)
CO2 SERPL-SCNC: 23 MMOL/L (ref 22–29)
CREAT SERPL-MCNC: 0.4 MG/DL (ref 0.57–1)
CRYPTOSP DNA STL QL NAA+NON-PROBE: NOT DETECTED
DEPRECATED RDW RBC AUTO: 52.4 FL (ref 37–54)
E HISTOLYT DNA STL QL NAA+NON-PROBE: NOT DETECTED
EAEC PAA PLAS AGGR+AATA ST NAA+NON-PRB: NOT DETECTED
EC STX1+STX2 GENES STL QL NAA+NON-PROBE: NOT DETECTED
EOSINOPHIL # BLD AUTO: 0.08 10*3/MM3 (ref 0–0.4)
EOSINOPHIL NFR BLD AUTO: 1.3 % (ref 0.3–6.2)
EPEC EAE GENE STL QL NAA+NON-PROBE: NOT DETECTED
ERYTHROCYTE [DISTWIDTH] IN BLOOD BY AUTOMATED COUNT: 13.2 % (ref 12.3–15.4)
ETEC LTA+ST1A+ST1B TOX ST NAA+NON-PROBE: NOT DETECTED
FATTY ACIDS: NORMAL
G LAMBLIA DNA STL QL NAA+NON-PROBE: NOT DETECTED
GFR SERPL CREATININE-BSD FRML MDRD: >150 ML/MIN/1.73
GLOBULIN UR ELPH-MCNC: 3.1 GM/DL
GLUCOSE SERPL-MCNC: 106 MG/DL (ref 65–99)
HCT VFR BLD AUTO: 29.6 % (ref 34–46.6)
HGB BLD-MCNC: 9.8 G/DL (ref 12–15.9)
IMM GRANULOCYTES # BLD AUTO: 0.02 10*3/MM3 (ref 0–0.05)
IMM GRANULOCYTES NFR BLD AUTO: 0.3 % (ref 0–0.5)
LIPASE SERPL-CCNC: 228 U/L (ref 13–60)
LYMPHOCYTES # BLD AUTO: 1.69 10*3/MM3 (ref 0.7–3.1)
LYMPHOCYTES NFR BLD AUTO: 27.3 % (ref 19.6–45.3)
MACROCYTES BLD QL SMEAR: NORMAL
MAGNESIUM SERPL-MCNC: 1.8 MG/DL (ref 1.6–2.6)
MCH RBC QN AUTO: 35.8 PG (ref 26.6–33)
MCHC RBC AUTO-ENTMCNC: 33.1 G/DL (ref 31.5–35.7)
MCV RBC AUTO: 108 FL (ref 79–97)
MONOCYTES # BLD AUTO: 0.34 10*3/MM3 (ref 0.1–0.9)
MONOCYTES NFR BLD AUTO: 5.5 % (ref 5–12)
NEUTRAL FATS: NORMAL
NEUTROPHILS NFR BLD AUTO: 4.03 10*3/MM3 (ref 1.7–7)
NEUTROPHILS NFR BLD AUTO: 65.1 % (ref 42.7–76)
NOROVIRUS GI+II RNA STL QL NAA+NON-PROBE: NOT DETECTED
NRBC BLD AUTO-RTO: 0 /100 WBC (ref 0–0.2)
P SHIGELLOIDES DNA STL QL NAA+NON-PROBE: NOT DETECTED
PLAT MORPH BLD: NORMAL
PLATELET # BLD AUTO: 139 10*3/MM3 (ref 140–450)
PMV BLD AUTO: 11.3 FL (ref 6–12)
POTASSIUM SERPL-SCNC: 4.6 MMOL/L (ref 3.5–5.2)
PROT SERPL-MCNC: 6.1 G/DL (ref 6–8.5)
RBC # BLD AUTO: 2.74 10*6/MM3 (ref 3.77–5.28)
RVA RNA STL QL NAA+NON-PROBE: NOT DETECTED
S ENT+BONG DNA STL QL NAA+NON-PROBE: NOT DETECTED
SAPO I+II+IV+V RNA STL QL NAA+NON-PROBE: NOT DETECTED
SHIGELLA SP+EIEC IPAH ST NAA+NON-PROBE: NOT DETECTED
SODIUM SERPL-SCNC: 137 MMOL/L (ref 136–145)
V CHOL+PARA+VUL DNA STL QL NAA+NON-PROBE: NOT DETECTED
V CHOLERAE DNA STL QL NAA+NON-PROBE: NOT DETECTED
WBC # BLD AUTO: 6.19 10*3/MM3 (ref 3.4–10.8)
WBC MORPH BLD: NORMAL
Y ENTEROCOL DNA STL QL NAA+NON-PROBE: NOT DETECTED

## 2021-04-08 PROCEDURE — 89125 SPECIMEN FAT STAIN: CPT | Performed by: STUDENT IN AN ORGANIZED HEALTH CARE EDUCATION/TRAINING PROGRAM

## 2021-04-08 PROCEDURE — 80053 COMPREHEN METABOLIC PANEL: CPT | Performed by: STUDENT IN AN ORGANIZED HEALTH CARE EDUCATION/TRAINING PROGRAM

## 2021-04-08 PROCEDURE — 25010000002 THIAMINE PER 100 MG: Performed by: STUDENT IN AN ORGANIZED HEALTH CARE EDUCATION/TRAINING PROGRAM

## 2021-04-08 PROCEDURE — 99232 SBSQ HOSP IP/OBS MODERATE 35: CPT | Performed by: INTERNAL MEDICINE

## 2021-04-08 PROCEDURE — 25010000002 HYDROMORPHONE 1 MG/ML SOLUTION: Performed by: STUDENT IN AN ORGANIZED HEALTH CARE EDUCATION/TRAINING PROGRAM

## 2021-04-08 PROCEDURE — 25010000002 LORAZEPAM PER 2 MG: Performed by: STUDENT IN AN ORGANIZED HEALTH CARE EDUCATION/TRAINING PROGRAM

## 2021-04-08 PROCEDURE — 85025 COMPLETE CBC W/AUTO DIFF WBC: CPT | Performed by: STUDENT IN AN ORGANIZED HEALTH CARE EDUCATION/TRAINING PROGRAM

## 2021-04-08 PROCEDURE — 83690 ASSAY OF LIPASE: CPT | Performed by: STUDENT IN AN ORGANIZED HEALTH CARE EDUCATION/TRAINING PROGRAM

## 2021-04-08 PROCEDURE — 85007 BL SMEAR W/DIFF WBC COUNT: CPT | Performed by: STUDENT IN AN ORGANIZED HEALTH CARE EDUCATION/TRAINING PROGRAM

## 2021-04-08 PROCEDURE — 99233 SBSQ HOSP IP/OBS HIGH 50: CPT | Performed by: STUDENT IN AN ORGANIZED HEALTH CARE EDUCATION/TRAINING PROGRAM

## 2021-04-08 PROCEDURE — 25010000002 HYDROMORPHONE PER 4 MG: Performed by: STUDENT IN AN ORGANIZED HEALTH CARE EDUCATION/TRAINING PROGRAM

## 2021-04-08 PROCEDURE — 25810000003 SODIUM CHLORIDE 0.9 % WITH KCL 40 MEQ/L 40-0.9 MEQ/L-% SOLUTION: Performed by: STUDENT IN AN ORGANIZED HEALTH CARE EDUCATION/TRAINING PROGRAM

## 2021-04-08 PROCEDURE — 83735 ASSAY OF MAGNESIUM: CPT | Performed by: STUDENT IN AN ORGANIZED HEALTH CARE EDUCATION/TRAINING PROGRAM

## 2021-04-08 PROCEDURE — 94760 N-INVAS EAR/PLS OXIMETRY 1: CPT

## 2021-04-08 RX ORDER — CHOLESTYRAMINE LIGHT 4 G/5.7G
1 POWDER, FOR SUSPENSION ORAL EVERY 8 HOURS SCHEDULED
Status: DISCONTINUED | OUTPATIENT
Start: 2021-04-08 | End: 2021-04-12 | Stop reason: HOSPADM

## 2021-04-08 RX ORDER — SODIUM CHLORIDE 9 MG/ML
150 INJECTION, SOLUTION INTRAVENOUS CONTINUOUS
Status: DISCONTINUED | OUTPATIENT
Start: 2021-04-08 | End: 2021-04-12

## 2021-04-08 RX ADMIN — PANTOPRAZOLE SODIUM 40 MG: 40 INJECTION, POWDER, FOR SOLUTION INTRAVENOUS at 05:19

## 2021-04-08 RX ADMIN — SODIUM CHLORIDE, PRESERVATIVE FREE 10 ML: 5 INJECTION INTRAVENOUS at 21:26

## 2021-04-08 RX ADMIN — FOLIC ACID 1 MG: 5 INJECTION, SOLUTION INTRAMUSCULAR; INTRAVENOUS; SUBCUTANEOUS at 10:08

## 2021-04-08 RX ADMIN — LORAZEPAM 2 MG: 2 INJECTION INTRAMUSCULAR at 12:43

## 2021-04-08 RX ADMIN — THIAMINE HYDROCHLORIDE 100 MG: 100 INJECTION, SOLUTION INTRAMUSCULAR; INTRAVENOUS at 21:25

## 2021-04-08 RX ADMIN — LORAZEPAM 2 MG: 2 INJECTION INTRAMUSCULAR at 04:54

## 2021-04-08 RX ADMIN — LORAZEPAM 1 MG: 2 INJECTION INTRAMUSCULAR; INTRAVENOUS at 07:51

## 2021-04-08 RX ADMIN — HYDROMORPHONE HYDROCHLORIDE 2 MG: 2 INJECTION, SOLUTION INTRAMUSCULAR; INTRAVENOUS; SUBCUTANEOUS at 07:50

## 2021-04-08 RX ADMIN — DICYCLOMINE HYDROCHLORIDE 20 MG: 20 TABLET ORAL at 10:07

## 2021-04-08 RX ADMIN — CHOLESTYRAMINE 4 G: 4 POWDER, FOR SUSPENSION ORAL at 10:07

## 2021-04-08 RX ADMIN — HYDROMORPHONE HYDROCHLORIDE 2 MG: 2 INJECTION, SOLUTION INTRAMUSCULAR; INTRAVENOUS; SUBCUTANEOUS at 12:43

## 2021-04-08 RX ADMIN — LORAZEPAM 2 MG: 2 INJECTION INTRAMUSCULAR; INTRAVENOUS at 17:58

## 2021-04-08 RX ADMIN — NICOTINE 1 PATCH: 21 PATCH, EXTENDED RELEASE TRANSDERMAL at 21:26

## 2021-04-08 RX ADMIN — SODIUM CHLORIDE 125 ML/HR: 900 INJECTION, SOLUTION INTRAVENOUS at 10:06

## 2021-04-08 RX ADMIN — DICYCLOMINE HYDROCHLORIDE 20 MG: 20 TABLET ORAL at 21:25

## 2021-04-08 RX ADMIN — HYDROMORPHONE HYDROCHLORIDE 1 MG: 1 INJECTION, SOLUTION INTRAMUSCULAR; INTRAVENOUS; SUBCUTANEOUS at 21:25

## 2021-04-08 RX ADMIN — LORAZEPAM 2 MG: 2 INJECTION INTRAMUSCULAR at 16:00

## 2021-04-08 RX ADMIN — HYDROMORPHONE HYDROCHLORIDE 2 MG: 2 INJECTION, SOLUTION INTRAMUSCULAR; INTRAVENOUS; SUBCUTANEOUS at 15:29

## 2021-04-08 RX ADMIN — LORAZEPAM 2 MG: 2 INJECTION INTRAMUSCULAR at 10:07

## 2021-04-08 RX ADMIN — CHOLESTYRAMINE 4 G: 4 POWDER, FOR SUSPENSION ORAL at 21:26

## 2021-04-08 RX ADMIN — HYDROMORPHONE HYDROCHLORIDE 2 MG: 2 INJECTION, SOLUTION INTRAMUSCULAR; INTRAVENOUS; SUBCUTANEOUS at 04:54

## 2021-04-08 RX ADMIN — POTASSIUM CHLORIDE AND SODIUM CHLORIDE 250 ML/HR: 900; 300 INJECTION, SOLUTION INTRAVENOUS at 07:50

## 2021-04-08 RX ADMIN — THIAMINE HYDROCHLORIDE 100 MG: 100 INJECTION, SOLUTION INTRAMUSCULAR; INTRAVENOUS at 10:08

## 2021-04-08 RX ADMIN — HYDROMORPHONE HYDROCHLORIDE 2 MG: 2 INJECTION, SOLUTION INTRAMUSCULAR; INTRAVENOUS; SUBCUTANEOUS at 10:07

## 2021-04-08 RX ADMIN — SODIUM CHLORIDE, PRESERVATIVE FREE 10 ML: 5 INJECTION INTRAVENOUS at 10:08

## 2021-04-08 RX ADMIN — HYDROMORPHONE HYDROCHLORIDE 2 MG: 2 INJECTION, SOLUTION INTRAMUSCULAR; INTRAVENOUS; SUBCUTANEOUS at 17:48

## 2021-04-08 RX ADMIN — THIAMINE HYDROCHLORIDE 100 MG: 100 INJECTION, SOLUTION INTRAMUSCULAR; INTRAVENOUS at 16:00

## 2021-04-08 RX ADMIN — LORAZEPAM 2 MG: 2 INJECTION INTRAMUSCULAR at 21:23

## 2021-04-08 RX ADMIN — CHOLESTYRAMINE 4 G: 4 POWDER, FOR SUSPENSION ORAL at 13:48

## 2021-04-08 RX ADMIN — POTASSIUM CHLORIDE AND SODIUM CHLORIDE 250 ML/HR: 900; 300 INJECTION, SOLUTION INTRAVENOUS at 03:53

## 2021-04-08 NOTE — PROGRESS NOTES
Nutrition Services    Patient Name:  Nata Bain  YOB: 1985  MRN: [id number]  Admit Date:  4/5/2021    #, #, pt is 96.4% IBW. Pt still taking folic acid and thiamine for probable deficiencies. Elevated ALP (121), AST (82) and lipase (228) and low albumin (3.0). Pt resting and in good spirits. Pt reports enjoying her meals, a good appetite, and tolerance of Regular diet. Pt reports intermittent nausea and continued diarrhea. Pt is enjoying Ensure Clear and showed some interest in receiving Boost coupons prior to discharge. Intern will continue to monitor and follow.     Electronically signed by:  AGUSTO CHI  04/08/21 14:08 CDT

## 2021-04-08 NOTE — PROGRESS NOTES
SUBJECTIVE:   4/8/2021  Chief Complaint:     Subjective      Patient feels some better today.  Abdominal pain is persistent but improving slowly.  Tolerating diet well.  No further nausea or emesis.  Diarrhea is improving.  Lipase has improved to 228.  Liver enzymes are improving as well.  Hemoglobin 9.8.  WBC normalized at 6.09.    History:  Past Medical History:   Diagnosis Date   • Abnormal Pap smear of cervix    • Alcohol-induced acute pancreatitis 6/1/2020    Results From Last 14 Days Lab Units 02/27/21 1342 LIPASE U/L 65*  -advance diet as tolerated starting with clears -IV fluids   -will cont suboxone and give morphine for breakthrough pain  -Zofran for nausea as needed -Scheduled Ativan, and CIWA protocol - f/u on CT abdomen     • Alcoholism (CMS/HCC)    • Anxiety    • Cervical dysplasia    • Depression    • Fibrocystic breast    • GERD (gastroesophageal reflux disease)    • Hypertension    • Seizures (CMS/HCC) 2017   • Substance abuse (CMS/HCC)    • Substance abuse (CMS/HCC)      Past Surgical History:   Procedure Laterality Date   • COLONOSCOPY N/A 2/2/2021    Procedure: COLONOSCOPY;  Surgeon: Mirian Mills MD;  Location: [facility] ENDOSCOPY;  Service: Gastroenterology;  Laterality: N/A;   • ENDOSCOPY N/A 1/8/2021    Procedure: ESOPHAGOGASTRODUODENOSCOPY;  Surgeon: Mirian Mills MD;  Location: [facility] ENDOSCOPY;  Service: Gastroenterology;  Laterality: N/A;   • RHINOPLASTY       Family History   Problem Relation Age of Onset   • Breast cancer Mother    • Cancer Mother    • COPD Mother    • Breast cancer Maternal Grandmother    • COPD Maternal Grandmother    • Heart disease Maternal Grandmother    • Breast cancer Paternal Grandmother    • Breast cancer Maternal Aunt    • Hypertension Father    • Heart disease Father      Social History     Tobacco Use   • Smoking status: Current Every Day Smoker     Packs/day: 1.00     Years: 20.00     Pack years: 20.00     Types: Cigarettes   • Smokeless  tobacco: Never Used   Substance Use Topics   • Alcohol use: Yes     Alcohol/week: 6.0 standard drinks     Types: 6 Shots of liquor per week     Comment: daily   • Drug use: Not Currently     Types: Fentanyl, Oxycodone     Comment: hx of abuse     Medications Prior to Admission   Medication Sig Dispense Refill Last Dose   • acamprosate (CAMPRAL) 333 MG EC tablet       • buprenorphine-naloxone (SUBOXONE) 8-2 MG per SL tablet Place 1 tablet under the tongue 3 (Three) Times a Day. Patient takes one 8-2 tablet three times a day.      • cloNIDine (CATAPRES) 0.1 MG tablet Take 0.1 mg by mouth 3 (Three) Times a Day.      • cyanocobalamin (VITAMIN B-12) 1000 MCG tablet Take 1 tablet by mouth Daily. 30 tablet 5    • diazePAM (VALIUM) 5 MG tablet Take 10 mg by mouth Every 8 (Eight) Hours As Needed.      • FLUoxetine (PROzac) 40 MG capsule Take 40 mg by mouth Daily.      • folic acid (FOLVITE) 1 MG tablet Take 1 tablet by mouth Daily. 30 tablet 0    • gabapentin (NEURONTIN) 800 MG tablet Take 800 mg by mouth 3 (Three) Times a Day.      • pantoprazole (Protonix) 40 MG EC tablet Take 1 tablet by mouth Daily. 30 tablet 1    • prazosin (MINIPRESS) 5 MG capsule Take 5 mg by mouth Every Night.      • promethazine (PHENERGAN) 25 MG suppository Insert 1 suppository into the rectum Every 6 (Six) Hours As Needed for Nausea or Vomiting. 12 suppository 0    • sucralfate (CARAFATE) 1 g tablet       • thiamine (thiamine) 100 MG tablet tablet Take 1 tablet by mouth Daily. 30 tablet 5    • vancomycin (VANCOCIN) 125 MG capsule 1 capsule by mouth four times a day for 14 days. Then 1 capsule 2 times a day for 7 days. Then 1 capsule daily for 7 days.  1 capsule every 3 days for 28 days. 56 capsule 0      Allergies:  Patient has no known allergies.     CURRENT MEDICATIONS/OBJECTIVE/VS/PE:     Current Medications:     Current Facility-Administered Medications   Medication Dose Route Frequency Provider Last Rate Last Admin   • cholestyramine light  packet 4 g  1 packet Oral Q8H Ranjit Castillo III, MD   4 g at 04/08/21 1348   • dicyclomine (BENTYL) tablet 20 mg  20 mg Oral BID Maryam Galdamez MD   20 mg at 04/08/21 1007   • folic acid 1 mg in sodium chloride 0.9 % 50 mL IVPB  1 mg Intravenous Daily Maryam Galdamez  mL/hr at 04/08/21 1008 1 mg at 04/08/21 1008   • HYDROmorphone (DILAUDID) injection 2 mg  2 mg Intravenous Q2H PRN Maryam Galdamez MD   2 mg at 04/08/21 1529   • labetalol (NORMODYNE,TRANDATE) injection 10 mg  10 mg Intravenous Q2H PRN Maryam Galdamez MD       • LORazepam (ATIVAN) tablet 1 mg  1 mg Oral Q2H PRN Maryam Galdamez MD        Or   • LORazepam (ATIVAN) injection 1 mg  1 mg Intravenous Q2H PRN Maryam Galdamez MD   1 mg at 04/08/21 0751    Or   • LORazepam (ATIVAN) tablet 2 mg  2 mg Oral Q1H PRN Maryam Galdamez MD        Or   • LORazepam (ATIVAN) injection 2 mg  2 mg Intravenous Q1H PRN Maryam Galdamez MD   2 mg at 04/06/21 1839    Or   • LORazepam (ATIVAN) injection 2 mg  2 mg Intravenous Q15 Min PRN Maryam Galdamez MD        Or   • LORazepam (ATIVAN) injection 2 mg  2 mg Intramuscular Q15 Min PRN Maryam Galdamez MD       • LORazepam (ATIVAN) injection 2 mg  2 mg Intravenous Q4H Julian Hickey MD   2 mg at 04/08/21 1600   • nicotine (NICODERM CQ) 21 MG/24HR patch 1 patch  1 patch Transdermal Q24H Maryam Galdamez MD   1 patch at 04/07/21 2107   • ondansetron (ZOFRAN) injection 4 mg  4 mg Intravenous Q6H PRN Maryam Galdamez MD       • pantoprazole (PROTONIX) injection 40 mg  40 mg Intravenous Q AM Maryam Galdamez MD   40 mg at 04/08/21 0519   • sodium chloride 0.9 % flush 10 mL  10 mL Intravenous PRN Han Perdomo MD       • sodium chloride 0.9 % flush 10 mL  10 mL Intravenous Q12H Maryam Galdamez MD   10 mL at 04/08/21 1008   • sodium chloride 0.9 % flush 10 mL  10 mL Intravenous PRN Maryam Galdamez MD       • sodium chloride 0.9 % infusion  125 mL/hr Intravenous Continuous Zeeshan Wiggins  MD  mL/hr at 04/08/21 1006 125 mL/hr at 04/08/21 1006   • thiamine (B-1) 100 mg in sodium chloride 0.9 % 50 mL IVPB  100 mg Intravenous TID Zeeshan Wiggins  mL/hr at 04/08/21 1600 100 mg at 04/08/21 1600       Objective     Review of Systems:   Review of Systems    Physical Exam:   Temp:  [96.8 °F (36 °C)-97.7 °F (36.5 °C)] 97.4 °F (36.3 °C)  Heart Rate:  [64-86] 72  Resp:  [8-18] 8  BP: (107-159)/(64-95) 131/89     Physical Exam:  General Appearance:    Alert, cooperative, in no acute distress   Head:    Normocephalic, without obvious abnormality, atraumatic   Eyes:            Lids and lashes normal, conjunctivae and sclerae normal, no   icterus, no pallor, corneas clear, PERRLA   Ears:    Ears appear intact with no abnormalities noted   Throat:   No oral lesions, no thrush, oral mucosa moist   Neck:   No adenopathy, supple, trachea midline, no thyromegaly, no     carotid bruit, no JVD   Back:     No kyphosis present, no scoliosis present, no skin lesions,       erythema or scars, no tenderness to percussion or                   palpation,   range of motion normal   Lungs:     Clear to auscultation,respirations regular, even and                   unlabored    Heart:    Regular rhythm and normal rate, normal S1 and S2, no            murmur, no gallop, no rub, no click   Breast Exam:    Deferred   Abdomen:     Normal bowel sounds, no masses, no organomegaly, soft        nontender, nondistended, no guarding, no rebound                 tenderness   Genitalia:    Deferred   Extremities:   Moves all extremities well, no edema, no cyanosis, no              redness   Pulses:   Pulses palpable and equal bilaterally   Skin:   No bleeding, bruising or rash   Lymph nodes:   No palpable adenopathy   Neurologic:   Cranial nerves 2 - 12 grossly intact, sensation intact, DTR        present and equal bilaterally      Results Review:     Lab Results (last 24 hours)     Procedure Component Value Units Date/Time    CBC &  Differential [[id number]]  (Abnormal) Collected: 04/08/21 0637    Specimen: Blood Updated: 04/08/21 0856    Narrative:      The following orders were created for panel order CBC & Differential.  Procedure                               Abnormality         Status                     ---------                               -----------         ------                     Scan Slide[[id number]]                                       Final result               CBC Auto Differential[[id number]]        Abnormal            Final result                 Please view results for these tests on the individual orders.    Scan Slide [[id number]] Collected: 04/08/21 0637    Specimen: Blood Updated: 04/08/21 0856     Macrocytes Mod/2+     WBC Morphology Normal     Platelet Morphology Normal    Fecal Fat, Qualitative - Stool, Per Rectum [[id number]] Collected: 04/08/21 0828    Specimen: Stool from Per Rectum Updated: 04/08/21 0828    Comprehensive Metabolic Panel [[id number]]  (Abnormal) Collected: 04/08/21 0637    Specimen: Blood Updated: 04/08/21 0807     Glucose 106 mg/dL      BUN 3 mg/dL      Creatinine 0.40 mg/dL      Sodium 137 mmol/L      Potassium 4.6 mmol/L      Chloride 106 mmol/L      CO2 23.0 mmol/L      Calcium 8.9 mg/dL      Total Protein 6.1 g/dL      Albumin 3.00 g/dL      ALT (SGPT) 28 U/L      AST (SGOT) 82 U/L      Alkaline Phosphatase 121 U/L      Total Bilirubin 0.4 mg/dL      eGFR Non African Amer >150 mL/min/1.73      Globulin 3.1 gm/dL      A/G Ratio 1.0 g/dL      BUN/Creatinine Ratio 7.5     Anion Gap 8.0 mmol/L     Narrative:      GFR Normal >60  Chronic Kidney Disease <60  Kidney Failure <15      Magnesium [[id number]]  (Normal) Collected: 04/08/21 0637    Specimen: Blood Updated: 04/08/21 0807     Magnesium 1.8 mg/dL     Lipase [[id number]]  (Abnormal) Collected: 04/08/21 0637    Specimen: Blood Updated: 04/08/21 0807     Lipase 228 U/L     CBC Auto Differential [[id number]]  (Abnormal) Collected: 04/08/21 0637     Specimen: Blood Updated: 04/08/21 0752     WBC 6.19 10*3/mm3      RBC 2.74 10*6/mm3      Hemoglobin 9.8 g/dL      Hematocrit 29.6 %      .0 fL      MCH 35.8 pg      MCHC 33.1 g/dL      RDW 13.2 %      RDW-SD 52.4 fl      MPV 11.3 fL      Platelets 139 10*3/mm3      Neutrophil % 65.1 %      Lymphocyte % 27.3 %      Monocyte % 5.5 %      Eosinophil % 1.3 %      Basophil % 0.5 %      Immature Grans % 0.3 %      Neutrophils, Absolute 4.03 10*3/mm3      Lymphocytes, Absolute 1.69 10*3/mm3      Monocytes, Absolute 0.34 10*3/mm3      Eosinophils, Absolute 0.08 10*3/mm3      Basophils, Absolute 0.03 10*3/mm3      Immature Grans, Absolute 0.02 10*3/mm3      nRBC 0.0 /100 WBC     Gastrointestinal Panel, PCR - Stool, Per Rectum [[id number]]  (Normal) Collected: 04/06/21 1827    Specimen: Stool from Per Rectum Updated: 04/08/21 0358     Campylobacter Not Detected     Plesiomonas shigelloides Not Detected     Salmonella Not Detected     Vibrio Not Detected     Vibrio cholerae Not Detected     Yersinia enterocolitica Not Detected     Enteroaggregative E. coli (EAEC) Not Detected     Enteropathogenic E. coli (EPEC) Not Detected     Enterotoxigenic E. coli (ETEC) lt/st Not Detected     Shiga-like toxin-producing E. coli (STEC) stx1/stx2 Not Detected     Shigella/Enteroinvasive E. coli (EIEC) Not Detected     Cryptosporidium Not Detected     Cyclospora cayetanensis Not Detected     Entamoeba histolytica Not Detected     Giardia lamblia Not Detected     Adenovirus F40/41 Not Detected     Astrovirus Not Detected     Norovirus GI/GII Not Detected     Rotavirus A Not Detected     Sapovirus (I, II, IV or V) Not Detected    Narrative:      Testing performed by multiplex PCR system.           I reviewed the patient's new clinical results.  I reviewed the patient's new imaging results and agree with the interpretation.     ASSESSMENT/PLAN:   ASSESSMENT: 1.  Acute pancreatitis, improving.  Likely due to alcohol usage.  2.  Elevated  liver enzymes, improving.  3.  Diarrhea, improving.  Repeat stool studies were unremarkable.  4.  Anemia, recent GI work-up was unremarkable.  5.  Alcohol abuse  PLAN: 1.  Advance diet as tolerated.  2.  Continue PPI  3.  Okay to DC in a.m. if she continues to improve.  4.  Recommend alcohol abstinence  5.  CIWA protocol  The risks, benefits, and alternatives of this procedure have been discussed with the patient or the responsible party- the patient understands and agrees to proceed.         Mirian Mills MD  04/08/21  17:21 CDT

## 2021-04-08 NOTE — PROGRESS NOTES
Discharge Planning Assessment  [facility]     Patient Name: Nata Bain  MRN: [id number]  Today's Date: 4/8/2021    Admit Date: 4/5/2021    Discharge Needs Assessment     Row Name 04/08/21 1514       Living Environment    Lives With  spouse    Name(s) of Who Lives With Patient  Jorge Sanz    Current Living Arrangements  home/apartment/condo    Primary Care Provided by  self    Family Caregiver if Needed  spouse    Family Caregiver Names  Jorge    Quality of Family Relationships  stressful       Resource/Environmental Concerns    Resource/Environmental Concerns  none       Transition Planning    Patient/Family Anticipates Transition to  home with family    Patient/Family Anticipated Services at Transition  none    Transportation Anticipated  family or friend will provide       Discharge Needs Assessment    Readmission Within the Last 30 Days  no previous admission in last 30 days    Current Outpatient/Agency/Support Group  other (see comments) [facility] [place].    Equipment Currently Used at Home  none    Concerns to be Addressed  patient refuses services;substance/tobacco abuse/use;compliance issue;decision-making    Anticipated Changes Related to Illness  none    Equipment Needed After Discharge  none    Discharge Facility/Level of Care Needs  substance abuse facility Patient refuses services.    Current Discharge Risk  substance use/abuse        Discharge Plan     Row Name 04/08/21 1519       Plan    Plan Comments  CM verfied demographics, pcp and pharmacy. Patient has rx coverage with affordable copays. CM received consult for substance abuse resources but patient refused resources. CM encouraged patient to reach out to family, friends, or her pcp if she changed her mind re help for alcohol abuse.   ABI Sahni        Continued Care and Services - Admitted Since 4/5/2021    Coordination has not been started for this encounter.       Expected Discharge Date and  Time     Expected Discharge Date Expected Discharge Time    Apr 14, 2021         Demographic Summary     Row Name 04/08/21 1512       General Information    Admission Type  inpatient    Arrived From  home    Referral Source  high risk screening    Preferred Language  English        Functional Status     Row Name 04/08/21 1512       Functional Status    Usual Activity Tolerance  moderate    Current Activity Tolerance  moderate       Functional Status, IADL    Medications  independent    Meal Preparation  independent    Housekeeping  independent    Laundry  independent    Shopping  assistive person    IADL Comments  Patient does not drive, she lost her drivers license about 10 years ago and has not applied for them again.       Mental Status    General Appearance WDL  WDL       Mental Status Summary    Recent Changes in Mental Status/Cognitive Functioning  no changes       Employment/    Employment Status  unemployed        Psychosocial    No documentation.       Abuse/Neglect    No documentation.       Legal    No documentation.       Substance Abuse    No documentation.       Patient Forms    No documentation.           Claudette Romero RN

## 2021-04-08 NOTE — PROGRESS NOTES
I have seen the patient.  I have reviewed the notes, assessments, and/or procedures performed by Dr. Aponte, I concur with her/his documentation and assessment and plan for Nata Bain.               This document has been electronically signed by Humble Hernandez MD on April 8, 2021 10:10 CDT

## 2021-04-08 NOTE — PLAN OF CARE
Goal Outcome Evaluation:         Pt has requested dilaudid for pain and ativan according to CIWA. She reports tolerating food ok, with little nausea and some fullness.

## 2021-04-08 NOTE — PROGRESS NOTES
FAMILY MEDICINE DAILY PROGRESS NOTE  NAME: Nata Bain  : 1985  MRN: [id number]     LOS: 3 days     PROVIDER OF SERVICE: Zeeshan Wiggins MD    Chief Complaint: Pancreatitis    Subjective:   Pt continues to complain of 5-6 episodes of non-formed diarrhea overnight. States abdominal pain continues to improve. Currently 4/10. States pain is not exacerbated by PO consumption. Nausea is resolved. Seen by Dr. Mayer yesterday.   Interval History:  History taken from: patient chart RN    Review of Systems:   Review of Systems   Constitutional: Negative for activity change, chills, diaphoresis and fever.   HENT: Negative for dental problem, drooling, ear discharge, ear pain, facial swelling, mouth sores, nosebleeds, rhinorrhea, sinus pressure, sinus pain, sneezing and sore throat.    Eyes: Negative for pain, discharge and visual disturbance.   Respiratory: Negative for apnea, cough, shortness of breath, wheezing and stridor.    Cardiovascular: Negative for chest pain, palpitations and leg swelling.   Gastrointestinal: Positive for abdominal pain and diarrhea. Negative for abdominal distention, constipation, nausea and vomiting.   Genitourinary: Negative for dysuria, frequency and urgency.   Musculoskeletal: Negative for arthralgias and back pain.   Skin: Negative for rash and wound.   Neurological: Negative for dizziness, facial asymmetry, light-headedness and headaches.   Psychiatric/Behavioral: Negative for agitation and decreased concentration. The patient is not nervous/anxious.        Objective:     Vital Signs  Temp:  [96 °F (35.6 °C)-97.7 °F (36.5 °C)] 97.3 °F (36.3 °C)  Heart Rate:  [] 70  Resp:  [16-18] 18  BP: (107-159)/(64-95) 159/95    Physical Exam  Physical Exam  Constitutional:       Appearance: She is well-developed.   HENT:      Head: Normocephalic and atraumatic.      Right Ear: External ear normal.      Left Ear: External ear normal.      Nose: Nose normal.      Mouth/Throat:       Pharynx: No oropharyngeal exudate.   Eyes:      General: No scleral icterus.        Right eye: No discharge.         Left eye: No discharge.      Conjunctiva/sclera: Conjunctivae normal.      Pupils: Pupils are equal, round, and reactive to light.   Neck:      Vascular: No JVD.   Cardiovascular:      Rate and Rhythm: Normal rate and regular rhythm.      Heart sounds: Normal heart sounds. No murmur heard.   No friction rub. No gallop.    Pulmonary:      Effort: Pulmonary effort is normal. No respiratory distress.      Breath sounds: Normal breath sounds. No stridor. No wheezing or rales.   Abdominal:      General: Bowel sounds are normal. There is no distension.      Palpations: Abdomen is soft.      Tenderness: There is abdominal tenderness (epigastric).   Musculoskeletal:         General: No tenderness or deformity. Normal range of motion.      Cervical back: Normal range of motion and neck supple.   Skin:     General: Skin is warm and dry.      Capillary Refill: Capillary refill takes less than 2 seconds.      Coloration: Skin is not pale.      Findings: No erythema or rash.   Neurological:      Mental Status: She is alert and oriented to person, place, and time.   Psychiatric:         Behavior: Behavior normal.         Medication Review    Current Facility-Administered Medications:   •  cholestyramine light packet 4 g, 1 packet, Oral, Q8H, Ranjit Castillo III, MD  •  dicyclomine (BENTYL) tablet 20 mg, 20 mg, Oral, BID, Maryma Galdamez MD, 20 mg at 04/07/21 2134  •  folic acid 1 mg in sodium chloride 0.9 % 50 mL IVPB, 1 mg, Intravenous, Daily, Maryam Galdamez MD, Last Rate: 100 mL/hr at 04/07/21 0957, 1 mg at 04/07/21 0957  •  HYDROmorphone (DILAUDID) injection 2 mg, 2 mg, Intravenous, Q2H PRN, Maryam Galdamez MD, 2 mg at 04/08/21 0750  •  labetalol (NORMODYNE,TRANDATE) injection 10 mg, 10 mg, Intravenous, Q2H PRN, Maryam Galdamez MD  •  LORazepam (ATIVAN) tablet 1 mg, 1 mg, Oral, Q2H PRN **OR**  LORazepam (ATIVAN) injection 1 mg, 1 mg, Intravenous, Q2H PRN, 1 mg at 04/08/21 0751 **OR** LORazepam (ATIVAN) tablet 2 mg, 2 mg, Oral, Q1H PRN **OR** LORazepam (ATIVAN) injection 2 mg, 2 mg, Intravenous, Q1H PRN, 2 mg at 04/06/21 1839 **OR** LORazepam (ATIVAN) injection 2 mg, 2 mg, Intravenous, Q15 Min PRN **OR** LORazepam (ATIVAN) injection 2 mg, 2 mg, Intramuscular, Q15 Min PRN, Maryam Galdamez MD  •  LORazepam (ATIVAN) injection 2 mg, 2 mg, Intravenous, Q4H, Julian Hickey MD, 2 mg at 04/08/21 0454  •  nicotine (NICODERM CQ) 21 MG/24HR patch 1 patch, 1 patch, Transdermal, Q24H, Maryam Galdamez MD, 1 patch at 04/07/21 2107  •  ondansetron (ZOFRAN) injection 4 mg, 4 mg, Intravenous, Q6H PRN, Maryam Galdamez MD  •  pantoprazole (PROTONIX) injection 40 mg, 40 mg, Intravenous, Q AM, Maryam Galdamez MD, 40 mg at 04/08/21 0519  •  sodium chloride 0.9 % flush 10 mL, 10 mL, Intravenous, PRN, Han Perdomo MD  •  sodium chloride 0.9 % flush 10 mL, 10 mL, Intravenous, Q12H, Maryam Galdamez MD, 10 mL at 04/07/21 2358  •  sodium chloride 0.9 % flush 10 mL, 10 mL, Intravenous, PRN, Maryam Galdamez MD  •  sodium chloride 0.9 % infusion, 125 mL/hr, Intravenous, Continuous, Zeeshan Wiggins MD  •  thiamine (B-1) 100 mg in sodium chloride 0.9 % 50 mL IVPB, 100 mg, Intravenous, TID, Zeeshan Wiggins MD, Last Rate: 100 mL/hr at 04/07/21 2106, 100 mg at 04/07/21 2106     Diagnostic Data    Lab Results (last 24 hours)     Procedure Component Value Units Date/Time    Comprehensive Metabolic Panel [[id number]]  (Abnormal) Collected: 04/08/21 0637    Specimen: Blood Updated: 04/08/21 0807     Glucose 106 mg/dL      BUN 3 mg/dL      Creatinine 0.40 mg/dL      Sodium 137 mmol/L      Potassium 4.6 mmol/L      Chloride 106 mmol/L      CO2 23.0 mmol/L      Calcium 8.9 mg/dL      Total Protein 6.1 g/dL      Albumin 3.00 g/dL      ALT (SGPT) 28 U/L      AST (SGOT) 82 U/L      Alkaline Phosphatase 121 U/L      Total  Bilirubin 0.4 mg/dL      eGFR Non African Amer >150 mL/min/1.73      Globulin 3.1 gm/dL      A/G Ratio 1.0 g/dL      BUN/Creatinine Ratio 7.5     Anion Gap 8.0 mmol/L     Narrative:      GFR Normal >60  Chronic Kidney Disease <60  Kidney Failure <15      Magnesium [[id number]]  (Normal) Collected: 04/08/21 0637    Specimen: Blood Updated: 04/08/21 0807     Magnesium 1.8 mg/dL     Lipase [[id number]]  (Abnormal) Collected: 04/08/21 0637    Specimen: Blood Updated: 04/08/21 0807     Lipase 228 U/L     CBC & Differential [[id number]]  (Abnormal) Collected: 04/08/21 0637    Specimen: Blood Updated: 04/08/21 0752    Narrative:      The following orders were created for panel order CBC & Differential.  Procedure                               Abnormality         Status                     ---------                               -----------         ------                     Scan Slide[[id number]]                                       In process                 CBC Auto Differential[[id number]]        Abnormal            Final result                 Please view results for these tests on the individual orders.    CBC Auto Differential [[id number]]  (Abnormal) Collected: 04/08/21 0637    Specimen: Blood Updated: 04/08/21 0752     WBC 6.19 10*3/mm3      RBC 2.74 10*6/mm3      Hemoglobin 9.8 g/dL      Hematocrit 29.6 %      .0 fL      MCH 35.8 pg      MCHC 33.1 g/dL      RDW 13.2 %      RDW-SD 52.4 fl      MPV 11.3 fL      Platelets 139 10*3/mm3      Neutrophil % 65.1 %      Lymphocyte % 27.3 %      Monocyte % 5.5 %      Eosinophil % 1.3 %      Basophil % 0.5 %      Immature Grans % 0.3 %      Neutrophils, Absolute 4.03 10*3/mm3      Lymphocytes, Absolute 1.69 10*3/mm3      Monocytes, Absolute 0.34 10*3/mm3      Eosinophils, Absolute 0.08 10*3/mm3      Basophils, Absolute 0.03 10*3/mm3      Immature Grans, Absolute 0.02 10*3/mm3      nRBC 0.0 /100 WBC     Scan Slide [[id number]] Collected: 04/08/21 0637    Specimen: Blood  Updated: 04/08/21 0751    Gastrointestinal Panel, PCR - Stool, Per Rectum [[id number]]  (Normal) Collected: 04/06/21 1827    Specimen: Stool from Per Rectum Updated: 04/08/21 0358     Campylobacter Not Detected     Plesiomonas shigelloides Not Detected     Salmonella Not Detected     Vibrio Not Detected     Vibrio cholerae Not Detected     Yersinia enterocolitica Not Detected     Enteroaggregative E. coli (EAEC) Not Detected     Enteropathogenic E. coli (EPEC) Not Detected     Enterotoxigenic E. coli (ETEC) lt/st Not Detected     Shiga-like toxin-producing E. coli (STEC) stx1/stx2 Not Detected     Shigella/Enteroinvasive E. coli (EIEC) Not Detected     Cryptosporidium Not Detected     Cyclospora cayetanensis Not Detected     Entamoeba histolytica Not Detected     Giardia lamblia Not Detected     Adenovirus F40/41 Not Detected     Astrovirus Not Detected     Norovirus GI/GII Not Detected     Rotavirus A Not Detected     Sapovirus (I, II, IV or V) Not Detected    Narrative:      Testing performed by multiplex PCR system.           Imaging Results (Last 24 Hours)     ** No results found for the last 24 hours. **          I reviewed the patient's new clinical results.    Assessment/Plan:     Active Hospital Problems    Diagnosis    • **Pancreatitis      -CT abdomen/pelvis confirms pancreatitis  -Lipase 1000-->1307-->228  -Given 4 mg IV morphine and 1 mg IV dilaudid in the ED  -Pt states epigastric pain is improving. Currently receiving jello, will continue to advance  -IVF at 125 cc/hour  -Advance diet as tolerated. Currently tolerating regular diet without any complaints  -Dilaudid q2 hours prn for severe pain  -Scheduled and PRN Ativan (CIWA)       • Functional diarrhea      -Pt complaining of 5-10 watery bowel movements daily  -Was being treated for recurrent C. Diff  -C. Diff toxin negative, vancomycin stopped.  -GI panel negative, will order stool fat  - Dr. Mayer following, Pt has seen Gene in past she will  assume care today 4/8/2021  -Have started Bentyl 20mg as of 4/7/201     • Hypomagnesemia      -Mag 1.1--->1.8  Replace and monitor daily     • Benzodiazepine dependence (CMS/HCC)      -Patient takes valium 10 mg bid (confirmed on krishan) and gabapentin.  -Ativan 2 mg Q4H scheduled in addition to CIWA prn ativan dosing     • Alcohol abuse      -CIWA  -IV fluids, IV folate, IV thiamine  -Has received 11mg Ativan in last 24 hours     • Tobacco abuse      -Nicotine patch     • Anxiety and depression        -Scheduled and as needed Ativan(CIWA)       • Opioid use disorder (CMS/HCC)      -Hold home gabapentin and Suboxone  -As needed Dilaudid for abdominal pain secondary to pancreatitis. Currently receiving more morphine equivalents than her morphine equivalent for suboxone  -patient is on Suboxone 8/2 TID at home       • Hypokalemia      -We will monitor and replace as needed  -No longer receiving potassium through fluids due to normalization of hypokalemia  -Mag 1.1 at admission. Currently 1.8    Results from last 7 days   Lab Units 04/08/21  0637 04/07/21  0551 04/06/21  0534 04/05/21  1645   POTASSIUM mmol/L 4.6 4.2 3.2* 3.6                DVT prophylaxis: SCDs/TEDs  Code Status and Medical Interventions:   Ordered at: 04/05/21 2021     Code Status:    CPR     Medical Interventions (Level of Support Prior to Arrest):    Full       Plan for disposition:Where: home and When:  1-2days      Time: 30 minutes        This document has been electronically signed by Zeeshan Wiggins MD on April 8, 2021 08:23 CDT

## 2021-04-08 NOTE — PLAN OF CARE
Goal Outcome Evaluation:     Progress: (P) improving  Outcome Summary: (P) Pt advanced to Regular diet and is tolerating it well. Reports good appetite. Will continue to monitor and follow.

## 2021-04-08 NOTE — PLAN OF CARE
Goal Outcome Evaluation:     Progress: no change  Outcome Summary: Patient resting between doses of IV dilaudid and ativan. Tolerating regular diet. VSS.

## 2021-04-09 PROBLEM — D69.6 THROMBOCYTOPENIA (HCC): Status: ACTIVE | Noted: 2021-04-09

## 2021-04-09 PROBLEM — F19.20 POLYSUBSTANCE (INCLUDING OPIOIDS) DEPENDENCE, DAILY USE (HCC): Status: ACTIVE | Noted: 2020-06-01

## 2021-04-09 PROBLEM — R74.8 ELEVATED LIVER ENZYMES: Status: ACTIVE | Noted: 2021-04-09

## 2021-04-09 PROBLEM — F11.20 POLYSUBSTANCE (INCLUDING OPIOIDS) DEPENDENCE, DAILY USE: Status: ACTIVE | Noted: 2020-06-01

## 2021-04-09 LAB
ALBUMIN SERPL-MCNC: 3.5 G/DL (ref 3.5–5.2)
ALBUMIN/GLOB SERPL: 1.1 G/DL
ALP SERPL-CCNC: 117 U/L (ref 39–117)
ALT SERPL W P-5'-P-CCNC: 43 U/L (ref 1–33)
ANION GAP SERPL CALCULATED.3IONS-SCNC: 8 MMOL/L (ref 5–15)
AST SERPL-CCNC: 132 U/L (ref 1–32)
BASOPHILS # BLD AUTO: 0.02 10*3/MM3 (ref 0–0.2)
BASOPHILS NFR BLD AUTO: 0.3 % (ref 0–1.5)
BILIRUB SERPL-MCNC: 0.4 MG/DL (ref 0–1.2)
BUN SERPL-MCNC: 3 MG/DL (ref 6–20)
BUN/CREAT SERPL: 5.7 (ref 7–25)
CALCIUM SPEC-SCNC: 9.6 MG/DL (ref 8.6–10.5)
CHLORIDE SERPL-SCNC: 103 MMOL/L (ref 98–107)
CO2 SERPL-SCNC: 27 MMOL/L (ref 22–29)
CREAT SERPL-MCNC: 0.53 MG/DL (ref 0.57–1)
DEPRECATED RDW RBC AUTO: 50 FL (ref 37–54)
EOSINOPHIL # BLD AUTO: 0.07 10*3/MM3 (ref 0–0.4)
EOSINOPHIL NFR BLD AUTO: 1.1 % (ref 0.3–6.2)
ERYTHROCYTE [DISTWIDTH] IN BLOOD BY AUTOMATED COUNT: 13.1 % (ref 12.3–15.4)
FERRITIN SERPL-MCNC: 631.8 NG/ML (ref 13–150)
GFR SERPL CREATININE-BSD FRML MDRD: 131 ML/MIN/1.73
GLOBULIN UR ELPH-MCNC: 3.2 GM/DL
GLUCOSE SERPL-MCNC: 99 MG/DL (ref 65–99)
HAV IGM SERPL QL IA: NORMAL
HBV CORE IGM SERPL QL IA: NORMAL
HBV SURFACE AG SERPL QL IA: NORMAL
HCT VFR BLD AUTO: 30.4 % (ref 34–46.6)
HCV AB SER DONR QL: NORMAL
HGB BLD-MCNC: 10.6 G/DL (ref 12–15.9)
IMM GRANULOCYTES # BLD AUTO: 0.03 10*3/MM3 (ref 0–0.05)
IMM GRANULOCYTES NFR BLD AUTO: 0.5 % (ref 0–0.5)
IRON 24H UR-MRATE: 32 MCG/DL (ref 37–145)
IRON SATN MFR SERPL: 16 % (ref 20–50)
LIPASE SERPL-CCNC: 355 U/L (ref 13–60)
LYMPHOCYTES # BLD AUTO: 1.66 10*3/MM3 (ref 0.7–3.1)
LYMPHOCYTES NFR BLD AUTO: 26.9 % (ref 19.6–45.3)
MAGNESIUM SERPL-MCNC: 1.6 MG/DL (ref 1.6–2.6)
MCH RBC QN AUTO: 37.1 PG (ref 26.6–33)
MCHC RBC AUTO-ENTMCNC: 34.9 G/DL (ref 31.5–35.7)
MCV RBC AUTO: 106.3 FL (ref 79–97)
MONOCYTES # BLD AUTO: 0.37 10*3/MM3 (ref 0.1–0.9)
MONOCYTES NFR BLD AUTO: 6 % (ref 5–12)
NEUTROPHILS NFR BLD AUTO: 4.02 10*3/MM3 (ref 1.7–7)
NEUTROPHILS NFR BLD AUTO: 65.2 % (ref 42.7–76)
NRBC BLD AUTO-RTO: 0 /100 WBC (ref 0–0.2)
PLATELET # BLD AUTO: 187 10*3/MM3 (ref 140–450)
PMV BLD AUTO: 10.2 FL (ref 6–12)
POTASSIUM SERPL-SCNC: 4.2 MMOL/L (ref 3.5–5.2)
PROT SERPL-MCNC: 6.7 G/DL (ref 6–8.5)
RBC # BLD AUTO: 2.86 10*6/MM3 (ref 3.77–5.28)
SODIUM SERPL-SCNC: 138 MMOL/L (ref 136–145)
TIBC SERPL-MCNC: 204 MCG/DL (ref 298–536)
TRANSFERRIN SERPL-MCNC: 137 MG/DL (ref 200–360)
WBC # BLD AUTO: 6.17 10*3/MM3 (ref 3.4–10.8)

## 2021-04-09 PROCEDURE — 99232 SBSQ HOSP IP/OBS MODERATE 35: CPT | Performed by: INTERNAL MEDICINE

## 2021-04-09 PROCEDURE — 83540 ASSAY OF IRON: CPT | Performed by: STUDENT IN AN ORGANIZED HEALTH CARE EDUCATION/TRAINING PROGRAM

## 2021-04-09 PROCEDURE — 25010000002 THIAMINE PER 100 MG: Performed by: STUDENT IN AN ORGANIZED HEALTH CARE EDUCATION/TRAINING PROGRAM

## 2021-04-09 PROCEDURE — 25010000002 LORAZEPAM PER 2 MG: Performed by: STUDENT IN AN ORGANIZED HEALTH CARE EDUCATION/TRAINING PROGRAM

## 2021-04-09 PROCEDURE — 80074 ACUTE HEPATITIS PANEL: CPT | Performed by: STUDENT IN AN ORGANIZED HEALTH CARE EDUCATION/TRAINING PROGRAM

## 2021-04-09 PROCEDURE — 94799 UNLISTED PULMONARY SVC/PX: CPT

## 2021-04-09 PROCEDURE — 85025 COMPLETE CBC W/AUTO DIFF WBC: CPT | Performed by: STUDENT IN AN ORGANIZED HEALTH CARE EDUCATION/TRAINING PROGRAM

## 2021-04-09 PROCEDURE — 84466 ASSAY OF TRANSFERRIN: CPT | Performed by: STUDENT IN AN ORGANIZED HEALTH CARE EDUCATION/TRAINING PROGRAM

## 2021-04-09 PROCEDURE — 80053 COMPREHEN METABOLIC PANEL: CPT | Performed by: STUDENT IN AN ORGANIZED HEALTH CARE EDUCATION/TRAINING PROGRAM

## 2021-04-09 PROCEDURE — 82728 ASSAY OF FERRITIN: CPT | Performed by: STUDENT IN AN ORGANIZED HEALTH CARE EDUCATION/TRAINING PROGRAM

## 2021-04-09 PROCEDURE — 83690 ASSAY OF LIPASE: CPT | Performed by: STUDENT IN AN ORGANIZED HEALTH CARE EDUCATION/TRAINING PROGRAM

## 2021-04-09 PROCEDURE — 25010000002 HYDROMORPHONE 1 MG/ML SOLUTION: Performed by: STUDENT IN AN ORGANIZED HEALTH CARE EDUCATION/TRAINING PROGRAM

## 2021-04-09 PROCEDURE — 94760 N-INVAS EAR/PLS OXIMETRY 1: CPT

## 2021-04-09 PROCEDURE — 99233 SBSQ HOSP IP/OBS HIGH 50: CPT | Performed by: STUDENT IN AN ORGANIZED HEALTH CARE EDUCATION/TRAINING PROGRAM

## 2021-04-09 PROCEDURE — 83735 ASSAY OF MAGNESIUM: CPT | Performed by: STUDENT IN AN ORGANIZED HEALTH CARE EDUCATION/TRAINING PROGRAM

## 2021-04-09 RX ORDER — DIAZEPAM 5 MG/1
10 TABLET ORAL EVERY 8 HOURS
Status: DISCONTINUED | OUTPATIENT
Start: 2021-04-09 | End: 2021-04-12 | Stop reason: HOSPADM

## 2021-04-09 RX ADMIN — LORAZEPAM 2 MG: 2 INJECTION INTRAMUSCULAR at 07:18

## 2021-04-09 RX ADMIN — LORAZEPAM 2 MG: 2 INJECTION INTRAMUSCULAR at 00:55

## 2021-04-09 RX ADMIN — THIAMINE HYDROCHLORIDE 100 MG: 100 INJECTION, SOLUTION INTRAMUSCULAR; INTRAVENOUS at 17:52

## 2021-04-09 RX ADMIN — LORAZEPAM 2 MG: 2 TABLET ORAL at 09:17

## 2021-04-09 RX ADMIN — NICOTINE 1 PATCH: 21 PATCH, EXTENDED RELEASE TRANSDERMAL at 21:50

## 2021-04-09 RX ADMIN — DICYCLOMINE HYDROCHLORIDE 20 MG: 20 TABLET ORAL at 21:42

## 2021-04-09 RX ADMIN — DICYCLOMINE HYDROCHLORIDE 20 MG: 20 TABLET ORAL at 09:13

## 2021-04-09 RX ADMIN — FOLIC ACID 1 MG: 5 INJECTION, SOLUTION INTRAMUSCULAR; INTRAVENOUS; SUBCUTANEOUS at 09:13

## 2021-04-09 RX ADMIN — PANTOPRAZOLE SODIUM 40 MG: 40 INJECTION, POWDER, FOR SOLUTION INTRAVENOUS at 05:21

## 2021-04-09 RX ADMIN — DIAZEPAM 10 MG: 5 TABLET ORAL at 14:23

## 2021-04-09 RX ADMIN — CHOLESTYRAMINE 4 G: 4 POWDER, FOR SUSPENSION ORAL at 21:42

## 2021-04-09 RX ADMIN — THIAMINE HYDROCHLORIDE 100 MG: 100 INJECTION, SOLUTION INTRAMUSCULAR; INTRAVENOUS at 21:43

## 2021-04-09 RX ADMIN — SODIUM CHLORIDE, PRESERVATIVE FREE 10 ML: 5 INJECTION INTRAVENOUS at 09:14

## 2021-04-09 RX ADMIN — LORAZEPAM 2 MG: 2 TABLET ORAL at 14:25

## 2021-04-09 RX ADMIN — HYDROMORPHONE HYDROCHLORIDE 1 MG: 1 INJECTION, SOLUTION INTRAMUSCULAR; INTRAVENOUS; SUBCUTANEOUS at 23:47

## 2021-04-09 RX ADMIN — DIAZEPAM 10 MG: 5 TABLET ORAL at 21:42

## 2021-04-09 RX ADMIN — SODIUM CHLORIDE 125 ML/HR: 900 INJECTION, SOLUTION INTRAVENOUS at 09:13

## 2021-04-09 RX ADMIN — HYDROMORPHONE HYDROCHLORIDE 1 MG: 1 INJECTION, SOLUTION INTRAMUSCULAR; INTRAVENOUS; SUBCUTANEOUS at 15:32

## 2021-04-09 RX ADMIN — SODIUM CHLORIDE 125 ML/HR: 900 INJECTION, SOLUTION INTRAVENOUS at 00:55

## 2021-04-09 RX ADMIN — HYDROMORPHONE HYDROCHLORIDE 1 MG: 1 INJECTION, SOLUTION INTRAMUSCULAR; INTRAVENOUS; SUBCUTANEOUS at 09:38

## 2021-04-09 RX ADMIN — THIAMINE HYDROCHLORIDE 100 MG: 100 INJECTION, SOLUTION INTRAMUSCULAR; INTRAVENOUS at 09:14

## 2021-04-09 RX ADMIN — SODIUM CHLORIDE 125 ML/HR: 900 INJECTION, SOLUTION INTRAVENOUS at 17:52

## 2021-04-09 RX ADMIN — HYDROMORPHONE HYDROCHLORIDE 1 MG: 1 INJECTION, SOLUTION INTRAMUSCULAR; INTRAVENOUS; SUBCUTANEOUS at 12:36

## 2021-04-09 RX ADMIN — HYDROMORPHONE HYDROCHLORIDE 1 MG: 1 INJECTION, SOLUTION INTRAMUSCULAR; INTRAVENOUS; SUBCUTANEOUS at 06:50

## 2021-04-09 RX ADMIN — MAGNESIUM OXIDE 400 MG (241.3 MG MAGNESIUM) TABLET 400 MG: TABLET at 14:23

## 2021-04-09 RX ADMIN — LORAZEPAM 2 MG: 2 INJECTION INTRAMUSCULAR; INTRAVENOUS at 12:33

## 2021-04-09 NOTE — PLAN OF CARE
Goal Outcome Evaluation:     Progress: improving  Outcome Summary: Patient very sedated this shift. Only given PRN dilaudid once. Given every dose of scheduled ativan except the 0415 dose. Patient falling asleep during conversation. VSS.

## 2021-04-09 NOTE — PAYOR COMM NOTE
"  Milena Farris RN [facility]  [phone]     Phone  [phone]     Fax  Cont. Stay review      Shawnee Springer (36 y.o. Female)     Date of Birth Social Security Number Address Home Phone MRN    1985  [address] [facility] [address] [phone] [id number]    Latter day Marital Status          Luther[employer]        Admission Date Admission Type Admitting Provider Attending Provider Department, Room/Bed    4/5/21 Emergency Nims, MD Rosalie Adam Umar S, MD [facility] [address] [place], 332/1    Discharge Date Discharge Disposition Discharge Destination                       Attending Provider: Zeeshan Wiggins MD    Allergies: No Known Allergies    Isolation: None   Infection: None   Code Status: CPR    Ht: 172.7 cm (68\")   Wt: 64.2 kg (141 lb 8 oz)    Admission Cmt: None   Principal Problem: Alcohol-induced acute pancreatitis without infection or necrosis [K85.20] More...                 Active Insurance as of 4/5/2021     Primary Coverage     Payor Plan Insurance Group Employer/Plan Group    [place] [id number]     Payor Plan Address Payor Plan Phone Number Payor Plan Fax Number Effective Dates    [employer] [address] [address] [phone]  1/1/2020 - None Entered    Former[facility] [id number]       Subscriber Name Subscriber Birth Date Member ID       SHAWNEE SPRINGER 1985 [id number]                 Emergency Contacts      (Rel.) Home Phone Work Phone Mobile Phone    Jorge Sanz (Spouse) [phone] -- [phone]    OdellJoanna (Mother) [phone] -- [phone]    OdellHilario (Father) [phone] -- [phone]            Vital Signs (last day)     Date/Time   Temp   Temp src   Pulse   Resp   BP   Patient Position   SpO2    04/09/21 0756   97.4 (36.3)   Axillary   87   18   123/79   Lying   97    04/09/21 0521   98.3 (36.8)   Oral   86   14   103/59   Lying   97    " 04/09/21 0055   --   Temporal   68   16   125/80   Lying   100    04/09/21 0030   --   --   76   --   --   --   --    04/08/21 1940   97.3 (36.3)   Temporal   73   12   107/77   Lying   97    04/08/21 1758   --   --   --   --   135/91   --   --    04/08/21 1754   --   --   82   8   --   --   --    04/08/21 1604   --   --   --   8   --   --   --    04/08/21 1542   --   --   71   --   --   --   --    04/08/21 1526   97.4 (36.3)   Temporal   72   18   131/89   Lying   100    04/08/21 1128   --   --   --   --   --   --   98    04/08/21 1127   97.3 (36.3)   Temporal   66   18   134/93   Lying   97    04/08/21 0800   97.3 (36.3)   Temporal   70   18   159/95   Lying   98    04/08/21 0731   --   --   64   --   --   --   --    04/08/21 0454   96.8 (36)   Temporal   69   18   140/76   Sitting   100    04/08/21 0214   --   --   79   --   --   --   --              Current Facility-Administered Medications   Medication Dose Route Frequency Provider Last Rate Last Admin   • cholestyramine light packet 4 g  1 packet Oral Q8H Ranjit Castillo III, MD   4 g at 04/08/21 2126   • dicyclomine (BENTYL) tablet 20 mg  20 mg Oral BID Maryam Galdamez MD   20 mg at 04/08/21 2125   • folic acid 1 mg in sodium chloride 0.9 % 50 mL IVPB  1 mg Intravenous Daily Maryam Galdamez  mL/hr at 04/08/21 1008 1 mg at 04/08/21 1008   • HYDROmorphone (DILAUDID) injection 1 mg  1 mg Intravenous Q3H PRN Maryam Galdamez MD   1 mg at 04/09/21 0650   • labetalol (NORMODYNE,TRANDATE) injection 10 mg  10 mg Intravenous Q2H PRN Maryam Galdamez MD       • LORazepam (ATIVAN) tablet 1 mg  1 mg Oral Q2H PRN Maryam Galdamez MD        Or   • LORazepam (ATIVAN) injection 1 mg  1 mg Intravenous Q2H PRN Maryam Galdamez MD   1 mg at 04/08/21 0751    Or   • LORazepam (ATIVAN) tablet 2 mg  2 mg Oral Q1H PRN Maryam Galdamez MD        Or   • LORazepam (ATIVAN) injection 2 mg  2 mg Intravenous Q1H PRN Maryam Galdamez MD   2 mg at 04/08/21 1757    Or   •  LORazepam (ATIVAN) injection 2 mg  2 mg Intravenous Q15 Min PRN Maryam Galdamez MD        Or   • LORazepam (ATIVAN) injection 2 mg  2 mg Intramuscular Q15 Min PRN Maryam Galdamez MD       • LORazepam (ATIVAN) injection 2 mg  2 mg Intravenous Q4H Julian Hickey MD   2 mg at 21 0718   • nicotine (NICODERM CQ) 21 MG/24HR patch 1 patch  1 patch Transdermal Q24H Maryam Galdamez MD   1 patch at 21   • ondansetron (ZOFRAN) injection 4 mg  4 mg Intravenous Q6H PRN Maryam Galdamez MD       • pantoprazole (PROTONIX) injection 40 mg  40 mg Intravenous Q AM Maryam Galdamez MD   40 mg at 21 05   • sodium chloride 0.9 % flush 10 mL  10 mL Intravenous PRN Han Perdomo MD       • sodium chloride 0.9 % flush 10 mL  10 mL Intravenous Q12H Maryam Galdamez MD   10 mL at 21   • sodium chloride 0.9 % flush 10 mL  10 mL Intravenous PRN Maryam Galdamez MD       • sodium chloride 0.9 % infusion  125 mL/hr Intravenous Continuous Zeeshan Wiggins  mL/hr at 21 0055 125 mL/hr at 21 0055   • thiamine (B-1) 100 mg in sodium chloride 0.9 % 100 mL IVPB  100 mg Intravenous TID Zeeshan Wiggins MD            Physician Progress Notes (last 24 hours) (Notes from 21 0841 through 21 0841)      Zeeshan Wiggins MD at 21 0814          FAMILY MEDICINE DAILY PROGRESS NOTE  NAME: Nata Bain  : 1985  MRN: [id number]     LOS: 4 days     PROVIDER OF SERVICE: Zeeshan Wiggins MD    Chief Complaint: Alcohol-induced acute pancreatitis without infection or necrosis    Subjective:   Pt VSS. Endorses resolution of all diarrhea symptoms with the addition of Bentyl. States having some nausea/abdominal pain after dinner last night but was able to keep down entire meal. Will ask for some Zofran this AM and try breakfast.   Interval History:  History taken from: patient chart RN    Review of Systems:   Review of Systems   Constitutional: Negative for  activity change, chills, diaphoresis and fever.   HENT: Negative for dental problem, drooling, ear discharge, ear pain, facial swelling, mouth sores, nosebleeds, rhinorrhea, sinus pressure, sinus pain, sneezing and sore throat.    Eyes: Negative for pain, discharge and visual disturbance.   Respiratory: Negative for apnea, cough, shortness of breath, wheezing and stridor.    Cardiovascular: Negative for chest pain, palpitations and leg swelling.   Gastrointestinal: Positive for abdominal pain and nausea. Negative for abdominal distention, constipation, diarrhea and vomiting.   Genitourinary: Negative for dysuria, frequency and urgency.   Musculoskeletal: Negative for arthralgias and back pain.   Skin: Negative for rash and wound.   Neurological: Negative for dizziness, facial asymmetry, light-headedness and headaches.   Psychiatric/Behavioral: Negative for agitation and decreased concentration. The patient is not nervous/anxious.        Objective:     Vital Signs  Temp:  [97.3 °F (36.3 °C)-98.3 °F (36.8 °C)] 97.4 °F (36.3 °C)  Heart Rate:  [66-87] 87  Resp:  [8-18] 18  BP: (103-135)/(59-93) 123/79    Physical Exam  Physical Exam  Constitutional:       Appearance: She is well-developed.   HENT:      Head: Normocephalic and atraumatic.      Right Ear: External ear normal.      Left Ear: External ear normal.      Nose: Nose normal.      Mouth/Throat:      Pharynx: No oropharyngeal exudate.   Eyes:      General: No scleral icterus.        Right eye: No discharge.         Left eye: No discharge.      Conjunctiva/sclera: Conjunctivae normal.      Pupils: Pupils are equal, round, and reactive to light.   Neck:      Vascular: No JVD.   Cardiovascular:      Rate and Rhythm: Normal rate and regular rhythm.      Heart sounds: Normal heart sounds. No murmur heard.   No friction rub. No gallop.    Pulmonary:      Effort: Pulmonary effort is normal. No respiratory distress.      Breath sounds: Normal breath sounds. No stridor. No  wheezing or rales.   Abdominal:      General: Bowel sounds are normal. There is no distension.      Palpations: Abdomen is soft.      Tenderness: There is abdominal tenderness (epigastric).   Musculoskeletal:         General: No tenderness or deformity. Normal range of motion.      Cervical back: Normal range of motion and neck supple.   Skin:     General: Skin is warm and dry.      Capillary Refill: Capillary refill takes less than 2 seconds.      Coloration: Skin is not pale.      Findings: No erythema or rash.   Neurological:      Mental Status: She is alert and oriented to person, place, and time.   Psychiatric:         Behavior: Behavior normal.         Medication Review    Current Facility-Administered Medications:   •  cholestyramine light packet 4 g, 1 packet, Oral, Q8H, Ranjit Castillo III, MD, 4 g at 04/08/21 2126  •  dicyclomine (BENTYL) tablet 20 mg, 20 mg, Oral, BID, Maryam Galdamez MD, 20 mg at 04/08/21 2125  •  folic acid 1 mg in sodium chloride 0.9 % 50 mL IVPB, 1 mg, Intravenous, Daily, Maryam Galdamez MD, Last Rate: 100 mL/hr at 04/08/21 1008, 1 mg at 04/08/21 1008  •  HYDROmorphone (DILAUDID) injection 1 mg, 1 mg, Intravenous, Q3H PRN, Maryam Galdamez MD, 1 mg at 04/09/21 0650  •  labetalol (NORMODYNE,TRANDATE) injection 10 mg, 10 mg, Intravenous, Q2H PRN, Maryam Galdamez MD  •  LORazepam (ATIVAN) tablet 1 mg, 1 mg, Oral, Q2H PRN **OR** LORazepam (ATIVAN) injection 1 mg, 1 mg, Intravenous, Q2H PRN, 1 mg at 04/08/21 0751 **OR** LORazepam (ATIVAN) tablet 2 mg, 2 mg, Oral, Q1H PRN **OR** LORazepam (ATIVAN) injection 2 mg, 2 mg, Intravenous, Q1H PRN, 2 mg at 04/08/21 1758 **OR** LORazepam (ATIVAN) injection 2 mg, 2 mg, Intravenous, Q15 Min PRN **OR** LORazepam (ATIVAN) injection 2 mg, 2 mg, Intramuscular, Q15 Min PRN, Maryam Galdamez MD  •  LORazepam (ATIVAN) injection 2 mg, 2 mg, Intravenous, Q4H, Julian Hickey MD, 2 mg at 04/09/21 0718  •  nicotine (NICODERM CQ) 21 MG/24HR patch  1 patch, 1 patch, Transdermal, Q24H, Maryam Galdamez MD, 1 patch at 04/08/21 2126  •  ondansetron (ZOFRAN) injection 4 mg, 4 mg, Intravenous, Q6H PRN, Maryam Galdamez MD  •  pantoprazole (PROTONIX) injection 40 mg, 40 mg, Intravenous, Q AM, Maryam Galdamez MD, 40 mg at 04/09/21 0521  •  sodium chloride 0.9 % flush 10 mL, 10 mL, Intravenous, PRN, Han Perdomo MD  •  sodium chloride 0.9 % flush 10 mL, 10 mL, Intravenous, Q12H, Maryam Galdamez MD, 10 mL at 04/08/21 2126  •  sodium chloride 0.9 % flush 10 mL, 10 mL, Intravenous, PRN, Maryam Galdamez MD  •  sodium chloride 0.9 % infusion, 125 mL/hr, Intravenous, Continuous, Zeeshan Wiggins MD, Last Rate: 125 mL/hr at 04/09/21 0055, 125 mL/hr at 04/09/21 0055  •  thiamine (B-1) 100 mg in sodium chloride 0.9 % 100 mL IVPB, 100 mg, Intravenous, TID, Zeeshan Wiggins MD     Diagnostic Data    Lab Results (last 24 hours)     Procedure Component Value Units Date/Time    Lipase [[id number]]  (Abnormal) Collected: 04/09/21 0641    Specimen: Blood Updated: 04/09/21 0718     Lipase 355 U/L     Comprehensive Metabolic Panel [[id number]]  (Abnormal) Collected: 04/09/21 0641    Specimen: Blood Updated: 04/09/21 0709     Glucose 99 mg/dL      BUN 3 mg/dL      Creatinine 0.53 mg/dL      Sodium 138 mmol/L      Potassium 4.2 mmol/L      Chloride 103 mmol/L      CO2 27.0 mmol/L      Calcium 9.6 mg/dL      Total Protein 6.7 g/dL      Albumin 3.50 g/dL      ALT (SGPT) 43 U/L      AST (SGOT) 132 U/L      Alkaline Phosphatase 117 U/L      Total Bilirubin 0.4 mg/dL      eGFR Non African Amer 131 mL/min/1.73      Globulin 3.2 gm/dL      A/G Ratio 1.1 g/dL      BUN/Creatinine Ratio 5.7     Anion Gap 8.0 mmol/L     Narrative:      GFR Normal >60  Chronic Kidney Disease <60  Kidney Failure <15      Magnesium [[id number]]  (Normal) Collected: 04/09/21 0641    Specimen: Blood Updated: 04/09/21 0709     Magnesium 1.6 mg/dL     CBC & Differential [[id number]]  (Abnormal) Collected:  04/09/21 0641    Specimen: Blood Updated: 04/09/21 0658    Narrative:      The following orders were created for panel order CBC & Differential.  Procedure                               Abnormality         Status                     ---------                               -----------         ------                     Scan Slide[[id number]]                                                                  CBC Auto Differential[[id number]]        Abnormal            Final result                 Please view results for these tests on the individual orders.    CBC Auto Differential [[id number]]  (Abnormal) Collected: 04/09/21 0641    Specimen: Blood Updated: 04/09/21 0658     WBC 6.17 10*3/mm3      RBC 2.86 10*6/mm3      Hemoglobin 10.6 g/dL      Hematocrit 30.4 %      .3 fL      MCH 37.1 pg      MCHC 34.9 g/dL      RDW 13.1 %      RDW-SD 50.0 fl      MPV 10.2 fL      Platelets 187 10*3/mm3      Neutrophil % 65.2 %      Lymphocyte % 26.9 %      Monocyte % 6.0 %      Eosinophil % 1.1 %      Basophil % 0.3 %      Immature Grans % 0.5 %      Neutrophils, Absolute 4.02 10*3/mm3      Lymphocytes, Absolute 1.66 10*3/mm3      Monocytes, Absolute 0.37 10*3/mm3      Eosinophils, Absolute 0.07 10*3/mm3      Basophils, Absolute 0.02 10*3/mm3      Immature Grans, Absolute 0.03 10*3/mm3      nRBC 0.0 /100 WBC     Fecal Fat, Qualitative - Stool, Per Rectum [[id number]]  (Normal) Collected: 04/08/21 0828    Specimen: Stool from Per Rectum Updated: 04/08/21 1918     Fecal Fats, Qualitative, Fatty Acids 0-100 fat droplets / hpf     Fecal Fat Qualitative, Neutral Fats 0-50 fat droplets / hpf           Imaging Results (Last 24 Hours)     ** No results found for the last 24 hours. **          I reviewed the patient's new clinical results.    Assessment/Plan:     Active Hospital Problems    Diagnosis    • **Alcohol-induced acute pancreatitis without infection or necrosis      -CT abdomen/pelvis confirms pancreatitis  -Lipase  1000-->1307-->228-->335  -Given 4 mg IV morphine and 1 mg IV dilaudid in the ED  -Pt states epigastric pain is improving. Currently receiving regular diet. Had some abdominal discomfort/nausea after dinner yesterday. Will try to eat breakfast today after receving Zofran  -IVF at 125 cc/hour  -Advance diet as tolerated. Currently tolerating regular diet without any complaints  -Dilaudid q2 hours prn for severe pain  -Scheduled and PRN Ativan (CIWA)       • Functional diarrhea      -Pt was complaining of 5-10 watery bowel movements daily. With addition of Bentyl she endorses complete resolution of these symptoms  -Was being treated for recurrent C. Diff  -C. Diff toxin negative, vancomycin stopped.  -GI panel negative, will order stool fat--Normal  - Dr. Gene Esquivel with dc if pain controlled/continues to be able to tolerate PO consumption  -Have started Bentyl 20mg as of 4/7/201     • Thiamine deficiency      Secondary to heavy alcohol use.  Will supplement with  IV thiamine TID     • Hypomagnesemia      -Mag 1.1--->1.8-->1.6  Replace and monitor daily     • Benzodiazepine dependence (CMS/HCC)      -Patient takes valium 10 mg bid (confirmed on krishan) and gabapentin.  -Ativan 2 mg Q4H scheduled in addition to CIWA prn ativan dosing     • Alcohol dependence with uncomplicated withdrawal (CMS/HCC)      -CIWA  -IV fluids, IV folate, IV thiamine TID       • Tobacco abuse      -Nicotine patch  Smoking cessation counseling     • Anxiety and depression        -Scheduled and as needed Ativan(CIWA)       • Opioid use disorder (CMS/HCC)      -Hold home gabapentin and Suboxone  -As needed Dilaudid for abdominal pain secondary to pancreatitis. Currently receiving more morphine equivalents than her morphine equivalent for suboxone  -patient is on Suboxone 8/2 TID at home       • Hypokalemia      -We will monitor and replace as needed  -No longer receiving potassium through fluids due to normalization of  hypokalemia  -Mag 1.1 at admission. Currently 1.8    Results from last 7 days   Lab Units 04/09/21  0641 04/08/21  0637 04/07/21  0551 04/06/21  0534 04/05/21  1645   POTASSIUM mmol/L 4.2 4.6 4.2 3.2* 3.6                DVT prophylaxis: SCDs/TEDs  Code Status and Medical Interventions:   Ordered at: 04/05/21 2021     Code Status:    CPR     Medical Interventions (Level of Support Prior to Arrest):    Full       Plan for disposition:Where: home and When:  today      Time: 30 minutes        This document has been electronically signed by Zeeshan Wiggins MD on April 9, 2021 08:18 CDT              Electronically signed by Zeeshan Wiggins MD at 04/09/21 0818     Mirian Mills MD at 04/08/21 1721                  SUBJECTIVE:   4/8/2021  Chief Complaint:     Subjective      Patient feels some better today.  Abdominal pain is persistent but improving slowly.  Tolerating diet well.  No further nausea or emesis.  Diarrhea is improving.  Lipase has improved to 228.  Liver enzymes are improving as well.  Hemoglobin 9.8.  WBC normalized at 6.09.    History:  Past Medical History:   Diagnosis Date   • Abnormal Pap smear of cervix    • Alcohol-induced acute pancreatitis 6/1/2020    Results From Last 14 Days Lab Units 02/27/21 1342 LIPASE U/L 65*  -advance diet as tolerated starting with clears -IV fluids   -will cont suboxone and give morphine for breakthrough pain  -Zofran for nausea as needed -Scheduled Ativan, and CIWA protocol - f/u on CT abdomen     • Alcoholism (CMS/HCC)    • Anxiety    • Cervical dysplasia    • Depression    • Fibrocystic breast    • GERD (gastroesophageal reflux disease)    • Hypertension    • Seizures (CMS/HCC) 2017   • Substance abuse (CMS/HCC)    • Substance abuse (CMS/HCC)      Past Surgical History:   Procedure Laterality Date   • COLONOSCOPY N/A 2/2/2021    Procedure: COLONOSCOPY;  Surgeon: Mirian Mills MD;  Location: [facility] ENDOSCOPY;  Service: Gastroenterology;  Laterality: N/A;   •  ENDOSCOPY N/A 1/8/2021    Procedure: ESOPHAGOGASTRODUODENOSCOPY;  Surgeon: Mirian Milsl MD;  Location: [facility] ENDOSCOPY;  Service: Gastroenterology;  Laterality: N/A;   • RHINOPLASTY       Family History   Problem Relation Age of Onset   • Breast cancer Mother    • Cancer Mother    • COPD Mother    • Breast cancer Maternal Grandmother    • COPD Maternal Grandmother    • Heart disease Maternal Grandmother    • Breast cancer Paternal Grandmother    • Breast cancer Maternal Aunt    • Hypertension Father    • Heart disease Father      Social History     Tobacco Use   • Smoking status: Current Every Day Smoker     Packs/day: 1.00     Years: 20.00     Pack years: 20.00     Types: Cigarettes   • Smokeless tobacco: Never Used   Substance Use Topics   • Alcohol use: Yes     Alcohol/week: 6.0 standard drinks     Types: 6 Shots of liquor per week     Comment: daily   • Drug use: Not Currently     Types: Fentanyl, Oxycodone     Comment: hx of abuse     Medications Prior to Admission   Medication Sig Dispense Refill Last Dose   • acamprosate (CAMPRAL) 333 MG EC tablet       • buprenorphine-naloxone (SUBOXONE) 8-2 MG per SL tablet Place 1 tablet under the tongue 3 (Three) Times a Day. Patient takes one 8-2 tablet three times a day.      • cloNIDine (CATAPRES) 0.1 MG tablet Take 0.1 mg by mouth 3 (Three) Times a Day.      • cyanocobalamin (VITAMIN B-12) 1000 MCG tablet Take 1 tablet by mouth Daily. 30 tablet 5    • diazePAM (VALIUM) 5 MG tablet Take 10 mg by mouth Every 8 (Eight) Hours As Needed.      • FLUoxetine (PROzac) 40 MG capsule Take 40 mg by mouth Daily.      • folic acid (FOLVITE) 1 MG tablet Take 1 tablet by mouth Daily. 30 tablet 0    • gabapentin (NEURONTIN) 800 MG tablet Take 800 mg by mouth 3 (Three) Times a Day.      • pantoprazole (Protonix) 40 MG EC tablet Take 1 tablet by mouth Daily. 30 tablet 1    • prazosin (MINIPRESS) 5 MG capsule Take 5 mg by mouth Every Night.      • promethazine (PHENERGAN) 25 MG  suppository Insert 1 suppository into the rectum Every 6 (Six) Hours As Needed for Nausea or Vomiting. 12 suppository 0    • sucralfate (CARAFATE) 1 g tablet       • thiamine (thiamine) 100 MG tablet tablet Take 1 tablet by mouth Daily. 30 tablet 5    • vancomycin (VANCOCIN) 125 MG capsule 1 capsule by mouth four times a day for 14 days. Then 1 capsule 2 times a day for 7 days. Then 1 capsule daily for 7 days.  1 capsule every 3 days for 28 days. 56 capsule 0      Allergies:  Patient has no known allergies.     CURRENT MEDICATIONS/OBJECTIVE/VS/PE:     Current Medications:     Current Facility-Administered Medications   Medication Dose Route Frequency Provider Last Rate Last Admin   • cholestyramine light packet 4 g  1 packet Oral Q8H LINETTE'Ranjit Fisher III, MD   4 g at 04/08/21 1348   • dicyclomine (BENTYL) tablet 20 mg  20 mg Oral BID Maryam Galdamez MD   20 mg at 04/08/21 1007   • folic acid 1 mg in sodium chloride 0.9 % 50 mL IVPB  1 mg Intravenous Daily Maryam Galdamez  mL/hr at 04/08/21 1008 1 mg at 04/08/21 1008   • HYDROmorphone (DILAUDID) injection 2 mg  2 mg Intravenous Q2H PRN Maryam Galdamez MD   2 mg at 04/08/21 1529   • labetalol (NORMODYNE,TRANDATE) injection 10 mg  10 mg Intravenous Q2H PRN Maryam Galdamez MD       • LORazepam (ATIVAN) tablet 1 mg  1 mg Oral Q2H PRN Maryam Galdamez MD        Or   • LORazepam (ATIVAN) injection 1 mg  1 mg Intravenous Q2H PRN Maryam Galdamez MD   1 mg at 04/08/21 0751    Or   • LORazepam (ATIVAN) tablet 2 mg  2 mg Oral Q1H PRN Maryam Galdamez MD        Or   • LORazepam (ATIVAN) injection 2 mg  2 mg Intravenous Q1H PRN Maryam Galdamez MD   2 mg at 04/06/21 1839    Or   • LORazepam (ATIVAN) injection 2 mg  2 mg Intravenous Q15 Min PRN Maryam Galdamez MD        Or   • LORazepam (ATIVAN) injection 2 mg  2 mg Intramuscular Q15 Min PRN Maryam Galdamez MD       • LORazepam (ATIVAN) injection 2 mg  2 mg Intravenous Q4H Julian Hickey MD   2 mg at  04/08/21 1600   • nicotine (NICODERM CQ) 21 MG/24HR patch 1 patch  1 patch Transdermal Q24H Maryam Galdamez MD   1 patch at 04/07/21 2107   • ondansetron (ZOFRAN) injection 4 mg  4 mg Intravenous Q6H PRN Maryam Galdamez MD       • pantoprazole (PROTONIX) injection 40 mg  40 mg Intravenous Q AM Maryam Galdamez MD   40 mg at 04/08/21 0519   • sodium chloride 0.9 % flush 10 mL  10 mL Intravenous PRN Han Perdomo MD       • sodium chloride 0.9 % flush 10 mL  10 mL Intravenous Q12H Maryam Galdmaez MD   10 mL at 04/08/21 1008   • sodium chloride 0.9 % flush 10 mL  10 mL Intravenous PRN Maryam Galdamez MD       • sodium chloride 0.9 % infusion  125 mL/hr Intravenous Continuous Zeeshan Wiggins  mL/hr at 04/08/21 1006 125 mL/hr at 04/08/21 1006   • thiamine (B-1) 100 mg in sodium chloride 0.9 % 50 mL IVPB  100 mg Intravenous TID Zeeshan Wiggins  mL/hr at 04/08/21 1600 100 mg at 04/08/21 1600       Objective     Review of Systems:   Review of Systems    Physical Exam:   Temp:  [96.8 °F (36 °C)-97.7 °F (36.5 °C)] 97.4 °F (36.3 °C)  Heart Rate:  [64-86] 72  Resp:  [8-18] 8  BP: (107-159)/(64-95) 131/89     Physical Exam:  General Appearance:    Alert, cooperative, in no acute distress   Head:    Normocephalic, without obvious abnormality, atraumatic   Eyes:            Lids and lashes normal, conjunctivae and sclerae normal, no   icterus, no pallor, corneas clear, PERRLA   Ears:    Ears appear intact with no abnormalities noted   Throat:   No oral lesions, no thrush, oral mucosa moist   Neck:   No adenopathy, supple, trachea midline, no thyromegaly, no     carotid bruit, no JVD   Back:     No kyphosis present, no scoliosis present, no skin lesions,       erythema or scars, no tenderness to percussion or                   palpation,   range of motion normal   Lungs:     Clear to auscultation,respirations regular, even and                   unlabored    Heart:    Regular rhythm and normal rate,  normal S1 and S2, no            murmur, no gallop, no rub, no click   Breast Exam:    Deferred   Abdomen:     Normal bowel sounds, no masses, no organomegaly, soft        nontender, nondistended, no guarding, no rebound                 tenderness   Genitalia:    Deferred   Extremities:   Moves all extremities well, no edema, no cyanosis, no              redness   Pulses:   Pulses palpable and equal bilaterally   Skin:   No bleeding, bruising or rash   Lymph nodes:   No palpable adenopathy   Neurologic:   Cranial nerves 2 - 12 grossly intact, sensation intact, DTR        present and equal bilaterally      Results Review:     Lab Results (last 24 hours)     Procedure Component Value Units Date/Time    CBC & Differential [[id number]]  (Abnormal) Collected: 04/08/21 0637    Specimen: Blood Updated: 04/08/21 0856    Narrative:      The following orders were created for panel order CBC & Differential.  Procedure                               Abnormality         Status                     ---------                               -----------         ------                     Scan Slide[[id number]]                                       Final result               CBC Auto Differential[[id number]]        Abnormal            Final result                 Please view results for these tests on the individual orders.    Scan Slide [[id number]] Collected: 04/08/21 0637    Specimen: Blood Updated: 04/08/21 0856     Macrocytes Mod/2+     WBC Morphology Normal     Platelet Morphology Normal    Fecal Fat, Qualitative - Stool, Per Rectum [[id number]] Collected: 04/08/21 0828    Specimen: Stool from Per Rectum Updated: 04/08/21 0828    Comprehensive Metabolic Panel [[id number]]  (Abnormal) Collected: 04/08/21 0637    Specimen: Blood Updated: 04/08/21 0807     Glucose 106 mg/dL      BUN 3 mg/dL      Creatinine 0.40 mg/dL      Sodium 137 mmol/L      Potassium 4.6 mmol/L      Chloride 106 mmol/L      CO2 23.0 mmol/L      Calcium 8.9 mg/dL       Total Protein 6.1 g/dL      Albumin 3.00 g/dL      ALT (SGPT) 28 U/L      AST (SGOT) 82 U/L      Alkaline Phosphatase 121 U/L      Total Bilirubin 0.4 mg/dL      eGFR Non African Amer >150 mL/min/1.73      Globulin 3.1 gm/dL      A/G Ratio 1.0 g/dL      BUN/Creatinine Ratio 7.5     Anion Gap 8.0 mmol/L     Narrative:      GFR Normal >60  Chronic Kidney Disease <60  Kidney Failure <15      Magnesium [[id number]]  (Normal) Collected: 04/08/21 0637    Specimen: Blood Updated: 04/08/21 0807     Magnesium 1.8 mg/dL     Lipase [[id number]]  (Abnormal) Collected: 04/08/21 0637    Specimen: Blood Updated: 04/08/21 0807     Lipase 228 U/L     CBC Auto Differential [[id number]]  (Abnormal) Collected: 04/08/21 0637    Specimen: Blood Updated: 04/08/21 0752     WBC 6.19 10*3/mm3      RBC 2.74 10*6/mm3      Hemoglobin 9.8 g/dL      Hematocrit 29.6 %      .0 fL      MCH 35.8 pg      MCHC 33.1 g/dL      RDW 13.2 %      RDW-SD 52.4 fl      MPV 11.3 fL      Platelets 139 10*3/mm3      Neutrophil % 65.1 %      Lymphocyte % 27.3 %      Monocyte % 5.5 %      Eosinophil % 1.3 %      Basophil % 0.5 %      Immature Grans % 0.3 %      Neutrophils, Absolute 4.03 10*3/mm3      Lymphocytes, Absolute 1.69 10*3/mm3      Monocytes, Absolute 0.34 10*3/mm3      Eosinophils, Absolute 0.08 10*3/mm3      Basophils, Absolute 0.03 10*3/mm3      Immature Grans, Absolute 0.02 10*3/mm3      nRBC 0.0 /100 WBC     Gastrointestinal Panel, PCR - Stool, Per Rectum [[id number]]  (Normal) Collected: 04/06/21 1827    Specimen: Stool from Per Rectum Updated: 04/08/21 0358     Campylobacter Not Detected     Plesiomonas shigelloides Not Detected     Salmonella Not Detected     Vibrio Not Detected     Vibrio cholerae Not Detected     Yersinia enterocolitica Not Detected     Enteroaggregative E. coli (EAEC) Not Detected     Enteropathogenic E. coli (EPEC) Not Detected     Enterotoxigenic E. coli (ETEC) lt/st Not Detected     Shiga-like toxin-producing E.  coli (STEC) stx1/stx2 Not Detected     Shigella/Enteroinvasive E. coli (EIEC) Not Detected     Cryptosporidium Not Detected     Cyclospora cayetanensis Not Detected     Entamoeba histolytica Not Detected     Giardia lamblia Not Detected     Adenovirus F40/41 Not Detected     Astrovirus Not Detected     Norovirus GI/GII Not Detected     Rotavirus A Not Detected     Sapovirus (I, II, IV or V) Not Detected    Narrative:      Testing performed by multiplex PCR system.           I reviewed the patient's new clinical results.  I reviewed the patient's new imaging results and agree with the interpretation.     ASSESSMENT/PLAN:   ASSESSMENT: 1.  Acute pancreatitis, improving.  Likely due to alcohol usage.  2.  Elevated liver enzymes, improving.  3.  Diarrhea, improving.  Repeat stool studies were unremarkable.  4.  Anemia, recent GI work-up was unremarkable.  5.  Alcohol abuse  PLAN: 1.  Advance diet as tolerated.  2.  Continue PPI  3.  Okay to DC in a.m. if she continues to improve.  4.  Recommend alcohol abstinence  5.  [facility] protocol  The risks, benefits, and alternatives of this procedure have been discussed with the patient or the responsible party- the patient understands and agrees to proceed.         Mirian Mills MD  04/08/21  17:21 CDT          Electronically signed by Mirian Mills MD at 04/08/21 1724       Medical Student Notes (last 24 hours) (Notes from 04/08/21 0841 through 04/09/21 0841)    No notes of this type exist for this encounter.         Consult Notes (last 24 hours) (Notes from 04/08/21 0841 through 04/09/21 0841)    No notes of this type exist for this encounter.

## 2021-04-09 NOTE — PROGRESS NOTES
FAMILY MEDICINE DAILY PROGRESS NOTE  NAME: Nata Bain  : 1985  MRN: [id number]     LOS: 4 days     PROVIDER OF SERVICE: Zeeshan Wiggins MD    Chief Complaint: Alcohol-induced acute pancreatitis without infection or necrosis    Subjective:   Pt VSS. Endorses resolution of all diarrhea symptoms with the addition of Bentyl. States having some nausea/abdominal pain after dinner last night but was able to keep down entire meal. Will ask for some Zofran this AM and try breakfast.   Interval History:  History taken from: patient chart RN    Review of Systems:   Review of Systems   Constitutional: Negative for activity change, chills, diaphoresis and fever.   HENT: Negative for dental problem, drooling, ear discharge, ear pain, facial swelling, mouth sores, nosebleeds, rhinorrhea, sinus pressure, sinus pain, sneezing and sore throat.    Eyes: Negative for pain, discharge and visual disturbance.   Respiratory: Negative for apnea, cough, shortness of breath, wheezing and stridor.    Cardiovascular: Negative for chest pain, palpitations and leg swelling.   Gastrointestinal: Positive for abdominal pain and nausea. Negative for abdominal distention, constipation, diarrhea and vomiting.   Genitourinary: Negative for dysuria, frequency and urgency.   Musculoskeletal: Negative for arthralgias and back pain.   Skin: Negative for rash and wound.   Neurological: Negative for dizziness, facial asymmetry, light-headedness and headaches.   Psychiatric/Behavioral: Negative for agitation and decreased concentration. The patient is not nervous/anxious.        Objective:     Vital Signs  Temp:  [97.3 °F (36.3 °C)-98.3 °F (36.8 °C)] 97.4 °F (36.3 °C)  Heart Rate:  [66-87] 87  Resp:  [8-18] 18  BP: (103-135)/(59-93) 123/79    Physical Exam  Physical Exam  Constitutional:       Appearance: She is well-developed.   HENT:      Head: Normocephalic and atraumatic.      Right Ear: External ear normal.      Left Ear: External ear  normal.      Nose: Nose normal.      Mouth/Throat:      Pharynx: No oropharyngeal exudate.   Eyes:      General: No scleral icterus.        Right eye: No discharge.         Left eye: No discharge.      Conjunctiva/sclera: Conjunctivae normal.      Pupils: Pupils are equal, round, and reactive to light.   Neck:      Vascular: No JVD.   Cardiovascular:      Rate and Rhythm: Normal rate and regular rhythm.      Heart sounds: Normal heart sounds. No murmur heard.   No friction rub. No gallop.    Pulmonary:      Effort: Pulmonary effort is normal. No respiratory distress.      Breath sounds: Normal breath sounds. No stridor. No wheezing or rales.   Abdominal:      General: Bowel sounds are normal. There is no distension.      Palpations: Abdomen is soft.      Tenderness: There is abdominal tenderness (epigastric).   Musculoskeletal:         General: No tenderness or deformity. Normal range of motion.      Cervical back: Normal range of motion and neck supple.   Skin:     General: Skin is warm and dry.      Capillary Refill: Capillary refill takes less than 2 seconds.      Coloration: Skin is not pale.      Findings: No erythema or rash.   Neurological:      Mental Status: She is alert and oriented to person, place, and time.   Psychiatric:         Behavior: Behavior normal.         Medication Review    Current Facility-Administered Medications:   •  cholestyramine light packet 4 g, 1 packet, Oral, Q8H, O'Ranjit Fisher III, MD, 4 g at 04/08/21 2126  •  dicyclomine (BENTYL) tablet 20 mg, 20 mg, Oral, BID, Maryam Galdamez MD, 20 mg at 04/08/21 2125  •  folic acid 1 mg in sodium chloride 0.9 % 50 mL IVPB, 1 mg, Intravenous, Daily, Maryam Galdamez MD, Last Rate: 100 mL/hr at 04/08/21 1008, 1 mg at 04/08/21 1008  •  HYDROmorphone (DILAUDID) injection 1 mg, 1 mg, Intravenous, Q3H PRN, Maryam Galdamez MD, 1 mg at 04/09/21 0650  •  labetalol (NORMODYNE,TRANDATE) injection 10 mg, 10 mg, Intravenous, Q2H PRN, Rudolph  MD Maryam  •  LORazepam (ATIVAN) tablet 1 mg, 1 mg, Oral, Q2H PRN **OR** LORazepam (ATIVAN) injection 1 mg, 1 mg, Intravenous, Q2H PRN, 1 mg at 04/08/21 0751 **OR** LORazepam (ATIVAN) tablet 2 mg, 2 mg, Oral, Q1H PRN **OR** LORazepam (ATIVAN) injection 2 mg, 2 mg, Intravenous, Q1H PRN, 2 mg at 04/08/21 1758 **OR** LORazepam (ATIVAN) injection 2 mg, 2 mg, Intravenous, Q15 Min PRN **OR** LORazepam (ATIVAN) injection 2 mg, 2 mg, Intramuscular, Q15 Min PRN, Maryam Galdamez MD  •  LORazepam (ATIVAN) injection 2 mg, 2 mg, Intravenous, Q4H, Julian Hickey MD, 2 mg at 04/09/21 0718  •  nicotine (NICODERM CQ) 21 MG/24HR patch 1 patch, 1 patch, Transdermal, Q24H, Maryam Galdamez MD, 1 patch at 04/08/21 2126  •  ondansetron (ZOFRAN) injection 4 mg, 4 mg, Intravenous, Q6H PRN, Maryam Galdamez MD  •  pantoprazole (PROTONIX) injection 40 mg, 40 mg, Intravenous, Q AM, Maryam Galdamez MD, 40 mg at 04/09/21 0521  •  sodium chloride 0.9 % flush 10 mL, 10 mL, Intravenous, PRN, Han Perdomo MD  •  sodium chloride 0.9 % flush 10 mL, 10 mL, Intravenous, Q12H, Maryam Galdamez MD, 10 mL at 04/08/21 2126  •  sodium chloride 0.9 % flush 10 mL, 10 mL, Intravenous, PRN, Maryam Galdamez MD  •  sodium chloride 0.9 % infusion, 125 mL/hr, Intravenous, Continuous, Zeeshan Wiggins MD, Last Rate: 125 mL/hr at 04/09/21 0055, 125 mL/hr at 04/09/21 0055  •  thiamine (B-1) 100 mg in sodium chloride 0.9 % 100 mL IVPB, 100 mg, Intravenous, TID, Zeeshan Wiggins MD     Diagnostic Data    Lab Results (last 24 hours)     Procedure Component Value Units Date/Time    Lipase [[id number]]  (Abnormal) Collected: 04/09/21 0641    Specimen: Blood Updated: 04/09/21 0718     Lipase 355 U/L     Comprehensive Metabolic Panel [[id number]]  (Abnormal) Collected: 04/09/21 0641    Specimen: Blood Updated: 04/09/21 0709     Glucose 99 mg/dL      BUN 3 mg/dL      Creatinine 0.53 mg/dL      Sodium 138 mmol/L      Potassium 4.2 mmol/L      Chloride  103 mmol/L      CO2 27.0 mmol/L      Calcium 9.6 mg/dL      Total Protein 6.7 g/dL      Albumin 3.50 g/dL      ALT (SGPT) 43 U/L      AST (SGOT) 132 U/L      Alkaline Phosphatase 117 U/L      Total Bilirubin 0.4 mg/dL      eGFR Non African Amer 131 mL/min/1.73      Globulin 3.2 gm/dL      A/G Ratio 1.1 g/dL      BUN/Creatinine Ratio 5.7     Anion Gap 8.0 mmol/L     Narrative:      GFR Normal >60  Chronic Kidney Disease <60  Kidney Failure <15      Magnesium [[id number]]  (Normal) Collected: 04/09/21 0641    Specimen: Blood Updated: 04/09/21 0709     Magnesium 1.6 mg/dL     CBC & Differential [[id number]]  (Abnormal) Collected: 04/09/21 0641    Specimen: Blood Updated: 04/09/21 0658    Narrative:      The following orders were created for panel order CBC & Differential.  Procedure                               Abnormality         Status                     ---------                               -----------         ------                     Scan Slide[[id number]]                                                                  CBC Auto Differential[[id number]]        Abnormal            Final result                 Please view results for these tests on the individual orders.    CBC Auto Differential [[id number]]  (Abnormal) Collected: 04/09/21 0641    Specimen: Blood Updated: 04/09/21 0658     WBC 6.17 10*3/mm3      RBC 2.86 10*6/mm3      Hemoglobin 10.6 g/dL      Hematocrit 30.4 %      .3 fL      MCH 37.1 pg      MCHC 34.9 g/dL      RDW 13.1 %      RDW-SD 50.0 fl      MPV 10.2 fL      Platelets 187 10*3/mm3      Neutrophil % 65.2 %      Lymphocyte % 26.9 %      Monocyte % 6.0 %      Eosinophil % 1.1 %      Basophil % 0.3 %      Immature Grans % 0.5 %      Neutrophils, Absolute 4.02 10*3/mm3      Lymphocytes, Absolute 1.66 10*3/mm3      Monocytes, Absolute 0.37 10*3/mm3      Eosinophils, Absolute 0.07 10*3/mm3      Basophils, Absolute 0.02 10*3/mm3      Immature Grans, Absolute 0.03 10*3/mm3      nRBC 0.0 /100  WBC     Fecal Fat, Qualitative - Stool, Per Rectum [[id number]]  (Normal) Collected: 04/08/21 0828    Specimen: Stool from Per Rectum Updated: 04/08/21 1918     Fecal Fats, Qualitative, Fatty Acids 0-100 fat droplets / hpf     Fecal Fat Qualitative, Neutral Fats 0-50 fat droplets / hpf           Imaging Results (Last 24 Hours)     ** No results found for the last 24 hours. **          I reviewed the patient's new clinical results.    Assessment/Plan:     Active Hospital Problems    Diagnosis    • **Alcohol-induced acute pancreatitis without infection or necrosis      -CT abdomen/pelvis confirms pancreatitis  -Lipase 1000-->1307-->228-->335  -Given 4 mg IV morphine and 1 mg IV dilaudid in the ED  -Pt states epigastric pain is improving. Currently receiving regular diet. Had some abdominal discomfort/nausea after dinner yesterday. Will try to eat breakfast today after receving Zofran  -IVF at 125 cc/hour  -Advance diet as tolerated. Currently tolerating regular diet without any complaints  -Dilaudid q2 hours prn for severe pain  -Scheduled and PRN Ativan (CIWA)       • Functional diarrhea      -Pt was complaining of 5-10 watery bowel movements daily. With addition of Bentyl she endorses complete resolution of these symptoms  -Was being treated for recurrent C. Diff  -C. Diff toxin negative, vancomycin stopped.  -GI panel negative, will order stool fat--Normal  - Dr. Mills following. Fine with dc if pain controlled/continues to be able to tolerate PO consumption  -Have started Bentyl 20mg as of 4/7/201     • Thiamine deficiency      Secondary to heavy alcohol use.  Will supplement with  IV thiamine TID     • Hypomagnesemia      -Mag 1.1--->1.8-->1.6  Replace and monitor daily     • Benzodiazepine dependence (CMS/HCC)      -Patient takes valium 10 mg bid (confirmed on krishan) and gabapentin.  -Ativan 2 mg Q4H scheduled in addition to CIWA prn ativan dosing     • Alcohol dependence with uncomplicated withdrawal (CMS/HCC)       -CIWA  -IV fluids, IV folate, IV thiamine TID       • Tobacco abuse      -Nicotine patch  Smoking cessation counseling     • Anxiety and depression        -Scheduled and as needed Ativan(CIWA)       • Opioid use disorder (CMS/HCC)      -Hold home gabapentin and Suboxone  -As needed Dilaudid for abdominal pain secondary to pancreatitis. Currently receiving more morphine equivalents than her morphine equivalent for suboxone  -patient is on Suboxone 8/2 TID at home       • Hypokalemia      -We will monitor and replace as needed  -No longer receiving potassium through fluids due to normalization of hypokalemia  -Mag 1.1 at admission. Currently 1.8    Results from last 7 days   Lab Units 04/09/21  0641 04/08/21  0637 04/07/21  0551 04/06/21  0534 04/05/21  1645   POTASSIUM mmol/L 4.2 4.6 4.2 3.2* 3.6                DVT prophylaxis: SCDs/TEDs  Code Status and Medical Interventions:   Ordered at: 04/05/21 2021     Code Status:    CPR     Medical Interventions (Level of Support Prior to Arrest):    Full       Plan for disposition:Where: home and When:  today      Time: 30 minutes        This document has been electronically signed by Zeeshan Wiggins MD on April 9, 2021 08:18 CDT

## 2021-04-09 NOTE — PROGRESS NOTES
SUBJECTIVE:   4/9/2021  Chief Complaint:     Subjective      Patient is complaining of worsening abdominal pain.  Symptoms are worse with food intake no further nausea or emesis.  Diarrhea is improving.  Lipase has increased to 335.  Liver enzymes are improving as well.  Hemoglobin 10.6.  WBC normalized at 6.17.    History:  Past Medical History:   Diagnosis Date   • Abnormal Pap smear of cervix    • Alcohol-induced acute pancreatitis 6/1/2020    Results From Last 14 Days Lab Units 02/27/21 1342 LIPASE U/L 65*  -advance diet as tolerated starting with clears -IV fluids   -will cont suboxone and give morphine for breakthrough pain  -Zofran for nausea as needed -Scheduled Ativan, and CIWA protocol - f/u on CT abdomen     • Alcoholism (CMS/HCC)    • Anxiety    • Cervical dysplasia    • Depression    • Fibrocystic breast    • GERD (gastroesophageal reflux disease)    • Hypertension    • Seizures (CMS/HCC) 2017   • Substance abuse (CMS/HCC)    • Substance abuse (CMS/HCC)      Past Surgical History:   Procedure Laterality Date   • COLONOSCOPY N/A 2/2/2021    Procedure: COLONOSCOPY;  Surgeon: Mirian Mills MD;  Location: [facility] ENDOSCOPY;  Service: Gastroenterology;  Laterality: N/A;   • ENDOSCOPY N/A 1/8/2021    Procedure: ESOPHAGOGASTRODUODENOSCOPY;  Surgeon: Mirian Mills MD;  Location: [facility] ENDOSCOPY;  Service: Gastroenterology;  Laterality: N/A;   • RHINOPLASTY       Family History   Problem Relation Age of Onset   • Breast cancer Mother    • Cancer Mother    • COPD Mother    • Breast cancer Maternal Grandmother    • COPD Maternal Grandmother    • Heart disease Maternal Grandmother    • Breast cancer Paternal Grandmother    • Breast cancer Maternal Aunt    • Hypertension Father    • Heart disease Father      Social History     Tobacco Use   • Smoking status: Current Every Day Smoker     Packs/day: 1.00     Years: 20.00     Pack years: 20.00     Types: Cigarettes   • Smokeless tobacco: Never Used      Substance Use Topics   • Alcohol use: Yes     Alcohol/week: 6.0 standard drinks     Types: 6 Shots of liquor per week     Comment: daily   • Drug use: Not Currently     Types: Fentanyl, Oxycodone     Comment: hx of abuse     Medications Prior to Admission   Medication Sig Dispense Refill Last Dose   • acamprosate (CAMPRAL) 333 MG EC tablet       • buprenorphine-naloxone (SUBOXONE) 8-2 MG per SL tablet Place 1 tablet under the tongue 3 (Three) Times a Day. Patient takes one 8-2 tablet three times a day.      • cloNIDine (CATAPRES) 0.1 MG tablet Take 0.1 mg by mouth 3 (Three) Times a Day.      • cyanocobalamin (VITAMIN B-12) 1000 MCG tablet Take 1 tablet by mouth Daily. 30 tablet 5    • diazePAM (VALIUM) 5 MG tablet Take 10 mg by mouth Every 8 (Eight) Hours As Needed.      • FLUoxetine (PROzac) 40 MG capsule Take 40 mg by mouth Daily.      • folic acid (FOLVITE) 1 MG tablet Take 1 tablet by mouth Daily. 30 tablet 0    • gabapentin (NEURONTIN) 800 MG tablet Take 800 mg by mouth 3 (Three) Times a Day.      • pantoprazole (Protonix) 40 MG EC tablet Take 1 tablet by mouth Daily. 30 tablet 1    • prazosin (MINIPRESS) 5 MG capsule Take 5 mg by mouth Every Night.      • promethazine (PHENERGAN) 25 MG suppository Insert 1 suppository into the rectum Every 6 (Six) Hours As Needed for Nausea or Vomiting. 12 suppository 0    • sucralfate (CARAFATE) 1 g tablet       • thiamine (thiamine) 100 MG tablet tablet Take 1 tablet by mouth Daily. 30 tablet 5    • vancomycin (VANCOCIN) 125 MG capsule 1 capsule by mouth four times a day for 14 days. Then 1 capsule 2 times a day for 7 days. Then 1 capsule daily for 7 days.  1 capsule every 3 days for 28 days. 56 capsule 0      Allergies:  Patient has no known allergies.     CURRENT MEDICATIONS/OBJECTIVE/VS/PE:     Current Medications:     Current Facility-Administered Medications   Medication Dose Route Frequency Provider Last Rate Last Admin   • cholestyramine light packet 4 g  1 packet  Oral Q8H Ranjit Castillo III, MD   4 g at 04/08/21 2126   • diazePAM (VALIUM) tablet 10 mg  10 mg Oral Q8H Rishabh Raya MD   10 mg at 04/09/21 1423   • dicyclomine (BENTYL) tablet 20 mg  20 mg Oral BID Maryam Galdamez MD   20 mg at 04/09/21 0913   • folic acid 1 mg in sodium chloride 0.9 % 50 mL IVPB  1 mg Intravenous Daily Maryam Galdamez  mL/hr at 04/09/21 0913 1 mg at 04/09/21 0913   • HYDROmorphone (DILAUDID) injection 1 mg  1 mg Intravenous Q3H PRN Maryam Galdamez MD   1 mg at 04/09/21 1532   • labetalol (NORMODYNE,TRANDATE) injection 10 mg  10 mg Intravenous Q2H PRN Maryam Galdamez MD       • LORazepam (ATIVAN) tablet 1 mg  1 mg Oral Q2H PRN Maryam Galdamez MD        Or   • LORazepam (ATIVAN) injection 1 mg  1 mg Intravenous Q2H PRN Maryam Galdamez MD   1 mg at 04/08/21 0751    Or   • LORazepam (ATIVAN) tablet 2 mg  2 mg Oral Q1H PRN Maryam Galdamez MD   2 mg at 04/09/21 1425    Or   • LORazepam (ATIVAN) injection 2 mg  2 mg Intravenous Q1H PRN Maryam Galdamez MD   2 mg at 04/09/21 1233    Or   • LORazepam (ATIVAN) injection 2 mg  2 mg Intravenous Q15 Min PRN Maryam Galdamez MD        Or   • LORazepam (ATIVAN) injection 2 mg  2 mg Intramuscular Q15 Min PRN Maryam Galdamez MD       • magnesium oxide (MAG-OX) tablet 400 mg  400 mg Oral Daily Rishabh Raya MD   400 mg at 04/09/21 1423   • nicotine (NICODERM CQ) 21 MG/24HR patch 1 patch  1 patch Transdermal Q24H Maryam Galdamez MD   1 patch at 04/08/21 2126   • ondansetron (ZOFRAN) injection 4 mg  4 mg Intravenous Q6H PRN Maryam Galdamez MD       • pantoprazole (PROTONIX) injection 40 mg  40 mg Intravenous Q AM Maryam Galdamez MD   40 mg at 04/09/21 0521   • sodium chloride 0.9 % flush 10 mL  10 mL Intravenous PRN Han Perdomo MD       • sodium chloride 0.9 % flush 10 mL  10 mL Intravenous Q12H Maryam Galdamez MD   10 mL at 04/09/21 0914   • sodium chloride 0.9 % flush 10 mL  10 mL Intravenous PRN  Maryam Galdamez MD       • sodium chloride 0.9 % infusion  125 mL/hr Intravenous Continuous Zeeshan Wiggins  mL/hr at 04/09/21 0913 125 mL/hr at 04/09/21 0913   • thiamine (B-1) 100 mg in sodium chloride 0.9 % 100 mL IVPB  100 mg Intravenous TID Zeeshan Wiggins  mL/hr at 04/09/21 0914 100 mg at 04/09/21 0914       Objective     Review of Systems:   Review of Systems   Constitutional: Negative for chills, fatigue, fever and unexpected weight change.   HENT: Negative for congestion, ear discharge, hearing loss, nosebleeds and sore throat.    Eyes: Negative for pain, discharge and redness.   Respiratory: Negative for cough, chest tightness, shortness of breath and wheezing.    Cardiovascular: Negative for chest pain and palpitations.   Gastrointestinal: Positive for abdominal pain. Negative for abdominal distention, blood in stool, constipation, diarrhea, nausea and vomiting.   Endocrine: Negative for cold intolerance, polydipsia, polyphagia and polyuria.   Genitourinary: Negative for dysuria, flank pain, frequency, hematuria and urgency.   Musculoskeletal: Negative for arthralgias, back pain, joint swelling and myalgias.   Skin: Negative for color change, pallor and rash.   Neurological: Negative for tremors, seizures, syncope, weakness and headaches.   Hematological: Negative for adenopathy. Does not bruise/bleed easily.   Psychiatric/Behavioral: Negative for behavioral problems, confusion, dysphoric mood, hallucinations and suicidal ideas. The patient is not nervous/anxious.        Physical Exam:   Temp:  [97.3 °F (36.3 °C)-98.3 °F (36.8 °C)] 97.8 °F (36.6 °C)  Heart Rate:  [67-87] 70  Resp:  [8-18] 18  BP: (103-136)/(59-91) 136/80     Physical Exam:  General Appearance:    Alert, cooperative, in no acute distress   Head:    Normocephalic, without obvious abnormality, atraumatic   Eyes:            Lids and lashes normal, conjunctivae and sclerae normal, no   icterus, no pallor, corneas clear,  PERRLA   Ears:    Ears appear intact with no abnormalities noted   Throat:   No oral lesions, no thrush, oral mucosa moist   Neck:   No adenopathy, supple, trachea midline, no thyromegaly, no     carotid bruit, no JVD   Back:     No kyphosis present, no scoliosis present, no skin lesions,       erythema or scars, no tenderness to percussion or                   palpation,   range of motion normal   Lungs:     Clear to auscultation,respirations regular, even and                   unlabored    Heart:    Regular rhythm and normal rate, normal S1 and S2, no            murmur, no gallop, no rub, no click   Breast Exam:    Deferred   Abdomen:     Normal bowel sounds, no masses, no organomegaly, soft        nontender, nondistended, no guarding, no rebound                 tenderness   Genitalia:    Deferred   Extremities:   Moves all extremities well, no edema, no cyanosis, no              redness   Pulses:   Pulses palpable and equal bilaterally   Skin:   No bleeding, bruising or rash   Lymph nodes:   No palpable adenopathy   Neurologic:   Cranial nerves 2 - 12 grossly intact, sensation intact, DTR        present and equal bilaterally      Results Review:     Lab Results (last 24 hours)     Procedure Component Value Units Date/Time    Hepatitis Panel, Acute [[id number]]  (Normal) Collected: 04/09/21 1248    Specimen: Blood Updated: 04/09/21 1435     Hepatitis B Surface Ag Non-Reactive     Hep A IgM Non-Reactive     Hep B C IgM Non-Reactive     Hepatitis C Ab Non-Reactive    Narrative:      Results may be falsely decreased if patient taking Biotin.     Ferritin [[id number]]  (Abnormal) Collected: 04/09/21 1248    Specimen: Blood Updated: 04/09/21 1432     Ferritin 631.80 ng/mL     Narrative:      Results may be falsely decreased if patient taking Biotin.      Iron Profile [[id number]]  (Abnormal) Collected: 04/09/21 1248    Specimen: Blood Updated: 04/09/21 1429     Iron 32 mcg/dL      Iron Saturation 16 %      Transferrin 137  mg/dL      TIBC 204 mcg/dL     Lipase [[id number]]  (Abnormal) Collected: 04/09/21 0641    Specimen: Blood Updated: 04/09/21 0718     Lipase 355 U/L     Comprehensive Metabolic Panel [[id number]]  (Abnormal) Collected: 04/09/21 0641    Specimen: Blood Updated: 04/09/21 0709     Glucose 99 mg/dL      BUN 3 mg/dL      Creatinine 0.53 mg/dL      Sodium 138 mmol/L      Potassium 4.2 mmol/L      Chloride 103 mmol/L      CO2 27.0 mmol/L      Calcium 9.6 mg/dL      Total Protein 6.7 g/dL      Albumin 3.50 g/dL      ALT (SGPT) 43 U/L      AST (SGOT) 132 U/L      Alkaline Phosphatase 117 U/L      Total Bilirubin 0.4 mg/dL      eGFR Non African Amer 131 mL/min/1.73      Globulin 3.2 gm/dL      A/G Ratio 1.1 g/dL      BUN/Creatinine Ratio 5.7     Anion Gap 8.0 mmol/L     Narrative:      GFR Normal >60  Chronic Kidney Disease <60  Kidney Failure <15      Magnesium [[id number]]  (Normal) Collected: 04/09/21 0641    Specimen: Blood Updated: 04/09/21 0709     Magnesium 1.6 mg/dL     CBC & Differential [[id number]]  (Abnormal) Collected: 04/09/21 0641    Specimen: Blood Updated: 04/09/21 0658    Narrative:      The following orders were created for panel order CBC & Differential.  Procedure                               Abnormality         Status                     ---------                               -----------         ------                     Scan Slide[[id number]]                                                                  CBC Auto Differential[[id number]]        Abnormal            Final result                 Please view results for these tests on the individual orders.    CBC Auto Differential [[id number]]  (Abnormal) Collected: 04/09/21 0641    Specimen: Blood Updated: 04/09/21 0658     WBC 6.17 10*3/mm3      RBC 2.86 10*6/mm3      Hemoglobin 10.6 g/dL      Hematocrit 30.4 %      .3 fL      MCH 37.1 pg      MCHC 34.9 g/dL      RDW 13.1 %      RDW-SD 50.0 fl      MPV 10.2 fL      Platelets 187 10*3/mm3       Neutrophil % 65.2 %      Lymphocyte % 26.9 %      Monocyte % 6.0 %      Eosinophil % 1.1 %      Basophil % 0.3 %      Immature Grans % 0.5 %      Neutrophils, Absolute 4.02 10*3/mm3      Lymphocytes, Absolute 1.66 10*3/mm3      Monocytes, Absolute 0.37 10*3/mm3      Eosinophils, Absolute 0.07 10*3/mm3      Basophils, Absolute 0.02 10*3/mm3      Immature Grans, Absolute 0.03 10*3/mm3      nRBC 0.0 /100 WBC            I reviewed the patient's new clinical results.  I reviewed the patient's new imaging results and agree with the interpretation.     ASSESSMENT/PLAN:   ASSESSMENT: 1.  Acute pancreatitis, patient has worsening symptoms with initiation of p.o. intake.  Likely due to alcohol usage.  2.  Elevated liver enzymes, improving.  3.  Diarrhea, improving.  Repeat stool studies were unremarkable.  4.  Anemia, recent GI work-up was unremarkable.  5.  Alcohol abuse  PLAN: 1.  Keep patient n.p.o.    2.  Continue PPI  3.  Continue pain management and antiemetics  4.  Recommend alcohol abstinence  5.  CIWA protocol  The risks, benefits, and alternatives of this procedure have been discussed with the patient or the responsible party- the patient understands and agrees to proceed.         Mirian Mills MD  04/09/21  15:54 CDT

## 2021-04-10 LAB
ALBUMIN SERPL-MCNC: 3.3 G/DL (ref 3.5–5.2)
ALBUMIN/GLOB SERPL: 1.1 G/DL
ALP SERPL-CCNC: 105 U/L (ref 39–117)
ALT SERPL W P-5'-P-CCNC: 35 U/L (ref 1–33)
ANION GAP SERPL CALCULATED.3IONS-SCNC: 9 MMOL/L (ref 5–15)
AST SERPL-CCNC: 65 U/L (ref 1–32)
BASOPHILS # BLD AUTO: 0.02 10*3/MM3 (ref 0–0.2)
BASOPHILS NFR BLD AUTO: 0.3 % (ref 0–1.5)
BILIRUB SERPL-MCNC: 0.3 MG/DL (ref 0–1.2)
BUN SERPL-MCNC: 4 MG/DL (ref 6–20)
BUN/CREAT SERPL: ABNORMAL
CALCIUM SPEC-SCNC: 9.6 MG/DL (ref 8.6–10.5)
CHLORIDE SERPL-SCNC: 104 MMOL/L (ref 98–107)
CO2 SERPL-SCNC: 25 MMOL/L (ref 22–29)
CREAT SERPL-MCNC: <0.47 MG/DL (ref 0.57–1)
DEPRECATED RDW RBC AUTO: 49.2 FL (ref 37–54)
EOSINOPHIL # BLD AUTO: 0.04 10*3/MM3 (ref 0–0.4)
EOSINOPHIL NFR BLD AUTO: 0.7 % (ref 0.3–6.2)
ERYTHROCYTE [DISTWIDTH] IN BLOOD BY AUTOMATED COUNT: 13 % (ref 12.3–15.4)
GLOBULIN UR ELPH-MCNC: 3.1 GM/DL
GLUCOSE SERPL-MCNC: 89 MG/DL (ref 65–99)
HCT VFR BLD AUTO: 30.3 % (ref 34–46.6)
HGB BLD-MCNC: 10.6 G/DL (ref 12–15.9)
IMM GRANULOCYTES # BLD AUTO: 0.02 10*3/MM3 (ref 0–0.05)
IMM GRANULOCYTES NFR BLD AUTO: 0.3 % (ref 0–0.5)
LIPASE SERPL-CCNC: 287 U/L (ref 13–60)
LYMPHOCYTES # BLD AUTO: 2.23 10*3/MM3 (ref 0.7–3.1)
LYMPHOCYTES NFR BLD AUTO: 38.1 % (ref 19.6–45.3)
MAGNESIUM SERPL-MCNC: 1.7 MG/DL (ref 1.6–2.6)
MCH RBC QN AUTO: 36.4 PG (ref 26.6–33)
MCHC RBC AUTO-ENTMCNC: 35 G/DL (ref 31.5–35.7)
MCV RBC AUTO: 104.1 FL (ref 79–97)
MONOCYTES # BLD AUTO: 0.44 10*3/MM3 (ref 0.1–0.9)
MONOCYTES NFR BLD AUTO: 7.5 % (ref 5–12)
NEUTROPHILS NFR BLD AUTO: 3.11 10*3/MM3 (ref 1.7–7)
NEUTROPHILS NFR BLD AUTO: 53.1 % (ref 42.7–76)
NRBC BLD AUTO-RTO: 0 /100 WBC (ref 0–0.2)
PLATELET # BLD AUTO: 214 10*3/MM3 (ref 140–450)
PMV BLD AUTO: 10.3 FL (ref 6–12)
POTASSIUM SERPL-SCNC: 3.7 MMOL/L (ref 3.5–5.2)
PROT SERPL-MCNC: 6.4 G/DL (ref 6–8.5)
RBC # BLD AUTO: 2.91 10*6/MM3 (ref 3.77–5.28)
SODIUM SERPL-SCNC: 138 MMOL/L (ref 136–145)
WBC # BLD AUTO: 5.86 10*3/MM3 (ref 3.4–10.8)

## 2021-04-10 PROCEDURE — 83735 ASSAY OF MAGNESIUM: CPT | Performed by: STUDENT IN AN ORGANIZED HEALTH CARE EDUCATION/TRAINING PROGRAM

## 2021-04-10 PROCEDURE — 85025 COMPLETE CBC W/AUTO DIFF WBC: CPT | Performed by: STUDENT IN AN ORGANIZED HEALTH CARE EDUCATION/TRAINING PROGRAM

## 2021-04-10 PROCEDURE — 99233 SBSQ HOSP IP/OBS HIGH 50: CPT | Performed by: CHIROPRACTOR

## 2021-04-10 PROCEDURE — 80053 COMPREHEN METABOLIC PANEL: CPT | Performed by: STUDENT IN AN ORGANIZED HEALTH CARE EDUCATION/TRAINING PROGRAM

## 2021-04-10 PROCEDURE — 25010000002 HYDROMORPHONE 1 MG/ML SOLUTION: Performed by: STUDENT IN AN ORGANIZED HEALTH CARE EDUCATION/TRAINING PROGRAM

## 2021-04-10 PROCEDURE — 99232 SBSQ HOSP IP/OBS MODERATE 35: CPT | Performed by: INTERNAL MEDICINE

## 2021-04-10 PROCEDURE — 25010000002 THIAMINE PER 100 MG: Performed by: STUDENT IN AN ORGANIZED HEALTH CARE EDUCATION/TRAINING PROGRAM

## 2021-04-10 PROCEDURE — 25010000002 LORAZEPAM PER 2 MG: Performed by: STUDENT IN AN ORGANIZED HEALTH CARE EDUCATION/TRAINING PROGRAM

## 2021-04-10 PROCEDURE — 83690 ASSAY OF LIPASE: CPT | Performed by: STUDENT IN AN ORGANIZED HEALTH CARE EDUCATION/TRAINING PROGRAM

## 2021-04-10 RX ADMIN — SODIUM CHLORIDE, PRESERVATIVE FREE 10 ML: 5 INJECTION INTRAVENOUS at 08:46

## 2021-04-10 RX ADMIN — PANTOPRAZOLE SODIUM 40 MG: 40 INJECTION, POWDER, FOR SOLUTION INTRAVENOUS at 05:37

## 2021-04-10 RX ADMIN — THIAMINE HYDROCHLORIDE 100 MG: 100 INJECTION, SOLUTION INTRAMUSCULAR; INTRAVENOUS at 20:58

## 2021-04-10 RX ADMIN — HYDROMORPHONE HYDROCHLORIDE 1 MG: 1 INJECTION, SOLUTION INTRAMUSCULAR; INTRAVENOUS; SUBCUTANEOUS at 15:05

## 2021-04-10 RX ADMIN — LORAZEPAM 1 MG: 0.5 TABLET ORAL at 09:31

## 2021-04-10 RX ADMIN — FOLIC ACID 1 MG: 5 INJECTION, SOLUTION INTRAMUSCULAR; INTRAVENOUS; SUBCUTANEOUS at 08:51

## 2021-04-10 RX ADMIN — DIAZEPAM 10 MG: 5 TABLET ORAL at 13:42

## 2021-04-10 RX ADMIN — NICOTINE 1 PATCH: 21 PATCH, EXTENDED RELEASE TRANSDERMAL at 20:57

## 2021-04-10 RX ADMIN — DICYCLOMINE HYDROCHLORIDE 20 MG: 20 TABLET ORAL at 08:45

## 2021-04-10 RX ADMIN — THIAMINE HYDROCHLORIDE 100 MG: 100 INJECTION, SOLUTION INTRAMUSCULAR; INTRAVENOUS at 17:07

## 2021-04-10 RX ADMIN — DIAZEPAM 10 MG: 5 TABLET ORAL at 20:57

## 2021-04-10 RX ADMIN — THIAMINE HYDROCHLORIDE 100 MG: 100 INJECTION, SOLUTION INTRAMUSCULAR; INTRAVENOUS at 08:51

## 2021-04-10 RX ADMIN — DIAZEPAM 10 MG: 5 TABLET ORAL at 05:38

## 2021-04-10 RX ADMIN — MAGNESIUM OXIDE 400 MG (241.3 MG MAGNESIUM) TABLET 400 MG: TABLET at 08:45

## 2021-04-10 RX ADMIN — HYDROMORPHONE HYDROCHLORIDE 1 MG: 1 INJECTION, SOLUTION INTRAMUSCULAR; INTRAVENOUS; SUBCUTANEOUS at 08:51

## 2021-04-10 RX ADMIN — SODIUM CHLORIDE 150 ML/HR: 900 INJECTION, SOLUTION INTRAVENOUS at 17:09

## 2021-04-10 RX ADMIN — LORAZEPAM 1 MG: 0.5 TABLET ORAL at 20:08

## 2021-04-10 RX ADMIN — HYDROMORPHONE HYDROCHLORIDE 1 MG: 1 INJECTION, SOLUTION INTRAMUSCULAR; INTRAVENOUS; SUBCUTANEOUS at 19:41

## 2021-04-10 RX ADMIN — DICYCLOMINE HYDROCHLORIDE 20 MG: 20 TABLET ORAL at 20:57

## 2021-04-10 RX ADMIN — HYDROMORPHONE HYDROCHLORIDE 1 MG: 1 INJECTION, SOLUTION INTRAMUSCULAR; INTRAVENOUS; SUBCUTANEOUS at 05:44

## 2021-04-10 RX ADMIN — SODIUM CHLORIDE 150 ML/HR: 900 INJECTION, SOLUTION INTRAVENOUS at 11:10

## 2021-04-10 RX ADMIN — HYDROMORPHONE HYDROCHLORIDE 1 MG: 1 INJECTION, SOLUTION INTRAMUSCULAR; INTRAVENOUS; SUBCUTANEOUS at 12:32

## 2021-04-10 RX ADMIN — HYDROMORPHONE HYDROCHLORIDE 1 MG: 1 INJECTION, SOLUTION INTRAMUSCULAR; INTRAVENOUS; SUBCUTANEOUS at 02:36

## 2021-04-10 RX ADMIN — HYDROMORPHONE HYDROCHLORIDE 1 MG: 1 INJECTION, SOLUTION INTRAMUSCULAR; INTRAVENOUS; SUBCUTANEOUS at 22:39

## 2021-04-10 RX ADMIN — SODIUM CHLORIDE 125 ML/HR: 900 INJECTION, SOLUTION INTRAVENOUS at 02:38

## 2021-04-10 RX ADMIN — LORAZEPAM 2 MG: 2 INJECTION INTRAMUSCULAR; INTRAVENOUS at 00:49

## 2021-04-10 RX ADMIN — LORAZEPAM 1 MG: 0.5 TABLET ORAL at 15:54

## 2021-04-10 NOTE — PROGRESS NOTES
SUBJECTIVE:   4/10/2021  Chief Complaint:     Subjective      Patient feels better today.  Abdominal pain is well controlled with pain management.  Diarrhea is improving.  Lipase has improved to 287.  Liver enzymes are improving as well.  Hemoglobin 10.6.  WBC normalized at 5.86.    History:  Past Medical History:   Diagnosis Date   • Abnormal Pap smear of cervix    • Alcohol-induced acute pancreatitis 6/1/2020    Results From Last 14 Days Lab Units 02/27/21 1342 LIPASE U/L 65*  -advance diet as tolerated starting with clears -IV fluids   -will cont suboxone and give morphine for breakthrough pain  -Zofran for nausea as needed -Scheduled Ativan, and CIWA protocol - f/u on CT abdomen     • Alcoholism (CMS/HCC)    • Anxiety    • Cervical dysplasia    • Depression    • Fibrocystic breast    • GERD (gastroesophageal reflux disease)    • Hypertension    • Seizures (CMS/HCC) 2017   • Substance abuse (CMS/HCC)    • Substance abuse (CMS/HCC)      Past Surgical History:   Procedure Laterality Date   • COLONOSCOPY N/A 2/2/2021    Procedure: COLONOSCOPY;  Surgeon: Mirian Mills MD;  Location: [facility] ENDOSCOPY;  Service: Gastroenterology;  Laterality: N/A;   • ENDOSCOPY N/A 1/8/2021    Procedure: ESOPHAGOGASTRODUODENOSCOPY;  Surgeon: Mirian Mills MD;  Location: [facility] ENDOSCOPY;  Service: Gastroenterology;  Laterality: N/A;   • RHINOPLASTY       Family History   Problem Relation Age of Onset   • Breast cancer Mother    • Cancer Mother    • COPD Mother    • Breast cancer Maternal Grandmother    • COPD Maternal Grandmother    • Heart disease Maternal Grandmother    • Breast cancer Paternal Grandmother    • Breast cancer Maternal Aunt    • Hypertension Father    • Heart disease Father      Social History     Tobacco Use   • Smoking status: Current Every Day Smoker     Packs/day: 1.00     Years: 20.00     Pack years: 20.00     Types: Cigarettes   • Smokeless tobacco: Never Used   Substance Use Topics   • Alcohol  use: Yes     Alcohol/week: 6.0 standard drinks     Types: 6 Shots of liquor per week     Comment: daily   • Drug use: Not Currently     Types: Fentanyl, Oxycodone     Comment: hx of abuse     Medications Prior to Admission   Medication Sig Dispense Refill Last Dose   • acamprosate (CAMPRAL) 333 MG EC tablet       • buprenorphine-naloxone (SUBOXONE) 8-2 MG per SL tablet Place 1 tablet under the tongue 3 (Three) Times a Day. Patient takes one 8-2 tablet three times a day.      • cloNIDine (CATAPRES) 0.1 MG tablet Take 0.1 mg by mouth 3 (Three) Times a Day.      • cyanocobalamin (VITAMIN B-12) 1000 MCG tablet Take 1 tablet by mouth Daily. 30 tablet 5    • diazePAM (VALIUM) 5 MG tablet Take 10 mg by mouth Every 8 (Eight) Hours As Needed.      • FLUoxetine (PROzac) 40 MG capsule Take 40 mg by mouth Daily.      • folic acid (FOLVITE) 1 MG tablet Take 1 tablet by mouth Daily. 30 tablet 0    • gabapentin (NEURONTIN) 800 MG tablet Take 800 mg by mouth 3 (Three) Times a Day.      • pantoprazole (Protonix) 40 MG EC tablet Take 1 tablet by mouth Daily. 30 tablet 1    • prazosin (MINIPRESS) 5 MG capsule Take 5 mg by mouth Every Night.      • promethazine (PHENERGAN) 25 MG suppository Insert 1 suppository into the rectum Every 6 (Six) Hours As Needed for Nausea or Vomiting. 12 suppository 0    • sucralfate (CARAFATE) 1 g tablet       • thiamine (thiamine) 100 MG tablet tablet Take 1 tablet by mouth Daily. 30 tablet 5    • vancomycin (VANCOCIN) 125 MG capsule 1 capsule by mouth four times a day for 14 days. Then 1 capsule 2 times a day for 7 days. Then 1 capsule daily for 7 days.  1 capsule every 3 days for 28 days. 56 capsule 0      Allergies:  Patient has no known allergies.     CURRENT MEDICATIONS/OBJECTIVE/VS/PE:     Current Medications:     Current Facility-Administered Medications   Medication Dose Route Frequency Provider Last Rate Last Admin   • cholestyramine light packet 4 g  1 packet Oral Q8H Ranjit Castillo III, MD    4 g at 04/09/21 2142   • diazePAM (VALIUM) tablet 10 mg  10 mg Oral Q8H Rishabh Raya MD   10 mg at 04/10/21 0538   • dicyclomine (BENTYL) tablet 20 mg  20 mg Oral BID Maryam Galdamez MD   20 mg at 04/10/21 0845   • folic acid 1 mg in sodium chloride 0.9 % 50 mL IVPB  1 mg Intravenous Daily Maryam Galdamez  mL/hr at 04/10/21 0851 1 mg at 04/10/21 0851   • HYDROmorphone (DILAUDID) injection 1 mg  1 mg Intravenous Q3H PRN Maryam Galdamez MD   1 mg at 04/10/21 1232   • labetalol (NORMODYNE,TRANDATE) injection 10 mg  10 mg Intravenous Q2H PRN Maryam Galdamez MD       • LORazepam (ATIVAN) tablet 1 mg  1 mg Oral Q2H PRN Maryam Galdamez MD   1 mg at 04/10/21 0931    Or   • LORazepam (ATIVAN) injection 1 mg  1 mg Intravenous Q2H PRN Maryam Galdamez MD   1 mg at 04/08/21 0751    Or   • LORazepam (ATIVAN) tablet 2 mg  2 mg Oral Q1H PRN Maryam Galdamez MD   2 mg at 04/09/21 1425    Or   • LORazepam (ATIVAN) injection 2 mg  2 mg Intravenous Q1H PRN Maryam Galdamez MD   2 mg at 04/09/21 1233    Or   • LORazepam (ATIVAN) injection 2 mg  2 mg Intravenous Q15 Min PRN Maryam Galdamez MD   2 mg at 04/10/21 0049    Or   • LORazepam (ATIVAN) injection 2 mg  2 mg Intramuscular Q15 Min PRN Maryam Galdamez MD       • magnesium oxide (MAG-OX) tablet 400 mg  400 mg Oral Daily Rishabh Raya MD   400 mg at 04/10/21 0845   • nicotine (NICODERM CQ) 21 MG/24HR patch 1 patch  1 patch Transdermal Q24H Maryam Galdamez MD   1 patch at 04/09/21 2150   • ondansetron (ZOFRAN) injection 4 mg  4 mg Intravenous Q6H PRN Maryam Galdamez MD       • pantoprazole (PROTONIX) injection 40 mg  40 mg Intravenous Q AM Maryam Galdamez MD   40 mg at 04/10/21 0537   • sodium chloride 0.9 % flush 10 mL  10 mL Intravenous PRN Han Perdomo MD       • sodium chloride 0.9 % flush 10 mL  10 mL Intravenous Q12H Maryam Galdamez MD   10 mL at 04/10/21 0846   • sodium chloride 0.9 % flush 10 mL  10 mL Intravenous PRN  Maryam Galdamez MD       • sodium chloride 0.9 % infusion  150 mL/hr Intravenous Continuous Ranjit Castillo III,  mL/hr at 04/10/21 1111 150 mL/hr at 04/10/21 1111   • thiamine (B-1) 100 mg in sodium chloride 0.9 % 100 mL IVPB  100 mg Intravenous TID Zeeshan Wiggins  mL/hr at 04/10/21 0851 100 mg at 04/10/21 0851       Objective     Review of Systems:   Review of Systems   Constitutional: Negative for chills, fatigue, fever and unexpected weight change.   HENT: Negative for congestion, ear discharge, hearing loss, nosebleeds and sore throat.    Eyes: Negative for pain, discharge and redness.   Respiratory: Negative for cough, chest tightness, shortness of breath and wheezing.    Cardiovascular: Negative for chest pain and palpitations.   Gastrointestinal: Positive for abdominal pain. Negative for abdominal distention, blood in stool, constipation, diarrhea, nausea and vomiting.   Endocrine: Negative for cold intolerance, polydipsia, polyphagia and polyuria.   Genitourinary: Negative for dysuria, flank pain, frequency, hematuria and urgency.   Musculoskeletal: Negative for arthralgias, back pain, joint swelling and myalgias.   Skin: Negative for color change, pallor and rash.   Neurological: Negative for tremors, seizures, syncope, weakness and headaches.   Hematological: Negative for adenopathy. Does not bruise/bleed easily.   Psychiatric/Behavioral: Negative for behavioral problems, confusion, dysphoric mood, hallucinations and suicidal ideas. The patient is not nervous/anxious.        Physical Exam:   Temp:  [96.6 °F (35.9 °C)-97.8 °F (36.6 °C)] 97.6 °F (36.4 °C)  Heart Rate:  [] 76  Resp:  [14-18] 18  BP: ()/(46-77) 111/67     Physical Exam:  General Appearance:    Alert, cooperative, in no acute distress   Head:    Normocephalic, without obvious abnormality, atraumatic   Eyes:            Lids and lashes normal, conjunctivae and sclerae normal, no   icterus, no pallor, corneas  clear, PERRLA   Ears:    Ears appear intact with no abnormalities noted   Throat:   No oral lesions, no thrush, oral mucosa moist   Neck:   No adenopathy, supple, trachea midline, no thyromegaly, no     carotid bruit, no JVD   Back:     No kyphosis present, no scoliosis present, no skin lesions,       erythema or scars, no tenderness to percussion or                   palpation,   range of motion normal   Lungs:     Clear to auscultation,respirations regular, even and                   unlabored    Heart:    Regular rhythm and normal rate, normal S1 and S2, no            murmur, no gallop, no rub, no click   Breast Exam:    Deferred   Abdomen:     Normal bowel sounds, no masses, no organomegaly, soft        nontender, nondistended, no guarding, no rebound                 tenderness   Genitalia:    Deferred   Extremities:   Moves all extremities well, no edema, no cyanosis, no              redness   Pulses:   Pulses palpable and equal bilaterally   Skin:   No bleeding, bruising or rash   Lymph nodes:   No palpable adenopathy   Neurologic:   Cranial nerves 2 - 12 grossly intact, sensation intact, DTR        present and equal bilaterally      Results Review:     Lab Results (last 24 hours)     Procedure Component Value Units Date/Time    Comprehensive Metabolic Panel [[id number]]  (Abnormal) Collected: 04/10/21 0600    Specimen: Blood Updated: 04/10/21 0706     Glucose 89 mg/dL      BUN 4 mg/dL      Creatinine <0.47 mg/dL      Sodium 138 mmol/L      Potassium 3.7 mmol/L      Chloride 104 mmol/L      CO2 25.0 mmol/L      Calcium 9.6 mg/dL      Total Protein 6.4 g/dL      Albumin 3.30 g/dL      ALT (SGPT) 35 U/L      AST (SGOT) 65 U/L      Alkaline Phosphatase 105 U/L      Total Bilirubin 0.3 mg/dL      Globulin 3.1 gm/dL      A/G Ratio 1.1 g/dL      BUN/Creatinine Ratio --     Comment: Unable to calculate Bun/Crea Ratio.        Anion Gap 9.0 mmol/L     Narrative:      GFR Normal >60  Chronic Kidney Disease <60  Kidney  Failure <15      Lipase [[id number]]  (Abnormal) Collected: 04/10/21 0600    Specimen: Blood Updated: 04/10/21 0648     Lipase 287 U/L     Magnesium [[id number]]  (Normal) Collected: 04/10/21 0600    Specimen: Blood Updated: 04/10/21 0648     Magnesium 1.7 mg/dL     CBC & Differential [[id number]]  (Abnormal) Collected: 04/10/21 0600    Specimen: Blood Updated: 04/10/21 0626    Narrative:      The following orders were created for panel order CBC & Differential.  Procedure                               Abnormality         Status                     ---------                               -----------         ------                     CBC Auto Differential[[id number]]        Abnormal            Final result                 Please view results for these tests on the individual orders.    CBC Auto Differential [[id number]]  (Abnormal) Collected: 04/10/21 0600    Specimen: Blood Updated: 04/10/21 0626     WBC 5.86 10*3/mm3      RBC 2.91 10*6/mm3      Hemoglobin 10.6 g/dL      Hematocrit 30.3 %      .1 fL      MCH 36.4 pg      MCHC 35.0 g/dL      RDW 13.0 %      RDW-SD 49.2 fl      MPV 10.3 fL      Platelets 214 10*3/mm3      Neutrophil % 53.1 %      Lymphocyte % 38.1 %      Monocyte % 7.5 %      Eosinophil % 0.7 %      Basophil % 0.3 %      Immature Grans % 0.3 %      Neutrophils, Absolute 3.11 10*3/mm3      Lymphocytes, Absolute 2.23 10*3/mm3      Monocytes, Absolute 0.44 10*3/mm3      Eosinophils, Absolute 0.04 10*3/mm3      Basophils, Absolute 0.02 10*3/mm3      Immature Grans, Absolute 0.02 10*3/mm3      nRBC 0.0 /100 WBC            I reviewed the patient's new clinical results.  I reviewed the patient's new imaging results and agree with the interpretation.     ASSESSMENT/PLAN:   ASSESSMENT: 1.  Acute pancreatitis, patient has worsening symptoms with initiation of p.o. intake.  Likely due to alcohol usage.  2.  Elevated liver enzymes, improving.  3.  Diarrhea, improving.  Repeat stool studies were  unremarkable.  4.  Anemia, recent GI work-up was unremarkable.  5.  Alcohol abuse  PLAN: 1.  Continue bowel rest, pain management and antiemetics  2.  Continue PPI  3.  Continue pain management and antiemetics  4.  Recommend alcohol abstinence  5.  CIWA protocol  6.  Resume p.o. after resolution of abdominal pain and normalization of lipase  The risks, benefits, and alternatives of this procedure have been discussed with the patient or the responsible party- the patient understands and agrees to proceed.         Mirian Mills MD  04/10/21  13:37 CDT

## 2021-04-10 NOTE — PROGRESS NOTES
FAMILY MEDICINE DAILY PROGRESS NOTE    NAME: Nata Bain  : 1985  MRN: [id number]      LOS: 5 days     PROVIDER OF SERVICE: Benji Holley MD    Chief Complaint: Alcohol-induced acute pancreatitis without infection or necrosis    Subjective:     Interval History:  History taken from: patient chart RN  This morning finds the patient awake alert and cooperative.  She is complaining of some nausea.  She is experiencing chills and tremors.  She is also complaining of epigastric pain that is worse with a deep breath.  Telemetry reported no overnight events.  Patient is afebrile.  Vital signs stable.    Review of Systems:   Review of Systems   Constitutional: Negative for chills.   Gastrointestinal: Positive for abdominal pain and nausea.        Epigastric pain worsens with deep breaths.   Neurological: Positive for tremors.       Objective:     Vital Signs  Temp:  [96.6 °F (35.9 °C)-97.8 °F (36.6 °C)] 97.2 °F (36.2 °C)  Heart Rate:  [] 64  Resp:  [14-18] 14  BP: ()/(46-80) 124/74   Body mass index is 19.89 kg/m² (pended).    Physical Exam  Physical Exam  Vitals and nursing note reviewed.   HENT:      Nose: No rhinorrhea.   Eyes:      General: No scleral icterus.     Extraocular Movements: Extraocular movements intact.   Neck:      Vascular: No carotid bruit.   Cardiovascular:      Rate and Rhythm: Normal rate and regular rhythm.      Pulses: Normal pulses.      Heart sounds: No murmur heard.   No gallop.    Pulmonary:      Effort: Pulmonary effort is normal. No respiratory distress.      Breath sounds: No wheezing.   Abdominal:      Tenderness: There is abdominal tenderness.   Musculoskeletal:      Right lower leg: No edema.      Left lower leg: No edema.   Skin:     Capillary Refill: Capillary refill takes less than 2 seconds.   Neurological:      General: No focal deficit present.      Mental Status: She is alert.      Motor: No weakness.      Comments: When her hands are held out there  are visible bilateral tremors.         Scheduled Meds   cholestyramine light, 1 packet, Oral, Q8H  diazePAM, 10 mg, Oral, Q8H  dicyclomine, 20 mg, Oral, BID  folic acid (FOLVITE) IVPB, 1 mg, Intravenous, Daily  magnesium oxide, 400 mg, Oral, Daily  nicotine, 1 patch, Transdermal, Q24H  pantoprazole, 40 mg, Intravenous, Q AM  sodium chloride, 10 mL, Intravenous, Q12H  thiamine (VITAMIN B1) IVPB, 100 mg, Intravenous, TID       PRN Meds   HYDROmorphone  •  labetalol  •  LORazepam **OR** LORazepam **OR** LORazepam **OR** LORazepam **OR** LORazepam **OR** LORazepam  •  ondansetron  •  sodium chloride  •  sodium chloride      Diagnostic Data    Results from last 7 days   Lab Units 04/10/21  0600   WBC 10*3/mm3 5.86   HEMOGLOBIN g/dL 10.6*   HEMATOCRIT % 30.3*   PLATELETS 10*3/mm3 214   GLUCOSE mg/dL 89   CREATININE mg/dL <0.47*   BUN mg/dL 4*   SODIUM mmol/L 138   POTASSIUM mmol/L 3.7   AST (SGOT) U/L 65*   ALT (SGPT) U/L 35*   ALK PHOS U/L 105   BILIRUBIN mg/dL 0.3   ANION GAP mmol/L 9.0       No radiology results for the last day      I reviewed the patient's new clinical results.    Assessment/Plan:     Active Hospital Problems    Diagnosis  POA   • **Alcohol-induced acute pancreatitis without infection or necrosis [K85.20]  Yes     Priority: High     -CT abdomen/pelvis confirms pancreatitis  -Lipase 1000-->1307-->228-->335-->287  -Given 4 mg IV morphine and 1 mg IV dilaudid in the ED  -Pt states epigastric pain is improving. Currently receiving regular diet. Had some abdominal discomfort/nausea after dinner yesterday. Will try to eat breakfast today after receving Zofran  -IVF at 125 cc/hour  -Currently NPO  -Dilaudid q2 hours prn for severe pain  -Scheduled and PRN Ativan (CIWA)       • Alcohol dependence with uncomplicated withdrawal (CMS/HCC) [F10.230]  Yes     Priority: High     -long term abuse  -Patient with tremors in both hands  -CIWA  -IV fluids, IV folate, IV thiamine TID       • Anxiety and depression  [F41.9, F32.9]  Yes     Priority: Medium       -Scheduled and as needed Ativan(CIWA)  -Valium Q8       • Polysubstance (including opioids) dependence, daily use (CMS/HCC) [F11.20, F19.20]  Yes     Priority: Medium     -Hold home gabapentin and Suboxone  -As needed Dilaudid for abdominal pain secondary to pancreatitis. Currently receiving more morphine equivalents than her morphine equivalent for suboxone  -patient is on Suboxone 8/2 TID at home    -social work following up for polysubstance abuse; history of leaving prior rehab for alcohol abuse when they held her medicines      • Elevated liver enzymes [R74.8]  Yes     - Will continue to monitor     • Thrombocytopenia (CMS/HCC) [D69.6]  Yes     - Secondary to alcohol use  - Will continue to monitor     • Functional diarrhea [K59.1]  Yes     -Pt was complaining of 5-10 watery bowel movements daily. With addition of Bentyl she endorses complete resolution of these symptoms  -Was being treated for recurrent C. Diff  -C. Diff toxin negative, vancomycin stopped.  -GI panel negative, will order stool fat--Normal  - Dr. Mills following. Fine with dc if pain controlled/continues to be able to tolerate PO consumption  -Have started Bentyl 20mg as of 4/7/201     • Thiamine deficiency [E51.9]  Yes     Secondary to heavy alcohol use.  Will supplement with  IV thiamine TID     • Hypomagnesemia [E83.42]  Yes     -Mag 1.1--->1.8-->1.6  Replace and monitor daily     • Benzodiazepine dependence (CMS/HCC) [F13.20]  Yes     -Patient takes valium 10 mg bid (confirmed on krishan) and gabapentin.  -Ativan 2 mg Q4H scheduled in addition to CIWA prn ativan dosing     • Tobacco abuse [Z72.0]  Yes     -Nicotine patch  Smoking cessation counseling     • Hypokalemia [E87.6]  No     -We will monitor and replace as needed  -No longer receiving potassium through fluids due to normalization of hypokalemia  -Mag 1.1 at admission. Currently 1.8    Results from last 7 days   Lab Units 04/09/21  0665  04/08/21  0637 04/07/21  0551 04/06/21  0534 04/05/21  1645   POTASSIUM mmol/L 4.2 4.6 4.2 3.2* 3.6              DVT prophylaxis: SCDs/TEDs  Code status is   Code Status and Medical Interventions:   Ordered at: 04/05/21 2021     Code Status:    CPR     Medical Interventions (Level of Support Prior to Arrest):    Full       Plan for disposition:Where: home      Time: More than 50% of time spent in counseling and coordination of care:  Total face-to-face/floor time 40 min.  Time spent in counseling 15 min. Counseling included the following topics: The importance of avoiding alcohol in the future.  Importance of following up with likely inpatient counseling         This document has been electronically signed by Benji Holley MD on April 10, 2021 14:06 CDT

## 2021-04-10 NOTE — PLAN OF CARE
Problem: Adult Inpatient Plan of Care  Goal: Plan of Care Review  Outcome: Ongoing, Progressing  Flowsheets  Taken 4/10/2021 0344 by Cici Muhammad RN  Progress: no change  Taken 4/7/2021 1544 by Yoanna Zuniga RN  Plan of Care Reviewed With: patient  Goal: Patient-Specific Goal (Individualized)  Outcome: Ongoing, Progressing  Goal: Absence of Hospital-Acquired Illness or Injury  Outcome: Ongoing, Progressing  Intervention: Identify and Manage Fall Risk  Recent Flowsheet Documentation  Taken 4/10/2021 0200 by Cici Muhammad RN  Safety Promotion/Fall Prevention: safety round/check completed  Taken 4/10/2021 0000 by Cici Muhammad RN  Safety Promotion/Fall Prevention: safety round/check completed  Taken 4/9/2021 2200 by Cici Muhammad RN  Safety Promotion/Fall Prevention: safety round/check completed  Taken 4/9/2021 2100 by Cici Muhammad RN  Safety Promotion/Fall Prevention: safety round/check completed  Taken 4/9/2021 2030 by Cici Muhammad RN  Safety Promotion/Fall Prevention: safety round/check completed  Taken 4/9/2021 1934 by Cici Muhammad RN  Safety Promotion/Fall Prevention: safety round/check completed  Intervention: Prevent Skin Injury  Recent Flowsheet Documentation  Taken 4/10/2021 0200 by Cici Muhammad RN  Body Position: position changed independently  Taken 4/10/2021 0000 by Cici Muhammad RN  Body Position: position changed independently  Taken 4/9/2021 2200 by Cici Muhammad RN  Body Position: position changed independently  Taken 4/9/2021 2100 by Cici Muhammad RN  Body Position: position changed independently  Taken 4/9/2021 2030 by Cici Muhammad RN  Body Position: position changed independently  Taken 4/9/2021 1934 by Cici Muhammad RN  Body Position: position changed independently  Intervention: Prevent and Manage VTE (venous thromboembolism) Risk  Recent Flowsheet Documentation  Taken 4/9/2021 1934 by Cici Muhammad RN  VTE  Prevention/Management: patient refused intervention  Goal: Optimal Comfort and Wellbeing  Outcome: Ongoing, Progressing  Intervention: Provide Person-Centered Care  Recent Flowsheet Documentation  Taken 4/9/2021 1934 by Cici Muhammad, RN  Trust Relationship/Rapport: care explained  Goal: Readiness for Transition of Care  Outcome: Ongoing, Progressing   Goal Outcome Evaluation:     Progress: no change

## 2021-04-11 LAB
ALBUMIN SERPL-MCNC: 3.1 G/DL (ref 3.5–5.2)
ALBUMIN/GLOB SERPL: 1.1 G/DL
ALP SERPL-CCNC: 93 U/L (ref 39–117)
ALT SERPL W P-5'-P-CCNC: 35 U/L (ref 1–33)
ANION GAP SERPL CALCULATED.3IONS-SCNC: 8 MMOL/L (ref 5–15)
AST SERPL-CCNC: 56 U/L (ref 1–32)
BASOPHILS # BLD AUTO: 0.03 10*3/MM3 (ref 0–0.2)
BASOPHILS NFR BLD AUTO: 0.5 % (ref 0–1.5)
BILIRUB SERPL-MCNC: 0.3 MG/DL (ref 0–1.2)
BUN SERPL-MCNC: 2 MG/DL (ref 6–20)
BUN/CREAT SERPL: ABNORMAL
CALCIUM SPEC-SCNC: 9 MG/DL (ref 8.6–10.5)
CHLORIDE SERPL-SCNC: 107 MMOL/L (ref 98–107)
CO2 SERPL-SCNC: 26 MMOL/L (ref 22–29)
CREAT SERPL-MCNC: <0.47 MG/DL (ref 0.57–1)
DEPRECATED RDW RBC AUTO: 50.1 FL (ref 37–54)
EOSINOPHIL # BLD AUTO: 0.06 10*3/MM3 (ref 0–0.4)
EOSINOPHIL NFR BLD AUTO: 1 % (ref 0.3–6.2)
ERYTHROCYTE [DISTWIDTH] IN BLOOD BY AUTOMATED COUNT: 13.1 % (ref 12.3–15.4)
GLOBULIN UR ELPH-MCNC: 2.9 GM/DL
GLUCOSE SERPL-MCNC: 79 MG/DL (ref 65–99)
HCT VFR BLD AUTO: 29.8 % (ref 34–46.6)
HGB BLD-MCNC: 10.5 G/DL (ref 12–15.9)
IMM GRANULOCYTES # BLD AUTO: 0.02 10*3/MM3 (ref 0–0.05)
IMM GRANULOCYTES NFR BLD AUTO: 0.3 % (ref 0–0.5)
LIPASE SERPL-CCNC: 203 U/L (ref 13–60)
LYMPHOCYTES # BLD AUTO: 2.75 10*3/MM3 (ref 0.7–3.1)
LYMPHOCYTES NFR BLD AUTO: 44.4 % (ref 19.6–45.3)
MAGNESIUM SERPL-MCNC: 1.7 MG/DL (ref 1.6–2.6)
MCH RBC QN AUTO: 36.8 PG (ref 26.6–33)
MCHC RBC AUTO-ENTMCNC: 35.2 G/DL (ref 31.5–35.7)
MCV RBC AUTO: 104.6 FL (ref 79–97)
MONOCYTES # BLD AUTO: 0.52 10*3/MM3 (ref 0.1–0.9)
MONOCYTES NFR BLD AUTO: 8.4 % (ref 5–12)
NEUTROPHILS NFR BLD AUTO: 2.82 10*3/MM3 (ref 1.7–7)
NEUTROPHILS NFR BLD AUTO: 45.4 % (ref 42.7–76)
NRBC BLD AUTO-RTO: 0 /100 WBC (ref 0–0.2)
PLATELET # BLD AUTO: 229 10*3/MM3 (ref 140–450)
PMV BLD AUTO: 10.1 FL (ref 6–12)
POTASSIUM SERPL-SCNC: 3.4 MMOL/L (ref 3.5–5.2)
PROT SERPL-MCNC: 6 G/DL (ref 6–8.5)
RBC # BLD AUTO: 2.85 10*6/MM3 (ref 3.77–5.28)
SODIUM SERPL-SCNC: 141 MMOL/L (ref 136–145)
WBC # BLD AUTO: 6.2 10*3/MM3 (ref 3.4–10.8)

## 2021-04-11 PROCEDURE — 83690 ASSAY OF LIPASE: CPT | Performed by: STUDENT IN AN ORGANIZED HEALTH CARE EDUCATION/TRAINING PROGRAM

## 2021-04-11 PROCEDURE — 80053 COMPREHEN METABOLIC PANEL: CPT | Performed by: STUDENT IN AN ORGANIZED HEALTH CARE EDUCATION/TRAINING PROGRAM

## 2021-04-11 PROCEDURE — 99232 SBSQ HOSP IP/OBS MODERATE 35: CPT | Performed by: INTERNAL MEDICINE

## 2021-04-11 PROCEDURE — 25010000002 KETOROLAC TROMETHAMINE PER 15 MG: Performed by: STUDENT IN AN ORGANIZED HEALTH CARE EDUCATION/TRAINING PROGRAM

## 2021-04-11 PROCEDURE — 99233 SBSQ HOSP IP/OBS HIGH 50: CPT | Performed by: CHIROPRACTOR

## 2021-04-11 PROCEDURE — 83735 ASSAY OF MAGNESIUM: CPT | Performed by: STUDENT IN AN ORGANIZED HEALTH CARE EDUCATION/TRAINING PROGRAM

## 2021-04-11 PROCEDURE — 25010000002 HYDROMORPHONE 1 MG/ML SOLUTION: Performed by: STUDENT IN AN ORGANIZED HEALTH CARE EDUCATION/TRAINING PROGRAM

## 2021-04-11 PROCEDURE — 25010000002 THIAMINE PER 100 MG: Performed by: STUDENT IN AN ORGANIZED HEALTH CARE EDUCATION/TRAINING PROGRAM

## 2021-04-11 PROCEDURE — 85025 COMPLETE CBC W/AUTO DIFF WBC: CPT | Performed by: STUDENT IN AN ORGANIZED HEALTH CARE EDUCATION/TRAINING PROGRAM

## 2021-04-11 RX ORDER — POTASSIUM CHLORIDE 750 MG/1
40 CAPSULE, EXTENDED RELEASE ORAL DAILY
Status: DISCONTINUED | OUTPATIENT
Start: 2021-04-11 | End: 2021-04-12 | Stop reason: HOSPADM

## 2021-04-11 RX ORDER — KETOROLAC TROMETHAMINE 30 MG/ML
30 INJECTION, SOLUTION INTRAMUSCULAR; INTRAVENOUS EVERY 6 HOURS PRN
Status: DISCONTINUED | OUTPATIENT
Start: 2021-04-11 | End: 2021-04-12 | Stop reason: HOSPADM

## 2021-04-11 RX ORDER — POLYETHYLENE GLYCOL 3350 17 G/17G
17 POWDER, FOR SOLUTION ORAL DAILY
Status: DISCONTINUED | OUTPATIENT
Start: 2021-04-11 | End: 2021-04-12 | Stop reason: HOSPADM

## 2021-04-11 RX ORDER — BUPRENORPHINE HYDROCHLORIDE AND NALOXONE HYDROCHLORIDE DIHYDRATE 8; 2 MG/1; MG/1
1 TABLET SUBLINGUAL 3 TIMES DAILY
Status: DISCONTINUED | OUTPATIENT
Start: 2021-04-11 | End: 2021-04-12 | Stop reason: HOSPADM

## 2021-04-11 RX ADMIN — HYDROMORPHONE HYDROCHLORIDE 1 MG: 1 INJECTION, SOLUTION INTRAMUSCULAR; INTRAVENOUS; SUBCUTANEOUS at 15:10

## 2021-04-11 RX ADMIN — LORAZEPAM 1 MG: 0.5 TABLET ORAL at 18:04

## 2021-04-11 RX ADMIN — THIAMINE HYDROCHLORIDE 100 MG: 100 INJECTION, SOLUTION INTRAMUSCULAR; INTRAVENOUS at 21:15

## 2021-04-11 RX ADMIN — SODIUM CHLORIDE 150 ML/HR: 900 INJECTION, SOLUTION INTRAVENOUS at 00:34

## 2021-04-11 RX ADMIN — BUPRENORPHINE HYDROCHLORIDE AND NALOXONE HYDROCHLORIDE DIHYDRATE 1 TABLET: 8; 2 TABLET SUBLINGUAL at 20:00

## 2021-04-11 RX ADMIN — HYDROMORPHONE HYDROCHLORIDE 1 MG: 1 INJECTION, SOLUTION INTRAMUSCULAR; INTRAVENOUS; SUBCUTANEOUS at 12:05

## 2021-04-11 RX ADMIN — DIAZEPAM 10 MG: 5 TABLET ORAL at 13:46

## 2021-04-11 RX ADMIN — HYDROMORPHONE HYDROCHLORIDE 1 MG: 1 INJECTION, SOLUTION INTRAMUSCULAR; INTRAVENOUS; SUBCUTANEOUS at 09:11

## 2021-04-11 RX ADMIN — FOLIC ACID 1 MG: 5 INJECTION, SOLUTION INTRAMUSCULAR; INTRAVENOUS; SUBCUTANEOUS at 09:11

## 2021-04-11 RX ADMIN — THIAMINE HYDROCHLORIDE 100 MG: 100 INJECTION, SOLUTION INTRAMUSCULAR; INTRAVENOUS at 17:16

## 2021-04-11 RX ADMIN — BUPRENORPHINE HYDROCHLORIDE AND NALOXONE HYDROCHLORIDE DIHYDRATE 1 TABLET: 8; 2 TABLET SUBLINGUAL at 17:39

## 2021-04-11 RX ADMIN — THIAMINE HYDROCHLORIDE 100 MG: 100 INJECTION, SOLUTION INTRAMUSCULAR; INTRAVENOUS at 09:11

## 2021-04-11 RX ADMIN — KETOROLAC TROMETHAMINE 30 MG: 30 INJECTION, SOLUTION INTRAMUSCULAR; INTRAVENOUS at 21:57

## 2021-04-11 RX ADMIN — DICYCLOMINE HYDROCHLORIDE 20 MG: 20 TABLET ORAL at 08:36

## 2021-04-11 RX ADMIN — DIAZEPAM 10 MG: 5 TABLET ORAL at 20:30

## 2021-04-11 RX ADMIN — PANTOPRAZOLE SODIUM 40 MG: 40 INJECTION, POWDER, FOR SOLUTION INTRAVENOUS at 06:03

## 2021-04-11 RX ADMIN — POTASSIUM CHLORIDE 40 MEQ: 750 CAPSULE, EXTENDED RELEASE ORAL at 13:46

## 2021-04-11 RX ADMIN — MAGNESIUM OXIDE 400 MG (241.3 MG MAGNESIUM) TABLET 400 MG: TABLET at 08:36

## 2021-04-11 RX ADMIN — DIAZEPAM 10 MG: 5 TABLET ORAL at 06:03

## 2021-04-11 RX ADMIN — HYDROMORPHONE HYDROCHLORIDE 1 MG: 1 INJECTION, SOLUTION INTRAMUSCULAR; INTRAVENOUS; SUBCUTANEOUS at 06:03

## 2021-04-11 RX ADMIN — SODIUM CHLORIDE 150 ML/HR: 900 INJECTION, SOLUTION INTRAVENOUS at 08:36

## 2021-04-11 RX ADMIN — SODIUM CHLORIDE 150 ML/HR: 900 INJECTION, SOLUTION INTRAVENOUS at 17:16

## 2021-04-11 RX ADMIN — NICOTINE 1 PATCH: 21 PATCH, EXTENDED RELEASE TRANSDERMAL at 20:00

## 2021-04-11 RX ADMIN — DICYCLOMINE HYDROCHLORIDE 20 MG: 20 TABLET ORAL at 20:00

## 2021-04-11 RX ADMIN — HYDROMORPHONE HYDROCHLORIDE 1 MG: 1 INJECTION, SOLUTION INTRAMUSCULAR; INTRAVENOUS; SUBCUTANEOUS at 01:41

## 2021-04-11 NOTE — PLAN OF CARE
Problem: Adult Inpatient Plan of Care  Goal: Plan of Care Review  Outcome: Ongoing, Progressing  Flowsheets  Taken 4/11/2021 0340 by Cici Muhammad RN  Progress: improving  Taken 4/7/2021 1544 by Yoanna Zuniga RN  Plan of Care Reviewed With: patient  Goal: Patient-Specific Goal (Individualized)  Outcome: Ongoing, Progressing  Goal: Absence of Hospital-Acquired Illness or Injury  Outcome: Ongoing, Progressing  Intervention: Identify and Manage Fall Risk  Recent Flowsheet Documentation  Taken 4/11/2021 0200 by Cici Muhammad RN  Safety Promotion/Fall Prevention: safety round/check completed  Taken 4/11/2021 0000 by Cici Muhammad RN  Safety Promotion/Fall Prevention: safety round/check completed  Taken 4/10/2021 2215 by Cici Muhammad RN  Safety Promotion/Fall Prevention: safety round/check completed  Taken 4/10/2021 2115 by Cici Muhammad RN  Safety Promotion/Fall Prevention: safety round/check completed  Taken 4/10/2021 2015 by Cici Muhammad RN  Safety Promotion/Fall Prevention: safety round/check completed  Taken 4/10/2021 1915 by Cici Muhammad RN  Safety Promotion/Fall Prevention: safety round/check completed  Intervention: Prevent Skin Injury  Recent Flowsheet Documentation  Taken 4/11/2021 0200 by Cici Muhammad RN  Body Position: position changed independently  Taken 4/11/2021 0000 by Cici Muhammad RN  Body Position: position changed independently  Taken 4/10/2021 2215 by Cici Muhammad RN  Body Position: position changed independently  Taken 4/10/2021 2115 by Cici Muhammad RN  Body Position: position changed independently  Taken 4/10/2021 2015 by Cici Muhammad RN  Body Position: position changed independently  Taken 4/10/2021 1915 by iCci Muhammad RN  Body Position: position changed independently  Intervention: Prevent Infection  Recent Flowsheet Documentation  Taken 4/10/2021 1915 by Cici Muhammad RN  Infection Prevention: single patient  room provided  Goal: Optimal Comfort and Wellbeing  Outcome: Ongoing, Progressing  Intervention: Provide Person-Centered Care  Recent Flowsheet Documentation  Taken 4/10/2021 1915 by Cici Muhammad, RN  Trust Relationship/Rapport: care explained  Goal: Readiness for Transition of Care  Outcome: Ongoing, Progressing   Goal Outcome Evaluation:     Progress: improving

## 2021-04-11 NOTE — PROGRESS NOTES
FAMILY MEDICINE DAILY PROGRESS NOTE    NAME: Nata Bain  : 1985  MRN: [id number]      LOS: 7  days     PROVIDER OF SERVICE: Benji Holley MD    Chief Complaint: Alcohol-induced acute pancreatitis without infection or necrosis    Subjective:     Interval History:  History taken from: patient chart RN  The patient was awake and alert upon entering the room sitting up in her bed.  She reports that deep breathing hurts.  She has some constipation.  Her pain level is 8/10, her last pain medication was given about 6:00 this morning.  She reports that her nausea has improved.  She has not had any vomiting or diarrhea since we last spoke.  Vital signs are stable.  Lipase is down to 203 from a high of 1046.  Telemetry reported no overnight events.  She still exhibits minor tremors with outstretched hands.    Review of Systems:   Review of Systems   Constitutional: Negative for chills, fever and unexpected weight change.   HENT: Negative for trouble swallowing.    Respiratory: Negative for chest tightness and shortness of breath.    Cardiovascular: Negative for chest pain and palpitations.   Gastrointestinal: Positive for abdominal pain and nausea. Negative for constipation, diarrhea and vomiting.   Genitourinary: Negative for hematuria.   Neurological: Negative for dizziness and headaches.       Objective:     Vital Signs  Temp:  [96.1 °F (35.6 °C)-98.1 °F (36.7 °C)] 98.1 °F (36.7 °C)  Heart Rate:  [58-76] 59  Resp:  [18] 18  BP: (101-115)/(61-76) 114/63   Body mass index is 20.01 kg/m² (pended).    Physical Exam  Physical Exam  Vitals and nursing note reviewed.   Constitutional:       Appearance: She is not diaphoretic.   Eyes:      General: No scleral icterus.  Cardiovascular:      Rate and Rhythm: Normal rate and regular rhythm.      Pulses: Normal pulses.   Pulmonary:      Effort: Pulmonary effort is normal.      Breath sounds: No wheezing or rales.   Abdominal:      Tenderness: There is abdominal  tenderness.   Musculoskeletal:      Right lower leg: No edema.      Left lower leg: Edema present.   Skin:     Capillary Refill: Capillary refill takes less than 2 seconds.      Findings: No erythema.   Neurological:      Mental Status: She is oriented to person, place, and time.      Comments: Tremors noted bilaterally in her hands when outstretched.         Scheduled Meds   cholestyramine light, 1 packet, Oral, Q8H  diazePAM, 10 mg, Oral, Q8H  dicyclomine, 20 mg, Oral, BID  folic acid (FOLVITE) IVPB, 1 mg, Intravenous, Daily  magnesium oxide, 400 mg, Oral, Daily  nicotine, 1 patch, Transdermal, Q24H  pantoprazole, 40 mg, Intravenous, Q AM  sodium chloride, 10 mL, Intravenous, Q12H  thiamine (VITAMIN B1) IVPB, 100 mg, Intravenous, TID       PRN Meds   HYDROmorphone  •  labetalol  •  LORazepam **OR** LORazepam **OR** LORazepam **OR** LORazepam **OR** LORazepam **OR** LORazepam  •  ondansetron  •  sodium chloride  •  sodium chloride      Diagnostic Data    Results from last 7 days   Lab Units 04/11/21  0600   WBC 10*3/mm3 6.20   HEMOGLOBIN g/dL 10.5*   HEMATOCRIT % 29.8*   PLATELETS 10*3/mm3 229   GLUCOSE mg/dL 79   CREATININE mg/dL <0.47*   BUN mg/dL 2*   SODIUM mmol/L 141   POTASSIUM mmol/L 3.4*   AST (SGOT) U/L 56*   ALT (SGPT) U/L 35*   ALK PHOS U/L 93   BILIRUBIN mg/dL 0.3   ANION GAP mmol/L 8.0       No radiology results for the last day      I reviewed the patient's new clinical results.    Assessment/Plan:     Active Hospital Problems    Diagnosis  POA   • **Alcohol-induced acute pancreatitis without infection or necrosis [K85.20]  Yes     Priority: High     -CT abdomen/pelvis confirms pancreatitis  -Lipase 1000-->1307-->228-->335-->287-->203  -Currently on Dilaudid 1 mg every 3 for pain control as needed  -IVF at 125 cc/hour  -Currently NPO  -Dilaudid q2 hours prn for severe pain  -Scheduled and PRN Ativan (CIWA)       • Alcohol dependence with uncomplicated withdrawal (CMS/HCC) [F10.230]  Yes     Priority:  High     -long term abuse  -Patient with tremors in both hands  -CIWA  -IV fluids, IV folate, IV thiamine TID       • Anxiety and depression [F41.9, F32.9]  Yes     Priority: Medium       -Scheduled and as needed Ativan(CIWA)  -Valium Q8       • Polysubstance (including opioids) dependence, daily use (CMS/HCC) [F11.20, F19.20]  Yes     Priority: Medium     -Hold home gabapentin and Suboxone  -As needed Dilaudid for abdominal pain secondary to pancreatitis. Currently receiving more morphine equivalents than her morphine equivalent for suboxone  -patient is on Suboxone 8/2 TID at home    -social work following up for polysubstance abuse; history of leaving prior rehab for alcohol abuse when they held her medicines      • Elevated liver enzymes [R74.8]  Yes     - Will continue to monitor     • Thrombocytopenia (CMS/HCC) [D69.6]  Yes     - Secondary to alcohol use  - Will continue to monitor     • Functional diarrhea [K59.1]  Yes     -Pt was complaining of 5-10 watery bowel movements daily. With addition of Bentyl she endorses complete resolution of these symptoms  -Was being treated for recurrent C. Diff  -C. Diff toxin negative, vancomycin stopped.  -GI panel negative, will order stool fat--Normal  - Dr. Mills following. Fine with dc if pain controlled/continues to be able to tolerate PO consumption  -Have started Bentyl 20mg as of 4/7/201     • Thiamine deficiency [E51.9]  Yes     Secondary to heavy alcohol use.  Will supplement with  IV thiamine TID     • Moderate malnutrition (CMS/HCC) [E44.0]  Yes   • Hypomagnesemia [E83.42]  Yes     -Mag 1.1--->1.8-->1.6  Replace and monitor daily     • Benzodiazepine dependence (CMS/HCC) [F13.20]  Yes     -Patient takes valium 10 mg bid (confirmed on krishan) and gabapentin.  -Ativan 2 mg Q4H scheduled in addition to CIWA prn ativan dosing     • Tobacco abuse [Z72.0]  Yes     -Nicotine patch  Smoking cessation counseling     • Hypokalemia [E87.6]  No     -We will monitor and  replace as needed  -No longer receiving potassium through fluids due to normalization of hypokalemia  -Mag 1.1 at admission. Currently 1.8    Results from last 7 days   Lab Units 04/09/21  0641 04/08/21  0637 04/07/21  0551 04/06/21  0534 04/05/21  1645   POTASSIUM mmol/L 4.2 4.6 4.2 3.2* 3.6              DVT prophylaxis: SCDs/TEDs  Code status is   Code Status and Medical Interventions:   Ordered at: 04/05/21 2021     Code Status:    CPR     Medical Interventions (Level of Support Prior to Arrest):    Full       Plan for disposition:Where: home      Time: More than 50% of time spent in counseling and coordination of care:  Total face-to-face/floor time 30 min.  Time spent in counseling 10 min. Counseling included the following topics: Importance of cessation of consumption of alcohol.         This document has been electronically signed by Benji Holley MD on April 11, 2021 08:54 CDT

## 2021-04-11 NOTE — NURSING NOTE
Patient found to have food in room but is NPO due to still c/o pain requiring IV pain medication Q3H. Cheerios were found to be on patients bedside. She was reminded not to eat them. An empty Dr. Pepper can was also found in her trash can.

## 2021-04-12 ENCOUNTER — READMISSION MANAGEMENT (OUTPATIENT)
Dept: CALL CENTER | Facility: HOSPITAL | Age: 36
End: 2021-04-12

## 2021-04-12 VITALS
WEIGHT: 132.3 LBS | OXYGEN SATURATION: 98 % | HEIGHT: 68 IN | DIASTOLIC BLOOD PRESSURE: 76 MMHG | SYSTOLIC BLOOD PRESSURE: 135 MMHG | TEMPERATURE: 97.4 F | HEART RATE: 73 BPM | BODY MASS INDEX: 20.05 KG/M2 | RESPIRATION RATE: 18 BRPM

## 2021-04-12 PROBLEM — E44.0 MODERATE MALNUTRITION: Status: RESOLVED | Noted: 2021-04-07 | Resolved: 2021-04-12

## 2021-04-12 LAB
ALBUMIN SERPL-MCNC: 2.8 G/DL (ref 3.5–5.2)
ALBUMIN/GLOB SERPL: 1 G/DL
ALP SERPL-CCNC: 85 U/L (ref 39–117)
ALT SERPL W P-5'-P-CCNC: 38 U/L (ref 1–33)
ANION GAP SERPL CALCULATED.3IONS-SCNC: 5 MMOL/L (ref 5–15)
AST SERPL-CCNC: 49 U/L (ref 1–32)
BASOPHILS # BLD AUTO: 0.04 10*3/MM3 (ref 0–0.2)
BASOPHILS NFR BLD AUTO: 0.8 % (ref 0–1.5)
BILIRUB SERPL-MCNC: 0.2 MG/DL (ref 0–1.2)
BUN SERPL-MCNC: 4 MG/DL (ref 6–20)
BUN/CREAT SERPL: 8.2 (ref 7–25)
CALCIUM SPEC-SCNC: 9.2 MG/DL (ref 8.6–10.5)
CHLORIDE SERPL-SCNC: 108 MMOL/L (ref 98–107)
CO2 SERPL-SCNC: 27 MMOL/L (ref 22–29)
CREAT SERPL-MCNC: 0.49 MG/DL (ref 0.57–1)
DEPRECATED RDW RBC AUTO: 50.7 FL (ref 37–54)
EOSINOPHIL # BLD AUTO: 0.05 10*3/MM3 (ref 0–0.4)
EOSINOPHIL NFR BLD AUTO: 0.9 % (ref 0.3–6.2)
ERYTHROCYTE [DISTWIDTH] IN BLOOD BY AUTOMATED COUNT: 13 % (ref 12.3–15.4)
GFR SERPL CREATININE-BSD FRML MDRD: 144 ML/MIN/1.73
GLOBULIN UR ELPH-MCNC: 2.9 GM/DL
GLUCOSE SERPL-MCNC: 90 MG/DL (ref 65–99)
HCT VFR BLD AUTO: 29.4 % (ref 34–46.6)
HGB BLD-MCNC: 9.9 G/DL (ref 12–15.9)
IMM GRANULOCYTES # BLD AUTO: 0.02 10*3/MM3 (ref 0–0.05)
IMM GRANULOCYTES NFR BLD AUTO: 0.4 % (ref 0–0.5)
LIPASE SERPL-CCNC: 170 U/L (ref 13–60)
LYMPHOCYTES # BLD AUTO: 2.58 10*3/MM3 (ref 0.7–3.1)
LYMPHOCYTES NFR BLD AUTO: 48.7 % (ref 19.6–45.3)
MAGNESIUM SERPL-MCNC: 1.7 MG/DL (ref 1.6–2.6)
MCH RBC QN AUTO: 35.9 PG (ref 26.6–33)
MCHC RBC AUTO-ENTMCNC: 33.7 G/DL (ref 31.5–35.7)
MCV RBC AUTO: 106.5 FL (ref 79–97)
MONOCYTES # BLD AUTO: 0.52 10*3/MM3 (ref 0.1–0.9)
MONOCYTES NFR BLD AUTO: 9.8 % (ref 5–12)
NEUTROPHILS NFR BLD AUTO: 2.09 10*3/MM3 (ref 1.7–7)
NEUTROPHILS NFR BLD AUTO: 39.4 % (ref 42.7–76)
NRBC BLD AUTO-RTO: 0 /100 WBC (ref 0–0.2)
PLATELET # BLD AUTO: 247 10*3/MM3 (ref 140–450)
PMV BLD AUTO: 10.5 FL (ref 6–12)
POTASSIUM SERPL-SCNC: 3.9 MMOL/L (ref 3.5–5.2)
PROT SERPL-MCNC: 5.7 G/DL (ref 6–8.5)
RBC # BLD AUTO: 2.76 10*6/MM3 (ref 3.77–5.28)
SODIUM SERPL-SCNC: 140 MMOL/L (ref 136–145)
WBC # BLD AUTO: 5.3 10*3/MM3 (ref 3.4–10.8)

## 2021-04-12 PROCEDURE — 99232 SBSQ HOSP IP/OBS MODERATE 35: CPT | Performed by: INTERNAL MEDICINE

## 2021-04-12 PROCEDURE — 25010000002 KETOROLAC TROMETHAMINE PER 15 MG: Performed by: STUDENT IN AN ORGANIZED HEALTH CARE EDUCATION/TRAINING PROGRAM

## 2021-04-12 PROCEDURE — 99233 SBSQ HOSP IP/OBS HIGH 50: CPT | Performed by: STUDENT IN AN ORGANIZED HEALTH CARE EDUCATION/TRAINING PROGRAM

## 2021-04-12 PROCEDURE — 85025 COMPLETE CBC W/AUTO DIFF WBC: CPT | Performed by: STUDENT IN AN ORGANIZED HEALTH CARE EDUCATION/TRAINING PROGRAM

## 2021-04-12 PROCEDURE — 99239 HOSP IP/OBS DSCHRG MGMT >30: CPT | Performed by: STUDENT IN AN ORGANIZED HEALTH CARE EDUCATION/TRAINING PROGRAM

## 2021-04-12 PROCEDURE — 83690 ASSAY OF LIPASE: CPT | Performed by: STUDENT IN AN ORGANIZED HEALTH CARE EDUCATION/TRAINING PROGRAM

## 2021-04-12 PROCEDURE — 80053 COMPREHEN METABOLIC PANEL: CPT | Performed by: STUDENT IN AN ORGANIZED HEALTH CARE EDUCATION/TRAINING PROGRAM

## 2021-04-12 PROCEDURE — 83735 ASSAY OF MAGNESIUM: CPT | Performed by: STUDENT IN AN ORGANIZED HEALTH CARE EDUCATION/TRAINING PROGRAM

## 2021-04-12 PROCEDURE — 25010000002 THIAMINE PER 100 MG: Performed by: STUDENT IN AN ORGANIZED HEALTH CARE EDUCATION/TRAINING PROGRAM

## 2021-04-12 RX ORDER — DIAZEPAM 10 MG/1
10 TABLET ORAL EVERY 8 HOURS
Qty: 15 TABLET | Refills: 0 | Status: SHIPPED | OUTPATIENT
Start: 2021-04-12 | End: 2021-04-17

## 2021-04-12 RX ORDER — DICYCLOMINE HCL 20 MG
20 TABLET ORAL 2 TIMES DAILY
Qty: 60 TABLET | Refills: 2 | Status: SHIPPED | OUTPATIENT
Start: 2021-04-12 | End: 2022-01-26

## 2021-04-12 RX ORDER — PANTOPRAZOLE SODIUM 40 MG/1
40 TABLET, DELAYED RELEASE ORAL
Status: DISCONTINUED | OUTPATIENT
Start: 2021-04-13 | End: 2021-04-12 | Stop reason: HOSPADM

## 2021-04-12 RX ADMIN — SODIUM CHLORIDE, PRESERVATIVE FREE 10 ML: 5 INJECTION INTRAVENOUS at 08:10

## 2021-04-12 RX ADMIN — THIAMINE HYDROCHLORIDE 100 MG: 100 INJECTION, SOLUTION INTRAMUSCULAR; INTRAVENOUS at 08:10

## 2021-04-12 RX ADMIN — MAGNESIUM OXIDE 400 MG (241.3 MG MAGNESIUM) TABLET 400 MG: TABLET at 08:18

## 2021-04-12 RX ADMIN — DIAZEPAM 10 MG: 5 TABLET ORAL at 12:30

## 2021-04-12 RX ADMIN — KETOROLAC TROMETHAMINE 30 MG: 30 INJECTION, SOLUTION INTRAMUSCULAR; INTRAVENOUS at 08:19

## 2021-04-12 RX ADMIN — POTASSIUM CHLORIDE 40 MEQ: 750 CAPSULE, EXTENDED RELEASE ORAL at 08:09

## 2021-04-12 RX ADMIN — SODIUM CHLORIDE 150 ML/HR: 900 INJECTION, SOLUTION INTRAVENOUS at 08:08

## 2021-04-12 RX ADMIN — FOLIC ACID 1 MG: 5 INJECTION, SOLUTION INTRAMUSCULAR; INTRAVENOUS; SUBCUTANEOUS at 08:19

## 2021-04-12 RX ADMIN — PANTOPRAZOLE SODIUM 40 MG: 40 INJECTION, POWDER, FOR SOLUTION INTRAVENOUS at 05:35

## 2021-04-12 RX ADMIN — SODIUM CHLORIDE 150 ML/HR: 900 INJECTION, SOLUTION INTRAVENOUS at 00:48

## 2021-04-12 RX ADMIN — BUPRENORPHINE HYDROCHLORIDE AND NALOXONE HYDROCHLORIDE DIHYDRATE 1 TABLET: 8; 2 TABLET SUBLINGUAL at 08:09

## 2021-04-12 RX ADMIN — DIAZEPAM 10 MG: 5 TABLET ORAL at 05:35

## 2021-04-12 RX ADMIN — DICYCLOMINE HYDROCHLORIDE 20 MG: 20 TABLET ORAL at 08:09

## 2021-04-12 NOTE — PROGRESS NOTES
SUBJECTIVE:   4/11/2021  Chief Complaint:     Subjective      Patient feels better today.  Abdominal pain is well controlled with pain management.  Diarrhea is improving.  Tolerating diet.  Lipase has improved to 203.  Liver enzymes are improving as well.  Hemoglobin 10.5.  WBC normalized at 6.20.    History:  Past Medical History:   Diagnosis Date   • Abnormal Pap smear of cervix    • Alcohol-induced acute pancreatitis 6/1/2020    Results From Last 14 Days Lab Units 02/27/21 1342 LIPASE U/L 65*  -advance diet as tolerated starting with clears -IV fluids   -will cont suboxone and give morphine for breakthrough pain  -Zofran for nausea as needed -Scheduled Ativan, and CIWA protocol - f/u on CT abdomen     • Alcoholism (CMS/HCC)    • Anxiety    • Cervical dysplasia    • Depression    • Fibrocystic breast    • GERD (gastroesophageal reflux disease)    • Hypertension    • Seizures (CMS/HCC) 2017   • Substance abuse (CMS/HCC)    • Substance abuse (CMS/HCC)      Past Surgical History:   Procedure Laterality Date   • COLONOSCOPY N/A 2/2/2021    Procedure: COLONOSCOPY;  Surgeon: Mirian Mills MD;  Location: [facility] ENDOSCOPY;  Service: Gastroenterology;  Laterality: N/A;   • ENDOSCOPY N/A 1/8/2021    Procedure: ESOPHAGOGASTRODUODENOSCOPY;  Surgeon: Mirian Mills MD;  Location: [facility] ENDOSCOPY;  Service: Gastroenterology;  Laterality: N/A;   • RHINOPLASTY       Family History   Problem Relation Age of Onset   • Breast cancer Mother    • Cancer Mother    • COPD Mother    • Breast cancer Maternal Grandmother    • COPD Maternal Grandmother    • Heart disease Maternal Grandmother    • Breast cancer Paternal Grandmother    • Breast cancer Maternal Aunt    • Hypertension Father    • Heart disease Father      Social History     Tobacco Use   • Smoking status: Current Every Day Smoker     Packs/day: 1.00     Years: 20.00     Pack years: 20.00     Types: Cigarettes   • Smokeless tobacco: Never Used   Substance Use  Topics   • Alcohol use: Yes     Alcohol/week: 6.0 standard drinks     Types: 6 Shots of liquor per week     Comment: daily   • Drug use: Not Currently     Types: Fentanyl, Oxycodone     Comment: hx of abuse     Medications Prior to Admission   Medication Sig Dispense Refill Last Dose   • acamprosate (CAMPRAL) 333 MG EC tablet       • buprenorphine-naloxone (SUBOXONE) 8-2 MG per SL tablet Place 1 tablet under the tongue 3 (Three) Times a Day. Patient takes one 8-2 tablet three times a day.      • cloNIDine (CATAPRES) 0.1 MG tablet Take 0.1 mg by mouth 3 (Three) Times a Day.      • cyanocobalamin (VITAMIN B-12) 1000 MCG tablet Take 1 tablet by mouth Daily. 30 tablet 5    • diazePAM (VALIUM) 5 MG tablet Take 10 mg by mouth Every 8 (Eight) Hours As Needed.      • FLUoxetine (PROzac) 40 MG capsule Take 40 mg by mouth Daily.      • folic acid (FOLVITE) 1 MG tablet Take 1 tablet by mouth Daily. 30 tablet 0    • gabapentin (NEURONTIN) 800 MG tablet Take 800 mg by mouth 3 (Three) Times a Day.      • pantoprazole (Protonix) 40 MG EC tablet Take 1 tablet by mouth Daily. 30 tablet 1    • prazosin (MINIPRESS) 5 MG capsule Take 5 mg by mouth Every Night.      • promethazine (PHENERGAN) 25 MG suppository Insert 1 suppository into the rectum Every 6 (Six) Hours As Needed for Nausea or Vomiting. 12 suppository 0    • sucralfate (CARAFATE) 1 g tablet       • thiamine (thiamine) 100 MG tablet tablet Take 1 tablet by mouth Daily. 30 tablet 5    • vancomycin (VANCOCIN) 125 MG capsule 1 capsule by mouth four times a day for 14 days. Then 1 capsule 2 times a day for 7 days. Then 1 capsule daily for 7 days.  1 capsule every 3 days for 28 days. 56 capsule 0      Allergies:  Patient has no known allergies.     CURRENT MEDICATIONS/OBJECTIVE/VS/PE:     Current Medications:     Current Facility-Administered Medications   Medication Dose Route Frequency Provider Last Rate Last Admin   • buprenorphine-naloxone (SUBOXONE) 8-2 MG per SL tablet 1  tablet  1 tablet Sublingual TID Benji Holley MD   1 tablet at 04/11/21 2000   • cholestyramine light packet 4 g  1 packet Oral Q8H LINETTE'Ranjit Fisher III, MD   4 g at 04/09/21 2142   • diazePAM (VALIUM) tablet 10 mg  10 mg Oral Q8H Rishabh Raya MD   10 mg at 04/11/21 2030   • dicyclomine (BENTYL) tablet 20 mg  20 mg Oral BID Maryam Galdamez MD   20 mg at 04/11/21 2000   • folic acid 1 mg in sodium chloride 0.9 % 50 mL IVPB  1 mg Intravenous Daily Maryam Galdamez  mL/hr at 04/11/21 0911 1 mg at 04/11/21 0911   • ketorolac (TORADOL) injection 30 mg  30 mg Intravenous Q6H PRN Socorro Hopson MD   30 mg at 04/11/21 2157   • labetalol (NORMODYNE,TRANDATE) injection 10 mg  10 mg Intravenous Q2H PRN Maryam Galdamez MD       • LORazepam (ATIVAN) tablet 1 mg  1 mg Oral Q2H PRN Maryam Galdamez MD   1 mg at 04/11/21 1804    Or   • LORazepam (ATIVAN) injection 1 mg  1 mg Intravenous Q2H PRN Maryam Galdamez MD   1 mg at 04/08/21 0751    Or   • LORazepam (ATIVAN) tablet 2 mg  2 mg Oral Q1H PRN Maryam Galdamez MD   2 mg at 04/09/21 1425    Or   • LORazepam (ATIVAN) injection 2 mg  2 mg Intravenous Q1H PRN Maryam Galdamez MD   2 mg at 04/09/21 1233    Or   • LORazepam (ATIVAN) injection 2 mg  2 mg Intravenous Q15 Min PRN Maryam Galdamez MD   2 mg at 04/10/21 0049    Or   • LORazepam (ATIVAN) injection 2 mg  2 mg Intramuscular Q15 Min PRN Maryam Galdamez MD       • magnesium oxide (MAG-OX) tablet 400 mg  400 mg Oral Daily Rishabh Raya MD   400 mg at 04/11/21 0836   • nicotine (NICODERM CQ) 21 MG/24HR patch 1 patch  1 patch Transdermal Q24H Maryam Galdamez MD   1 patch at 04/11/21 2000   • ondansetron (ZOFRAN) injection 4 mg  4 mg Intravenous Q6H PRN Maryam Galdamez MD       • pantoprazole (PROTONIX) injection 40 mg  40 mg Intravenous Q AM Maryam Galdamez MD   40 mg at 04/11/21 0603   • polyethylene glycol (MIRALAX) packet 17 g  17 g Oral Daily Benji Holley MD       • potassium  chloride (MICRO-K) CR capsule 40 mEq  40 mEq Oral Daily Benji Holley MD   40 mEq at 04/11/21 1346   • sodium chloride 0.9 % flush 10 mL  10 mL Intravenous PRN Han Perdomo MD       • sodium chloride 0.9 % flush 10 mL  10 mL Intravenous Q12H Maryam Galdamez MD   10 mL at 04/10/21 0846   • sodium chloride 0.9 % flush 10 mL  10 mL Intravenous PRN Maryam Galdamez MD       • sodium chloride 0.9 % infusion  150 mL/hr Intravenous Continuous O'Ranjit Fisher III,  mL/hr at 04/11/21 1716 150 mL/hr at 04/11/21 1716   • thiamine (B-1) 100 mg in sodium chloride 0.9 % 100 mL IVPB  100 mg Intravenous TID Zeeshan Wiggins  mL/hr at 04/11/21 2115 100 mg at 04/11/21 2115       Objective     Review of Systems:   Review of Systems   Constitutional: Negative for chills, fatigue, fever and unexpected weight change.   HENT: Negative for congestion, ear discharge, hearing loss, nosebleeds and sore throat.    Eyes: Negative for pain, discharge and redness.   Respiratory: Negative for cough, chest tightness, shortness of breath and wheezing.    Cardiovascular: Negative for chest pain and palpitations.   Gastrointestinal: Positive for abdominal pain. Negative for abdominal distention, blood in stool, constipation, diarrhea, nausea and vomiting.   Endocrine: Negative for cold intolerance, polydipsia, polyphagia and polyuria.   Genitourinary: Negative for dysuria, flank pain, frequency, hematuria and urgency.   Musculoskeletal: Negative for arthralgias, back pain, joint swelling and myalgias.   Skin: Negative for color change, pallor and rash.   Neurological: Negative for tremors, seizures, syncope, weakness and headaches.   Hematological: Negative for adenopathy. Does not bruise/bleed easily.   Psychiatric/Behavioral: Negative for behavioral problems, confusion, dysphoric mood, hallucinations and suicidal ideas. The patient is not nervous/anxious.        Physical Exam:   Temp:  [96.2 °F (35.7 °C)-98.1 °F (36.7  °C)] 96.2 °F (35.7 °C)  Heart Rate:  [58-84] 84  Resp:  [18] 18  BP: (101-138)/(61-78) 124/66     Physical Exam:  General Appearance:    Alert, cooperative, in no acute distress   Head:    Normocephalic, without obvious abnormality, atraumatic   Eyes:            Lids and lashes normal, conjunctivae and sclerae normal, no   icterus, no pallor, corneas clear, PERRLA   Ears:    Ears appear intact with no abnormalities noted   Throat:   No oral lesions, no thrush, oral mucosa moist   Neck:   No adenopathy, supple, trachea midline, no thyromegaly, no     carotid bruit, no JVD   Back:     No kyphosis present, no scoliosis present, no skin lesions,       erythema or scars, no tenderness to percussion or                   palpation,   range of motion normal   Lungs:     Clear to auscultation,respirations regular, even and                   unlabored    Heart:    Regular rhythm and normal rate, normal S1 and S2, no            murmur, no gallop, no rub, no click   Breast Exam:    Deferred   Abdomen:     Normal bowel sounds, no masses, no organomegaly, soft        nontender, nondistended, no guarding, no rebound                 tenderness   Genitalia:    Deferred   Extremities:   Moves all extremities well, no edema, no cyanosis, no              redness   Pulses:   Pulses palpable and equal bilaterally   Skin:   No bleeding, bruising or rash   Lymph nodes:   No palpable adenopathy   Neurologic:   Cranial nerves 2 - 12 grossly intact, sensation intact, DTR        present and equal bilaterally      Results Review:     Lab Results (last 24 hours)     Procedure Component Value Units Date/Time    Comprehensive Metabolic Panel [[id number]]  (Abnormal) Collected: 04/11/21 0600    Specimen: Blood Updated: 04/11/21 0711     Glucose 79 mg/dL      BUN 2 mg/dL      Creatinine <0.47 mg/dL      Sodium 141 mmol/L      Potassium 3.4 mmol/L      Chloride 107 mmol/L      CO2 26.0 mmol/L      Calcium 9.0 mg/dL      Total Protein 6.0 g/dL       Albumin 3.10 g/dL      ALT (SGPT) 35 U/L      AST (SGOT) 56 U/L      Alkaline Phosphatase 93 U/L      Total Bilirubin 0.3 mg/dL      Globulin 2.9 gm/dL      A/G Ratio 1.1 g/dL      BUN/Creatinine Ratio --     Comment: Unable to calculate Bun/Crea Ratio.        Anion Gap 8.0 mmol/L     Narrative:      GFR Normal >60  Chronic Kidney Disease <60  Kidney Failure <15      Lipase [[id number]]  (Abnormal) Collected: 04/11/21 0600    Specimen: Blood Updated: 04/11/21 0709     Lipase 203 U/L     Magnesium [[id number]]  (Normal) Collected: 04/11/21 0600    Specimen: Blood Updated: 04/11/21 0709     Magnesium 1.7 mg/dL     CBC & Differential [[id number]]  (Abnormal) Collected: 04/11/21 0600    Specimen: Blood Updated: 04/11/21 0641    Narrative:      The following orders were created for panel order CBC & Differential.  Procedure                               Abnormality         Status                     ---------                               -----------         ------                     CBC Auto Differential[[id number]]        Abnormal            Final result                 Please view results for these tests on the individual orders.    CBC Auto Differential [[id number]]  (Abnormal) Collected: 04/11/21 0600    Specimen: Blood Updated: 04/11/21 0641     WBC 6.20 10*3/mm3      RBC 2.85 10*6/mm3      Hemoglobin 10.5 g/dL      Hematocrit 29.8 %      .6 fL      MCH 36.8 pg      MCHC 35.2 g/dL      RDW 13.1 %      RDW-SD 50.1 fl      MPV 10.1 fL      Platelets 229 10*3/mm3      Neutrophil % 45.4 %      Lymphocyte % 44.4 %      Monocyte % 8.4 %      Eosinophil % 1.0 %      Basophil % 0.5 %      Immature Grans % 0.3 %      Neutrophils, Absolute 2.82 10*3/mm3      Lymphocytes, Absolute 2.75 10*3/mm3      Monocytes, Absolute 0.52 10*3/mm3      Eosinophils, Absolute 0.06 10*3/mm3      Basophils, Absolute 0.03 10*3/mm3      Immature Grans, Absolute 0.02 10*3/mm3      nRBC 0.0 /100 WBC            I reviewed the patient's new  clinical results.  I reviewed the patient's new imaging results and agree with the interpretation.     ASSESSMENT/PLAN:   ASSESSMENT: 1.  Acute pancreatitis, patient has worsening symptoms with initiation of p.o. intake.  Likely due to alcohol usage.  2.  Elevated liver enzymes, improving.  3.  Diarrhea, improving.  Repeat stool studies were unremarkable.  4.  Anemia, recent GI work-up was unremarkable.  5.  Alcohol abuse  PLAN: 1.  Continue diet advancement and D/C pain management and antiemetics  2.  Continue PPI  3.  OK to D/C from GI standpoint  4.  Recommend alcohol abstinence  5.  CIWA protocol  6.  Resume p.o. after resolution of abdominal pain and normalization of lipase  The risks, benefits, and alternatives of this procedure have been discussed with the patient or the responsible party- the patient understands and agrees to proceed.         Mirian Mills MD  04/11/21  22:43 CDT

## 2021-04-12 NOTE — OUTREACH NOTE
Prep Survey      Responses   Orthodoxy facility patient discharged from?  [facility]   Is LACE score < 7 ?  No   Emergency Room discharge w/ pulse ox?  No   Eligibility  [facility]   Date of Admission  04/05/21   Date of Discharge  04/12/21   Discharge Disposition  Home or Self Care   Discharge diagnosis  ETOH induced acute pancreatitis, diarrhea, tobacco abuse   Does the patient have one of the following disease processes/diagnoses(primary or secondary)?  Other   Does the patient have Home health ordered?  No   Is there a DME ordered?  No   Prep survey completed?  Yes          Aneta Ceballos RN

## 2021-04-12 NOTE — PAYOR COMM NOTE
"    Milena Farris RN [facility]  [phone]     Phone  [phone]      Fax  Cont stay review      Shawnee Springer (36 y.o. Female)     Date of Birth Social Security Number Address Home Phone MRN    1985  [address] [facility] [address] [phone] [id number]    Buddhism Marital Status          Islam        Admission Date Admission Type Admitting Provider Attending Provider Department, Room/Bed    4/5/21 Emergency NimsBrissa MD Mohammed, Umar S, MD [facility] [address] [place], 332/1    Discharge Date Discharge Disposition Discharge Destination                       Attending Provider: Zeeshan Wiggins MD    Allergies: No Known Allergies    Isolation: None   Infection: None   Code Status: CPR    Ht: 172.7 cm (68\")   Wt: 60 kg (132 lb 4.8 oz)    Admission Cmt: None   Principal Problem: Alcohol-induced acute pancreatitis without infection or necrosis [K85.20] More...                 Active Insurance as of 4/5/2021     Primary Coverage     Payor Plan Insurance Group Employer/Plan Group    [place] [id number]     Payor Plan Address Payor Plan Phone Number Payor Plan Fax Number Effective Dates    [employer] [address] [address] [phone]  1/1/2020 - None Entered    [facility] [id number]       Subscriber Name Subscriber Birth Date Member ID       SHAWNEE SPRINGER 1985 [id number]                 Emergency Contacts      (Rel.) Home Phone Work Phone Mobile Phone    Jorge Sanz (Spouse) [phone] -- [phone]    OdellJoanna (Mother) [phone] -- [phone]    OdellHilario (Father) [phone] -- [phone]            Vital Signs (last day)     Date/Time   Temp   Temp src   Pulse   Resp   BP   Patient Position   SpO2    04/12/21 0816   97.3 (36.3)   Temporal   77   18   117/79   Sitting   100    04/12/21 0733   --   --   68   --   --   --   --    04/11/21 2304   " 96.7 (35.9)   Temporal   61   18   94/55   Sitting   97    04/11/21 1942   96.2 (35.7)   Temporal   84   18   124/66   Sitting   99    04/11/21 1608   97.8 (36.6)   Axillary   66   18   138/77   Sitting   98    04/11/21 1149   97.4 (36.3)   Axillary   69   18   102/78   Lying   99    04/11/21 0743   98.1 (36.7)   Axillary   59   18   114/63   Lying   98    04/11/21 0737   --   --   58   --   --   --   --    04/11/21 0507   97.1 (36.2)   Axillary   63   18   101/61   Lying   99    04/11/21 0130   --   --   65   --   --   --   --              Current Facility-Administered Medications   Medication Dose Route Frequency Provider Last Rate Last Admin   • buprenorphine-naloxone (SUBOXONE) 8-2 MG per SL tablet 1 tablet  1 tablet Sublingual TID Benji Holley MD   1 tablet at 04/12/21 0809   • cholestyramine light packet 4 g  1 packet Oral Q8H Ranjit Castillo III, MD   Stopped at 04/12/21 0809   • diazePAM (VALIUM) tablet 10 mg  10 mg Oral Q8H Rishabh Raya MD   10 mg at 04/12/21 0535   • dicyclomine (BENTYL) tablet 20 mg  20 mg Oral BID Maryam Galdamez MD   20 mg at 04/12/21 0809   • folic acid 1 mg in sodium chloride 0.9 % 50 mL IVPB  1 mg Intravenous Daily Maryam Galdamez  mL/hr at 04/12/21 0819 1 mg at 04/12/21 0819   • ketorolac (TORADOL) injection 30 mg  30 mg Intravenous Q6H PRN Socorro Hopson MD   30 mg at 04/12/21 0819   • labetalol (NORMODYNE,TRANDATE) injection 10 mg  10 mg Intravenous Q2H PRN Maryam Galdamez MD       • LORazepam (ATIVAN) tablet 1 mg  1 mg Oral Q2H PRN Maryam Galdamez MD   1 mg at 04/11/21 1804    Or   • LORazepam (ATIVAN) injection 1 mg  1 mg Intravenous Q2H PRN Maryam Galdamez MD   1 mg at 04/08/21 0751    Or   • LORazepam (ATIVAN) tablet 2 mg  2 mg Oral Q1H PRN Maryam Galdamez MD   2 mg at 04/09/21 1425    Or   • LORazepam (ATIVAN) injection 2 mg  2 mg Intravenous Q1H PRN Maryam Galdamez MD   2 mg at 04/09/21 1233    Or   • LORazepam (ATIVAN) injection 2 mg  2 mg  Intravenous Q15 Min PRN Maryam Galdamez MD   2 mg at 04/10/21 0049    Or   • LORazepam (ATIVAN) injection 2 mg  2 mg Intramuscular Q15 Min PRN Maryam Galdamez MD       • magnesium oxide (MAG-OX) tablet 400 mg  400 mg Oral Daily Rishabh Raya MD   400 mg at 21 0818   • nicotine (NICODERM CQ) 21 MG/24HR patch 1 patch  1 patch Transdermal Q24H Maryam Galdamez MD   1 patch at 21   • ondansetron (ZOFRAN) injection 4 mg  4 mg Intravenous Q6H PRN Maryam Galdamez MD       • pantoprazole (PROTONIX) injection 40 mg  40 mg Intravenous Q AM Maryam Galdamez MD   40 mg at 21 0535   • polyethylene glycol (MIRALAX) packet 17 g  17 g Oral Daily Benji Holley MD   Stopped at 21 0809   • potassium chloride (MICRO-K) CR capsule 40 mEq  40 mEq Oral Daily Benji Holley MD   40 mEq at 21 0809   • sodium chloride 0.9 % flush 10 mL  10 mL Intravenous PRN Han Perdomo MD       • sodium chloride 0.9 % flush 10 mL  10 mL Intravenous Q12H Maryam Galdamez MD   10 mL at 21 0810   • sodium chloride 0.9 % flush 10 mL  10 mL Intravenous PRN Maryam Galdamez MD       • sodium chloride 0.9 % infusion  150 mL/hr Intravenous Continuous LINETTE'Ranjit Fisher III,  mL/hr at 21 0808 150 mL/hr at 21 0808   • thiamine (B-1) 100 mg in sodium chloride 0.9 % 100 mL IVPB  100 mg Intravenous TID Zeeshan Wiggins  mL/hr at 21 0810 100 mg at 21 0810        Physician Progress Notes (last 24 hours) (Notes from 21 0920 through 21 0920)      Zeeshan Wiggins MD at 21 0817          FAMILY MEDICINE DAILY PROGRESS NOTE  NAME: Nata DOE: 1985  MRN: [id number]     LOS: 7 days     PROVIDER OF SERVICE: Zeeshan Wiggins MD    Chief Complaint: Alcohol-induced acute pancreatitis without infection or necrosis    Subjective:   Pt did well overnight. VSS. Currently on regular diet. States tolerating it well. States abdominal pain has  greatly improved. Diarrhea is resovled since addition of Bentyl. She expresses strong desire for discharge today.  Interval History:  History taken from: patient chart RN      Review of Systems:   Review of Systems   Constitutional: Negative for activity change, chills, diaphoresis and fever.   HENT: Negative for dental problem, drooling, ear discharge, ear pain, facial swelling, mouth sores, nosebleeds, rhinorrhea, sinus pressure, sinus pain, sneezing and sore throat.    Eyes: Negative for pain, discharge and visual disturbance.   Respiratory: Negative for apnea, cough, shortness of breath, wheezing and stridor.    Cardiovascular: Negative for chest pain, palpitations and leg swelling.   Gastrointestinal: Positive for abdominal pain. Negative for abdominal distention, constipation, diarrhea, nausea and vomiting.   Genitourinary: Negative for dysuria, frequency and urgency.   Musculoskeletal: Negative for arthralgias and back pain.   Skin: Negative for rash and wound.   Neurological: Negative for dizziness, facial asymmetry, light-headedness and headaches.   Psychiatric/Behavioral: Negative for agitation and decreased concentration. The patient is not nervous/anxious.        Objective:     Vital Signs  Temp:  [96.2 °F (35.7 °C)-97.8 °F (36.6 °C)] 96.7 °F (35.9 °C)  Heart Rate:  [61-84] 68  Resp:  [18] 18  BP: ()/(55-78) 94/55    Physical Exam  Physical Exam  Constitutional:       Appearance: She is well-developed.   HENT:      Head: Normocephalic and atraumatic.      Right Ear: External ear normal.      Left Ear: External ear normal.      Nose: Nose normal.      Mouth/Throat:      Pharynx: No oropharyngeal exudate.   Eyes:      General: No scleral icterus.        Right eye: No discharge.         Left eye: No discharge.      Conjunctiva/sclera: Conjunctivae normal.      Pupils: Pupils are equal, round, and reactive to light.   Neck:      Vascular: No JVD.   Cardiovascular:      Rate and Rhythm: Normal rate and  regular rhythm.      Heart sounds: Normal heart sounds. No murmur heard.   No friction rub. No gallop.    Pulmonary:      Effort: Pulmonary effort is normal. No respiratory distress.      Breath sounds: Normal breath sounds. No stridor. No wheezing or rales.   Abdominal:      General: Bowel sounds are normal. There is no distension.      Palpations: Abdomen is soft.      Tenderness: There is abdominal tenderness (mild epigastric tenderness).   Musculoskeletal:         General: No tenderness or deformity. Normal range of motion.      Cervical back: Normal range of motion and neck supple.   Skin:     General: Skin is warm and dry.      Capillary Refill: Capillary refill takes less than 2 seconds.      Coloration: Skin is not pale.      Findings: No erythema or rash.   Neurological:      Mental Status: She is alert and oriented to person, place, and time.   Psychiatric:         Behavior: Behavior normal.         Medication Review    Current Facility-Administered Medications:   •  buprenorphine-naloxone (SUBOXONE) 8-2 MG per SL tablet 1 tablet, 1 tablet, Sublingual, TID, Benji Holley MD, 1 tablet at 04/12/21 0809  •  cholestyramine light packet 4 g, 1 packet, Oral, Q8H, Ranjit Castillo III, MD, Stopped at 04/12/21 0809  •  diazePAM (VALIUM) tablet 10 mg, 10 mg, Oral, Q8H, Rishabh Raya MD, 10 mg at 04/12/21 0535  •  dicyclomine (BENTYL) tablet 20 mg, 20 mg, Oral, BID, Maryam Galdamez MD, 20 mg at 04/12/21 0809  •  folic acid 1 mg in sodium chloride 0.9 % 50 mL IVPB, 1 mg, Intravenous, Daily, Maryam Galdamez MD, Last Rate: 100 mL/hr at 04/11/21 0911, 1 mg at 04/11/21 0911  •  ketorolac (TORADOL) injection 30 mg, 30 mg, Intravenous, Q6H PRN, Socorro Hopson MD, 30 mg at 04/11/21 2157  •  labetalol (NORMODYNE,TRANDATE) injection 10 mg, 10 mg, Intravenous, Q2H PRN, Maryam Galdamez MD  •  LORazepam (ATIVAN) tablet 1 mg, 1 mg, Oral, Q2H PRN, 1 mg at 04/11/21 1804 **OR** LORazepam (ATIVAN) injection 1 mg, 1  mg, Intravenous, Q2H PRN, 1 mg at 04/08/21 0751 **OR** LORazepam (ATIVAN) tablet 2 mg, 2 mg, Oral, Q1H PRN, 2 mg at 04/09/21 1425 **OR** LORazepam (ATIVAN) injection 2 mg, 2 mg, Intravenous, Q1H PRN, 2 mg at 04/09/21 1233 **OR** LORazepam (ATIVAN) injection 2 mg, 2 mg, Intravenous, Q15 Min PRN, 2 mg at 04/10/21 0049 **OR** LORazepam (ATIVAN) injection 2 mg, 2 mg, Intramuscular, Q15 Min PRN, Maryam Galdamez MD  •  magnesium oxide (MAG-OX) tablet 400 mg, 400 mg, Oral, Daily, Rishabh Raya MD, 400 mg at 04/11/21 0836  •  nicotine (NICODERM CQ) 21 MG/24HR patch 1 patch, 1 patch, Transdermal, Q24H, Maryam Galdamez MD, 1 patch at 04/11/21 2000  •  ondansetron (ZOFRAN) injection 4 mg, 4 mg, Intravenous, Q6H PRN, Maryam Galdamez MD  •  pantoprazole (PROTONIX) injection 40 mg, 40 mg, Intravenous, Q AM, Maryam Galdamez MD, 40 mg at 04/12/21 0535  •  polyethylene glycol (MIRALAX) packet 17 g, 17 g, Oral, Daily, Benji Holley MD, Stopped at 04/12/21 0809  •  potassium chloride (MICRO-K) CR capsule 40 mEq, 40 mEq, Oral, Daily, Benji Holley MD, 40 mEq at 04/12/21 0809  •  sodium chloride 0.9 % flush 10 mL, 10 mL, Intravenous, PRN, Han Perdomo MD  •  sodium chloride 0.9 % flush 10 mL, 10 mL, Intravenous, Q12H, Maryam Galdamez MD, 10 mL at 04/12/21 0810  •  sodium chloride 0.9 % flush 10 mL, 10 mL, Intravenous, PRN, Maryam Galdamez MD  •  sodium chloride 0.9 % infusion, 150 mL/hr, Intravenous, Continuous, Ranjit Castillo III, MD, Last Rate: 150 mL/hr at 04/12/21 0808, 150 mL/hr at 04/12/21 0808  •  thiamine (B-1) 100 mg in sodium chloride 0.9 % 100 mL IVPB, 100 mg, Intravenous, TID, Zeeshan Wiggins MD, Last Rate: 200 mL/hr at 04/12/21 0810, 100 mg at 04/12/21 0810     Diagnostic Data    Lab Results (last 24 hours)     Procedure Component Value Units Date/Time    Comprehensive Metabolic Panel [[id number]]  (Abnormal) Collected: 04/12/21 0547    Specimen: Blood Updated: 04/12/21 0644     Glucose  90 mg/dL      BUN 4 mg/dL      Creatinine 0.49 mg/dL      Sodium 140 mmol/L      Potassium 3.9 mmol/L      Chloride 108 mmol/L      CO2 27.0 mmol/L      Calcium 9.2 mg/dL      Total Protein 5.7 g/dL      Albumin 2.80 g/dL      ALT (SGPT) 38 U/L      AST (SGOT) 49 U/L      Alkaline Phosphatase 85 U/L      Total Bilirubin 0.2 mg/dL      eGFR Non African Amer 144 mL/min/1.73      Globulin 2.9 gm/dL      A/G Ratio 1.0 g/dL      BUN/Creatinine Ratio 8.2     Anion Gap 5.0 mmol/L     Narrative:      GFR Normal >60  Chronic Kidney Disease <60  Kidney Failure <15      Lipase [[id number]]  (Abnormal) Collected: 04/12/21 0547    Specimen: Blood Updated: 04/12/21 0642     Lipase 170 U/L     Magnesium [[id number]]  (Normal) Collected: 04/12/21 0547    Specimen: Blood Updated: 04/12/21 0642     Magnesium 1.7 mg/dL     CBC & Differential [[id number]]  (Abnormal) Collected: 04/12/21 0547    Specimen: Blood Updated: 04/12/21 0632    Narrative:      The following orders were created for panel order CBC & Differential.  Procedure                               Abnormality         Status                     ---------                               -----------         ------                     Scan Slide[[id number]]                                                                  CBC Auto Differential[[id number]]        Abnormal            Final result                 Please view results for these tests on the individual orders.    CBC Auto Differential [[id number]]  (Abnormal) Collected: 04/12/21 0547    Specimen: Blood Updated: 04/12/21 0632     WBC 5.30 10*3/mm3      RBC 2.76 10*6/mm3      Hemoglobin 9.9 g/dL      Hematocrit 29.4 %      .5 fL      MCH 35.9 pg      MCHC 33.7 g/dL      RDW 13.0 %      RDW-SD 50.7 fl      MPV 10.5 fL      Platelets 247 10*3/mm3      Neutrophil % 39.4 %      Lymphocyte % 48.7 %      Monocyte % 9.8 %      Eosinophil % 0.9 %      Basophil % 0.8 %      Immature Grans % 0.4 %      Neutrophils, Absolute 2.09  10*3/mm3      Lymphocytes, Absolute 2.58 10*3/mm3      Monocytes, Absolute 0.52 10*3/mm3      Eosinophils, Absolute 0.05 10*3/mm3      Basophils, Absolute 0.04 10*3/mm3      Immature Grans, Absolute 0.02 10*3/mm3      nRBC 0.0 /100 WBC            Imaging Results (Last 24 Hours)     ** No results found for the last 24 hours. **          I reviewed the patient's new clinical results.    Assessment/Plan:     Active Hospital Problems    Diagnosis    • **Alcohol-induced acute pancreatitis without infection or necrosis      · -CT abdomen/pelvis confirms pancreatitis  Lab Results   Component Value Date    LIPASE 170 (H) 04/12/2021   Lipase of 1307 at highest  · Back on Suboxone home regimen  · -IVF at 150 cc/hour  · -Scheduled and PRN Ativan (CIWA)  · -Patient currently on regular diet, tolerating well.      • Elevated liver enzymes      - Will continue to monitor     • Thrombocytopenia (CMS/HCC)      - Secondary to alcohol use  - Will continue to monitor     • Functional diarrhea      -Pt was complaining of 5-10 watery bowel movements daily. With addition of Bentyl she endorses complete resolution of these symptoms  -Was being treated for recurrent C. Diff  -C. Diff toxin negative, vancomycin stopped.  -GI panel negative, will order stool fat--Normal  - Dr. Gene Esquivel with dc if pain controlled/continues to be able to tolerate PO consumption  -Have started Bentyl 20mg as of 4/7/201     • Thiamine deficiency      Secondary to heavy alcohol use.  Will supplement with  IV thiamine TID     • Hypomagnesemia      · Replace and monitor daily  Results from last 7 days   Lab Units 04/12/21  0547   MAGNESIUM mg/dL 1.7        • Benzodiazepine dependence (CMS/HCC)      -Patient takes valium 10 mg bid (confirmed on krishan) and gabapentin.  -Valium 10mg Q8H  CIWA prn ativan dosing     • Alcohol dependence with uncomplicated withdrawal (CMS/HCC)      -long term abuse  -Patient with tremors in both hands  -CIWA  -IV fluids,  IV folate, IV thiamine TID  -Currently receiving scheduled Valium and Ativan PRN       • Tobacco abuse      -Nicotine patch  Smoking cessation counseling     • Anxiety and depression      -As needed Ativan(CIWA)  -Valium Q8       • Polysubstance (including opioids) dependence, daily use (CMS/HCC)      -Hold home gabapentin  -Dilaudid has been stopped, currently receiving PRN Toradol for pain control  -patient is on Suboxone 8/2 TID at home  -social work following up for polysubstance abuse; history of leaving prior rehab for alcohol abuse when they held her medicines. She refuses any further workup/follow up for these issues     • Hypokalemia      Recent Labs     04/10/21  0600 04/11/21  0600 04/12/21  0547   K 3.7 3.4* 3.9   {  Calcium level on 4/11/2021 3.4.  -We will repeat potassium levels in the morning and adjust oral replacement as necessary.             DVT prophylaxis: SCDs/TEDs  Code Status and Medical Interventions:   Ordered at: 04/05/21 2021     Code Status:    CPR     Medical Interventions (Level of Support Prior to Arrest):    Full       Plan for disposition:Where: home and When:  today      Time: 30 minutes        This document has been electronically signed by Zeeshan Wiggins MD on April 12, 2021 08:18 CDT              Electronically signed by Zeeshan Wiggins MD at 04/12/21 0820     Mirian Mills MD at 04/11/21 1966                  SUBJECTIVE:   4/11/2021  Chief Complaint:     Subjective      Patient feels better today.  Abdominal pain is well controlled with pain management.  Diarrhea is improving.  Tolerating diet.  Lipase has improved to 203.  Liver enzymes are improving as well.  Hemoglobin 10.5.  WBC normalized at 6.20.    History:  Past Medical History:   Diagnosis Date   • Abnormal Pap smear of cervix    • Alcohol-induced acute pancreatitis 6/1/2020    Results From Last 14 Days Lab Units 02/27/21 1342 LIPASE U/L 65*  -advance diet as tolerated starting with clears -IV fluids   -will  cont suboxone and give morphine for breakthrough pain  -Zofran for nausea as needed -Scheduled Ativan, and CIWA protocol - f/u on CT abdomen     • Alcoholism (CMS/[facility])    • Anxiety    • Cervical dysplasia    • Depression    • Fibrocystic breast    • GERD (gastroesophageal reflux disease)    • Hypertension    • Seizures (CMS/HCC) 2017   • Substance abuse (CMS/[facility])    • Substance abuse (CMS/[facility])      Past Surgical History:   Procedure Laterality Date   • COLONOSCOPY N/A 2/2/2021    Procedure: COLONOSCOPY;  Surgeon: Mirian Mills MD;  Location: [facility] ENDOSCOPY;  Service: Gastroenterology;  Laterality: N/A;   • ENDOSCOPY N/A 1/8/2021    Procedure: ESOPHAGOGASTRODUODENOSCOPY;  Surgeon: Mirian Mills MD;  Location: [facility] ENDOSCOPY;  Service: Gastroenterology;  Laterality: N/A;   • RHINOPLASTY       Family History   Problem Relation Age of Onset   • Breast cancer Mother    • Cancer Mother    • COPD Mother    • Breast cancer Maternal Grandmother    • COPD Maternal Grandmother    • Heart disease Maternal Grandmother    • Breast cancer Paternal Grandmother    • Breast cancer Maternal Aunt    • Hypertension Father    • Heart disease Father      Social History     Tobacco Use   • Smoking status: Current Every Day Smoker     Packs/day: 1.00     Years: 20.00     Pack years: 20.00     Types: Cigarettes   • Smokeless tobacco: Never Used   Substance Use Topics   • Alcohol use: Yes     Alcohol/week: 6.0 standard drinks     Types: 6 Shots of liquor per week     Comment: daily   • Drug use: Not Currently     Types: Fentanyl, Oxycodone     Comment: hx of abuse     Medications Prior to Admission   Medication Sig Dispense Refill Last Dose   • acamprosate (CAMPRAL) 333 MG EC tablet       • buprenorphine-naloxone (SUBOXONE) 8-2 MG per SL tablet Place 1 tablet under the tongue 3 (Three) Times a Day. Patient takes one 8-2 tablet three times a day.      • cloNIDine (CATAPRES) 0.1 MG tablet Take 0.1 mg by mouth 3 (Three) Times a  Day.      • cyanocobalamin (VITAMIN B-12) 1000 MCG tablet Take 1 tablet by mouth Daily. 30 tablet 5    • diazePAM (VALIUM) 5 MG tablet Take 10 mg by mouth Every 8 (Eight) Hours As Needed.      • FLUoxetine (PROzac) 40 MG capsule Take 40 mg by mouth Daily.      • folic acid (FOLVITE) 1 MG tablet Take 1 tablet by mouth Daily. 30 tablet 0    • gabapentin (NEURONTIN) 800 MG tablet Take 800 mg by mouth 3 (Three) Times a Day.      • pantoprazole (Protonix) 40 MG EC tablet Take 1 tablet by mouth Daily. 30 tablet 1    • prazosin (MINIPRESS) 5 MG capsule Take 5 mg by mouth Every Night.      • promethazine (PHENERGAN) 25 MG suppository Insert 1 suppository into the rectum Every 6 (Six) Hours As Needed for Nausea or Vomiting. 12 suppository 0    • sucralfate (CARAFATE) 1 g tablet       • thiamine (thiamine) 100 MG tablet tablet Take 1 tablet by mouth Daily. 30 tablet 5    • vancomycin (VANCOCIN) 125 MG capsule 1 capsule by mouth four times a day for 14 days. Then 1 capsule 2 times a day for 7 days. Then 1 capsule daily for 7 days.  1 capsule every 3 days for 28 days. 56 capsule 0      Allergies:  Patient has no known allergies.     CURRENT MEDICATIONS/OBJECTIVE/VS/PE:     Current Medications:     Current Facility-Administered Medications   Medication Dose Route Frequency Provider Last Rate Last Admin   • buprenorphine-naloxone (SUBOXONE) 8-2 MG per SL tablet 1 tablet  1 tablet Sublingual TID Benji Holley MD   1 tablet at 04/11/21 2000   • cholestyramine light packet 4 g  1 packet Oral Q8H Ranjit Castillo III, MD   4 g at 04/09/21 2142   • diazePAM (VALIUM) tablet 10 mg  10 mg Oral Q8H Rishabh Raya MD   10 mg at 04/11/21 2030   • dicyclomine (BENTYL) tablet 20 mg  20 mg Oral BID Maryam Galdamez MD   20 mg at 04/11/21 2000   • folic acid 1 mg in sodium chloride 0.9 % 50 mL IVPB  1 mg Intravenous Daily Maryam Galdamez  mL/hr at 04/11/21 0911 1 mg at 04/11/21 0911   • ketorolac (TORADOL) injection 30 mg  30  mg Intravenous Q6H PRN Socorro Hopson MD   30 mg at 04/11/21 2157   • labetalol (NORMODYNE,TRANDATE) injection 10 mg  10 mg Intravenous Q2H PRN Maryam Galdamez MD       • LORazepam (ATIVAN) tablet 1 mg  1 mg Oral Q2H PRN Maryam Galdamez MD   1 mg at 04/11/21 1804    Or   • LORazepam (ATIVAN) injection 1 mg  1 mg Intravenous Q2H PRN Maryam Galdamez MD   1 mg at 04/08/21 0751    Or   • LORazepam (ATIVAN) tablet 2 mg  2 mg Oral Q1H PRN Maryam Galdamez MD   2 mg at 04/09/21 1425    Or   • LORazepam (ATIVAN) injection 2 mg  2 mg Intravenous Q1H PRN Maryam Galdamez MD   2 mg at 04/09/21 1233    Or   • LORazepam (ATIVAN) injection 2 mg  2 mg Intravenous Q15 Min PRN Maryam Galdamez MD   2 mg at 04/10/21 0049    Or   • LORazepam (ATIVAN) injection 2 mg  2 mg Intramuscular Q15 Min PRN Maryam Galdamez MD       • magnesium oxide (MAG-OX) tablet 400 mg  400 mg Oral Daily Rishabh Raya MD   400 mg at 04/11/21 0836   • nicotine (NICODERM CQ) 21 MG/24HR patch 1 patch  1 patch Transdermal Q24H Maryam Galdamez MD   1 patch at 04/11/21 2000   • ondansetron (ZOFRAN) injection 4 mg  4 mg Intravenous Q6H PRN Maryam Galdamez MD       • pantoprazole (PROTONIX) injection 40 mg  40 mg Intravenous Q AM Maryam Galdamez MD   40 mg at 04/11/21 0603   • polyethylene glycol (MIRALAX) packet 17 g  17 g Oral Daily Benji Holley MD       • potassium chloride (MICRO-K) CR capsule 40 mEq  40 mEq Oral Daily Benji Holley MD   40 mEq at 04/11/21 1346   • sodium chloride 0.9 % flush 10 mL  10 mL Intravenous PRN Han Perdomo MD       • sodium chloride 0.9 % flush 10 mL  10 mL Intravenous Q12H Maryam Galdamez MD   10 mL at 04/10/21 0846   • sodium chloride 0.9 % flush 10 mL  10 mL Intravenous PRN Maryam Galdamez MD       • sodium chloride 0.9 % infusion  150 mL/hr Intravenous Continuous Ranjit Castillo III,  mL/hr at 04/11/21 1716 150 mL/hr at 04/11/21 1716   • thiamine (B-1) 100 mg in sodium chloride 0.9  % 100 mL IVPB  100 mg Intravenous TID Zeeshan Wiggins  mL/hr at 04/11/21 2115 100 mg at 04/11/21 2115       Objective     Review of Systems:   Review of Systems   Constitutional: Negative for chills, fatigue, fever and unexpected weight change.   HENT: Negative for congestion, ear discharge, hearing loss, nosebleeds and sore throat.    Eyes: Negative for pain, discharge and redness.   Respiratory: Negative for cough, chest tightness, shortness of breath and wheezing.    Cardiovascular: Negative for chest pain and palpitations.   Gastrointestinal: Positive for abdominal pain. Negative for abdominal distention, blood in stool, constipation, diarrhea, nausea and vomiting.   Endocrine: Negative for cold intolerance, polydipsia, polyphagia and polyuria.   Genitourinary: Negative for dysuria, flank pain, frequency, hematuria and urgency.   Musculoskeletal: Negative for arthralgias, back pain, joint swelling and myalgias.   Skin: Negative for color change, pallor and rash.   Neurological: Negative for tremors, seizures, syncope, weakness and headaches.   Hematological: Negative for adenopathy. Does not bruise/bleed easily.   Psychiatric/Behavioral: Negative for behavioral problems, confusion, dysphoric mood, hallucinations and suicidal ideas. The patient is not nervous/anxious.        Physical Exam:   Temp:  [96.2 °F (35.7 °C)-98.1 °F (36.7 °C)] 96.2 °F (35.7 °C)  Heart Rate:  [58-84] 84  Resp:  [18] 18  BP: (101-138)/(61-78) 124/66     Physical Exam:  General Appearance:    Alert, cooperative, in no acute distress   Head:    Normocephalic, without obvious abnormality, atraumatic   Eyes:            Lids and lashes normal, conjunctivae and sclerae normal, no   icterus, no pallor, corneas clear, PERRLA   Ears:    Ears appear intact with no abnormalities noted   Throat:   No oral lesions, no thrush, oral mucosa moist   Neck:   No adenopathy, supple, trachea midline, no thyromegaly, no     carotid bruit, no JVD   Back:      No kyphosis present, no scoliosis present, no skin lesions,       erythema or scars, no tenderness to percussion or                   palpation,   range of motion normal   Lungs:     Clear to auscultation,respirations regular, even and                   unlabored    Heart:    Regular rhythm and normal rate, normal S1 and S2, no            murmur, no gallop, no rub, no click   Breast Exam:    Deferred   Abdomen:     Normal bowel sounds, no masses, no organomegaly, soft        nontender, nondistended, no guarding, no rebound                 tenderness   Genitalia:    Deferred   Extremities:   Moves all extremities well, no edema, no cyanosis, no              redness   Pulses:   Pulses palpable and equal bilaterally   Skin:   No bleeding, bruising or rash   Lymph nodes:   No palpable adenopathy   Neurologic:   Cranial nerves 2 - 12 grossly intact, sensation intact, DTR        present and equal bilaterally      Results Review:     Lab Results (last 24 hours)     Procedure Component Value Units Date/Time    Comprehensive Metabolic Panel [[id number]]  (Abnormal) Collected: 04/11/21 0600    Specimen: Blood Updated: 04/11/21 0711     Glucose 79 mg/dL      BUN 2 mg/dL      Creatinine <0.47 mg/dL      Sodium 141 mmol/L      Potassium 3.4 mmol/L      Chloride 107 mmol/L      CO2 26.0 mmol/L      Calcium 9.0 mg/dL      Total Protein 6.0 g/dL      Albumin 3.10 g/dL      ALT (SGPT) 35 U/L      AST (SGOT) 56 U/L      Alkaline Phosphatase 93 U/L      Total Bilirubin 0.3 mg/dL      Globulin 2.9 gm/dL      A/G Ratio 1.1 g/dL      BUN/Creatinine Ratio --     Comment: Unable to calculate Bun/Crea Ratio.        Anion Gap 8.0 mmol/L     Narrative:      GFR Normal >60  Chronic Kidney Disease <60  Kidney Failure <15      Lipase [[id number]]  (Abnormal) Collected: 04/11/21 0600    Specimen: Blood Updated: 04/11/21 0709     Lipase 203 U/L     Magnesium [[id number]]  (Normal) Collected: 04/11/21 0600    Specimen: Blood Updated: 04/11/21 0709      Magnesium 1.7 mg/dL     CBC & Differential [[id number]]  (Abnormal) Collected: 04/11/21 0600    Specimen: Blood Updated: 04/11/21 0641    Narrative:      The following orders were created for panel order CBC & Differential.  Procedure                               Abnormality         Status                     ---------                               -----------         ------                     CBC Auto Differential[[id number]]        Abnormal            Final result                 Please view results for these tests on the individual orders.    CBC Auto Differential [[id number]]  (Abnormal) Collected: 04/11/21 0600    Specimen: Blood Updated: 04/11/21 0641     WBC 6.20 10*3/mm3      RBC 2.85 10*6/mm3      Hemoglobin 10.5 g/dL      Hematocrit 29.8 %      .6 fL      MCH 36.8 pg      MCHC 35.2 g/dL      RDW 13.1 %      RDW-SD 50.1 fl      MPV 10.1 fL      Platelets 229 10*3/mm3      Neutrophil % 45.4 %      Lymphocyte % 44.4 %      Monocyte % 8.4 %      Eosinophil % 1.0 %      Basophil % 0.5 %      Immature Grans % 0.3 %      Neutrophils, Absolute 2.82 10*3/mm3      Lymphocytes, Absolute 2.75 10*3/mm3      Monocytes, Absolute 0.52 10*3/mm3      Eosinophils, Absolute 0.06 10*3/mm3      Basophils, Absolute 0.03 10*3/mm3      Immature Grans, Absolute 0.02 10*3/mm3      nRBC 0.0 /100 WBC            I reviewed the patient's new clinical results.  I reviewed the patient's new imaging results and agree with the interpretation.     ASSESSMENT/PLAN:   ASSESSMENT: 1.  Acute pancreatitis, patient has worsening symptoms with initiation of p.o. intake.  Likely due to alcohol usage.  2.  Elevated liver enzymes, improving.  3.  Diarrhea, improving.  Repeat stool studies were unremarkable.  4.  Anemia, recent GI work-up was unremarkable.  5.  Alcohol abuse  PLAN: 1.  Continue diet advancement and D/C pain management and antiemetics  2.  Continue PPI  3.  OK to D/C from GI standpoint  4.  Recommend alcohol abstinence  5.   [facility] protocol  6.  Resume p.o. after resolution of abdominal pain and normalization of lipase  The risks, benefits, and alternatives of this procedure have been discussed with the patient or the responsible party- the patient understands and agrees to proceed.         Mirian Mills MD  04/11/21  22:43 CDT          Electronically signed by Mirian Mills MD at 04/11/21 6287       Medical Student Notes (last 24 hours) (Notes from 04/11/21 0921 through 04/12/21 0921)    No notes of this type exist for this encounter.         Consult Notes (last 24 hours) (Notes from 04/11/21 0921 through 04/12/21 0921)    No notes of this type exist for this encounter.

## 2021-04-12 NOTE — PROGRESS NOTES
FAMILY MEDICINE DAILY PROGRESS NOTE  NAME: Nata Bain  : 1985  MRN: [id number]     LOS: 7 days     PROVIDER OF SERVICE: Zeeshan Wiggins MD    Chief Complaint: Alcohol-induced acute pancreatitis without infection or necrosis    Subjective:   Pt did well overnight. VSS. Currently on regular diet. States tolerating it well. States abdominal pain has greatly improved. Diarrhea is resovled since addition of Bentyl. She expresses strong desire for discharge today.  Interval History:  History taken from: patient chart RN      Review of Systems:   Review of Systems   Constitutional: Negative for activity change, chills, diaphoresis and fever.   HENT: Negative for dental problem, drooling, ear discharge, ear pain, facial swelling, mouth sores, nosebleeds, rhinorrhea, sinus pressure, sinus pain, sneezing and sore throat.    Eyes: Negative for pain, discharge and visual disturbance.   Respiratory: Negative for apnea, cough, shortness of breath, wheezing and stridor.    Cardiovascular: Negative for chest pain, palpitations and leg swelling.   Gastrointestinal: Positive for abdominal pain. Negative for abdominal distention, constipation, diarrhea, nausea and vomiting.   Genitourinary: Negative for dysuria, frequency and urgency.   Musculoskeletal: Negative for arthralgias and back pain.   Skin: Negative for rash and wound.   Neurological: Negative for dizziness, facial asymmetry, light-headedness and headaches.   Psychiatric/Behavioral: Negative for agitation and decreased concentration. The patient is not nervous/anxious.        Objective:     Vital Signs  Temp:  [96.2 °F (35.7 °C)-97.8 °F (36.6 °C)] 96.7 °F (35.9 °C)  Heart Rate:  [61-84] 68  Resp:  [18] 18  BP: ()/(55-78) 94/55    Physical Exam  Physical Exam  Constitutional:       Appearance: She is well-developed.   HENT:      Head: Normocephalic and atraumatic.      Right Ear: External ear normal.      Left Ear: External ear normal.      Nose: Nose  normal.      Mouth/Throat:      Pharynx: No oropharyngeal exudate.   Eyes:      General: No scleral icterus.        Right eye: No discharge.         Left eye: No discharge.      Conjunctiva/sclera: Conjunctivae normal.      Pupils: Pupils are equal, round, and reactive to light.   Neck:      Vascular: No JVD.   Cardiovascular:      Rate and Rhythm: Normal rate and regular rhythm.      Heart sounds: Normal heart sounds. No murmur heard.   No friction rub. No gallop.    Pulmonary:      Effort: Pulmonary effort is normal. No respiratory distress.      Breath sounds: Normal breath sounds. No stridor. No wheezing or rales.   Abdominal:      General: Bowel sounds are normal. There is no distension.      Palpations: Abdomen is soft.      Tenderness: There is abdominal tenderness (mild epigastric tenderness).   Musculoskeletal:         General: No tenderness or deformity. Normal range of motion.      Cervical back: Normal range of motion and neck supple.   Skin:     General: Skin is warm and dry.      Capillary Refill: Capillary refill takes less than 2 seconds.      Coloration: Skin is not pale.      Findings: No erythema or rash.   Neurological:      Mental Status: She is alert and oriented to person, place, and time.   Psychiatric:         Behavior: Behavior normal.         Medication Review    Current Facility-Administered Medications:   •  buprenorphine-naloxone (SUBOXONE) 8-2 MG per SL tablet 1 tablet, 1 tablet, Sublingual, TID, Benji Holley MD, 1 tablet at 04/12/21 0809  •  cholestyramine light packet 4 g, 1 packet, Oral, Q8H, Ranjit Castillo III, MD, Stopped at 04/12/21 0809  •  diazePAM (VALIUM) tablet 10 mg, 10 mg, Oral, Q8H, Rishabh Raya MD, 10 mg at 04/12/21 0535  •  dicyclomine (BENTYL) tablet 20 mg, 20 mg, Oral, BID, Maryam Galdamez MD, 20 mg at 04/12/21 0809  •  folic acid 1 mg in sodium chloride 0.9 % 50 mL IVPB, 1 mg, Intravenous, Daily, Maryam Galdamez MD, Last Rate: 100 mL/hr at 04/11/21  0911, 1 mg at 04/11/21 0911  •  ketorolac (TORADOL) injection 30 mg, 30 mg, Intravenous, Q6H PRN, Socorro Hopson MD, 30 mg at 04/11/21 2157  •  labetalol (NORMODYNE,TRANDATE) injection 10 mg, 10 mg, Intravenous, Q2H PRN, Maryam Galdamez MD  •  LORazepam (ATIVAN) tablet 1 mg, 1 mg, Oral, Q2H PRN, 1 mg at 04/11/21 1804 **OR** LORazepam (ATIVAN) injection 1 mg, 1 mg, Intravenous, Q2H PRN, 1 mg at 04/08/21 0751 **OR** LORazepam (ATIVAN) tablet 2 mg, 2 mg, Oral, Q1H PRN, 2 mg at 04/09/21 1425 **OR** LORazepam (ATIVAN) injection 2 mg, 2 mg, Intravenous, Q1H PRN, 2 mg at 04/09/21 1233 **OR** LORazepam (ATIVAN) injection 2 mg, 2 mg, Intravenous, Q15 Min PRN, 2 mg at 04/10/21 0049 **OR** LORazepam (ATIVAN) injection 2 mg, 2 mg, Intramuscular, Q15 Min PRN, Maryam Galdamez MD  •  magnesium oxide (MAG-OX) tablet 400 mg, 400 mg, Oral, Daily, Rishabh Raya MD, 400 mg at 04/11/21 0836  •  nicotine (NICODERM CQ) 21 MG/24HR patch 1 patch, 1 patch, Transdermal, Q24H, Maryam Galdamez MD, 1 patch at 04/11/21 2000  •  ondansetron (ZOFRAN) injection 4 mg, 4 mg, Intravenous, Q6H PRN, Maryam Galdamez MD  •  pantoprazole (PROTONIX) injection 40 mg, 40 mg, Intravenous, Q AM, Maryam Galdamez MD, 40 mg at 04/12/21 0535  •  polyethylene glycol (MIRALAX) packet 17 g, 17 g, Oral, Daily, Benji Holley MD, Stopped at 04/12/21 0809  •  potassium chloride (MICRO-K) CR capsule 40 mEq, 40 mEq, Oral, Daily, Benji Holley MD, 40 mEq at 04/12/21 0809  •  sodium chloride 0.9 % flush 10 mL, 10 mL, Intravenous, PRN, Han Perdomo MD  •  sodium chloride 0.9 % flush 10 mL, 10 mL, Intravenous, Q12H, Maryam Galdamez MD, 10 mL at 04/12/21 0810  •  sodium chloride 0.9 % flush 10 mL, 10 mL, Intravenous, PRN, Maryam Galdamez MD  •  sodium chloride 0.9 % infusion, 150 mL/hr, Intravenous, Continuous, Ranjit Castillo III, MD, Last Rate: 150 mL/hr at 04/12/21 0808, 150 mL/hr at 04/12/21 0808  •  thiamine (B-1) 100 mg in sodium  chloride 0.9 % 100 mL IVPB, 100 mg, Intravenous, TID, Zeeshan Wiggins MD, Last Rate: 200 mL/hr at 04/12/21 0810, 100 mg at 04/12/21 0810     Diagnostic Data    Lab Results (last 24 hours)     Procedure Component Value Units Date/Time    Comprehensive Metabolic Panel [[id number]]  (Abnormal) Collected: 04/12/21 0547    Specimen: Blood Updated: 04/12/21 0644     Glucose 90 mg/dL      BUN 4 mg/dL      Creatinine 0.49 mg/dL      Sodium 140 mmol/L      Potassium 3.9 mmol/L      Chloride 108 mmol/L      CO2 27.0 mmol/L      Calcium 9.2 mg/dL      Total Protein 5.7 g/dL      Albumin 2.80 g/dL      ALT (SGPT) 38 U/L      AST (SGOT) 49 U/L      Alkaline Phosphatase 85 U/L      Total Bilirubin 0.2 mg/dL      eGFR Non African Amer 144 mL/min/1.73      Globulin 2.9 gm/dL      A/G Ratio 1.0 g/dL      BUN/Creatinine Ratio 8.2     Anion Gap 5.0 mmol/L     Narrative:      GFR Normal >60  Chronic Kidney Disease <60  Kidney Failure <15      Lipase [[id number]]  (Abnormal) Collected: 04/12/21 0547    Specimen: Blood Updated: 04/12/21 0642     Lipase 170 U/L     Magnesium [[id number]]  (Normal) Collected: 04/12/21 0547    Specimen: Blood Updated: 04/12/21 0642     Magnesium 1.7 mg/dL     CBC & Differential [[id number]]  (Abnormal) Collected: 04/12/21 0547    Specimen: Blood Updated: 04/12/21 0632    Narrative:      The following orders were created for panel order CBC & Differential.  Procedure                               Abnormality         Status                     ---------                               -----------         ------                     Scan Slide[[id number]]                                                                  CBC Auto Differential[[id number]]        Abnormal            Final result                 Please view results for these tests on the individual orders.    CBC Auto Differential [[id number]]  (Abnormal) Collected: 04/12/21 0547    Specimen: Blood Updated: 04/12/21 0632     WBC 5.30 10*3/mm3      RBC 2.76  10*6/mm3      Hemoglobin 9.9 g/dL      Hematocrit 29.4 %      .5 fL      MCH 35.9 pg      MCHC 33.7 g/dL      RDW 13.0 %      RDW-SD 50.7 fl      MPV 10.5 fL      Platelets 247 10*3/mm3      Neutrophil % 39.4 %      Lymphocyte % 48.7 %      Monocyte % 9.8 %      Eosinophil % 0.9 %      Basophil % 0.8 %      Immature Grans % 0.4 %      Neutrophils, Absolute 2.09 10*3/mm3      Lymphocytes, Absolute 2.58 10*3/mm3      Monocytes, Absolute 0.52 10*3/mm3      Eosinophils, Absolute 0.05 10*3/mm3      Basophils, Absolute 0.04 10*3/mm3      Immature Grans, Absolute 0.02 10*3/mm3      nRBC 0.0 /100 WBC            Imaging Results (Last 24 Hours)     ** No results found for the last 24 hours. **          I reviewed the patient's new clinical results.    Assessment/Plan:     Active Hospital Problems    Diagnosis    • **Alcohol-induced acute pancreatitis without infection or necrosis      · -CT abdomen/pelvis confirms pancreatitis  Lab Results   Component Value Date    LIPASE 170 (H) 04/12/2021   Lipase of 1307 at highest  · Back on Suboxone home regimen  · -IVF at 150 cc/hour  · -Scheduled and PRN Ativan (CIWA)  · -Patient currently on regular diet, tolerating well.      • Elevated liver enzymes      - Will continue to monitor     • Thrombocytopenia (CMS/HCC)      - Secondary to alcohol use  - Will continue to monitor     • Functional diarrhea      -Pt was complaining of 5-10 watery bowel movements daily. With addition of Bentyl she endorses complete resolution of these symptoms  -Was being treated for recurrent C. Diff  -C. Diff toxin negative, vancomycin stopped.  -GI panel negative, will order stool fat--Normal  - Dr. Mills following. Fine with dc if pain controlled/continues to be able to tolerate PO consumption  -Have started Bentyl 20mg as of 4/7/201     • Thiamine deficiency      Secondary to heavy alcohol use.  Will supplement with  IV thiamine TID     • Hypomagnesemia      · Replace and monitor daily  Results  from last 7 days   Lab Units 04/12/21  0547   MAGNESIUM mg/dL 1.7        • Benzodiazepine dependence (CMS/HCC)      -Patient takes valium 10 mg bid (confirmed on krishan) and gabapentin.  -Valium 10mg Q8H  CIWA prn ativan dosing     • Alcohol dependence with uncomplicated withdrawal (CMS/HCC)      -long term abuse  -Patient with tremors in both hands  -CIWA  -IV fluids, IV folate, IV thiamine TID  -Currently receiving scheduled Valium and Ativan PRN       • Tobacco abuse      -Nicotine patch  Smoking cessation counseling     • Anxiety and depression      -As needed Ativan(CIWA)  -Valium Q8       • Polysubstance (including opioids) dependence, daily use (CMS/HCC)      -Hold home gabapentin  -Dilaudid has been stopped, currently receiving PRN Toradol for pain control  -patient is on Suboxone 8/2 TID at home  -social work following up for polysubstance abuse; history of leaving prior rehab for alcohol abuse when they held her medicines. She refuses any further workup/follow up for these issues     • Hypokalemia      Recent Labs     04/10/21  0600 04/11/21  0600 04/12/21  0547   K 3.7 3.4* 3.9   {  Calcium level on 4/11/2021 3.4.  -We will repeat potassium levels in the morning and adjust oral replacement as necessary.             DVT prophylaxis: SCDs/TEDs  Code Status and Medical Interventions:   Ordered at: 04/05/21 2021     Code Status:    CPR     Medical Interventions (Level of Support Prior to Arrest):    Full       Plan for disposition:Where: home and When:  today      Time: 30 minutes        This document has been electronically signed by Zeeshan Wiggins MD on April 12, 2021 08:18 CDT

## 2021-04-12 NOTE — PLAN OF CARE
Goal Outcome Evaluation:     Progress: (P) improving  Outcome Summary: (P) Provided diet edu for pancreatitis recovery. Pt expressed intent to stop ETOH use post-d/c and asked for diet edu about fatty liver disease. Attached diet edu for pancreatitis, healthy eating, and fatty liver disease to pt's d/c papers. Will continue to monitor and follow.

## 2021-04-12 NOTE — PLAN OF CARE
Problem: Adult Inpatient Plan of Care  Goal: Plan of Care Review  Outcome: Ongoing, Progressing  Flowsheets  Taken 4/12/2021 0511 by Cici Muhammad RN  Progress: improving  Taken 4/7/2021 1544 by Yoanna Zuniga RN  Plan of Care Reviewed With: patient  Goal: Patient-Specific Goal (Individualized)  Outcome: Ongoing, Progressing  Goal: Absence of Hospital-Acquired Illness or Injury  Outcome: Ongoing, Progressing  Intervention: Identify and Manage Fall Risk  Recent Flowsheet Documentation  Taken 4/12/2021 0400 by Cici Muhammad RN  Safety Promotion/Fall Prevention: safety round/check completed  Taken 4/12/2021 0200 by Cici Muhammad RN  Safety Promotion/Fall Prevention: safety round/check completed  Taken 4/12/2021 0000 by Cici Muhammad RN  Safety Promotion/Fall Prevention: safety round/check completed  Taken 4/11/2021 2230 by Cici Muhammad RN  Safety Promotion/Fall Prevention: safety round/check completed  Taken 4/11/2021 2130 by Cici Muhammad RN  Safety Promotion/Fall Prevention: safety round/check completed  Taken 4/11/2021 2030 by Cici Muhammad RN  Safety Promotion/Fall Prevention: safety round/check completed  Taken 4/11/2021 1931 by Cici Muhammad RN  Safety Promotion/Fall Prevention: safety round/check completed  Intervention: Prevent Skin Injury  Recent Flowsheet Documentation  Taken 4/12/2021 0400 by Cici Muhammad RN  Body Position: position changed independently  Taken 4/12/2021 0200 by Cici Muhammad RN  Body Position: position changed independently  Taken 4/12/2021 0000 by Cici Muhammad RN  Body Position: position changed independently  Taken 4/11/2021 2230 by Cici Muhammad RN  Body Position: position changed independently  Taken 4/11/2021 2130 by Cici Muhammad RN  Body Position: position changed independently  Taken 4/11/2021 2030 by Cici Muhammad RN  Body Position: position changed independently  Taken 4/11/2021 1931 by Cici Muhammad  AFIA, RN  Body Position: position changed independently  Goal: Optimal Comfort and Wellbeing  Outcome: Ongoing, Progressing  Intervention: Provide Person-Centered Care  Recent Flowsheet Documentation  Taken 4/11/2021 1931 by Cici Muhammad, RN  Trust Relationship/Rapport: care explained  Goal: Readiness for Transition of Care  Outcome: Ongoing, Progressing   Goal Outcome Evaluation:     Progress: improving

## 2021-04-12 NOTE — PROGRESS NOTES
"Nutrition Services    Patient Name:  Nata Bain  YOB: 1985  MRN: [id number]  Admit Date:  4/5/2021    Pt reports some abdominal pain and swelling w/ meals over weekend, but reports she was given medicine to relieve this and feels minimal pain now. Pt expressed intent to stop ETOH use post-d/c. Pt stated that hearing she \"has a fatty liver scared her\" and made her want to change. Intern encouraged and supported pt's desire to stop ETOH use and encouraged pt to lean on her support system at home and at [facility] for help. Intern encouraged pt to eat low-fat, small, frequent meals at home as she recovers from pancreatitis. Pt asked for diet edu regarding fatty liver disease. Intern attached diet edu for pancreatitis, healthy eating, and fatty liver disease to pt's d/c papers. Intern will continue to monitor and follow.     Electronically signed by:  AGUSTO CHI  04/12/21 12:10 CDT   "

## 2021-04-12 NOTE — PROGRESS NOTES
SUBJECTIVE:   4/12/2021  Chief Complaint:     Subjective      Patient feels better today.  Abdominal pain has resolved.  Diarrhea is improving.  Tolerating diet.  Lipase has improved to 170.  Liver enzymes are improving as well.  Hemoglobin 9.9.  WBC normalized at 5.30.    History:  Past Medical History:   Diagnosis Date   • Abnormal Pap smear of cervix    • Alcohol-induced acute pancreatitis 6/1/2020    Results From Last 14 Days Lab Units 02/27/21 1342 LIPASE U/L 65*  -advance diet as tolerated starting with clears -IV fluids   -will cont suboxone and give morphine for breakthrough pain  -Zofran for nausea as needed -Scheduled Ativan, and CIWA protocol - f/u on CT abdomen     • Alcoholism (CMS/HCC)    • Anxiety    • Cervical dysplasia    • Depression    • Fibrocystic breast    • GERD (gastroesophageal reflux disease)    • Hypertension    • Seizures (CMS/HCC) 2017   • Substance abuse (CMS/HCC)    • Substance abuse (CMS/HCC)      Past Surgical History:   Procedure Laterality Date   • COLONOSCOPY N/A 2/2/2021    Procedure: COLONOSCOPY;  Surgeon: Mirian Mills MD;  Location: [facility] ENDOSCOPY;  Service: Gastroenterology;  Laterality: N/A;   • ENDOSCOPY N/A 1/8/2021    Procedure: ESOPHAGOGASTRODUODENOSCOPY;  Surgeon: Mirian Mills MD;  Location: [facility] ENDOSCOPY;  Service: Gastroenterology;  Laterality: N/A;   • RHINOPLASTY       Family History   Problem Relation Age of Onset   • Breast cancer Mother    • Cancer Mother    • COPD Mother    • Breast cancer Maternal Grandmother    • COPD Maternal Grandmother    • Heart disease Maternal Grandmother    • Breast cancer Paternal Grandmother    • Breast cancer Maternal Aunt    • Hypertension Father    • Heart disease Father      Social History     Tobacco Use   • Smoking status: Current Every Day Smoker     Packs/day: 1.00     Years: 20.00     Pack years: 20.00     Types: Cigarettes   • Smokeless tobacco: Never Used   Substance Use Topics   • Alcohol use: Yes        Alcohol/week: 6.0 standard drinks     Types: 6 Shots of liquor per week     Comment: daily   • Drug use: Not Currently     Types: Fentanyl, Oxycodone     Comment: hx of abuse     No medications prior to admission.     Allergies:  Patient has no known allergies.     CURRENT MEDICATIONS/OBJECTIVE/VS/PE:     Current Medications:     Current Facility-Administered Medications   Medication Dose Route Frequency Provider Last Rate Last Admin   • buprenorphine-naloxone (SUBOXONE) 8-2 MG per SL tablet 1 tablet  1 tablet Sublingual TID Benji Holley MD   1 tablet at 04/12/21 0809   • cholestyramine light packet 4 g  1 packet Oral Q8H LINETTE'Ranjit Fisher III, MD   Stopped at 04/12/21 0809   • diazePAM (VALIUM) tablet 10 mg  10 mg Oral Q8H Rishabh Raya MD   10 mg at 04/12/21 1230   • dicyclomine (BENTYL) tablet 20 mg  20 mg Oral BID Maryam Galdamez MD   20 mg at 04/12/21 0809   • folic acid 1 mg in sodium chloride 0.9 % 50 mL IVPB  1 mg Intravenous Daily Maryam Galdamez  mL/hr at 04/12/21 0819 1 mg at 04/12/21 0819   • ketorolac (TORADOL) injection 30 mg  30 mg Intravenous Q6H PRN Socorro Hopson MD   30 mg at 04/12/21 0819   • labetalol (NORMODYNE,TRANDATE) injection 10 mg  10 mg Intravenous Q2H PRN Maryam Galdamez MD       • LORazepam (ATIVAN) tablet 1 mg  1 mg Oral Q2H PRN Maryam Galdamez MD   1 mg at 04/11/21 1804    Or   • LORazepam (ATIVAN) injection 1 mg  1 mg Intravenous Q2H PRN Maryam Galdamez MD   1 mg at 04/08/21 0751    Or   • LORazepam (ATIVAN) tablet 2 mg  2 mg Oral Q1H PRN Maryam Galdamez MD   2 mg at 04/09/21 1425    Or   • LORazepam (ATIVAN) injection 2 mg  2 mg Intravenous Q1H PRN Maryam Galdamez MD   2 mg at 04/09/21 1233    Or   • LORazepam (ATIVAN) injection 2 mg  2 mg Intravenous Q15 Min PRN Maryam Galdamez MD   2 mg at 04/10/21 0049    Or   • LORazepam (ATIVAN) injection 2 mg  2 mg Intramuscular Q15 Min PRN Maryam Galdamez MD       • magnesium oxide (MAG-OX) tablet 400  mg  400 mg Oral Daily Rishabh Raya MD   400 mg at 04/12/21 0818   • nicotine (NICODERM CQ) 21 MG/24HR patch 1 patch  1 patch Transdermal Q24H Maryam Galdamez MD   1 patch at 04/11/21 2000   • ondansetron (ZOFRAN) injection 4 mg  4 mg Intravenous Q6H PRN Maryam Galdamez MD       • [START ON 4/13/2021] pantoprazole (PROTONIX) EC tablet 40 mg  40 mg Oral Q AM Maryam Galdamez MD       • polyethylene glycol (MIRALAX) packet 17 g  17 g Oral Daily Benji Holley MD   Stopped at 04/12/21 0809   • potassium chloride (MICRO-K) CR capsule 40 mEq  40 mEq Oral Daily Benji Holley MD   40 mEq at 04/12/21 0809   • sodium chloride 0.9 % flush 10 mL  10 mL Intravenous PRN Han Perdomo MD       • sodium chloride 0.9 % flush 10 mL  10 mL Intravenous Q12H Maryam Galdamez MD   10 mL at 04/12/21 0810   • sodium chloride 0.9 % flush 10 mL  10 mL Intravenous PRN Maryam Galdamez MD       • thiamine (B-1) 100 mg in sodium chloride 0.9 % 100 mL IVPB  100 mg Intravenous TID Zeeshan Wiggins  mL/hr at 04/12/21 0810 100 mg at 04/12/21 0810     Current Outpatient Medications   Medication Sig Dispense Refill   • acamprosate (CAMPRAL) 333 MG EC tablet      • buprenorphine-naloxone (SUBOXONE) 8-2 MG per SL tablet Place 1 tablet under the tongue 3 (Three) Times a Day. Patient takes one 8-2 tablet three times a day.     • cloNIDine (CATAPRES) 0.1 MG tablet Take 0.1 mg by mouth 3 (Three) Times a Day.     • cyanocobalamin (VITAMIN B-12) 1000 MCG tablet Take 1 tablet by mouth Daily. 30 tablet 5   • diazePAM (VALIUM) 10 MG tablet Take 1 tablet by mouth Every 8 (Eight) Hours for 5 days. 15 tablet 0   • dicyclomine (BENTYL) 20 MG tablet Take 1 tablet by mouth 2 (Two) Times a Day. 60 tablet 2   • FLUoxetine (PROzac) 40 MG capsule Take 40 mg by mouth Daily.     • folic acid (FOLVITE) 1 MG tablet Take 1 tablet by mouth Daily. 30 tablet 0   • gabapentin (NEURONTIN) 800 MG tablet Take 800 mg by mouth 3 (Three) Times a Day.      • pantoprazole (Protonix) 40 MG EC tablet Take 1 tablet by mouth Daily. 30 tablet 1   • prazosin (MINIPRESS) 5 MG capsule Take 5 mg by mouth Every Night.     • promethazine (PHENERGAN) 25 MG suppository Insert 1 suppository into the rectum Every 6 (Six) Hours As Needed for Nausea or Vomiting. 12 suppository 0   • sucralfate (CARAFATE) 1 g tablet      • thiamine (thiamine) 100 MG tablet tablet Take 1 tablet by mouth Daily. 30 tablet 5   • vancomycin (VANCOCIN) 125 MG capsule 1 capsule by mouth four times a day for 14 days. Then 1 capsule 2 times a day for 7 days. Then 1 capsule daily for 7 days.  1 capsule every 3 days for 28 days. 56 capsule 0       Objective     Review of Systems:   Review of Systems   Constitutional: Negative for chills, fatigue, fever and unexpected weight change.   HENT: Negative for congestion, ear discharge, hearing loss, nosebleeds and sore throat.    Eyes: Negative for pain, discharge and redness.   Respiratory: Negative for cough, chest tightness, shortness of breath and wheezing.    Cardiovascular: Negative for chest pain and palpitations.   Gastrointestinal: Negative for abdominal distention, abdominal pain, blood in stool, constipation, diarrhea, nausea and vomiting.   Endocrine: Negative for cold intolerance, polydipsia, polyphagia and polyuria.   Genitourinary: Negative for dysuria, flank pain, frequency, hematuria and urgency.   Musculoskeletal: Negative for arthralgias, back pain, joint swelling and myalgias.   Skin: Negative for color change, pallor and rash.   Neurological: Negative for tremors, seizures, syncope, weakness and headaches.   Hematological: Negative for adenopathy. Does not bruise/bleed easily.   Psychiatric/Behavioral: Negative for behavioral problems, confusion, dysphoric mood, hallucinations and suicidal ideas. The patient is not nervous/anxious.        Physical Exam:   Temp:  [96.2 °F (35.7 °C)-97.8 °F (36.6 °C)] 97.4 °F (36.3 °C)  Heart Rate:  [61-84] 73  Resp:   [18] 18  BP: ()/(55-79) 135/76     Physical Exam:  General Appearance:    Alert, cooperative, in no acute distress   Head:    Normocephalic, without obvious abnormality, atraumatic   Eyes:            Lids and lashes normal, conjunctivae and sclerae normal, no   icterus, no pallor, corneas clear, PERRLA   Ears:    Ears appear intact with no abnormalities noted   Throat:   No oral lesions, no thrush, oral mucosa moist   Neck:   No adenopathy, supple, trachea midline, no thyromegaly, no     carotid bruit, no JVD   Back:     No kyphosis present, no scoliosis present, no skin lesions,       erythema or scars, no tenderness to percussion or                   palpation,   range of motion normal   Lungs:     Clear to auscultation,respirations regular, even and                   unlabored    Heart:    Regular rhythm and normal rate, normal S1 and S2, no            murmur, no gallop, no rub, no click   Breast Exam:    Deferred   Abdomen:     Normal bowel sounds, no masses, no organomegaly, soft        nontender, nondistended, no guarding, no rebound                 tenderness   Genitalia:    Deferred   Extremities:   Moves all extremities well, no edema, no cyanosis, no              redness   Pulses:   Pulses palpable and equal bilaterally   Skin:   No bleeding, bruising or rash   Lymph nodes:   No palpable adenopathy   Neurologic:   Cranial nerves 2 - 12 grossly intact, sensation intact, DTR        present and equal bilaterally      Results Review:     Lab Results (last 24 hours)     Procedure Component Value Units Date/Time    Comprehensive Metabolic Panel [[id number]]  (Abnormal) Collected: 04/12/21 0547    Specimen: Blood Updated: 04/12/21 0644     Glucose 90 mg/dL      BUN 4 mg/dL      Creatinine 0.49 mg/dL      Sodium 140 mmol/L      Potassium 3.9 mmol/L      Chloride 108 mmol/L      CO2 27.0 mmol/L      Calcium 9.2 mg/dL      Total Protein 5.7 g/dL      Albumin 2.80 g/dL      ALT (SGPT) 38 U/L      AST (SGOT) 49  U/L      Alkaline Phosphatase 85 U/L      Total Bilirubin 0.2 mg/dL      eGFR Non African Amer 144 mL/min/1.73      Globulin 2.9 gm/dL      A/G Ratio 1.0 g/dL      BUN/Creatinine Ratio 8.2     Anion Gap 5.0 mmol/L     Narrative:      GFR Normal >60  Chronic Kidney Disease <60  Kidney Failure <15      Lipase [[id number]]  (Abnormal) Collected: 04/12/21 0547    Specimen: Blood Updated: 04/12/21 0642     Lipase 170 U/L     Magnesium [[id number]]  (Normal) Collected: 04/12/21 0547    Specimen: Blood Updated: 04/12/21 0642     Magnesium 1.7 mg/dL     CBC & Differential [[id number]]  (Abnormal) Collected: 04/12/21 0547    Specimen: Blood Updated: 04/12/21 0632    Narrative:      The following orders were created for panel order CBC & Differential.  Procedure                               Abnormality         Status                     ---------                               -----------         ------                     Scan Slide[[id number]]                                                                  CBC Auto Differential[[id number]]        Abnormal            Final result                 Please view results for these tests on the individual orders.    CBC Auto Differential [[id number]]  (Abnormal) Collected: 04/12/21 0547    Specimen: Blood Updated: 04/12/21 0632     WBC 5.30 10*3/mm3      RBC 2.76 10*6/mm3      Hemoglobin 9.9 g/dL      Hematocrit 29.4 %      .5 fL      MCH 35.9 pg      MCHC 33.7 g/dL      RDW 13.0 %      RDW-SD 50.7 fl      MPV 10.5 fL      Platelets 247 10*3/mm3      Neutrophil % 39.4 %      Lymphocyte % 48.7 %      Monocyte % 9.8 %      Eosinophil % 0.9 %      Basophil % 0.8 %      Immature Grans % 0.4 %      Neutrophils, Absolute 2.09 10*3/mm3      Lymphocytes, Absolute 2.58 10*3/mm3      Monocytes, Absolute 0.52 10*3/mm3      Eosinophils, Absolute 0.05 10*3/mm3      Basophils, Absolute 0.04 10*3/mm3      Immature Grans, Absolute 0.02 10*3/mm3      nRBC 0.0 /100 WBC            I reviewed the  patient's new clinical results.  I reviewed the patient's new imaging results and agree with the interpretation.     ASSESSMENT/PLAN:   ASSESSMENT: 1.  Acute pancreatitis, patient has worsening symptoms with initiation of p.o. intake.  Likely due to alcohol usage.  2.  Elevated liver enzymes, improving.  3.  Diarrhea, improving.  Repeat stool studies were unremarkable.  4.  Anemia, recent GI work-up was unremarkable.  5.  Alcohol abuse  PLAN: 1.  Continue p.o. low-fat diet  2.  Continue PPI  3.  OK to D/C from GI standpoint  4.  Recommend alcohol abstinence  5.  CIWA protocol  6.  Follow-up in clinic in 2 weeks  The risks, benefits, and alternatives of this procedure have been discussed with the patient or the responsible party- the patient understands and agrees to proceed.         Mirian Mills MD  04/12/21  15:03 CDT

## 2021-04-13 ENCOUNTER — TRANSITIONAL CARE MANAGEMENT TELEPHONE ENCOUNTER (OUTPATIENT)
Dept: CALL CENTER | Facility: HOSPITAL | Age: 36
End: 2021-04-13

## 2021-04-13 NOTE — PAYOR COMM NOTE
"Lena Rucker  [facility]  P: [phone]  F: [phone]    [facility]#[id number]    Shawnee Springer (36 y.o. Female)     Date of Birth Social Security Number Address Home Phone MRN    1985  [address] [address] [phone] [id number]    Rastafarian Marital Status          Hindu        Admission Date Admission Type Admitting Provider Attending Provider Department, Room/Bed    4/5/21 Emergency Nims, Brissa Winchester MD  [facility] [address] [place], 332/1    Discharge Date Discharge Disposition Discharge Destination        4/12/2021 Home or Self Care              Attending Provider: (none)   Allergies: No Known Allergies    Isolation: None   Infection: None   Code Status: Prior    Ht: 172.7 cm (68\")   Wt: 60 kg (132 lb 4.8 oz)    Admission Cmt: None   Principal Problem: Alcohol-induced acute pancreatitis without infection or necrosis [K85.20] More...                 Active Insurance as of 4/5/2021     Primary Coverage     Payor Plan Insurance Group Employer/Plan Group    [place] [id number]     Payor Plan Address Payor Plan Phone Number Payor Plan Fax Number Effective Dates    [employer] [address] [address] [phone]  1/1/2020 - None Entered    [facility] [address]       Subscriber Name Subscriber Birth Date Member ID       SHAWNEE SPRINGER 1985 [id number]                 Emergency Contacts      (Rel.) Home Phone Work Phone Mobile Phone    UmuJorge irvin (Spouse) [phone] -- [phone]    OdellJoanna (Mother) [phone] -- [phone]    Odell Hilario (Father) [phone] -- [phone]            Discharge Summary    No notes of this type exist for this encounter.         Discharge Order (From admission, onward)     Start     Ordered    04/12/21 1301  Discharge patient  Once     Expected Discharge Date: 04/12/21    Expected Discharge Time: Midday    Discharge Disposition: " Home or Self Care    Physician of Record for Attribution - Please select from Treatment Team: FLOYD DE LA ROSA [[id number]]    Review needed by CMO to determine Physician of Record: No       Question Answer Comment   Physician of Record for Attribution - Please select from Treatment Team FLOYD DE LA ROSA    Review needed by CMO to determine Physician of Record No        04/12/21 1300

## 2021-04-13 NOTE — OUTREACH NOTE
Call Center TCM Note      Responses   [facility] patient discharged from?  [facility]   Does the patient have one of the following disease processes/diagnoses(primary or secondary)?  Other   TCM attempt successful?  Yes   Call start time  1306   Call end time  1308   General alerts for this patient  Call patient's cell phone for follow up calls.   Meds reviewed with patient/caregiver?  Yes   Is the patient having any side effects they believe may be caused by any medication additions or changes?  No   Does the patient have all medications ordered at discharge?  Yes   Is the patient taking all medications as directed (includes completed medication regime)?  Yes   Does the patient have a primary care provider?   Yes   Does the patient have an appointment with their PCP within 7 days of discharge?  Yes   Comments regarding PCP  Patient has a followup scheduled on 4/202/2021 and 4/26/2021 with GI    Has the patient kept scheduled appointments due by today?  N/A   Has home health visited the patient within 72 hours of discharge?  N/A   Psychosocial issues?  No   Did the patient receive a copy of their discharge instructions?  Yes   Nursing interventions  Reviewed instructions with patient   What is the patient's perception of their health status since discharge?  Improving   Is the patient/caregiver able to teach back signs and symptoms related to disease process for when to call PCP?  Yes   Is the patient/caregiver able to teach back signs and symptoms related to disease process for when to call 911?  Yes   Is the patient/caregiver able to teach back the hierarchy of who to call/visit for symptoms/problems? PCP, Specialist, Home health nurse, Urgent Care, ED, 911  Yes   If the patient is a current smoker, are they able to teach back resources for cessation?  Smoking cessation medications   TCM call completed?  Yes          Oscar Bowens RN    4/13/2021, 13:08 CDT

## 2021-04-20 NOTE — DISCHARGE SUMMARY
DISCHARGE SUMMARY    PATIENT NAME: Nata Bain       PHYSICIAN: Zeeshan Wiggins MD  : 1985  MRN: [id number]    ADMITTED: 2021     DISCHARGED: 2021    ADMISSION DIAGNOSES:  Active Hospital Problems    Diagnosis  POA   • **Alcohol-induced acute pancreatitis without infection or necrosis [K85.20]  Yes   • Elevated liver enzymes [R74.8]  Yes   • Thrombocytopenia (CMS/HCC) [D69.6]  Yes   • Functional diarrhea [K59.1]  Yes   • Thiamine deficiency [E51.9]  Yes   • Moderate malnutrition (CMS/HCC) [E44.0]  Yes   • Hypomagnesemia [E83.42]  Yes   • Benzodiazepine dependence (CMS/HCC) [F13.20]  Yes   • Alcohol dependence with uncomplicated withdrawal (CMS/HCC) [F10.230]  Yes   • Tobacco abuse [Z72.0]  Yes   • Anxiety and depression [F41.9, F32.9]  Yes   • Polysubstance (including opioids) dependence, daily use (CMS/HCC) [F11.20, F19.20]  Yes   • Hypokalemia [E87.6]  No      Resolved Hospital Problems    Diagnosis Date Resolved POA   • Melena [K92.1] 2021 No   • Acute alcoholic pancreatitis [K85.20] 2021 Yes     DISCHARGE DIAGNOSES:   Active Hospital Problems    Diagnosis  POA   • **Alcohol-induced acute pancreatitis without infection or necrosis [K85.20]  Yes   • Elevated liver enzymes [R74.8]  Yes   • Thrombocytopenia (CMS/HCC) [D69.6]  Yes   • Functional diarrhea [K59.1]  Yes   • Thiamine deficiency [E51.9]  Yes   • Moderate malnutrition (CMS/HCC) [E44.0]  Yes   • Hypomagnesemia [E83.42]  Yes   • Benzodiazepine dependence (CMS/HCC) [F13.20]  Yes   • Alcohol dependence with uncomplicated withdrawal (CMS/HCC) [F10.230]  Yes   • Tobacco abuse [Z72.0]  Yes   • Anxiety and depression [F41.9, F32.9]  Yes   • Polysubstance (including opioids) dependence, daily use (CMS/HCC) [F11.20, F19.20]  Yes   • Hypokalemia [E87.6]  No      Resolved Hospital Problems    Diagnosis Date Resolved POA   • Melena [K92.1] 2021 No   • Acute alcoholic pancreatitis [K85.20] 2021 Yes       SERVICE:  "Family Medicine Residency  Attending: Brissa Jeffrey MD  Resident: Zeeshan Wiggins MD    CONSULTS:   Consult Orders (all) (From admission, onward)     Start     Ordered    04/08/21 1259  Inpatient Case Management  Consult  Once     Provider:  (Not yet assigned)    04/08/21 1259    04/07/21 0835  Inpatient Gastroenterology Consult  Once     Specialty:  Gastroenterology  Provider:  Jose Mayer DO    04/07/21 0834                PROCEDURES:   N/A    HISTORY OF PRESENT ILLNESS:   Per Dr. Galdamez's H&P at admission on 4/5/2021:  \"Nata Bain is a 36 y.o. female with a CMH of alcohol abuse, previous opioid use currently receiving Suboxone treatment, anxiety, depression who presents complaining of abdominal pain that started 2 days ago.  Describes it as a sharp, squeezing epigastric pain that shoots down to her periumbilical region and to her hips.  States that the slightest movement will exacerbate her abdominal pain.  She has been admitted for pancreatitis 5 times previously.  Patient states that she drinks a pint of rum daily.\"    DIAGNOSTIC DATA:   Lab Results (last 48 hours)     Procedure Component Value Units Date/Time    Comprehensive Metabolic Panel [[id number]]  (Abnormal) Collected: 04/12/21 0547    Specimen: Blood Updated: 04/12/21 0644     Glucose 90 mg/dL      BUN 4 mg/dL      Creatinine 0.49 mg/dL      Sodium 140 mmol/L      Potassium 3.9 mmol/L      Chloride 108 mmol/L      CO2 27.0 mmol/L      Calcium 9.2 mg/dL      Total Protein 5.7 g/dL      Albumin 2.80 g/dL      ALT (SGPT) 38 U/L      AST (SGOT) 49 U/L      Alkaline Phosphatase 85 U/L      Total Bilirubin 0.2 mg/dL      eGFR Non African Amer 144 mL/min/1.73      Globulin 2.9 gm/dL      A/G Ratio 1.0 g/dL      BUN/Creatinine Ratio 8.2     Anion Gap 5.0 mmol/L     Narrative:      GFR Normal >60  Chronic Kidney Disease <60  Kidney Failure <15      Lipase [[id number]]  (Abnormal) Collected: 04/12/21 0547    Specimen: Blood " Updated: 04/12/21 0642     Lipase 170 U/L     Magnesium [[id number]]  (Normal) Collected: 04/12/21 0547    Specimen: Blood Updated: 04/12/21 0642     Magnesium 1.7 mg/dL     CBC & Differential [[id number]]  (Abnormal) Collected: 04/12/21 0547    Specimen: Blood Updated: 04/12/21 0632    Narrative:      The following orders were created for panel order CBC & Differential.  Procedure                               Abnormality         Status                     ---------                               -----------         ------                     Scan Slide[[id number]]                                                                  CBC Auto Differential[[id number]]        Abnormal            Final result                 Please view results for these tests on the individual orders.    CBC Auto Differential [[id number]]  (Abnormal) Collected: 04/12/21 0547    Specimen: Blood Updated: 04/12/21 0632     WBC 5.30 10*3/mm3      RBC 2.76 10*6/mm3      Hemoglobin 9.9 g/dL      Hematocrit 29.4 %      .5 fL      MCH 35.9 pg      MCHC 33.7 g/dL      RDW 13.0 %      RDW-SD 50.7 fl      MPV 10.5 fL      Platelets 247 10*3/mm3      Neutrophil % 39.4 %      Lymphocyte % 48.7 %      Monocyte % 9.8 %      Eosinophil % 0.9 %      Basophil % 0.8 %      Immature Grans % 0.4 %      Neutrophils, Absolute 2.09 10*3/mm3      Lymphocytes, Absolute 2.58 10*3/mm3      Monocytes, Absolute 0.52 10*3/mm3      Eosinophils, Absolute 0.05 10*3/mm3      Basophils, Absolute 0.04 10*3/mm3      Immature Grans, Absolute 0.02 10*3/mm3      nRBC 0.0 /100 WBC     Comprehensive Metabolic Panel [[id number]]  (Abnormal) Collected: 04/11/21 0600    Specimen: Blood Updated: 04/11/21 0711     Glucose 79 mg/dL      BUN 2 mg/dL      Creatinine <0.47 mg/dL      Sodium 141 mmol/L      Potassium 3.4 mmol/L      Chloride 107 mmol/L      CO2 26.0 mmol/L      Calcium 9.0 mg/dL      Total Protein 6.0 g/dL      Albumin 3.10 g/dL      ALT (SGPT) 35 U/L      AST (SGOT) 56 U/L       Alkaline Phosphatase 93 U/L      Total Bilirubin 0.3 mg/dL      Globulin 2.9 gm/dL      A/G Ratio 1.1 g/dL      BUN/Creatinine Ratio --     Comment: Unable to calculate Bun/Crea Ratio.        Anion Gap 8.0 mmol/L     Narrative:      GFR Normal >60  Chronic Kidney Disease <60  Kidney Failure <15      Lipase [[id number]]  (Abnormal) Collected: 04/11/21 0600    Specimen: Blood Updated: 04/11/21 0709     Lipase 203 U/L     Magnesium [[id number]]  (Normal) Collected: 04/11/21 0600    Specimen: Blood Updated: 04/11/21 0709     Magnesium 1.7 mg/dL     CBC & Differential [[id number]]  (Abnormal) Collected: 04/11/21 0600    Specimen: Blood Updated: 04/11/21 0641    Narrative:      The following orders were created for panel order CBC & Differential.  Procedure                               Abnormality         Status                     ---------                               -----------         ------                     CBC Auto Differential[[id number]]        Abnormal            Final result                 Please view results for these tests on the individual orders.    CBC Auto Differential [[id number]]  (Abnormal) Collected: 04/11/21 0600    Specimen: Blood Updated: 04/11/21 0641     WBC 6.20 10*3/mm3      RBC 2.85 10*6/mm3      Hemoglobin 10.5 g/dL      Hematocrit 29.8 %      .6 fL      MCH 36.8 pg      MCHC 35.2 g/dL      RDW 13.1 %      RDW-SD 50.1 fl      MPV 10.1 fL      Platelets 229 10*3/mm3      Neutrophil % 45.4 %      Lymphocyte % 44.4 %      Monocyte % 8.4 %      Eosinophil % 1.0 %      Basophil % 0.5 %      Immature Grans % 0.3 %      Neutrophils, Absolute 2.82 10*3/mm3      Lymphocytes, Absolute 2.75 10*3/mm3      Monocytes, Absolute 0.52 10*3/mm3      Eosinophils, Absolute 0.06 10*3/mm3      Basophils, Absolute 0.03 10*3/mm3      Immature Grans, Absolute 0.02 10*3/mm3      nRBC 0.0 /100 WBC              HOSPITAL COURSE:  Pt was treated for alcoholic pancreatitis. She was initially placed on a NPO  diet and received a high rate of IV hydration. Lipase was monitored throughout her admission. She was also continued on oral Vancomycin for her presumed C. Diff diarrhea that had failed outpatient treatment. C. Diff toxin was also ordered to assess for this infection. Once this returned negative vancomycin was discontinued. Diet was advanced as tolerated secondary, she was able to return to normal PO diet prior to discharge. GI was also consulted and Dr. Mills followed the Pt. Hepatitis panel and iron panel returned normal. She was started on Bentyl for her abdominal pain and had a very good response to this medication. She was advised on need to stop drinking due to her alcoholism being the major cause of her pancreatitis.  was consulted but Pt refused any interventions or rehab programs offered. She was prescribed Valium for alcohol withdrawal and this medication was prescribed for her upon discharge. She was advised for close follow up with GI and PCP, for further workup. She expressed agreement with this. Due to medical stabilization she was cleared for discharge. Advised to return to ED if symptoms return.   DISCHARGE CONDITION:   stable    DISPOSITION:  Home or Self Care    DISCHARGE MEDICATIONS     Discharge Medications      New Medications      Instructions Start Date   dicyclomine 20 MG tablet  Commonly known as: BENTYL   20 mg, Oral, 2 Times Daily         Continue These Medications      Instructions Start Date   acamprosate 333 MG EC tablet  Commonly known as: CAMPRAL  Notes to patient: Take as directed   No dose, route, or frequency recorded.      buprenorphine-naloxone 8-2 MG per SL tablet  Commonly known as: SUBOXONE  Notes to patient: Take as directed   1 tablet, Sublingual, 3 Times Daily, Patient takes one 8-2 tablet three times a day.      cloNIDine 0.1 MG tablet  Commonly known as: CATAPRES   0.1 mg, Oral, 3 Times Daily      cyanocobalamin 1000 MCG tablet  Commonly known as: VITAMIN  B-12   1,000 mcg, Oral, Daily      FLUoxetine 40 MG capsule  Commonly known as: PROzac   40 mg, Oral, Daily      folic acid 1 MG tablet  Commonly known as: FOLVITE   1 mg, Oral, Daily      gabapentin 800 MG tablet  Commonly known as: NEURONTIN   800 mg, Oral, 3 Times Daily      pantoprazole 40 MG EC tablet  Commonly known as: Protonix   40 mg, Oral, Daily      prazosin 5 MG capsule  Commonly known as: MINIPRESS   5 mg, Oral, Nightly      promethazine 25 MG suppository  Commonly known as: PHENERGAN   25 mg, Rectal, Every 6 Hours PRN      sucralfate 1 g tablet  Commonly known as: CARAFATE  Notes to patient: Take as directed   No dose, route, or frequency recorded.      thiamine 100 MG tablet tablet  Commonly known as: VITAMIN B-1   100 mg, Oral, Daily      vancomycin 125 MG capsule  Commonly known as: VANCOCIN  Notes to patient: Take as directed   1 capsule by mouth four times a day for 14 days. Then 1 capsule 2 times a day for 7 days. Then 1 capsule daily for 7 days.  1 capsule every 3 days for 28 days.         Stop These Medications    diazePAM 5 MG tablet  Commonly known as: VALIUM        ASK your doctor about these medications      Instructions Start Date   diazePAM 10 MG tablet  Commonly known as: VALIUM  Ask about: Should I take this medication?   10 mg, Oral, Every 8 Hours             INSTRUCTIONS:  Activity:   Activity Instructions     Activity as Tolerated          Diet:   Diet Instructions     Diet: Regular      Discharge Diet: Regular          FOLLOW UP:   Additional Instructions for the Follow-ups that You Need to Schedule     Discharge Follow-up with PCP   As directed       Currently Documented PCP:    Zeeshan Wiggins MD    PCP Phone Number:    [phone]     Follow Up Details: 1 week         Discharge Follow-up with Specified Provider: Dr. Mills; 2 Weeks   As directed      To: Dr. Mills    Follow Up: 2 Weeks           Follow-up Information     Zeeshan Wiggins MD. Go on 4/20/2021.    Specialties:  Family Medicine, Emergency Medicine  Why: TUESDAY APRIL 20TH 2:15 HOSPITAL FOLLOW UP APPOINTMENT  Contact information:  [address]   [place] [address]  [phone]             Mirian Mills MD. Go on 4/26/2021.    Specialty: Gastroenterology  Why: MONDAY APRIL 26TH 9:45 HOSPITAL FOLLOW UP APPOINTMENT  Contact information:  [address] [place] [address]  [phone]             Zeeshan Wiggins MD .    Specialties: Family Medicine, Emergency Medicine  Contact information:  [address]   [place] [address]  [phone]                   PENDING TEST RESULTS AT DISCHARGE      Time: >30 minutes were spent in discharge planning, medication reconciliation and coordination of care for this patient.    Brissa Jeffrey MD is the attending at time of discharge, She is aware of the patient's status and agrees with the above discharge summary.          This document has been electronically signed by Zeeshan Wiggins MD on April 20, 2021 16:26 CDT        '

## 2021-04-21 ENCOUNTER — READMISSION MANAGEMENT (OUTPATIENT)
Dept: CALL CENTER | Facility: HOSPITAL | Age: 36
End: 2021-04-21

## 2021-04-21 NOTE — OUTREACH NOTE
Medical Week 2 Survey      Responses   [facility] patient discharged from?  [place]   Does the patient have one of the following disease processes/diagnoses(primary or secondary)?  Other   Week 2 attempt successful?  No   Unsuccessful attempts  Attempt 1          Charlene Rand RN

## 2021-04-23 ENCOUNTER — READMISSION MANAGEMENT (OUTPATIENT)
Dept: CALL CENTER | Facility: HOSPITAL | Age: 36
End: 2021-04-23

## 2021-04-23 NOTE — OUTREACH NOTE
Medical Week 2 Survey      Responses   [facility] patient discharged from?  [place]   Does the patient have one of the following disease processes/diagnoses(primary or secondary)?  Other   Week 2 attempt successful?  No   Unsuccessful attempts  Attempt 2          Freda Giles RN

## 2021-04-29 ENCOUNTER — READMISSION MANAGEMENT (OUTPATIENT)
Dept: CALL CENTER | Facility: HOSPITAL | Age: 36
End: 2021-04-29

## 2021-04-29 NOTE — OUTREACH NOTE
Medical Week 3 Survey      Responses   [facility] patient discharged from?  [place]   Does the patient have one of the following disease processes/diagnoses(primary or secondary)?  Other   Week 3 attempt successful?  No   Unsuccessful attempts  Attempt 2          Charlene Rand RN

## 2021-05-10 LAB
QT INTERVAL: 346 MS
QTC INTERVAL: 465 MS

## 2021-06-03 ENCOUNTER — APPOINTMENT (OUTPATIENT)
Dept: CT IMAGING | Facility: HOSPITAL | Age: 36
End: 2021-06-03

## 2021-06-03 ENCOUNTER — APPOINTMENT (OUTPATIENT)
Dept: GENERAL RADIOLOGY | Facility: HOSPITAL | Age: 36
End: 2021-06-03

## 2021-06-03 ENCOUNTER — HOSPITAL ENCOUNTER (EMERGENCY)
Facility: HOSPITAL | Age: 36
Discharge: HOME | End: 2021-06-03
Attending: STUDENT IN AN ORGANIZED HEALTH CARE EDUCATION/TRAINING PROGRAM | Admitting: EMERGENCY MEDICINE

## 2021-06-03 VITALS
WEIGHT: 132.28 LBS | HEIGHT: 65 IN | TEMPERATURE: 98.6 F | RESPIRATION RATE: 24 BRPM | BODY MASS INDEX: 22.04 KG/M2 | SYSTOLIC BLOOD PRESSURE: 129 MMHG | HEART RATE: 98 BPM | OXYGEN SATURATION: 97 % | DIASTOLIC BLOOD PRESSURE: 88 MMHG

## 2021-06-03 DIAGNOSIS — R11.2 NON-INTRACTABLE VOMITING WITH NAUSEA, UNSPECIFIED VOMITING TYPE: ICD-10-CM

## 2021-06-03 DIAGNOSIS — R10.13 EPIGASTRIC PAIN: Primary | ICD-10-CM

## 2021-06-03 DIAGNOSIS — F10.920 ALCOHOLIC INTOXICATION WITHOUT COMPLICATION (HCC): ICD-10-CM

## 2021-06-03 LAB
ALBUMIN SERPL-MCNC: 4.2 G/DL (ref 3.5–5.2)
ALBUMIN/GLOB SERPL: 1.4 G/DL
ALP SERPL-CCNC: 133 U/L (ref 39–117)
ALT SERPL W P-5'-P-CCNC: 143 U/L (ref 1–33)
AMPHET+METHAMPHET UR QL: NEGATIVE
AMPHETAMINES UR QL: NEGATIVE
ANION GAP SERPL CALCULATED.3IONS-SCNC: 13 MMOL/L (ref 5–15)
AST SERPL-CCNC: 211 U/L (ref 1–32)
B-HCG UR QL: NEGATIVE
BARBITURATES UR QL SCN: NEGATIVE
BASOPHILS # BLD AUTO: 0.04 10*3/MM3 (ref 0–0.2)
BASOPHILS NFR BLD AUTO: 0.5 % (ref 0–1.5)
BENZODIAZ UR QL SCN: POSITIVE
BILIRUB SERPL-MCNC: 0.2 MG/DL (ref 0–1.2)
BILIRUB UR QL STRIP: NEGATIVE
BUN SERPL-MCNC: 17 MG/DL (ref 6–20)
BUN/CREAT SERPL: 25 (ref 7–25)
BUPRENORPHINE SERPL-MCNC: POSITIVE NG/ML
CALCIUM SPEC-SCNC: 9.1 MG/DL (ref 8.6–10.5)
CANNABINOIDS SERPL QL: NEGATIVE
CHLORIDE SERPL-SCNC: 97 MMOL/L (ref 98–107)
CLARITY UR: CLEAR
CO2 SERPL-SCNC: 21 MMOL/L (ref 22–29)
COCAINE UR QL: NEGATIVE
COLOR UR: YELLOW
CREAT SERPL-MCNC: 0.68 MG/DL (ref 0.57–1)
DEPRECATED RDW RBC AUTO: 40.8 FL (ref 37–54)
EOSINOPHIL # BLD AUTO: 0.02 10*3/MM3 (ref 0–0.4)
EOSINOPHIL NFR BLD AUTO: 0.2 % (ref 0.3–6.2)
ERYTHROCYTE [DISTWIDTH] IN BLOOD BY AUTOMATED COUNT: 11.7 % (ref 12.3–15.4)
ETHANOL BLD-MCNC: 327 MG/DL (ref 0–10)
ETHANOL UR QL: 0.33 %
FLUAV RNA RESP QL NAA+PROBE: NOT DETECTED
FLUBV RNA RESP QL NAA+PROBE: NOT DETECTED
GFR SERPL CREATININE-BSD FRML MDRD: 98 ML/MIN/1.73
GLOBULIN UR ELPH-MCNC: 3.1 GM/DL
GLUCOSE SERPL-MCNC: 114 MG/DL (ref 65–99)
GLUCOSE UR STRIP-MCNC: NEGATIVE MG/DL
HCT VFR BLD AUTO: 39.8 % (ref 34–46.6)
HGB BLD-MCNC: 14.7 G/DL (ref 12–15.9)
HGB UR QL STRIP.AUTO: NEGATIVE
HOLD SPECIMEN: NORMAL
HOLD SPECIMEN: NORMAL
IMM GRANULOCYTES # BLD AUTO: 0.02 10*3/MM3 (ref 0–0.05)
IMM GRANULOCYTES NFR BLD AUTO: 0.2 % (ref 0–0.5)
KETONES UR QL STRIP: NEGATIVE
LEUKOCYTE ESTERASE UR QL STRIP.AUTO: NEGATIVE
LIPASE SERPL-CCNC: 41 U/L (ref 13–60)
LYMPHOCYTES # BLD AUTO: 5.14 10*3/MM3 (ref 0.7–3.1)
LYMPHOCYTES # BLD MANUAL: 4.77 10*3/MM3 (ref 0.7–3.1)
LYMPHOCYTES NFR BLD AUTO: 61.4 % (ref 19.6–45.3)
LYMPHOCYTES NFR BLD MANUAL: 57 % (ref 19.6–45.3)
LYMPHOCYTES NFR BLD MANUAL: 6 % (ref 5–12)
MAGNESIUM SERPL-MCNC: 1.7 MG/DL (ref 1.6–2.6)
MCH RBC QN AUTO: 34.9 PG (ref 26.6–33)
MCHC RBC AUTO-ENTMCNC: 36.9 G/DL (ref 31.5–35.7)
MCV RBC AUTO: 94.5 FL (ref 79–97)
METAMYELOCYTES NFR BLD MANUAL: 1 % (ref 0–0)
METHADONE UR QL SCN: NEGATIVE
MONOCYTES # BLD AUTO: 0.5 10*3/MM3 (ref 0.1–0.9)
MONOCYTES # BLD AUTO: 0.5 10*3/MM3 (ref 0.1–0.9)
MONOCYTES NFR BLD AUTO: 6 % (ref 5–12)
NEUTROPHILS # BLD AUTO: 3.01 10*3/MM3 (ref 1.7–7)
NEUTROPHILS NFR BLD AUTO: 2.65 10*3/MM3 (ref 1.7–7)
NEUTROPHILS NFR BLD AUTO: 31.7 % (ref 42.7–76)
NEUTROPHILS NFR BLD MANUAL: 36 % (ref 42.7–76)
NITRITE UR QL STRIP: NEGATIVE
NRBC BLD AUTO-RTO: 0 /100 WBC (ref 0–0.2)
OPIATES UR QL: NEGATIVE
OXYCODONE UR QL SCN: NEGATIVE
PCP UR QL SCN: NEGATIVE
PH UR STRIP.AUTO: 6 [PH] (ref 5–9)
PLAT MORPH BLD: NORMAL
PLATELET # BLD AUTO: 173 10*3/MM3 (ref 140–450)
PMV BLD AUTO: 9.7 FL (ref 6–12)
POTASSIUM SERPL-SCNC: 3.4 MMOL/L (ref 3.5–5.2)
PROPOXYPH UR QL: NEGATIVE
PROT SERPL-MCNC: 7.3 G/DL (ref 6–8.5)
PROT UR QL STRIP: NEGATIVE
RBC # BLD AUTO: 4.21 10*6/MM3 (ref 3.77–5.28)
RBC MORPH BLD: NORMAL
SARS-COV-2 RNA RESP QL NAA+PROBE: NOT DETECTED
SODIUM SERPL-SCNC: 131 MMOL/L (ref 136–145)
SP GR UR STRIP: 1 (ref 1–1.03)
TRICYCLICS UR QL SCN: NEGATIVE
UROBILINOGEN UR QL STRIP: NORMAL
WBC # BLD AUTO: 8.37 10*3/MM3 (ref 3.4–10.8)
WBC MORPH BLD: NORMAL
WHOLE BLOOD HOLD SPECIMEN: NORMAL

## 2021-06-03 PROCEDURE — 96376 TX/PRO/DX INJ SAME DRUG ADON: CPT

## 2021-06-03 PROCEDURE — 96375 TX/PRO/DX INJ NEW DRUG ADDON: CPT

## 2021-06-03 PROCEDURE — 83735 ASSAY OF MAGNESIUM: CPT | Performed by: PHYSICIAN ASSISTANT

## 2021-06-03 PROCEDURE — 81003 URINALYSIS AUTO W/O SCOPE: CPT | Performed by: PHYSICIAN ASSISTANT

## 2021-06-03 PROCEDURE — 83690 ASSAY OF LIPASE: CPT | Performed by: PHYSICIAN ASSISTANT

## 2021-06-03 PROCEDURE — 25010000002 IOPAMIDOL 61 % SOLUTION: Performed by: EMERGENCY MEDICINE

## 2021-06-03 PROCEDURE — 96361 HYDRATE IV INFUSION ADD-ON: CPT

## 2021-06-03 PROCEDURE — 71045 X-RAY EXAM CHEST 1 VIEW: CPT

## 2021-06-03 PROCEDURE — 81025 URINE PREGNANCY TEST: CPT | Performed by: EMERGENCY MEDICINE

## 2021-06-03 PROCEDURE — 85025 COMPLETE CBC W/AUTO DIFF WBC: CPT | Performed by: PHYSICIAN ASSISTANT

## 2021-06-03 PROCEDURE — 87636 SARSCOV2 & INF A&B AMP PRB: CPT | Performed by: PHYSICIAN ASSISTANT

## 2021-06-03 PROCEDURE — 85007 BL SMEAR W/DIFF WBC COUNT: CPT | Performed by: PHYSICIAN ASSISTANT

## 2021-06-03 PROCEDURE — 25010000002 LORAZEPAM PER 2 MG: Performed by: PHYSICIAN ASSISTANT

## 2021-06-03 PROCEDURE — 25010000002 LORAZEPAM PER 2 MG: Performed by: EMERGENCY MEDICINE

## 2021-06-03 PROCEDURE — 25010000002 ONDANSETRON PER 1 MG: Performed by: PHYSICIAN ASSISTANT

## 2021-06-03 PROCEDURE — 99283 EMERGENCY DEPT VISIT LOW MDM: CPT

## 2021-06-03 PROCEDURE — 80053 COMPREHEN METABOLIC PANEL: CPT | Performed by: PHYSICIAN ASSISTANT

## 2021-06-03 PROCEDURE — 25010000002 HYDROMORPHONE 1 MG/ML SOLUTION: Performed by: EMERGENCY MEDICINE

## 2021-06-03 PROCEDURE — 80306 DRUG TEST PRSMV INSTRMNT: CPT | Performed by: PHYSICIAN ASSISTANT

## 2021-06-03 PROCEDURE — 93005 ELECTROCARDIOGRAM TRACING: CPT | Performed by: PHYSICIAN ASSISTANT

## 2021-06-03 PROCEDURE — 96374 THER/PROPH/DIAG INJ IV PUSH: CPT

## 2021-06-03 PROCEDURE — 74177 CT ABD & PELVIS W/CONTRAST: CPT

## 2021-06-03 PROCEDURE — 93010 ELECTROCARDIOGRAM REPORT: CPT | Performed by: INTERNAL MEDICINE

## 2021-06-03 PROCEDURE — 82077 ASSAY SPEC XCP UR&BREATH IA: CPT | Performed by: PHYSICIAN ASSISTANT

## 2021-06-03 RX ORDER — ONDANSETRON 2 MG/ML
4 INJECTION INTRAMUSCULAR; INTRAVENOUS ONCE
Status: COMPLETED | OUTPATIENT
Start: 2021-06-03 | End: 2021-06-03

## 2021-06-03 RX ORDER — SODIUM CHLORIDE 0.9 % (FLUSH) 0.9 %
10 SYRINGE (ML) INJECTION AS NEEDED
Status: DISCONTINUED | OUTPATIENT
Start: 2021-06-03 | End: 2021-06-04 | Stop reason: HOSPADM

## 2021-06-03 RX ORDER — LORAZEPAM 2 MG/ML
1 INJECTION INTRAMUSCULAR ONCE
Status: COMPLETED | OUTPATIENT
Start: 2021-06-03 | End: 2021-06-03

## 2021-06-03 RX ADMIN — HYDROMORPHONE HYDROCHLORIDE 1 MG: 1 INJECTION, SOLUTION INTRAMUSCULAR; INTRAVENOUS; SUBCUTANEOUS at 19:26

## 2021-06-03 RX ADMIN — LORAZEPAM 1 MG: 2 INJECTION INTRAMUSCULAR; INTRAVENOUS at 21:56

## 2021-06-03 RX ADMIN — LORAZEPAM 1 MG: 2 INJECTION INTRAMUSCULAR; INTRAVENOUS at 20:26

## 2021-06-03 RX ADMIN — IOPAMIDOL 90 ML: 612 INJECTION, SOLUTION INTRAVENOUS at 21:09

## 2021-06-03 RX ADMIN — SODIUM CHLORIDE 1000 ML: 9 INJECTION, SOLUTION INTRAVENOUS at 19:26

## 2021-06-03 RX ADMIN — ONDANSETRON 4 MG: 2 INJECTION INTRAMUSCULAR; INTRAVENOUS at 19:26

## 2021-06-04 ENCOUNTER — TELEPHONE (OUTPATIENT)
Dept: FAMILY MEDICINE CLINIC | Facility: CLINIC | Age: 36
End: 2021-06-04

## 2021-06-04 NOTE — ED PROVIDER NOTES
Subjective   Presents to emergency department for abdominal pain, nausea/vomiting, chest pain, cough.  States she has had a bad cough for the past 3 to 4 days and abdominal pain, nausea/vomiting.  Last drink of ETOH was yesterday (shots of vodka).  She takes suboxone.        History provided by:  Patient   used: No        Review of Systems   Constitutional: Negative for chills and fever.   HENT: Negative for sore throat and trouble swallowing.    Eyes: Negative for visual disturbance.   Respiratory: Positive for cough and shortness of breath.    Cardiovascular: Positive for chest pain.   Gastrointestinal: Positive for abdominal distention and abdominal pain.   Genitourinary: Negative for dysuria and flank pain.   Musculoskeletal: Negative for back pain.   Skin: Negative for rash.   Allergic/Immunologic: Negative for immunocompromised state.   Neurological: Negative for syncope and weakness.   Hematological: Does not bruise/bleed easily.   Psychiatric/Behavioral: The patient is nervous/anxious.        Past Medical History:   Diagnosis Date   • Abnormal Pap smear of cervix    • Alcohol-induced acute pancreatitis 6/1/2020    Results From Last 14 Days Lab Units 02/27/21 1342 LIPASE U/L 65*  -advance diet as tolerated starting with clears -IV fluids   -will cont suboxone and give morphine for breakthrough pain  -Zofran for nausea as needed -Scheduled Ativan, and CIWA protocol - f/u on CT abdomen     • Alcoholism (CMS/HCC)    • Anxiety    • Cervical dysplasia    • Depression    • Fibrocystic breast    • GERD (gastroesophageal reflux disease)    • Hypertension    • Seizures (CMS/HCC) 2017   • Substance abuse (CMS/HCC)    • Substance abuse (CMS/HCC)        No Known Allergies    Past Surgical History:   Procedure Laterality Date   • COLONOSCOPY N/A 2/2/2021    Procedure: COLONOSCOPY;  Surgeon: Mirian Mills MD;  Location: [facility] ENDOSCOPY;  Service: Gastroenterology;  Laterality: N/A;   • ENDOSCOPY  "N/A 1/8/2021    Procedure: ESOPHAGOGASTRODUODENOSCOPY;  Surgeon: Mirian Mills MD;  Location: [facility] ENDOSCOPY;  Service: Gastroenterology;  Laterality: N/A;   • RHINOPLASTY         Family History   Problem Relation Age of Onset   • Breast cancer Mother    • Cancer Mother    • COPD Mother    • Breast cancer Maternal Grandmother    • COPD Maternal Grandmother    • Heart disease Maternal Grandmother    • Breast cancer Paternal Grandmother    • Breast cancer Maternal Aunt    • Hypertension Father    • Heart disease Father        Social History     Socioeconomic History   • Marital status:      Spouse name: Not on file   • Number of children: Not on file   • Years of education: Not on file   • Highest education level: Not on file   Tobacco Use   • Smoking status: Current Every Day Smoker     Packs/day: 1.00     Years: 20.00     Pack years: 20.00     Types: Cigarettes   • Smokeless tobacco: Never Used   Substance and Sexual Activity   • Alcohol use: Yes     Alcohol/week: 6.0 standard drinks     Types: 6 Shots of liquor per week     Comment: daily   • Drug use: Not Currently     Types: Fentanyl, Oxycodone     Comment: hx of abuse   • Sexual activity: Defer           Objective      /88   Pulse 98   Temp 98.6 °F (37 °C) (Infrared)   Resp 24   Ht 165.1 cm (65\")   Wt 60 kg (132 lb 4.4 oz)   SpO2 97%   BMI 22.01 kg/m²     Physical Exam  Vitals and nursing note reviewed.   Constitutional:       Appearance: Normal appearance. She is ill-appearing.   HENT:      Head: Normocephalic and atraumatic.      Mouth/Throat:      Pharynx: Oropharynx is clear.   Eyes:      Conjunctiva/sclera: Conjunctivae normal.   Cardiovascular:      Rate and Rhythm: Regular rhythm. Tachycardia present.      Pulses: Normal pulses.      Heart sounds: Normal heart sounds.   Pulmonary:      Effort: Pulmonary effort is normal. No respiratory distress.      Breath sounds: Normal breath sounds. No wheezing.   Abdominal:      General: " There is distension.      Tenderness: There is abdominal tenderness (epigastric).   Skin:     General: Skin is warm.      Capillary Refill: Capillary refill takes less than 2 seconds.   Neurological:      General: No focal deficit present.      Mental Status: She is alert.   Psychiatric:         Behavior: Behavior normal.         Thought Content: Thought content normal.      Comments: Anxious, tearful           ECG 12 Lead      Date/Time: 6/3/2021 7:40 PM  Performed by: Phoenix Feldman PA-C  Authorized by: Phoenix Feldman PA-C   Interpreted by physician  Comparison: compared with previous ECG from 4/5/2021  Similar to previous ECG  Rhythm: sinus tachycardia  Rate: tachycardic  BPM: 126  ST Segments: ST segments normal  Clinical impression: non-specific ECG                 ED Course  ED Course as of Jun 03 2333   Thu Jun 03, 2021 2102 Patient care transferred to Dr Saenz at end of shift.      [LEATHA]      ED Course User Index  [LEATHA] Phoenix Feldman PA-C      Labs Reviewed   COMPREHENSIVE METABOLIC PANEL - Abnormal; Notable for the following components:       Result Value    Glucose 114 (*)     Sodium 131 (*)     Potassium 3.4 (*)     Chloride 97 (*)     CO2 21.0 (*)     ALT (SGPT) 143 (*)     AST (SGOT) 211 (*)     Alkaline Phosphatase 133 (*)     All other components within normal limits    Narrative:     GFR Normal >60  Chronic Kidney Disease <60  Kidney Failure <15     URINE DRUG SCREEN - Abnormal; Notable for the following components:    Benzodiazepine Screen, Urine Positive (*)     Buprenorphine, Screen, Urine Positive (*)     All other components within normal limits    Narrative:     Cutoff For Drugs Screened:    Amphetamines               500 ng/ml  Barbiturates               200 ng/ml  Benzodiazepines            150 ng/ml  Cocaine                    150 ng/ml  Methadone                  200 ng/ml  Opiates                    100 ng/ml  Phencyclidine               25 ng/ml  THC                             50 ng/ml  Methamphetamine            500 ng/ml  Tricyclic Antidepressants  300 ng/ml  Oxycodone                  100 ng/ml  Propoxyphene               300 ng/ml  Buprenorphine               10 ng/ml    The normal value for all drugs tested is negative. This report includes unconfirmed screening results, with the cutoff values listed, to be used for medical treatment purposes only.  Unconfirmed results must not be used for non-medical purposes such as employment or legal testing.  Clinical consideration should be applied to any drug of abuse test, particularly when unconfirmed results are used.     CBC WITH AUTO DIFFERENTIAL - Abnormal; Notable for the following components:    MCH 34.9 (*)     MCHC 36.9 (*)     RDW 11.7 (*)     Neutrophil % 31.7 (*)     Lymphocyte % 61.4 (*)     Eosinophil % 0.2 (*)     Lymphocytes, Absolute 5.14 (*)     All other components within normal limits   ETHANOL - Abnormal; Notable for the following components:    Ethanol 327 (*)     All other components within normal limits   MANUAL DIFFERENTIAL - Abnormal; Notable for the following components:    Neutrophil % 36.0 (*)     Lymphocyte % 57.0 (*)     Metamyelocyte % 1.0 (*)     Lymphocytes Absolute 4.77 (*)     All other components within normal limits   COVID-19 AND FLU A/B, NP SWAB IN TRANSPORT MEDIA 8-12 HR TAT - Normal    Narrative:     Fact sheet for providers: https://www.fda.gov/media/787775/download    Fact sheet for patients: https://www.fda.gov/media/215798/download    Test performed by PCR.   LIPASE - Normal   URINALYSIS W/ MICROSCOPIC IF INDICATED (NO CULTURE) - Normal    Narrative:     Urine microscopic not indicated.   MAGNESIUM - Normal   PREGNANCY, URINE - Normal   RAINBOW DRAW    Narrative:     The following orders were created for panel order [facility] Draw.  Procedure                               Abnormality         Status                     ---------                               -----------         ------                      Light Blue Top[[id number]]                                                              Green Top (Gel)[[id number]]                                  Final result               Lavender Top[[id number]]                                     Final result               Gold Top - SST[[id number]]                                   Final result                 Please view results for these tests on the individual orders.   CBC AND DIFFERENTIAL    Narrative:     The following orders were created for panel order CBC & Differential.  Procedure                               Abnormality         Status                     ---------                               -----------         ------                     Scan Slide[[id number]]                                                                  CBC Auto Differential[[id number]]        Abnormal            Final result                 Please view results for these tests on the individual orders.   GREEN TOP   LAVENDER TOP   GOLD TOP - SST   LIGHT BLUE TOP     CT Abdomen Pelvis With Contrast    Result Date: 6/3/2021  Narrative: EXAM:   CT Abdomen and Pelvis With Intravenous Contrast CLINICAL HISTORY:   The patient is 36 years old and is Female; abdominal pain, nausea/vomiting, hx of pancreatitis TECHNIQUE:   Axial computed tomography images of the abdomen and pelvis with intravenous contrast.  Sagittal and coronal reformatted images were created and reviewed.  This CT exam was performed using one or more of the following dose reduction techniques:  automated exposure control, adjustment of the mA and/or kV according to patient size, and/or use of iterative reconstruction technique. COMPARISON:   CT of the abdomen and pelvis April 5, 2021 FINDINGS:   LUNG BASES:  Unremarkable.  No mass.  No consolidation.  ABDOMEN:   LIVER:  The liver is enlarged and mildly fatty.   GALLBLADDER AND BILE DUCTS:  No calcified stones.  No ductal dilation.   PANCREAS:  No ductal dilation.  No  mass.   SPLEEN:  Unremarkable.   ADRENALS:  Unremarkable.  No mass.   KIDNEYS AND URETERS:  Unremarkable. The kidneys enhance symmetrically. No obstructing renal or ureteral calculus is seen. No hydronephrosis or hydroureter. No perinephric fluid or stranding.   STOMACH AND BOWEL:  The stomach is minimally distended with fluid. The small bowel is normal in caliber. Minimal stool is present throughout colon. There is no mucosal thickening or evidence of bowel obstruction.  PELVIS:   APPENDIX:  The appendix is normal in caliber without surrounding inflammation.   BLADDER:  The bladder is well distended.   REPRODUCTIVE:  The uterus and ovaries are unremarkable.  ABDOMEN and PELVIS:   INTRAPERITONEAL SPACE:  Unremarkable.  No free air.  No significant fluid collection.   BONES/JOINTS:  No acute fracture.   SOFT TISSUES:  The soft tissues are normal.   VASCULATURE:  Unremarkable.  No abdominal aortic aneurysm.   LYMPH NODES:  Unremarkable. No enlarged lymph nodes.     Impression:   No acute findings on this contrasted CT of the abdomen and pelvis to explain the patient's symptoms. Electronically signed by:  Xin Dempsey MD  6/3/2021 10:24 PM CDT Workstation: [id number]    XR Chest 1 View    Result Date: 6/3/2021  Narrative: [facility] Patient Name: MISS SHAWNEE SPRINGER ORDERING: KATELIN SHERIDAN ATTENDING: SAMRA LINCOLN REFERRING: KATELIN SHERIDAN ----------------------- PROCEDURE: Chest Single View TECHNIQUE: Single AP view of the chest COMPARISON: 2/27/2021 HISTORY: cough FINDINGS:  Life-support devices: Cardiac monitoring leads superimpose the thorax. Lungs/pleura: No consolidation, pleural effusion, or pneumothorax. Heart, hilar and mediastinal structures: The heart size and mediastinal contours are within limits of normal. The trachea is midline. Skeletal Structures: No acute findings. No free air beneath the diaphragm.     Impression: No acute pulmonary or pleural finding.  Electronically signed by:  Larry Ling MD  6/3/2021 7:50 PM CDT Workstation: [id number]          [facility]  Number of Diagnoses or Management Options     Amount and/or Complexity of Data Reviewed  Clinical lab tests: reviewed  Tests in the radiology section of CPT®: reviewed  Tests in the medicine section of CPT®: reviewed    Patient Progress  Patient progress: stable    Patient signed out to me at the end of the PA shift awaiting results of CT imaging.  CT imaging reveals no evidence of acute abnormality to explain patient's nausea, vomiting or pain.  No evidence of acute pancreatitis.  Lipase levels within normal range at 41.  White blood cell count is not elevated.  Liver enzymes mildly elevated.  Patient's alcohol level is significantly elevated at 0.327.  She reports that she has not had a drink since yesterday.  She reports that she is not intoxicated however she is very sleepy.  She is advised that her symptoms of nausea, vomiting and epigastric pain can be explained by the amount of alcohol intake.  She reports that 2 of her doctors told her to come here to be admitted.  She is advised that none of her labs or imaging meet criteria for inpatient admission.  Her vital signs have remained stable.  She is not having any further vomiting.  She has no signs of dehydration.  Of note in her chart she missed a family medicine appointment 2 days ago and she has no showed her last 2 GI appointments here.    Final diagnoses:   Epigastric pain   Non-intractable vomiting with nausea, unspecified vomiting type   Alcoholic intoxication without complication (CMS/[facility])       ED Disposition  ED Disposition     ED Disposition Condition Comment    Discharge Stable           Zeeshan Wiggins MD  [address] [place] [address] [address] [place] [address] [phone]               Medication List      No changes were made to your prescriptions during this visit.          Francisco Saenz DO  06/03/21 0107

## 2021-06-04 NOTE — TELEPHONE ENCOUNTER
TRIED TO CALL PATIENT TO MAKE ER F/U; FIRST ATTEMPT; UNABLE TO REACH PT X1.        THANK YOU,        SAMSON

## 2021-06-05 LAB
QT INTERVAL: 312 MS
QTC INTERVAL: 451 MS

## 2021-06-07 ENCOUNTER — TELEPHONE (OUTPATIENT)
Dept: FAMILY MEDICINE CLINIC | Facility: CLINIC | Age: 36
End: 2021-06-07

## 2021-06-07 NOTE — TELEPHONE ENCOUNTER
TRIED TO CALL PATIENT TO MAKE ER F/U; SECOND ATTEMPT; UNABLE TO REACH PT X2; WILL MAIL OUT LETTER FOR PATIENT TO CALL AND MAKE AN APPOINTMENT.          THANK YOU,        SAMSON

## 2021-07-16 ENCOUNTER — APPOINTMENT (OUTPATIENT)
Dept: CT IMAGING | Facility: HOSPITAL | Age: 36
End: 2021-07-16

## 2021-07-16 ENCOUNTER — HOSPITAL ENCOUNTER (EMERGENCY)
Facility: HOSPITAL | Age: 36
Discharge: HOME | End: 2021-07-16
Attending: STUDENT IN AN ORGANIZED HEALTH CARE EDUCATION/TRAINING PROGRAM | Admitting: STUDENT IN AN ORGANIZED HEALTH CARE EDUCATION/TRAINING PROGRAM

## 2021-07-16 VITALS
WEIGHT: 142 LBS | SYSTOLIC BLOOD PRESSURE: 119 MMHG | RESPIRATION RATE: 18 BRPM | HEART RATE: 98 BPM | OXYGEN SATURATION: 92 % | TEMPERATURE: 97.5 F | DIASTOLIC BLOOD PRESSURE: 64 MMHG | HEIGHT: 67 IN | BODY MASS INDEX: 22.29 KG/M2

## 2021-07-16 DIAGNOSIS — F10.220: ICD-10-CM

## 2021-07-16 DIAGNOSIS — N83.202 CYST OF LEFT OVARY: ICD-10-CM

## 2021-07-16 DIAGNOSIS — R10.9 ABDOMINAL PAIN, UNSPECIFIED ABDOMINAL LOCATION: Primary | ICD-10-CM

## 2021-07-16 LAB
ALBUMIN SERPL-MCNC: 4.3 G/DL (ref 3.5–5.2)
ALBUMIN/GLOB SERPL: 1.4 G/DL
ALP SERPL-CCNC: 200 U/L (ref 39–117)
ALT SERPL W P-5'-P-CCNC: 56 U/L (ref 1–33)
ANION GAP SERPL CALCULATED.3IONS-SCNC: 14 MMOL/L (ref 5–15)
ANISOCYTOSIS BLD QL: ABNORMAL
AST SERPL-CCNC: 88 U/L (ref 1–32)
BACTERIA UR QL AUTO: ABNORMAL /HPF
BASO STIPL COARSE BLD QL SMEAR: ABNORMAL
BILIRUB SERPL-MCNC: 0.5 MG/DL (ref 0–1.2)
BILIRUB UR QL STRIP: NEGATIVE
BUN SERPL-MCNC: 8 MG/DL (ref 6–20)
BUN/CREAT SERPL: 13.3 (ref 7–25)
CALCIUM SPEC-SCNC: 9.3 MG/DL (ref 8.6–10.5)
CHLORIDE SERPL-SCNC: 100 MMOL/L (ref 98–107)
CLARITY UR: ABNORMAL
CO2 SERPL-SCNC: 23 MMOL/L (ref 22–29)
COLOR UR: YELLOW
CREAT SERPL-MCNC: 0.6 MG/DL (ref 0.57–1)
DACRYOCYTES BLD QL SMEAR: ABNORMAL
DEPRECATED RDW RBC AUTO: ABNORMAL FL
ERYTHROCYTE [DISTWIDTH] IN BLOOD BY AUTOMATED COUNT: ABNORMAL %
ETHANOL BLD-MCNC: 159 MG/DL (ref 0–10)
ETHANOL UR QL: 0.16 %
GFR SERPL CREATININE-BSD FRML MDRD: 114 ML/MIN/1.73
GLOBULIN UR ELPH-MCNC: 3.1 GM/DL
GLUCOSE SERPL-MCNC: 99 MG/DL (ref 65–99)
GLUCOSE UR STRIP-MCNC: NEGATIVE MG/DL
HCG SERPL QL: NEGATIVE
HCT VFR BLD AUTO: 32.7 % (ref 34–46.6)
HGB BLD-MCNC: 11 G/DL (ref 12–15.9)
HGB UR QL STRIP.AUTO: NEGATIVE
HOLD SPECIMEN: NORMAL
HYALINE CASTS UR QL AUTO: ABNORMAL /LPF
HYPOCHROMIA BLD QL: ABNORMAL
KETONES UR QL STRIP: ABNORMAL
LEUKOCYTE ESTERASE UR QL STRIP.AUTO: ABNORMAL
LIPASE SERPL-CCNC: 24 U/L (ref 13–60)
LYMPHOCYTES # BLD MANUAL: 3.84 10*3/MM3 (ref 0.7–3.1)
LYMPHOCYTES NFR BLD MANUAL: 49 % (ref 19.6–45.3)
LYMPHOCYTES NFR BLD MANUAL: 7 % (ref 5–12)
MACROCYTES BLD QL SMEAR: ABNORMAL
MCH RBC QN AUTO: 38.9 PG (ref 26.6–33)
MCHC RBC AUTO-ENTMCNC: 33.6 G/DL (ref 31.5–35.7)
MCV RBC AUTO: 115.5 FL (ref 79–97)
MONOCYTES # BLD AUTO: 0.55 10*3/MM3 (ref 0.1–0.9)
NEUTROPHILS # BLD AUTO: 3.45 10*3/MM3 (ref 1.7–7)
NEUTROPHILS NFR BLD MANUAL: 43 % (ref 42.7–76)
NEUTS BAND NFR BLD MANUAL: 1 % (ref 0–5)
NITRITE UR QL STRIP: NEGATIVE
PH UR STRIP.AUTO: 6 [PH] (ref 5–9)
PLATELET # BLD AUTO: 297 10*3/MM3 (ref 140–450)
PMV BLD AUTO: 9 FL (ref 6–12)
POLYCHROMASIA BLD QL SMEAR: ABNORMAL
POTASSIUM SERPL-SCNC: 4.3 MMOL/L (ref 3.5–5.2)
PROT SERPL-MCNC: 7.4 G/DL (ref 6–8.5)
PROT UR QL STRIP: NEGATIVE
RBC # BLD AUTO: 2.83 10*6/MM3 (ref 3.77–5.28)
RBC # UR: ABNORMAL /HPF
REF LAB TEST METHOD: ABNORMAL
SMALL PLATELETS BLD QL SMEAR: ADEQUATE
SODIUM SERPL-SCNC: 137 MMOL/L (ref 136–145)
SP GR UR STRIP: 1.02 (ref 1–1.03)
SQUAMOUS #/AREA URNS HPF: ABNORMAL /HPF
UROBILINOGEN UR QL STRIP: ABNORMAL
WBC # BLD AUTO: 7.84 10*3/MM3 (ref 3.4–10.8)
WBC MORPH BLD: NORMAL
WBC UR QL AUTO: ABNORMAL /HPF
WHOLE BLOOD HOLD SPECIMEN: NORMAL

## 2021-07-16 PROCEDURE — 25010000002 MORPHINE PER 10 MG: Performed by: STUDENT IN AN ORGANIZED HEALTH CARE EDUCATION/TRAINING PROGRAM

## 2021-07-16 PROCEDURE — 74177 CT ABD & PELVIS W/CONTRAST: CPT

## 2021-07-16 PROCEDURE — 85007 BL SMEAR W/DIFF WBC COUNT: CPT | Performed by: STUDENT IN AN ORGANIZED HEALTH CARE EDUCATION/TRAINING PROGRAM

## 2021-07-16 PROCEDURE — 84703 CHORIONIC GONADOTROPIN ASSAY: CPT

## 2021-07-16 PROCEDURE — 85025 COMPLETE CBC W/AUTO DIFF WBC: CPT

## 2021-07-16 PROCEDURE — 96374 THER/PROPH/DIAG INJ IV PUSH: CPT

## 2021-07-16 PROCEDURE — 82077 ASSAY SPEC XCP UR&BREATH IA: CPT | Performed by: STUDENT IN AN ORGANIZED HEALTH CARE EDUCATION/TRAINING PROGRAM

## 2021-07-16 PROCEDURE — 99283 EMERGENCY DEPT VISIT LOW MDM: CPT

## 2021-07-16 PROCEDURE — 96375 TX/PRO/DX INJ NEW DRUG ADDON: CPT

## 2021-07-16 PROCEDURE — 80053 COMPREHEN METABOLIC PANEL: CPT | Performed by: STUDENT IN AN ORGANIZED HEALTH CARE EDUCATION/TRAINING PROGRAM

## 2021-07-16 PROCEDURE — 81001 URINALYSIS AUTO W/SCOPE: CPT | Performed by: STUDENT IN AN ORGANIZED HEALTH CARE EDUCATION/TRAINING PROGRAM

## 2021-07-16 PROCEDURE — 25010000002 HYDROMORPHONE 1 MG/ML SOLUTION: Performed by: STUDENT IN AN ORGANIZED HEALTH CARE EDUCATION/TRAINING PROGRAM

## 2021-07-16 PROCEDURE — 93005 ELECTROCARDIOGRAM TRACING: CPT | Performed by: STUDENT IN AN ORGANIZED HEALTH CARE EDUCATION/TRAINING PROGRAM

## 2021-07-16 PROCEDURE — 93010 ELECTROCARDIOGRAM REPORT: CPT | Performed by: INTERNAL MEDICINE

## 2021-07-16 PROCEDURE — 25010000002 IOPAMIDOL 61 % SOLUTION: Performed by: STUDENT IN AN ORGANIZED HEALTH CARE EDUCATION/TRAINING PROGRAM

## 2021-07-16 PROCEDURE — 25010000002 LORAZEPAM PER 2 MG: Performed by: STUDENT IN AN ORGANIZED HEALTH CARE EDUCATION/TRAINING PROGRAM

## 2021-07-16 PROCEDURE — 93005 ELECTROCARDIOGRAM TRACING: CPT

## 2021-07-16 PROCEDURE — 83690 ASSAY OF LIPASE: CPT | Performed by: STUDENT IN AN ORGANIZED HEALTH CARE EDUCATION/TRAINING PROGRAM

## 2021-07-16 RX ORDER — SODIUM CHLORIDE 0.9 % (FLUSH) 0.9 %
10 SYRINGE (ML) INJECTION AS NEEDED
Status: DISCONTINUED | OUTPATIENT
Start: 2021-07-16 | End: 2021-07-16 | Stop reason: HOSPADM

## 2021-07-16 RX ORDER — MORPHINE SULFATE 2 MG/ML
2 INJECTION, SOLUTION INTRAMUSCULAR; INTRAVENOUS ONCE
Status: COMPLETED | OUTPATIENT
Start: 2021-07-16 | End: 2021-07-16

## 2021-07-16 RX ORDER — LORAZEPAM 2 MG/ML
1 INJECTION INTRAMUSCULAR ONCE
Status: COMPLETED | OUTPATIENT
Start: 2021-07-16 | End: 2021-07-16

## 2021-07-16 RX ADMIN — IOPAMIDOL 90 ML: 612 INJECTION, SOLUTION INTRAVENOUS at 20:15

## 2021-07-16 RX ADMIN — LORAZEPAM 1 MG: 2 INJECTION INTRAMUSCULAR; INTRAVENOUS at 20:19

## 2021-07-16 RX ADMIN — HYDROMORPHONE HYDROCHLORIDE 1 MG: 1 INJECTION, SOLUTION INTRAMUSCULAR; INTRAVENOUS; SUBCUTANEOUS at 21:39

## 2021-07-16 RX ADMIN — SODIUM CHLORIDE, POTASSIUM CHLORIDE, SODIUM LACTATE AND CALCIUM CHLORIDE 1000 ML: 600; 310; 30; 20 INJECTION, SOLUTION INTRAVENOUS at 20:07

## 2021-07-16 RX ADMIN — Medication 1 TABLET: at 21:38

## 2021-07-16 RX ADMIN — MORPHINE SULFATE 2 MG: 2 INJECTION, SOLUTION INTRAMUSCULAR; INTRAVENOUS at 19:45

## 2021-07-16 NOTE — ED NOTES
EKG completed at time of triage (8236) by this tech. EKG was signed by Juan Antonio Barillas  07/16/21 1202

## 2021-07-16 NOTE — ED NOTES
Patient weight aprox 1 month ago 132lbs. Patient weight today on standing scale in triage 142lbs.      Juan Antonio Sanz  07/16/21 6575

## 2021-07-17 LAB
QT INTERVAL: 312 MS
QTC INTERVAL: 457 MS

## 2021-07-17 NOTE — ED PROVIDER NOTES
"Subjective   36-year-old female history of liver disease on the \"verge of cirrhosis\" comes to the ER chief complaint of abdominal pain, abdominal distention, nausea, loss of appetite, \"turning yellow\" that is been getting worse over the last week.  Patient states that she is a chronic alcoholic and drinks 1 to 2 pints daily of 100% vodka.  No vaginal symptoms.  No urinary symptoms.  Patient is very tearful and \"does not know why this is happening to me\".  Nothing is made her symptoms better.      History provided by:  Patient   used: No        Review of Systems   Constitutional: Positive for activity change, appetite change and diaphoresis. Negative for chills and fever.   HENT: Negative for congestion and rhinorrhea.    Respiratory: Negative for cough, chest tightness, shortness of breath and wheezing.    Cardiovascular: Negative for chest pain and palpitations.   Gastrointestinal: Positive for abdominal distention, abdominal pain and nausea. Negative for blood in stool, constipation, diarrhea and vomiting.   Genitourinary: Negative for dysuria, flank pain, vaginal bleeding and vaginal discharge.   Skin: Positive for color change (yellow). Negative for rash.   Neurological: Negative for dizziness and headaches.   Psychiatric/Behavioral: Negative for agitation. The patient is not nervous/anxious.        Past Medical History:   Diagnosis Date   • Abnormal Pap smear of cervix    • Alcohol-induced acute pancreatitis 6/1/2020    Results From Last 14 Days Lab Units 02/27/21 1342 LIPASE U/L 65*  -advance diet as tolerated starting with clears -IV fluids   -will cont suboxone and give morphine for breakthrough pain  -Zofran for nausea as needed -Scheduled Ativan, and CIWA protocol - f/u on CT abdomen     • Alcoholism (CMS/HCC)    • Anxiety    • Cervical dysplasia    • Depression    • Fibrocystic breast    • GERD (gastroesophageal reflux disease)    • Hypertension    • Seizures (CMS/HCC) 2017   • " "Substance abuse (CMS/HCC)    • Substance abuse (CMS/HCC)        No Known Allergies    Past Surgical History:   Procedure Laterality Date   • COLONOSCOPY N/A 2/2/2021    Procedure: COLONOSCOPY;  Surgeon: Mirian Mills MD;  Location: [facility] ENDOSCOPY;  Service: Gastroenterology;  Laterality: N/A;   • ENDOSCOPY N/A 1/8/2021    Procedure: ESOPHAGOGASTRODUODENOSCOPY;  Surgeon: Mirian Mills MD;  Location: [facility] ENDOSCOPY;  Service: Gastroenterology;  Laterality: N/A;   • RHINOPLASTY         Family History   Problem Relation Age of Onset   • Breast cancer Mother    • Cancer Mother    • COPD Mother    • Breast cancer Maternal Grandmother    • COPD Maternal Grandmother    • Heart disease Maternal Grandmother    • Breast cancer Paternal Grandmother    • Breast cancer Maternal Aunt    • Hypertension Father    • Heart disease Father        Social History     Socioeconomic History   • Marital status:      Spouse name: Not on file   • Number of children: Not on file   • Years of education: Not on file   • Highest education level: Not on file   Tobacco Use   • Smoking status: Current Every Day Smoker     Packs/day: 1.00     Years: 20.00     Pack years: 20.00     Types: Cigarettes   • Smokeless tobacco: Never Used   Substance and Sexual Activity   • Alcohol use: Yes     Alcohol/week: 6.0 standard drinks     Types: 6 Shots of liquor per week     Comment: daily   • Drug use: Not Currently     Types: Fentanyl, Oxycodone     Comment: hx of abuse   • Sexual activity: Defer           Objective    Vitals:    07/16/21 1743 07/16/21 1945 07/16/21 1946 07/16/21 2031   BP: 111/90  132/85 126/70   BP Location: Right arm      Patient Position: Sitting      Pulse: (!) 133 106 108 98   Resp: 24  20 18   Temp: 97.5 °F (36.4 °C)      TempSrc: Infrared      SpO2: 96% 95% 95% 97%   Weight: 64.4 kg (142 lb)      Height: 170.2 cm (67\")          Physical Exam  Vitals and nursing note reviewed.   Constitutional:       General: She is " not in acute distress.     Appearance: She is well-developed. She is ill-appearing. She is not toxic-appearing or diaphoretic.   HENT:      Head: Normocephalic.      Right Ear: External ear normal.      Left Ear: External ear normal.   Eyes:      General: No scleral icterus.     Conjunctiva/sclera: Conjunctivae normal.   Pulmonary:      Effort: Pulmonary effort is normal. No accessory muscle usage or respiratory distress.      Breath sounds: No decreased breath sounds or wheezing.   Chest:      Chest wall: No tenderness.   Abdominal:      General: Bowel sounds are normal. There is no distension.      Palpations: Abdomen is soft.      Tenderness: There is abdominal tenderness. There is no guarding or rebound.   Skin:     General: Skin is warm and dry.      Capillary Refill: Capillary refill takes less than 2 seconds.   Neurological:      Mental Status: She is alert and oriented to person, place, and time. Mental status is at baseline. She is not disoriented.   Psychiatric:         Mood and Affect: Affect is tearful.         ECG 12 Lead      Date/Time: 7/16/2021 9:02 PM  Performed by: Ponce Farmer MD  Authorized by: Ponce Farmer MD   Interpreted by physician  Rhythm: sinus tachycardia  Rate: tachycardic  QRS axis: right  ST segment elevation noted on lead: none.  ST segment depression noted on lead: none.                   ED Course      Results for orders placed or performed during the hospital encounter of 07/16/21   Comprehensive Metabolic Panel    Specimen: Blood   Result Value Ref Range    Glucose 99 65 - 99 mg/dL    BUN 8 6 - 20 mg/dL    Creatinine 0.60 0.57 - 1.00 mg/dL    Sodium 137 136 - 145 mmol/L    Potassium 4.3 3.5 - 5.2 mmol/L    Chloride 100 98 - 107 mmol/L    CO2 23.0 22.0 - 29.0 mmol/L    Calcium 9.3 8.6 - 10.5 mg/dL    Total Protein 7.4 6.0 - 8.5 g/dL    Albumin 4.30 3.50 - 5.20 g/dL    ALT (SGPT) 56 (H) 1 - 33 U/L    AST (SGOT) 88 (H) 1 - 32 U/L    Alkaline Phosphatase 200 (H) 39 - 117 U/L     Total Bilirubin 0.5 0.0 - 1.2 mg/dL    eGFR Non African Amer 114 >60 mL/min/1.73    Globulin 3.1 gm/dL    A/G Ratio 1.4 g/dL    BUN/Creatinine Ratio 13.3 7.0 - 25.0    Anion Gap 14.0 5.0 - 15.0 mmol/L   Lipase    Specimen: Blood   Result Value Ref Range    Lipase 24 13 - 60 U/L   Urinalysis With Microscopic If Indicated (No Culture) - Urine, Clean Catch    Specimen: Urine, Clean Catch   Result Value Ref Range    Color, UA Yellow Yellow, Straw, Dark Yellow, Rhonda    Appearance, UA Cloudy (A) Clear    pH, UA 6.0 5.0 - 9.0    Specific Gravity, UA 1.020 1.003 - 1.030    Glucose, UA Negative Negative    Ketones, UA Trace (A) Negative    Bilirubin, UA Negative Negative    Blood, UA Negative Negative    Protein, UA Negative Negative    Leuk Esterase, UA Small (1+) (A) Negative    Nitrite, UA Negative Negative    Urobilinogen, UA 1.0 E.U./dL 0.2 - 1.0 E.U./dL   hCG, Serum, Qualitative    Specimen: Blood   Result Value Ref Range    HCG Qualitative Negative Negative   CBC Auto Differential    Specimen: Blood   Result Value Ref Range    WBC 7.84 3.40 - 10.80 10*3/mm3    RBC 2.83 (L) 3.77 - 5.28 10*6/mm3    Hemoglobin 11.0 (L) 12.0 - 15.9 g/dL    Hematocrit 32.7 (L) 34.0 - 46.6 %    .5 (H) 79.0 - 97.0 fL    MCH 38.9 (H) 26.6 - 33.0 pg    MCHC 33.6 31.5 - 35.7 g/dL    RDW      RDW-SD      MPV 9.0 6.0 - 12.0 fL    Platelets 297 140 - 450 10*3/mm3   Manual Differential    Specimen: Blood   Result Value Ref Range    Neutrophil % 43.0 42.7 - 76.0 %    Lymphocyte % 49.0 (H) 19.6 - 45.3 %    Monocyte % 7.0 5.0 - 12.0 %    Bands %  1.0 0.0 - 5.0 %    Neutrophils Absolute 3.45 1.70 - 7.00 10*3/mm3    Lymphocytes Absolute 3.84 (H) 0.70 - 3.10 10*3/mm3    Monocytes Absolute 0.55 0.10 - 0.90 10*3/mm3    Anisocytosis Large/3+ None Seen    Basophilic Stippling Slight/1+ None Seen    Dacrocytes Slight/1+ None Seen    Hypochromia Mod/2+ None Seen    Macrocytes Large/3+ None Seen    Polychromasia Mod/2+ None Seen    WBC Morphology  Normal Normal    Platelet Estimate Adequate Normal   Urinalysis, Microscopic Only - Urine, Clean Catch    Specimen: Urine, Clean Catch   Result Value Ref Range    RBC, UA 0-2 (A) None Seen /HPF    WBC, UA 6-12 (A) None Seen, 0-2, 3-5 /HPF    Bacteria, UA 1+ (A) None Seen /HPF    Squamous Epithelial Cells, UA 6-12 (A) None Seen, 0-2 /HPF    Hyaline Casts, UA 3-6 None Seen /LPF    Methodology Automated Microscopy    Ethanol    Specimen: Blood   Result Value Ref Range    Ethanol 159 (H) 0 - 10 mg/dL    Ethanol % 0.159 %   Green Top (Gel)   Result Value Ref Range    Extra Tube Hold for add-ons.    Lavender Top   Result Value Ref Range    Extra Tube hold for add-on      CT Abdomen Pelvis With Contrast   Final Result   Cystic prominence of the left ovary measuring up to 4.7 x 4.0 x   4.4 cm (craniocaudal by AP by transverse), and this is likely the   source of pain.    Correlation with patient's symptoms and history is recommended.   Appendix is normal.   Hepatomegaly with diffuse fatty change of liver.   No evidence of nephrolithiasis or obstructive uropathy on either   side.   No evidence of bowel obstruction or perienteric inflammation.      Electronically signed by:  Kenneth Phipps MD  7/16/2021 8:46 PM CDT   Workstation: [id number]              MDM  Number of Diagnoses or Management Options  Abdominal pain, unspecified abdominal location: new and requires workup  Acute alcoholic intoxication in alcoholism with blood level of 0.08 to 0.29 without complication (CMS/HCC): new and requires workup  Cyst of left ovary: new and requires workup  Diagnosis management comments: Vital signs are stable, afebrile.  Heart rate improved with time and fluid bolus.  Labs obtained and are grossly unremarkable.  LFTs are slightly elevated, but better than a month ago.  Lipase is normal..  Alcohol level is 0.16.  CT abdomen pelvis shows no acute processes.  It did show a large left-sided ovarian cyst.  On reevaluation, patient is alert and  resting. I discussed the results of the emergency department evaluation.  I recommended primary care and OB/GYN follow-up.  Return precautions discussed.  Patient offered detox and rehab facilities for her alcoholism, but she declined.       Amount and/or Complexity of Data Reviewed  Decide to obtain previous medical records or to obtain history from someone other than the patient: yes  Independent visualization of images, tracings, or specimens: yes        Final diagnoses:   Abdominal pain, unspecified abdominal location   Cyst of left ovary   Acute alcoholic intoxication in alcoholism with blood level of 0.08 to 0.29 without complication (CMS/HCC)       ED Disposition  ED Disposition     ED Disposition Condition Comment    Discharge Stable           Yosi Almaguer MD  [address] [facility]   [facility] [address] [phone]    Schedule an appointment as soon as possible for a visit in 2 days  ER follow up    [facility]  [address] [place] [address] Dr  [facility] [address] [place] [id number] [phone]  Schedule an appointment as soon as possible for a visit in 2 days  ER follow up         Medication List      No changes were made to your prescriptions during this visit.          Ponce Farmer MD  07/16/21 2116       Ponce Farmer MD  07/16/21 2153

## 2021-07-17 NOTE — ED NOTES
Called pharmacy for folic acid, will discharge after given.      Aletha Beverly, RN  07/16/21 1423

## 2021-07-20 ENCOUNTER — OFFICE VISIT (OUTPATIENT)
Dept: FAMILY MEDICINE CLINIC | Facility: CLINIC | Age: 36
End: 2021-07-20

## 2021-07-20 VITALS
SYSTOLIC BLOOD PRESSURE: 118 MMHG | BODY MASS INDEX: 22.29 KG/M2 | DIASTOLIC BLOOD PRESSURE: 68 MMHG | WEIGHT: 142 LBS | HEART RATE: 123 BPM | OXYGEN SATURATION: 95 % | HEIGHT: 67 IN

## 2021-07-20 DIAGNOSIS — R10.84 GENERALIZED ABDOMINAL PAIN: Primary | ICD-10-CM

## 2021-07-20 DIAGNOSIS — A04.72 C. DIFFICILE COLITIS: ICD-10-CM

## 2021-07-20 DIAGNOSIS — F10.20 ALCOHOL USE DISORDER, MODERATE, DEPENDENCE (HCC): ICD-10-CM

## 2021-07-20 PROCEDURE — 99213 OFFICE O/P EST LOW 20 MIN: CPT | Performed by: STUDENT IN AN ORGANIZED HEALTH CARE EDUCATION/TRAINING PROGRAM

## 2021-07-20 NOTE — PROGRESS NOTES
"  Family Medicine Residency  Erica Jacob MD    Subjective:     CC: ED follow up    Nata Bain is a 36 y.o. female with history of multiple episodes of alcohol induced pancreatitis and C.diff who presents for ED follow up. Patient was seen in ED twice on 7/15 and 7/16 with complaints of abdominal pain with nausea and vomiting. On 7/15 patient went to [facility] ED. Patient was seen in ED at [facility] on 7/16 with worsening abdominal pain and jaundice. Patient is chronic alcoholic and drinks 1-2 pints of 100% Vodka daily. Patient had normal lipase, mild elevations in LFTs and CT abdomen pelvis did not show any acute process. Patient was found to have large left-sided ovarian cyst and was recommended OB/GYN follow up. Patient declined detox and rehab facilities.     Since the discharge patient has no improvement in the pain. She continues to have abdominal pain and has been feeling like her stomach is getting \"swollen\"and her skin color is \"yellow\". Patient was present with her elderly mother who reports that patient's  works in liquor store and brings alcohol home and drinks in front of her. Patient reports her last drink was day before yesterday. She expressed desire to get admitted in order to receive IV pain medication for her abdominal pain. Patient reports she continues to have nausea, vomiting and is not interested in taking any pain medication that was previously prescribed to her. She did not follow up with GI in the past and reports she does not want to follow up with OB/GYN to evaluate incidental finding of the left ovarian cyst.   Currently denies chest pain, palpitations, shortness of breath, syncopal episodes, visual changes, headaches, nausea, lightheadedness.     The following portions of the patient's history were reviewed and updated as appropriate: allergies, current medications, past family history, past medical history, past social history, past surgical history and problem " list.    Past Medical Hx:  Past Medical History:   Diagnosis Date   • Abnormal Pap smear of cervix    • Alcohol-induced acute pancreatitis 6/1/2020    Results From Last 14 Days Lab Units 02/27/21 1342 LIPASE U/L 65*  -advance diet as tolerated starting with clears -IV fluids   -will cont suboxone and give morphine for breakthrough pain  -Zofran for nausea as needed -Scheduled Ativan, and CIWA protocol - f/u on CT abdomen     • Alcoholism (CMS/HCC)    • Anxiety    • Cervical dysplasia    • Depression    • Fibrocystic breast    • GERD (gastroesophageal reflux disease)    • Hypertension    • Seizures (CMS/HCC) 2017   • Substance abuse (CMS/HCC)    • Substance abuse (CMS/HCC)        Past Surgical Hx:  Past Surgical History:   Procedure Laterality Date   • COLONOSCOPY N/A 2/2/2021    Procedure: COLONOSCOPY;  Surgeon: Mirian Mills MD;  Location: [facility] ENDOSCOPY;  Service: Gastroenterology;  Laterality: N/A;   • ENDOSCOPY N/A 1/8/2021    Procedure: ESOPHAGOGASTRODUODENOSCOPY;  Surgeon: Mirian Mills MD;  Location: [facility] ENDOSCOPY;  Service: Gastroenterology;  Laterality: N/A;   • RHINOPLASTY         Current Meds:    Current Outpatient Medications:   •  acamprosate (CAMPRAL) 333 MG EC tablet, , Disp: , Rfl:   •  buprenorphine-naloxone (SUBOXONE) 8-2 MG per SL tablet, Place 1 tablet under the tongue 3 (Three) Times a Day. Patient takes one 8-2 tablet three times a day., Disp: , Rfl:   •  cloNIDine (CATAPRES) 0.1 MG tablet, Take 0.1 mg by mouth 3 (Three) Times a Day., Disp: , Rfl:   •  cyanocobalamin (VITAMIN B-12) 1000 MCG tablet, Take 1 tablet by mouth Daily., Disp: 30 tablet, Rfl: 5  •  dicyclomine (BENTYL) 20 MG tablet, Take 1 tablet by mouth 2 (Two) Times a Day., Disp: 60 tablet, Rfl: 2  •  FLUoxetine (PROzac) 40 MG capsule, Take 40 mg by mouth Daily., Disp: , Rfl:   •  folic acid (FOLVITE) 1 MG tablet, Take 1 tablet by mouth Daily., Disp: 30 tablet, Rfl: 0  •  gabapentin (NEURONTIN) 800 MG tablet, Take 800  mg by mouth 3 (Three) Times a Day., Disp: , Rfl:   •  pantoprazole (Protonix) 40 MG EC tablet, Take 1 tablet by mouth Daily., Disp: 30 tablet, Rfl: 1  •  prazosin (MINIPRESS) 5 MG capsule, Take 5 mg by mouth Every Night., Disp: , Rfl:   •  promethazine (PHENERGAN) 25 MG suppository, Insert 1 suppository into the rectum Every 6 (Six) Hours As Needed for Nausea or Vomiting., Disp: 12 suppository, Rfl: 0  •  sucralfate (CARAFATE) 1 g tablet, , Disp: , Rfl:   •  thiamine (thiamine) 100 MG tablet tablet, Take 1 tablet by mouth Daily., Disp: 30 tablet, Rfl: 5  •  vancomycin (VANCOCIN) 125 MG capsule, 1 capsule by mouth four times a day for 14 days. Then 1 capsule 2 times a day for 7 days. Then 1 capsule daily for 7 days.  1 capsule every 3 days for 28 days., Disp: 56 capsule, Rfl: 0    Allergies:  No Known Allergies    Family Hx:  Family History   Problem Relation Age of Onset   • Breast cancer Mother    • Cancer Mother    • COPD Mother    • Breast cancer Maternal Grandmother    • COPD Maternal Grandmother    • Heart disease Maternal Grandmother    • Breast cancer Paternal Grandmother    • Breast cancer Maternal Aunt    • Hypertension Father    • Heart disease Father         Social History:  Social History     Socioeconomic History   • Marital status:      Spouse name: Not on file   • Number of children: Not on file   • Years of education: Not on file   • Highest education level: Not on file   Tobacco Use   • Smoking status: Current Every Day Smoker     Packs/day: 1.00     Years: 20.00     Pack years: 20.00     Types: Cigarettes   • Smokeless tobacco: Never Used   Substance and Sexual Activity   • Alcohol use: Yes     Alcohol/week: 6.0 standard drinks     Types: 6 Shots of liquor per week     Comment: daily   • Drug use: Not Currently     Types: Fentanyl, Oxycodone     Comment: hx of abuse   • Sexual activity: Defer       Review of Systems  Review of Systems   Constitutional: Positive for activity change, appetite  "change and fatigue. Negative for chills, diaphoresis, fever and unexpected weight change.   Eyes: Negative for visual disturbance.   Respiratory: Negative for cough, chest tightness, shortness of breath and wheezing.    Cardiovascular: Negative for chest pain and leg swelling.   Gastrointestinal: Positive for abdominal distention, abdominal pain, nausea and vomiting. Negative for constipation and diarrhea.   Genitourinary: Negative for difficulty urinating, dysuria, frequency and urgency.   Musculoskeletal: Negative for arthralgias and myalgias.   Skin: Positive for color change. Negative for rash.   Neurological: Positive for headaches. Negative for dizziness, syncope, weakness, light-headedness and numbness.   Psychiatric/Behavioral: Positive for sleep disturbance. Negative for behavioral problems, confusion, decreased concentration and suicidal ideas. The patient is nervous/anxious.        Objective:     /68   Pulse (!) 123   Ht 170.2 cm (67\")   Wt 64.4 kg (142 lb)   SpO2 95%   BMI 22.24 kg/m²   Physical Exam  Vitals and nursing note reviewed.   Constitutional:       Appearance: Normal appearance. She is normal weight.      Comments: Teary during the entire office visit    HENT:      Head: Normocephalic and atraumatic.   Cardiovascular:      Rate and Rhythm: Normal rate and regular rhythm.   Pulmonary:      Effort: Pulmonary effort is normal.      Breath sounds: Normal breath sounds.   Abdominal:      General: Bowel sounds are normal.      Palpations: Abdomen is soft.      Tenderness: There is abdominal tenderness (B/l upper and lower qudrants ). There is no right CVA tenderness or left CVA tenderness.   Musculoskeletal:      Right lower leg: No edema.      Left lower leg: No edema.   Skin:     General: Skin is warm and dry.      Capillary Refill: Capillary refill takes less than 2 seconds.      Coloration: Skin is not jaundiced or pale.   Neurological:      General: No focal deficit present.      " Mental Status: She is alert and oriented to person, place, and time.   Psychiatric:         Mood and Affect: Mood is anxious. Affect is tearful.         Speech: Speech normal.         Behavior: Behavior is agitated.         Thought Content: Thought content normal.          Assessment/Plan:     Nata Bain is a 36 y.o. female with history of multiple episodes of alcohol induced pancreatitis and C.diff who presents for ED follow up. Patient requested to be admitted directly to the hospital due to continued abdominal pain. Patient's vitals and prior laboratory work up was reviewed and remained stable. Patient had multiple prior admission for abdominal pain, alcohol induced pancreatitis yet continues to drink daily. Patient was advised to begin taking medications like Bentyl, PPI, Carafate, thiamine however she refused to take the medication and wanted to get admitted so she can get IV pain medications as those are the only ones that seem to control her pain. I did not think it was medically necessary to admit her today as she was recently seen in ED with negative work up. Patient's pain is likely secondary to ovarian cyst or alcohol withdrawals or alcohol induced gastritis. At this point, patient was strongly urged to follow up with OB/gyn and GI for further management of pain which she declined to do so. In addition, strongly advised to abstain from alcohol as it is causing all the damage. Discussed options for rehab facility and patient declined. Patient was upset that she was not being admitted directly to hospital for IV pain medication. If her condition was to decline patient was strongly advised to go to ED for re-evaluation. Unfortunately due to her current history it is not appropriate to admit her for IV pain medications, especially if she continues to drink alcohol.       Diagnoses and all orders for this visit:    1. Generalized abdominal pain (Primary)    2. C. difficile colitis    3. Alcohol use  disorder, moderate, dependence (CMS/HCC)        · Rx changes: none   · Patient Education: alcohol abstinence   · Compliance at present is estimated to be poor.   · Efforts to improve compliance (if necessary) will be directed at none.    Depression screening: Up to date; last screen 4/2/2019     Follow-up:     Return in about 4 weeks (around 8/17/2021) for Recheck.    Preventative:  Health Maintenance   Topic Date Due   • ANNUAL PHYSICAL  Never done   • Pneumococcal Vaccine 0-64 (1 of 1 - PPSV23) Never done   • COVID-19 Vaccine (1) Never done   • TDAP/TD VACCINES (2 - Tdap) 08/02/2011   • INFLUENZA VACCINE  10/01/2021   • PAP SMEAR  10/10/2021   • COLORECTAL CANCER SCREENING  02/02/2031   • HEPATITIS C SCREENING  Completed         Weight  -Class: Normal: 18.5-24.9kg/m2  -Patient's Body mass index is 22.24 kg/m². indicating that she is within normal range (BMI 18.5-24.9). No BMI management plan needed..   eat more fruits and vegetables, decrease soda or juice intake, increase water intake, increase physical activity, reduce portion size, cut out extra servings, reduce fast food intake and plan meals    Alcohol use:  reports current alcohol use of about 6.0 standard drinks of alcohol per week.  Nicotine status  reports that she has been smoking cigarettes. She has a 20.00 pack-year smoking history. She has never used smokeless tobacco.    Goals    None         RISK SCORE: 2        Erica Jacob MD PGY-2  [facility] Family Medicine Residency   This document has been electronically signed by Erica Jacob MD on July 20, 2021 17:38 CDT

## 2021-07-23 NOTE — PROGRESS NOTES
I have seen this patient and discussed the case with resident and agree with the assessment and plan.  SELMA Celaya M.D.

## 2021-08-31 ENCOUNTER — APPOINTMENT (OUTPATIENT)
Dept: GENERAL RADIOLOGY | Facility: HOSPITAL | Age: 36
End: 2021-08-31

## 2021-08-31 ENCOUNTER — HOSPITAL ENCOUNTER (INPATIENT)
Facility: HOSPITAL | Age: 36
LOS: 4 days | Discharge: HOME | End: 2021-09-05
Attending: EMERGENCY MEDICINE | Admitting: FAMILY MEDICINE

## 2021-08-31 ENCOUNTER — APPOINTMENT (OUTPATIENT)
Dept: CT IMAGING | Facility: HOSPITAL | Age: 36
End: 2021-08-31

## 2021-08-31 DIAGNOSIS — R10.10 UPPER ABDOMINAL PAIN: Primary | ICD-10-CM

## 2021-08-31 DIAGNOSIS — E83.42 HYPOMAGNESEMIA: ICD-10-CM

## 2021-08-31 DIAGNOSIS — K29.20 ACUTE ALCOHOLIC GASTRITIS WITHOUT HEMORRHAGE: ICD-10-CM

## 2021-08-31 DIAGNOSIS — R11.2 INTRACTABLE VOMITING WITH NAUSEA, UNSPECIFIED VOMITING TYPE: ICD-10-CM

## 2021-08-31 PROBLEM — Z91.14 NONCOMPLIANCE WITH MEDICATION REGIMEN: Status: ACTIVE | Noted: 2021-08-31

## 2021-08-31 PROBLEM — R74.01 TRANSAMINITIS: Status: ACTIVE | Noted: 2021-04-09

## 2021-08-31 LAB
ACETONE BLD QL: NEGATIVE
ALBUMIN SERPL-MCNC: 4.7 G/DL (ref 3.5–5.2)
ALBUMIN/GLOB SERPL: 1.5 G/DL
ALP SERPL-CCNC: 117 U/L (ref 39–117)
ALT SERPL W P-5'-P-CCNC: 66 U/L (ref 1–33)
AMPHET+METHAMPHET UR QL: NEGATIVE
AMPHETAMINES UR QL: NEGATIVE
AMYLASE SERPL-CCNC: 78 U/L (ref 28–100)
ANION GAP SERPL CALCULATED.3IONS-SCNC: 20 MMOL/L (ref 5–15)
APTT PPP: 28.6 SECONDS (ref 20–40.3)
AST SERPL-CCNC: 96 U/L (ref 1–32)
BACTERIA UR QL AUTO: ABNORMAL /HPF
BARBITURATES UR QL SCN: NEGATIVE
BASOPHILS # BLD AUTO: 0.05 10*3/MM3 (ref 0–0.2)
BASOPHILS NFR BLD AUTO: 0.6 % (ref 0–1.5)
BENZODIAZ UR QL SCN: POSITIVE
BILIRUB SERPL-MCNC: 0.4 MG/DL (ref 0–1.2)
BILIRUB UR QL STRIP: NEGATIVE
BUN SERPL-MCNC: 9 MG/DL (ref 6–20)
BUN/CREAT SERPL: 16.7 (ref 7–25)
BUPRENORPHINE SERPL-MCNC: POSITIVE NG/ML
CALCIUM SPEC-SCNC: 9.3 MG/DL (ref 8.6–10.5)
CANNABINOIDS SERPL QL: NEGATIVE
CHLORIDE SERPL-SCNC: 95 MMOL/L (ref 98–107)
CLARITY UR: CLEAR
CO2 SERPL-SCNC: 21 MMOL/L (ref 22–29)
COCAINE UR QL: NEGATIVE
COLOR UR: YELLOW
CREAT SERPL-MCNC: 0.54 MG/DL (ref 0.57–1)
D-DIMER, QUANTITATIVE (MAD,POW, STR): 727 NG/ML (FEU) (ref 0–470)
DEPRECATED RDW RBC AUTO: 47 FL (ref 37–54)
EOSINOPHIL # BLD AUTO: 0.01 10*3/MM3 (ref 0–0.4)
EOSINOPHIL NFR BLD AUTO: 0.1 % (ref 0.3–6.2)
ERYTHROCYTE [DISTWIDTH] IN BLOOD BY AUTOMATED COUNT: 12.6 % (ref 12.3–15.4)
ETHANOL BLD-MCNC: <10 MG/DL (ref 0–10)
ETHANOL UR QL: <0.01 %
FLUAV SUBTYP SPEC NAA+PROBE: NOT DETECTED
FLUBV RNA ISLT QL NAA+PROBE: NOT DETECTED
GFR SERPL CREATININE-BSD FRML MDRD: 128 ML/MIN/1.73
GLOBULIN UR ELPH-MCNC: 3.2 GM/DL
GLUCOSE SERPL-MCNC: 120 MG/DL (ref 65–99)
GLUCOSE UR STRIP-MCNC: NEGATIVE MG/DL
HCG SERPL QL: NEGATIVE
HCT VFR BLD AUTO: 40.7 % (ref 34–46.6)
HGB BLD-MCNC: 14.9 G/DL (ref 12–15.9)
HGB UR QL STRIP.AUTO: NEGATIVE
HOLD SPECIMEN: NORMAL
HYALINE CASTS UR QL AUTO: ABNORMAL /LPF
IMM GRANULOCYTES # BLD AUTO: 0.02 10*3/MM3 (ref 0–0.05)
IMM GRANULOCYTES NFR BLD AUTO: 0.2 % (ref 0–0.5)
INR PPP: 1 (ref 0.8–1.2)
KETONES UR QL STRIP: NEGATIVE
LEUKOCYTE ESTERASE UR QL STRIP.AUTO: NEGATIVE
LIPASE SERPL-CCNC: 27 U/L (ref 13–60)
LYMPHOCYTES # BLD AUTO: 2.02 10*3/MM3 (ref 0.7–3.1)
LYMPHOCYTES NFR BLD AUTO: 23 % (ref 19.6–45.3)
MAGNESIUM SERPL-MCNC: 1.2 MG/DL (ref 1.6–2.6)
MCH RBC QN AUTO: 36.9 PG (ref 26.6–33)
MCHC RBC AUTO-ENTMCNC: 36.6 G/DL (ref 31.5–35.7)
MCV RBC AUTO: 100.7 FL (ref 79–97)
METHADONE UR QL SCN: NEGATIVE
MONOCYTES # BLD AUTO: 0.46 10*3/MM3 (ref 0.1–0.9)
MONOCYTES NFR BLD AUTO: 5.2 % (ref 5–12)
NEUTROPHILS NFR BLD AUTO: 6.22 10*3/MM3 (ref 1.7–7)
NEUTROPHILS NFR BLD AUTO: 70.9 % (ref 42.7–76)
NITRITE UR QL STRIP: NEGATIVE
NRBC BLD AUTO-RTO: 0 /100 WBC (ref 0–0.2)
NT-PROBNP SERPL-MCNC: 71.9 PG/ML (ref 0–450)
OPIATES UR QL: NEGATIVE
OXYCODONE UR QL SCN: NEGATIVE
PCP UR QL SCN: NEGATIVE
PH UR STRIP.AUTO: 6 [PH] (ref 5–9)
PLATELET # BLD AUTO: 198 10*3/MM3 (ref 140–450)
PMV BLD AUTO: 9.7 FL (ref 6–12)
POTASSIUM SERPL-SCNC: 3.3 MMOL/L (ref 3.5–5.2)
PROPOXYPH UR QL: NEGATIVE
PROT SERPL-MCNC: 7.9 G/DL (ref 6–8.5)
PROT UR QL STRIP: ABNORMAL
PROTHROMBIN TIME: 13.1 SECONDS (ref 11.1–15.3)
QT INTERVAL: 388 MS
QTC INTERVAL: 485 MS
RBC # BLD AUTO: 4.04 10*6/MM3 (ref 3.77–5.28)
RBC # UR: ABNORMAL /HPF
REF LAB TEST METHOD: ABNORMAL
SARS-COV-2 RNA PNL SPEC NAA+PROBE: NOT DETECTED
SODIUM SERPL-SCNC: 136 MMOL/L (ref 136–145)
SP GR UR STRIP: 1.04 (ref 1–1.03)
SQUAMOUS #/AREA URNS HPF: ABNORMAL /HPF
TRICYCLICS UR QL SCN: NEGATIVE
TROPONIN T SERPL-MCNC: <0.01 NG/ML (ref 0–0.03)
TROPONIN T SERPL-MCNC: <0.01 NG/ML (ref 0–0.03)
UROBILINOGEN UR QL STRIP: ABNORMAL
WBC # BLD AUTO: 8.78 10*3/MM3 (ref 3.4–10.8)
WBC UR QL AUTO: ABNORMAL /HPF
WHOLE BLOOD HOLD SPECIMEN: NORMAL
WHOLE BLOOD HOLD SPECIMEN: NORMAL

## 2021-08-31 PROCEDURE — 25010000002 LORAZEPAM PER 2 MG: Performed by: EMERGENCY MEDICINE

## 2021-08-31 PROCEDURE — 83880 ASSAY OF NATRIURETIC PEPTIDE: CPT | Performed by: EMERGENCY MEDICINE

## 2021-08-31 PROCEDURE — 80306 DRUG TEST PRSMV INSTRMNT: CPT | Performed by: STUDENT IN AN ORGANIZED HEALTH CARE EDUCATION/TRAINING PROGRAM

## 2021-08-31 PROCEDURE — 83690 ASSAY OF LIPASE: CPT | Performed by: EMERGENCY MEDICINE

## 2021-08-31 PROCEDURE — 25010000002 KETOROLAC TROMETHAMINE PER 15 MG: Performed by: STUDENT IN AN ORGANIZED HEALTH CARE EDUCATION/TRAINING PROGRAM

## 2021-08-31 PROCEDURE — 25010000002 ONDANSETRON PER 1 MG: Performed by: EMERGENCY MEDICINE

## 2021-08-31 PROCEDURE — 99285 EMERGENCY DEPT VISIT HI MDM: CPT

## 2021-08-31 PROCEDURE — 36415 COLL VENOUS BLD VENIPUNCTURE: CPT | Performed by: EMERGENCY MEDICINE

## 2021-08-31 PROCEDURE — 82150 ASSAY OF AMYLASE: CPT | Performed by: EMERGENCY MEDICINE

## 2021-08-31 PROCEDURE — 25010000002 SODIUM CHLORIDE 0.9 % WITH KCL 20 MEQ 20-0.9 MEQ/L-% SOLUTION: Performed by: STUDENT IN AN ORGANIZED HEALTH CARE EDUCATION/TRAINING PROGRAM

## 2021-08-31 PROCEDURE — G0378 HOSPITAL OBSERVATION PER HR: HCPCS

## 2021-08-31 PROCEDURE — 84703 CHORIONIC GONADOTROPIN ASSAY: CPT

## 2021-08-31 PROCEDURE — 87636 SARSCOV2 & INF A&B AMP PRB: CPT | Performed by: STUDENT IN AN ORGANIZED HEALTH CARE EDUCATION/TRAINING PROGRAM

## 2021-08-31 PROCEDURE — 85730 THROMBOPLASTIN TIME PARTIAL: CPT | Performed by: EMERGENCY MEDICINE

## 2021-08-31 PROCEDURE — 71045 X-RAY EXAM CHEST 1 VIEW: CPT

## 2021-08-31 PROCEDURE — 25010000002 THIAMINE PER 100 MG: Performed by: EMERGENCY MEDICINE

## 2021-08-31 PROCEDURE — 93005 ELECTROCARDIOGRAM TRACING: CPT

## 2021-08-31 PROCEDURE — 25010000002 HYDROMORPHONE 1 MG/ML SOLUTION: Performed by: EMERGENCY MEDICINE

## 2021-08-31 PROCEDURE — 74177 CT ABD & PELVIS W/CONTRAST: CPT

## 2021-08-31 PROCEDURE — 81001 URINALYSIS AUTO W/SCOPE: CPT | Performed by: EMERGENCY MEDICINE

## 2021-08-31 PROCEDURE — 80053 COMPREHEN METABOLIC PANEL: CPT | Performed by: EMERGENCY MEDICINE

## 2021-08-31 PROCEDURE — 25010000002 LORAZEPAM PER 2 MG: Performed by: STUDENT IN AN ORGANIZED HEALTH CARE EDUCATION/TRAINING PROGRAM

## 2021-08-31 PROCEDURE — 99220 PR INITIAL OBSERVATION CARE/DAY 70 MINUTES: CPT | Performed by: STUDENT IN AN ORGANIZED HEALTH CARE EDUCATION/TRAINING PROGRAM

## 2021-08-31 PROCEDURE — 83735 ASSAY OF MAGNESIUM: CPT | Performed by: EMERGENCY MEDICINE

## 2021-08-31 PROCEDURE — 82077 ASSAY SPEC XCP UR&BREATH IA: CPT | Performed by: EMERGENCY MEDICINE

## 2021-08-31 PROCEDURE — 85610 PROTHROMBIN TIME: CPT | Performed by: EMERGENCY MEDICINE

## 2021-08-31 PROCEDURE — 85025 COMPLETE CBC W/AUTO DIFF WBC: CPT

## 2021-08-31 PROCEDURE — 84484 ASSAY OF TROPONIN QUANT: CPT | Performed by: EMERGENCY MEDICINE

## 2021-08-31 PROCEDURE — 85379 FIBRIN DEGRADATION QUANT: CPT | Performed by: EMERGENCY MEDICINE

## 2021-08-31 PROCEDURE — 71275 CT ANGIOGRAPHY CHEST: CPT

## 2021-08-31 PROCEDURE — 93010 ELECTROCARDIOGRAM REPORT: CPT | Performed by: INTERNAL MEDICINE

## 2021-08-31 PROCEDURE — 93005 ELECTROCARDIOGRAM TRACING: CPT | Performed by: EMERGENCY MEDICINE

## 2021-08-31 PROCEDURE — 0 IOPAMIDOL PER 1 ML: Performed by: EMERGENCY MEDICINE

## 2021-08-31 PROCEDURE — 82009 KETONE BODYS QUAL: CPT | Performed by: EMERGENCY MEDICINE

## 2021-08-31 PROCEDURE — 25010000002 ENOXAPARIN PER 10 MG: Performed by: STUDENT IN AN ORGANIZED HEALTH CARE EDUCATION/TRAINING PROGRAM

## 2021-08-31 RX ORDER — SODIUM CHLORIDE 0.9 % (FLUSH) 0.9 %
10 SYRINGE (ML) INJECTION EVERY 12 HOURS SCHEDULED
Status: DISCONTINUED | OUTPATIENT
Start: 2021-08-31 | End: 2021-09-05 | Stop reason: HOSPADM

## 2021-08-31 RX ORDER — POTASSIUM CHLORIDE 29.8 MG/ML
20 INJECTION INTRAVENOUS
Status: DISCONTINUED | OUTPATIENT
Start: 2021-08-31 | End: 2021-08-31

## 2021-08-31 RX ORDER — KETOROLAC TROMETHAMINE 30 MG/ML
30 INJECTION, SOLUTION INTRAMUSCULAR; INTRAVENOUS EVERY 6 HOURS PRN
Status: COMPLETED | OUTPATIENT
Start: 2021-08-31 | End: 2021-09-02

## 2021-08-31 RX ORDER — SODIUM CHLORIDE 0.9 % (FLUSH) 0.9 %
10 SYRINGE (ML) INJECTION AS NEEDED
Status: DISCONTINUED | OUTPATIENT
Start: 2021-08-31 | End: 2021-09-05 | Stop reason: HOSPADM

## 2021-08-31 RX ORDER — LORAZEPAM 2 MG/ML
2 INJECTION INTRAMUSCULAR
Status: DISCONTINUED | OUTPATIENT
Start: 2021-08-31 | End: 2021-09-05 | Stop reason: HOSPADM

## 2021-08-31 RX ORDER — LORAZEPAM 2 MG/ML
2 INJECTION INTRAMUSCULAR
Status: DISPENSED | OUTPATIENT
Start: 2021-09-01 | End: 2021-09-01

## 2021-08-31 RX ORDER — BUPRENORPHINE HYDROCHLORIDE AND NALOXONE HYDROCHLORIDE DIHYDRATE 8; 2 MG/1; MG/1
1 TABLET SUBLINGUAL 3 TIMES DAILY
Status: DISCONTINUED | OUTPATIENT
Start: 2021-08-31 | End: 2021-09-05 | Stop reason: HOSPADM

## 2021-08-31 RX ORDER — PANTOPRAZOLE SODIUM 40 MG/10ML
40 INJECTION, POWDER, LYOPHILIZED, FOR SOLUTION INTRAVENOUS ONCE
Status: COMPLETED | OUTPATIENT
Start: 2021-08-31 | End: 2021-08-31

## 2021-08-31 RX ORDER — LORAZEPAM 2 MG/ML
2 INJECTION INTRAMUSCULAR
Status: DISPENSED | OUTPATIENT
Start: 2021-08-31 | End: 2021-09-01

## 2021-08-31 RX ORDER — MAGNESIUM SULFATE HEPTAHYDRATE 40 MG/ML
2 INJECTION, SOLUTION INTRAVENOUS AS NEEDED
Status: DISCONTINUED | OUTPATIENT
Start: 2021-08-31 | End: 2021-09-05 | Stop reason: HOSPADM

## 2021-08-31 RX ORDER — PANTOPRAZOLE SODIUM 40 MG/10ML
40 INJECTION, POWDER, LYOPHILIZED, FOR SOLUTION INTRAVENOUS
Status: DISCONTINUED | OUTPATIENT
Start: 2021-09-01 | End: 2021-09-03

## 2021-08-31 RX ORDER — LORAZEPAM 2 MG/ML
2 INJECTION INTRAMUSCULAR ONCE
Status: COMPLETED | OUTPATIENT
Start: 2021-08-31 | End: 2021-08-31

## 2021-08-31 RX ORDER — POTASSIUM CHLORIDE 750 MG/1
20 CAPSULE, EXTENDED RELEASE ORAL ONCE
Status: COMPLETED | OUTPATIENT
Start: 2021-08-31 | End: 2021-08-31

## 2021-08-31 RX ORDER — LORAZEPAM 2 MG/ML
1 INJECTION INTRAMUSCULAR
Status: DISCONTINUED | OUTPATIENT
Start: 2021-08-31 | End: 2021-09-05 | Stop reason: HOSPADM

## 2021-08-31 RX ORDER — POTASSIUM CHLORIDE 7.45 MG/ML
10 INJECTION INTRAVENOUS
Status: DISCONTINUED | OUTPATIENT
Start: 2021-08-31 | End: 2021-09-05 | Stop reason: HOSPADM

## 2021-08-31 RX ORDER — SODIUM CHLORIDE 9 MG/ML
125 INJECTION, SOLUTION INTRAVENOUS CONTINUOUS
Status: DISCONTINUED | OUTPATIENT
Start: 2021-08-31 | End: 2021-09-03

## 2021-08-31 RX ORDER — LORAZEPAM 0.5 MG/1
1 TABLET ORAL
Status: DISCONTINUED | OUTPATIENT
Start: 2021-08-31 | End: 2021-09-05 | Stop reason: HOSPADM

## 2021-08-31 RX ORDER — ASPIRIN 325 MG
325 TABLET ORAL ONCE
Status: COMPLETED | OUTPATIENT
Start: 2021-08-31 | End: 2021-08-31

## 2021-08-31 RX ORDER — ONDANSETRON 2 MG/ML
4 INJECTION INTRAMUSCULAR; INTRAVENOUS ONCE
Status: COMPLETED | OUTPATIENT
Start: 2021-08-31 | End: 2021-08-31

## 2021-08-31 RX ORDER — NICOTINE 21 MG/24HR
1 PATCH, TRANSDERMAL 24 HOURS TRANSDERMAL
Status: DISCONTINUED | OUTPATIENT
Start: 2021-08-31 | End: 2021-09-05 | Stop reason: HOSPADM

## 2021-08-31 RX ORDER — SODIUM CHLORIDE AND POTASSIUM CHLORIDE 150; 900 MG/100ML; MG/100ML
100 INJECTION, SOLUTION INTRAVENOUS CONTINUOUS
Status: DISCONTINUED | OUTPATIENT
Start: 2021-08-31 | End: 2021-09-03

## 2021-08-31 RX ORDER — MAGNESIUM SULFATE HEPTAHYDRATE 40 MG/ML
4 INJECTION, SOLUTION INTRAVENOUS AS NEEDED
Status: DISCONTINUED | OUTPATIENT
Start: 2021-08-31 | End: 2021-09-05 | Stop reason: HOSPADM

## 2021-08-31 RX ORDER — LORAZEPAM 2 MG/1
2 TABLET ORAL
Status: DISCONTINUED | OUTPATIENT
Start: 2021-08-31 | End: 2021-09-05 | Stop reason: HOSPADM

## 2021-08-31 RX ORDER — ONDANSETRON 2 MG/ML
4 INJECTION INTRAMUSCULAR; INTRAVENOUS EVERY 6 HOURS PRN
Status: DISCONTINUED | OUTPATIENT
Start: 2021-08-31 | End: 2021-09-05 | Stop reason: HOSPADM

## 2021-08-31 RX ADMIN — ASPIRIN 325 MG: 325 TABLET ORAL at 15:18

## 2021-08-31 RX ADMIN — IOPAMIDOL 90 ML: 755 INJECTION, SOLUTION INTRAVENOUS at 16:48

## 2021-08-31 RX ADMIN — THIAMINE HCL TAB 100 MG 200 MG: 100 TAB at 21:35

## 2021-08-31 RX ADMIN — LORAZEPAM 2 MG: 2 INJECTION INTRAMUSCULAR; INTRAVENOUS at 22:23

## 2021-08-31 RX ADMIN — THIAMINE HYDROCHLORIDE 1000 ML/HR: 100 INJECTION, SOLUTION INTRAMUSCULAR; INTRAVENOUS at 15:45

## 2021-08-31 RX ADMIN — ONDANSETRON 4 MG: 2 INJECTION INTRAMUSCULAR; INTRAVENOUS at 15:29

## 2021-08-31 RX ADMIN — POTASSIUM CHLORIDE 20 MEQ: 750 CAPSULE, EXTENDED RELEASE ORAL at 15:18

## 2021-08-31 RX ADMIN — ENOXAPARIN SODIUM 40 MG: 40 INJECTION SUBCUTANEOUS at 20:35

## 2021-08-31 RX ADMIN — PANTOPRAZOLE SODIUM 40 MG: 40 INJECTION, POWDER, FOR SOLUTION INTRAVENOUS at 15:39

## 2021-08-31 RX ADMIN — HYDROMORPHONE HYDROCHLORIDE 1 MG: 1 INJECTION, SOLUTION INTRAMUSCULAR; INTRAVENOUS; SUBCUTANEOUS at 17:24

## 2021-08-31 RX ADMIN — LORAZEPAM 2 MG: 2 INJECTION INTRAMUSCULAR; INTRAVENOUS at 15:30

## 2021-08-31 RX ADMIN — BUPRENORPHINE HYDROCHLORIDE AND NALOXONE HYDROCHLORIDE DIHYDRATE 1 TABLET: 8; 2 TABLET SUBLINGUAL at 21:35

## 2021-08-31 RX ADMIN — NICOTINE 1 PATCH: 21 PATCH, EXTENDED RELEASE TRANSDERMAL at 20:27

## 2021-08-31 RX ADMIN — POTASSIUM CHLORIDE AND SODIUM CHLORIDE 100 ML/HR: 900; 150 INJECTION, SOLUTION INTRAVENOUS at 20:29

## 2021-08-31 RX ADMIN — SODIUM CHLORIDE 125 ML/HR: 9 INJECTION, SOLUTION INTRAVENOUS at 15:38

## 2021-08-31 RX ADMIN — KETOROLAC TROMETHAMINE 30 MG: 30 INJECTION, SOLUTION INTRAMUSCULAR; INTRAVENOUS at 22:23

## 2021-08-31 RX ADMIN — LORAZEPAM 2 MG: 2 INJECTION INTRAMUSCULAR; INTRAVENOUS at 20:31

## 2021-09-01 LAB
ALBUMIN SERPL-MCNC: 3.7 G/DL (ref 3.5–5.2)
ALBUMIN/GLOB SERPL: 1.4 G/DL
ALP SERPL-CCNC: 96 U/L (ref 39–117)
ALT SERPL W P-5'-P-CCNC: 40 U/L (ref 1–33)
ANION GAP SERPL CALCULATED.3IONS-SCNC: 12 MMOL/L (ref 5–15)
AST SERPL-CCNC: 51 U/L (ref 1–32)
BASOPHILS # BLD AUTO: 0.04 10*3/MM3 (ref 0–0.2)
BASOPHILS NFR BLD AUTO: 0.6 % (ref 0–1.5)
BILIRUB SERPL-MCNC: 0.4 MG/DL (ref 0–1.2)
BUN SERPL-MCNC: 4 MG/DL (ref 6–20)
BUN/CREAT SERPL: 9.5 (ref 7–25)
CALCIUM SPEC-SCNC: 7.5 MG/DL (ref 8.6–10.5)
CHLORIDE SERPL-SCNC: 101 MMOL/L (ref 98–107)
CO2 SERPL-SCNC: 22 MMOL/L (ref 22–29)
CREAT SERPL-MCNC: 0.42 MG/DL (ref 0.57–1)
DEPRECATED RDW RBC AUTO: 46.1 FL (ref 37–54)
EOSINOPHIL # BLD AUTO: 0.08 10*3/MM3 (ref 0–0.4)
EOSINOPHIL NFR BLD AUTO: 1.2 % (ref 0.3–6.2)
ERYTHROCYTE [DISTWIDTH] IN BLOOD BY AUTOMATED COUNT: 12.2 % (ref 12.3–15.4)
GFR SERPL CREATININE-BSD FRML MDRD: >150 ML/MIN/1.73
GLOBULIN UR ELPH-MCNC: 2.6 GM/DL
GLUCOSE SERPL-MCNC: 103 MG/DL (ref 65–99)
HCT VFR BLD AUTO: 37 % (ref 34–46.6)
HGB BLD-MCNC: 13.8 G/DL (ref 12–15.9)
IMM GRANULOCYTES # BLD AUTO: 0.01 10*3/MM3 (ref 0–0.05)
IMM GRANULOCYTES NFR BLD AUTO: 0.2 % (ref 0–0.5)
LYMPHOCYTES # BLD AUTO: 3.21 10*3/MM3 (ref 0.7–3.1)
LYMPHOCYTES NFR BLD AUTO: 48.4 % (ref 19.6–45.3)
MAGNESIUM SERPL-MCNC: 3.5 MG/DL (ref 1.6–2.6)
MCH RBC QN AUTO: 38 PG (ref 26.6–33)
MCHC RBC AUTO-ENTMCNC: 37.3 G/DL (ref 31.5–35.7)
MCV RBC AUTO: 101.9 FL (ref 79–97)
MONOCYTES # BLD AUTO: 0.39 10*3/MM3 (ref 0.1–0.9)
MONOCYTES NFR BLD AUTO: 5.9 % (ref 5–12)
NEUTROPHILS NFR BLD AUTO: 2.9 10*3/MM3 (ref 1.7–7)
NEUTROPHILS NFR BLD AUTO: 43.7 % (ref 42.7–76)
NRBC BLD AUTO-RTO: 0 /100 WBC (ref 0–0.2)
PLATELET # BLD AUTO: 145 10*3/MM3 (ref 140–450)
PMV BLD AUTO: 9.9 FL (ref 6–12)
POTASSIUM SERPL-SCNC: 2.8 MMOL/L (ref 3.5–5.2)
POTASSIUM SERPL-SCNC: 2.9 MMOL/L (ref 3.5–5.2)
POTASSIUM SERPL-SCNC: 3.7 MMOL/L (ref 3.5–5.2)
PROT SERPL-MCNC: 6.3 G/DL (ref 6–8.5)
RBC # BLD AUTO: 3.63 10*6/MM3 (ref 3.77–5.28)
SODIUM SERPL-SCNC: 135 MMOL/L (ref 136–145)
WBC # BLD AUTO: 6.63 10*3/MM3 (ref 3.4–10.8)

## 2021-09-01 PROCEDURE — 85025 COMPLETE CBC W/AUTO DIFF WBC: CPT | Performed by: STUDENT IN AN ORGANIZED HEALTH CARE EDUCATION/TRAINING PROGRAM

## 2021-09-01 PROCEDURE — 25010000002 ONDANSETRON PER 1 MG: Performed by: STUDENT IN AN ORGANIZED HEALTH CARE EDUCATION/TRAINING PROGRAM

## 2021-09-01 PROCEDURE — 84132 ASSAY OF SERUM POTASSIUM: CPT | Performed by: FAMILY MEDICINE

## 2021-09-01 PROCEDURE — 25010000002 LORAZEPAM PER 2 MG: Performed by: STUDENT IN AN ORGANIZED HEALTH CARE EDUCATION/TRAINING PROGRAM

## 2021-09-01 PROCEDURE — 84132 ASSAY OF SERUM POTASSIUM: CPT | Performed by: STUDENT IN AN ORGANIZED HEALTH CARE EDUCATION/TRAINING PROGRAM

## 2021-09-01 PROCEDURE — 25010000002 MAGNESIUM SULFATE 2 GM/50ML SOLUTION: Performed by: STUDENT IN AN ORGANIZED HEALTH CARE EDUCATION/TRAINING PROGRAM

## 2021-09-01 PROCEDURE — 25010000002 KETOROLAC TROMETHAMINE PER 15 MG: Performed by: STUDENT IN AN ORGANIZED HEALTH CARE EDUCATION/TRAINING PROGRAM

## 2021-09-01 PROCEDURE — 80053 COMPREHEN METABOLIC PANEL: CPT | Performed by: STUDENT IN AN ORGANIZED HEALTH CARE EDUCATION/TRAINING PROGRAM

## 2021-09-01 PROCEDURE — 99231 SBSQ HOSP IP/OBS SF/LOW 25: CPT | Performed by: STUDENT IN AN ORGANIZED HEALTH CARE EDUCATION/TRAINING PROGRAM

## 2021-09-01 PROCEDURE — 83735 ASSAY OF MAGNESIUM: CPT | Performed by: STUDENT IN AN ORGANIZED HEALTH CARE EDUCATION/TRAINING PROGRAM

## 2021-09-01 PROCEDURE — 25010000002 SODIUM CHLORIDE 0.9 % WITH KCL 20 MEQ 20-0.9 MEQ/L-% SOLUTION: Performed by: STUDENT IN AN ORGANIZED HEALTH CARE EDUCATION/TRAINING PROGRAM

## 2021-09-01 PROCEDURE — 25010000002 ENOXAPARIN PER 10 MG: Performed by: STUDENT IN AN ORGANIZED HEALTH CARE EDUCATION/TRAINING PROGRAM

## 2021-09-01 RX ORDER — POTASSIUM CHLORIDE 750 MG/1
20 CAPSULE, EXTENDED RELEASE ORAL ONCE
Status: COMPLETED | OUTPATIENT
Start: 2021-09-01 | End: 2021-09-01

## 2021-09-01 RX ADMIN — ONDANSETRON 4 MG: 2 INJECTION INTRAMUSCULAR; INTRAVENOUS at 10:43

## 2021-09-01 RX ADMIN — BUPRENORPHINE HYDROCHLORIDE AND NALOXONE HYDROCHLORIDE DIHYDRATE 1 TABLET: 8; 2 TABLET SUBLINGUAL at 20:02

## 2021-09-01 RX ADMIN — FOLIC ACID 1 MG: 5 INJECTION, SOLUTION INTRAMUSCULAR; INTRAVENOUS; SUBCUTANEOUS at 09:45

## 2021-09-01 RX ADMIN — LORAZEPAM 2 MG: 2 INJECTION INTRAMUSCULAR; INTRAVENOUS at 19:24

## 2021-09-01 RX ADMIN — MAGNESIUM SULFATE HEPTAHYDRATE 2 G: 40 INJECTION, SOLUTION INTRAVENOUS at 02:20

## 2021-09-01 RX ADMIN — KETOROLAC TROMETHAMINE 30 MG: 30 INJECTION, SOLUTION INTRAMUSCULAR; INTRAVENOUS at 15:38

## 2021-09-01 RX ADMIN — ONDANSETRON 4 MG: 2 INJECTION INTRAMUSCULAR; INTRAVENOUS at 19:24

## 2021-09-01 RX ADMIN — POTASSIUM CHLORIDE AND SODIUM CHLORIDE 100 ML/HR: 900; 150 INJECTION, SOLUTION INTRAVENOUS at 20:07

## 2021-09-01 RX ADMIN — POTASSIUM CHLORIDE 20 MEQ: 750 CAPSULE, EXTENDED RELEASE ORAL at 09:49

## 2021-09-01 RX ADMIN — PANTOPRAZOLE SODIUM 40 MG: 40 INJECTION, POWDER, FOR SOLUTION INTRAVENOUS at 09:48

## 2021-09-01 RX ADMIN — LORAZEPAM 2 MG: 2 INJECTION INTRAMUSCULAR; INTRAVENOUS at 14:05

## 2021-09-01 RX ADMIN — LORAZEPAM 2 MG: 2 INJECTION INTRAMUSCULAR; INTRAVENOUS at 00:10

## 2021-09-01 RX ADMIN — LORAZEPAM 2 MG: 2 INJECTION INTRAMUSCULAR; INTRAVENOUS at 02:22

## 2021-09-01 RX ADMIN — BUPRENORPHINE HYDROCHLORIDE AND NALOXONE HYDROCHLORIDE DIHYDRATE 1 TABLET: 8; 2 TABLET SUBLINGUAL at 15:42

## 2021-09-01 RX ADMIN — LORAZEPAM 2 MG: 2 INJECTION INTRAMUSCULAR; INTRAVENOUS at 15:38

## 2021-09-01 RX ADMIN — BUPRENORPHINE HYDROCHLORIDE AND NALOXONE HYDROCHLORIDE DIHYDRATE 1 TABLET: 8; 2 TABLET SUBLINGUAL at 09:50

## 2021-09-01 RX ADMIN — KETOROLAC TROMETHAMINE 30 MG: 30 INJECTION, SOLUTION INTRAMUSCULAR; INTRAVENOUS at 09:49

## 2021-09-01 RX ADMIN — LORAZEPAM 2 MG: 2 INJECTION INTRAMUSCULAR; INTRAVENOUS at 05:34

## 2021-09-01 RX ADMIN — LORAZEPAM 2 MG: 2 INJECTION INTRAMUSCULAR; INTRAVENOUS at 23:23

## 2021-09-01 RX ADMIN — LORAZEPAM 2 MG: 2 INJECTION INTRAMUSCULAR; INTRAVENOUS at 21:33

## 2021-09-01 RX ADMIN — ENOXAPARIN SODIUM 40 MG: 40 INJECTION SUBCUTANEOUS at 19:30

## 2021-09-01 RX ADMIN — SODIUM CHLORIDE, PRESERVATIVE FREE 10 ML: 5 INJECTION INTRAVENOUS at 20:02

## 2021-09-01 RX ADMIN — MAGNESIUM SULFATE HEPTAHYDRATE 2 G: 40 INJECTION, SOLUTION INTRAVENOUS at 00:07

## 2021-09-01 RX ADMIN — SODIUM CHLORIDE, PRESERVATIVE FREE 10 ML: 5 INJECTION INTRAVENOUS at 02:23

## 2021-09-01 RX ADMIN — LORAZEPAM 2 MG: 2 INJECTION INTRAMUSCULAR; INTRAVENOUS at 10:43

## 2021-09-01 RX ADMIN — POTASSIUM CHLORIDE AND SODIUM CHLORIDE 100 ML/HR: 900; 150 INJECTION, SOLUTION INTRAVENOUS at 10:44

## 2021-09-01 RX ADMIN — LORAZEPAM 2 MG: 2 INJECTION INTRAMUSCULAR; INTRAVENOUS at 04:37

## 2021-09-01 RX ADMIN — LORAZEPAM 2 MG: 2 INJECTION INTRAMUSCULAR; INTRAVENOUS at 08:11

## 2021-09-01 RX ADMIN — NICOTINE 1 PATCH: 21 PATCH, EXTENDED RELEASE TRANSDERMAL at 09:53

## 2021-09-01 NOTE — PLAN OF CARE
Goal Outcome Evaluation:  Plan of Care Reviewed With: patient        Progress: no change  Outcome Summary: Pt arrived via w/c form ED.  A/O x4.  Ambulatory in room.  CIWA protocol maintained.  VSS.  Will continue to monitor.

## 2021-09-01 NOTE — PROGRESS NOTES
FAMILY MEDICINE DAILY PROGRESS NOTE    NAME: Nata Bain  : 1985  MRN: [id number]      LOS: 0 days     PROVIDER OF SERVICE: Oh Linares MD    Chief Complaint: Acute alcoholic gastritis without hemorrhage    Subjective:     Interval History:  History taken from: patient chart RN  Nata Bain 1985 female did well overnight with no acute events. VSS. Orientated X3.   Patient had low potassium this morning 2.8 order potassium 20 mg and repeat lab collect circa 1300 hrs.  The patient was responsive to my questions, reports that their pain is well controlled with their current medication plan.  Overnight nursing staff report that the patient had slept well throughout the night.   Patient doing well on CIWA protocol.  On exiting the room Nata Bain was in no acute distress and was happy to continue with our current plan of care at c.0700hrs.-     Review of Systems:   Review of Systems   Constitutional: Negative for activity change, appetite change, chills, diaphoresis, fatigue and fever.   HENT: Negative for congestion, dental problem, ear discharge, ear pain, hearing loss, mouth sores, nosebleeds, postnasal drip, sinus pain, sore throat, tinnitus, trouble swallowing and voice change.    Eyes: Negative for photophobia, pain, discharge and itching.   Respiratory: Negative for cough, choking, chest tightness, shortness of breath and wheezing.    Cardiovascular: Negative for chest pain, palpitations and leg swelling.   Gastrointestinal: Negative for abdominal distention, abdominal pain, blood in stool, constipation, diarrhea, nausea and vomiting.   Endocrine: Negative for cold intolerance and heat intolerance.   Genitourinary: Negative for difficulty urinating, dysuria, flank pain and hematuria.   Musculoskeletal: Negative for back pain, joint swelling and neck pain.   Skin: Negative for color change and rash.   Neurological: Negative for dizziness, tremors, seizures, weakness,  light-headedness, numbness and headaches.   Hematological: Negative for adenopathy.   Psychiatric/Behavioral: Negative for behavioral problems, confusion, hallucinations, self-injury and sleep disturbance.       Objective:     Vital Signs  Temp:  [97.1 °F (36.2 °C)] 97.1 °F (36.2 °C)  Heart Rate:  [62-98] 64  Resp:  [19-20] 19  BP: (101-167)/() 141/84   Body mass index is 20.36 kg/m².    Physical Exam  Physical Exam  Vitals and nursing note reviewed.   Constitutional:       Appearance: Normal appearance. She is not ill-appearing or diaphoretic.   HENT:      Head: Normocephalic and atraumatic.      Right Ear: External ear normal.      Left Ear: External ear normal.      Nose: Nose normal. No rhinorrhea.      Mouth/Throat:      Mouth: Mucous membranes are moist.      Pharynx: No posterior oropharyngeal erythema.   Eyes:      General: No scleral icterus.        Right eye: No discharge.         Left eye: No discharge.      Extraocular Movements: Extraocular movements intact.   Neck:      Vascular: No carotid bruit.   Cardiovascular:      Rate and Rhythm: Normal rate and regular rhythm.      Pulses: Normal pulses.      Heart sounds: Normal heart sounds. No gallop.    Pulmonary:      Effort: Pulmonary effort is normal.      Breath sounds: Normal breath sounds. No wheezing.   Chest:      Chest wall: No tenderness.   Abdominal:      General: Bowel sounds are normal.      Palpations: Abdomen is soft.      Tenderness: There is no rebound.   Musculoskeletal:         General: No swelling.      Cervical back: No muscular tenderness.      Right lower leg: No edema.      Left lower leg: No edema.   Lymphadenopathy:      Cervical: No cervical adenopathy.   Skin:     General: Skin is warm and dry.      Capillary Refill: Capillary refill takes less than 2 seconds.      Findings: No erythema or rash.          Neurological:      General: No focal deficit present.      Mental Status: She is alert and oriented to person, place, and  time.      Motor: No weakness.   Psychiatric:         Mood and Affect: Mood normal.         Behavior: Behavior normal.         Thought Content: Thought content normal.         Judgment: Judgment normal.         Scheduled Meds   buprenorphine-naloxone, 1 tablet, Sublingual, TID  enoxaparin, 40 mg, Subcutaneous, Q24H  folic acid (FOLVITE) IVPB, 1 mg, Intravenous, Daily  nicotine, 1 patch, Transdermal, Q24H  pantoprazole, 40 mg, Intravenous, Q AM  potassium chloride, 20 mEq, Oral, Once  sodium chloride, 10 mL, Intravenous, Q12H       PRN Meds   ketorolac  •  LORazepam **OR** LORazepam **OR** LORazepam **OR** LORazepam **OR** LORazepam **OR** LORazepam  •  magnesium sulfate **OR** magnesium sulfate **OR** magnesium sulfate  •  ondansetron  •  potassium chloride  •  sodium chloride  •  sodium chloride      Diagnostic Data    Results from last 7 days   Lab Units 09/01/21  0531   WBC 10*3/mm3 6.63   HEMOGLOBIN g/dL 13.8   HEMATOCRIT % 37.0   PLATELETS 10*3/mm3 145   GLUCOSE mg/dL 103*   CREATININE mg/dL 0.42*   BUN mg/dL 4*   SODIUM mmol/L 135*   POTASSIUM mmol/L 2.8*   AST (SGOT) U/L 51*   ALT (SGPT) U/L 40*   ALK PHOS U/L 96   BILIRUBIN mg/dL 0.4   ANION GAP mmol/L 12.0       CT Abdomen Pelvis With Contrast    Result Date: 8/31/2021  CONCLUSION: No evidence of pulmonary embolism. Fatty liver. Electronically signed by:  Ranjit Marie MD  8/31/2021 5:26 PM CDT Workstation: [id number]    XR Chest 1 View    Result Date: 8/31/2021  Negative single view chest Electronically signed by:  Doug Calvo MD  8/31/2021 2:39 PM CDT Workstation: [id number]    CT Angiogram Chest    Result Date: 8/31/2021  CONCLUSION: No evidence of pulmonary embolism. Fatty liver. Electronically signed by:  Ranjit Marie MD  8/31/2021 5:26 PM CDT Workstation: [id number]        I reviewed the patient's new clinical results.    Assessment/Plan:     Active Hospital Problems    Diagnosis  POA   • **Acute alcoholic gastritis without hemorrhage [K29.20]   Yes     · CT abd/pelvis only showed fatty liver dz, no PE even with elevated D dimer  · Zofran  · Protonix 40mg daily  · NPO  · CIWA scheduled 2mg q2h, followed by 2mg q2h; 1mg q2h as needed depending on scale. Patient on 2mg ativan po at home 3 times daily as needed.  · Last EGD 1/8/2021 no varices or bleeding     • Noncompliance with medication regimen [Z91.14]  Not Applicable     · Patient counseled     • Transaminitis [R74.01]  Yes     · Likely alcohol induced  · Daily CMP     • Benzodiazepine dependence (CMS/HCC) [F13.20]  Yes     · Continue Suboxone home regimen   · UDS pending  · Jam appropriate via PDMP     • Hypomagnesemia [E83.42]  Yes     Results from last 7 days   Lab Units 08/31/21  1415   MAGNESIUM mg/dL 1.2*   Monitor & replace IV     • Alcohol dependence with uncomplicated withdrawal (CMS/HCC) [F10.230]  Yes     · CIWA scheduled 2mg q2h, followed by 2mg q2h; 1mg q2h as needed depending on scale. Patient on 2mg ativan po at home 3 times daily as needed.  · Last drink 1800 8/30  · Scale up CIWA as needed based on score  · IV Thiamine  · IV Folic acid  · Transition to po when N/V resolves     • Dependence on nicotine from cigarettes [F17.210]  Yes     · Nicotine patch  · Patient counseled     • Hypokalemia [E87.6]  Yes     Lab Results   Component Value Date    K 3.3 (L) 08/31/2021   Monitor and replace  IV and transition to po when N/V resolves     • Polysubstance (including opioids) dependence, daily use (CMS/HCC) [F11.20, F19.20]  Yes     · Continue home Suboxone regimen  · UDS pending         DVT prophylaxis: Lovenox  Code status is   Code Status and Medical Interventions:   Ordered at: 08/31/21 1914     Level Of Support Discussed With:    Patient     Code Status:    CPR     Medical Interventions (Level of Support Prior to Arrest):    Full       Plan for disposition:Where: home 2 days      Time: 15 minutes     Signature  Oh Linares MD  [facility]  [address],  [place] [address]  Office: [phone]      This document has been electronically signed by Oh Linares MD on September 1, 2021 07:09 CDT      EMR Dragon/transcription disclaimer: Much of this encounter note was created utilizing an electronic transcription/translation of spoken language to printed text.  Electronic translation of spoken language may permit erroneous, or at times, nonsensical words or phrases to be in advertently transcribed; although I have reviewed the note for such errors to the best of my ability, some may still exist.

## 2021-09-02 PROBLEM — K70.0 ALCOHOLIC FATTY LIVER: Status: ACTIVE | Noted: 2021-04-09

## 2021-09-02 LAB
ALBUMIN SERPL-MCNC: 3.8 G/DL (ref 3.5–5.2)
ALBUMIN/GLOB SERPL: 1.5 G/DL
ALP SERPL-CCNC: 94 U/L (ref 39–117)
ALT SERPL W P-5'-P-CCNC: 104 U/L (ref 1–33)
ANION GAP SERPL CALCULATED.3IONS-SCNC: 12 MMOL/L (ref 5–15)
AST SERPL-CCNC: 336 U/L (ref 1–32)
BASOPHILS # BLD AUTO: 0.04 10*3/MM3 (ref 0–0.2)
BASOPHILS NFR BLD AUTO: 0.8 % (ref 0–1.5)
BILIRUB SERPL-MCNC: 0.3 MG/DL (ref 0–1.2)
BUN SERPL-MCNC: 3 MG/DL (ref 6–20)
BUN/CREAT SERPL: 7.1 (ref 7–25)
CALCIUM SPEC-SCNC: 7.6 MG/DL (ref 8.6–10.5)
CHLORIDE SERPL-SCNC: 105 MMOL/L (ref 98–107)
CO2 SERPL-SCNC: 23 MMOL/L (ref 22–29)
CREAT SERPL-MCNC: 0.42 MG/DL (ref 0.57–1)
DEPRECATED RDW RBC AUTO: 47.5 FL (ref 37–54)
EOSINOPHIL # BLD AUTO: 0.07 10*3/MM3 (ref 0–0.4)
EOSINOPHIL NFR BLD AUTO: 1.3 % (ref 0.3–6.2)
ERYTHROCYTE [DISTWIDTH] IN BLOOD BY AUTOMATED COUNT: 12.4 % (ref 12.3–15.4)
FOLATE SERPL-MCNC: >20 NG/ML (ref 4.78–24.2)
GFR SERPL CREATININE-BSD FRML MDRD: >150 ML/MIN/1.73
GLOBULIN UR ELPH-MCNC: 2.6 GM/DL
GLUCOSE SERPL-MCNC: 81 MG/DL (ref 65–99)
HCT VFR BLD AUTO: 37.3 % (ref 34–46.6)
HGB BLD-MCNC: 13.2 G/DL (ref 12–15.9)
IMM GRANULOCYTES # BLD AUTO: 0.01 10*3/MM3 (ref 0–0.05)
IMM GRANULOCYTES NFR BLD AUTO: 0.2 % (ref 0–0.5)
LYMPHOCYTES # BLD AUTO: 1.9 10*3/MM3 (ref 0.7–3.1)
LYMPHOCYTES NFR BLD AUTO: 35.8 % (ref 19.6–45.3)
MAGNESIUM SERPL-MCNC: 2.1 MG/DL (ref 1.6–2.6)
MCH RBC QN AUTO: 36.7 PG (ref 26.6–33)
MCHC RBC AUTO-ENTMCNC: 35.4 G/DL (ref 31.5–35.7)
MCV RBC AUTO: 103.6 FL (ref 79–97)
MONOCYTES # BLD AUTO: 0.21 10*3/MM3 (ref 0.1–0.9)
MONOCYTES NFR BLD AUTO: 4 % (ref 5–12)
NEUTROPHILS NFR BLD AUTO: 3.07 10*3/MM3 (ref 1.7–7)
NEUTROPHILS NFR BLD AUTO: 57.9 % (ref 42.7–76)
NRBC BLD AUTO-RTO: 0 /100 WBC (ref 0–0.2)
PLATELET # BLD AUTO: 136 10*3/MM3 (ref 140–450)
PMV BLD AUTO: 10.5 FL (ref 6–12)
POTASSIUM SERPL-SCNC: 3.3 MMOL/L (ref 3.5–5.2)
POTASSIUM SERPL-SCNC: 3.9 MMOL/L (ref 3.5–5.2)
PROT SERPL-MCNC: 6.4 G/DL (ref 6–8.5)
RBC # BLD AUTO: 3.6 10*6/MM3 (ref 3.77–5.28)
SODIUM SERPL-SCNC: 140 MMOL/L (ref 136–145)
VIT B12 BLD-MCNC: 1021 PG/ML (ref 211–946)
WBC # BLD AUTO: 5.3 10*3/MM3 (ref 3.4–10.8)
WHOLE BLOOD HOLD SPECIMEN: NORMAL

## 2021-09-02 PROCEDURE — 99231 SBSQ HOSP IP/OBS SF/LOW 25: CPT | Performed by: STUDENT IN AN ORGANIZED HEALTH CARE EDUCATION/TRAINING PROGRAM

## 2021-09-02 PROCEDURE — 80053 COMPREHEN METABOLIC PANEL: CPT | Performed by: STUDENT IN AN ORGANIZED HEALTH CARE EDUCATION/TRAINING PROGRAM

## 2021-09-02 PROCEDURE — 25010000002 THIAMINE PER 100 MG: Performed by: STUDENT IN AN ORGANIZED HEALTH CARE EDUCATION/TRAINING PROGRAM

## 2021-09-02 PROCEDURE — 25010000002 LORAZEPAM PER 2 MG: Performed by: STUDENT IN AN ORGANIZED HEALTH CARE EDUCATION/TRAINING PROGRAM

## 2021-09-02 PROCEDURE — 25010000002 KETOROLAC TROMETHAMINE PER 15 MG: Performed by: STUDENT IN AN ORGANIZED HEALTH CARE EDUCATION/TRAINING PROGRAM

## 2021-09-02 PROCEDURE — 84132 ASSAY OF SERUM POTASSIUM: CPT | Performed by: STUDENT IN AN ORGANIZED HEALTH CARE EDUCATION/TRAINING PROGRAM

## 2021-09-02 PROCEDURE — 85025 COMPLETE CBC W/AUTO DIFF WBC: CPT | Performed by: STUDENT IN AN ORGANIZED HEALTH CARE EDUCATION/TRAINING PROGRAM

## 2021-09-02 PROCEDURE — 25010000002 SODIUM CHLORIDE 0.9 % WITH KCL 20 MEQ 20-0.9 MEQ/L-% SOLUTION: Performed by: STUDENT IN AN ORGANIZED HEALTH CARE EDUCATION/TRAINING PROGRAM

## 2021-09-02 PROCEDURE — 25010000002 ENOXAPARIN PER 10 MG: Performed by: STUDENT IN AN ORGANIZED HEALTH CARE EDUCATION/TRAINING PROGRAM

## 2021-09-02 PROCEDURE — 82746 ASSAY OF FOLIC ACID SERUM: CPT | Performed by: STUDENT IN AN ORGANIZED HEALTH CARE EDUCATION/TRAINING PROGRAM

## 2021-09-02 PROCEDURE — 83735 ASSAY OF MAGNESIUM: CPT | Performed by: STUDENT IN AN ORGANIZED HEALTH CARE EDUCATION/TRAINING PROGRAM

## 2021-09-02 PROCEDURE — 82607 VITAMIN B-12: CPT | Performed by: STUDENT IN AN ORGANIZED HEALTH CARE EDUCATION/TRAINING PROGRAM

## 2021-09-02 RX ORDER — GABAPENTIN 400 MG/1
800 CAPSULE ORAL 3 TIMES DAILY
Status: DISCONTINUED | OUTPATIENT
Start: 2021-09-02 | End: 2021-09-05 | Stop reason: HOSPADM

## 2021-09-02 RX ORDER — POTASSIUM CHLORIDE 750 MG/1
10 CAPSULE, EXTENDED RELEASE ORAL ONCE
Status: COMPLETED | OUTPATIENT
Start: 2021-09-02 | End: 2021-09-02

## 2021-09-02 RX ORDER — LORAZEPAM 2 MG/1
2 TABLET ORAL 3 TIMES DAILY
Status: DISCONTINUED | OUTPATIENT
Start: 2021-09-02 | End: 2021-09-03

## 2021-09-02 RX ORDER — POTASSIUM CHLORIDE 750 MG/1
30 CAPSULE, EXTENDED RELEASE ORAL ONCE
Status: COMPLETED | OUTPATIENT
Start: 2021-09-02 | End: 2021-09-02

## 2021-09-02 RX ADMIN — KETOROLAC TROMETHAMINE 30 MG: 30 INJECTION, SOLUTION INTRAMUSCULAR; INTRAVENOUS at 02:06

## 2021-09-02 RX ADMIN — LORAZEPAM 2 MG: 2 INJECTION INTRAMUSCULAR; INTRAVENOUS at 10:34

## 2021-09-02 RX ADMIN — POTASSIUM CHLORIDE 30 MEQ: 750 CAPSULE, EXTENDED RELEASE ORAL at 13:15

## 2021-09-02 RX ADMIN — SODIUM CHLORIDE, PRESERVATIVE FREE 10 ML: 5 INJECTION INTRAVENOUS at 10:38

## 2021-09-02 RX ADMIN — BUPRENORPHINE HYDROCHLORIDE AND NALOXONE HYDROCHLORIDE DIHYDRATE 1 TABLET: 8; 2 TABLET SUBLINGUAL at 16:11

## 2021-09-02 RX ADMIN — LORAZEPAM 2 MG: 2 TABLET ORAL at 20:09

## 2021-09-02 RX ADMIN — GABAPENTIN 800 MG: 400 CAPSULE ORAL at 20:09

## 2021-09-02 RX ADMIN — POTASSIUM CHLORIDE 10 MEQ: 750 CAPSULE, EXTENDED RELEASE ORAL at 08:03

## 2021-09-02 RX ADMIN — LORAZEPAM 2 MG: 2 INJECTION INTRAMUSCULAR; INTRAVENOUS at 02:08

## 2021-09-02 RX ADMIN — GABAPENTIN 800 MG: 400 CAPSULE ORAL at 16:11

## 2021-09-02 RX ADMIN — PANTOPRAZOLE SODIUM 40 MG: 40 INJECTION, POWDER, FOR SOLUTION INTRAVENOUS at 05:26

## 2021-09-02 RX ADMIN — POTASSIUM CHLORIDE AND SODIUM CHLORIDE 100 ML/HR: 900; 150 INJECTION, SOLUTION INTRAVENOUS at 05:25

## 2021-09-02 RX ADMIN — THIAMINE HYDROCHLORIDE 100 MG: 100 INJECTION, SOLUTION INTRAMUSCULAR; INTRAVENOUS at 13:16

## 2021-09-02 RX ADMIN — LORAZEPAM 2 MG: 2 INJECTION INTRAMUSCULAR; INTRAVENOUS at 05:54

## 2021-09-02 RX ADMIN — LORAZEPAM 2 MG: 2 INJECTION INTRAMUSCULAR; INTRAVENOUS at 04:33

## 2021-09-02 RX ADMIN — BUPRENORPHINE HYDROCHLORIDE AND NALOXONE HYDROCHLORIDE DIHYDRATE 1 TABLET: 8; 2 TABLET SUBLINGUAL at 08:03

## 2021-09-02 RX ADMIN — ENOXAPARIN SODIUM 40 MG: 40 INJECTION SUBCUTANEOUS at 19:43

## 2021-09-02 RX ADMIN — SODIUM CHLORIDE 125 ML/HR: 9 INJECTION, SOLUTION INTRAVENOUS at 10:33

## 2021-09-02 RX ADMIN — SODIUM CHLORIDE, PRESERVATIVE FREE 10 ML: 5 INJECTION INTRAVENOUS at 20:10

## 2021-09-02 RX ADMIN — LORAZEPAM 2 MG: 2 INJECTION INTRAMUSCULAR; INTRAVENOUS at 07:49

## 2021-09-02 RX ADMIN — NICOTINE 1 PATCH: 21 PATCH, EXTENDED RELEASE TRANSDERMAL at 08:04

## 2021-09-02 RX ADMIN — BUPRENORPHINE HYDROCHLORIDE AND NALOXONE HYDROCHLORIDE DIHYDRATE 1 TABLET: 8; 2 TABLET SUBLINGUAL at 20:09

## 2021-09-02 RX ADMIN — GABAPENTIN 800 MG: 400 CAPSULE ORAL at 13:15

## 2021-09-02 RX ADMIN — LORAZEPAM 2 MG: 2 TABLET ORAL at 13:15

## 2021-09-02 RX ADMIN — POTASSIUM CHLORIDE AND SODIUM CHLORIDE 100 ML/HR: 900; 150 INJECTION, SOLUTION INTRAVENOUS at 16:15

## 2021-09-02 RX ADMIN — LORAZEPAM 2 MG: 2 TABLET ORAL at 16:11

## 2021-09-02 RX ADMIN — FOLIC ACID 1 MG: 5 INJECTION, SOLUTION INTRAMUSCULAR; INTRAVENOUS; SUBCUTANEOUS at 10:38

## 2021-09-02 NOTE — PROGRESS NOTES
FAMILY MEDICINE DAILY PROGRESS NOTE    NAME: Nata Bain  : 1985  MRN: [id number]      LOS: 1 day     PROVIDER OF SERVICE: Stefani Sidhu MD    Chief Complaint: Acute alcoholic gastritis without hemorrhage    Subjective:     Interval History:  History taken from: patient chart RN     Nata Bain is a 37 y/o female who reported some alcohol withdrawal symptoms overnight. She reported having some anxiety, and intermittent periods of feeling hot and cold, and shakiness.  On the morning of 21 patient reported feeling well.  States that she has not have any abdominal pain, nausea, vomiting, or diarrhea.  Her last bowel movement was yesterday morning.  Patient reports feeling hungry. Nursing staff reports that patient was  restlessness overnight, asking for Ativan.  Doing well on CIWA protocol (CIWA 13).  No acute complaints this morning.       Review of Systems:   Review of Systems   Constitutional: Positive for appetite change and diaphoresis. Negative for fatigue and unexpected weight change.   Eyes: Negative for visual disturbance.   Respiratory: Negative for chest tightness and shortness of breath.    Cardiovascular: Negative for chest pain and palpitations.   Gastrointestinal: Negative for abdominal pain, constipation, diarrhea, nausea and vomiting.   Endocrine: Negative for cold intolerance, heat intolerance, polydipsia and polyuria.   Musculoskeletal: Negative for back pain, myalgias and neck pain.   Skin: Negative for rash.   Neurological: Negative for dizziness, weakness, light-headedness, numbness and headaches.   Psychiatric/Behavioral: Negative for agitation, behavioral problems, confusion, hallucinations and self-injury. The patient is nervous/anxious.        Objective:     Vital Signs  Temp:  [96.8 °F (36 °C)-98.3 °F (36.8 °C)] 97.6 °F (36.4 °C)  Heart Rate:  [69-96] 83  Resp:  [16-20] 18  BP: (126-158)/(58-97) 158/95  Body mass index is 20.74 kg/m².    Physical  Exam  Physical Exam  Constitutional:       Appearance: Normal appearance.   HENT:      Head: Normocephalic and atraumatic.      Mouth/Throat:      Mouth: Mucous membranes are moist.      Pharynx: Oropharynx is clear.   Eyes:      Extraocular Movements: Extraocular movements intact.      Conjunctiva/sclera: Conjunctivae normal.      Pupils: Pupils are equal, round, and reactive to light.   Cardiovascular:      Rate and Rhythm: Normal rate and regular rhythm.   Pulmonary:      Effort: Pulmonary effort is normal.      Breath sounds: Normal breath sounds.   Abdominal:      General: Abdomen is flat. Bowel sounds are normal.      Palpations: Abdomen is soft.      Tenderness: There is no abdominal tenderness.   Musculoskeletal:         General: Normal range of motion.      Cervical back: Normal range of motion.   Skin:     Findings: Bruising present.          Neurological:      General: No focal deficit present.      Mental Status: She is alert and oriented to person, place, and time.   Psychiatric:         Mood and Affect: Mood normal.         Medication Review    Current Facility-Administered Medications:   •  buprenorphine-naloxone (SUBOXONE) 8-2 MG per SL tablet 1 tablet, 1 tablet, Sublingual, TID, Socorro Hopson MD, 1 tablet at 09/01/21 2002  •  enoxaparin (LOVENOX) syringe 40 mg, 40 mg, Subcutaneous, Q24H, Socorro Hopson MD, 40 mg at 09/01/21 1930  •  folic acid 1 mg in sodium chloride 0.9 % 50 mL IVPB, 1 mg, Intravenous, Daily, Socorro Hopson MD, Last Rate: 100 mL/hr at 09/01/21 0945, 1 mg at 09/01/21 0945  •  LORazepam (ATIVAN) tablet 1 mg, 1 mg, Oral, Q2H PRN **OR** LORazepam (ATIVAN) injection 1 mg, 1 mg, Intravenous, Q2H PRN **OR** LORazepam (ATIVAN) tablet 2 mg, 2 mg, Oral, Q1H PRN **OR** LORazepam (ATIVAN) injection 2 mg, 2 mg, Intravenous, Q1H PRN, 2 mg at 09/02/21 0554 **OR** LORazepam (ATIVAN) injection 2 mg, 2 mg, Intravenous, Q15 Min PRN, 2 mg at 09/01/21 0811 **OR** LORazepam (ATIVAN) injection 2 mg, 2  mg, Intramuscular, Q15 Min PRN, Socorro Hopson MD  •  Magnesium Sulfate 2 gram Bolus, followed by 8 gram infusion (total Mg dose 10 grams)- Mg less than or equal to 1mg/dL, 2 g, Intravenous, PRN **OR** Magnesium Sulfate 2 gram / 50mL Infusion (GIVE X 3 BAGS TO EQUAL 6GM TOTAL DOSE) - Mg 1.1 - 1.5 mg/dl, 2 g, Intravenous, PRN, Last Rate: 25 mL/hr at 09/01/21 0220, 2 g at 09/01/21 0220 **OR** Magnesium Sulfate 4 gram infusion- Mg 1.6-1.9 mg/dL, 4 g, Intravenous, PRN, Socorro Hopson MD  •  nicotine (NICODERM CQ) 21 MG/24HR patch 1 patch, 1 patch, Transdermal, Q24H, Socorro Hopson MD, 1 patch at 09/01/21 0953  •  ondansetron (ZOFRAN) injection 4 mg, 4 mg, Intravenous, Q6H PRN, Socorro Hopson MD, 4 mg at 09/01/21 1924  •  pantoprazole (PROTONIX) injection 40 mg, 40 mg, Intravenous, Q AM, Socorro Hopson MD, 40 mg at 09/02/21 0526  •  potassium chloride (MICRO-K) CR capsule 10 mEq, 10 mEq, Oral, Once, Stefani Sidhu MD  •  potassium chloride 10 mEq in 100 mL IVPB, 10 mEq, Intravenous, Q1H PRN, Socorro Hopson MD  •  sodium chloride 0.9 % flush 10 mL, 10 mL, Intravenous, PRN, Socorro Hopson MD  •  sodium chloride 0.9 % flush 10 mL, 10 mL, Intravenous, Q12H, Socorro Hopson MD, 10 mL at 09/01/21 2002  •  sodium chloride 0.9 % flush 10 mL, 10 mL, Intravenous, PRN, Socorro Hopson MD  •  sodium chloride 0.9 % infusion, 125 mL/hr, Intravenous, Continuous, Socorro Hopson MD, Stopped at 09/01/21 0313  •  sodium chloride 0.9 % with KCl 20 mEq/L infusion, 100 mL/hr, Intravenous, Continuous, Socorro Hopson MD, Last Rate: 100 mL/hr at 09/02/21 0525, 100 mL/hr at 09/02/21 0525     Diagnostic Data    Lab Results (last 24 hours)     Procedure Component Value Units Date/Time    Magnesium [[id number]]  (Normal) Collected: 09/02/21 0525    Specimen: Blood Updated: 09/02/21 0714     Magnesium 2.1 mg/dL     Comprehensive Metabolic Panel [[id number]]  (Abnormal) Collected: 09/02/21 0525    Specimen: Blood Updated: 09/02/21 0713     Glucose 81  mg/dL      BUN 3 mg/dL      Creatinine 0.42 mg/dL      Sodium 140 mmol/L      Potassium 3.3 mmol/L      Chloride 105 mmol/L      CO2 23.0 mmol/L      Calcium 7.6 mg/dL      Total Protein 6.4 g/dL      Albumin 3.80 g/dL      ALT (SGPT) 104 U/L      AST (SGOT) 336 U/L      Alkaline Phosphatase 94 U/L      Total Bilirubin 0.3 mg/dL      eGFR Non African Amer >150 mL/min/1.73      Globulin 2.6 gm/dL      A/G Ratio 1.5 g/dL      BUN/Creatinine Ratio 7.1     Anion Gap 12.0 mmol/L     Narrative:      GFR Normal >60  Chronic Kidney Disease <60  Kidney Failure <15      CBC Auto Differential [[id number]]  (Abnormal) Collected: 09/02/21 0525    Specimen: Blood Updated: 09/02/21 0651     WBC 5.30 10*3/mm3      RBC 3.60 10*6/mm3      Hemoglobin 13.2 g/dL      Hematocrit 37.3 %      .6 fL      MCH 36.7 pg      MCHC 35.4 g/dL      RDW 12.4 %      RDW-SD 47.5 fl      MPV 10.5 fL      Platelets 136 10*3/mm3      Neutrophil % 57.9 %      Lymphocyte % 35.8 %      Monocyte % 4.0 %      Eosinophil % 1.3 %      Basophil % 0.8 %      Immature Grans % 0.2 %      Neutrophils, Absolute 3.07 10*3/mm3      Lymphocytes, Absolute 1.90 10*3/mm3      Monocytes, Absolute 0.21 10*3/mm3      Eosinophils, Absolute 0.07 10*3/mm3      Basophils, Absolute 0.04 10*3/mm3      Immature Grans, Absolute 0.01 10*3/mm3      nRBC 0.0 /100 WBC     Potassium [[id number]]  (Normal) Collected: 09/01/21 1530    Specimen: Blood Updated: 09/01/21 1637     Potassium 3.7 mmol/L             I reviewed the patient's new clinical results.      Assessment/Plan:     Active Hospital Problems    Diagnosis  POA   • **Acute alcoholic gastritis without hemorrhage [K29.20]  Yes     · Improving  · CT abd/pelvis only showed fatty liver dz, no PE even with elevated D dimer  · Zofran PRN  · Protonix 40mg daily  · Advance to clear liquid diet  · CIWA scheduled 2mg q2h, followed by 2mg q2h; 1mg q2h as needed depending on scale. Patient on 2mg ativan po at home 3 times daily as  needed.  · CIWA this mornin  · Last EGD 2021 no varices or bleeding     • Noncompliance with medication regimen [Z91.14]  Not Applicable     · Patient counseled     • Transaminitis [R74.01]  Yes     · Likely alcohol induced  · CT abd/pelvis showed fatty liver disease.  · Increased  · Daily CMP     • Benzodiazepine dependence (CMS/HCC) [F13.20]  Yes     · Continue Suboxone home regimen   · UDS positive for benzodiazepine and buprenorphine  · Jam appropriate via PDMP     • Hypomagnesemia [E83.42]  Yes     Results from last 7 days   Lab Units 21  1415   MAGNESIUM mg/dL 1.2*   Monitor & replace IV     • Alcohol dependence with uncomplicated withdrawal (CMS/HCC) [F10.230]  Yes     · CIWA scheduled 2mg q2h, followed by 2mg q2h; 1mg q2h as needed depending on scale. Patient on 2mg ativan po at home 3 times daily as needed.  · Last drink 1800   · Scale up CIWA as needed based on score. Last CIWA 13  · IV Thiamine  · IV Folic acid  · Transition to po when N/V resolves     • Dependence on nicotine from cigarettes [F17.210]  Yes     · Nicotine patch  · Patient counseled     • Hypokalemia [E87.6]  Yes     Lab Results   Component Value Date    K 3.3 (L) 2021   Potassium ordered  Recheck potassium at 13:00     • Polysubstance (including opioids) dependence, daily use (CMS/HCC) [F11.20, F19.20]  Yes     · Continue home Suboxone regimen  · UDS positive for benzodiazepine and buprenorphine         DVT prophylaxis:   Mechanical Order History:     None      Pharmalogical Order History:      Ordered     Dose Route Frequency Stop    21  enoxaparin (LOVENOX) syringe 40 mg      40 mg SC Every 24 Hours --               Code status is   Code Status and Medical Interventions:   Ordered at: 21     Level Of Support Discussed With:    Patient     Code Status:    CPR     Medical Interventions (Level of Support Prior to Arrest):    Full       Plan for disposition:Where: home and When:   tomorrow      Time: 15 minutes            This document has been electronically signed by Stefani Sidhu MD on September 2, 2021 07:44 CDT       Part of this note may be an electronic transcription/translation of spoken language to printed text using the Dragon Dictation System.

## 2021-09-02 NOTE — PLAN OF CARE
Goal Outcome Evaluation:  Plan of Care Reviewed With: patient        Progress: no change  Outcome Summary: Pt is A&Ox4. ambulates independently. CIWA protocol. pain addressed w/ PRN meds. call light w/in reach. Safety maintained. WIll continue to monitor

## 2021-09-03 PROBLEM — E87.1 HYPONATREMIA: Status: ACTIVE | Noted: 2021-09-03

## 2021-09-03 PROBLEM — K29.20 ACUTE ALCOHOLIC GASTRITIS WITHOUT HEMORRHAGE: Status: RESOLVED | Noted: 2021-08-31 | Resolved: 2021-09-03

## 2021-09-03 LAB
ALBUMIN SERPL-MCNC: 4 G/DL (ref 3.5–5.2)
ALBUMIN/GLOB SERPL: 1.7 G/DL
ALP SERPL-CCNC: 98 U/L (ref 39–117)
ALT SERPL W P-5'-P-CCNC: 101 U/L (ref 1–33)
ANION GAP SERPL CALCULATED.3IONS-SCNC: 7 MMOL/L (ref 5–15)
AST SERPL-CCNC: 199 U/L (ref 1–32)
BASOPHILS # BLD AUTO: 0.04 10*3/MM3 (ref 0–0.2)
BASOPHILS NFR BLD AUTO: 0.7 % (ref 0–1.5)
BILIRUB SERPL-MCNC: 0.2 MG/DL (ref 0–1.2)
BUN SERPL-MCNC: 4 MG/DL (ref 6–20)
BUN/CREAT SERPL: 8.5 (ref 7–25)
CALCIUM SPEC-SCNC: 9 MG/DL (ref 8.6–10.5)
CHLORIDE SERPL-SCNC: 98 MMOL/L (ref 98–107)
CO2 SERPL-SCNC: 25 MMOL/L (ref 22–29)
CREAT SERPL-MCNC: 0.47 MG/DL (ref 0.57–1)
DEPRECATED RDW RBC AUTO: 48.3 FL (ref 37–54)
EOSINOPHIL # BLD AUTO: 0.11 10*3/MM3 (ref 0–0.4)
EOSINOPHIL NFR BLD AUTO: 1.9 % (ref 0.3–6.2)
ERYTHROCYTE [DISTWIDTH] IN BLOOD BY AUTOMATED COUNT: 12.6 % (ref 12.3–15.4)
GFR SERPL CREATININE-BSD FRML MDRD: >150 ML/MIN/1.73
GLOBULIN UR ELPH-MCNC: 2.4 GM/DL
GLUCOSE SERPL-MCNC: 91 MG/DL (ref 65–99)
HCT VFR BLD AUTO: 38.4 % (ref 34–46.6)
HGB BLD-MCNC: 13.5 G/DL (ref 12–15.9)
IMM GRANULOCYTES # BLD AUTO: 0.02 10*3/MM3 (ref 0–0.05)
IMM GRANULOCYTES NFR BLD AUTO: 0.3 % (ref 0–0.5)
LYMPHOCYTES # BLD AUTO: 2.5 10*3/MM3 (ref 0.7–3.1)
LYMPHOCYTES NFR BLD AUTO: 43 % (ref 19.6–45.3)
MAGNESIUM SERPL-MCNC: 1.8 MG/DL (ref 1.6–2.6)
MCH RBC QN AUTO: 36.6 PG (ref 26.6–33)
MCHC RBC AUTO-ENTMCNC: 35.2 G/DL (ref 31.5–35.7)
MCV RBC AUTO: 104.1 FL (ref 79–97)
MONOCYTES # BLD AUTO: 0.32 10*3/MM3 (ref 0.1–0.9)
MONOCYTES NFR BLD AUTO: 5.5 % (ref 5–12)
NEUTROPHILS NFR BLD AUTO: 2.83 10*3/MM3 (ref 1.7–7)
NEUTROPHILS NFR BLD AUTO: 48.6 % (ref 42.7–76)
NRBC BLD AUTO-RTO: 0 /100 WBC (ref 0–0.2)
PLATELET # BLD AUTO: 144 10*3/MM3 (ref 140–450)
PMV BLD AUTO: 10.3 FL (ref 6–12)
POTASSIUM SERPL-SCNC: 3.9 MMOL/L (ref 3.5–5.2)
PROT SERPL-MCNC: 6.4 G/DL (ref 6–8.5)
RBC # BLD AUTO: 3.69 10*6/MM3 (ref 3.77–5.28)
SODIUM SERPL-SCNC: 130 MMOL/L (ref 136–145)
WBC # BLD AUTO: 5.82 10*3/MM3 (ref 3.4–10.8)

## 2021-09-03 PROCEDURE — 85025 COMPLETE CBC W/AUTO DIFF WBC: CPT | Performed by: STUDENT IN AN ORGANIZED HEALTH CARE EDUCATION/TRAINING PROGRAM

## 2021-09-03 PROCEDURE — 99231 SBSQ HOSP IP/OBS SF/LOW 25: CPT | Performed by: STUDENT IN AN ORGANIZED HEALTH CARE EDUCATION/TRAINING PROGRAM

## 2021-09-03 PROCEDURE — 83735 ASSAY OF MAGNESIUM: CPT | Performed by: STUDENT IN AN ORGANIZED HEALTH CARE EDUCATION/TRAINING PROGRAM

## 2021-09-03 PROCEDURE — 25010000002 HYDRALAZINE PER 20 MG: Performed by: STUDENT IN AN ORGANIZED HEALTH CARE EDUCATION/TRAINING PROGRAM

## 2021-09-03 PROCEDURE — 80053 COMPREHEN METABOLIC PANEL: CPT | Performed by: STUDENT IN AN ORGANIZED HEALTH CARE EDUCATION/TRAINING PROGRAM

## 2021-09-03 PROCEDURE — 25010000002 ENOXAPARIN PER 10 MG: Performed by: STUDENT IN AN ORGANIZED HEALTH CARE EDUCATION/TRAINING PROGRAM

## 2021-09-03 PROCEDURE — 25010000002 LORAZEPAM PER 2 MG: Performed by: STUDENT IN AN ORGANIZED HEALTH CARE EDUCATION/TRAINING PROGRAM

## 2021-09-03 RX ORDER — LORAZEPAM 0.5 MG/1
1 TABLET ORAL 3 TIMES DAILY
Status: DISCONTINUED | OUTPATIENT
Start: 2021-09-03 | End: 2021-09-04

## 2021-09-03 RX ORDER — PANTOPRAZOLE SODIUM 40 MG/1
40 TABLET, DELAYED RELEASE ORAL
Status: DISCONTINUED | OUTPATIENT
Start: 2021-09-04 | End: 2021-09-05 | Stop reason: HOSPADM

## 2021-09-03 RX ORDER — HYDRALAZINE HYDROCHLORIDE 20 MG/ML
10 INJECTION INTRAMUSCULAR; INTRAVENOUS EVERY 4 HOURS PRN
Status: DISCONTINUED | OUTPATIENT
Start: 2021-09-03 | End: 2021-09-05 | Stop reason: HOSPADM

## 2021-09-03 RX ADMIN — BUPRENORPHINE HYDROCHLORIDE AND NALOXONE HYDROCHLORIDE DIHYDRATE 1 TABLET: 8; 2 TABLET SUBLINGUAL at 08:58

## 2021-09-03 RX ADMIN — HYDRALAZINE HYDROCHLORIDE 10 MG: 20 INJECTION INTRAMUSCULAR; INTRAVENOUS at 20:45

## 2021-09-03 RX ADMIN — GABAPENTIN 800 MG: 400 CAPSULE ORAL at 20:31

## 2021-09-03 RX ADMIN — FOLIC ACID 1 MG: 5 INJECTION, SOLUTION INTRAMUSCULAR; INTRAVENOUS; SUBCUTANEOUS at 09:11

## 2021-09-03 RX ADMIN — BUPRENORPHINE HYDROCHLORIDE AND NALOXONE HYDROCHLORIDE DIHYDRATE 1 TABLET: 8; 2 TABLET SUBLINGUAL at 14:38

## 2021-09-03 RX ADMIN — PANTOPRAZOLE SODIUM 40 MG: 40 INJECTION, POWDER, FOR SOLUTION INTRAVENOUS at 05:12

## 2021-09-03 RX ADMIN — LORAZEPAM 2 MG: 2 INJECTION INTRAMUSCULAR; INTRAVENOUS at 17:29

## 2021-09-03 RX ADMIN — ENOXAPARIN SODIUM 40 MG: 40 INJECTION SUBCUTANEOUS at 21:48

## 2021-09-03 RX ADMIN — GABAPENTIN 800 MG: 400 CAPSULE ORAL at 08:57

## 2021-09-03 RX ADMIN — LORAZEPAM 1 MG: 0.5 TABLET ORAL at 20:44

## 2021-09-03 RX ADMIN — GABAPENTIN 800 MG: 400 CAPSULE ORAL at 14:38

## 2021-09-03 RX ADMIN — SODIUM CHLORIDE, PRESERVATIVE FREE 10 ML: 5 INJECTION INTRAVENOUS at 20:32

## 2021-09-03 RX ADMIN — LORAZEPAM 2 MG: 2 TABLET ORAL at 08:57

## 2021-09-03 RX ADMIN — BUPRENORPHINE HYDROCHLORIDE AND NALOXONE HYDROCHLORIDE DIHYDRATE 1 TABLET: 8; 2 TABLET SUBLINGUAL at 20:31

## 2021-09-03 RX ADMIN — LORAZEPAM 2 MG: 2 INJECTION INTRAMUSCULAR; INTRAVENOUS at 19:29

## 2021-09-03 RX ADMIN — LORAZEPAM 1 MG: 0.5 TABLET ORAL at 14:37

## 2021-09-03 RX ADMIN — LORAZEPAM 2 MG: 2 INJECTION INTRAMUSCULAR; INTRAVENOUS at 11:50

## 2021-09-03 RX ADMIN — NICOTINE 1 PATCH: 21 PATCH, EXTENDED RELEASE TRANSDERMAL at 09:30

## 2021-09-03 NOTE — PROGRESS NOTES
FAMILY MEDICINE DAILY PROGRESS NOTE    NAME: Nata Bain  : 1985  MRN: [id number]      LOS: 2 days     PROVIDER OF SERVICE: Stefani Sidhu MD    Chief Complaint: Acute alcoholic gastritis without hemorrhage    Subjective:     Interval History:  History taken from: patient chart RN  Jonn Bain is a 36-year-old female here for alcohol dependence with uncomplicated withdrawal.  Nurses report that patient had a good night, and was able to get some rest.  On the morning of 21 patient reports she is feeling good this morning.  Denies feeling anxious, sweaty, tremors, nausea, vomiting, or abdominal pain.  Patient reports 1 episode of soft stools, she states that she usually gets diarrhea when detoxing from alcohol.  Last CIWA score was at 19:42 on 2021.  No acute complaints this morning.      Review of Systems:   Review of Systems   Constitutional: Negative for appetite change, diaphoresis and fatigue.   HENT: Negative for congestion.    Eyes: Negative for redness and itching.   Respiratory: Negative for cough, chest tightness and shortness of breath.    Cardiovascular: Negative for chest pain, palpitations and leg swelling.   Gastrointestinal: Positive for diarrhea. Negative for abdominal pain, constipation, nausea and vomiting.   Genitourinary: Negative for difficulty urinating and dysuria.   Musculoskeletal: Negative for back pain and myalgias.   Skin: Negative for rash.   Neurological: Negative for dizziness, tremors, seizures and headaches.   Psychiatric/Behavioral: Negative for agitation, confusion, hallucinations, sleep disturbance and suicidal ideas. The patient is not nervous/anxious.        Objective:     Vital Signs  Temp:  [96.6 °F (35.9 °C)-97.7 °F (36.5 °C)] 96.7 °F (35.9 °C)  Heart Rate:  [] 85  Resp:  [18] 18  BP: (109-137)/(54-87) 124/87  Body mass index is 20.74 kg/m².    Physical Exam  Physical Exam  Constitutional:       Appearance: Normal appearance.   HENT:       Head: Normocephalic and atraumatic.      Right Ear: External ear normal.      Left Ear: External ear normal.      Nose: Nose normal.      Mouth/Throat:      Mouth: Mucous membranes are moist.      Pharynx: Oropharynx is clear.   Eyes:      Extraocular Movements: Extraocular movements intact.      Conjunctiva/sclera: Conjunctivae normal.   Cardiovascular:      Rate and Rhythm: Normal rate and regular rhythm.      Pulses: Normal pulses.   Pulmonary:      Effort: Pulmonary effort is normal.      Breath sounds: Normal breath sounds.   Abdominal:      General: Bowel sounds are normal.      Palpations: Abdomen is soft.   Musculoskeletal:         General: Normal range of motion.      Cervical back: Normal range of motion.   Skin:     Capillary Refill: Capillary refill takes less than 2 seconds.      Findings: Bruising present.          Neurological:      General: No focal deficit present.      Mental Status: She is alert and oriented to person, place, and time.   Psychiatric:         Mood and Affect: Mood normal.         Medication Review    Current Facility-Administered Medications:   •  buprenorphine-naloxone (SUBOXONE) 8-2 MG per SL tablet 1 tablet, 1 tablet, Sublingual, TID, Socorro Hopson MD, 1 tablet at 09/02/21 2009  •  enoxaparin (LOVENOX) syringe 40 mg, 40 mg, Subcutaneous, Q24H, Socorro Hopson MD, 40 mg at 09/02/21 1943  •  folic acid 1 mg in sodium chloride 0.9 % 50 mL IVPB, 1 mg, Intravenous, Daily, Socorro Hopson MD, Last Rate: 100 mL/hr at 09/02/21 1038, 1 mg at 09/02/21 1038  •  gabapentin (NEURONTIN) capsule 800 mg, 800 mg, Oral, TID, Stefani Sidhu MD, 800 mg at 09/02/21 2009  •  LORazepam (ATIVAN) tablet 1 mg, 1 mg, Oral, Q2H PRN **OR** LORazepam (ATIVAN) injection 1 mg, 1 mg, Intravenous, Q2H PRN **OR** LORazepam (ATIVAN) tablet 2 mg, 2 mg, Oral, Q1H PRN **OR** LORazepam (ATIVAN) injection 2 mg, 2 mg, Intravenous, Q1H PRN, 2 mg at 09/02/21 1034 **OR** LORazepam (ATIVAN) injection 2 mg, 2 mg,  Intravenous, Q15 Min PRN, 2 mg at 09/02/21 0749 **OR** LORazepam (ATIVAN) injection 2 mg, 2 mg, Intramuscular, Q15 Min PRN, Socorro Hopson MD  •  LORazepam (ATIVAN) tablet 2 mg, 2 mg, Oral, TID, Stefani Sidhu MD, 2 mg at 09/02/21 2009  •  Magnesium Sulfate 2 gram Bolus, followed by 8 gram infusion (total Mg dose 10 grams)- Mg less than or equal to 1mg/dL, 2 g, Intravenous, PRN **OR** Magnesium Sulfate 2 gram / 50mL Infusion (GIVE X 3 BAGS TO EQUAL 6GM TOTAL DOSE) - Mg 1.1 - 1.5 mg/dl, 2 g, Intravenous, PRN, Last Rate: 25 mL/hr at 09/01/21 0220, 2 g at 09/01/21 0220 **OR** Magnesium Sulfate 4 gram infusion- Mg 1.6-1.9 mg/dL, 4 g, Intravenous, PRN, Socorro Hopson MD  •  nicotine (NICODERM CQ) 21 MG/24HR patch 1 patch, 1 patch, Transdermal, Q24H, Socorro Hopson MD, 1 patch at 09/02/21 0804  •  ondansetron (ZOFRAN) injection 4 mg, 4 mg, Intravenous, Q6H PRN, Socorro Hopson MD, 4 mg at 09/01/21 1924  •  pantoprazole (PROTONIX) injection 40 mg, 40 mg, Intravenous, Q AM, Socorro Hopson MD, 40 mg at 09/03/21 0512  •  potassium chloride 10 mEq in 100 mL IVPB, 10 mEq, Intravenous, Q1H PRN, Socorro Hopson MD  •  sodium chloride 0.9 % flush 10 mL, 10 mL, Intravenous, PRN, Socorro Hopson MD  •  sodium chloride 0.9 % flush 10 mL, 10 mL, Intravenous, Q12H, Socorro Hopson MD, 10 mL at 09/02/21 2010  •  sodium chloride 0.9 % flush 10 mL, 10 mL, Intravenous, PRN, Socorro Hopson MD  •  thiamine (B-1) 100 mg in sodium chloride 0.9 % 100 mL IVPB, 100 mg, Intravenous, Daily With Lunch, Stefani Sidhu MD, Last Rate: 200 mL/hr at 09/02/21 1316, 100 mg at 09/02/21 1316     Diagnostic Data    Lab Results (last 24 hours)     Procedure Component Value Units Date/Time    Comprehensive Metabolic Panel [[id number]]  (Abnormal) Collected: 09/03/21 0505    Specimen: Blood Updated: 09/03/21 0600     Glucose 91 mg/dL      BUN 4 mg/dL      Creatinine 0.47 mg/dL      Sodium 130 mmol/L      Potassium 3.9 mmol/L      Chloride 98 mmol/L      CO2 25.0  mmol/L      Calcium 9.0 mg/dL      Total Protein 6.4 g/dL      Albumin 4.00 g/dL      ALT (SGPT) 101 U/L      AST (SGOT) 199 U/L      Alkaline Phosphatase 98 U/L      Total Bilirubin 0.2 mg/dL      eGFR Non African Amer >150 mL/min/1.73      Globulin 2.4 gm/dL      A/G Ratio 1.7 g/dL      BUN/Creatinine Ratio 8.5     Anion Gap 7.0 mmol/L     Narrative:      GFR Normal >60  Chronic Kidney Disease <60  Kidney Failure <15      Magnesium [[id number]]  (Normal) Collected: 09/03/21 0505    Specimen: Blood Updated: 09/03/21 0600     Magnesium 1.8 mg/dL     CBC Auto Differential [[id number]]  (Abnormal) Collected: 09/03/21 0505    Specimen: Blood Updated: 09/03/21 0541     WBC 5.82 10*3/mm3      RBC 3.69 10*6/mm3      Hemoglobin 13.5 g/dL      Hematocrit 38.4 %      .1 fL      MCH 36.6 pg      MCHC 35.2 g/dL      RDW 12.6 %      RDW-SD 48.3 fl      MPV 10.3 fL      Platelets 144 10*3/mm3      Neutrophil % 48.6 %      Lymphocyte % 43.0 %      Monocyte % 5.5 %      Eosinophil % 1.9 %      Basophil % 0.7 %      Immature Grans % 0.3 %      Neutrophils, Absolute 2.83 10*3/mm3      Lymphocytes, Absolute 2.50 10*3/mm3      Monocytes, Absolute 0.32 10*3/mm3      Eosinophils, Absolute 0.11 10*3/mm3      Basophils, Absolute 0.04 10*3/mm3      Immature Grans, Absolute 0.02 10*3/mm3      nRBC 0.0 /100 WBC     Folate [[id number]]  (Normal) Collected: 09/02/21 1250    Specimen: Blood Updated: 09/02/21 2147     Folate >20.00 ng/mL     Narrative:      Results may be falsely increased if patient taking Biotin.      Vitamin B12 [[id number]]  (Abnormal) Collected: 09/02/21 1250    Specimen: Blood Updated: 09/02/21 2147     Vitamin B-12 1,021 pg/mL     Narrative:      Results may be falsely increased if patient taking Biotin.      Extra Tubes [[id number]] Collected: 09/02/21 1250    Specimen: Blood, Venous Line Updated: 09/02/21 1401    Narrative:      The following orders were created for panel order Extra Tubes.  Procedure                                Abnormality         Status                     ---------                               -----------         ------                     Cinthia Conrad[[id number]]                                     Final result                 Please view results for these tests on the individual orders.    Cinthia Conrad [[id number]] Collected: 09/02/21 1250    Specimen: Blood Updated: 09/02/21 1401     Extra Tube hold for add-on     Comment: Auto resulted       Potassium [[id number]]  (Normal) Collected: 09/02/21 1250    Specimen: Blood Updated: 09/02/21 1316     Potassium 3.9 mmol/L             I reviewed the patient's new clinical results.    Assessment/Plan:     Active Hospital Problems    Diagnosis  POA   • **Acute alcoholic gastritis without hemorrhage [K29.20]  Yes     · CT abd/pelvis only showed fatty liver dz, no PE even with elevated D dimer  · Nausea and vomiting have resolved.  · Protonix 40mg daily  · Last EGD 1/8/2021 no varices or bleeding     • Alcohol dependence with uncomplicated withdrawal (CMS/HCC) [F10.230]  Yes     Priority: High     · Last CIWA score was 4 at 19:41 09/02/2021  · Last drink 1800 8/30  · Folic acid WNL, B12 mildly elevated     • Hyponatremia [E87.1]  No     - Likely dilutional from IVFs, stop fluids & monitor.     • Noncompliance with medication regimen [Z91.14]  Not Applicable     · Patient counseled     • Alcoholic fatty liver [K70.0]  Yes     · Daily CMP     • Benzodiazepine dependence (CMS/HCC) [F13.20]  Yes     · Continue Suboxone home regimen   · UDS positive for benzodiazepine and buprenorphine  · Jam appropriate via PDMP     • Hypomagnesemia [E83.42]  Yes     Results from last 7 days   Lab Units 08/31/21  1415   MAGNESIUM mg/dL 1.2*   Monitor & replace      • Dependence on nicotine from cigarettes [F17.210]  Yes     · Nicotine patch  · Patient expressed that she would like to quit.     • Hypokalemia [E87.6]  Yes     Lab Results   Component Value Date    K 3.3 (L) 08/31/2021    Monitor and replace       • Polysubstance (including opioids) dependence, daily use (CMS/HCC) [F11.20, F19.20]  Yes     · Continue home Suboxone regimen  · UDS positive for benzodiazepine and buprenorphine         DVT prophylaxis:   Mechanical Order History:     None      Pharmalogical Order History:      Ordered     Dose Route Frequency Stop    08/31/21 1919  enoxaparin (LOVENOX) syringe 40 mg      40 mg SC Every 24 Hours --               Code status is   Code Status and Medical Interventions:   Ordered at: 08/31/21 1914     Level Of Support Discussed With:    Patient     Code Status:    CPR     Medical Interventions (Level of Support Prior to Arrest):    Full       Plan for disposition:Where: home, When: today      Time: 15            This document has been electronically signed by Stefani Sidhu MD on September 3, 2021 07:50 CDT       Part of this note may be an electronic transcription/translation of spoken language to printed text using the Dragon Dictation System.

## 2021-09-03 NOTE — PLAN OF CARE
Goal Outcome Evaluation:  Plan of Care Reviewed With: patient        Progress: improving  Outcome Summary: Pt is A&Ox4. ambulates independently. no c/o pain or n/v. call light w/in reach. Safety maintained. Will continue to montior.

## 2021-09-04 PROBLEM — E87.6 HYPOKALEMIA: Status: RESOLVED | Noted: 2020-06-01 | Resolved: 2021-09-04

## 2021-09-04 PROBLEM — E83.42 HYPOMAGNESEMIA: Status: RESOLVED | Noted: 2021-04-06 | Resolved: 2021-09-04

## 2021-09-04 PROBLEM — R03.0 ELEVATED BLOOD PRESSURE READING WITHOUT DIAGNOSIS OF HYPERTENSION: Status: ACTIVE | Noted: 2021-09-04

## 2021-09-04 PROBLEM — I10 HIGH BLOOD PRESSURE: Status: ACTIVE | Noted: 2021-09-04

## 2021-09-04 LAB
ALBUMIN SERPL-MCNC: 4.2 G/DL (ref 3.5–5.2)
ALBUMIN/GLOB SERPL: 1.4 G/DL
ALP SERPL-CCNC: 103 U/L (ref 39–117)
ALT SERPL W P-5'-P-CCNC: 91 U/L (ref 1–33)
ANION GAP SERPL CALCULATED.3IONS-SCNC: 12 MMOL/L (ref 5–15)
AST SERPL-CCNC: 102 U/L (ref 1–32)
BASOPHILS # BLD AUTO: 0.04 10*3/MM3 (ref 0–0.2)
BASOPHILS NFR BLD AUTO: 0.6 % (ref 0–1.5)
BILIRUB SERPL-MCNC: 0.2 MG/DL (ref 0–1.2)
BUN SERPL-MCNC: 7 MG/DL (ref 6–20)
BUN/CREAT SERPL: 14.6 (ref 7–25)
CALCIUM SPEC-SCNC: 10.4 MG/DL (ref 8.6–10.5)
CHLORIDE SERPL-SCNC: 95 MMOL/L (ref 98–107)
CO2 SERPL-SCNC: 27 MMOL/L (ref 22–29)
CREAT SERPL-MCNC: 0.48 MG/DL (ref 0.57–1)
DEPRECATED RDW RBC AUTO: 47.7 FL (ref 37–54)
EOSINOPHIL # BLD AUTO: 0.07 10*3/MM3 (ref 0–0.4)
EOSINOPHIL NFR BLD AUTO: 1.1 % (ref 0.3–6.2)
ERYTHROCYTE [DISTWIDTH] IN BLOOD BY AUTOMATED COUNT: 12.6 % (ref 12.3–15.4)
GFR SERPL CREATININE-BSD FRML MDRD: 147 ML/MIN/1.73
GLOBULIN UR ELPH-MCNC: 2.9 GM/DL
GLUCOSE SERPL-MCNC: 95 MG/DL (ref 65–99)
HCT VFR BLD AUTO: 38.9 % (ref 34–46.6)
HGB BLD-MCNC: 13.9 G/DL (ref 12–15.9)
IMM GRANULOCYTES # BLD AUTO: 0.02 10*3/MM3 (ref 0–0.05)
IMM GRANULOCYTES NFR BLD AUTO: 0.3 % (ref 0–0.5)
LYMPHOCYTES # BLD AUTO: 2.66 10*3/MM3 (ref 0.7–3.1)
LYMPHOCYTES NFR BLD AUTO: 41.3 % (ref 19.6–45.3)
MAGNESIUM SERPL-MCNC: 1.9 MG/DL (ref 1.6–2.6)
MCH RBC QN AUTO: 37 PG (ref 26.6–33)
MCHC RBC AUTO-ENTMCNC: 35.7 G/DL (ref 31.5–35.7)
MCV RBC AUTO: 103.5 FL (ref 79–97)
MONOCYTES # BLD AUTO: 0.48 10*3/MM3 (ref 0.1–0.9)
MONOCYTES NFR BLD AUTO: 7.5 % (ref 5–12)
NEUTROPHILS NFR BLD AUTO: 3.17 10*3/MM3 (ref 1.7–7)
NEUTROPHILS NFR BLD AUTO: 49.2 % (ref 42.7–76)
NRBC BLD AUTO-RTO: 0 /100 WBC (ref 0–0.2)
PLATELET # BLD AUTO: 174 10*3/MM3 (ref 140–450)
PMV BLD AUTO: 10.6 FL (ref 6–12)
POTASSIUM SERPL-SCNC: 3.9 MMOL/L (ref 3.5–5.2)
PROT SERPL-MCNC: 7.1 G/DL (ref 6–8.5)
RBC # BLD AUTO: 3.76 10*6/MM3 (ref 3.77–5.28)
SODIUM SERPL-SCNC: 134 MMOL/L (ref 136–145)
WBC # BLD AUTO: 6.44 10*3/MM3 (ref 3.4–10.8)

## 2021-09-04 PROCEDURE — 25010000002 LORAZEPAM PER 2 MG: Performed by: STUDENT IN AN ORGANIZED HEALTH CARE EDUCATION/TRAINING PROGRAM

## 2021-09-04 PROCEDURE — 85025 COMPLETE CBC W/AUTO DIFF WBC: CPT | Performed by: STUDENT IN AN ORGANIZED HEALTH CARE EDUCATION/TRAINING PROGRAM

## 2021-09-04 PROCEDURE — 80053 COMPREHEN METABOLIC PANEL: CPT | Performed by: STUDENT IN AN ORGANIZED HEALTH CARE EDUCATION/TRAINING PROGRAM

## 2021-09-04 PROCEDURE — 25010000002 ONDANSETRON PER 1 MG: Performed by: STUDENT IN AN ORGANIZED HEALTH CARE EDUCATION/TRAINING PROGRAM

## 2021-09-04 PROCEDURE — 25010000002 ENOXAPARIN PER 10 MG: Performed by: STUDENT IN AN ORGANIZED HEALTH CARE EDUCATION/TRAINING PROGRAM

## 2021-09-04 PROCEDURE — 83735 ASSAY OF MAGNESIUM: CPT | Performed by: STUDENT IN AN ORGANIZED HEALTH CARE EDUCATION/TRAINING PROGRAM

## 2021-09-04 PROCEDURE — 99231 SBSQ HOSP IP/OBS SF/LOW 25: CPT | Performed by: STUDENT IN AN ORGANIZED HEALTH CARE EDUCATION/TRAINING PROGRAM

## 2021-09-04 RX ORDER — LORAZEPAM 2 MG/1
2 TABLET ORAL 3 TIMES DAILY
Status: DISCONTINUED | OUTPATIENT
Start: 2021-09-04 | End: 2021-09-05 | Stop reason: HOSPADM

## 2021-09-04 RX ORDER — HYDROXYZINE PAMOATE 25 MG/1
50 CAPSULE ORAL 3 TIMES DAILY PRN
Status: DISCONTINUED | OUTPATIENT
Start: 2021-09-04 | End: 2021-09-05 | Stop reason: HOSPADM

## 2021-09-04 RX ORDER — IBUPROFEN 800 MG/1
800 TABLET ORAL EVERY 8 HOURS SCHEDULED
Status: DISCONTINUED | OUTPATIENT
Start: 2021-09-04 | End: 2021-09-05 | Stop reason: HOSPADM

## 2021-09-04 RX ORDER — ACETAMINOPHEN 325 MG/1
650 TABLET ORAL ONCE
Status: DISCONTINUED | OUTPATIENT
Start: 2021-09-04 | End: 2021-09-04

## 2021-09-04 RX ADMIN — BUPRENORPHINE HYDROCHLORIDE AND NALOXONE HYDROCHLORIDE DIHYDRATE 1 TABLET: 8; 2 TABLET SUBLINGUAL at 20:13

## 2021-09-04 RX ADMIN — THIAMINE HCL TAB 100 MG 100 MG: 100 TAB at 09:53

## 2021-09-04 RX ADMIN — LORAZEPAM 2 MG: 2 INJECTION INTRAMUSCULAR; INTRAVENOUS at 06:06

## 2021-09-04 RX ADMIN — ENOXAPARIN SODIUM 40 MG: 40 INJECTION SUBCUTANEOUS at 20:12

## 2021-09-04 RX ADMIN — GABAPENTIN 800 MG: 400 CAPSULE ORAL at 20:09

## 2021-09-04 RX ADMIN — IBUPROFEN 800 MG: 800 TABLET, FILM COATED ORAL at 14:19

## 2021-09-04 RX ADMIN — IBUPROFEN 800 MG: 800 TABLET, FILM COATED ORAL at 09:53

## 2021-09-04 RX ADMIN — GABAPENTIN 800 MG: 400 CAPSULE ORAL at 15:10

## 2021-09-04 RX ADMIN — LORAZEPAM 2 MG: 2 TABLET ORAL at 20:09

## 2021-09-04 RX ADMIN — BUPRENORPHINE HYDROCHLORIDE AND NALOXONE HYDROCHLORIDE DIHYDRATE 1 TABLET: 8; 2 TABLET SUBLINGUAL at 15:10

## 2021-09-04 RX ADMIN — ONDANSETRON 4 MG: 2 INJECTION INTRAMUSCULAR; INTRAVENOUS at 06:04

## 2021-09-04 RX ADMIN — BUPRENORPHINE HYDROCHLORIDE AND NALOXONE HYDROCHLORIDE DIHYDRATE 1 TABLET: 8; 2 TABLET SUBLINGUAL at 09:52

## 2021-09-04 RX ADMIN — NICOTINE 1 PATCH: 21 PATCH, EXTENDED RELEASE TRANSDERMAL at 09:55

## 2021-09-04 RX ADMIN — SODIUM CHLORIDE, PRESERVATIVE FREE 10 ML: 5 INJECTION INTRAVENOUS at 20:11

## 2021-09-04 RX ADMIN — LORAZEPAM 2 MG: 2 INJECTION INTRAMUSCULAR; INTRAVENOUS at 12:10

## 2021-09-04 RX ADMIN — LORAZEPAM 2 MG: 2 TABLET ORAL at 09:53

## 2021-09-04 RX ADMIN — GABAPENTIN 800 MG: 400 CAPSULE ORAL at 09:52

## 2021-09-04 RX ADMIN — SODIUM CHLORIDE, PRESERVATIVE FREE 10 ML: 5 INJECTION INTRAVENOUS at 09:56

## 2021-09-04 RX ADMIN — IBUPROFEN 800 MG: 800 TABLET, FILM COATED ORAL at 21:17

## 2021-09-04 RX ADMIN — PANTOPRAZOLE SODIUM 40 MG: 40 TABLET, DELAYED RELEASE ORAL at 05:59

## 2021-09-04 RX ADMIN — LORAZEPAM 2 MG: 2 TABLET ORAL at 15:10

## 2021-09-04 NOTE — PROGRESS NOTES
FAMILY MEDICINE DAILY PROGRESS NOTE    NAME: Nata Bain  : 1985  MRN: [id number]      LOS: 3 days     PROVIDER OF SERVICE: Stefani Sidhu MD    Chief Complaint: Alcohol dependence with uncomplicated withdrawal (CMS/[facility])    Subjective:     Interval History:  History taken from: patient chart RN    Jonn Pizano is a 36-year-old female here for gold dependence with uncomplicated withdrawal.  Nurse reports that patient was given PRN lorazepam last night and this morning, with a total of 6 mg.  Patient also had one episode of hypertension overnight for which she was given 10 mg of hydralazine.  She also had one episode of tachycardia in the 127.  On the morning of 21 patient states that she is feeling a little bit anxious.  Reports about 2-3 episodes of diarrhea, one episode of nausea, no vomiting, and a headache.  Patient denies any hallucinations, shakiness, or diaphoresis.  CIWA score last night at 11 PM was 0, this morning at 5:59 AM score was 15.  No other acute complaints this morning.      Review of Systems:   Review of Systems   Constitutional: Negative for appetite change and diaphoresis.   Eyes: Negative for photophobia, redness and visual disturbance.   Respiratory: Negative for cough, chest tightness and shortness of breath.    Cardiovascular: Negative for chest pain, palpitations and leg swelling.   Gastrointestinal: Positive for diarrhea and nausea. Negative for abdominal pain, constipation and vomiting.   Genitourinary: Negative for difficulty urinating.   Musculoskeletal: Positive for myalgias.   Neurological: Negative for dizziness.   Psychiatric/Behavioral: Negative for agitation and hallucinations. The patient is nervous/anxious.        Objective:     Vital Signs  Temp:  [97.1 °F (36.2 °C)-98.7 °F (37.1 °C)] 97.1 °F (36.2 °C)  Heart Rate:  [] 91  Resp:  [18-20] 18  BP: (107-180)/() 138/83  Body mass index is 20.74  kg/m².    Physical Exam  Physical Exam  Constitutional:       Appearance: Normal appearance.   HENT:      Head: Normocephalic and atraumatic.      Nose: Nose normal.      Mouth/Throat:      Mouth: Mucous membranes are moist.      Pharynx: Oropharynx is clear.   Eyes:      Extraocular Movements: Extraocular movements intact.   Cardiovascular:      Rate and Rhythm: Normal rate and regular rhythm.      Pulses: Normal pulses.   Pulmonary:      Effort: Pulmonary effort is normal.      Breath sounds: Normal breath sounds.   Abdominal:      General: Bowel sounds are normal.      Palpations: Abdomen is soft.   Skin:         Neurological:      General: No focal deficit present.      Mental Status: She is alert and oriented to person, place, and time.   Psychiatric:         Attention and Perception: Attention normal.         Mood and Affect: Mood is not anxious.         Behavior: Behavior is not agitated or aggressive.         Medication Review    Current Facility-Administered Medications:   •  buprenorphine-naloxone (SUBOXONE) 8-2 MG per SL tablet 1 tablet, 1 tablet, Sublingual, TID, Socorro Hopson MD, 1 tablet at 09/04/21 0952  •  enoxaparin (LOVENOX) syringe 40 mg, 40 mg, Subcutaneous, Q24H, Socorro Hopson MD, 40 mg at 09/03/21 2148  •  gabapentin (NEURONTIN) capsule 800 mg, 800 mg, Oral, TID, Stefani Sidhu MD, 800 mg at 09/04/21 0952  •  hydrALAZINE (APRESOLINE) injection 10 mg, 10 mg, Intravenous, Q4H PRN, Julian Hickey MD, 10 mg at 09/03/21 2045  •  ibuprofen (ADVIL,MOTRIN) tablet 800 mg, 800 mg, Oral, Q8H, Danielle Holloway MD, 800 mg at 09/04/21 0953  •  LORazepam (ATIVAN) tablet 1 mg, 1 mg, Oral, Q2H PRN **OR** LORazepam (ATIVAN) injection 1 mg, 1 mg, Intravenous, Q2H PRN **OR** LORazepam (ATIVAN) tablet 2 mg, 2 mg, Oral, Q1H PRN **OR** LORazepam (ATIVAN) injection 2 mg, 2 mg, Intravenous, Q1H PRN, 2 mg at 09/04/21 1210 **OR** LORazepam (ATIVAN) injection 2 mg, 2 mg, Intravenous, Q15 Min PRN, 2 mg at  09/02/21 0749 **OR** LORazepam (ATIVAN) injection 2 mg, 2 mg, Intramuscular, Q15 Min PRN, Socorro Hopson MD  •  LORazepam (ATIVAN) tablet 2 mg, 2 mg, Oral, TID, Danielle Holloway MD, 2 mg at 09/04/21 0953  •  Magnesium Sulfate 2 gram Bolus, followed by 8 gram infusion (total Mg dose 10 grams)- Mg less than or equal to 1mg/dL, 2 g, Intravenous, PRN **OR** Magnesium Sulfate 2 gram / 50mL Infusion (GIVE X 3 BAGS TO EQUAL 6GM TOTAL DOSE) - Mg 1.1 - 1.5 mg/dl, 2 g, Intravenous, PRN, Last Rate: 25 mL/hr at 09/01/21 0220, 2 g at 09/01/21 0220 **OR** Magnesium Sulfate 4 gram infusion- Mg 1.6-1.9 mg/dL, 4 g, Intravenous, PRN, Socorro Hopson MD  •  nicotine (NICODERM CQ) 21 MG/24HR patch 1 patch, 1 patch, Transdermal, Q24H, Socorro Hopson MD, 1 patch at 09/04/21 0955  •  ondansetron (ZOFRAN) injection 4 mg, 4 mg, Intravenous, Q6H PRN, Socorro Hopson MD, 4 mg at 09/04/21 0604  •  pantoprazole (PROTONIX) EC tablet 40 mg, 40 mg, Oral, Q AM, Stefani Sidhu MD, 40 mg at 09/04/21 0559  •  potassium chloride 10 mEq in 100 mL IVPB, 10 mEq, Intravenous, Q1H PRN, Socorro Hopson MD  •  sodium chloride 0.9 % flush 10 mL, 10 mL, Intravenous, PRN, Socorro Hopson MD  •  sodium chloride 0.9 % flush 10 mL, 10 mL, Intravenous, Q12H, Socorro Hopson MD, 10 mL at 09/04/21 0956  •  sodium chloride 0.9 % flush 10 mL, 10 mL, Intravenous, PRN, Socorro Hopson MD  •  thiamine (VITAMIN B-1) tablet 100 mg, 100 mg, Oral, Daily, Stefani Sidhu MD, 100 mg at 09/04/21 0953     Diagnostic Data    Lab Results (last 24 hours)     Procedure Component Value Units Date/Time    Comprehensive Metabolic Panel [[id number]]  (Abnormal) Collected: 09/04/21 0524    Specimen: Blood Updated: 09/04/21 0700     Glucose 95 mg/dL      BUN 7 mg/dL      Creatinine 0.48 mg/dL      Sodium 134 mmol/L      Potassium 3.9 mmol/L      Chloride 95 mmol/L      CO2 27.0 mmol/L      Calcium 10.4 mg/dL      Total Protein 7.1 g/dL      Albumin 4.20 g/dL      ALT (SGPT) 91 U/L       AST (SGOT) 102 U/L      Alkaline Phosphatase 103 U/L      Total Bilirubin 0.2 mg/dL      eGFR Non African Amer 147 mL/min/1.73      Globulin 2.9 gm/dL      A/G Ratio 1.4 g/dL      BUN/Creatinine Ratio 14.6     Anion Gap 12.0 mmol/L     Narrative:      GFR Normal >60  Chronic Kidney Disease <60  Kidney Failure <15      Magnesium [[id number]]  (Normal) Collected: 09/04/21 0524    Specimen: Blood Updated: 09/04/21 0655     Magnesium 1.9 mg/dL     CBC Auto Differential [[id number]]  (Abnormal) Collected: 09/04/21 0524    Specimen: Blood Updated: 09/04/21 0629     WBC 6.44 10*3/mm3      RBC 3.76 10*6/mm3      Hemoglobin 13.9 g/dL      Hematocrit 38.9 %      .5 fL      MCH 37.0 pg      MCHC 35.7 g/dL      RDW 12.6 %      RDW-SD 47.7 fl      MPV 10.6 fL      Platelets 174 10*3/mm3      Neutrophil % 49.2 %      Lymphocyte % 41.3 %      Monocyte % 7.5 %      Eosinophil % 1.1 %      Basophil % 0.6 %      Immature Grans % 0.3 %      Neutrophils, Absolute 3.17 10*3/mm3      Lymphocytes, Absolute 2.66 10*3/mm3      Monocytes, Absolute 0.48 10*3/mm3      Eosinophils, Absolute 0.07 10*3/mm3      Basophils, Absolute 0.04 10*3/mm3      Immature Grans, Absolute 0.02 10*3/mm3      nRBC 0.0 /100 WBC             I reviewed the patient's new clinical results.    Assessment/Plan:     Active Hospital Problems    Diagnosis  POA   • **Alcohol dependence with uncomplicated withdrawal (CMS/HCC) [F10.230]  Yes     Priority: High     · Last CIWA score was 15 at 5:59AM 09/04/2021, from 0 at 23:00.  · Last drink 1800 8/30  · Folic acid WNL, B12 mildly elevated  · Patient on scheduled Lorazepam 1 mg every 4 hours  · Ibuprofen 800mg given once for headache     • High blood pressure [I10]  Unknown     - Patient had one episode of high blood pressure at 180/102, possible secondary to her withdrawal  - Hydralazine 10 mg IV given       • Hyponatremia [E87.1]  No     - Likely dilutional from IVFs, stopped  - Monitor sodium     • Noncompliance with  medication regimen [Z91.14]  Not Applicable     · Patient counseled          • Alcoholic fatty liver [K70.0]  Yes     · Daily CMP        • Benzodiazepine dependence (CMS/HCC) [F13.20]  Yes     · Continue Suboxone home regimen   · UDS positive for benzodiazepine and buprenorphine  · Jam appropriate via PDMP      • Dependence on nicotine from cigarettes [F17.210]  Yes     · Nicotine patch  · Patient expressed that she would like to quit     • Polysubstance (including opioids) dependence, daily use (CMS/HCC) [F11.20, F19.20]  Yes     · Continue home Suboxone regimen  · UDS positive for benzodiazepine and buprenorphine   · Patient follows up with Dr. Nik Saenz for addiction medicine.  · She will have a same day appointment when she is discharge and has enough prescription until then          DVT prophylaxis:   Mechanical Order History:     None      Pharmalogical Order History:      Ordered     Dose Route Frequency Stop    08/31/21 1919  enoxaparin (LOVENOX) syringe 40 mg      40 mg SC Every 24 Hours --               Code status is   Code Status and Medical Interventions:   Ordered at: 08/31/21 1914     Level Of Support Discussed With:    Patient     Code Status:    CPR     Medical Interventions (Level of Support Prior to Arrest):    Full       Plan for disposition:Where: home      Time: 15 mins            This document has been electronically signed by Stefani Sidhu MD on September 4, 2021 14:01 CDT       Part of this note may be an electronic transcription/translation of spoken language to printed text using the Dragon Dictation System.

## 2021-09-04 NOTE — PLAN OF CARE
Goal Outcome Evaluation:  Plan of Care Reviewed With: patient        Progress: improving  Outcome Summary: Pt is A&Ox4. ambulates independently. scoring on CIWA; ativan given per orders. call light w/in reach. Safety maintained. Will continue to montior.

## 2021-09-04 NOTE — PLAN OF CARE
Goal Outcome Evaluation:              Outcome Summary: continued following the CIWA scale. will continue to monitor the pt

## 2021-09-05 VITALS
TEMPERATURE: 98.2 F | WEIGHT: 132.4 LBS | OXYGEN SATURATION: 97 % | HEART RATE: 93 BPM | SYSTOLIC BLOOD PRESSURE: 106 MMHG | HEIGHT: 67 IN | BODY MASS INDEX: 20.78 KG/M2 | DIASTOLIC BLOOD PRESSURE: 75 MMHG | RESPIRATION RATE: 18 BRPM

## 2021-09-05 PROBLEM — R03.0 ELEVATED BLOOD PRESSURE READING WITHOUT DIAGNOSIS OF HYPERTENSION: Status: RESOLVED | Noted: 2021-09-04 | Resolved: 2021-09-05

## 2021-09-05 PROBLEM — E87.1 HYPONATREMIA: Status: RESOLVED | Noted: 2021-09-03 | Resolved: 2021-09-05

## 2021-09-05 LAB
ALBUMIN SERPL-MCNC: 4.1 G/DL (ref 3.5–5.2)
ALBUMIN/GLOB SERPL: 1.5 G/DL
ALP SERPL-CCNC: 93 U/L (ref 39–117)
ALT SERPL W P-5'-P-CCNC: 78 U/L (ref 1–33)
ANION GAP SERPL CALCULATED.3IONS-SCNC: 12 MMOL/L (ref 5–15)
AST SERPL-CCNC: 83 U/L (ref 1–32)
BASOPHILS # BLD AUTO: 0.05 10*3/MM3 (ref 0–0.2)
BASOPHILS NFR BLD AUTO: 0.7 % (ref 0–1.5)
BILIRUB SERPL-MCNC: 0.2 MG/DL (ref 0–1.2)
BUN SERPL-MCNC: 13 MG/DL (ref 6–20)
BUN/CREAT SERPL: 20.6 (ref 7–25)
CALCIUM SPEC-SCNC: 9.7 MG/DL (ref 8.6–10.5)
CHLORIDE SERPL-SCNC: 97 MMOL/L (ref 98–107)
CO2 SERPL-SCNC: 27 MMOL/L (ref 22–29)
CREAT SERPL-MCNC: 0.63 MG/DL (ref 0.57–1)
DEPRECATED RDW RBC AUTO: 47.6 FL (ref 37–54)
EOSINOPHIL # BLD AUTO: 0.09 10*3/MM3 (ref 0–0.4)
EOSINOPHIL NFR BLD AUTO: 1.3 % (ref 0.3–6.2)
ERYTHROCYTE [DISTWIDTH] IN BLOOD BY AUTOMATED COUNT: 12.4 % (ref 12.3–15.4)
GFR SERPL CREATININE-BSD FRML MDRD: 108 ML/MIN/1.73
GLOBULIN UR ELPH-MCNC: 2.7 GM/DL
GLUCOSE SERPL-MCNC: 98 MG/DL (ref 65–99)
HCT VFR BLD AUTO: 39.7 % (ref 34–46.6)
HGB BLD-MCNC: 14 G/DL (ref 12–15.9)
IMM GRANULOCYTES # BLD AUTO: 0.01 10*3/MM3 (ref 0–0.05)
IMM GRANULOCYTES NFR BLD AUTO: 0.1 % (ref 0–0.5)
LYMPHOCYTES # BLD AUTO: 2.94 10*3/MM3 (ref 0.7–3.1)
LYMPHOCYTES NFR BLD AUTO: 43.8 % (ref 19.6–45.3)
MAGNESIUM SERPL-MCNC: 1.7 MG/DL (ref 1.6–2.6)
MCH RBC QN AUTO: 36.6 PG (ref 26.6–33)
MCHC RBC AUTO-ENTMCNC: 35.3 G/DL (ref 31.5–35.7)
MCV RBC AUTO: 103.7 FL (ref 79–97)
MONOCYTES # BLD AUTO: 0.61 10*3/MM3 (ref 0.1–0.9)
MONOCYTES NFR BLD AUTO: 9.1 % (ref 5–12)
NEUTROPHILS NFR BLD AUTO: 3.02 10*3/MM3 (ref 1.7–7)
NEUTROPHILS NFR BLD AUTO: 45 % (ref 42.7–76)
NRBC BLD AUTO-RTO: 0 /100 WBC (ref 0–0.2)
PLATELET # BLD AUTO: 173 10*3/MM3 (ref 140–450)
PMV BLD AUTO: 10.1 FL (ref 6–12)
POTASSIUM SERPL-SCNC: 4.1 MMOL/L (ref 3.5–5.2)
PROT SERPL-MCNC: 6.8 G/DL (ref 6–8.5)
RBC # BLD AUTO: 3.83 10*6/MM3 (ref 3.77–5.28)
SODIUM SERPL-SCNC: 136 MMOL/L (ref 136–145)
WBC # BLD AUTO: 6.72 10*3/MM3 (ref 3.4–10.8)

## 2021-09-05 PROCEDURE — 83735 ASSAY OF MAGNESIUM: CPT | Performed by: STUDENT IN AN ORGANIZED HEALTH CARE EDUCATION/TRAINING PROGRAM

## 2021-09-05 PROCEDURE — 80053 COMPREHEN METABOLIC PANEL: CPT | Performed by: STUDENT IN AN ORGANIZED HEALTH CARE EDUCATION/TRAINING PROGRAM

## 2021-09-05 PROCEDURE — 99239 HOSP IP/OBS DSCHRG MGMT >30: CPT | Performed by: STUDENT IN AN ORGANIZED HEALTH CARE EDUCATION/TRAINING PROGRAM

## 2021-09-05 PROCEDURE — 85025 COMPLETE CBC W/AUTO DIFF WBC: CPT | Performed by: STUDENT IN AN ORGANIZED HEALTH CARE EDUCATION/TRAINING PROGRAM

## 2021-09-05 RX ORDER — NICOTINE 21 MG/24HR
1 PATCH, TRANSDERMAL 24 HOURS TRANSDERMAL EVERY 24 HOURS
Qty: 28 PATCH | Refills: 2 | Status: SHIPPED | OUTPATIENT
Start: 2021-09-05 | End: 2022-01-26

## 2021-09-05 RX ORDER — LORAZEPAM 2 MG/1
2 TABLET ORAL 3 TIMES DAILY
COMMUNITY

## 2021-09-05 RX ADMIN — PANTOPRAZOLE SODIUM 40 MG: 40 TABLET, DELAYED RELEASE ORAL at 05:34

## 2021-09-05 RX ADMIN — BUPRENORPHINE HYDROCHLORIDE AND NALOXONE HYDROCHLORIDE DIHYDRATE 1 TABLET: 8; 2 TABLET SUBLINGUAL at 08:10

## 2021-09-05 RX ADMIN — IBUPROFEN 800 MG: 800 TABLET, FILM COATED ORAL at 05:34

## 2021-09-05 RX ADMIN — GABAPENTIN 800 MG: 400 CAPSULE ORAL at 08:10

## 2021-09-05 RX ADMIN — THIAMINE HCL TAB 100 MG 100 MG: 100 TAB at 08:09

## 2021-09-05 RX ADMIN — LORAZEPAM 2 MG: 2 TABLET ORAL at 08:09

## 2021-09-05 RX ADMIN — SODIUM CHLORIDE, PRESERVATIVE FREE 10 ML: 5 INJECTION INTRAVENOUS at 08:10

## 2021-09-05 RX ADMIN — NICOTINE 1 PATCH: 21 PATCH, EXTENDED RELEASE TRANSDERMAL at 08:09

## 2021-09-05 NOTE — PLAN OF CARE
Goal Outcome Evaluation:  Plan of Care Reviewed With: patient        Progress: improving  Outcome Summary: BP addressed w/ PRN medication. No other concerns during shift. Call light w/in reach. safety maintained.

## 2021-09-05 NOTE — PROGRESS NOTES
FAMILY MEDICINE DAILY PROGRESS NOTE    NAME: Nata Bain  : 1985  MRN: [id number]      LOS: 4 days     PROVIDER OF SERVICE: Stefani Sidhu MD    Chief Complaint: Alcohol dependence with uncomplicated withdrawal (CMS/[facility])    Subjective:     Interval History:  History taken from: patient chart RN     No acute events overnight.  Patient stated that she rested really well.    On the morning of 21 patient reports feeling better.  She denies feeling anxious.  Denies any headache, chest pain, shortness of breath, abdominal pain, nausea, vomiting, or diarrhea.  No acute complaints this morning.      Review of Systems:   Review of Systems   Constitutional: Negative for appetite change, diaphoresis and fatigue.   HENT: Negative for ear pain.    Eyes: Negative for photophobia and redness.   Respiratory: Negative for chest tightness and shortness of breath.    Cardiovascular: Negative for chest pain, palpitations and leg swelling.   Gastrointestinal: Negative for abdominal pain, constipation, diarrhea, nausea and vomiting.   Genitourinary: Negative for difficulty urinating and dysuria.   Musculoskeletal: Negative for myalgias.   Neurological: Negative for dizziness, light-headedness and headaches.   Psychiatric/Behavioral: Negative for agitation, behavioral problems, confusion, hallucinations and sleep disturbance. The patient is not nervous/anxious.        Objective:     Vital Signs  Temp:  [97.5 °F (36.4 °C)-98.2 °F (36.8 °C)] 98.2 °F (36.8 °C)  Heart Rate:  [] 80  Resp:  [18] 18  BP: ()/() 99/61  Body mass index is 20.74 kg/m².    Physical Exam  Physical Exam  Constitutional:       Appearance: Normal appearance.   HENT:      Head: Normocephalic and atraumatic.      Mouth/Throat:      Mouth: Mucous membranes are moist.      Pharynx: Oropharynx is clear.   Eyes:      Extraocular Movements: Extraocular movements intact.      Conjunctiva/sclera: Conjunctivae normal.      Pupils:  Pupils are equal, round, and reactive to light.   Cardiovascular:      Rate and Rhythm: Normal rate and regular rhythm.      Pulses: Normal pulses.   Pulmonary:      Effort: Pulmonary effort is normal.      Breath sounds: Normal breath sounds.   Abdominal:      General: Bowel sounds are normal.      Palpations: Abdomen is soft.   Musculoskeletal:      Cervical back: Normal range of motion.   Skin:     Findings: Bruising present.          Neurological:      General: No focal deficit present.      Mental Status: She is alert and oriented to person, place, and time.   Psychiatric:         Mood and Affect: Mood normal.         Medication Review    Current Facility-Administered Medications:   •  buprenorphine-naloxone (SUBOXONE) 8-2 MG per SL tablet 1 tablet, 1 tablet, Sublingual, TID, Socorro Hopson MD, 1 tablet at 09/04/21 2013  •  enoxaparin (LOVENOX) syringe 40 mg, 40 mg, Subcutaneous, Q24H, Socorro Hopson MD, 40 mg at 09/04/21 2012  •  gabapentin (NEURONTIN) capsule 800 mg, 800 mg, Oral, TID, Stefani Sidhu MD, 800 mg at 09/04/21 2009  •  hydrALAZINE (APRESOLINE) injection 10 mg, 10 mg, Intravenous, Q4H PRN, Julian Hickey MD, 10 mg at 09/03/21 2045  •  hydrOXYzine pamoate (VISTARIL) capsule 50 mg, 50 mg, Oral, TID PRN, Danielle Holloway MD  •  ibuprofen (ADVIL,MOTRIN) tablet 800 mg, 800 mg, Oral, Q8H, Danielle Holloway MD, 800 mg at 09/05/21 0534  •  LORazepam (ATIVAN) tablet 1 mg, 1 mg, Oral, Q2H PRN **OR** LORazepam (ATIVAN) injection 1 mg, 1 mg, Intravenous, Q2H PRN **OR** LORazepam (ATIVAN) tablet 2 mg, 2 mg, Oral, Q1H PRN **OR** LORazepam (ATIVAN) injection 2 mg, 2 mg, Intravenous, Q1H PRN, 2 mg at 09/04/21 1210 **OR** LORazepam (ATIVAN) injection 2 mg, 2 mg, Intravenous, Q15 Min PRN, 2 mg at 09/02/21 0749 **OR** LORazepam (ATIVAN) injection 2 mg, 2 mg, Intramuscular, Q15 Min PRN, Socorro Hopson MD  •  LORazepam (ATIVAN) tablet 2 mg, 2 mg, Oral, TID, Danielle Holloway MD, 2 mg at  09/04/21 2009  •  Magnesium Sulfate 2 gram Bolus, followed by 8 gram infusion (total Mg dose 10 grams)- Mg less than or equal to 1mg/dL, 2 g, Intravenous, PRN **OR** Magnesium Sulfate 2 gram / 50mL Infusion (GIVE X 3 BAGS TO EQUAL 6GM TOTAL DOSE) - Mg 1.1 - 1.5 mg/dl, 2 g, Intravenous, PRN, Last Rate: 25 mL/hr at 09/01/21 0220, 2 g at 09/01/21 0220 **OR** Magnesium Sulfate 4 gram infusion- Mg 1.6-1.9 mg/dL, 4 g, Intravenous, PRN, Socorro Hopson MD  •  nicotine (NICODERM CQ) 21 MG/24HR patch 1 patch, 1 patch, Transdermal, Q24H, Socorro Hopson MD, 1 patch at 09/04/21 0955  •  ondansetron (ZOFRAN) injection 4 mg, 4 mg, Intravenous, Q6H PRN, Socorro Hopson MD, 4 mg at 09/04/21 0604  •  pantoprazole (PROTONIX) EC tablet 40 mg, 40 mg, Oral, Q AM, Stefani Sidhu MD, 40 mg at 09/05/21 0534  •  potassium chloride 10 mEq in 100 mL IVPB, 10 mEq, Intravenous, Q1H PRN, Socorro Hopson MD  •  sodium chloride 0.9 % flush 10 mL, 10 mL, Intravenous, PRN, Socorro Hopson MD  •  sodium chloride 0.9 % flush 10 mL, 10 mL, Intravenous, Q12H, Socorro Hopson MD, 10 mL at 09/04/21 2011  •  sodium chloride 0.9 % flush 10 mL, 10 mL, Intravenous, PRN, Socorro Hopson MD  •  thiamine (VITAMIN B-1) tablet 100 mg, 100 mg, Oral, Daily, Stefani Sidhu MD, 100 mg at 09/04/21 0953     Diagnostic Data    Lab Results (last 24 hours)     Procedure Component Value Units Date/Time    Comprehensive Metabolic Panel [[id number]]  (Abnormal) Collected: 09/05/21 0459    Specimen: Blood Updated: 09/05/21 0605     Glucose 98 mg/dL      BUN 13 mg/dL      Creatinine 0.63 mg/dL      Sodium 136 mmol/L      Potassium 4.1 mmol/L      Chloride 97 mmol/L      CO2 27.0 mmol/L      Calcium 9.7 mg/dL      Total Protein 6.8 g/dL      Albumin 4.10 g/dL      ALT (SGPT) 78 U/L      AST (SGOT) 83 U/L      Alkaline Phosphatase 93 U/L      Total Bilirubin 0.2 mg/dL      eGFR Non African Amer 108 mL/min/1.73      Globulin 2.7 gm/dL      A/G Ratio 1.5 g/dL      BUN/Creatinine Ratio  20.6     Anion Gap 12.0 mmol/L     Narrative:      GFR Normal >60  Chronic Kidney Disease <60  Kidney Failure <15      Magnesium [[id number]]  (Normal) Collected: 09/05/21 0459    Specimen: Blood Updated: 09/05/21 0601     Magnesium 1.7 mg/dL     CBC Auto Differential [[id number]]  (Abnormal) Collected: 09/05/21 0459    Specimen: Blood Updated: 09/05/21 0543     WBC 6.72 10*3/mm3      RBC 3.83 10*6/mm3      Hemoglobin 14.0 g/dL      Hematocrit 39.7 %      .7 fL      MCH 36.6 pg      MCHC 35.3 g/dL      RDW 12.4 %      RDW-SD 47.6 fl      MPV 10.1 fL      Platelets 173 10*3/mm3      Neutrophil % 45.0 %      Lymphocyte % 43.8 %      Monocyte % 9.1 %      Eosinophil % 1.3 %      Basophil % 0.7 %      Immature Grans % 0.1 %      Neutrophils, Absolute 3.02 10*3/mm3      Lymphocytes, Absolute 2.94 10*3/mm3      Monocytes, Absolute 0.61 10*3/mm3      Eosinophils, Absolute 0.09 10*3/mm3      Basophils, Absolute 0.05 10*3/mm3      Immature Grans, Absolute 0.01 10*3/mm3      nRBC 0.0 /100 WBC             I reviewed the patient's new clinical results.    Assessment/Plan:     Active Hospital Problems    Diagnosis  POA   • **Alcohol dependence with uncomplicated withdrawal (CMS/HCC) [F10.230]  Yes     Priority: High     · Last CIWA score was 0 at 3:00AM 09/05/2021  · Last drink 1800 8/30  · Folic acid WNL, B12 mildly elevated  · Patient on scheduled Lorazepam 2mg TID     • Elevated blood pressure reading without diagnosis of hypertension [R03.0]  Unknown     - Possible secondary to her withdrawal  - Hydralazine 10 mg IV if Systolic BP >170       • Noncompliance with medication regimen [Z91.14]  Not Applicable     · Patient counseled about compliance       • Alcoholic fatty liver [K70.0]  Yes     · Daily CMP         • Benzodiazepine dependence (CMS/HCC) [F13.20]  Yes     · Continue Suboxone home regimen   · UDS positive for benzodiazepine and buprenorphine  · Jam appropriate via PDMP       • Dependence on nicotine from  cigarettes [F17.210]  Yes     · Nicotine patch     • Polysubstance (including opioids) dependence, daily use (CMS/HCC) [F11.20, F19.20]  Yes     · Continue home Suboxone regimen  · UDS positive for benzodiazepine and buprenorphine   · Patient follows up with Dr. Nik Saenz for addiction medicine.  · She will have a same day appointment when she is discharged and has enough prescription until then          DVT prophylaxis:   Mechanical Order History:     None      Pharmalogical Order History:      Ordered     Dose Route Frequency Stop    08/31/21 1919  enoxaparin (LOVENOX) syringe 40 mg      40 mg SC Every 24 Hours --               Code status is   Code Status and Medical Interventions:   Ordered at: 08/31/21 1914     Level Of Support Discussed With:    Patient     Code Status:    CPR     Medical Interventions (Level of Support Prior to Arrest):    Full       Plan for disposition:Where: home and When:  today      Time: 15 minutes            This document has been electronically signed by Stefani Sidhu MD on September 5, 2021 08:08 CDT       Part of this note may be an electronic transcription/translation of spoken language to printed text using the Dragon Dictation System.

## 2021-09-05 NOTE — DISCHARGE SUMMARY
DISCHARGE SUMMARY    PATIENT NAME: Nata Bain       PHYSICIAN: Stefani Sidhu MD  : 1985  MRN: [id number]    ADMITTED: 2021     DISCHARGED: 2021    ADMISSION DIAGNOSES:  Active Hospital Problems    Diagnosis  POA   • **Alcohol dependence with uncomplicated withdrawal (CMS/HCC) [F10.230]  Yes     Priority: High   • Noncompliance with medication regimen [Z91.14]  Not Applicable   • Alcoholic fatty liver [K70.0]  Yes   • Benzodiazepine dependence (CMS/HCC) [F13.20]  Yes   • Dependence on nicotine from cigarettes [F17.210]  Yes   • Polysubstance (including opioids) dependence, daily use (CMS/HCC) [F11.20, F19.20]  Yes      Resolved Hospital Problems    Diagnosis Date Resolved POA   • Elevated blood pressure reading without diagnosis of hypertension [R03.0] 2021 Unknown   • Hyponatremia [E87.1] 2021 No   • Acute alcoholic gastritis without hemorrhage [K29.20] 2021 Yes   • Hypomagnesemia [E83.42] 2021 Yes   • Hypokalemia [E87.6] 2021 Yes     DISCHARGE DIAGNOSES:   Active Hospital Problems    Diagnosis  POA   • **Alcohol dependence with uncomplicated withdrawal (CMS/HCC) [F10.230]  Yes     Priority: High   • Noncompliance with medication regimen [Z91.14]  Not Applicable   • Alcoholic fatty liver [K70.0]  Yes   • Benzodiazepine dependence (CMS/HCC) [F13.20]  Yes   • Dependence on nicotine from cigarettes [F17.210]  Yes   • Polysubstance (including opioids) dependence, daily use (CMS/HCC) [F11.20, F19.20]  Yes      Resolved Hospital Problems    Diagnosis Date Resolved POA   • Elevated blood pressure reading without diagnosis of hypertension [R03.0] 2021 Unknown   • Hyponatremia [E87.1] 2021 No   • Acute alcoholic gastritis without hemorrhage [K29.20] 2021 Yes   • Hypomagnesemia [E83.42] 2021 Yes   • Hypokalemia [E87.6] 2021 Yes       SERVICE: Family Medicine Residency  Attending:  "Danielle Holloway MD  Resident: Stefani Sidhu MD    CONSULTS:   Consult Orders (all) (From admission, onward)     Start     Ordered    09/01/21 0750  Inpatient Case Management  Consult  Once     Provider:  (Not yet assigned)    09/01/21 0750    08/31/21 1726  Family Practice - Resident (on-call MD unless specified)  Once     Specialty:  Family Medicine  Provider:  (Not yet assigned)    08/31/21 1725                PROCEDURES:   N/A    HISTORY OF PRESENT ILLNESS:   Per Dr. Socorro Hopson H&P at admision on 08/31/2021.  \"Nata Bain is a 36 y.o. female with a PMH of polysubstance abuse, benzodiazapine dependence, alcohol abuse with uncomplicated withdrawals, medication noncompliance, and tobacco abuse who presents with 2 days of N/V with abdominal pain and some diarrhea. She states she has stopped taking Ativan at home because she started drinking again about 2 weeks ago. She has had increasing abdominal pain, with nausea, green/yellow vomitus and some nonbloody diarrhea.  Patient also had some scattered ecchymosis on her abdomen and right eye, and she denied any physical abuse stating she was wrestling with her 10 year old son and \"it got too rough\". Patient denied feeling unsafe at home, any SI/HI, or ever having any physical abuse against her by her spouse. She is AO X 3. \"      DIAGNOSTIC DATA:   Lab Results (last 24 hours)     Procedure Component Value Units Date/Time    Comprehensive Metabolic Panel [[id number]]  (Abnormal) Collected: 09/05/21 0459    Specimen: Blood Updated: 09/05/21 0605     Glucose 98 mg/dL      BUN 13 mg/dL      Creatinine 0.63 mg/dL      Sodium 136 mmol/L      Potassium 4.1 mmol/L      Chloride 97 mmol/L      CO2 27.0 mmol/L      Calcium 9.7 mg/dL      Total Protein 6.8 g/dL      Albumin 4.10 g/dL      ALT (SGPT) 78 U/L      AST (SGOT) 83 U/L      Alkaline Phosphatase 93 U/L      Total Bilirubin 0.2 mg/dL      eGFR Non African Amer 108 mL/min/1.73      Globulin " 2.7 gm/dL      A/G Ratio 1.5 g/dL      BUN/Creatinine Ratio 20.6     Anion Gap 12.0 mmol/L     Narrative:      GFR Normal >60  Chronic Kidney Disease <60  Kidney Failure <15      Magnesium [[id number]]  (Normal) Collected: 09/05/21 0459    Specimen: Blood Updated: 09/05/21 0601     Magnesium 1.7 mg/dL     CBC Auto Differential [[id number]]  (Abnormal) Collected: 09/05/21 0459    Specimen: Blood Updated: 09/05/21 0543     WBC 6.72 10*3/mm3      RBC 3.83 10*6/mm3      Hemoglobin 14.0 g/dL      Hematocrit 39.7 %      .7 fL      MCH 36.6 pg      MCHC 35.3 g/dL      RDW 12.4 %      RDW-SD 47.6 fl      MPV 10.1 fL      Platelets 173 10*3/mm3      Neutrophil % 45.0 %      Lymphocyte % 43.8 %      Monocyte % 9.1 %      Eosinophil % 1.3 %      Basophil % 0.7 %      Immature Grans % 0.1 %      Neutrophils, Absolute 3.02 10*3/mm3      Lymphocytes, Absolute 2.94 10*3/mm3      Monocytes, Absolute 0.61 10*3/mm3      Eosinophils, Absolute 0.09 10*3/mm3      Basophils, Absolute 0.05 10*3/mm3      Immature Grans, Absolute 0.01 10*3/mm3      nRBC 0.0 /100 WBC          CT Abdomen Pelvis With Contrast    Result Date: 8/31/2021  CONCLUSION: No evidence of pulmonary embolism. Fatty liver. Electronically signed by:  Ranjit Marie MD  8/31/2021 5:26 PM CDT Workstation: [id number]    XR Chest 1 View    Result Date: 8/31/2021  Negative single view chest Electronically signed by:  Doug Calvo MD  8/31/2021 2:39 PM CDT Workstation: [id number]    CT Angiogram Chest    Result Date: 8/31/2021  CONCLUSION: No evidence of pulmonary embolism. Fatty liver. Electronically signed by:  Ranjit Marie MD  8/31/2021 5:26 PM CDT Workstation: [id number]    HOSPITAL COURSE:  Patient was initially treated for alcoholic gastritis.  She was initially placed on an NPO diet and received IV hydration.  Her abdominal pain, nausea, vomiting, and diarrhea resolved within the first day of admission.  During her admission, she was also started on the CIWA  protocol.  She was given her scheduled lorazepam 2 mg TID, in addition to the CIWA scale.  Patient was advised about stop drinking alcohol.  She discussed with  about the resources for alcohol abstinence.  She was also advised to quit smoking and is currently using the nicotine patch, which was sent to her pharmacy.  She follows up with Dr. Nik Saenz, Addiction Medicine, with whom she will have an appointment as soon as she is discharged to continue her treatment.  Patient states that she has enough lorazepam and suboxone until her next visit with him.    DISCHARGE CONDITION:   Stable    DISPOSITION:  Home or Self Care    DISCHARGE MEDICATIONS     Discharge Medications      New Medications      Instructions Start Date   nicotine 21 MG/24HR patch  Commonly known as: NICODERM CQ   1 patch, Transdermal, Every 24 Hours         Continue These Medications      Instructions Start Date   acamprosate 333 MG EC tablet  Commonly known as: CAMPRAL   333 mg, Oral, Daily      buprenorphine-naloxone 8-2 MG per SL tablet  Commonly known as: SUBOXONE   1 tablet, Sublingual, 3 Times Daily, Patient takes one 8-2 tablet three times a day.      cyanocobalamin 1000 MCG tablet  Commonly known as: VITAMIN B-12   1,000 mcg, Oral, Daily      dicyclomine 20 MG tablet  Commonly known as: BENTYL   20 mg, Oral, 2 Times Daily      FLUoxetine 40 MG capsule  Commonly known as: PROzac   40 mg, Oral, Daily      folic acid 1 MG tablet  Commonly known as: FOLVITE   1 mg, Oral, Daily      gabapentin 800 MG tablet  Commonly known as: NEURONTIN   800 mg, Oral, 3 Times Daily      LORazepam 2 MG tablet  Commonly known as: ATIVAN   2 mg, Oral, 3 Times Daily      pantoprazole 40 MG EC tablet  Commonly known as: Protonix   40 mg, Oral, Daily      prazosin 5 MG capsule  Commonly known as: MINIPRESS   5 mg, Oral, Nightly      promethazine 25 MG suppository  Commonly known as: PHENERGAN   25 mg, Rectal, Every 6 Hours PRN      sucralfate 1 g  tablet  Commonly known as: CARAFATE   No dose, route, or frequency recorded.      thiamine 100 MG tablet tablet  Commonly known as: VITAMIN B-1   100 mg, Oral, Daily             INSTRUCTIONS:  Activity:   Activity Instructions     Activity as Tolerated          Diet:   Diet Instructions     Advance Diet As Tolerated      Diet: Regular      Discharge Diet: Regular          FOLLOW UP:   Additional Instructions for the Follow-ups that You Need to Schedule     Call MD With Problems / Concerns   As directed      Instructions: Increase confusion, tremors, fever over 101, nausea or vomiting    Order Comments: Instructions: Increase confusion, tremors, fever over 101, nausea or vomiting          Discharge Follow-up with PCP   As directed       Currently Documented PCP:    Yosi Almaguer MD    PCP Phone Number:    None     Follow Up Details: 1 week.           Follow-up Information     Yosi Almaguer MD .    Specialties: Family Medicine, Emergency Medicine  Why: 1 week.  Contact information:  [address] [place] [address] [address] [place] [address] [phone]                   PENDING TEST RESULTS AT DISCHARGE      Time: >30 minutes were spent in discharge planning, medication reconciliation and coordination of care for this patient.    Danielle Holloway MD is the attending at time of discharge, She is aware of the patient's status and agrees with the above discharge summary.        This document has been electronically signed by Stefani Sidhu MD on September 5, 2021 13:30 CDT

## 2021-09-06 ENCOUNTER — READMISSION MANAGEMENT (OUTPATIENT)
Dept: CALL CENTER | Facility: HOSPITAL | Age: 36
End: 2021-09-06

## 2021-09-07 ENCOUNTER — TELEPHONE (OUTPATIENT)
Dept: FAMILY MEDICINE CLINIC | Facility: CLINIC | Age: 36
End: 2021-09-07

## 2021-09-07 NOTE — TELEPHONE ENCOUNTER
TRIED TO CALL PATIENT TO MAKE HOSPITAL F/U; FIRST ATTEMPT; UNABLE TO REACH PT X1.          THANK YOU,        SAMSON

## 2021-09-07 NOTE — PAYOR COMM NOTE
"Maria Elena Anusha  Case Management Extender  [phone] phone  [phone] fax      Auth# [id number]    Shawnee Springer (36 y.o. Female)     Date of Birth Social Security Number Address Home Phone MRN    1985  [address] [phone] [id number]    [employer] Marital Status          None        Admission Date Admission Type Admitting Provider Attending Provider Department, Room/Bed    8/31/21 Emergency Nadir Villarreal MD  [facility] 3 RUST, 351/1    Discharge Date Discharge Disposition Discharge Destination        9/5/2021 Home or Self Care              Attending Provider: (none)   Allergies: No Known Allergies    Isolation: None   Infection: None   Code Status: Prior    Ht: 170.2 cm (67\")   Wt: 60.1 kg (132 lb 6.4 oz)    Admission Cmt: None   Principal Problem: Alcohol dependence with uncomplicated withdrawal (CMS/[facility]) [F10.230] More...                 Active Insurance as of 8/31/2021     Primary Coverage     Payor Plan Insurance Group Employer/Plan Group    [place] [id number]     Payor Plan Address Payor Plan Phone Number Payor Plan Fax Number Effective Dates    [employer] [address] [address] [phone]  1/1/2020 - None Entered    [facility] [id number]       Subscriber Name Subscriber Birth Date Member ID       SHAWNEE SPRINGER 1985 [id number]                 Emergency Contacts      (Rel.) Home Phone Work Phone Mobile Phone    Jorge Sanz (Spouse) [phone] -- [phone]    OdellJoanna (Mother) [phone] -- [phone]    Odell Hilario (Father) [phone] -- [phone]               Discharge Summary      Stefani Sidhu MD at 09/05/21 1149     Attestation signed by Danielle Holloway MD at 09/05/21 1436    I have seen and evaluated the patient on date of discharge.  I have discussed the case with the resident. I have reviewed the notes, " assessment and plan, and/or procedures performed by the resident. I concur with the resident’s discharge summary.      Follow up:  she will follow up with her new PCP Dr. Jacob as Dr. Almaguer has graduated.    Signature  [place] [address]  Office: [phone]    This document has been electronically signed by Danielle Holloway MD on 2021 14:37 CDT                                                                         DISCHARGE SUMMARY    PATIENT NAME: Nata Bain       PHYSICIAN: Stefani Sidhu MD  : 1985  MRN: [id number]    ADMITTED: 2021     DISCHARGED: 2021    ADMISSION DIAGNOSES:  Active Hospital Problems    Diagnosis  POA   • **Alcohol dependence with uncomplicated withdrawal (CMS/HCC) [F10.230]  Yes     Priority: High   • Noncompliance with medication regimen [Z91.14]  Not Applicable   • Alcoholic fatty liver [K70.0]  Yes   • Benzodiazepine dependence (CMS/HCC) [F13.20]  Yes   • Dependence on nicotine from cigarettes [F17.210]  Yes   • Polysubstance (including opioids) dependence, daily use (CMS/HCC) [F11.20, F19.20]  Yes      Resolved Hospital Problems    Diagnosis Date Resolved POA   • Elevated blood pressure reading without diagnosis of hypertension [R03.0] 2021 Unknown   • Hyponatremia [E87.1] 2021 No   • Acute alcoholic gastritis without hemorrhage [K29.20] 2021 Yes   • Hypomagnesemia [E83.42] 2021 Yes   • Hypokalemia [E87.6] 2021 Yes     DISCHARGE DIAGNOSES:   Active Hospital Problems    Diagnosis  POA   • **Alcohol dependence with uncomplicated withdrawal (CMS/HCC) [F10.230]  Yes     Priority: High   • Noncompliance with medication regimen [Z91.14]  Not Applicable   • Alcoholic fatty liver [K70.0]  Yes   • Benzodiazepine dependence (CMS/HCC) [F13.20]  Yes   • Dependence on nicotine from cigarettes [F17.210]  Yes   • Polysubstance (including  "opioids) dependence, daily use (CMS/[facility]) [F11.20, F19.20]  Yes      Resolved Hospital Problems    Diagnosis Date Resolved POA   • Elevated blood pressure reading without diagnosis of hypertension [R03.0] 09/05/2021 Unknown   • Hyponatremia [E87.1] 09/05/2021 No   • Acute alcoholic gastritis without hemorrhage [K29.20] 09/03/2021 Yes   • Hypomagnesemia [E83.42] 09/04/2021 Yes   • Hypokalemia [E87.6] 09/04/2021 Yes       SERVICE: Family Medicine Residency  Attending: Danielle Holloway MD  Resident: Stefani Sidhu MD    CONSULTS:   Consult Orders (all) (From admission, onward)     Start     Ordered    09/01/21 0750  Inpatient Case Management  Consult  Once     Provider:  (Not yet assigned)    09/01/21 0750    08/31/21 1726  Family Practice - Resident (on-call MD unless specified)  Once     Specialty:  Family Medicine  Provider:  (Not yet assigned)    08/31/21 1725                PROCEDURES:   N/A    HISTORY OF PRESENT ILLNESS:   Per Dr. Socorro Hopson H&P at admision on 08/31/2021.  \"Nata Bain is a 36 y.o. female with a PMH of polysubstance abuse, benzodiazapine dependence, alcohol abuse with uncomplicated withdrawals, medication noncompliance, and tobacco abuse who presents with 2 days of N/V with abdominal pain and some diarrhea. She states she has stopped taking Ativan at home because she started drinking again about 2 weeks ago. She has had increasing abdominal pain, with nausea, green/yellow vomitus and some nonbloody diarrhea.  Patient also had some scattered ecchymosis on her abdomen and right eye, and she denied any physical abuse stating she was wrestling with her 10 year old son and \"it got too rough\". Patient denied feeling unsafe at home, any SI/HI, or ever having any physical abuse against her by her spouse. She is AO X 3. \"      DIAGNOSTIC DATA:   Lab Results (last 24 hours)     Procedure Component Value Units Date/Time    Comprehensive Metabolic Panel [[id number]]  (Abnormal) " Collected: 09/05/21 0459    Specimen: Blood Updated: 09/05/21 0605     Glucose 98 mg/dL      BUN 13 mg/dL      Creatinine 0.63 mg/dL      Sodium 136 mmol/L      Potassium 4.1 mmol/L      Chloride 97 mmol/L      CO2 27.0 mmol/L      Calcium 9.7 mg/dL      Total Protein 6.8 g/dL      Albumin 4.10 g/dL      ALT (SGPT) 78 U/L      AST (SGOT) 83 U/L      Alkaline Phosphatase 93 U/L      Total Bilirubin 0.2 mg/dL      eGFR Non African Amer 108 mL/min/1.73      Globulin 2.7 gm/dL      A/G Ratio 1.5 g/dL      BUN/Creatinine Ratio 20.6     Anion Gap 12.0 mmol/L     Narrative:      GFR Normal >60  Chronic Kidney Disease <60  Kidney Failure <15      Magnesium [[id number]]  (Normal) Collected: 09/05/21 0459    Specimen: Blood Updated: 09/05/21 0601     Magnesium 1.7 mg/dL     CBC Auto Differential [[id number]]  (Abnormal) Collected: 09/05/21 0459    Specimen: Blood Updated: 09/05/21 0543     WBC 6.72 10*3/mm3      RBC 3.83 10*6/mm3      Hemoglobin 14.0 g/dL      Hematocrit 39.7 %      .7 fL      MCH 36.6 pg      MCHC 35.3 g/dL      RDW 12.4 %      RDW-SD 47.6 fl      MPV 10.1 fL      Platelets 173 10*3/mm3      Neutrophil % 45.0 %      Lymphocyte % 43.8 %      Monocyte % 9.1 %      Eosinophil % 1.3 %      Basophil % 0.7 %      Immature Grans % 0.1 %      Neutrophils, Absolute 3.02 10*3/mm3      Lymphocytes, Absolute 2.94 10*3/mm3      Monocytes, Absolute 0.61 10*3/mm3      Eosinophils, Absolute 0.09 10*3/mm3      Basophils, Absolute 0.05 10*3/mm3      Immature Grans, Absolute 0.01 10*3/mm3      nRBC 0.0 /100 WBC          CT Abdomen Pelvis With Contrast    Result Date: 8/31/2021  CONCLUSION: No evidence of pulmonary embolism. Fatty liver. Electronically signed by:  Ranjit Marie MD  8/31/2021 5:26 PM CDT Workstation: [id number]    XR Chest 1 View    Result Date: 8/31/2021  Negative single view chest Electronically signed by:  Doug Calvo MD  8/31/2021 2:39 PM CDT Workstation: [id number]    CT Angiogram Chest    Result  Date: 8/31/2021  CONCLUSION: No evidence of pulmonary embolism. Fatty liver. Electronically signed by:  Ranjit Marie MD  8/31/2021 5:26 PM CDT Workstation: [id number]    HOSPITAL COURSE:  Patient was initially treated for alcoholic gastritis.  She was initially placed on an NPO diet and received IV hydration.  Her abdominal pain, nausea, vomiting, and diarrhea resolved within the first day of admission.  During her admission, she was also started on the CIWA protocol.  She was given her scheduled lorazepam 2 mg TID, in addition to the CIWA scale.  Patient was advised about stop drinking alcohol.  She discussed with  about the resources for alcohol abstinence.  She was also advised to quit smoking and is currently using the nicotine patch, which was sent to her pharmacy.  She follows up with Dr. Nik Saenz, Addiction Medicine, with whom she will have an appointment as soon as she is discharged to continue her treatment.  Patient states that she has enough lorazepam and suboxone until her next visit with him.    DISCHARGE CONDITION:   Stable    DISPOSITION:  Home or Self Care    DISCHARGE MEDICATIONS     Discharge Medications      New Medications      Instructions Start Date   nicotine 21 MG/24HR patch  Commonly known as: NICODERM CQ   1 patch, Transdermal, Every 24 Hours         Continue These Medications      Instructions Start Date   acamprosate 333 MG EC tablet  Commonly known as: CAMPRAL   333 mg, Oral, Daily      buprenorphine-naloxone 8-2 MG per SL tablet  Commonly known as: SUBOXONE   1 tablet, Sublingual, 3 Times Daily, Patient takes one 8-2 tablet three times a day.      cyanocobalamin 1000 MCG tablet  Commonly known as: VITAMIN B-12   1,000 mcg, Oral, Daily      dicyclomine 20 MG tablet  Commonly known as: BENTYL   20 mg, Oral, 2 Times Daily      FLUoxetine 40 MG capsule  Commonly known as: PROzac   40 mg, Oral, Daily      folic acid 1 MG tablet  Commonly known as: FOLVITE   1 mg,  Oral, Daily      gabapentin 800 MG tablet  Commonly known as: NEURONTIN   800 mg, Oral, 3 Times Daily      LORazepam 2 MG tablet  Commonly known as: ATIVAN   2 mg, Oral, 3 Times Daily      pantoprazole 40 MG EC tablet  Commonly known as: Protonix   40 mg, Oral, Daily      prazosin 5 MG capsule  Commonly known as: MINIPRESS   5 mg, Oral, Nightly      promethazine 25 MG suppository  Commonly known as: PHENERGAN   25 mg, Rectal, Every 6 Hours PRN      sucralfate 1 g tablet  Commonly known as: CARAFATE   No dose, route, or frequency recorded.      thiamine 100 MG tablet tablet  Commonly known as: VITAMIN B-1   100 mg, Oral, Daily             INSTRUCTIONS:  Activity:   Activity Instructions     Activity as Tolerated          Diet:   Diet Instructions     Advance Diet As Tolerated      Diet: Regular      Discharge Diet: Regular          FOLLOW UP:   Additional Instructions for the Follow-ups that You Need to Schedule     Call MD With Problems / Concerns   As directed      Instructions: Increase confusion, tremors, fever over 101, nausea or vomiting    Order Comments: Instructions: Increase confusion, tremors, fever over 101, nausea or vomiting          Discharge Follow-up with PCP   As directed       Currently Documented PCP:    Yosi Almaguer MD    PCP Phone Number:    None     Follow Up Details: 1 week.           Follow-up Information     Yosi Almaguer MD .    Specialties: Family Medicine, Emergency Medicine  Why: 1 week.  Contact information:  [address] [place] [address] [phone]                   PENDING TEST RESULTS AT DISCHARGE      Time: >30 minutes were spent in discharge planning, medication reconciliation and coordination of care for this patient.    Danielle Holloway MD is the attending at time of discharge, She is aware of the patient's status and agrees with the above discharge summary.        This document has been electronically signed by Stefani Sidhu MD on September 5, 2021 13:30  CDT          Electronically signed by Danielle Holloway MD at 09/05/21 3355

## 2021-09-09 ENCOUNTER — TELEPHONE (OUTPATIENT)
Dept: FAMILY MEDICINE CLINIC | Facility: CLINIC | Age: 36
End: 2021-09-09

## 2021-09-09 NOTE — TELEPHONE ENCOUNTER
TRIED TO CALL PATIENT TO MAKE HOSPITAL F/U; SECOND ATTEMPT; UNABLE TO REACH PT X2; WILL MAIL OUT LETTER FOR PATIENT TO CALL AND MAKE AN APPOINTMENT.          THANK YOU,        SAMSON

## 2021-10-04 ENCOUNTER — HOSPITAL ENCOUNTER (OUTPATIENT)
Facility: HOSPITAL | Age: 36
Setting detail: OBSERVATION
Discharge: HOME | End: 2021-10-06
Attending: EMERGENCY MEDICINE | Admitting: FAMILY MEDICINE

## 2021-10-04 ENCOUNTER — APPOINTMENT (OUTPATIENT)
Dept: GENERAL RADIOLOGY | Facility: HOSPITAL | Age: 36
End: 2021-10-04

## 2021-10-04 ENCOUNTER — APPOINTMENT (OUTPATIENT)
Dept: CT IMAGING | Facility: HOSPITAL | Age: 36
End: 2021-10-04

## 2021-10-04 DIAGNOSIS — N39.0 URINARY TRACT INFECTION WITH HEMATURIA, SITE UNSPECIFIED: ICD-10-CM

## 2021-10-04 DIAGNOSIS — R31.9 URINARY TRACT INFECTION WITH HEMATURIA, SITE UNSPECIFIED: ICD-10-CM

## 2021-10-04 DIAGNOSIS — F10.930 ALCOHOL WITHDRAWAL SYNDROME WITHOUT COMPLICATION (HCC): ICD-10-CM

## 2021-10-04 DIAGNOSIS — K85.90 ACUTE PANCREATITIS, UNSPECIFIED COMPLICATION STATUS, UNSPECIFIED PANCREATITIS TYPE: Primary | ICD-10-CM

## 2021-10-04 PROBLEM — F10.20 ALCOHOLISM (HCC): Status: ACTIVE | Noted: 2021-04-05

## 2021-10-04 PROBLEM — K52.9 COLITIS: Status: ACTIVE | Noted: 2021-10-04

## 2021-10-04 PROBLEM — N30.00 ACUTE CYSTITIS: Status: ACTIVE | Noted: 2021-10-04

## 2021-10-04 LAB
ALBUMIN SERPL-MCNC: 4.7 G/DL (ref 3.5–5.2)
ALBUMIN/GLOB SERPL: 1.7 G/DL
ALP SERPL-CCNC: 100 U/L (ref 39–117)
ALT SERPL W P-5'-P-CCNC: 14 U/L (ref 1–33)
AMPHET+METHAMPHET UR QL: NEGATIVE
AMPHETAMINES UR QL: NEGATIVE
ANION GAP SERPL CALCULATED.3IONS-SCNC: 17 MMOL/L (ref 5–15)
AST SERPL-CCNC: 24 U/L (ref 1–32)
BACTERIA UR QL AUTO: ABNORMAL /HPF
BARBITURATES UR QL SCN: NEGATIVE
BASOPHILS # BLD AUTO: 0.06 10*3/MM3 (ref 0–0.2)
BASOPHILS NFR BLD AUTO: 0.5 % (ref 0–1.5)
BENZODIAZ UR QL SCN: POSITIVE
BILIRUB SERPL-MCNC: 0.9 MG/DL (ref 0–1.2)
BILIRUB UR QL STRIP: NEGATIVE
BUN SERPL-MCNC: 8 MG/DL (ref 6–20)
BUN/CREAT SERPL: 14.5 (ref 7–25)
BUPRENORPHINE SERPL-MCNC: POSITIVE NG/ML
CALCIUM SPEC-SCNC: 8.6 MG/DL (ref 8.6–10.5)
CANNABINOIDS SERPL QL: NEGATIVE
CHLORIDE SERPL-SCNC: 97 MMOL/L (ref 98–107)
CLARITY UR: ABNORMAL
CO2 SERPL-SCNC: 20 MMOL/L (ref 22–29)
COCAINE UR QL: NEGATIVE
COLOR UR: ABNORMAL
CREAT SERPL-MCNC: 0.55 MG/DL (ref 0.57–1)
DEPRECATED RDW RBC AUTO: 47.3 FL (ref 37–54)
EOSINOPHIL # BLD AUTO: 0.01 10*3/MM3 (ref 0–0.4)
EOSINOPHIL NFR BLD AUTO: 0.1 % (ref 0.3–6.2)
ERYTHROCYTE [DISTWIDTH] IN BLOOD BY AUTOMATED COUNT: 13.5 % (ref 12.3–15.4)
ETHANOL BLD-MCNC: <10 MG/DL (ref 0–10)
ETHANOL UR QL: <0.01 %
FLUAV SUBTYP SPEC NAA+PROBE: NOT DETECTED
FLUBV RNA ISLT QL NAA+PROBE: NOT DETECTED
GFR SERPL CREATININE-BSD FRML MDRD: 126 ML/MIN/1.73
GLOBULIN UR ELPH-MCNC: 2.7 GM/DL
GLUCOSE SERPL-MCNC: 119 MG/DL (ref 65–99)
GLUCOSE UR STRIP-MCNC: NEGATIVE MG/DL
HCG SERPL QL: NEGATIVE
HCT VFR BLD AUTO: 35.1 % (ref 34–46.6)
HGB BLD-MCNC: 13.1 G/DL (ref 12–15.9)
HGB UR QL STRIP.AUTO: ABNORMAL
HOLD SPECIMEN: NORMAL
HOLD SPECIMEN: NORMAL
HYALINE CASTS UR QL AUTO: ABNORMAL /LPF
IMM GRANULOCYTES # BLD AUTO: 0.03 10*3/MM3 (ref 0–0.05)
IMM GRANULOCYTES NFR BLD AUTO: 0.3 % (ref 0–0.5)
KETONES UR QL STRIP: ABNORMAL
LEUKOCYTE ESTERASE UR QL STRIP.AUTO: ABNORMAL
LIPASE SERPL-CCNC: 117 U/L (ref 13–60)
LYMPHOCYTES # BLD AUTO: 1.19 10*3/MM3 (ref 0.7–3.1)
LYMPHOCYTES NFR BLD AUTO: 10.4 % (ref 19.6–45.3)
MCH RBC QN AUTO: 35.7 PG (ref 26.6–33)
MCHC RBC AUTO-ENTMCNC: 37.3 G/DL (ref 31.5–35.7)
MCV RBC AUTO: 95.6 FL (ref 79–97)
METHADONE UR QL SCN: NEGATIVE
MONOCYTES # BLD AUTO: 0.39 10*3/MM3 (ref 0.1–0.9)
MONOCYTES NFR BLD AUTO: 3.4 % (ref 5–12)
NEUTROPHILS NFR BLD AUTO: 85.3 % (ref 42.7–76)
NEUTROPHILS NFR BLD AUTO: 9.75 10*3/MM3 (ref 1.7–7)
NITRITE UR QL STRIP: POSITIVE
NRBC BLD AUTO-RTO: 0 /100 WBC (ref 0–0.2)
NT-PROBNP SERPL-MCNC: 71.1 PG/ML (ref 0–450)
OPIATES UR QL: POSITIVE
OXYCODONE UR QL SCN: NEGATIVE
PCP UR QL SCN: NEGATIVE
PH UR STRIP.AUTO: 6.5 [PH] (ref 5–9)
PLATELET # BLD AUTO: 215 10*3/MM3 (ref 140–450)
PMV BLD AUTO: 8.9 FL (ref 6–12)
POTASSIUM SERPL-SCNC: 3.5 MMOL/L (ref 3.5–5.2)
PROPOXYPH UR QL: NEGATIVE
PROT SERPL-MCNC: 7.4 G/DL (ref 6–8.5)
PROT UR QL STRIP: ABNORMAL
RBC # BLD AUTO: 3.67 10*6/MM3 (ref 3.77–5.28)
RBC # UR: ABNORMAL /HPF
REF LAB TEST METHOD: ABNORMAL
SARS-COV-2 RNA PNL SPEC NAA+PROBE: NOT DETECTED
SODIUM SERPL-SCNC: 134 MMOL/L (ref 136–145)
SP GR UR STRIP: 1.01 (ref 1–1.03)
SQUAMOUS #/AREA URNS HPF: ABNORMAL /HPF
TRICYCLICS UR QL SCN: NEGATIVE
TROPONIN T SERPL-MCNC: <0.01 NG/ML (ref 0–0.03)
UROBILINOGEN UR QL STRIP: ABNORMAL
WBC # BLD AUTO: 11.43 10*3/MM3 (ref 3.4–10.8)
WBC UR QL AUTO: ABNORMAL /HPF
WHOLE BLOOD HOLD SPECIMEN: NORMAL
WHOLE BLOOD HOLD SPECIMEN: NORMAL

## 2021-10-04 PROCEDURE — 96376 TX/PRO/DX INJ SAME DRUG ADON: CPT

## 2021-10-04 PROCEDURE — 96365 THER/PROPH/DIAG IV INF INIT: CPT

## 2021-10-04 PROCEDURE — 25010000002 LORAZEPAM PER 2 MG: Performed by: PHYSICIAN ASSISTANT

## 2021-10-04 PROCEDURE — 96368 THER/DIAG CONCURRENT INF: CPT

## 2021-10-04 PROCEDURE — 71045 X-RAY EXAM CHEST 1 VIEW: CPT

## 2021-10-04 PROCEDURE — C9803 HOPD COVID-19 SPEC COLLECT: HCPCS

## 2021-10-04 PROCEDURE — 96361 HYDRATE IV INFUSION ADD-ON: CPT

## 2021-10-04 PROCEDURE — 36415 COLL VENOUS BLD VENIPUNCTURE: CPT | Performed by: EMERGENCY MEDICINE

## 2021-10-04 PROCEDURE — 80306 DRUG TEST PRSMV INSTRMNT: CPT | Performed by: PHYSICIAN ASSISTANT

## 2021-10-04 PROCEDURE — 84484 ASSAY OF TROPONIN QUANT: CPT | Performed by: PHYSICIAN ASSISTANT

## 2021-10-04 PROCEDURE — 25010000002 LEVOFLOXACIN PER 250 MG: Performed by: STUDENT IN AN ORGANIZED HEALTH CARE EDUCATION/TRAINING PROGRAM

## 2021-10-04 PROCEDURE — 85025 COMPLETE CBC W/AUTO DIFF WBC: CPT

## 2021-10-04 PROCEDURE — 25010000002 PROCHLORPERAZINE 10 MG/2ML SOLUTION: Performed by: PHYSICIAN ASSISTANT

## 2021-10-04 PROCEDURE — 80053 COMPREHEN METABOLIC PANEL: CPT

## 2021-10-04 PROCEDURE — 87086 URINE CULTURE/COLONY COUNT: CPT | Performed by: STUDENT IN AN ORGANIZED HEALTH CARE EDUCATION/TRAINING PROGRAM

## 2021-10-04 PROCEDURE — 96375 TX/PRO/DX INJ NEW DRUG ADDON: CPT

## 2021-10-04 PROCEDURE — 82077 ASSAY SPEC XCP UR&BREATH IA: CPT | Performed by: PHYSICIAN ASSISTANT

## 2021-10-04 PROCEDURE — 96367 TX/PROPH/DG ADDL SEQ IV INF: CPT

## 2021-10-04 PROCEDURE — 25010000002 CEFTRIAXONE PER 250 MG: Performed by: PHYSICIAN ASSISTANT

## 2021-10-04 PROCEDURE — 93005 ELECTROCARDIOGRAM TRACING: CPT | Performed by: PHYSICIAN ASSISTANT

## 2021-10-04 PROCEDURE — 87040 BLOOD CULTURE FOR BACTERIA: CPT | Performed by: STUDENT IN AN ORGANIZED HEALTH CARE EDUCATION/TRAINING PROGRAM

## 2021-10-04 PROCEDURE — 93010 ELECTROCARDIOGRAM REPORT: CPT | Performed by: INTERNAL MEDICINE

## 2021-10-04 PROCEDURE — 99285 EMERGENCY DEPT VISIT HI MDM: CPT

## 2021-10-04 PROCEDURE — 74177 CT ABD & PELVIS W/CONTRAST: CPT

## 2021-10-04 PROCEDURE — G0378 HOSPITAL OBSERVATION PER HR: HCPCS

## 2021-10-04 PROCEDURE — 25010000002 IOPAMIDOL 61 % SOLUTION: Performed by: EMERGENCY MEDICINE

## 2021-10-04 PROCEDURE — 84703 CHORIONIC GONADOTROPIN ASSAY: CPT

## 2021-10-04 PROCEDURE — 25010000002 LORAZEPAM PER 2 MG: Performed by: STUDENT IN AN ORGANIZED HEALTH CARE EDUCATION/TRAINING PROGRAM

## 2021-10-04 PROCEDURE — 83690 ASSAY OF LIPASE: CPT

## 2021-10-04 PROCEDURE — 87636 SARSCOV2 & INF A&B AMP PRB: CPT | Performed by: PHYSICIAN ASSISTANT

## 2021-10-04 PROCEDURE — 25010000002 MORPHINE PER 10 MG: Performed by: EMERGENCY MEDICINE

## 2021-10-04 PROCEDURE — 83880 ASSAY OF NATRIURETIC PEPTIDE: CPT | Performed by: PHYSICIAN ASSISTANT

## 2021-10-04 PROCEDURE — 99218 PR INITIAL OBSERVATION CARE/DAY 30 MINUTES: CPT | Performed by: STUDENT IN AN ORGANIZED HEALTH CARE EDUCATION/TRAINING PROGRAM

## 2021-10-04 PROCEDURE — 81001 URINALYSIS AUTO W/SCOPE: CPT | Performed by: EMERGENCY MEDICINE

## 2021-10-04 RX ORDER — LORAZEPAM 2 MG/ML
2 INJECTION INTRAMUSCULAR
Status: DISCONTINUED | OUTPATIENT
Start: 2021-10-04 | End: 2021-10-06 | Stop reason: HOSPADM

## 2021-10-04 RX ORDER — METHOCARBAMOL 500 MG/1
500 TABLET, FILM COATED ORAL EVERY 8 HOURS PRN
Status: DISCONTINUED | OUTPATIENT
Start: 2021-10-04 | End: 2021-10-06 | Stop reason: HOSPADM

## 2021-10-04 RX ORDER — LORAZEPAM 2 MG/1
2 TABLET ORAL
Status: DISCONTINUED | OUTPATIENT
Start: 2021-10-04 | End: 2021-10-06 | Stop reason: HOSPADM

## 2021-10-04 RX ORDER — ONDANSETRON 4 MG/1
4 TABLET, FILM COATED ORAL EVERY 6 HOURS PRN
Status: DISCONTINUED | OUTPATIENT
Start: 2021-10-04 | End: 2021-10-06 | Stop reason: HOSPADM

## 2021-10-04 RX ORDER — LABETALOL HYDROCHLORIDE 5 MG/ML
10 INJECTION, SOLUTION INTRAVENOUS
Status: DISCONTINUED | OUTPATIENT
Start: 2021-10-04 | End: 2021-10-05

## 2021-10-04 RX ORDER — NICOTINE 21 MG/24HR
1 PATCH, TRANSDERMAL 24 HOURS TRANSDERMAL EVERY 24 HOURS
Status: DISCONTINUED | OUTPATIENT
Start: 2021-10-04 | End: 2021-10-06 | Stop reason: HOSPADM

## 2021-10-04 RX ORDER — BUPRENORPHINE HYDROCHLORIDE AND NALOXONE HYDROCHLORIDE DIHYDRATE 8; 2 MG/1; MG/1
1 TABLET SUBLINGUAL 3 TIMES DAILY
Status: DISCONTINUED | OUTPATIENT
Start: 2021-10-04 | End: 2021-10-06 | Stop reason: HOSPADM

## 2021-10-04 RX ORDER — PROCHLORPERAZINE EDISYLATE 5 MG/ML
5 INJECTION INTRAMUSCULAR; INTRAVENOUS ONCE
Status: COMPLETED | OUTPATIENT
Start: 2021-10-04 | End: 2021-10-04

## 2021-10-04 RX ORDER — NICOTINE 21 MG/24HR
1 PATCH, TRANSDERMAL 24 HOURS TRANSDERMAL EVERY 24 HOURS
Status: DISCONTINUED | OUTPATIENT
Start: 2021-10-04 | End: 2021-10-04 | Stop reason: SDUPTHER

## 2021-10-04 RX ORDER — LORAZEPAM 2 MG/ML
1 INJECTION INTRAMUSCULAR
Status: DISCONTINUED | OUTPATIENT
Start: 2021-10-04 | End: 2021-10-06 | Stop reason: HOSPADM

## 2021-10-04 RX ORDER — CALCIUM CARBONATE 200(500)MG
1 TABLET,CHEWABLE ORAL 2 TIMES DAILY PRN
Status: DISCONTINUED | OUTPATIENT
Start: 2021-10-04 | End: 2021-10-06 | Stop reason: HOSPADM

## 2021-10-04 RX ORDER — SODIUM CHLORIDE 9 MG/ML
125 INJECTION, SOLUTION INTRAVENOUS CONTINUOUS
Status: DISCONTINUED | OUTPATIENT
Start: 2021-10-04 | End: 2021-10-06 | Stop reason: HOSPADM

## 2021-10-04 RX ORDER — PROMETHAZINE HYDROCHLORIDE 12.5 MG/1
12.5 TABLET ORAL EVERY 6 HOURS PRN
Status: DISCONTINUED | OUTPATIENT
Start: 2021-10-04 | End: 2021-10-06 | Stop reason: HOSPADM

## 2021-10-04 RX ORDER — DICYCLOMINE HCL 20 MG
20 TABLET ORAL 2 TIMES DAILY
Status: DISCONTINUED | OUTPATIENT
Start: 2021-10-04 | End: 2021-10-06 | Stop reason: HOSPADM

## 2021-10-04 RX ORDER — LORAZEPAM 0.5 MG/1
1 TABLET ORAL
Status: DISCONTINUED | OUTPATIENT
Start: 2021-10-04 | End: 2021-10-06 | Stop reason: HOSPADM

## 2021-10-04 RX ORDER — PROMETHAZINE HYDROCHLORIDE 12.5 MG/1
12.5 SUPPOSITORY RECTAL EVERY 6 HOURS PRN
Status: DISCONTINUED | OUTPATIENT
Start: 2021-10-04 | End: 2021-10-06 | Stop reason: HOSPADM

## 2021-10-04 RX ORDER — LORAZEPAM 2 MG/1
2 TABLET ORAL ONCE
Status: COMPLETED | OUTPATIENT
Start: 2021-10-04 | End: 2021-10-04

## 2021-10-04 RX ORDER — LORAZEPAM 2 MG/ML
1 INJECTION INTRAMUSCULAR EVERY 8 HOURS
Status: COMPLETED | OUTPATIENT
Start: 2021-10-05 | End: 2021-10-06

## 2021-10-04 RX ORDER — ONDANSETRON 2 MG/ML
4 INJECTION INTRAMUSCULAR; INTRAVENOUS EVERY 6 HOURS PRN
Status: DISCONTINUED | OUTPATIENT
Start: 2021-10-04 | End: 2021-10-06 | Stop reason: HOSPADM

## 2021-10-04 RX ORDER — LANOLIN ALCOHOL/MO/W.PET/CERES
1000 CREAM (GRAM) TOPICAL DAILY
Status: DISCONTINUED | OUTPATIENT
Start: 2021-10-05 | End: 2021-10-06 | Stop reason: HOSPADM

## 2021-10-04 RX ORDER — PANTOPRAZOLE SODIUM 40 MG/1
40 TABLET, DELAYED RELEASE ORAL DAILY
Status: DISCONTINUED | OUTPATIENT
Start: 2021-10-05 | End: 2021-10-04 | Stop reason: ALTCHOICE

## 2021-10-04 RX ORDER — LORAZEPAM 2 MG/ML
1 INJECTION INTRAMUSCULAR EVERY 6 HOURS
Status: DISPENSED | OUTPATIENT
Start: 2021-10-04 | End: 2021-10-05

## 2021-10-04 RX ORDER — GABAPENTIN 400 MG/1
400 CAPSULE ORAL EVERY 8 HOURS SCHEDULED
Status: DISCONTINUED | OUTPATIENT
Start: 2021-10-04 | End: 2021-10-06 | Stop reason: HOSPADM

## 2021-10-04 RX ORDER — SODIUM CHLORIDE 0.9 % (FLUSH) 0.9 %
10 SYRINGE (ML) INJECTION AS NEEDED
Status: DISCONTINUED | OUTPATIENT
Start: 2021-10-04 | End: 2021-10-06 | Stop reason: HOSPADM

## 2021-10-04 RX ORDER — FOLIC ACID 1 MG/1
1 TABLET ORAL DAILY
Status: DISCONTINUED | OUTPATIENT
Start: 2021-10-05 | End: 2021-10-06 | Stop reason: HOSPADM

## 2021-10-04 RX ORDER — FLUOXETINE HYDROCHLORIDE 20 MG/1
40 CAPSULE ORAL DAILY
Status: DISCONTINUED | OUTPATIENT
Start: 2021-10-05 | End: 2021-10-06 | Stop reason: HOSPADM

## 2021-10-04 RX ORDER — SODIUM CHLORIDE 0.9 % (FLUSH) 0.9 %
10 SYRINGE (ML) INJECTION EVERY 12 HOURS SCHEDULED
Status: DISCONTINUED | OUTPATIENT
Start: 2021-10-04 | End: 2021-10-06 | Stop reason: HOSPADM

## 2021-10-04 RX ORDER — ACETAMINOPHEN 500 MG
1000 TABLET ORAL 3 TIMES DAILY
Status: DISCONTINUED | OUTPATIENT
Start: 2021-10-04 | End: 2021-10-06

## 2021-10-04 RX ORDER — FAMOTIDINE 20 MG/1
20 TABLET, FILM COATED ORAL
Status: DISCONTINUED | OUTPATIENT
Start: 2021-10-04 | End: 2021-10-06 | Stop reason: HOSPADM

## 2021-10-04 RX ORDER — LEVOFLOXACIN 5 MG/ML
500 INJECTION, SOLUTION INTRAVENOUS EVERY 24 HOURS
Status: DISCONTINUED | OUTPATIENT
Start: 2021-10-04 | End: 2021-10-05

## 2021-10-04 RX ORDER — LORAZEPAM 2 MG/ML
1 INJECTION INTRAMUSCULAR ONCE
Status: COMPLETED | OUTPATIENT
Start: 2021-10-04 | End: 2021-10-04

## 2021-10-04 RX ADMIN — PROCHLORPERAZINE EDISYLATE 5 MG: 5 INJECTION INTRAMUSCULAR; INTRAVENOUS at 13:21

## 2021-10-04 RX ADMIN — LORAZEPAM 2 MG: 2 INJECTION INTRAMUSCULAR; INTRAVENOUS at 22:22

## 2021-10-04 RX ADMIN — METHOCARBAMOL 500 MG: 500 TABLET, FILM COATED ORAL at 22:22

## 2021-10-04 RX ADMIN — SODIUM CHLORIDE, PRESERVATIVE FREE 10 ML: 5 INJECTION INTRAVENOUS at 20:26

## 2021-10-04 RX ADMIN — ACETAMINOPHEN 1000 MG: 500 TABLET, FILM COATED ORAL at 17:55

## 2021-10-04 RX ADMIN — METRONIDAZOLE 500 MG: 500 INJECTION, SOLUTION INTRAVENOUS at 21:24

## 2021-10-04 RX ADMIN — BUPRENORPHINE HYDROCHLORIDE AND NALOXONE HYDROCHLORIDE DIHYDRATE 1 TABLET: 8; 2 TABLET SUBLINGUAL at 20:26

## 2021-10-04 RX ADMIN — LORAZEPAM 1 MG: 2 INJECTION INTRAMUSCULAR; INTRAVENOUS at 13:21

## 2021-10-04 RX ADMIN — LORAZEPAM 2 MG: 2 TABLET ORAL at 12:48

## 2021-10-04 RX ADMIN — SODIUM CHLORIDE 1000 ML: 9 INJECTION, SOLUTION INTRAVENOUS at 12:48

## 2021-10-04 RX ADMIN — LEVOFLOXACIN 500 MG: 5 INJECTION, SOLUTION INTRAVENOUS at 20:04

## 2021-10-04 RX ADMIN — Medication 125 MG: at 20:04

## 2021-10-04 RX ADMIN — SODIUM CHLORIDE 250 ML/HR: 900 INJECTION, SOLUTION INTRAVENOUS at 15:07

## 2021-10-04 RX ADMIN — DICYCLOMINE HYDROCHLORIDE 20 MG: 20 TABLET ORAL at 20:04

## 2021-10-04 RX ADMIN — GABAPENTIN 400 MG: 400 CAPSULE ORAL at 20:04

## 2021-10-04 RX ADMIN — IOPAMIDOL 90 ML: 612 INJECTION, SOLUTION INTRAVENOUS at 14:08

## 2021-10-04 RX ADMIN — MORPHINE SULFATE 4 MG: 4 INJECTION INTRAVENOUS at 13:23

## 2021-10-04 RX ADMIN — LORAZEPAM 2 MG: 2 INJECTION INTRAMUSCULAR; INTRAVENOUS at 20:26

## 2021-10-04 RX ADMIN — LORAZEPAM 2 MG: 2 INJECTION INTRAMUSCULAR; INTRAVENOUS at 17:54

## 2021-10-04 NOTE — H&P
HISTORY AND PHYSICAL  NAME: Nata Bain  : 1985  MRN: [id number]    DATE OF ADMISSION:  10/4/2021     DATE & TIME SEEN: 10/04/21 at 2:50pm     PCP: Erica Jacob MD    CODE STATUS: Full code    CHIEF COMPLAINT:  Abdominal pain, N/V     HPI:  Nata Bain is a 36 y.o. female with a CMH of alcohol induced pancreatis, alcoholism, tobacco dependence, GERD, opiates dependence on suboxone therapy who presents complaining of  worsening abdominal pain, nausea, vomiting and diarrhea. Symptoms started 1 week ago with abdominal pain, nausea and vomiting. Patient also reported diarrhea that has been going on for 2 weeks. Pain located on epigastric and LUQ with intensity 8/10. She used zofran with minimal effects. Patient reports that her last drink yesterday morning at 8 am. She reports burning with urination started 1 week ago. Used azo pills but not helpful.         CONCURRENT MEDICAL HISTORY:  Past Medical History:   Diagnosis Date   • Abnormal Pap smear of cervix    • Alcohol-induced acute pancreatitis 2020    Results From Last 14 Days Lab Units 21 1342 LIPASE U/L 65*  -advance diet as tolerated starting with clears -IV fluids   -will cont suboxone and give morphine for breakthrough pain  -Zofran for nausea as needed -Scheduled Ativan, and CIWA protocol - f/u on CT abdomen     • Alcoholism (HCC)    • Anxiety    • Cervical dysplasia    • Depression    • Fibrocystic breast    • GERD (gastroesophageal reflux disease)    • Hypertension    • Seizures (HCC) 2017   • Substance abuse (HCC)    • Substance abuse (HCC)        PAST SURGICAL HISTORY:  Past Surgical History:   Procedure Laterality Date   • COLONOSCOPY N/A 2021    Procedure: COLONOSCOPY;  Surgeon: Mirian Mills MD;  Location: [facility] ENDOSCOPY;  Service: Gastroenterology;  Laterality: N/A;   • ENDOSCOPY N/A 2021    Procedure: ESOPHAGOGASTRODUODENOSCOPY;  Surgeon: Mirian Mills MD;  Location: [facility] ENDOSCOPY;   Service: Gastroenterology;  Laterality: N/A;   • RHINOPLASTY         FAMILY HISTORY:  Family History   Problem Relation Age of Onset   • Breast cancer Mother    • Cancer Mother    • COPD Mother    • Breast cancer Maternal Grandmother    • COPD Maternal Grandmother    • Heart disease Maternal Grandmother    • Breast cancer Paternal Grandmother    • Breast cancer Maternal Aunt    • Hypertension Father    • Heart disease Father         SOCIAL HISTORY:  Social History     Socioeconomic History   • Marital status:      Spouse name: Not on file   • Number of children: Not on file   • Years of education: Not on file   • Highest education level: Not on file   Tobacco Use   • Smoking status: Current Every Day Smoker     Packs/day: 1.00     Years: 20.00     Pack years: 20.00     Types: Cigarettes   • Smokeless tobacco: Never Used   Substance and Sexual Activity   • Alcohol use: Yes     Alcohol/week: 6.0 standard drinks     Types: 6 Shots of liquor per week     Comment: daily   • Drug use: Not Currently     Types: Fentanyl, Oxycodone     Comment: hx of abuse   • Sexual activity: Defer       HOME MEDICATIONS:  Prior to Admission medications    Medication Sig Start Date End Date Taking? Authorizing Provider   acamprosate (CAMPRAL) 333 MG EC tablet Take 333 mg by mouth Daily. 3/23/21   Caty Yepez MD   buprenorphine-naloxone (SUBOXONE) 8-2 MG per SL tablet Place 1 tablet under the tongue 3 (Three) Times a Day. Patient takes one 8-2 tablet three times a day. 2/20/20   Caty Yepez MD   cyanocobalamin (VITAMIN B-12) 1000 MCG tablet Take 1 tablet by mouth Daily. 1/19/21   Zeeshan Wiggins MD   dicyclomine (BENTYL) 20 MG tablet Take 1 tablet by mouth 2 (Two) Times a Day. 4/12/21   Brissa Jeffrey MD   FLUoxetine (PROzac) 40 MG capsule Take 40 mg by mouth Daily. 3/9/21   Caty Yepez MD   folic acid (FOLVITE) 1 MG tablet Take 1 tablet by mouth Daily. 3/5/21   Delmy Jarrett MD   gabapentin  (NEURONTIN) 800 MG tablet Take 800 mg by mouth 3 (Three) Times a Day. 12/31/20   Caty Yepez MD   LORazepam (ATIVAN) 2 MG tablet Take 2 mg by mouth 3 (Three) Times a Day.    Ranjit Zaragoza MD   nicotine (NICODERM CQ) 21 MG/24HR patch Place 1 patch on the skin as directed by provider Daily. 9/5/21   Danielle Holloway MD   pantoprazole (Protonix) 40 MG EC tablet Take 1 tablet by mouth Daily. 1/10/21   Maryam Galdamez MD   prazosin (MINIPRESS) 5 MG capsule Take 5 mg by mouth Every Night. 1/7/21   Caty Yepez MD   promethazine (PHENERGAN) 25 MG suppository Insert 1 suppository into the rectum Every 6 (Six) Hours As Needed for Nausea or Vomiting. 1/22/21   Zeeshan Wiggins MD   sucralfate (CARAFATE) 1 g tablet  2/15/21   Caty Yepez MD   thiamine (thiamine) 100 MG tablet tablet Take 1 tablet by mouth Daily. 1/19/21   Zeeshan Wiggins MD       ALLERGIES:  Patient has no known allergies.    REVIEW OF SYSTEMS  Review of Systems   Constitutional: Positive for activity change, appetite change and chills. Negative for fever.   HENT: Negative for congestion, rhinorrhea and sore throat.    Eyes: Positive for visual disturbance.   Respiratory: Negative for cough, chest tightness and shortness of breath.    Cardiovascular: Positive for chest pain. Negative for palpitations and leg swelling.   Gastrointestinal: Positive for abdominal pain, diarrhea, nausea and vomiting. Negative for constipation.   Endocrine: Negative for polydipsia and polyuria.   Genitourinary: Positive for dysuria. Negative for difficulty urinating, frequency and urgency.   Musculoskeletal: Negative for joint swelling and myalgias.   Skin: Negative for rash and wound.   Neurological: Positive for dizziness and weakness. Negative for tremors, seizures, light-headedness and headaches.   Psychiatric/Behavioral: Negative for dysphoric mood and sleep disturbance.       PHYSICAL EXAM:  Temp:  [97.9 °F (36.6 °C)] 97.9 °F  (36.6 °C)  Heart Rate:  [87-98] 98  Resp:  [17-24] 18  BP: (140-165)/() 140/95  Body mass index is 20.36 kg/m².  Physical Exam  Vitals and nursing note reviewed.   Constitutional:       General: She is not in acute distress.     Appearance: Normal appearance. She is not diaphoretic.   HENT:      Head: Normocephalic and atraumatic.      Right Ear: External ear normal.      Left Ear: External ear normal.   Eyes:      General: No scleral icterus.     Extraocular Movements: Extraocular movements intact.      Conjunctiva/sclera: Conjunctivae normal.      Pupils: Pupils are equal, round, and reactive to light.   Cardiovascular:      Rate and Rhythm: Normal rate and regular rhythm.      Heart sounds: Normal heart sounds.   Pulmonary:      Effort: Pulmonary effort is normal. No respiratory distress.      Breath sounds: Normal breath sounds.   Chest:      Chest wall: No tenderness.   Abdominal:      General: Bowel sounds are normal.      Palpations: Abdomen is soft.      Tenderness: There is abdominal tenderness. There is no guarding or rebound.   Musculoskeletal:         General: No swelling or tenderness.      Cervical back: Normal range of motion and neck supple.      Right lower leg: No edema.      Left lower leg: No edema.   Skin:     General: Skin is warm and dry.      Capillary Refill: Capillary refill takes less than 2 seconds.      Findings: No erythema or rash.   Neurological:      General: No focal deficit present.      Mental Status: She is alert and oriented to person, place, and time.      Motor: No weakness.   Psychiatric:         Behavior: Behavior normal.         DIAGNOSTIC DATA:   Lab Results (last 24 hours)     Procedure Component Value Units Date/Time    Urine Culture - Urine, Urine, Clean Catch [[id number]] Collected: 10/04/21 1345    Specimen: Urine, Clean Catch Updated: 10/04/21 1746    Extra Tubes [[id number]] Collected: 10/04/21 1406    Specimen: Blood, Venous Line Updated: 10/04/21 6207     Narrative:      The following orders were created for panel order Extra Tubes.  Procedure                               Abnormality         Status                     ---------                               -----------         ------                     Lavender Top[[id number]]                                     Final result               Green Top (Gel)[[id number]]                                  Final result                 Please view results for these tests on the individual orders.    Green Top (Gel) [[id number]] Collected: 10/04/21 1406    Specimen: Blood Updated: 10/04/21 1515     Extra Tube Hold for add-ons.     Comment: Auto resulted.       Lavender Top [[id number]] Collected: 10/04/21 1406    Specimen: Blood Updated: 10/04/21 1515     Extra Tube hold for add-on     Comment: Auto resulted       Urinalysis, Microscopic Only - Urine, Clean Catch [[id number]]  (Abnormal) Collected: 10/04/21 1345    Specimen: Urine, Clean Catch Updated: 10/04/21 1412     RBC, UA 13-20 /HPF      WBC, UA Too Numerous to Count /HPF      Bacteria, UA 3+ /HPF      Squamous Epithelial Cells, UA None Seen /HPF      Hyaline Casts, UA 0-2 /LPF      Methodology Automated Microscopy    Urinalysis With Microscopic If Indicated (No Culture) - Urine, Clean Catch [[id number]]  (Abnormal) Collected: 10/04/21 1345    Specimen: Urine, Clean Catch Updated: 10/04/21 1411     Color, UA [place]     Appearance, UA Cloudy     pH, UA 6.5     Specific Gravity, UA 1.015     Glucose, UA Negative     Ketones, UA 15 mg/dL (1+)     Bilirubin, UA Negative     Blood, UA Large (3+)     Protein, UA 30 mg/dL (1+)     Leuk Esterase, UA Large (3+)     Nitrite, UA Positive     Urobilinogen, UA 0.2 E.U./dL    Troponin [[id number]]  (Normal) Collected: 10/04/21 1200    Specimen: Blood Updated: 10/04/21 1410     Troponin T <0.010 ng/mL     Narrative:      Troponin T Reference Range:  <= 0.03 ng/mL-   Negative for AMI  >0.03 ng/mL-     Abnormal for myocardial necrosis.   Clinicians would have to utilize clinical acumen, EKG, Troponin and serial changes to determine if it is an Acute Myocardial Infarction or myocardial injury due to an underlying chronic condition.       Results may be falsely decreased if patient taking Biotin.      BNP [[id number]]  (Normal) Collected: 10/04/21 1200    Specimen: Blood Updated: 10/04/21 1410     proBNP 71.1 pg/mL     Narrative:      Among patients with dyspnea, NT-proBNP is highly sensitive for the detection of acute congestive heart failure. In addition NT-proBNP of <300 pg/ml effectively rules out acute congestive heart failure with 99% negative predictive value.    Results may be falsely decreased if patient taking Biotin.      Urine Drug Screen - Urine, Clean Catch [[id number]]  (Abnormal) Collected: 10/04/21 1345    Specimen: Urine, Clean Catch Updated: 10/04/21 1409     THC, Screen, Urine Negative     Phencyclidine (PCP), Urine Negative     Cocaine Screen, Urine Negative     Methamphetamine, Ur Negative     Opiate Screen Positive     Amphetamine Screen, Urine Negative     Benzodiazepine Screen, Urine Positive     Tricyclic Antidepressants Screen Negative     Methadone Screen, Urine Negative     Barbiturates Screen, Urine Negative     Oxycodone Screen, Urine Negative     Propoxyphene Screen Negative     Buprenorphine, Screen, Urine Positive    Narrative:      Cutoff For Drugs Screened:    Amphetamines               500 ng/ml  Barbiturates               200 ng/ml  Benzodiazepines            150 ng/ml  Cocaine                    150 ng/ml  Methadone                  200 ng/ml  Opiates                    100 ng/ml  Phencyclidine               25 ng/ml  THC                            50 ng/ml  Methamphetamine            500 ng/ml  Tricyclic Antidepressants  300 ng/ml  Oxycodone                  100 ng/ml  Propoxyphene               300 ng/ml  Buprenorphine               10 ng/ml    The normal value for all drugs tested is negative. This report  includes unconfirmed screening results, with the cutoff values listed, to be used for medical treatment purposes only.  Unconfirmed results must not be used for non-medical purposes such as employment or legal testing.  Clinical consideration should be applied to any drug of abuse test, particularly when unconfirmed results are used.      Ethanol [[id number]] Collected: 10/04/21 1200    Specimen: Blood Updated: 10/04/21 1402     Ethanol <10 mg/dL      Ethanol % <0.010 %     COVID-19 and FLU A/B PCR - Swab, Nasopharynx [[id number]]  (Normal) Collected: 10/04/21 1322    Specimen: Swab from Nasopharynx Updated: 10/04/21 1354     COVID19 Not Detected     Influenza A PCR Not Detected     Influenza B PCR Not Detected    Narrative:      Fact sheet for providers: https://www.fda.gov/media/121878/download    Fact sheet for patients: https://www.fda.gov/media/233982/download    Test performed by PCR.    [place] Draw [[id number]] Collected: 10/04/21 1200    Specimen: Blood Updated: 10/04/21 1315    Narrative:      The following orders were created for panel order [place] Draw.  Procedure                               Abnormality         Status                     ---------                               -----------         ------                     Green Top (Gel)[[id number]]                                  Final result               Lavender Top[[id number]]                                     Final result               Gold Top - SST[[id number]]                                                              Light Blue Top[[id number]]                                                                Please view results for these tests on the individual orders.    Green Top (Gel) [[id number]] Collected: 10/04/21 1201    Specimen: Blood Updated: 10/04/21 1315     Extra Tube Hold for add-ons.     Comment: Auto resulted.       Lavender Top [[id number]] Collected: 10/04/21 1201    Specimen: Blood Updated: 10/04/21 1315     Extra Tube hold for  add-on     Comment: Auto resulted       Comprehensive Metabolic Panel [[id number]]  (Abnormal) Collected: 10/04/21 1201    Specimen: Blood Updated: 10/04/21 1227     Glucose 119 mg/dL      BUN 8 mg/dL      Creatinine 0.55 mg/dL      Sodium 134 mmol/L      Potassium 3.5 mmol/L      Chloride 97 mmol/L      CO2 20.0 mmol/L      Calcium 8.6 mg/dL      Total Protein 7.4 g/dL      Albumin 4.70 g/dL      ALT (SGPT) 14 U/L      AST (SGOT) 24 U/L      Alkaline Phosphatase 100 U/L      Total Bilirubin 0.9 mg/dL      eGFR Non African Amer 126 mL/min/1.73      Globulin 2.7 gm/dL      A/G Ratio 1.7 g/dL      BUN/Creatinine Ratio 14.5     Anion Gap 17.0 mmol/L     Narrative:      GFR Normal >60  Chronic Kidney Disease <60  Kidney Failure <15      Lipase [[id number]]  (Abnormal) Collected: 10/04/21 1201    Specimen: Blood Updated: 10/04/21 1227     Lipase 117 U/L     hCG, Serum, Qualitative [[id number]]  (Normal) Collected: 10/04/21 1200    Specimen: Blood Updated: 10/04/21 1221     HCG Qualitative Negative    CBC & Differential [[id number]]  (Abnormal) Collected: 10/04/21 1201    Specimen: Blood Updated: 10/04/21 1209    Narrative:      The following orders were created for panel order CBC & Differential.  Procedure                               Abnormality         Status                     ---------                               -----------         ------                     CBC Auto Differential[[id number]]        Abnormal            Final result                 Please view results for these tests on the individual orders.    CBC Auto Differential [[id number]]  (Abnormal) Collected: 10/04/21 1201    Specimen: Blood Updated: 10/04/21 1209     WBC 11.43 10*3/mm3      RBC 3.67 10*6/mm3      Hemoglobin 13.1 g/dL      Hematocrit 35.1 %      MCV 95.6 fL      MCH 35.7 pg      MCHC 37.3 g/dL      RDW 13.5 %      RDW-SD 47.3 fl      MPV 8.9 fL      Platelets 215 10*3/mm3      Neutrophil % 85.3 %      Lymphocyte % 10.4 %      Monocyte % 3.4 %       Eosinophil % 0.1 %      Basophil % 0.5 %      Immature Grans % 0.3 %      Neutrophils, Absolute 9.75 10*3/mm3      Lymphocytes, Absolute 1.19 10*3/mm3      Monocytes, Absolute 0.39 10*3/mm3      Eosinophils, Absolute 0.01 10*3/mm3      Basophils, Absolute 0.06 10*3/mm3      Immature Grans, Absolute 0.03 10*3/mm3      nRBC 0.0 /100 WBC            Imaging Results (Last 24 Hours)     Procedure Component Value Units Date/Time    CT Abdomen Pelvis With Contrast [[id number]] Collected: 10/04/21 1407     Updated: 10/04/21 1443    Narrative:      PROCEDURE: CT abdomen and pelvis with IV contrast.    INDICATION: Abdominal pain with nausea and vomiting.    COMPARISON: 8/31/2021 CT chest, abdomen and pelvis.    Total .2 mGy-cm.    TECHNIQUE: This exam was performed according to our departmental  dose-optimization program, which includes automated exposure  control, adjustment of the mA and/or kV according to patient size  and/or use of iterative reconstruction technique. CT abdomen and  pelvis performed with 90 mL Isovue-300 with axial and  reconstructed sagittal and coronal images from above the  diaphragms to below the lesser trochanters.    FINDINGS:  Lung bases clear with no pleural effusion.    No acute osseous abnormalities in the lumbar spine or bony  pelvis.    There is mild hepatomegaly with diffuse fatty infiltration of the  liver.  The spleen, pancreas and gallbladder are normal.     The abdominal aorta and IVC normal.  No retroperitoneal adenopathy.  Bilateral normal adrenal glands.  Bilateral normal kidneys with no renal or ureteral calculi,  hydronephrosis or perirenal fat stranding. No renal masses.  Urinary bladder partially distended with no intrinsic bladder  abnormalities.  Uterus and adnexal areas are normal.    Stomach and small bowel normal with no small bowel distention or  abnormal air-fluid levels. Distal and terminal ileum normal.    Mild diffuse colonic wall thickening with no diverticuli  or  pericolonic inflammatory changes. Findings could represent mild  colitis. Correlate clinically.  Appendix normal.    No free fluid or air in the abdomen or pelvis.      Impression:      Mild hepatomegaly with diffuse fatty infiltration of the liver.    Mild diffuse colonic wall thickening suspicious for colitis. No  diverticuli.  Appendix normal.    [id number]    Electronically signed by:  Rishabh Mejias MD  10/4/2021 2:42  PM CDT Workstation: [id number]    XR Chest 1 View [[id number]] Collected: 10/04/21 1321     Updated: 10/04/21 1348    Narrative:        PROCEDURE: Single chest view portable    REASON FOR EXAM:shortness of breath    FINDINGS: Comparison exam dated August 31, 2021. Cardiac and  pulmonary vasculature are normal. Lungs are clear. Pleural spaces  are normal. No acute osseous abnormality.      Impression:      Negative single view chest    Electronically signed by:  Doug Calvo MD  10/4/2021 1:47 PM CDT  Workstation: [id number]            I reviewed the patient's new clinical results.    ASSESSMENT AND PLAN: This is a 36 y.o. female with:    Active Hospital Problems    Diagnosis  POA   • Colitis [K52.9]  Yes     Evidence via CT scan in the ED  Pt has hx of recurrent C. Diff and functional diarrhea  Will order C. Diff toxin, GI panel  CBC daily  Will give Levaquin and flagyl       • Acute cystitis [N30.00]  Yes     IV ceftriaxone given in the ED  UA showed UTI w/ + nitrites, 3+ blood, leukocytes and bacteria  Urine culture pending  CBC daily  Will give Levaquin and flagyl     • Alcoholism (HCC) [F10.20]  Unknown     · CIWA protocols  · Last drink 0800 10/3  · Continued Folic acid     • Left upper quadrant abdominal pain [R10.12]  Yes     CT scan showed Mild hepatomegaly with diffuse fatty infiltration of the liver and colitis  IVF  Given morphine for pain in the ED  Will give Bentyl and tylenol for pain  Will monitor LFTs     • Anxiety and depression [F41.9, F32.A]  Yes     -As needed Ativan(CIWA)  -  continue prozac  -Valium PRN       • Polysubstance (including opioids) dependence, daily use (HCC) [F11.20, F19.20]  Yes     · Continue home Suboxone regimen  · UDS + for opiates, buprenorphine, and benzos  · Patient follows up with Dr. Nik Saenz for addiction medicine.  · She will have a same day appointment when she is discharged and will have enough prescription until then      • Dependence on nicotine from cigarettes [F17.210]  Yes     · Nicotine patch         DVT prophylaxis: SCDs/TEDs     Nata Bain and I have discussed pain goals for this hospitalization after reviewing her current clinical condition, medical history and prior pain experiences.  The goal is to keep the pain level 4.  To help achieve this, I plan to pain medicine.    JOELLE # per PDMP, reviewed and consistent with patient reported medications.    Expected Length of Stay: Where: current living arrangements and When:  1-4days    I discussed the patient's findings and my recommendations with patient.     Nadir Villarreal MD is the attending on record at time of admission, He is aware of the patient's status and agrees with the above history and physical.      This document has been electronically signed by Mac Ceballos MD on October 4, 2021 18:01 CDT

## 2021-10-04 NOTE — PROGRESS NOTES
F[facility]ILY MEDICINE RESIDENCY SERVICE  SOCIAL NOTE    NAME: Nata Bain  : 1985  MRN: [id number]      LOS: 1 day     PROVIDER OF SERVICE: Erica Jacob MD    Chief Complaint: abdominal pain with nausea and vomiting, dysuria increase in urinary frequency      Ms Nata Bain is being admitted for treatment of acute pancreatitis, UTI and alcohol withdrawal. I saw the patient while she was down in the ED. Nata Bain is a 36 y.o. female with history of multiple episodes of alcohol induced pancreatitis and C.diff who presents today due to abdominal pain, dysuria, increase in urinary frequency and urgency. Patient reports her last drink was yesterday morning. In the ED, UA was positive for UTI, Lipase was elevated at 117, UDS positive for Opiate, and negative Ethanol level. CT abdomen and pelvis showed mild hepatomegaly with diffuse fatty infiltration of the liver. Mild diffuse colonic wall thickening suspicious for colitis. Pancrease was normal on imaging. CXR was negative for acute process. Discussed patient's care with the admitting team who will be treating the patient appropriately.           Erica Jacob MD PGY-2  [facility]   This document has been electronically signed by Erica Jacob MD on 2021 14:59 CDT

## 2021-10-04 NOTE — ED NOTES
"Pt states \"I dont think this pill is going to stay down\" pt is still have tremors to Kye Sol, RN  10/04/21 2616    "

## 2021-10-04 NOTE — ACP (ADVANCE CARE PLANNING)
Patient expresses desire to be full code if situation arises where she needs CPR or intubation.  Patient designates her spouse Jorge Sanz at [phone] as her healthcare surrogate if situation arises where she is unable to make healthcare decisions at home.        Pily Pike M.D. PGY 2  [facility]  [address] [place] [address]  Office: [phone]  This document has been electronically signed by Pily Pike MD on October 4, 2021 16:46 CDT

## 2021-10-04 NOTE — ED PROVIDER NOTES
Subjective   Patient presents to emergency department for nausea/vomiting/diarrhea x 1 week.  States she has not been able to keep any of her medications down including her suboxone, ativan.  She endorses alcoholism and states her last drink was yesterday morning.  Recent admission 1 month ago for pancreatitis/gastritis.  Also states she has a UTI which she is treating with Azo only.        History provided by:  Patient   used: No        Review of Systems   Constitutional: Negative for chills and fever.   HENT: Negative for sore throat and trouble swallowing.    Respiratory: Positive for cough and shortness of breath. Negative for wheezing.    Cardiovascular: Negative for chest pain.   Gastrointestinal: Positive for abdominal pain, diarrhea, nausea and vomiting.   Genitourinary: Negative for dysuria and flank pain.   Skin: Negative for color change.   Allergic/Immunologic: Negative for immunocompromised state.   Neurological: Positive for tremors. Negative for dizziness and headaches.   Hematological: Does not bruise/bleed easily.   Psychiatric/Behavioral: Negative for confusion.       Past Medical History:   Diagnosis Date   • Abnormal Pap smear of cervix    • Alcohol-induced acute pancreatitis 6/1/2020    Results From Last 14 Days Lab Units 02/27/21 1342 LIPASE U/L 65*  -advance diet as tolerated starting with clears -IV fluids   -will cont suboxone and give morphine for breakthrough pain  -Zofran for nausea as needed -Scheduled Ativan, and CIWA protocol - f/u on CT abdomen     • Alcoholism (HCC)    • Anxiety    • Cervical dysplasia    • Depression    • Fibrocystic breast    • GERD (gastroesophageal reflux disease)    • Hypertension    • Seizures (HCC) 2017   • Substance abuse (HCC)    • Substance abuse (HCC)        No Known Allergies    Past Surgical History:   Procedure Laterality Date   • COLONOSCOPY N/A 2/2/2021    Procedure: COLONOSCOPY;  Surgeon: Mirian Mills MD;  Location: [facility]  "ENDOSCOPY;  Service: Gastroenterology;  Laterality: N/A;   • ENDOSCOPY N/A 1/8/2021    Procedure: ESOPHAGOGASTRODUODENOSCOPY;  Surgeon: Mirian Mills MD;  Location: [facility] ENDOSCOPY;  Service: Gastroenterology;  Laterality: N/A;   • RHINOPLASTY         Family History   Problem Relation Age of Onset   • Breast cancer Mother    • Cancer Mother    • COPD Mother    • Breast cancer Maternal Grandmother    • COPD Maternal Grandmother    • Heart disease Maternal Grandmother    • Breast cancer Paternal Grandmother    • Breast cancer Maternal Aunt    • Hypertension Father    • Heart disease Father        Social History     Socioeconomic History   • Marital status:      Spouse name: Not on file   • Number of children: Not on file   • Years of education: Not on file   • Highest education level: Not on file   Tobacco Use   • Smoking status: Current Every Day Smoker     Packs/day: 1.00     Years: 20.00     Pack years: 20.00     Types: Cigarettes   • Smokeless tobacco: Never Used   Substance and Sexual Activity   • Alcohol use: Yes     Alcohol/week: 6.0 standard drinks     Types: 6 Shots of liquor per week     Comment: daily   • Drug use: Not Currently     Types: Fentanyl, Oxycodone     Comment: hx of abuse   • Sexual activity: Defer           Objective      BP (!) 162/103 (BP Location: Right arm, Patient Position: Sitting)   Pulse 98   Temp 97.9 °F (36.6 °C) (Oral)   Resp 24   Ht 170.2 cm (67\")   Wt 59 kg (130 lb)   LMP 09/01/2021 (Approximate)   SpO2 96%   BMI 20.36 kg/m²     Physical Exam  Vitals and nursing note reviewed.   Constitutional:       Appearance: She is well-developed.   HENT:      Head: Normocephalic and atraumatic.      Mouth/Throat:      Pharynx: Oropharynx is clear.   Eyes:      Conjunctiva/sclera: Conjunctivae normal.   Cardiovascular:      Rate and Rhythm: Regular rhythm. Tachycardia present.      Pulses: Normal pulses.      Heart sounds: Normal heart sounds.   Pulmonary:      Effort: " Pulmonary effort is normal. No respiratory distress.      Breath sounds: Normal breath sounds. No wheezing.   Abdominal:      Tenderness: There is abdominal tenderness (tenderness elicited to light touch ). There is right CVA tenderness and left CVA tenderness.   Skin:     General: Skin is warm.      Capillary Refill: Capillary refill takes less than 2 seconds.   Neurological:      Mental Status: She is alert. Mental status is at baseline.   Psychiatric:         Mood and Affect: Mood normal.         Behavior: Behavior normal.         Thought Content: Thought content normal.         ECG 12 Lead      Date/Time: 10/4/2021 2:35 PM  Performed by: Phoenix Feldman PA-C  Authorized by: Phoenix Feldman PA-C   Interpreted by physician  Comparison: compared with previous ECG from 8/31/2021  Similar to previous ECG  Rhythm: sinus rhythm  Rate: normal  BPM: 87  ST Segments: ST segments normal  Other findings: prolonged QTc interval  Clinical impression: abnormal ECG                 ED Course  ED Course as of Oct 04 1440   Mon Oct 04, 2021   1310 Patient vomited PO ativan.  Placing IV order.      [LEATHA]   1421 Paged FP Resident.    [LEATHA]      ED Course User Index  [LEATHA] Phoenix Feldman PA-C      Results for orders placed or performed during the hospital encounter of 10/04/21   COVID-19 and FLU A/B PCR - Swab, Nasopharynx    Specimen: Nasopharynx; Swab   Result Value Ref Range    COVID19 Not Detected Not Detected - Ref. Range    Influenza A PCR Not Detected Not Detected    Influenza B PCR Not Detected Not Detected   Comprehensive Metabolic Panel    Specimen: Blood   Result Value Ref Range    Glucose 119 (H) 65 - 99 mg/dL    BUN 8 6 - 20 mg/dL    Creatinine 0.55 (L) 0.57 - 1.00 mg/dL    Sodium 134 (L) 136 - 145 mmol/L    Potassium 3.5 3.5 - 5.2 mmol/L    Chloride 97 (L) 98 - 107 mmol/L    CO2 20.0 (L) 22.0 - 29.0 mmol/L    Calcium 8.6 8.6 - 10.5 mg/dL    Total Protein 7.4 6.0 - 8.5 g/dL    Albumin 4.70 3.50 - 5.20  g/dL    ALT (SGPT) 14 1 - 33 U/L    AST (SGOT) 24 1 - 32 U/L    Alkaline Phosphatase 100 39 - 117 U/L    Total Bilirubin 0.9 0.0 - 1.2 mg/dL    eGFR Non African Amer 126 >60 mL/min/1.73    Globulin 2.7 gm/dL    A/G Ratio 1.7 g/dL    BUN/Creatinine Ratio 14.5 7.0 - 25.0    Anion Gap 17.0 (H) 5.0 - 15.0 mmol/L   Lipase    Specimen: Blood   Result Value Ref Range    Lipase 117 (H) 13 - 60 U/L   Urinalysis With Microscopic If Indicated (No Culture) - Urine, Clean Catch    Specimen: Urine, Clean Catch   Result Value Ref Range    Color, UA Orange (A) Yellow, Straw, Dark Yellow, Rhonda    Appearance, UA Cloudy (A) Clear    pH, UA 6.5 5.0 - 9.0    Specific [place], UA 1.015 1.003 - 1.030    Glucose, UA Negative Negative    Ketones, UA 15 mg/dL (1+) (A) Negative    Bilirubin, UA Negative Negative    Blood, UA Large (3+) (A) Negative    Protein, UA 30 mg/dL (1+) (A) Negative    Leuk Esterase, UA Large (3+) (A) Negative    Nitrite, UA Positive (A) Negative    Urobilinogen, UA 0.2 E.U./dL 0.2 - 1.0 E.U./dL   hCG, Serum, Qualitative    Specimen: Blood   Result Value Ref Range    HCG Qualitative Negative Negative   CBC Auto Differential    Specimen: Blood   Result Value Ref Range    WBC 11.43 (H) 3.40 - 10.80 10*3/mm3    RBC 3.67 (L) 3.77 - 5.28 10*6/mm3    Hemoglobin 13.1 12.0 - 15.9 g/dL    Hematocrit 35.1 34.0 - 46.6 %    MCV 95.6 79.0 - 97.0 fL    MCH 35.7 (H) 26.6 - 33.0 pg    MCHC 37.3 (H) 31.5 - 35.7 g/dL    RDW 13.5 12.3 - 15.4 %    RDW-SD 47.3 37.0 - 54.0 fl    MPV 8.9 6.0 - 12.0 fL    Platelets 215 140 - 450 10*3/mm3    Neutrophil % 85.3 (H) 42.7 - 76.0 %    Lymphocyte % 10.4 (L) 19.6 - 45.3 %    Monocyte % 3.4 (L) 5.0 - 12.0 %    Eosinophil % 0.1 (L) 0.3 - 6.2 %    Basophil % 0.5 0.0 - 1.5 %    Immature Grans % 0.3 0.0 - 0.5 %    Neutrophils, Absolute 9.75 (H) 1.70 - 7.00 10*3/mm3    Lymphocytes, Absolute 1.19 0.70 - 3.10 10*3/mm3    Monocytes, Absolute 0.39 0.10 - 0.90 10*3/mm3    Eosinophils, Absolute 0.01 0.00 - 0.40  10*3/mm3    Basophils, Absolute 0.06 0.00 - 0.20 10*3/mm3    Immature Grans, Absolute 0.03 0.00 - 0.05 10*3/mm3    nRBC 0.0 0.0 - 0.2 /100 WBC   Ethanol    Specimen: Blood   Result Value Ref Range    Ethanol <10 0 - 10 mg/dL    Ethanol % <0.010 %   Urine Drug Screen - Urine, Clean Catch    Specimen: Urine, Clean Catch   Result Value Ref Range    THC, Screen, Urine Negative Negative    Phencyclidine (PCP), Urine Negative Negative    Cocaine Screen, Urine Negative Negative    Methamphetamine, Ur Negative Negative    Opiate Screen Positive (A) Negative    Amphetamine Screen, Urine Negative Negative    Benzodiazepine Screen, Urine Positive (A) Negative    Tricyclic Antidepressants Screen Negative Negative    Methadone Screen, Urine Negative Negative    Barbiturates Screen, Urine Negative Negative    Oxycodone Screen, Urine Negative Negative    Propoxyphene Screen Negative Negative    Buprenorphine, Screen, Urine Positive (A) Negative   Troponin    Specimen: Blood   Result Value Ref Range    Troponin T <0.010 0.000 - 0.030 ng/mL   BNP    Specimen: Blood   Result Value Ref Range    proBNP 71.1 0.0 - 450.0 pg/mL   Urinalysis, Microscopic Only - Urine, Clean Catch    Specimen: Urine, Clean Catch   Result Value Ref Range    RBC, UA 13-20 (A) None Seen /HPF    WBC, UA Too Numerous to Count (A) None Seen, 0-2, 3-5 /HPF    Bacteria, UA 3+ (A) None Seen /HPF    Squamous Epithelial Cells, UA None Seen None Seen, 0-2 /HPF    Hyaline Casts, UA 0-2 None Seen /LPF    Methodology Automated Microscopy    Green Top (Gel)   Result Value Ref Range    Extra Tube Hold for add-ons.    Lavender Top   Result Value Ref Range    Extra Tube hold for add-on      XR Chest 1 View    Result Date: 10/4/2021  Narrative: PROCEDURE: Single chest view portable REASON FOR EXAM:shortness of breath FINDINGS: Comparison exam dated August 31, 2021. Cardiac and pulmonary vasculature are normal. Lungs are clear. Pleural spaces are normal. No acute osseous  abnormality.     Impression: Negative single view chest Electronically signed by:  Doug Calvo MD  10/4/2021 1:47 PM CDT Workstation: [id number]                                         [facility]    Final diagnoses:   Acute pancreatitis, unspecified complication status, unspecified pancreatitis type   Urinary tract infection with hematuria, site unspecified   Alcohol withdrawal syndrome without complication (HCC)       ED Disposition  ED Disposition     ED Disposition Condition Comment    Decision to Admit  Level of Care: Med/Surg [1]   Diagnosis: Acute pancreatitis, unspecified complication status, unspecified pancreatitis type [[id number]]   Admitting Physician: KIP CHRISTIANSON [[id number]]   Attending Physician: KIP CHRISTIANSON [[id number]]   Isolate for COVID?: No [0]   Certification: I Certify That Inpatient Hospital Services Are Medically Necessary For Greater Than 2 Midnights            No follow-up provider specified.       Medication List      No changes were made to your prescriptions during this visit.               Phoenix Feldman PA-C  10/04/21 1442

## 2021-10-04 NOTE — ED NOTES
Patient reports she has had N/V/D for about 1 weeks, has not been able to keep any food or fluids down. Pt was hospitalized for gastritis and pancreatitis about 1 month ago. Pt has not been able to take her medications for 2 days including her sabaxone. Pt appear anxious and has tremors to upper extremities. Pt reports she is an alcoholic, her last drink was yesterday morning and has not been able to take her ativan. Pt on monitor. Side rails up x2.       Kye Rocha, RN  10/04/21 2606

## 2021-10-04 NOTE — ED NOTES
Pt reports feeling better after medication. Pt denies nausea, reports chest pain has decreased to 5/10.  in room with patient. Patients appear comfortable. Will continue to monitor.      Kye Rocha RN  10/04/21 8152

## 2021-10-05 LAB
ADV 40+41 DNA STL QL NAA+NON-PROBE: NOT DETECTED
ALBUMIN SERPL-MCNC: 4 G/DL (ref 3.5–5.2)
ALBUMIN/GLOB SERPL: 1.5 G/DL
ALP SERPL-CCNC: 84 U/L (ref 39–117)
ALT SERPL W P-5'-P-CCNC: 10 U/L (ref 1–33)
ANION GAP SERPL CALCULATED.3IONS-SCNC: 9 MMOL/L (ref 5–15)
AST SERPL-CCNC: 23 U/L (ref 1–32)
ASTRO TYP 1-8 RNA STL QL NAA+NON-PROBE: NOT DETECTED
BACTERIA SPEC AEROBE CULT: ABNORMAL
BASOPHILS # BLD AUTO: 0.04 10*3/MM3 (ref 0–0.2)
BASOPHILS NFR BLD AUTO: 0.5 % (ref 0–1.5)
BILIRUB SERPL-MCNC: 1.2 MG/DL (ref 0–1.2)
BUN SERPL-MCNC: 3 MG/DL (ref 6–20)
BUN/CREAT SERPL: 6.3 (ref 7–25)
C CAYETANENSIS DNA STL QL NAA+NON-PROBE: NOT DETECTED
C COLI+JEJ+UPSA DNA STL QL NAA+NON-PROBE: NOT DETECTED
C DIFF TOX GENS STL QL NAA+PROBE: POSITIVE
CALCIUM SPEC-SCNC: 7.9 MG/DL (ref 8.6–10.5)
CHLORIDE SERPL-SCNC: 102 MMOL/L (ref 98–107)
CO2 SERPL-SCNC: 26 MMOL/L (ref 22–29)
CREAT SERPL-MCNC: 0.48 MG/DL (ref 0.57–1)
CRYPTOSP DNA STL QL NAA+NON-PROBE: NOT DETECTED
DEPRECATED RDW RBC AUTO: 48.1 FL (ref 37–54)
E HISTOLYT DNA STL QL NAA+NON-PROBE: NOT DETECTED
EAEC PAA PLAS AGGR+AATA ST NAA+NON-PRB: NOT DETECTED
EC STX1+STX2 GENES STL QL NAA+NON-PROBE: NOT DETECTED
EOSINOPHIL # BLD AUTO: 0.07 10*3/MM3 (ref 0–0.4)
EOSINOPHIL NFR BLD AUTO: 0.8 % (ref 0.3–6.2)
EPEC EAE GENE STL QL NAA+NON-PROBE: NOT DETECTED
ERYTHROCYTE [DISTWIDTH] IN BLOOD BY AUTOMATED COUNT: 13.6 % (ref 12.3–15.4)
ETEC LTA+ST1A+ST1B TOX ST NAA+NON-PROBE: NOT DETECTED
G LAMBLIA DNA STL QL NAA+NON-PROBE: NOT DETECTED
GFR SERPL CREATININE-BSD FRML MDRD: 147 ML/MIN/1.73
GLOBULIN UR ELPH-MCNC: 2.6 GM/DL
GLUCOSE SERPL-MCNC: 92 MG/DL (ref 65–99)
HCT VFR BLD AUTO: 33.5 % (ref 34–46.6)
HGB BLD-MCNC: 12.3 G/DL (ref 12–15.9)
IMM GRANULOCYTES # BLD AUTO: 0.02 10*3/MM3 (ref 0–0.05)
IMM GRANULOCYTES NFR BLD AUTO: 0.2 % (ref 0–0.5)
LIPASE SERPL-CCNC: 79 U/L (ref 13–60)
LYMPHOCYTES # BLD AUTO: 2.32 10*3/MM3 (ref 0.7–3.1)
LYMPHOCYTES NFR BLD AUTO: 27.9 % (ref 19.6–45.3)
MCH RBC QN AUTO: 35.9 PG (ref 26.6–33)
MCHC RBC AUTO-ENTMCNC: 36.7 G/DL (ref 31.5–35.7)
MCV RBC AUTO: 97.7 FL (ref 79–97)
MONOCYTES # BLD AUTO: 0.38 10*3/MM3 (ref 0.1–0.9)
MONOCYTES NFR BLD AUTO: 4.6 % (ref 5–12)
NEUTROPHILS NFR BLD AUTO: 5.48 10*3/MM3 (ref 1.7–7)
NEUTROPHILS NFR BLD AUTO: 66 % (ref 42.7–76)
NOROVIRUS GI+II RNA STL QL NAA+NON-PROBE: NOT DETECTED
NRBC BLD AUTO-RTO: 0 /100 WBC (ref 0–0.2)
P SHIGELLOIDES DNA STL QL NAA+NON-PROBE: NOT DETECTED
PLATELET # BLD AUTO: 158 10*3/MM3 (ref 140–450)
PMV BLD AUTO: 10.2 FL (ref 6–12)
POTASSIUM SERPL-SCNC: 3.1 MMOL/L (ref 3.5–5.2)
PROT SERPL-MCNC: 6.6 G/DL (ref 6–8.5)
RBC # BLD AUTO: 3.43 10*6/MM3 (ref 3.77–5.28)
RVA RNA STL QL NAA+NON-PROBE: NOT DETECTED
S ENT+BONG DNA STL QL NAA+NON-PROBE: NOT DETECTED
SAPO I+II+IV+V RNA STL QL NAA+NON-PROBE: NOT DETECTED
SHIGELLA SP+EIEC IPAH ST NAA+NON-PROBE: NOT DETECTED
SODIUM SERPL-SCNC: 137 MMOL/L (ref 136–145)
V CHOL+PARA+VUL DNA STL QL NAA+NON-PROBE: NOT DETECTED
V CHOLERAE DNA STL QL NAA+NON-PROBE: NOT DETECTED
WBC # BLD AUTO: 8.31 10*3/MM3 (ref 3.4–10.8)
Y ENTEROCOL DNA STL QL NAA+NON-PROBE: NOT DETECTED

## 2021-10-05 PROCEDURE — 85025 COMPLETE CBC W/AUTO DIFF WBC: CPT | Performed by: STUDENT IN AN ORGANIZED HEALTH CARE EDUCATION/TRAINING PROGRAM

## 2021-10-05 PROCEDURE — 0097U HC BIOFIRE FILMARRAY GI PANEL: CPT | Performed by: STUDENT IN AN ORGANIZED HEALTH CARE EDUCATION/TRAINING PROGRAM

## 2021-10-05 PROCEDURE — 83690 ASSAY OF LIPASE: CPT | Performed by: STUDENT IN AN ORGANIZED HEALTH CARE EDUCATION/TRAINING PROGRAM

## 2021-10-05 PROCEDURE — 25010000002 KETOROLAC TROMETHAMINE PER 15 MG: Performed by: STUDENT IN AN ORGANIZED HEALTH CARE EDUCATION/TRAINING PROGRAM

## 2021-10-05 PROCEDURE — G0378 HOSPITAL OBSERVATION PER HR: HCPCS

## 2021-10-05 PROCEDURE — 87493 C DIFF AMPLIFIED PROBE: CPT | Performed by: STUDENT IN AN ORGANIZED HEALTH CARE EDUCATION/TRAINING PROGRAM

## 2021-10-05 PROCEDURE — 96361 HYDRATE IV INFUSION ADD-ON: CPT

## 2021-10-05 PROCEDURE — 99225 PR SBSQ OBSERVATION CARE/DAY 25 MINUTES: CPT | Performed by: STUDENT IN AN ORGANIZED HEALTH CARE EDUCATION/TRAINING PROGRAM

## 2021-10-05 PROCEDURE — 63710000001 PROMETHAZINE PER 12.5 MG: Performed by: STUDENT IN AN ORGANIZED HEALTH CARE EDUCATION/TRAINING PROGRAM

## 2021-10-05 PROCEDURE — 25010000002 LORAZEPAM PER 2 MG: Performed by: STUDENT IN AN ORGANIZED HEALTH CARE EDUCATION/TRAINING PROGRAM

## 2021-10-05 PROCEDURE — 96375 TX/PRO/DX INJ NEW DRUG ADDON: CPT

## 2021-10-05 PROCEDURE — 96376 TX/PRO/DX INJ SAME DRUG ADON: CPT

## 2021-10-05 PROCEDURE — 80053 COMPREHEN METABOLIC PANEL: CPT | Performed by: STUDENT IN AN ORGANIZED HEALTH CARE EDUCATION/TRAINING PROGRAM

## 2021-10-05 RX ORDER — POTASSIUM CHLORIDE 750 MG/1
40 CAPSULE, EXTENDED RELEASE ORAL ONCE
Status: COMPLETED | OUTPATIENT
Start: 2021-10-05 | End: 2021-10-05

## 2021-10-05 RX ORDER — AMLODIPINE BESYLATE 5 MG/1
5 TABLET ORAL
Status: DISCONTINUED | OUTPATIENT
Start: 2021-10-05 | End: 2021-10-06 | Stop reason: HOSPADM

## 2021-10-05 RX ORDER — HYDRALAZINE HYDROCHLORIDE 20 MG/ML
20 INJECTION INTRAMUSCULAR; INTRAVENOUS EVERY 6 HOURS PRN
Status: DISCONTINUED | OUTPATIENT
Start: 2021-10-05 | End: 2021-10-06

## 2021-10-05 RX ORDER — KETOROLAC TROMETHAMINE 30 MG/ML
30 INJECTION, SOLUTION INTRAMUSCULAR; INTRAVENOUS ONCE
Status: COMPLETED | OUTPATIENT
Start: 2021-10-05 | End: 2021-10-05

## 2021-10-05 RX ADMIN — LORAZEPAM 2 MG: 2 INJECTION INTRAMUSCULAR; INTRAVENOUS at 08:06

## 2021-10-05 RX ADMIN — BUPRENORPHINE HYDROCHLORIDE AND NALOXONE HYDROCHLORIDE DIHYDRATE 1 TABLET: 8; 2 TABLET SUBLINGUAL at 15:00

## 2021-10-05 RX ADMIN — LORAZEPAM 2 MG: 2 INJECTION INTRAMUSCULAR; INTRAVENOUS at 19:28

## 2021-10-05 RX ADMIN — ACETAMINOPHEN 1000 MG: 500 TABLET, FILM COATED ORAL at 08:05

## 2021-10-05 RX ADMIN — LORAZEPAM 1 MG: 2 INJECTION INTRAMUSCULAR; INTRAVENOUS at 05:45

## 2021-10-05 RX ADMIN — LORAZEPAM 1 MG: 2 INJECTION INTRAMUSCULAR; INTRAVENOUS at 22:17

## 2021-10-05 RX ADMIN — FAMOTIDINE 20 MG: 20 TABLET ORAL at 08:06

## 2021-10-05 RX ADMIN — FAMOTIDINE 20 MG: 20 TABLET ORAL at 17:17

## 2021-10-05 RX ADMIN — SODIUM CHLORIDE 125 ML/HR: 900 INJECTION, SOLUTION INTRAVENOUS at 01:08

## 2021-10-05 RX ADMIN — BUPRENORPHINE HYDROCHLORIDE AND NALOXONE HYDROCHLORIDE DIHYDRATE 1 TABLET: 8; 2 TABLET SUBLINGUAL at 20:40

## 2021-10-05 RX ADMIN — SODIUM CHLORIDE, PRESERVATIVE FREE 10 ML: 5 INJECTION INTRAVENOUS at 22:17

## 2021-10-05 RX ADMIN — DICYCLOMINE HYDROCHLORIDE 20 MG: 20 TABLET ORAL at 08:06

## 2021-10-05 RX ADMIN — PROMETHAZINE HYDROCHLORIDE 12.5 MG: 12.5 TABLET ORAL at 22:17

## 2021-10-05 RX ADMIN — FOLIC ACID 1 MG: 1 TABLET ORAL at 08:06

## 2021-10-05 RX ADMIN — METRONIDAZOLE 500 MG: 500 INJECTION, SOLUTION INTRAVENOUS at 05:45

## 2021-10-05 RX ADMIN — GABAPENTIN 400 MG: 400 CAPSULE ORAL at 20:40

## 2021-10-05 RX ADMIN — SODIUM CHLORIDE, PRESERVATIVE FREE 10 ML: 5 INJECTION INTRAVENOUS at 08:07

## 2021-10-05 RX ADMIN — ACETAMINOPHEN 1000 MG: 500 TABLET, FILM COATED ORAL at 17:17

## 2021-10-05 RX ADMIN — BUPRENORPHINE HYDROCHLORIDE AND NALOXONE HYDROCHLORIDE DIHYDRATE 1 TABLET: 8; 2 TABLET SUBLINGUAL at 08:06

## 2021-10-05 RX ADMIN — FLUOXETINE 40 MG: 20 CAPSULE ORAL at 08:06

## 2021-10-05 RX ADMIN — CYANOCOBALAMIN TAB 1000 MCG 1000 MCG: 1000 TAB at 08:06

## 2021-10-05 RX ADMIN — POTASSIUM CHLORIDE 40 MEQ: 750 CAPSULE, EXTENDED RELEASE ORAL at 08:05

## 2021-10-05 RX ADMIN — Medication 125 MG: at 19:14

## 2021-10-05 RX ADMIN — KETOROLAC TROMETHAMINE 30 MG: 30 INJECTION, SOLUTION INTRAMUSCULAR; INTRAVENOUS at 10:37

## 2021-10-05 RX ADMIN — SODIUM CHLORIDE 125 ML/HR: 900 INJECTION, SOLUTION INTRAVENOUS at 22:17

## 2021-10-05 RX ADMIN — METRONIDAZOLE 500 MG: 500 INJECTION, SOLUTION INTRAVENOUS at 12:33

## 2021-10-05 RX ADMIN — DICYCLOMINE HYDROCHLORIDE 20 MG: 20 TABLET ORAL at 20:40

## 2021-10-05 RX ADMIN — AMLODIPINE BESYLATE 5 MG: 5 TABLET ORAL at 17:16

## 2021-10-05 RX ADMIN — LORAZEPAM 2 MG: 2 INJECTION INTRAMUSCULAR; INTRAVENOUS at 12:32

## 2021-10-05 RX ADMIN — GABAPENTIN 400 MG: 400 CAPSULE ORAL at 05:51

## 2021-10-05 RX ADMIN — Medication 125 MG: at 01:48

## 2021-10-05 RX ADMIN — LORAZEPAM 1 MG: 2 INJECTION INTRAMUSCULAR; INTRAVENOUS at 00:13

## 2021-10-05 RX ADMIN — NICOTINE 1 PATCH: 21 PATCH, EXTENDED RELEASE TRANSDERMAL at 17:17

## 2021-10-05 RX ADMIN — GABAPENTIN 400 MG: 400 CAPSULE ORAL at 15:00

## 2021-10-05 RX ADMIN — LORAZEPAM 1 MG: 2 INJECTION INTRAMUSCULAR; INTRAVENOUS at 17:20

## 2021-10-05 RX ADMIN — PROMETHAZINE HYDROCHLORIDE 12.5 MG: 12.5 TABLET ORAL at 00:19

## 2021-10-05 RX ADMIN — THIAMINE HCL TAB 100 MG 100 MG: 100 TAB at 08:05

## 2021-10-05 RX ADMIN — ACETAMINOPHEN 1000 MG: 500 TABLET, FILM COATED ORAL at 20:40

## 2021-10-05 RX ADMIN — LORAZEPAM 2 MG: 2 INJECTION INTRAMUSCULAR; INTRAVENOUS at 15:12

## 2021-10-05 RX ADMIN — LORAZEPAM 1 MG: 2 INJECTION INTRAMUSCULAR; INTRAVENOUS at 10:37

## 2021-10-05 RX ADMIN — LORAZEPAM 1 MG: 2 INJECTION INTRAMUSCULAR; INTRAVENOUS at 04:23

## 2021-10-05 NOTE — PLAN OF CARE
Problem: Fluid Imbalance (Pancreatitis)  Goal: Fluid Balance  Outcome: Ongoing, Progressing     Problem: Infection (Pancreatitis)  Goal: Infection Symptom Resolution  Outcome: Ongoing, Progressing     Problem: Nutrition Impaired (Pancreatitis)  Goal: Optimal Nutrition Intake  Outcome: Ongoing, Progressing     Problem: Pain (Pancreatitis)  Goal: Acceptable Pain Control  Outcome: Ongoing, Progressing     Problem: Respiratory Compromise (Pancreatitis)  Goal: Effective Oxygenation and Ventilation  Outcome: Ongoing, Progressing  Intervention: Promote Airway Secretion Clearance  Recent Flowsheet Documentation  Taken 10/5/2021 0105 by Court Rivera RN  Activity Management: activity adjusted per tolerance  Cough And Deep Breathing: done independently per patient  Intervention: Optimize Oxygenation and Ventilation  Recent Flowsheet Documentation  Taken 10/5/2021 0105 by Court Rivera RN  Activity Management: activity adjusted per tolerance  Cough And Deep Breathing: done independently per patient     Problem: Alcohol Withdrawal  Goal: Alcohol Withdrawal Symptom Control  Outcome: Ongoing, Progressing     Problem: UTI (Urinary Tract Infection)  Goal: Improved Infection Symptoms  Outcome: Ongoing, Progressing   Goal Outcome Evaluation:

## 2021-10-05 NOTE — PROGRESS NOTES
FAMILY MEDICINE RESIDENCY SERVICE  DAILY PROGRESS NOTE    NAME: Nata Bain  : 1985  MRN: [id number]      LOS: 1 day     PROVIDER OF SERVICE: Pily Pike MD    Chief Complaint: Colitis    Subjective:     Interval History:  History taken from: patient chart  Patient Complaints: Abdominal pain overnight.  Vital signs are stable. She reports nausea.   Patient Denies: Diarrhea, or vomiting.     Review of Systems:   Review of Systems   Constitutional: Negative for chills and fever.   HENT: Negative for congestion, rhinorrhea and sore throat.    Eyes: Negative for visual disturbance.   Respiratory: Negative for cough, chest tightness and shortness of breath.    Cardiovascular: Negative for chest pain, palpitations and leg swelling.   Gastrointestinal: Positive for abdominal pain and nausea. Negative for constipation, diarrhea and vomiting.   Endocrine: Negative for polydipsia and polyuria.   Genitourinary: Negative for difficulty urinating, dysuria, frequency and urgency.   Musculoskeletal: Negative for joint swelling and myalgias.   Skin: Negative for rash and wound.   Neurological: Positive for tremors. Negative for dizziness, seizures, speech difficulty, weakness, light-headedness and headaches.   Psychiatric/Behavioral: Negative for dysphoric mood and sleep disturbance. The patient is nervous/anxious.        Objective:     Vital Signs  Temp:  [97 °F (36.1 °C)-98 °F (36.7 °C)] 97.3 °F (36.3 °C)  Heart Rate:  [82-98] 88  Resp:  [17-24] 18  BP: (138-171)/() 145/91   Body mass index is 23.52 kg/m².    Physical Exam  Physical Exam  Vitals and nursing note reviewed.   Constitutional:       General: She is not in acute distress.     Appearance: Normal appearance. She is not diaphoretic.   HENT:      Head: Normocephalic and atraumatic.      Right Ear: External ear normal.      Left Ear: External ear normal.      Nose: Nose normal.      Mouth/Throat:      Mouth: Mucous membranes are moist.    Eyes:      General: No scleral icterus.     Extraocular Movements: Extraocular movements intact.      Conjunctiva/sclera: Conjunctivae normal.      Pupils: Pupils are equal, round, and reactive to light.   Cardiovascular:      Rate and Rhythm: Normal rate and regular rhythm.      Heart sounds: Normal heart sounds.   Pulmonary:      Effort: Pulmonary effort is normal. No respiratory distress.      Breath sounds: Normal breath sounds.   Chest:      Chest wall: No tenderness.   Abdominal:      General: Bowel sounds are normal.      Palpations: Abdomen is soft.      Tenderness: There is abdominal tenderness (generalized ). There is no right CVA tenderness, left CVA tenderness, guarding or rebound.   Musculoskeletal:         General: No swelling or tenderness.      Cervical back: Normal range of motion and neck supple.      Right lower leg: No edema.      Left lower leg: No edema.   Skin:     General: Skin is warm and dry.      Capillary Refill: Capillary refill takes less than 2 seconds.      Findings: No erythema or rash.   Neurological:      General: No focal deficit present.      Mental Status: She is alert and oriented to person, place, and time.      Motor: No weakness.   Psychiatric:         Behavior: Behavior normal.         Scheduled Meds   acetaminophen, 1,000 mg, Oral, TID  buprenorphine-naloxone, 1 tablet, Sublingual, TID  dicyclomine, 20 mg, Oral, BID  famotidine, 20 mg, Oral, BID AC  FLUoxetine, 40 mg, Oral, Daily  folic acid, 1 mg, Oral, Daily  gabapentin, 400 mg, Oral, Q8H  ketorolac, 30 mg, Intravenous, Once  levoFLOXacin, 500 mg, Intravenous, Q24H  LORazepam, 1 mg, Intravenous, Q6H   Followed by  LORazepam, 1 mg, Intravenous, Q8H  metroNIDAZOLE, 500 mg, Intravenous, Q8H  nicotine, 1 patch, Transdermal, Q24H  sodium chloride, 10 mL, Intravenous, Q12H  thiamine, 100 mg, Oral, Daily  cyanocobalamin, 1,000 mcg, Oral, Daily       PRN Meds   calcium carbonate  •  labetalol  •  LORazepam **OR** LORazepam  **OR** LORazepam **OR** LORazepam **OR** LORazepam **OR** LORazepam  •  methocarbamol  •  ondansetron **OR** ondansetron  •  Pharmacy Consult  •  promethazine **OR** promethazine  •  sodium chloride  •  sodium chloride      Diagnostic Data    Results from last 7 days   Lab Units 10/05/21  0506   WBC 10*3/mm3 8.31   HEMOGLOBIN g/dL 12.3   HEMATOCRIT % 33.5*   PLATELETS 10*3/mm3 158   GLUCOSE mg/dL 92   CREATININE mg/dL 0.48*   BUN mg/dL 3*   SODIUM mmol/L 137   POTASSIUM mmol/L 3.1*   AST (SGOT) U/L 23   ALT (SGPT) U/L 10   ALK PHOS U/L 84   BILIRUBIN mg/dL 1.2   ANION GAP mmol/L 9.0       CT Abdomen Pelvis With Contrast    Result Date: 10/4/2021  Mild hepatomegaly with diffuse fatty infiltration of the liver. Mild diffuse colonic wall thickening suspicious for colitis. No diverticuli. Appendix normal. [id number] Electronically signed by:  Rishabh Mejias MD  10/4/2021 2:42 PM CDT Workstation: [id number]    XR Chest 1 View    Result Date: 10/4/2021  Negative single view chest Electronically signed by:  Doug Calvo MD  10/4/2021 1:47 PM CDT Workstation: [id number]        I reviewed the patient's new clinical results.    Assessment/Plan:     Active Hospital Problems    Diagnosis  POA   • **Colitis [K52.9]  Yes     Evidence via CT scan in the ED  Pt has hx of recurrent C. Diff and functional diarrhea  Will order C. Diff toxin, GI panel  CBC daily  Will give Levaquin and flagyl        • Acute cystitis [N30.00]  Yes     IV ceftriaxone given in the ED  UA showed UTI w/ + nitrites, 3+ blood, leukocytes and bacteria  Urine culture pending  CBC daily  Will give Levaquin      • Elevated BP without diagnosis of hypertension [R03.0]  Yes     - Possible secondary to her withdrawal  -   Labetalol 10 mg IV if Systolic BP >160        • Alcoholism (HCC) [F10.20]  Yes     · CIWA protocols-scheduled and prn ativan  · Last drink 0800 10/3  · Continued Folic acid and thiamine      • Left upper quadrant abdominal pain [R10.12]  Yes     CT  scan showed Mild hepatomegaly with diffuse fatty infiltration of the liver and colitis  IVF  Given morphine for pain in the ED  Will give Bentyl and tylenol for pain  Will monitor LFTs      • Anxiety and depression [F41.9, F32.A]  Yes     - continue prozac  -Valium PRN   - PRN Ativan        • Polysubstance (including opioids) dependence, daily use (HCC) [F11.20, F19.20]  Yes     · Continue home Suboxone regimen  · UDS + for opiates, buprenorphine, and benzos  · Patient follows up with Dr. Nik Saenz for addiction medicine.  · She will have a same day appointment when she is discharged and will have enough prescription until then       • Dependence on nicotine from cigarettes [F17.210]  Yes     · Nicotine patch      • Hypokalemia [E87.6]  No     Lab Results   Component Value Date    K 3.1 (L) 10/05/2021   Currently stable  Monitor and replace           DVT prophylaxis: SCDs/TEDs  Code status is   Code Status and Medical Interventions:   Ordered at: 10/04/21 1644     Level Of Support Discussed With:    Patient     Code Status:    CPR     Medical Interventions (Level of Support Prior to Arrest):    Full       Plan for disposition:Where: current living arrangements and When:  1-2days      Time: 15 mins      Pily Pike M.D. PGY 2  [facility]  [address] [place] [address]  Office: [phone]  This document has been electronically signed by Pily Pike MD on October 5, 2021 09:50 CDT

## 2021-10-06 ENCOUNTER — READMISSION MANAGEMENT (OUTPATIENT)
Dept: CALL CENTER | Facility: HOSPITAL | Age: 36
End: 2021-10-06

## 2021-10-06 VITALS
WEIGHT: 150.2 LBS | TEMPERATURE: 96.6 F | HEIGHT: 67 IN | DIASTOLIC BLOOD PRESSURE: 96 MMHG | HEART RATE: 85 BPM | OXYGEN SATURATION: 98 % | RESPIRATION RATE: 18 BRPM | BODY MASS INDEX: 23.57 KG/M2 | SYSTOLIC BLOOD PRESSURE: 142 MMHG

## 2021-10-06 PROBLEM — A04.72 C. DIFFICILE COLITIS: Status: ACTIVE | Noted: 2021-10-04

## 2021-10-06 LAB
ALBUMIN SERPL-MCNC: 4.2 G/DL (ref 3.5–5.2)
ALBUMIN/GLOB SERPL: 1.6 G/DL
ALP SERPL-CCNC: 81 U/L (ref 39–117)
ALT SERPL W P-5'-P-CCNC: 19 U/L (ref 1–33)
ANION GAP SERPL CALCULATED.3IONS-SCNC: 10 MMOL/L (ref 5–15)
AST SERPL-CCNC: 93 U/L (ref 1–32)
BASOPHILS # BLD AUTO: 0.06 10*3/MM3 (ref 0–0.2)
BASOPHILS NFR BLD AUTO: 0.8 % (ref 0–1.5)
BILIRUB SERPL-MCNC: 0.4 MG/DL (ref 0–1.2)
BUN SERPL-MCNC: 5 MG/DL (ref 6–20)
BUN/CREAT SERPL: 12.2 (ref 7–25)
CALCIUM SPEC-SCNC: 8.1 MG/DL (ref 8.6–10.5)
CHLORIDE SERPL-SCNC: 102 MMOL/L (ref 98–107)
CO2 SERPL-SCNC: 24 MMOL/L (ref 22–29)
CREAT SERPL-MCNC: 0.41 MG/DL (ref 0.57–1)
DEPRECATED RDW RBC AUTO: 49.3 FL (ref 37–54)
EOSINOPHIL # BLD AUTO: 0.09 10*3/MM3 (ref 0–0.4)
EOSINOPHIL NFR BLD AUTO: 1.2 % (ref 0.3–6.2)
ERYTHROCYTE [DISTWIDTH] IN BLOOD BY AUTOMATED COUNT: 13.8 % (ref 12.3–15.4)
GFR SERPL CREATININE-BSD FRML MDRD: >150 ML/MIN/1.73
GLOBULIN UR ELPH-MCNC: 2.6 GM/DL
GLUCOSE SERPL-MCNC: 87 MG/DL (ref 65–99)
HCT VFR BLD AUTO: 32.7 % (ref 34–46.6)
HGB BLD-MCNC: 11.6 G/DL (ref 12–15.9)
IMM GRANULOCYTES # BLD AUTO: 0.03 10*3/MM3 (ref 0–0.05)
IMM GRANULOCYTES NFR BLD AUTO: 0.4 % (ref 0–0.5)
LYMPHOCYTES # BLD AUTO: 1.89 10*3/MM3 (ref 0.7–3.1)
LYMPHOCYTES NFR BLD AUTO: 26.1 % (ref 19.6–45.3)
MAGNESIUM SERPL-MCNC: 1.3 MG/DL (ref 1.6–2.6)
MCH RBC QN AUTO: 35.2 PG (ref 26.6–33)
MCHC RBC AUTO-ENTMCNC: 35.5 G/DL (ref 31.5–35.7)
MCV RBC AUTO: 99.1 FL (ref 79–97)
MONOCYTES # BLD AUTO: 0.35 10*3/MM3 (ref 0.1–0.9)
MONOCYTES NFR BLD AUTO: 4.8 % (ref 5–12)
NEUTROPHILS NFR BLD AUTO: 4.81 10*3/MM3 (ref 1.7–7)
NEUTROPHILS NFR BLD AUTO: 66.7 % (ref 42.7–76)
NRBC BLD AUTO-RTO: 0 /100 WBC (ref 0–0.2)
PLATELET # BLD AUTO: 169 10*3/MM3 (ref 140–450)
PMV BLD AUTO: 9.2 FL (ref 6–12)
POTASSIUM SERPL-SCNC: 3.1 MMOL/L (ref 3.5–5.2)
PROT SERPL-MCNC: 6.8 G/DL (ref 6–8.5)
RBC # BLD AUTO: 3.3 10*6/MM3 (ref 3.77–5.28)
SODIUM SERPL-SCNC: 136 MMOL/L (ref 136–145)
WBC # BLD AUTO: 7.23 10*3/MM3 (ref 3.4–10.8)

## 2021-10-06 PROCEDURE — 25010000002 CEFTRIAXONE PER 250 MG: Performed by: STUDENT IN AN ORGANIZED HEALTH CARE EDUCATION/TRAINING PROGRAM

## 2021-10-06 PROCEDURE — 85025 COMPLETE CBC W/AUTO DIFF WBC: CPT | Performed by: STUDENT IN AN ORGANIZED HEALTH CARE EDUCATION/TRAINING PROGRAM

## 2021-10-06 PROCEDURE — 96361 HYDRATE IV INFUSION ADD-ON: CPT

## 2021-10-06 PROCEDURE — 80053 COMPREHEN METABOLIC PANEL: CPT | Performed by: STUDENT IN AN ORGANIZED HEALTH CARE EDUCATION/TRAINING PROGRAM

## 2021-10-06 PROCEDURE — G0378 HOSPITAL OBSERVATION PER HR: HCPCS

## 2021-10-06 PROCEDURE — 96376 TX/PRO/DX INJ SAME DRUG ADON: CPT

## 2021-10-06 PROCEDURE — 36415 COLL VENOUS BLD VENIPUNCTURE: CPT | Performed by: STUDENT IN AN ORGANIZED HEALTH CARE EDUCATION/TRAINING PROGRAM

## 2021-10-06 PROCEDURE — 96366 THER/PROPH/DIAG IV INF ADDON: CPT

## 2021-10-06 PROCEDURE — 99217 PR OBSERVATION CARE DISCHARGE MANAGEMENT: CPT | Performed by: STUDENT IN AN ORGANIZED HEALTH CARE EDUCATION/TRAINING PROGRAM

## 2021-10-06 PROCEDURE — 83735 ASSAY OF MAGNESIUM: CPT | Performed by: STUDENT IN AN ORGANIZED HEALTH CARE EDUCATION/TRAINING PROGRAM

## 2021-10-06 PROCEDURE — 25010000002 LORAZEPAM PER 2 MG: Performed by: STUDENT IN AN ORGANIZED HEALTH CARE EDUCATION/TRAINING PROGRAM

## 2021-10-06 PROCEDURE — 63710000001 PROMETHAZINE PER 12.5 MG: Performed by: STUDENT IN AN ORGANIZED HEALTH CARE EDUCATION/TRAINING PROGRAM

## 2021-10-06 RX ORDER — HYDRALAZINE HYDROCHLORIDE 20 MG/ML
10 INJECTION INTRAMUSCULAR; INTRAVENOUS EVERY 6 HOURS PRN
Status: DISCONTINUED | OUTPATIENT
Start: 2021-10-06 | End: 2021-10-06 | Stop reason: HOSPADM

## 2021-10-06 RX ORDER — MAGNESIUM OXIDE 400 MG/1
400 TABLET ORAL DAILY
Qty: 10 TABLET | Refills: 0 | OUTPATIENT
Start: 2021-10-06 | End: 2022-01-26

## 2021-10-06 RX ORDER — POTASSIUM CHLORIDE 750 MG/1
40 TABLET, FILM COATED, EXTENDED RELEASE ORAL DAILY
Qty: 28 TABLET | Refills: 0 | Status: SHIPPED | OUTPATIENT
Start: 2021-10-06 | End: 2021-10-13

## 2021-10-06 RX ORDER — POTASSIUM CHLORIDE 750 MG/1
40 CAPSULE, EXTENDED RELEASE ORAL DAILY
Qty: 40 CAPSULE | Refills: 0 | Status: SHIPPED | OUTPATIENT
Start: 2021-10-07 | End: 2021-10-17

## 2021-10-06 RX ORDER — VANCOMYCIN HYDROCHLORIDE 125 MG/1
125 CAPSULE ORAL 4 TIMES DAILY
Qty: 40 CAPSULE | Refills: 0 | Status: SHIPPED | OUTPATIENT
Start: 2021-10-06 | End: 2021-10-16

## 2021-10-06 RX ORDER — POTASSIUM CHLORIDE 750 MG/1
40 CAPSULE, EXTENDED RELEASE ORAL DAILY
Status: DISCONTINUED | OUTPATIENT
Start: 2021-10-06 | End: 2021-10-06 | Stop reason: HOSPADM

## 2021-10-06 RX ORDER — MELOXICAM 15 MG/1
15 TABLET ORAL DAILY
Qty: 10 TABLET | Refills: 0 | Status: SHIPPED | OUTPATIENT
Start: 2021-10-07 | End: 2021-10-17

## 2021-10-06 RX ORDER — ONDANSETRON 4 MG/1
4 TABLET, FILM COATED ORAL EVERY 6 HOURS PRN
Qty: 10 TABLET | Refills: 0 | Status: SHIPPED | OUTPATIENT
Start: 2021-10-06 | End: 2022-03-30

## 2021-10-06 RX ORDER — MELOXICAM 15 MG/1
15 TABLET ORAL DAILY
Status: DISCONTINUED | OUTPATIENT
Start: 2021-10-06 | End: 2021-10-06 | Stop reason: HOSPADM

## 2021-10-06 RX ADMIN — Medication 125 MG: at 00:26

## 2021-10-06 RX ADMIN — Medication 400 MG: at 14:07

## 2021-10-06 RX ADMIN — CYANOCOBALAMIN TAB 1000 MCG 1000 MCG: 1000 TAB at 08:22

## 2021-10-06 RX ADMIN — FAMOTIDINE 20 MG: 20 TABLET ORAL at 08:22

## 2021-10-06 RX ADMIN — FLUOXETINE 40 MG: 20 CAPSULE ORAL at 08:22

## 2021-10-06 RX ADMIN — LORAZEPAM 2 MG: 2 INJECTION INTRAMUSCULAR; INTRAVENOUS at 08:23

## 2021-10-06 RX ADMIN — BUPRENORPHINE HYDROCHLORIDE AND NALOXONE HYDROCHLORIDE DIHYDRATE 1 TABLET: 8; 2 TABLET SUBLINGUAL at 08:22

## 2021-10-06 RX ADMIN — GABAPENTIN 400 MG: 400 CAPSULE ORAL at 06:19

## 2021-10-06 RX ADMIN — SODIUM CHLORIDE, PRESERVATIVE FREE 10 ML: 5 INJECTION INTRAVENOUS at 08:24

## 2021-10-06 RX ADMIN — POTASSIUM CHLORIDE 40 MEQ: 750 CAPSULE, EXTENDED RELEASE ORAL at 08:54

## 2021-10-06 RX ADMIN — LORAZEPAM 2 MG: 2 INJECTION INTRAMUSCULAR; INTRAVENOUS at 04:35

## 2021-10-06 RX ADMIN — THIAMINE HCL TAB 100 MG 100 MG: 100 TAB at 08:22

## 2021-10-06 RX ADMIN — DICYCLOMINE HYDROCHLORIDE 20 MG: 20 TABLET ORAL at 08:22

## 2021-10-06 RX ADMIN — LORAZEPAM 1 MG: 2 INJECTION INTRAMUSCULAR; INTRAVENOUS at 01:34

## 2021-10-06 RX ADMIN — LORAZEPAM 1 MG: 2 INJECTION INTRAMUSCULAR; INTRAVENOUS at 06:25

## 2021-10-06 RX ADMIN — CEFTRIAXONE SODIUM 1 G: 1 INJECTION, POWDER, FOR SOLUTION INTRAMUSCULAR; INTRAVENOUS at 08:54

## 2021-10-06 RX ADMIN — Medication 125 MG: at 11:54

## 2021-10-06 RX ADMIN — PROMETHAZINE HYDROCHLORIDE 12.5 MG: 12.5 TABLET ORAL at 06:25

## 2021-10-06 RX ADMIN — LORAZEPAM 1 MG: 2 INJECTION INTRAMUSCULAR; INTRAVENOUS at 08:23

## 2021-10-06 RX ADMIN — AMLODIPINE BESYLATE 5 MG: 5 TABLET ORAL at 08:22

## 2021-10-06 RX ADMIN — SODIUM CHLORIDE 125 ML/HR: 900 INJECTION, SOLUTION INTRAVENOUS at 06:19

## 2021-10-06 RX ADMIN — MELOXICAM 15 MG: 15 TABLET ORAL at 08:30

## 2021-10-06 RX ADMIN — FOLIC ACID 1 MG: 1 TABLET ORAL at 08:22

## 2021-10-06 RX ADMIN — LORAZEPAM 1 MG: 2 INJECTION INTRAMUSCULAR; INTRAVENOUS at 11:54

## 2021-10-06 RX ADMIN — Medication 125 MG: at 06:19

## 2021-10-06 NOTE — PROGRESS NOTES
FAMILY MEDICINE RESIDENCY SERVICE  DAILY PROGRESS NOTE    NAME: Nata Bain  : 1985  MRN: [id number]      LOS: 1 day     PROVIDER OF SERVICE: Pily Pike MD    Chief Complaint: Colitis    Subjective:     Interval History:  History taken from: patient chart  Patient Complaints: No acute event reported overnight.  Patient states that she feels better but continues to have mild abdominal pain.  Vital signs are stable on current therapy.      Review of Systems:   Review of Systems   Constitutional: Negative for chills and fever.   HENT: Negative for congestion, rhinorrhea and sore throat.    Eyes: Negative for visual disturbance.   Respiratory: Negative for cough, chest tightness and shortness of breath.    Cardiovascular: Negative for chest pain, palpitations and leg swelling.   Gastrointestinal: Positive for abdominal pain. Negative for constipation, nausea and vomiting.   Endocrine: Negative for polydipsia and polyuria.   Genitourinary: Positive for dysuria. Negative for difficulty urinating, frequency and urgency.   Musculoskeletal: Negative for joint swelling and myalgias.   Skin: Negative for rash and wound.   Neurological: Negative for dizziness, weakness, light-headedness and headaches.   Psychiatric/Behavioral: Negative for dysphoric mood and sleep disturbance.       Objective:     Vital Signs  Temp:  [96.6 °F (35.9 °C)-96.9 °F (36.1 °C)] 96.6 °F (35.9 °C)  Heart Rate:  [79-94] 85  Resp:  [18] 18  BP: (130-154)/() 142/96   Body mass index is 23.52 kg/m².    Physical Exam  Physical Exam  Vitals and nursing note reviewed.   Constitutional:       General: She is not in acute distress.     Appearance: Normal appearance. She is not diaphoretic.   HENT:      Head: Normocephalic and atraumatic.      Right Ear: External ear normal.      Left Ear: External ear normal.      Nose: Nose normal.      Mouth/Throat:      Mouth: Mucous membranes are moist.   Eyes:      General: No scleral  icterus.     Extraocular Movements: Extraocular movements intact.      Conjunctiva/sclera: Conjunctivae normal.      Pupils: Pupils are equal, round, and reactive to light.   Cardiovascular:      Rate and Rhythm: Normal rate and regular rhythm.      Heart sounds: Normal heart sounds.   Pulmonary:      Effort: Pulmonary effort is normal. No respiratory distress.      Breath sounds: Normal breath sounds.   Chest:      Chest wall: No tenderness.   Abdominal:      General: Bowel sounds are normal.      Palpations: Abdomen is soft.      Tenderness: There is abdominal tenderness (generalized ). There is no right CVA tenderness or left CVA tenderness.   Musculoskeletal:         General: No swelling or tenderness.      Cervical back: Normal range of motion and neck supple.      Right lower leg: No edema.      Left lower leg: No edema.   Skin:     General: Skin is warm and dry.      Capillary Refill: Capillary refill takes less than 2 seconds.      Findings: No erythema or rash.   Neurological:      General: No focal deficit present.      Mental Status: She is alert and oriented to person, place, and time.      Motor: No weakness.   Psychiatric:         Behavior: Behavior normal.         Scheduled Meds   amLODIPine, 5 mg, Oral, Q24H  buprenorphine-naloxone, 1 tablet, Sublingual, TID  cefTRIAXone, 1 g, Intravenous, Once  dicyclomine, 20 mg, Oral, BID  famotidine, 20 mg, Oral, BID AC  FLUoxetine, 40 mg, Oral, Daily  folic acid, 1 mg, Oral, Daily  gabapentin, 400 mg, Oral, Q8H  meloxicam, 15 mg, Oral, Daily  nicotine, 1 patch, Transdermal, Q24H  potassium chloride, 40 mEq, Oral, Daily  vancomycin, 125 mg, Oral, Q6H   Followed by  [START ON 10/15/2021] rifAXIMin, 200 mg, Oral, Q8H  sodium chloride, 10 mL, Intravenous, Q12H  thiamine, 100 mg, Oral, Daily  cyanocobalamin, 1,000 mcg, Oral, Daily       PRN Meds   calcium carbonate  •  hydrALAZINE  •  LORazepam **OR** LORazepam **OR** LORazepam **OR** LORazepam **OR** LORazepam  **OR** LORazepam  •  methocarbamol  •  ondansetron **OR** ondansetron  •  Pharmacy Consult  •  promethazine **OR** promethazine  •  sodium chloride  •  sodium chloride      Diagnostic Data    Results from last 7 days   Lab Units 10/06/21  0735   WBC 10*3/mm3 7.23   HEMOGLOBIN g/dL 11.6*   HEMATOCRIT % 32.7*   PLATELETS 10*3/mm3 169   GLUCOSE mg/dL 87   CREATININE mg/dL 0.41*   BUN mg/dL 5*   SODIUM mmol/L 136   POTASSIUM mmol/L 3.1*   AST (SGOT) U/L 93*   ALT (SGPT) U/L 19   ALK PHOS U/L 81   BILIRUBIN mg/dL 0.4   ANION GAP mmol/L 10.0       CT Abdomen Pelvis With Contrast    Result Date: 10/4/2021  Mild hepatomegaly with diffuse fatty infiltration of the liver. Mild diffuse colonic wall thickening suspicious for colitis. No diverticuli. Appendix normal. [id number] Electronically signed by:  Rishabh Mejias MD  10/4/2021 2:42 PM CDT Workstation: [id number]    XR Chest 1 View    Result Date: 10/4/2021  Negative single view chest Electronically signed by:  Doug Calvo MD  10/4/2021 1:47 PM CDT Workstation: [id number]        I reviewed the patient's new clinical results.    Assessment/Plan:     Active Hospital Problems    Diagnosis  POA   • **Colitis [K52.9]  Yes     Evidence via CT scan in the ED   C. Diff positive   GI panel neg   Oral Vanc and rifaximin   Monitor labs        • Acute cystitis [N30.00]  Yes     IV ceftriaxone given in the ED  UA showed UTI w/ + nitrites, 3+ blood, leukocytes and bacteria  Urine culture >100,000 CFU/mL Mixed Gram Negative FloraAbnormal    Received Levaquin x1   Will give 1 dose of rocephin to complete 3 days of IV antibiotics   Daily labs      • Elevated BP without diagnosis of hypertension [R03.0]  Yes     - Possible secondary to her withdrawal  - Hydralazine 10 mg IV if Systolic BP >160         • Alcoholism (HCC) [F10.20]  Yes     · [facility] protocols-scheduled and prn ativan  · Last drink 0800 10/3  · Continued Folic acid and thiamine       • Left upper quadrant abdominal pain [R10.12]   Yes     CT scan showed Mild hepatomegaly with diffuse fatty infiltration of the liver and colitis  IVF  Given morphine for pain in the ED  Will give Bentyl and tylenol for pain  Will monitor LFTs       • Anxiety and depression [F41.9, F32.A]  Yes     - continue prozac  -Valium PRN   - PRN Ativan         • Polysubstance (including opioids) dependence, daily use (HCC) [F11.20, F19.20]  Yes     · Continue home Suboxone regimen  · UDS + for opiates, buprenorphine, and benzos  · Patient follows up with Dr. Nik Saenz for addiction medicine.       • Dependence on nicotine from cigarettes [F17.210]  Yes     · Nicotine patch       • Hypokalemia [E87.6]  No     Lab Results   Component Value Date    K 3.1 (L) 10/06/2021   Currently stable  Recheck mg  Monitor and replace           DVT prophylaxis: SCDs/TEDs  Code status is   Code Status and Medical Interventions:   Ordered at: 10/04/21 1644     Level Of Support Discussed With:    Patient     Code Status:    CPR     Medical Interventions (Level of Support Prior to Arrest):    Full       Plan for disposition:Where: current living arrangements and When:  tomorrow      Time: 15 mins        Pily Pike M.D. PGY 2  [facility]  [address] [place] [address]  Office: [phone]  This document has been electronically signed by Pily Pike MD on October 6, 2021 08:42 CDT

## 2021-10-06 NOTE — DISCHARGE SUMMARY
DISCHARGE SUMMARY    PATIENT NAME: Nata Bain       PHYSICIAN: Pily Pike MD  : 1985  MRN: [id number]    ADMITTED: 10/4/2021     DISCHARGED: 10/6/2021    ADMISSION DIAGNOSES:  Active Hospital Problems    Diagnosis  POA   • **C. difficile colitis [A04.72]  Yes   • Acute cystitis [N30.00]  Yes   • Elevated BP without diagnosis of hypertension [R03.0]  Yes   • Alcoholism (HCC) [F10.20]  Yes   • Left upper quadrant abdominal pain [R10.12]  Yes   • Anxiety and depression [F41.9, F32.A]  Yes   • Polysubstance (including opioids) dependence, daily use (HCC) [F11.20, F19.20]  Yes   • Dependence on nicotine from cigarettes [F17.210]  Yes   • Hypokalemia [E87.6]  No      Resolved Hospital Problems   No resolved problems to display.     DISCHARGE DIAGNOSES:   Active Hospital Problems    Diagnosis  POA   • **C. difficile colitis [A04.72]  Yes   • Acute cystitis [N30.00]  Yes   • Elevated BP without diagnosis of hypertension [R03.0]  Yes   • Alcoholism (HCC) [F10.20]  Yes   • Left upper quadrant abdominal pain [R10.12]  Yes   • Anxiety and depression [F41.9, F32.A]  Yes   • Polysubstance (including opioids) dependence, daily use (HCC) [F11.20, F19.20]  Yes   • Dependence on nicotine from cigarettes [F17.210]  Yes   • Hypokalemia [E87.6]  No      Resolved Hospital Problems   No resolved problems to display.       SERVICE: Family Medicine Residency  Attending: Nadir Villarreal MD  Resident: Pily Pike MD    CONSULTS:   Consult Orders (all) (From admission, onward)     Start     Ordered    10/04/21 1839  Inpatient Consult to Advance Care Planning  Once,   Status:  Canceled     Provider:  (Not yet assigned)    10/04/21 1839                PROCEDURES:   None     HISTORY OF PRESENT ILLNESS: Adapted from Dr. Mac Ceballos's H&P from 10/4/2021  Nata Bain is a 36 y.o. female with a CMH of alcohol induced pancreatis, alcoholism, tobacco dependence, GERD, opiates dependence on suboxone  therapy who presents complaining of  worsening abdominal pain, nausea, vomiting and diarrhea. Symptoms started 1 week ago with abdominal pain, nausea and vomiting. Patient also reported diarrhea that has been going on for 2 weeks. Pain located on epigastric and LUQ with intensity 8/10. She used zofran with minimal effects. Patient reports that her last drink yesterday morning at 8 am. She reports burning with urination started 1 week ago. Used azo pills but not helpful.      DIAGNOSTIC DATA:   Lab Results (last 72 hours)     Procedure Component Value Units Date/Time    Magnesium [[id number]]  (Abnormal) Collected: 10/06/21 0735    Specimen: Blood Updated: 10/06/21 1012     Magnesium 1.3 mg/dL     Comprehensive Metabolic Panel [[id number]]  (Abnormal) Collected: 10/06/21 0735    Specimen: Blood Updated: 10/06/21 0806     Glucose 87 mg/dL      BUN 5 mg/dL      Creatinine 0.41 mg/dL      Sodium 136 mmol/L      Potassium 3.1 mmol/L      Chloride 102 mmol/L      CO2 24.0 mmol/L      Calcium 8.1 mg/dL      Total Protein 6.8 g/dL      Albumin 4.20 g/dL      ALT (SGPT) 19 U/L      AST (SGOT) 93 U/L      Alkaline Phosphatase 81 U/L      Total Bilirubin 0.4 mg/dL      eGFR Non African Amer >150 mL/min/1.73      Globulin 2.6 gm/dL      A/G Ratio 1.6 g/dL      BUN/Creatinine Ratio 12.2     Anion Gap 10.0 mmol/L     Narrative:      GFR Normal >60  Chronic Kidney Disease <60  Kidney Failure <15      CBC & Differential [[id number]]  (Abnormal) Collected: 10/06/21 0735    Specimen: Blood Updated: 10/06/21 0746    Narrative:      The following orders were created for panel order CBC & Differential.  Procedure                               Abnormality         Status                     ---------                               -----------         ------                     CBC Auto Differential[[id number]]        Abnormal            Final result                 Please view results for these tests on the individual orders.    CBC Auto  Differential [[id number]]  (Abnormal) Collected: 10/06/21 0735    Specimen: Blood Updated: 10/06/21 0746     WBC 7.23 10*3/mm3      RBC 3.30 10*6/mm3      Hemoglobin 11.6 g/dL      Hematocrit 32.7 %      MCV 99.1 fL      MCH 35.2 pg      MCHC 35.5 g/dL      RDW 13.8 %      RDW-SD 49.3 fl      MPV 9.2 fL      Platelets 169 10*3/mm3      Neutrophil % 66.7 %      Lymphocyte % 26.1 %      Monocyte % 4.8 %      Eosinophil % 1.2 %      Basophil % 0.8 %      Immature Grans % 0.4 %      Neutrophils, Absolute 4.81 10*3/mm3      Lymphocytes, Absolute 1.89 10*3/mm3      Monocytes, Absolute 0.35 10*3/mm3      Eosinophils, Absolute 0.09 10*3/mm3      Basophils, Absolute 0.06 10*3/mm3      Immature Grans, Absolute 0.03 10*3/mm3      nRBC 0.0 /100 WBC     Blood Culture - Blood, Arm, Right [[id number]] Collected: 10/04/21 1911    Specimen: Blood from Arm, Right Updated: 10/05/21 2016     Blood Culture No growth at 24 hours    Urine Culture - Urine, Urine, Clean Catch [[id number]]  (Abnormal) Collected: 10/04/21 1345    Specimen: Urine, Clean Catch Updated: 10/05/21 1155     Urine Culture >100,000 CFU/mL Mixed Gram Negative Valentin    Narrative:      Specimen contains mixed organisms of questionable pathogenicity which indicates contamination with commensal valentin.  Further identification is unlikely to provide clinically useful information.  Suggest recollection.    Gastrointestinal Panel, PCR - Stool, Per Rectum [[id number]]  (Normal) Collected: 10/05/21 0825    Specimen: Stool from Per Rectum Updated: 10/05/21 1134     Campylobacter Not Detected     Plesiomonas shigelloides Not Detected     Salmonella Not Detected     Vibrio Not Detected     Vibrio cholerae Not Detected     Yersinia enterocolitica Not Detected     Enteroaggregative E. coli (EAEC) Not Detected     Enteropathogenic E. coli (EPEC) Not Detected     Enterotoxigenic E. coli (ETEC) lt/st Not Detected     Shiga-like toxin-producing E. coli (STEC) stx1/stx2 Not Detected      Shigella/Enteroinvasive E. coli (EIEC) Not Detected     Cryptosporidium Not Detected     Cyclospora cayetanensis Not Detected     Entamoeba histolytica Not Detected     Giardia lamblia Not Detected     Adenovirus F40/41 Not Detected     Astrovirus Not Detected     Norovirus GI/GII Not Detected     Rotavirus A Not Detected     Sapovirus (I, II, IV or V) Not Detected    Narrative:      Testing performed by multiplex PCR system.    Clostridium Difficile Toxin - Stool, Per Rectum [[id number]]  (Abnormal) Collected: 10/05/21 0825    Specimen: Stool from Per Rectum Updated: 10/05/21 1036    Narrative:      The following orders were created for panel order Clostridium Difficile Toxin - Stool, Per Rectum.  Procedure                               Abnormality         Status                     ---------                               -----------         ------                     Clostridium Difficile To...[[id number]]  Abnormal            Final result                 Please view results for these tests on the individual orders.    Clostridium Difficile Toxin, PCR - Stool, Per Rectum [[id number]]  (Abnormal) Collected: 10/05/21 0825    Specimen: Stool from Per Rectum Updated: 10/05/21 1036     C. Difficile Toxins by PCR Positive     Comment: contact precautions requested         Narrative:      Performed by real-time polymerase chain reaction (qPCR).    Lipase [[id number]]  (Abnormal) Collected: 10/05/21 0506    Specimen: Blood Updated: 10/05/21 1013     Lipase 79 U/L     Comprehensive Metabolic Panel [[id number]]  (Abnormal) Collected: 10/05/21 0506    Specimen: Blood Updated: 10/05/21 0601     Glucose 92 mg/dL      BUN 3 mg/dL      Creatinine 0.48 mg/dL      Sodium 137 mmol/L      Potassium 3.1 mmol/L      Comment: Slight hemolysis detected by analyzer. Results may be affected.        Chloride 102 mmol/L      CO2 26.0 mmol/L      Calcium 7.9 mg/dL      Total Protein 6.6 g/dL      Albumin 4.00 g/dL      ALT (SGPT) 10 U/L      AST  (SGOT) 23 U/L      Comment: Slight hemolysis detected by analyzer. Results may be affected.        Alkaline Phosphatase 84 U/L      Total Bilirubin 1.2 mg/dL      eGFR Non African Amer 147 mL/min/1.73      Globulin 2.6 gm/dL      A/G Ratio 1.5 g/dL      BUN/Creatinine Ratio 6.3     Anion Gap 9.0 mmol/L     Narrative:      GFR Normal >60  Chronic Kidney Disease <60  Kidney Failure <15      CBC & Differential [[id number]]  (Abnormal) Collected: 10/05/21 0506    Specimen: Blood Updated: 10/05/21 0547    Narrative:      The following orders were created for panel order CBC & Differential.  Procedure                               Abnormality         Status                     ---------                               -----------         ------                     CBC Auto Differential[[id number]]        Abnormal            Final result                 Please view results for these tests on the individual orders.    CBC Auto Differential [[id number]]  (Abnormal) Collected: 10/05/21 0506    Specimen: Blood Updated: 10/05/21 0547     WBC 8.31 10*3/mm3      RBC 3.43 10*6/mm3      Hemoglobin 12.3 g/dL      Hematocrit 33.5 %      MCV 97.7 fL      MCH 35.9 pg      MCHC 36.7 g/dL      RDW 13.6 %      RDW-SD 48.1 fl      MPV 10.2 fL      Platelets 158 10*3/mm3      Neutrophil % 66.0 %      Lymphocyte % 27.9 %      Monocyte % 4.6 %      Eosinophil % 0.8 %      Basophil % 0.5 %      Immature Grans % 0.2 %      Neutrophils, Absolute 5.48 10*3/mm3      Lymphocytes, Absolute 2.32 10*3/mm3      Monocytes, Absolute 0.38 10*3/mm3      Eosinophils, Absolute 0.07 10*3/mm3      Basophils, Absolute 0.04 10*3/mm3      Immature Grans, Absolute 0.02 10*3/mm3      nRBC 0.0 /100 WBC     [place] Draw [[id number]] Collected: 10/04/21 1200    Specimen: Blood Updated: 10/04/21 2011    Narrative:      The following orders were created for panel order [place] Draw.  Procedure                               Abnormality         Status                      ---------                               -----------         ------                     Green Top (Gel)[[id number]]                                  Final result               Lavender Top[[id number]]                                     Final result               Gold Top - SST[[id number]]                                                              Light Blue Top[[id number]]                                                                Please view results for these tests on the individual orders.    Extra Tubes [[id number]] Collected: 10/04/21 1406    Specimen: Blood, Venous Line Updated: 10/04/21 1515    Narrative:      The following orders were created for panel order Extra Tubes.  Procedure                               Abnormality         Status                     ---------                               -----------         ------                     Lavender Top[[id number]]                                     Final result               Green Top (Gel)[[id number]]                                  Final result                 Please view results for these tests on the individual orders.    Green Top (Gel) [[id number]] Collected: 10/04/21 1406    Specimen: Blood Updated: 10/04/21 1515     Extra Tube Hold for add-ons.     Comment: Auto resulted.       Lavender Top [[id number]] Collected: 10/04/21 1406    Specimen: Blood Updated: 10/04/21 1515     Extra Tube hold for add-on     Comment: Auto resulted       Urinalysis, Microscopic Only - Urine, Clean Catch [[id number]]  (Abnormal) Collected: 10/04/21 1345    Specimen: Urine, Clean Catch Updated: 10/04/21 1412     RBC, UA 13-20 /HPF      WBC, UA Too Numerous to Count /HPF      Bacteria, UA 3+ /HPF      Squamous Epithelial Cells, UA None Seen /HPF      Hyaline Casts, UA 0-2 /LPF      Methodology Automated Microscopy    Urinalysis With Microscopic If Indicated (No Culture) - Urine, Clean Catch [[id number]]  (Abnormal) Collected: 10/04/21 1345    Specimen: Urine, Clean Catch  Updated: 10/04/21 1411     Color, UA [place]     Appearance, UA Cloudy     pH, UA 6.5     Specific Gravity, UA 1.015     Glucose, UA Negative     Ketones, UA 15 mg/dL (1+)     Bilirubin, UA Negative     Blood, UA Large (3+)     Protein, UA 30 mg/dL (1+)     Leuk Esterase, UA Large (3+)     Nitrite, UA Positive     Urobilinogen, UA 0.2 E.U./dL    Troponin [[id number]]  (Normal) Collected: 10/04/21 1200    Specimen: Blood Updated: 10/04/21 1410     Troponin T <0.010 ng/mL     Narrative:      Troponin T Reference Range:  <= 0.03 ng/mL-   Negative for AMI  >0.03 ng/mL-     Abnormal for myocardial necrosis.  Clinicians would have to utilize clinical acumen, EKG, Troponin and serial changes to determine if it is an Acute Myocardial Infarction or myocardial injury due to an underlying chronic condition.       Results may be falsely decreased if patient taking Biotin.      BNP [[id number]]  (Normal) Collected: 10/04/21 1200    Specimen: Blood Updated: 10/04/21 1410     proBNP 71.1 pg/mL     Narrative:      Among patients with dyspnea, NT-proBNP is highly sensitive for the detection of acute congestive heart failure. In addition NT-proBNP of <300 pg/ml effectively rules out acute congestive heart failure with 99% negative predictive value.    Results may be falsely decreased if patient taking Biotin.      Urine Drug Screen - Urine, Clean Catch [[id number]]  (Abnormal) Collected: 10/04/21 1345    Specimen: Urine, Clean Catch Updated: 10/04/21 1409     THC, Screen, Urine Negative     Phencyclidine (PCP), Urine Negative     Cocaine Screen, Urine Negative     Methamphetamine, Ur Negative     Opiate Screen Positive     Amphetamine Screen, Urine Negative     Benzodiazepine Screen, Urine Positive     Tricyclic Antidepressants Screen Negative     Methadone Screen, Urine Negative     Barbiturates Screen, Urine Negative     Oxycodone Screen, Urine Negative     Propoxyphene Screen Negative     Buprenorphine, Screen, Urine Positive     Narrative:      Cutoff For Drugs Screened:    Amphetamines               500 ng/ml  Barbiturates               200 ng/ml  Benzodiazepines            150 ng/ml  Cocaine                    150 ng/ml  Methadone                  200 ng/ml  Opiates                    100 ng/ml  Phencyclidine               25 ng/ml  THC                            50 ng/ml  Methamphetamine            500 ng/ml  Tricyclic Antidepressants  300 ng/ml  Oxycodone                  100 ng/ml  Propoxyphene               300 ng/ml  Buprenorphine               10 ng/ml    The normal value for all drugs tested is negative. This report includes unconfirmed screening results, with the cutoff values listed, to be used for medical treatment purposes only.  Unconfirmed results must not be used for non-medical purposes such as employment or legal testing.  Clinical consideration should be applied to any drug of abuse test, particularly when unconfirmed results are used.      Ethanol [[id number]] Collected: 10/04/21 1200    Specimen: Blood Updated: 10/04/21 1402     Ethanol <10 mg/dL      Ethanol % <0.010 %     COVID-19 and FLU A/B PCR - Swab, Nasopharynx [[id number]]  (Normal) Collected: 10/04/21 1322    Specimen: Swab from Nasopharynx Updated: 10/04/21 1354     COVID19 Not Detected     Influenza A PCR Not Detected     Influenza B PCR Not Detected    Narrative:      Fact sheet for providers: https://www.fda.gov/media/877536/download    Fact sheet for patients: https://www.fda.gov/media/198557/download    Test performed by PCR.    Green Top (Gel) [[id number]] Collected: 10/04/21 1201    Specimen: Blood Updated: 10/04/21 1315     Extra Tube Hold for add-ons.     Comment: Auto resulted.       Lavender Top [[id number]] Collected: 10/04/21 1201    Specimen: Blood Updated: 10/04/21 1315     Extra Tube hold for add-on     Comment: Auto resulted       Comprehensive Metabolic Panel [[id number]]  (Abnormal) Collected: 10/04/21 1201    Specimen: Blood Updated: 10/04/21  1227     Glucose 119 mg/dL      BUN 8 mg/dL      Creatinine 0.55 mg/dL      Sodium 134 mmol/L      Potassium 3.5 mmol/L      Chloride 97 mmol/L      CO2 20.0 mmol/L      Calcium 8.6 mg/dL      Total Protein 7.4 g/dL      Albumin 4.70 g/dL      ALT (SGPT) 14 U/L      AST (SGOT) 24 U/L      Alkaline Phosphatase 100 U/L      Total Bilirubin 0.9 mg/dL      eGFR Non African Amer 126 mL/min/1.73      Globulin 2.7 gm/dL      A/G Ratio 1.7 g/dL      BUN/Creatinine Ratio 14.5     Anion Gap 17.0 mmol/L     Narrative:      GFR Normal >60  Chronic Kidney Disease <60  Kidney Failure <15      Lipase [[id number]]  (Abnormal) Collected: 10/04/21 1201    Specimen: Blood Updated: 10/04/21 1227     Lipase 117 U/L     hCG, Serum, Qualitative [[id number]]  (Normal) Collected: 10/04/21 1200    Specimen: Blood Updated: 10/04/21 1221     HCG Qualitative Negative    CBC & Differential [[id number]]  (Abnormal) Collected: 10/04/21 1201    Specimen: Blood Updated: 10/04/21 1209    Narrative:      The following orders were created for panel order CBC & Differential.  Procedure                               Abnormality         Status                     ---------                               -----------         ------                     CBC Auto Differential[[id number]]        Abnormal            Final result                 Please view results for these tests on the individual orders.    CBC Auto Differential [[id number]]  (Abnormal) Collected: 10/04/21 1201    Specimen: Blood Updated: 10/04/21 1209     WBC 11.43 10*3/mm3      RBC 3.67 10*6/mm3      Hemoglobin 13.1 g/dL      Hematocrit 35.1 %      MCV 95.6 fL      MCH 35.7 pg      MCHC 37.3 g/dL      RDW 13.5 %      RDW-SD 47.3 fl      MPV 8.9 fL      Platelets 215 10*3/mm3      Neutrophil % 85.3 %      Lymphocyte % 10.4 %      Monocyte % 3.4 %      Eosinophil % 0.1 %      Basophil % 0.5 %      Immature Grans % 0.3 %      Neutrophils, Absolute 9.75 10*3/mm3      Lymphocytes, Absolute 1.19 10*3/mm3       Monocytes, Absolute 0.39 10*3/mm3      Eosinophils, Absolute 0.01 10*3/mm3      Basophils, Absolute 0.06 10*3/mm3      Immature Grans, Absolute 0.03 10*3/mm3      nRBC 0.0 /100 WBC         Blood Culture   Date Value Ref Range Status   10/04/2021 No growth at 24 hours  Preliminary     No results found for: BCIDPCR, CXREFLEX, CSFCX, CULTURETIS  No results found for: CULTURES, HSVCX, URCX  No results found for: EYECULTURE, GCCX, HSVCULTURE, LABHSV  No results found for: LEGIONELLA, MRSACX, MUMPSCX, MYCOPLASCX  No results found for: NOCARDIACX, STOOLCX  Urine Culture   Date Value Ref Range Status   10/04/2021 >100,000 CFU/mL Mixed Gram Negative Lin[place] (A)  Final     No results found for: VIRALCULTU, WOUNDCX       HOSPITAL COURSE:  Patient was admitted for C. difficile colitis.  This is a second occurrence of C. difficile infection in this patient.  Patient was initiated on oral vancomycin and rifaximin.  Patient was also found to have acute cystitis, she was treated with 3 days of IV antibiotics.  Patient had hypokalemia and hypomagnesemia.  Potassium and magnesium were replaced.  Patient will discharge home with completion of 10 days dose of vancomycin and 20 days dose of rifaximin sent to outpatient pharmacy.  Before discharge patient was reexamined and had no tenderness on abdominal palpation. Patient was discharged home in stable condition and advised to follow up with PCP within 1 week of discharge.     DISCHARGE CONDITION:   Stable/improving     DISPOSITION:  Home or Self Care    DISCHARGE MEDICATIONS     Discharge Medications      New Medications      Instructions Start Date   magnesium oxide 400 MG tablet  Commonly known as: MAG-OX   400 mg, Oral, Daily      meloxicam 15 MG tablet  Commonly known as: MOBIC   15 mg, Oral, Daily      ondansetron 4 MG tablet  Commonly known as: ZOFRAN   4 mg, Oral, Every 6 Hours PRN      potassium chloride 10 MEQ CR tablet   40 mEq, Oral, Daily      potassium chloride 10 MEQ CR  capsule  Commonly known as: MICRO-K   40 mEq, Oral, Daily      rifAXIMin 200 MG tablet  Commonly known as: XIFAXAN   400 mg, Oral, 3 Times Daily      vancomycin 125 MG capsule  Commonly known as: VANCOCIN   125 mg, Oral, 4 Times Daily         Continue These Medications      Instructions Start Date   acamprosate 333 MG EC tablet  Commonly known as: CAMPRAL   333 mg, Oral, Daily      buprenorphine-naloxone 8-2 MG per SL tablet  Commonly known as: SUBOXONE   1 tablet, Sublingual, 3 Times Daily, Patient takes one 8-2 tablet three times a day.      cyanocobalamin 1000 MCG tablet  Commonly known as: VITAMIN B-12   1,000 mcg, Oral, Daily      dicyclomine 20 MG tablet  Commonly known as: BENTYL   20 mg, Oral, 2 Times Daily      FLUoxetine 40 MG capsule  Commonly known as: PROzac   40 mg, Oral, Daily      folic acid 1 MG tablet  Commonly known as: FOLVITE   1 mg, Oral, Daily      gabapentin 800 MG tablet  Commonly known as: NEURONTIN   800 mg, Oral, 3 Times Daily      LORazepam 2 MG tablet  Commonly known as: ATIVAN   2 mg, Oral, 3 Times Daily      nicotine 21 MG/24HR patch  Commonly known as: NICODERM CQ   1 patch, Transdermal, Every 24 Hours      pantoprazole 40 MG EC tablet  Commonly known as: Protonix   40 mg, Oral, Daily      prazosin 5 MG capsule  Commonly known as: MINIPRESS   5 mg, Oral, Nightly      promethazine 25 MG suppository  Commonly known as: PHENERGAN   25 mg, Rectal, Every 6 Hours PRN      sucralfate 1 g tablet  Commonly known as: CARAFATE   No dose, route, or frequency recorded.      thiamine 100 MG tablet tablet  Commonly known as: VITAMIN B-1   100 mg, Oral, Daily             INSTRUCTIONS:  Activity:   Activity Instructions     Activity as Tolerated          Diet:   Diet Instructions     Advance Diet As Tolerated            FOLLOW UP:   Additional Instructions for the Follow-ups that You Need to Schedule     Discharge Follow-up with PCP   As directed       Currently Documented PCP:    Erica Jacob MD     PCP Phone Number:    [phone]     Follow Up Details: Please follow up with PCP within a week of discharge           Follow-up Information     Erica Jacob MD .    Specialties: Family Medicine, Emergency Medicine  Why: Please follow up with PCP within a week of discharge  Contact information:  [address]  [address] [place] [id number] [phone]             Erica Jacob MD .    Specialties: Family Medicine, Emergency Medicine  Contact information:  [address]   [address] [place] [id number] [phone]                   PENDING TEST RESULTS AT DISCHARGE  Pending Labs     Order Current Status    Blood Culture - Blood, Arm, Right Preliminary result          Time: >30 minutes were spent in discharge planning, medication reconciliation and coordination of care for this patient.    Nadir Villarreal MD is the attending at time of discharge, He is aware of the patient's status and agrees with the above discharge summary.      Pily Pike M.D. PGY 2  [facility]  [address] [place] [address]  Office: [phone]  This document has been electronically signed by Pily Pike MD on October 7, 2021 13:06 CDT

## 2021-10-07 ENCOUNTER — TRANSITIONAL CARE MANAGEMENT TELEPHONE ENCOUNTER (OUTPATIENT)
Dept: CALL CENTER | Facility: HOSPITAL | Age: 36
End: 2021-10-07

## 2021-10-07 LAB
QT INTERVAL: 416 MS
QTC INTERVAL: 500 MS

## 2021-10-07 NOTE — OUTREACH NOTE
Call Center TCM Note      Responses   [facility] patient discharged from?  [place]   Does the patient have one of the following disease processes/diagnoses(primary or secondary)?  Other   TCM attempt successful?  No   Unsuccessful attempts  Attempt 2          Denise Gee RN    10/7/2021, 16:11 EDT

## 2021-10-07 NOTE — OUTREACH NOTE
Call Center TCM Note      Responses   [facility] patient discharged from?  [place]   Does the patient have one of the following disease processes/diagnoses(primary or secondary)?  Other   TCM attempt successful?  No   Unsuccessful attempts  Attempt 1          Denise Gee RN    10/7/2021, 14:52 EDT

## 2021-10-08 ENCOUNTER — TRANSITIONAL CARE MANAGEMENT TELEPHONE ENCOUNTER (OUTPATIENT)
Dept: CALL CENTER | Facility: HOSPITAL | Age: 36
End: 2021-10-08

## 2021-10-08 NOTE — OUTREACH NOTE
Call Center TCM Note      Responses   [facility] patient discharged from?  [place]   Does the patient have one of the following disease processes/diagnoses(primary or secondary)?  Other   TCM attempt successful?  No [no verbal release on file]   Unsuccessful attempts  Attempt 3          Jennifer Ann RN    10/8/2021, 11:45 EDT

## 2021-10-09 LAB — BACTERIA SPEC AEROBE CULT: NORMAL

## 2021-10-18 ENCOUNTER — READMISSION MANAGEMENT (OUTPATIENT)
Dept: CALL CENTER | Facility: HOSPITAL | Age: 36
End: 2021-10-18

## 2021-10-18 NOTE — OUTREACH NOTE
Medical Week 2 Survey      Responses   [facility] patient discharged from? [facility]   Does the patient have one of the following disease processes/diagnoses(primary or secondary)? Other   Week 2 attempt successful? No   Unsuccessful attempts Attempt 1   Call end time 1616   Week 2 Call Completed? Yes   Is the patient interested in additional calls from an ambulatory ?  NOTE:  applies to high risk patients requiring additional follow-up. No   Revoked No further contact(revokes)-requires comment   Graduated/Revoked comments UTR x 4          Ivory Horner RN

## 2022-03-10 ENCOUNTER — HOSPITAL ENCOUNTER (EMERGENCY)
Facility: HOSPITAL | Age: 37
Discharge: LEFT BEFORE DISCHARGE | End: 2022-03-10

## 2022-03-10 ENCOUNTER — APPOINTMENT (OUTPATIENT)
Dept: GENERAL RADIOLOGY | Facility: HOSPITAL | Age: 37
End: 2022-03-10

## 2022-03-10 VITALS
SYSTOLIC BLOOD PRESSURE: 132 MMHG | RESPIRATION RATE: 18 BRPM | DIASTOLIC BLOOD PRESSURE: 84 MMHG | HEART RATE: 108 BPM | OXYGEN SATURATION: 96 % | WEIGHT: 150 LBS | HEIGHT: 67 IN | TEMPERATURE: 98.8 F | BODY MASS INDEX: 23.54 KG/M2

## 2022-03-10 PROCEDURE — 99211 OFF/OP EST MAY X REQ PHY/QHP: CPT

## 2022-03-10 PROCEDURE — 73590 X-RAY EXAM OF LOWER LEG: CPT

## 2022-03-10 PROCEDURE — 73610 X-RAY EXAM OF ANKLE: CPT

## 2022-03-10 NOTE — ED NOTES
Pt states hx of left ankle sprain. Pt we going into [place] when her left ankle inverted. Pt c/o left ankle pain and lateral malleolus swelling.

## 2022-03-11 ENCOUNTER — TELEPHONE (OUTPATIENT)
Dept: FAMILY MEDICINE CLINIC | Facility: CLINIC | Age: 37
End: 2022-03-11

## 2022-03-11 NOTE — TELEPHONE ENCOUNTER
CALLED PATIENT TO SCHEDULE ED F/U. PATIENT LEFT ED WITHOUT BEING SEEN. HER  HAD TO GO TO WORK, SHE THINKS ANKLE IS BROKEN AND IS TRYING TO GO BACK TO ED. DECLINED APPT

## 2022-03-30 ENCOUNTER — APPOINTMENT (OUTPATIENT)
Dept: CT IMAGING | Facility: HOSPITAL | Age: 37
End: 2022-03-30

## 2022-03-30 ENCOUNTER — HOSPITAL ENCOUNTER (EMERGENCY)
Facility: HOSPITAL | Age: 37
Discharge: HOME | End: 2022-03-30
Attending: EMERGENCY MEDICINE | Admitting: FAMILY MEDICINE

## 2022-03-30 VITALS
RESPIRATION RATE: 18 BRPM | HEART RATE: 93 BPM | HEIGHT: 67 IN | BODY MASS INDEX: 25.27 KG/M2 | DIASTOLIC BLOOD PRESSURE: 92 MMHG | OXYGEN SATURATION: 95 % | WEIGHT: 161 LBS | SYSTOLIC BLOOD PRESSURE: 164 MMHG | TEMPERATURE: 98.2 F

## 2022-03-30 DIAGNOSIS — R10.84 GENERALIZED ABDOMINAL PAIN: Primary | ICD-10-CM

## 2022-03-30 DIAGNOSIS — R19.7 DIARRHEA, UNSPECIFIED TYPE: ICD-10-CM

## 2022-03-30 DIAGNOSIS — R11.2 NON-INTRACTABLE VOMITING WITH NAUSEA, UNSPECIFIED VOMITING TYPE: ICD-10-CM

## 2022-03-30 PROBLEM — F10.939 ALCOHOL WITHDRAWAL SYNDROME (HCC): Status: ACTIVE | Noted: 2021-07-26

## 2022-03-30 LAB
ALBUMIN SERPL-MCNC: 4.8 G/DL (ref 3.5–5.2)
ALBUMIN/GLOB SERPL: 1.5 G/DL
ALP SERPL-CCNC: 105 U/L (ref 39–117)
ALT SERPL W P-5'-P-CCNC: 23 U/L (ref 1–33)
AMPHET+METHAMPHET UR QL: NEGATIVE
AMPHETAMINES UR QL: NEGATIVE
ANION GAP SERPL CALCULATED.3IONS-SCNC: 16 MMOL/L (ref 5–15)
AST SERPL-CCNC: 52 U/L (ref 1–32)
BARBITURATES UR QL SCN: NEGATIVE
BASOPHILS # BLD AUTO: 0.1 10*3/MM3 (ref 0–0.2)
BASOPHILS NFR BLD AUTO: 0.7 % (ref 0–1.5)
BENZODIAZ UR QL SCN: POSITIVE
BILIRUB SERPL-MCNC: 0.2 MG/DL (ref 0–1.2)
BILIRUB UR QL STRIP: NEGATIVE
BUN SERPL-MCNC: 15 MG/DL (ref 6–20)
BUN/CREAT SERPL: 19.2 (ref 7–25)
BUPRENORPHINE SERPL-MCNC: POSITIVE NG/ML
CALCIUM SPEC-SCNC: 10.2 MG/DL (ref 8.6–10.5)
CANNABINOIDS SERPL QL: POSITIVE
CHLORIDE SERPL-SCNC: 100 MMOL/L (ref 98–107)
CLARITY UR: CLEAR
CO2 SERPL-SCNC: 21 MMOL/L (ref 22–29)
COCAINE UR QL: NEGATIVE
COLOR UR: YELLOW
CREAT SERPL-MCNC: 0.78 MG/DL (ref 0.57–1)
D-LACTATE SERPL-SCNC: 2 MMOL/L (ref 0.5–2)
DEPRECATED RDW RBC AUTO: 49.8 FL (ref 37–54)
EGFRCR SERPLBLD CKD-EPI 2021: 101.1 ML/MIN/1.73
EOSINOPHIL # BLD AUTO: 0.06 10*3/MM3 (ref 0–0.4)
EOSINOPHIL NFR BLD AUTO: 0.4 % (ref 0.3–6.2)
ERYTHROCYTE [DISTWIDTH] IN BLOOD BY AUTOMATED COUNT: 13.9 % (ref 12.3–15.4)
GLOBULIN UR ELPH-MCNC: 3.3 GM/DL
GLUCOSE SERPL-MCNC: 123 MG/DL (ref 65–99)
GLUCOSE UR STRIP-MCNC: NEGATIVE MG/DL
HCG SERPL QL: NEGATIVE
HCT VFR BLD AUTO: 38.8 % (ref 34–46.6)
HGB BLD-MCNC: 13.7 G/DL (ref 12–15.9)
HGB UR QL STRIP.AUTO: NEGATIVE
HOLD SPECIMEN: NORMAL
IMM GRANULOCYTES # BLD AUTO: 0.12 10*3/MM3 (ref 0–0.05)
IMM GRANULOCYTES NFR BLD AUTO: 0.8 % (ref 0–0.5)
KETONES UR QL STRIP: NEGATIVE
LEUKOCYTE ESTERASE UR QL STRIP.AUTO: NEGATIVE
LIPASE SERPL-CCNC: 36 U/L (ref 13–60)
LYMPHOCYTES # BLD AUTO: 3.88 10*3/MM3 (ref 0.7–3.1)
LYMPHOCYTES NFR BLD AUTO: 27.2 % (ref 19.6–45.3)
MAGNESIUM SERPL-MCNC: 2.1 MG/DL (ref 1.6–2.6)
MCH RBC QN AUTO: 34.2 PG (ref 26.6–33)
MCHC RBC AUTO-ENTMCNC: 35.3 G/DL (ref 31.5–35.7)
MCV RBC AUTO: 96.8 FL (ref 79–97)
METHADONE UR QL SCN: NEGATIVE
MONOCYTES # BLD AUTO: 0.89 10*3/MM3 (ref 0.1–0.9)
MONOCYTES NFR BLD AUTO: 6.2 % (ref 5–12)
NEUTROPHILS NFR BLD AUTO: 64.7 % (ref 42.7–76)
NEUTROPHILS NFR BLD AUTO: 9.2 10*3/MM3 (ref 1.7–7)
NITRITE UR QL STRIP: NEGATIVE
NRBC BLD AUTO-RTO: 0 /100 WBC (ref 0–0.2)
OPIATES UR QL: NEGATIVE
OXYCODONE UR QL SCN: NEGATIVE
PCP UR QL SCN: NEGATIVE
PH UR STRIP.AUTO: 6 [PH] (ref 5–9)
PLATELET # BLD AUTO: 307 10*3/MM3 (ref 140–450)
PMV BLD AUTO: 9.2 FL (ref 6–12)
POTASSIUM SERPL-SCNC: 3.5 MMOL/L (ref 3.5–5.2)
PROPOXYPH UR QL: NEGATIVE
PROT SERPL-MCNC: 8.1 G/DL (ref 6–8.5)
PROT UR QL STRIP: NEGATIVE
RBC # BLD AUTO: 4.01 10*6/MM3 (ref 3.77–5.28)
SODIUM SERPL-SCNC: 137 MMOL/L (ref 136–145)
SP GR UR STRIP: 1.01 (ref 1–1.03)
TRICYCLICS UR QL SCN: NEGATIVE
UROBILINOGEN UR QL STRIP: NORMAL
WBC NRBC COR # BLD: 14.25 10*3/MM3 (ref 3.4–10.8)
WHOLE BLOOD HOLD SPECIMEN: NORMAL
WHOLE BLOOD HOLD SPECIMEN: NORMAL

## 2022-03-30 PROCEDURE — 96375 TX/PRO/DX INJ NEW DRUG ADDON: CPT

## 2022-03-30 PROCEDURE — 74177 CT ABD & PELVIS W/CONTRAST: CPT

## 2022-03-30 PROCEDURE — 96361 HYDRATE IV INFUSION ADD-ON: CPT

## 2022-03-30 PROCEDURE — 80053 COMPREHEN METABOLIC PANEL: CPT | Performed by: PHYSICIAN ASSISTANT

## 2022-03-30 PROCEDURE — 87040 BLOOD CULTURE FOR BACTERIA: CPT | Performed by: PHYSICIAN ASSISTANT

## 2022-03-30 PROCEDURE — 99283 EMERGENCY DEPT VISIT LOW MDM: CPT

## 2022-03-30 PROCEDURE — 83735 ASSAY OF MAGNESIUM: CPT | Performed by: PHYSICIAN ASSISTANT

## 2022-03-30 PROCEDURE — 83605 ASSAY OF LACTIC ACID: CPT | Performed by: PHYSICIAN ASSISTANT

## 2022-03-30 PROCEDURE — 25010000002 ONDANSETRON PER 1 MG: Performed by: PHYSICIAN ASSISTANT

## 2022-03-30 PROCEDURE — 84703 CHORIONIC GONADOTROPIN ASSAY: CPT | Performed by: PHYSICIAN ASSISTANT

## 2022-03-30 PROCEDURE — 83690 ASSAY OF LIPASE: CPT | Performed by: PHYSICIAN ASSISTANT

## 2022-03-30 PROCEDURE — 96374 THER/PROPH/DIAG INJ IV PUSH: CPT

## 2022-03-30 PROCEDURE — 36415 COLL VENOUS BLD VENIPUNCTURE: CPT

## 2022-03-30 PROCEDURE — 25010000002 MORPHINE PER 10 MG: Performed by: FAMILY MEDICINE

## 2022-03-30 PROCEDURE — 85025 COMPLETE CBC W/AUTO DIFF WBC: CPT | Performed by: PHYSICIAN ASSISTANT

## 2022-03-30 PROCEDURE — 25010000002 LORAZEPAM PER 2 MG: Performed by: PHYSICIAN ASSISTANT

## 2022-03-30 PROCEDURE — 81003 URINALYSIS AUTO W/O SCOPE: CPT | Performed by: PHYSICIAN ASSISTANT

## 2022-03-30 PROCEDURE — 80306 DRUG TEST PRSMV INSTRMNT: CPT | Performed by: PHYSICIAN ASSISTANT

## 2022-03-30 PROCEDURE — 25010000002 IOPAMIDOL 61 % SOLUTION: Performed by: FAMILY MEDICINE

## 2022-03-30 RX ORDER — LORAZEPAM 2 MG/ML
1 INJECTION INTRAMUSCULAR ONCE
Status: COMPLETED | OUTPATIENT
Start: 2022-03-30 | End: 2022-03-30

## 2022-03-30 RX ORDER — ONDANSETRON 4 MG/1
4 TABLET, ORALLY DISINTEGRATING ORAL EVERY 6 HOURS PRN
Qty: 20 TABLET | Refills: 0 | Status: SHIPPED | OUTPATIENT
Start: 2022-03-30

## 2022-03-30 RX ORDER — DICYCLOMINE HCL 20 MG
20 TABLET ORAL EVERY 6 HOURS PRN
Qty: 30 TABLET | Refills: 0 | Status: SHIPPED | OUTPATIENT
Start: 2022-03-30 | End: 2023-01-25

## 2022-03-30 RX ORDER — SODIUM CHLORIDE 0.9 % (FLUSH) 0.9 %
10 SYRINGE (ML) INJECTION AS NEEDED
Status: DISCONTINUED | OUTPATIENT
Start: 2022-03-30 | End: 2022-03-30 | Stop reason: HOSPADM

## 2022-03-30 RX ORDER — ONDANSETRON 2 MG/ML
4 INJECTION INTRAMUSCULAR; INTRAVENOUS ONCE
Status: COMPLETED | OUTPATIENT
Start: 2022-03-30 | End: 2022-03-30

## 2022-03-30 RX ADMIN — LORAZEPAM 1 MG: 2 INJECTION INTRAMUSCULAR; INTRAVENOUS at 18:21

## 2022-03-30 RX ADMIN — SODIUM CHLORIDE 2190 ML: 9 INJECTION, SOLUTION INTRAVENOUS at 18:21

## 2022-03-30 RX ADMIN — IOPAMIDOL 90 ML: 612 INJECTION, SOLUTION INTRAVENOUS at 19:15

## 2022-03-30 RX ADMIN — ONDANSETRON 4 MG: 2 INJECTION INTRAMUSCULAR; INTRAVENOUS at 18:21

## 2022-03-30 RX ADMIN — MORPHINE SULFATE 4 MG: 4 INJECTION INTRAVENOUS at 18:21

## 2022-04-04 LAB
BACTERIA SPEC AEROBE CULT: NORMAL
BACTERIA SPEC AEROBE CULT: NORMAL

## 2022-04-15 ENCOUNTER — HOSPITAL ENCOUNTER (EMERGENCY)
Facility: HOSPITAL | Age: 37
Discharge: HOME | End: 2022-04-15
Attending: FAMILY MEDICINE | Admitting: FAMILY MEDICINE

## 2022-04-15 VITALS
HEIGHT: 66 IN | TEMPERATURE: 99.7 F | RESPIRATION RATE: 18 BRPM | HEART RATE: 96 BPM | BODY MASS INDEX: 25.71 KG/M2 | OXYGEN SATURATION: 97 % | SYSTOLIC BLOOD PRESSURE: 134 MMHG | DIASTOLIC BLOOD PRESSURE: 95 MMHG | WEIGHT: 160 LBS

## 2022-04-15 DIAGNOSIS — R10.13 EPIGASTRIC PAIN: Primary | ICD-10-CM

## 2022-04-15 LAB
ALBUMIN SERPL-MCNC: 4.7 G/DL (ref 3.5–5.2)
ALBUMIN/GLOB SERPL: 1.4 G/DL
ALP SERPL-CCNC: 113 U/L (ref 39–117)
ALT SERPL W P-5'-P-CCNC: 35 U/L (ref 1–33)
ANION GAP SERPL CALCULATED.3IONS-SCNC: 24 MMOL/L (ref 5–15)
AST SERPL-CCNC: 56 U/L (ref 1–32)
BASOPHILS # BLD AUTO: 0.07 10*3/MM3 (ref 0–0.2)
BASOPHILS NFR BLD AUTO: 0.5 % (ref 0–1.5)
BILIRUB SERPL-MCNC: 0.4 MG/DL (ref 0–1.2)
BUN SERPL-MCNC: 8 MG/DL (ref 6–20)
BUN/CREAT SERPL: 11.8 (ref 7–25)
CALCIUM SPEC-SCNC: 9.9 MG/DL (ref 8.6–10.5)
CHLORIDE SERPL-SCNC: 92 MMOL/L (ref 98–107)
CK SERPL-CCNC: 79 U/L (ref 20–180)
CO2 SERPL-SCNC: 16 MMOL/L (ref 22–29)
CREAT SERPL-MCNC: 0.68 MG/DL (ref 0.57–1)
DEPRECATED RDW RBC AUTO: 47.4 FL (ref 37–54)
EGFRCR SERPLBLD CKD-EPI 2021: 115.9 ML/MIN/1.73
EOSINOPHIL # BLD AUTO: 0.02 10*3/MM3 (ref 0–0.4)
EOSINOPHIL NFR BLD AUTO: 0.1 % (ref 0.3–6.2)
ERYTHROCYTE [DISTWIDTH] IN BLOOD BY AUTOMATED COUNT: 13.2 % (ref 12.3–15.4)
FLUAV RNA RESP QL NAA+PROBE: NOT DETECTED
FLUBV RNA RESP QL NAA+PROBE: NOT DETECTED
GLOBULIN UR ELPH-MCNC: 3.4 GM/DL
GLUCOSE SERPL-MCNC: 81 MG/DL (ref 65–99)
HCG SERPL QL: NEGATIVE
HCT VFR BLD AUTO: 39.6 % (ref 34–46.6)
HGB BLD-MCNC: 14.4 G/DL (ref 12–15.9)
HOLD SPECIMEN: NORMAL
IMM GRANULOCYTES # BLD AUTO: 0.12 10*3/MM3 (ref 0–0.05)
IMM GRANULOCYTES NFR BLD AUTO: 0.8 % (ref 0–0.5)
LIPASE SERPL-CCNC: 24 U/L (ref 13–60)
LYMPHOCYTES # BLD AUTO: 2.75 10*3/MM3 (ref 0.7–3.1)
LYMPHOCYTES NFR BLD AUTO: 18.5 % (ref 19.6–45.3)
MAGNESIUM SERPL-MCNC: 1.9 MG/DL (ref 1.6–2.6)
MCH RBC QN AUTO: 35.2 PG (ref 26.6–33)
MCHC RBC AUTO-ENTMCNC: 36.4 G/DL (ref 31.5–35.7)
MCV RBC AUTO: 96.8 FL (ref 79–97)
MONOCYTES # BLD AUTO: 0.89 10*3/MM3 (ref 0.1–0.9)
MONOCYTES NFR BLD AUTO: 6 % (ref 5–12)
NEUTROPHILS NFR BLD AUTO: 11.01 10*3/MM3 (ref 1.7–7)
NEUTROPHILS NFR BLD AUTO: 74.1 % (ref 42.7–76)
NRBC BLD AUTO-RTO: 0 /100 WBC (ref 0–0.2)
PLATELET # BLD AUTO: 295 10*3/MM3 (ref 140–450)
PMV BLD AUTO: 9.2 FL (ref 6–12)
POTASSIUM SERPL-SCNC: 3.9 MMOL/L (ref 3.5–5.2)
PROT SERPL-MCNC: 8.1 G/DL (ref 6–8.5)
RBC # BLD AUTO: 4.09 10*6/MM3 (ref 3.77–5.28)
SARS-COV-2 RNA RESP QL NAA+PROBE: NOT DETECTED
SODIUM SERPL-SCNC: 132 MMOL/L (ref 136–145)
WBC NRBC COR # BLD: 14.86 10*3/MM3 (ref 3.4–10.8)
WHOLE BLOOD HOLD SPECIMEN: NORMAL
WHOLE BLOOD HOLD SPECIMEN: NORMAL

## 2022-04-15 PROCEDURE — 25010000002 MORPHINE PER 10 MG: Performed by: FAMILY MEDICINE

## 2022-04-15 PROCEDURE — 83735 ASSAY OF MAGNESIUM: CPT | Performed by: FAMILY MEDICINE

## 2022-04-15 PROCEDURE — 85025 COMPLETE CBC W/AUTO DIFF WBC: CPT | Performed by: FAMILY MEDICINE

## 2022-04-15 PROCEDURE — 84703 CHORIONIC GONADOTROPIN ASSAY: CPT | Performed by: FAMILY MEDICINE

## 2022-04-15 PROCEDURE — 80053 COMPREHEN METABOLIC PANEL: CPT | Performed by: FAMILY MEDICINE

## 2022-04-15 PROCEDURE — 25010000002 ONDANSETRON PER 1 MG: Performed by: FAMILY MEDICINE

## 2022-04-15 PROCEDURE — 87636 SARSCOV2 & INF A&B AMP PRB: CPT | Performed by: FAMILY MEDICINE

## 2022-04-15 PROCEDURE — 99283 EMERGENCY DEPT VISIT LOW MDM: CPT

## 2022-04-15 PROCEDURE — 96375 TX/PRO/DX INJ NEW DRUG ADDON: CPT

## 2022-04-15 PROCEDURE — 83690 ASSAY OF LIPASE: CPT | Performed by: FAMILY MEDICINE

## 2022-04-15 PROCEDURE — 96374 THER/PROPH/DIAG INJ IV PUSH: CPT

## 2022-04-15 PROCEDURE — 82550 ASSAY OF CK (CPK): CPT | Performed by: FAMILY MEDICINE

## 2022-04-15 RX ORDER — ONDANSETRON 2 MG/ML
4 INJECTION INTRAMUSCULAR; INTRAVENOUS ONCE
Status: COMPLETED | OUTPATIENT
Start: 2022-04-15 | End: 2022-04-15

## 2022-04-15 RX ORDER — SODIUM CHLORIDE 0.9 % (FLUSH) 0.9 %
10 SYRINGE (ML) INJECTION AS NEEDED
Status: DISCONTINUED | OUTPATIENT
Start: 2022-04-15 | End: 2022-04-15 | Stop reason: HOSPADM

## 2022-04-15 RX ORDER — PANTOPRAZOLE SODIUM 40 MG/1
40 TABLET, DELAYED RELEASE ORAL DAILY
Qty: 30 TABLET | Refills: 0 | Status: SHIPPED | OUTPATIENT
Start: 2022-04-15 | End: 2022-06-09

## 2022-04-15 RX ADMIN — ONDANSETRON 4 MG: 2 INJECTION INTRAMUSCULAR; INTRAVENOUS at 04:23

## 2022-04-15 RX ADMIN — MORPHINE SULFATE 4 MG: 4 INJECTION, SOLUTION INTRAMUSCULAR; INTRAVENOUS at 04:23

## 2022-04-15 RX ADMIN — SODIUM CHLORIDE 1000 ML: 9 INJECTION, SOLUTION INTRAVENOUS at 04:22

## 2022-04-15 NOTE — ED PROVIDER NOTES
Subjective   Pancreatitis with dailly NV that has worsened over the past 4 days. Decreased urinary output      Vomiting  The primary symptoms include abdominal pain, nausea and vomiting. Primary symptoms do not include fever, fatigue, diarrhea, dysuria, myalgias or rash. The illness began 3 to 5 days ago.   The illness does not include chills.   Hernia  Associated symptoms: abdominal pain, cough, nausea and vomiting    Associated symptoms: no chest pain, no congestion, no diarrhea, no ear pain, no fatigue, no fever, no headaches, no myalgias, no rash, no rhinorrhea, no shortness of breath, no sore throat and no wheezing        Review of Systems   Constitutional: Positive for activity change and appetite change. Negative for chills, diaphoresis, fatigue and fever.   HENT: Negative for congestion, ear discharge, ear pain, nosebleeds, rhinorrhea, sinus pressure, sore throat and trouble swallowing.    Eyes: Negative for discharge and redness.   Respiratory: Positive for cough. Negative for apnea, chest tightness, shortness of breath and wheezing.    Cardiovascular: Negative for chest pain.   Gastrointestinal: Positive for abdominal pain, nausea and vomiting. Negative for diarrhea.   Endocrine: Negative for polyuria.   Genitourinary: Negative for dysuria, frequency and urgency.   Musculoskeletal: Negative for myalgias and neck pain.   Skin: Negative for color change and rash.   Allergic/Immunologic: Negative for immunocompromised state.   Neurological: Negative for dizziness, seizures, syncope, weakness, light-headedness and headaches.   Hematological: Negative for adenopathy. Does not bruise/bleed easily.   Psychiatric/Behavioral: Negative for behavioral problems and confusion.   All other systems reviewed and are negative.      Past Medical History:   Diagnosis Date   • Abnormal Pap smear of cervix    • Alcohol-induced acute pancreatitis 6/1/2020    Results From Last 14 Days Lab Units 02/27/21 1342 LIPASE U/L 65*   -advance diet as tolerated starting with clears -IV fluids   -will cont suboxone and give morphine for breakthrough pain  -Zofran for nausea as needed -Scheduled Ativan, and CIWA protocol - f/u on CT abdomen     • Alcoholism (HCC)    • Anxiety    • Cervical dysplasia    • Depression    • Fibrocystic breast    • GERD (gastroesophageal reflux disease)    • Hypertension    • Seizures (HCC) 2017   • Substance abuse (HCC)    • Substance abuse (HCC)        No Known Allergies    Past Surgical History:   Procedure Laterality Date   • COLONOSCOPY N/A 2/2/2021    Procedure: COLONOSCOPY;  Surgeon: Mirian Mills MD;  Location: [facility] ENDOSCOPY;  Service: Gastroenterology;  Laterality: N/A;   • ENDOSCOPY N/A 1/8/2021    Procedure: ESOPHAGOGASTRODUODENOSCOPY;  Surgeon: Mirian Mills MD;  Location: [facility] ENDOSCOPY;  Service: Gastroenterology;  Laterality: N/A;   • RHINOPLASTY         Family History   Problem Relation Age of Onset   • Breast cancer Mother    • Cancer Mother    • COPD Mother    • Breast cancer Maternal Grandmother    • COPD Maternal Grandmother    • Heart disease Maternal Grandmother    • Breast cancer Paternal Grandmother    • Breast cancer Maternal Aunt    • Hypertension Father    • Heart disease Father        Social History     Socioeconomic History   • Marital status:    Tobacco Use   • Smoking status: Current Every Day Smoker     Packs/day: 1.00     Years: 20.00     Pack years: 20.00     Types: Cigarettes   • Smokeless tobacco: Never Used   Substance and Sexual Activity   • Alcohol use: Yes     Alcohol/week: 6.0 standard drinks     Types: 6 Shots of liquor per week     Comment: daily   • Drug use: Not Currently     Types: Fentanyl, Oxycodone     Comment: hx of abuse   • Sexual activity: Defer           Objective   Physical Exam  Vitals and nursing note reviewed.   Constitutional:       Appearance: She is well-developed.   HENT:      Head: Normocephalic and atraumatic.      Nose: Nose normal.    Eyes:      General: No scleral icterus.        Right eye: No discharge.         Left eye: No discharge.      Conjunctiva/sclera: Conjunctivae normal.      Pupils: Pupils are equal, round, and reactive to light.   Neck:      Trachea: No tracheal deviation.   Cardiovascular:      Rate and Rhythm: Regular rhythm. Tachycardia present.      Heart sounds: Normal heart sounds. No murmur heard.  Pulmonary:      Effort: Pulmonary effort is normal. No respiratory distress.      Breath sounds: Normal breath sounds. No stridor. No wheezing or rales.   Abdominal:      General: Bowel sounds are normal. There is no distension.      Palpations: Abdomen is soft. There is no mass.      Tenderness: There is abdominal tenderness in the epigastric area. There is no guarding or rebound.   Musculoskeletal:      Cervical back: Normal range of motion and neck supple.   Skin:     General: Skin is warm and dry.      Findings: No erythema or rash.   Neurological:      Mental Status: She is alert and oriented to person, place, and time.      Coordination: Coordination normal.   Psychiatric:         Behavior: Behavior normal.         Thought Content: Thought content normal.         Procedures           ED Course  ED Course as of 04/15/22 0558   Fri Apr 15, 2022   0557 Patient has had multiple CAT scans over the past year of her abdomen and pelvis with no significant acute findings.  She does have a fat-containing periumbilical hernia.  She had an appointment on April 4 with Dr. Newman for an EGD and did not keep that appointment.  She was advised to reschedule with Dr. Newman.  Patient states that she has Phenergan suppositories and Carafate at home. [CB]      ED Course User Index  [CB] Han Perdomo MD             Labs Reviewed   COMPREHENSIVE METABOLIC PANEL - Abnormal; Notable for the following components:       Result Value    Sodium 132 (*)     Chloride 92 (*)     CO2 16.0 (*)     ALT (SGPT) 35 (*)     AST (SGOT) 56 (*)     Anion  Gap 24.0 (*)     All other components within normal limits    Narrative:     GFR Normal >60  Chronic Kidney Disease <60  Kidney Failure <15     CBC WITH AUTO DIFFERENTIAL - Abnormal; Notable for the following components:    WBC 14.86 (*)     MCH 35.2 (*)     MCHC 36.4 (*)     Lymphocyte % 18.5 (*)     Eosinophil % 0.1 (*)     Immature Grans % 0.8 (*)     Neutrophils, Absolute 11.01 (*)     Immature Grans, Absolute 0.12 (*)     All other components within normal limits   COVID-19 AND FLU A/B, NP SWAB IN TRANSPORT MEDIA 8-12 HR TAT - Normal    Narrative:     Fact sheet for providers: https://www.fda.gov/media/184278/download    Fact sheet for patients: https://www.fda.gov/media/726324/download    Test performed by PCR.   LIPASE - Normal   CK - Normal   MAGNESIUM - Normal   HCG, SERUM, QUALITATIVE - Normal   RAINBOW DRAW    Narrative:     The following orders were created for panel order Plain Draw.  Procedure                               Abnormality         Status                     ---------                               -----------         ------                     Green Top (Gel)[[id number]]                                  Final result               Lavender Top[[id number]]                                     Final result               Gold Top - SST[[id number]]                                                              Light Blue Top[[id number]]                                   Final result                 Please view results for these tests on the individual orders.   CBC AND DIFFERENTIAL    Narrative:     The following orders were created for panel order CBC & Differential.  Procedure                               Abnormality         Status                     ---------                               -----------         ------                     CBC Auto Differential[[id number]]        Abnormal            Final result                 Please view results for these tests on the individual orders.   GREEN TOP    LAVENDER TOP   LIGHT BLUE TOP       No orders to display                                             MDM    Final diagnoses:   Epigastric pain       ED Disposition  ED Disposition     ED Disposition   Discharge    Condition   Stable    Comment   --             Erica Jacob MD  [facility] [place]note 2] [address] [facility] [address] [phone]    In 3 days           Medication List      New Prescriptions    pantoprazole 40 MG EC tablet  Commonly known as: PROTONIX  Take 1 tablet by mouth Daily.           Where to Get Your Medications      These medications were sent to [facility][id number] - [place], [place] - [address] AT [facility] [facility] [address] - [phone]  - [phone] FX  [facility] [facility] [address]    Phone: [phone]   · pantoprazole 40 MG EC tablet          Han Perdomo MD  04/15/22 1083

## 2022-06-09 ENCOUNTER — OFFICE VISIT (OUTPATIENT)
Dept: FAMILY MEDICINE CLINIC | Facility: CLINIC | Age: 37
End: 2022-06-09

## 2022-06-09 ENCOUNTER — TELEPHONE (OUTPATIENT)
Dept: GENERAL RADIOLOGY | Facility: HOSPITAL | Age: 37
End: 2022-06-09

## 2022-06-09 VITALS
DIASTOLIC BLOOD PRESSURE: 88 MMHG | BODY MASS INDEX: 25.96 KG/M2 | OXYGEN SATURATION: 97 % | SYSTOLIC BLOOD PRESSURE: 136 MMHG | WEIGHT: 161.5 LBS | HEART RATE: 117 BPM | HEIGHT: 66 IN

## 2022-06-09 DIAGNOSIS — K86.0 ALCOHOL-INDUCED CHRONIC PANCREATITIS: ICD-10-CM

## 2022-06-09 DIAGNOSIS — R14.0 DISTENDED ABDOMEN: ICD-10-CM

## 2022-06-09 DIAGNOSIS — K42.9 UMBILICAL HERNIA WITHOUT OBSTRUCTION AND WITHOUT GANGRENE: Primary | ICD-10-CM

## 2022-06-09 PROCEDURE — 99213 OFFICE O/P EST LOW 20 MIN: CPT | Performed by: STUDENT IN AN ORGANIZED HEALTH CARE EDUCATION/TRAINING PROGRAM

## 2022-06-09 PROCEDURE — 96372 THER/PROPH/DIAG INJ SC/IM: CPT | Performed by: STUDENT IN AN ORGANIZED HEALTH CARE EDUCATION/TRAINING PROGRAM

## 2022-06-09 RX ORDER — ESOMEPRAZOLE MAGNESIUM 40 MG/1
CAPSULE, DELAYED RELEASE ORAL
COMMUNITY
Start: 2022-03-03 | End: 2023-01-19 | Stop reason: SDUPTHER

## 2022-06-09 RX ORDER — PROMETHAZINE HYDROCHLORIDE 25 MG/1
TABLET ORAL
COMMUNITY
Start: 2022-05-31 | End: 2023-01-19

## 2022-06-09 RX ORDER — MIRTAZAPINE 30 MG/1
TABLET, FILM COATED ORAL
COMMUNITY
Start: 2022-06-07

## 2022-06-09 RX ORDER — ZOLPIDEM TARTRATE 10 MG/1
TABLET ORAL
COMMUNITY
Start: 2022-06-07 | End: 2023-01-19

## 2022-06-09 RX ORDER — KETOROLAC TROMETHAMINE 30 MG/ML
60 INJECTION, SOLUTION INTRAMUSCULAR; INTRAVENOUS ONCE
Status: COMPLETED | OUTPATIENT
Start: 2022-06-09 | End: 2022-06-09

## 2022-06-09 RX ADMIN — KETOROLAC TROMETHAMINE 60 MG: 30 INJECTION, SOLUTION INTRAMUSCULAR; INTRAVENOUS at 14:40

## 2022-06-09 NOTE — PROGRESS NOTES
Family Medicine Residency  Erica Jacob MD    Subjective:     Nata Bain is a 37 y.o. female with CMH of alcohol induced pancreatis, alcoholism, tobacco dependence, GERD, opiates dependence who presents today complaining of abdominal distension, abdominal pain, general surgery referral for treatment of umbilical hernia. Patient has long history of alcohol abuse and previously was strongly instructed to quit drinking and follow up with GI for chronic pancreatitis. Patient failed to follow up with GI. Patient reports she was found to have umbilical hernia and belives its causing abdominal discomfort and distention. Denies any diarrhea, nausea, vomiting ,constipation, fevers/chills. Patient also requested for pain management as she has chronic pain secondary to her pancreatitis. Patient was teary in the room and was requesting for help.     The following portions of the patient's history were reviewed and updated as appropriate: allergies, current medications, past family history, past medical history, past social history, past surgical history and problem list.    Past Medical Hx:  Past Medical History:   Diagnosis Date   • Abnormal Pap smear of cervix    • Alcohol-induced acute pancreatitis 6/1/2020    Results From Last 14 Days Lab Units 02/27/21 1342 LIPASE U/L 65*  -advance diet as tolerated starting with clears -IV fluids   -will cont suboxone and give morphine for breakthrough pain  -Zofran for nausea as needed -Scheduled Ativan, and CIWA protocol - f/u on CT abdomen     • Alcoholism (HCC)    • Anxiety    • Cervical dysplasia    • Depression    • Fibrocystic breast    • GERD (gastroesophageal reflux disease)    • Hypertension    • Seizures (HCC) 2017   • Substance abuse (HCC)    • Substance abuse (HCC)        Past Surgical Hx:  Past Surgical History:   Procedure Laterality Date   • COLONOSCOPY N/A 2/2/2021    Procedure: COLONOSCOPY;  Surgeon: Mirian Mills MD;  Location: [facility] ENDOSCOPY;   Service: Gastroenterology;  Laterality: N/A;   • ENDOSCOPY N/A 1/8/2021    Procedure: ESOPHAGOGASTRODUODENOSCOPY;  Surgeon: Mirian Mills MD;  Location: [facility] ENDOSCOPY;  Service: Gastroenterology;  Laterality: N/A;   • RHINOPLASTY         Current Meds:    Current Outpatient Medications:   •  buprenorphine-naloxone (SUBOXONE) 8-2 MG per SL tablet, Place 1 tablet under the tongue 3 (Three) Times a Day. Patient takes one 8-2 tablet three times a day., Disp: , Rfl:   •  esomeprazole (nexIUM) 40 MG capsule, , Disp: , Rfl:   •  gabapentin (NEURONTIN) 800 MG tablet, Take 800 mg by mouth 3 (Three) Times a Day., Disp: , Rfl:   •  LORazepam (ATIVAN) 2 MG tablet, Take 2 mg by mouth 3 (Three) Times a Day., Disp: , Rfl:   •  mirtazapine (REMERON SOL-TAB) 45 MG disintegrating tablet, , Disp: , Rfl:   •  mirtazapine (REMERON) 30 MG tablet, , Disp: , Rfl:   •  ondansetron ODT (ZOFRAN-ODT) 4 MG disintegrating tablet, Place 1 tablet on the tongue Every 6 (Six) Hours As Needed for Nausea or Vomiting., Disp: 20 tablet, Rfl: 0  •  prazosin (MINIPRESS) 5 MG capsule, Take 5 mg by mouth Every Night., Disp: , Rfl:   •  promethazine (PHENERGAN) 25 MG suppository, Insert 1 suppository into the rectum Every 6 (Six) Hours As Needed for Nausea or Vomiting., Disp: 12 suppository, Rfl: 0  •  promethazine (PHENERGAN) 25 MG tablet, , Disp: , Rfl:   •  zolpidem (AMBIEN) 10 MG tablet, , Disp: , Rfl:   •  dicyclomine (BENTYL) 20 MG tablet, Take 1 tablet by mouth Every 6 (Six) Hours As Needed (abdominal cramping)., Disp: 30 tablet, Rfl: 0  No current facility-administered medications for this visit.    Allergies:  No Known Allergies    Family Hx:  Family History   Problem Relation Age of Onset   • Breast cancer Mother    • Cancer Mother    • COPD Mother    • Breast cancer Maternal Grandmother    • COPD Maternal Grandmother    • Heart disease Maternal Grandmother    • Breast cancer Paternal Grandmother    • Breast cancer Maternal Aunt    •  "Hypertension Father    • Heart disease Father         Social History:  Social History     Socioeconomic History   • Marital status:    Tobacco Use   • Smoking status: Current Every Day Smoker     Packs/day: 1.00     Years: 20.00     Pack years: 20.00     Types: Cigarettes   • Smokeless tobacco: Never Used   Substance and Sexual Activity   • Alcohol use: Yes     Alcohol/week: 6.0 standard drinks     Types: 6 Shots of liquor per week     Comment: daily   • Drug use: Not Currently     Types: Fentanyl, Oxycodone     Comment: hx of abuse   • Sexual activity: Defer       Review of Systems  Review of Systems   Constitutional: Positive for activity change, appetite change and fatigue. Negative for chills and diaphoresis.   Gastrointestinal: Positive for abdominal distention and abdominal pain. Negative for blood in stool, constipation, diarrhea, nausea and vomiting.   Genitourinary: Negative for difficulty urinating, dysuria, enuresis, frequency, hematuria and urgency.   Musculoskeletal: Positive for arthralgias and myalgias.   Skin: Negative for color change.   Psychiatric/Behavioral: Positive for decreased concentration and sleep disturbance. The patient is nervous/anxious.        Objective:     /88   Pulse 117   Ht 167.6 cm (66\")   Wt 73.3 kg (161 lb 8 oz)   SpO2 97%   BMI 26.07 kg/m²   Physical Exam  Vitals and nursing note reviewed.   Constitutional:       General: She is not in acute distress.     Appearance: Normal appearance. She is ill-appearing. She is not toxic-appearing or diaphoretic.   Cardiovascular:      Rate and Rhythm: Normal rate and regular rhythm.      Pulses: Normal pulses.      Heart sounds: Normal heart sounds. No murmur heard.  Pulmonary:      Effort: Pulmonary effort is normal. No respiratory distress.      Breath sounds: Normal breath sounds. No wheezing.   Abdominal:      General: Bowel sounds are normal. There is distension.      Palpations: Abdomen is soft. There is fluid wave " and hepatomegaly.      Tenderness: There is abdominal tenderness.      Comments: No umbilical hernia appreciated due to abdominal distention. No hernia appreciated despite asking pt to cough or valsalva    Neurological:      Mental Status: She is alert.   Psychiatric:         Mood and Affect: Affect is tearful.          Assessment/Plan:     Nata Bain is a 37 y.o. female with CMH of alcohol induced pancreatis, alcoholism, tobacco dependence, GERD, opiates dependence who presents today complaining of abdominal distension, abdominal pain, general surgery referral for treatment of umbilical hernia. Patient was sent to ultrasound STAT to see if she has any ascites that can be drained. Pain was managed with IM Toradol shot in the office. Patient was strongly advised to follow up with pain management and was given referral today. No umbilical hernia was appreciated, it is less likely causing the pain however will send her to general surgery for second opinion. Further recommendations will be made based on the ultrasound results.     Diagnoses and all orders for this visit:    1. Umbilical hernia without obstruction and without gangrene (Primary)  -     Ambulatory Referral to General Surgery    2. Alcohol-induced chronic pancreatitis (HCC)  -     Ambulatory Referral to Pain Management  -     Ambulatory Referral to Gastroenterology    3. Distended abdomen  -     US Liver  -     US abdomen limited  -     Ambulatory Referral to Gastroenterology      · Rx changes: none  · Patient Education: alcohol cessation   · Compliance at present is estimated to be poor.   · Efforts to improve compliance (if necessary) will be directed at increased exercise.    Depression screening: Up to date; last screen      Follow-up:     Return in about 4 weeks (around 7/7/2022).    Preventative:  Health Maintenance   Topic Date Due   • ANNUAL PHYSICAL  Never done   • Pneumococcal Vaccine 0-64 (1 - PCV) Never done   • TDAP/TD VACCINES (2 -  Tdap) 08/02/2011   • COVID-19 Vaccine (3 - Booster for Moderna series) 10/03/2021   • PAP SMEAR  10/10/2021   • INFLUENZA VACCINE  08/01/2022   • COLORECTAL CANCER SCREENING  02/02/2031   • HEPATITIS C SCREENING  Completed         Alcohol use:  reports current alcohol use of about 6.0 standard drinks of alcohol per week.  Nicotine status  reports that she has been smoking cigarettes. She has a 20.00 pack-year smoking history. She has never used smokeless tobacco.     Goals    None         RISK SCORE: 3        Erica Jacob MD PGY-2  [facility] Family Medicine Residency   This document has been electronically signed by Erica Jacob MD on June 9, 2022 15:55 CDT

## 2022-06-10 ENCOUNTER — HOSPITAL ENCOUNTER (OUTPATIENT)
Dept: ULTRASOUND IMAGING | Facility: HOSPITAL | Age: 37
Discharge: HOME | End: 2022-06-10
Admitting: RADIOLOGY

## 2022-06-10 PROCEDURE — 76705 ECHO EXAM OF ABDOMEN: CPT

## 2022-06-10 NOTE — PROGRESS NOTES
I have seen the patient.  I have reviewed the notes, assessments, and/or procedures performed by Erica Jacob MD, I concur with her/his documentation and assessment and plan for Nata Bain.               This document has been electronically signed by Humble Hernandez MD on Renetta 10, 2022 16:31 CDT

## 2022-06-15 ENCOUNTER — TELEPHONE (OUTPATIENT)
Dept: FAMILY MEDICINE CLINIC | Facility: CLINIC | Age: 37
End: 2022-06-15

## 2022-06-15 NOTE — TELEPHONE ENCOUNTER
[facility] CALLED STATING THAT THEY HAVE DISMISSED THIS PATIENT BEFORE THE NEW REFERRAL WAS PUT IN AND THAT THEY ARE UNABLE TO TAKE HER ON AS A PATIENT.

## 2022-07-13 ENCOUNTER — OFFICE VISIT (OUTPATIENT)
Dept: SURGERY | Facility: CLINIC | Age: 37
End: 2022-07-13

## 2022-07-13 VITALS
HEART RATE: 102 BPM | OXYGEN SATURATION: 98 % | BODY MASS INDEX: 26.61 KG/M2 | DIASTOLIC BLOOD PRESSURE: 90 MMHG | SYSTOLIC BLOOD PRESSURE: 138 MMHG | WEIGHT: 165.6 LBS | HEIGHT: 66 IN

## 2022-07-13 DIAGNOSIS — R10.9 ABDOMINAL PAIN, UNSPECIFIED ABDOMINAL LOCATION: Primary | ICD-10-CM

## 2022-07-13 PROCEDURE — 99203 OFFICE O/P NEW LOW 30 MIN: CPT | Performed by: SURGERY

## 2022-07-13 RX ORDER — CIPROFLOXACIN 250 MG/1
TABLET, FILM COATED ORAL
COMMUNITY
Start: 2022-06-14 | End: 2023-01-19

## 2022-07-13 RX ORDER — ESCITALOPRAM OXALATE 20 MG/1
TABLET ORAL
COMMUNITY
Start: 2022-06-21 | End: 2023-01-25

## 2022-07-13 RX ORDER — ACAMPROSATE CALCIUM 333 MG/1
TABLET, DELAYED RELEASE ORAL
COMMUNITY
Start: 2022-07-12

## 2022-07-13 NOTE — PROGRESS NOTES
Chief Complaint   Patient presents with   • Hernia     Umbilical hernia ref. Nulu        HPI  37 yr old with multiple issues related to GI function. I was asked to see for consideration of UH repair. No hx of incarceration or intestinal obstruction.  Past Medical History:   Diagnosis Date   • Abnormal Pap smear of cervix    • Alcohol-induced acute pancreatitis 6/1/2020    Results From Last 14 Days Lab Units 02/27/21 1342 LIPASE U/L 65*  -advance diet as tolerated starting with clears -IV fluids   -will cont suboxone and give morphine for breakthrough pain  -Zofran for nausea as needed -Scheduled Ativan, and CIWA protocol - f/u on CT abdomen     • Alcoholism (HCC)    • Anxiety    • Cervical dysplasia    • Depression    • Fibrocystic breast    • GERD (gastroesophageal reflux disease)    • Hypertension    • Seizures (HCC) 2017   • Substance abuse (HCC)    • Substance abuse (HCC)        Past Surgical History:   Procedure Laterality Date   • COLONOSCOPY N/A 2/2/2021    Procedure: COLONOSCOPY;  Surgeon: Mirian Mills MD;  Location: [facility] ENDOSCOPY;  Service: Gastroenterology;  Laterality: N/A;   • ENDOSCOPY N/A 1/8/2021    Procedure: ESOPHAGOGASTRODUODENOSCOPY;  Surgeon: Mirian Mills MD;  Location: [facility] ENDOSCOPY;  Service: Gastroenterology;  Laterality: N/A;   • RHINOPLASTY           Current Outpatient Medications:   •  acamprosate (CAMPRAL) 333 MG EC tablet, , Disp: , Rfl:   •  buprenorphine-naloxone (SUBOXONE) 8-2 MG per SL tablet, Place 1 tablet under the tongue 3 (Three) Times a Day. Patient takes one 8-2 tablet three times a day., Disp: , Rfl:   •  gabapentin (NEURONTIN) 800 MG tablet, Take 800 mg by mouth 3 (Three) Times a Day., Disp: , Rfl:   •  LORazepam (ATIVAN) 2 MG tablet, Take 2 mg by mouth 3 (Three) Times a Day., Disp: , Rfl:   •  mirtazapine (REMERON SOL-TAB) 45 MG disintegrating tablet, , Disp: , Rfl:   •  ciprofloxacin (CIPRO) 250 MG tablet, , Disp: , Rfl:   •  dicyclomine (BENTYL) 20 MG  tablet, Take 1 tablet by mouth Every 6 (Six) Hours As Needed (abdominal cramping)., Disp: 30 tablet, Rfl: 0  •  escitalopram (LEXAPRO) 20 MG tablet, , Disp: , Rfl:   •  esomeprazole (nexIUM) 40 MG capsule, , Disp: , Rfl:   •  mirtazapine (REMERON) 30 MG tablet, , Disp: , Rfl:   •  ondansetron ODT (ZOFRAN-ODT) 4 MG disintegrating tablet, Place 1 tablet on the tongue Every 6 (Six) Hours As Needed for Nausea or Vomiting., Disp: 20 tablet, Rfl: 0  •  prazosin (MINIPRESS) 5 MG capsule, Take 5 mg by mouth Every Night., Disp: , Rfl:   •  promethazine (PHENERGAN) 25 MG suppository, Insert 1 suppository into the rectum Every 6 (Six) Hours As Needed for Nausea or Vomiting., Disp: 12 suppository, Rfl: 0  •  promethazine (PHENERGAN) 25 MG tablet, , Disp: , Rfl:   •  zolpidem (AMBIEN) 10 MG tablet, , Disp: , Rfl:     No Known Allergies    Family History   Problem Relation Age of Onset   • Cancer Mother    • COPD Mother    • Hypertension Father    • Heart disease Father    • Breast cancer Maternal Aunt    • Breast cancer Maternal Grandmother    • COPD Maternal Grandmother    • Heart disease Maternal Grandmother    • Breast cancer Paternal Grandmother        Social History     Socioeconomic History   • Marital status:    Tobacco Use   • Smoking status: Current Every Day Smoker     Packs/day: 1.00     Years: 20.00     Pack years: 20.00     Types: Cigarettes   • Smokeless tobacco: Never Used   Substance and Sexual Activity   • Alcohol use: Yes     Alcohol/week: 6.0 standard drinks     Types: 6 Shots of liquor per week     Comment: daily   • Drug use: Not Currently     Types: Fentanyl, Oxycodone     Comment: hx of abuse   • Sexual activity: Defer       Review of Systems   Constitutional: Positive for appetite change and unexpected weight change.   HENT: Negative.    Eyes: Negative.    Respiratory: Negative.    Cardiovascular: Positive for leg swelling.   Gastrointestinal: Positive for abdominal distention, abdominal pain,  diarrhea, nausea and vomiting.   Endocrine: Negative.    Genitourinary: Positive for difficulty urinating and frequency.   Musculoskeletal: Negative.    Skin: Negative.    Allergic/Immunologic: Negative.    Neurological: Negative.    Hematological: Bruises/bleeds easily.   Psychiatric/Behavioral: Negative.        Physical Exam  Vitals reviewed.   Constitutional:       Appearance: Normal appearance.   HENT:      Head: Normocephalic and atraumatic.   Cardiovascular:      Rate and Rhythm: Normal rate and regular rhythm.   Pulmonary:      Effort: Pulmonary effort is normal. No respiratory distress.   Abdominal:      General: Abdomen is flat. There is no distension.      Palpations: Abdomen is soft. There is no mass.      Tenderness: There is no abdominal tenderness. There is no guarding or rebound.      Hernia: No hernia ( There is no appreciable UH) is present.   Musculoskeletal:         General: Normal range of motion.      Cervical back: Normal range of motion and neck supple.   Skin:     General: Skin is warm and dry.   Neurological:      Mental Status: She is alert.     Study Result    Narrative & Impression   EXAM:  CT Abdomen and Pelvis With Intravenous Contrast     CLINICAL HISTORY:  abdominal pain, nausea/vomiting, diarrhea     TECHNIQUE:  Axial computed tomography images of the abdomen and pelvis with  intravenous contrast.  Sagittal and coronal reformatted images  were created and reviewed.  This CT exam was performed using one  or more of the following dose reduction techniques:  automated  exposure control, adjustment of the mA and/or kV according to  patient size, and/or use of iterative reconstruction technique.     COMPARISON:  CT Abdomen Pelvis dated 10/4/2021     FINDINGS:  Lung bases:  Unremarkable.  No mass.  No consolidation.     ABDOMEN:  Liver:  The liver is enlarged.  Gallbladder and bile ducts:  Incidental note is made of a  phrygian cap at the level of the gallbladder fundus.  No  calcified  stones.  No ductal dilation.  Pancreas:  Unremarkable.  No mass.  No ductal dilation.  Spleen:  Unremarkable.  No splenomegaly.  Adrenals:  Unremarkable.  No mass.  Kidneys and ureters:  Unremarkable.  No solid mass.  No  hydronephrosis.  Stomach and bowel:  Intramural fat within portions of the large  bowel which can be seen in the setting of prior inflammation. No  obstruction. No appreciable mucosal thickening.     PELVIS:  Appendix:  Normal caliber appendix.  No findings to suggest acute  appendicitis.  Bladder:  Mild circumferential urinary bladder wall thickening.  Reproductive:  There is a 2.5 x 2 x 1.8 cm right ovarian corpus  luteal cyst.  The uterus and left ovary are unremarkable as  visualized.     ABDOMEN and PELVIS:  Intraperitoneal space:  Unremarkable.  No free air.  No  significant fluid collection.  Bones/joints:  Mild multilevel spondylosis. No acute fracture.   No dislocation.  Soft tissues:  Small fat-containing umbilical hernia.  Vasculature:  Mild atherosclerotic disease.  No abdominal aortic  aneurysm.  Lymph nodes:  Unremarkable.  No enlarged lymph nodes.     IMPRESSION:  1.  No appreciable inflammatory process identified within the  bowel.   2.  Mild circumferential urinary bladder wall thickening. Please  correlate clinically for cystitis.  3.  There is a 2.5 cm right ovarian corpus luteal cyst. No  follow-up imaging is recommended. Reference: Radiology 2010  Sep;256(3):943-54  4.  Other findings as above.     Electronically signed by:  Salvatore Samuel MD  3/30/2022 7:50 PM CDT  Workstation: [id number]     I have personally reviewed the imaging and concur with the radiologist's findings.- there is no appreciable UH on CT      ASSESSMENT    Diagnoses and all orders for this visit:    1. Abdominal pain, unspecified abdominal location (Primary)        PLAN    1. No surgery is currently suggested              This document has been electronically signed by Mark Dowling MD on July 17, 2022 17:12  CDT

## 2022-09-14 ENCOUNTER — OFFICE VISIT (OUTPATIENT)
Dept: FAMILY MEDICINE CLINIC | Facility: CLINIC | Age: 37
End: 2022-09-14

## 2022-09-14 VITALS
BODY MASS INDEX: 27.58 KG/M2 | WEIGHT: 171.6 LBS | DIASTOLIC BLOOD PRESSURE: 88 MMHG | HEIGHT: 66 IN | TEMPERATURE: 97.1 F | OXYGEN SATURATION: 96 % | SYSTOLIC BLOOD PRESSURE: 150 MMHG | HEART RATE: 116 BPM

## 2022-09-14 DIAGNOSIS — N90.7 LABIAL CYST: Primary | ICD-10-CM

## 2022-09-14 PROCEDURE — 99213 OFFICE O/P EST LOW 20 MIN: CPT | Performed by: STUDENT IN AN ORGANIZED HEALTH CARE EDUCATION/TRAINING PROGRAM

## 2022-09-14 NOTE — PROGRESS NOTES
Family Medicine Residency  Erica Jacob MD    Subjective:     Nata Bain is a 37 y.o. female with CMH of alcohol induced pancreatis, alcoholism, tobacco dependence, GERD, opiates dependence who presents today with complains of a month history of left labial cyst which is painful and has not been draining. She has tried warm compresses, hot baths, the cyst did not ripen and is not draining. There is no opening. Patient reports she previously had similar cysts in the labial regions that required drainage in the office. Patient reportedly did not see gyn to get it removed. Patient requested it to be drained in the office despite the lesion being still hard to touch without any opening or ripening. Patient denies any fevers/chills, dizziness, nausea, vomiting or lightheadedness.   Patient was teary in the office and requested for pain medication. Patient is already seeing pain management and rehab center.     The following portions of the patient's history were reviewed and updated as appropriate: allergies, current medications, past family history, past medical history, past social history, past surgical history and problem list.    Past Medical Hx:  Past Medical History:   Diagnosis Date   • Abnormal Pap smear of cervix    • Alcohol-induced acute pancreatitis 6/1/2020    Results From Last 14 Days Lab Units 02/27/21 1342 LIPASE U/L 65*  -advance diet as tolerated starting with clears -IV fluids   -will cont suboxone and give morphine for breakthrough pain  -Zofran for nausea as needed -Scheduled Ativan, and CIWA protocol - f/u on CT abdomen     • Alcoholism (HCC)    • Anxiety    • Cervical dysplasia    • Depression    • Fibrocystic breast    • GERD (gastroesophageal reflux disease)    • Hypertension    • Seizures (HCC) 2017   • Substance abuse (HCC)    • Substance abuse (HCC)        Past Surgical Hx:  Past Surgical History:   Procedure Laterality Date   • COLONOSCOPY N/A 2/2/2021    Procedure: COLONOSCOPY;   Surgeon: Mirian Mills MD;  Location: [facility] ENDOSCOPY;  Service: Gastroenterology;  Laterality: N/A;   • ENDOSCOPY N/A 1/8/2021    Procedure: ESOPHAGOGASTRODUODENOSCOPY;  Surgeon: Mirian Mills MD;  Location: [facility] ENDOSCOPY;  Service: Gastroenterology;  Laterality: N/A;   • RHINOPLASTY         Current Meds:    Current Outpatient Medications:   •  buprenorphine-naloxone (SUBOXONE) 8-2 MG per SL tablet, Place 1 tablet under the tongue 3 (Three) Times a Day. Patient takes one 8-2 tablet three times a day., Disp: , Rfl:   •  gabapentin (NEURONTIN) 800 MG tablet, Take 800 mg by mouth 3 (Three) Times a Day., Disp: , Rfl:   •  LORazepam (ATIVAN) 2 MG tablet, Take 2 mg by mouth 3 (Three) Times a Day., Disp: , Rfl:   •  mirtazapine (REMERON SOL-TAB) 45 MG disintegrating tablet, , Disp: , Rfl:   •  mirtazapine (REMERON) 30 MG tablet, , Disp: , Rfl:   •  ondansetron ODT (ZOFRAN-ODT) 4 MG disintegrating tablet, Place 1 tablet on the tongue Every 6 (Six) Hours As Needed for Nausea or Vomiting., Disp: 20 tablet, Rfl: 0  •  prazosin (MINIPRESS) 5 MG capsule, Take 5 mg by mouth Every Night., Disp: , Rfl:   •  promethazine (PHENERGAN) 25 MG suppository, Insert 1 suppository into the rectum Every 6 (Six) Hours As Needed for Nausea or Vomiting., Disp: 12 suppository, Rfl: 0  •  promethazine (PHENERGAN) 25 MG tablet, , Disp: , Rfl:   •  acamprosate (CAMPRAL) 333 MG EC tablet, , Disp: , Rfl:   •  ciprofloxacin (CIPRO) 250 MG tablet, , Disp: , Rfl:   •  dicyclomine (BENTYL) 20 MG tablet, Take 1 tablet by mouth Every 6 (Six) Hours As Needed (abdominal cramping)., Disp: 30 tablet, Rfl: 0  •  escitalopram (LEXAPRO) 20 MG tablet, , Disp: , Rfl:   •  esomeprazole (nexIUM) 40 MG capsule, , Disp: , Rfl:   •  zolpidem (AMBIEN) 10 MG tablet, , Disp: , Rfl:     Allergies:  No Known Allergies    Family Hx:  Family History   Problem Relation Age of Onset   • Cancer Mother    • COPD Mother    • Hypertension Father    • Heart disease  "Father    • Breast cancer Maternal Aunt    • Breast cancer Maternal Grandmother    • COPD Maternal Grandmother    • Heart disease Maternal Grandmother    • Breast cancer Paternal Grandmother         Social History:  Social History     Socioeconomic History   • Marital status:    Tobacco Use   • Smoking status: Current Every Day Smoker     Packs/day: 1.00     Years: 20.00     Pack years: 20.00     Types: Cigarettes   • Smokeless tobacco: Never Used   Substance and Sexual Activity   • Alcohol use: Yes     Alcohol/week: 6.0 standard drinks     Types: 6 Shots of liquor per week     Comment: daily   • Drug use: Not Currently     Types: Fentanyl, Oxycodone     Comment: hx of abuse   • Sexual activity: Defer       Review of Systems  Review of Systems   Constitutional: Negative for activity change, appetite change, chills, diaphoresis and fatigue.   Gastrointestinal: Negative for nausea and vomiting.   Genitourinary: Negative for difficulty urinating, dysuria, enuresis, frequency, hematuria, urgency, vaginal discharge and vaginal pain.        Labial lesion on the left side    Musculoskeletal: Negative for arthralgias and myalgias.   Skin: Negative for color change.   Neurological: Negative for dizziness, weakness and light-headedness.       Objective:     /88   Pulse 116   Temp 97.1 °F (36.2 °C)   Ht 167.6 cm (66\")   Wt 77.8 kg (171 lb 9.6 oz)   SpO2 96%   BMI 27.70 kg/m²   Physical Exam  Vitals and nursing note reviewed.   Constitutional:       General: She is not in acute distress.     Appearance: Normal appearance. She is not ill-appearing, toxic-appearing or diaphoretic.   Cardiovascular:      Rate and Rhythm: Normal rate and regular rhythm.      Pulses: Normal pulses.      Heart sounds: Normal heart sounds. No murmur heard.  Pulmonary:      Effort: Pulmonary effort is normal. No respiratory distress.      Breath sounds: Normal breath sounds. No wheezing.   Genitourinary:     Labia:         Right: " Tenderness present.         Left: Tenderness present.       Comments: Pea sized hard mobile labial cyst present on the left, no opening noted. No drainage, no erythema noted   Tender to touch.   Neurological:      Mental Status: She is alert.   Psychiatric:         Mood and Affect: Affect is tearful.          Assessment/Plan:     Nata Bain is a 37 y.o. female with CMH of alcohol induced pancreatis, alcoholism, tobacco dependence, GERD, opiates dependence who presents today complaining of pain related to left labial cyst and requesting drainage in the office. Per physical exam, the labial cyst is still hard to touch, not ripen, no opening and not draining. Due to the lesion being in the sensitive area, pt will need gynecologist to do the procedure in the office. Patient was advised to continue warm compresses and sitz bath to help drain the cyst. Since patient had prior lesions, will need further evaluation by the specialist.   Patient was strongly advised to call office if she develops any fevers/chills, erythema or signs of infection in the area. If the symptoms worsen then she will benefit from antibiotics therapy. Patient agreeable with the plan today.     Diagnoses and all orders for this visit:    1. Labial cyst (Primary)  -     Ambulatory Referral to Gynecology      · Rx changes: none  · Patient Education: alcohol cessation   · Compliance at present is estimated to be poor.   · Efforts to improve compliance (if necessary) will be directed at increased exercise.    Depression screening: Up to date; last screen      Follow-up:     Return in about 4 weeks (around 10/12/2022) for Recheck.    Preventative:  Health Maintenance   Topic Date Due   • ANNUAL PHYSICAL  Never done   • Pneumococcal Vaccine 0-64 (1 - PCV) Never done   • TDAP/TD VACCINES (2 - Tdap) 08/02/2011   • COVID-19 Vaccine (3 - Booster for Moderna series) 10/03/2021   • PAP SMEAR  10/10/2021   • INFLUENZA VACCINE  10/01/2022   • COLORECTAL  CANCER SCREENING  02/02/2031   • HEPATITIS C SCREENING  Completed         Alcohol use:  reports current alcohol use of about 6.0 standard drinks of alcohol per week.  Nicotine status  reports that she has been smoking cigarettes. She has a 20.00 pack-year smoking history. She has never used smokeless tobacco.     Goals    None         RISK SCORE: 3        Erica Jacob MD PGY-3  [facility]   This document has been electronically signed by Erica Jacob MD on September 15, 2022 16:13 CDT

## 2022-09-20 NOTE — PROGRESS NOTES
I have seen the patient.  I have reviewed the notes, assessments, and/or procedures performed by Erica Jacob MD during office visit I concur with her/his documentation and assessment and plan for Nata Bain.            This document has been electronically signed by Chris Vazquez MD on September 20, 2022 15:36 CDT

## 2022-09-26 ENCOUNTER — PATIENT ROUNDING (BHMG ONLY) (OUTPATIENT)
Dept: OBSTETRICS AND GYNECOLOGY | Facility: CLINIC | Age: 37
End: 2022-09-26

## 2022-09-26 NOTE — PROGRESS NOTES
September 26, 2022    Hello, may I speak with Nata Bain?    My name is Katerine    I am  with FLOR [facility]ZZI  [facility]  [facility] [address]  [facility] [id number] [phone].    Before we get started may I verify your date of birth? 1985    I am calling to officially welcome you to our practice and ask about your recent visit. Is this a good time to talk? No-Voicemail not set up    Tell me about your visit with us. What things went well?         We're always looking for ways to make our patients' experiences even better. Do you have recommendations on ways we may improve?  {    Overall were you satisfied with your first visit to our practice?        I appreciate you taking the time to speak with me today. Is there anything else I can do for you?       Thank you, and have a great day.

## 2023-01-15 ENCOUNTER — APPOINTMENT (OUTPATIENT)
Dept: CT IMAGING | Facility: HOSPITAL | Age: 38
End: 2023-01-15
Payer: MEDICAID

## 2023-01-15 ENCOUNTER — HOSPITAL ENCOUNTER (EMERGENCY)
Facility: HOSPITAL | Age: 38
Discharge: HOME | End: 2023-01-15
Attending: EMERGENCY MEDICINE | Admitting: EMERGENCY MEDICINE
Payer: MEDICAID

## 2023-01-15 VITALS
SYSTOLIC BLOOD PRESSURE: 146 MMHG | TEMPERATURE: 97.6 F | WEIGHT: 170 LBS | RESPIRATION RATE: 18 BRPM | HEART RATE: 84 BPM | BODY MASS INDEX: 26.68 KG/M2 | HEIGHT: 67 IN | DIASTOLIC BLOOD PRESSURE: 86 MMHG | OXYGEN SATURATION: 96 %

## 2023-01-15 DIAGNOSIS — K21.01 GASTROESOPHAGEAL REFLUX DISEASE WITH ESOPHAGITIS AND HEMORRHAGE: ICD-10-CM

## 2023-01-15 DIAGNOSIS — R68.89 WITHDRAWAL COMPLAINT: ICD-10-CM

## 2023-01-15 DIAGNOSIS — R11.2 NAUSEA AND VOMITING, UNSPECIFIED VOMITING TYPE: Primary | ICD-10-CM

## 2023-01-15 LAB
ALBUMIN SERPL-MCNC: 4.7 G/DL (ref 3.5–5.2)
ALBUMIN/GLOB SERPL: 1.3 G/DL
ALP SERPL-CCNC: 148 U/L (ref 39–117)
ALT SERPL W P-5'-P-CCNC: 77 U/L (ref 1–33)
ANION GAP SERPL CALCULATED.3IONS-SCNC: 18 MMOL/L (ref 5–15)
AST SERPL-CCNC: 144 U/L (ref 1–32)
BASOPHILS # BLD AUTO: 0.05 10*3/MM3 (ref 0–0.2)
BASOPHILS NFR BLD AUTO: 0.4 % (ref 0–1.5)
BILIRUB SERPL-MCNC: 1.1 MG/DL (ref 0–1.2)
BUN SERPL-MCNC: 8 MG/DL (ref 6–20)
BUN/CREAT SERPL: 13.1 (ref 7–25)
CALCIUM SPEC-SCNC: 10.2 MG/DL (ref 8.6–10.5)
CHLORIDE SERPL-SCNC: 89 MMOL/L (ref 98–107)
CO2 SERPL-SCNC: 24 MMOL/L (ref 22–29)
CREAT SERPL-MCNC: 0.61 MG/DL (ref 0.57–1)
DEPRECATED RDW RBC AUTO: 45.4 FL (ref 37–54)
EGFRCR SERPLBLD CKD-EPI 2021: 119 ML/MIN/1.73
EOSINOPHIL # BLD AUTO: 0.01 10*3/MM3 (ref 0–0.4)
EOSINOPHIL NFR BLD AUTO: 0.1 % (ref 0.3–6.2)
ERYTHROCYTE [DISTWIDTH] IN BLOOD BY AUTOMATED COUNT: 12.6 % (ref 12.3–15.4)
ETHANOL BLD-MCNC: <10 MG/DL (ref 0–10)
ETHANOL UR QL: <0.01 %
GLOBULIN UR ELPH-MCNC: 3.7 GM/DL
GLUCOSE SERPL-MCNC: 145 MG/DL (ref 65–99)
HCG SERPL QL: NEGATIVE
HCT VFR BLD AUTO: 47.3 % (ref 34–46.6)
HGB BLD-MCNC: 17 G/DL (ref 12–15.9)
IMM GRANULOCYTES # BLD AUTO: 0.05 10*3/MM3 (ref 0–0.05)
IMM GRANULOCYTES NFR BLD AUTO: 0.4 % (ref 0–0.5)
LIPASE SERPL-CCNC: 23 U/L (ref 13–60)
LYMPHOCYTES # BLD AUTO: 1.75 10*3/MM3 (ref 0.7–3.1)
LYMPHOCYTES NFR BLD AUTO: 14.4 % (ref 19.6–45.3)
MAGNESIUM SERPL-MCNC: 1.6 MG/DL (ref 1.6–2.6)
MCH RBC QN AUTO: 35.2 PG (ref 26.6–33)
MCHC RBC AUTO-ENTMCNC: 35.9 G/DL (ref 31.5–35.7)
MCV RBC AUTO: 97.9 FL (ref 79–97)
MONOCYTES # BLD AUTO: 0.8 10*3/MM3 (ref 0.1–0.9)
MONOCYTES NFR BLD AUTO: 6.6 % (ref 5–12)
NEUTROPHILS NFR BLD AUTO: 78.1 % (ref 42.7–76)
NEUTROPHILS NFR BLD AUTO: 9.53 10*3/MM3 (ref 1.7–7)
NRBC BLD AUTO-RTO: 0 /100 WBC (ref 0–0.2)
PLATELET # BLD AUTO: 260 10*3/MM3 (ref 140–450)
PMV BLD AUTO: 9.3 FL (ref 6–12)
POTASSIUM SERPL-SCNC: 3.2 MMOL/L (ref 3.5–5.2)
PROT SERPL-MCNC: 8.4 G/DL (ref 6–8.5)
RBC # BLD AUTO: 4.83 10*6/MM3 (ref 3.77–5.28)
SODIUM SERPL-SCNC: 131 MMOL/L (ref 136–145)
TROPONIN T SERPL-MCNC: <0.01 NG/ML (ref 0–0.03)
WBC NRBC COR # BLD: 12.19 10*3/MM3 (ref 3.4–10.8)
WHOLE BLOOD HOLD COAG: NORMAL

## 2023-01-15 PROCEDURE — 25010000002 MORPHINE PER 10 MG: Performed by: EMERGENCY MEDICINE

## 2023-01-15 PROCEDURE — 84484 ASSAY OF TROPONIN QUANT: CPT | Performed by: EMERGENCY MEDICINE

## 2023-01-15 PROCEDURE — 25010000002 METOCLOPRAMIDE PER 10 MG: Performed by: EMERGENCY MEDICINE

## 2023-01-15 PROCEDURE — 83690 ASSAY OF LIPASE: CPT | Performed by: EMERGENCY MEDICINE

## 2023-01-15 PROCEDURE — 96374 THER/PROPH/DIAG INJ IV PUSH: CPT

## 2023-01-15 PROCEDURE — 84703 CHORIONIC GONADOTROPIN ASSAY: CPT | Performed by: EMERGENCY MEDICINE

## 2023-01-15 PROCEDURE — 82077 ASSAY SPEC XCP UR&BREATH IA: CPT | Performed by: EMERGENCY MEDICINE

## 2023-01-15 PROCEDURE — 25010000002 ONDANSETRON PER 1 MG: Performed by: EMERGENCY MEDICINE

## 2023-01-15 PROCEDURE — 85025 COMPLETE CBC W/AUTO DIFF WBC: CPT | Performed by: EMERGENCY MEDICINE

## 2023-01-15 PROCEDURE — 25010000002 LORAZEPAM PER 2 MG: Performed by: EMERGENCY MEDICINE

## 2023-01-15 PROCEDURE — 74176 CT ABD & PELVIS W/O CONTRAST: CPT

## 2023-01-15 PROCEDURE — 80053 COMPREHEN METABOLIC PANEL: CPT | Performed by: EMERGENCY MEDICINE

## 2023-01-15 PROCEDURE — 83735 ASSAY OF MAGNESIUM: CPT | Performed by: EMERGENCY MEDICINE

## 2023-01-15 PROCEDURE — 99284 EMERGENCY DEPT VISIT MOD MDM: CPT

## 2023-01-15 PROCEDURE — 96375 TX/PRO/DX INJ NEW DRUG ADDON: CPT

## 2023-01-15 RX ORDER — ONDANSETRON 4 MG/1
4 TABLET, ORALLY DISINTEGRATING ORAL 4 TIMES DAILY PRN
Qty: 8 TABLET | Refills: 0 | Status: SHIPPED | OUTPATIENT
Start: 2023-01-15 | End: 2023-01-19

## 2023-01-15 RX ORDER — PANTOPRAZOLE SODIUM 40 MG/10ML
40 INJECTION, POWDER, LYOPHILIZED, FOR SOLUTION INTRAVENOUS ONCE
Status: COMPLETED | OUTPATIENT
Start: 2023-01-15 | End: 2023-01-15

## 2023-01-15 RX ORDER — LORAZEPAM 2 MG/ML
1 INJECTION INTRAMUSCULAR ONCE
Status: COMPLETED | OUTPATIENT
Start: 2023-01-15 | End: 2023-01-15

## 2023-01-15 RX ORDER — METOCLOPRAMIDE HYDROCHLORIDE 5 MG/ML
10 INJECTION INTRAMUSCULAR; INTRAVENOUS ONCE
Status: COMPLETED | OUTPATIENT
Start: 2023-01-15 | End: 2023-01-15

## 2023-01-15 RX ORDER — SUCRALFATE 1 G/1
1 TABLET ORAL 4 TIMES DAILY
Qty: 28 TABLET | Refills: 0 | Status: SHIPPED | OUTPATIENT
Start: 2023-01-15 | End: 2023-01-22

## 2023-01-15 RX ORDER — POTASSIUM CHLORIDE 1.5 G/1.77G
40 POWDER, FOR SOLUTION ORAL ONCE
Status: COMPLETED | OUTPATIENT
Start: 2023-01-15 | End: 2023-01-15

## 2023-01-15 RX ORDER — ONDANSETRON 2 MG/ML
4 INJECTION INTRAMUSCULAR; INTRAVENOUS ONCE
Status: COMPLETED | OUTPATIENT
Start: 2023-01-15 | End: 2023-01-15

## 2023-01-15 RX ADMIN — SODIUM CHLORIDE 1000 ML: 9 INJECTION, SOLUTION INTRAVENOUS at 17:04

## 2023-01-15 RX ADMIN — METOCLOPRAMIDE 10 MG: 5 INJECTION, SOLUTION INTRAMUSCULAR; INTRAVENOUS at 17:09

## 2023-01-15 RX ADMIN — PANTOPRAZOLE SODIUM 40 MG: 40 INJECTION, POWDER, FOR SOLUTION INTRAVENOUS at 15:38

## 2023-01-15 RX ADMIN — POTASSIUM CHLORIDE 40 MEQ: 1.5 POWDER, FOR SOLUTION ORAL at 18:05

## 2023-01-15 RX ADMIN — SODIUM CHLORIDE 1000 ML: 9 INJECTION, SOLUTION INTRAVENOUS at 15:37

## 2023-01-15 RX ADMIN — MORPHINE SULFATE 4 MG: 4 INJECTION, SOLUTION INTRAMUSCULAR; INTRAVENOUS at 15:40

## 2023-01-15 RX ADMIN — ONDANSETRON 4 MG: 2 INJECTION INTRAMUSCULAR; INTRAVENOUS at 15:37

## 2023-01-15 RX ADMIN — LORAZEPAM 1 MG: 2 INJECTION INTRAMUSCULAR; INTRAVENOUS at 15:42

## 2023-01-15 NOTE — ED PROVIDER NOTES
Subjective   History of Present Illness  37 years old female with history of anxiety, depression, alcoholism, seizure disorder, substance abuse, chronic pain on Suboxone presented in the ER with 4-5 days history of upper abdominal pain with multiple episodes of nonprojectile, nonbilious vomiting with some blood-tinged vomitus couple of times today after dry heaving.  Patient is not able to hold anything down.  Feels weak fatigued tired dehydrated.  She is having generalized shaking/tremors and very anxious.  Denies any sick contact.    History provided by:  Patient      Review of Systems   Constitutional: Positive for fatigue. Negative for chills and fever.   HENT: Negative for congestion, nosebleeds, sinus pressure, sinus pain and sore throat.    Respiratory: Negative for chest tightness and shortness of breath.    Cardiovascular: Negative for chest pain and palpitations.   Gastrointestinal: Positive for abdominal pain, nausea and vomiting.   Genitourinary: Negative for flank pain.   Musculoskeletal: Positive for back pain and myalgias.   Skin: Negative for color change.   Neurological: Positive for tremors. Negative for syncope and headaches.   Psychiatric/Behavioral: Negative for agitation.       Past Medical History:   Diagnosis Date   • Abnormal Pap smear of cervix    • Alcohol-induced acute pancreatitis 6/1/2020    Results From Last 14 Days Lab Units 02/27/21 1342 LIPASE U/L 65*  -advance diet as tolerated starting with clears -IV fluids   -will cont suboxone and give morphine for breakthrough pain  -Zofran for nausea as needed -Scheduled Ativan, and CIWA protocol - f/u on CT abdomen     • Alcoholism (HCC)    • Anxiety    • Cervical dysplasia    • Depression    • Fibrocystic breast    • GERD (gastroesophageal reflux disease)    • Hypertension    • Seizures (HCC) 2017   • Substance abuse (HCC)    • Substance abuse (HCC)        No Known Allergies    Past Surgical History:   Procedure Laterality Date   •  COLONOSCOPY N/A 2/2/2021    Procedure: COLONOSCOPY;  Surgeon: Mirian Mills MD;  Location: [facility] ENDOSCOPY;  Service: Gastroenterology;  Laterality: N/A;   • ENDOSCOPY N/A 1/8/2021    Procedure: ESOPHAGOGASTRODUODENOSCOPY;  Surgeon: Mirian Mills MD;  Location: [facility] ENDOSCOPY;  Service: Gastroenterology;  Laterality: N/A;   • RHINOPLASTY         Family History   Problem Relation Age of Onset   • Breast cancer Mother    • Cancer Mother    • COPD Mother    • Hypertension Father    • Heart disease Father    • Breast cancer Maternal Grandmother    • COPD Maternal Grandmother    • Heart disease Maternal Grandmother    • Breast cancer Paternal Grandmother    • Breast cancer Maternal Aunt        Social History     Socioeconomic History   • Marital status:    Tobacco Use   • Smoking status: Every Day     Packs/day: 1.00     Years: 20.00     Pack years: 20.00     Types: Cigarettes   • Smokeless tobacco: Never   Substance and Sexual Activity   • Alcohol use: Yes     Alcohol/week: 6.0 standard drinks     Types: 6 Shots of liquor per week     Comment: daily   • Drug use: Not Currently     Types: Fentanyl, Oxycodone     Comment: hx of abuse   • Sexual activity: Defer           Objective   Physical Exam  Vitals and nursing note reviewed.   Constitutional:       Appearance: She is well-developed.   HENT:      Head: Normocephalic.      Mouth/Throat:      Mouth: Mucous membranes are moist.   Eyes:      Extraocular Movements: Extraocular movements intact.      Pupils: Pupils are equal, round, and reactive to light.   Cardiovascular:      Rate and Rhythm: Normal rate and regular rhythm.   Pulmonary:      Effort: Pulmonary effort is normal.      Breath sounds: Normal breath sounds.   Abdominal:      General: Bowel sounds are normal.      Palpations: Abdomen is soft.      Tenderness: There is abdominal tenderness in the right upper quadrant, epigastric area, periumbilical area and left upper quadrant.   Skin:      General: Skin is warm.      Capillary Refill: Capillary refill takes less than 2 seconds.   Neurological:      General: No focal deficit present.      Mental Status: She is alert and oriented to person, place, and time.   Psychiatric:         Mood and Affect: Mood is anxious.         Procedures           ED Course                                           Medical Decision Making  37-year-old is evaluated for upper abdominal pain with nausea vomiting.  Patient has history of alcohol abuse and has not been drinking enough for the last few days, was tremorous, given fluids and Ativan, on reevaluation she is feeling much better.  Work-up showed white count of 12, chemistries mildly low sodium and potassium, given oral replacement.  CT abdomen pelvis showed diffuse fatty changes of the liver with hepatomegaly and no other acute intra-abdominal findings.  She is given Protonix and Zofran/Reglan and feeling much better.  No peritoneal signs on repeated evaluations.  I believe patient has some withdrawals with not able to take Suboxone and not able to drink much alcohol.  She is counseled about cutting down on alcohol and about rehab.  We will give her Zofran for nausea and will also give her Carafate.  I believe patient was earlier having some blood-tinged with possible Marybeth-Valverde tear and Carafate will help.  Do not think patient needs to be admitted and is stable for discharge with outpatient follow-up.    Amount and/or Complexity of Data Reviewed  Labs: ordered. Decision-making details documented in ED Course.  Radiology: ordered. Decision-making details documented in ED Course.      Risk  Prescription drug management.        Labs Reviewed   COMPREHENSIVE METABOLIC PANEL - Abnormal; Notable for the following components:       Result Value    Glucose 145 (*)     Sodium 131 (*)     Potassium 3.2 (*)     Chloride 89 (*)     ALT (SGPT) 77 (*)     AST (SGOT) 144 (*)     Alkaline Phosphatase 148 (*)     Anion Gap 18.0 (*)      All other components within normal limits    Narrative:     GFR Normal >60  Chronic Kidney Disease <60  Kidney Failure <15     CBC WITH AUTO DIFFERENTIAL - Abnormal; Notable for the following components:    WBC 12.19 (*)     Hemoglobin 17.0 (*)     Hematocrit 47.3 (*)     MCV 97.9 (*)     MCH 35.2 (*)     MCHC 35.9 (*)     Neutrophil % 78.1 (*)     Lymphocyte % 14.4 (*)     Eosinophil % 0.1 (*)     Neutrophils, Absolute 9.53 (*)     All other components within normal limits   LIPASE - Normal   HCG, SERUM, QUALITATIVE - Normal   TROPONIN (IN-HOUSE) - Normal    Narrative:     Troponin T Reference Range:  <= 0.03 ng/mL-   Negative for AMI  >0.03 ng/mL-     Abnormal for myocardial necrosis.  Clinicians would have to utilize clinical acumen, EKG, Troponin and serial changes to determine if it is an Acute Myocardial Infarction or myocardial injury due to an underlying chronic condition.       Results may be falsely decreased if patient taking Biotin.     MAGNESIUM - Normal   ETHANOL   CBC AND DIFFERENTIAL    Narrative:     The following orders were created for panel order CBC & Differential.  Procedure                               Abnormality         Status                     ---------                               -----------         ------                     CBC Auto Differential[[id number]]        Abnormal            Final result                 Please view results for these tests on the individual orders.   EXTRA TUBES    Narrative:     The following orders were created for panel order Extra Tubes.  Procedure                               Abnormality         Status                     ---------                               -----------         ------                     Light Blue Top[[id number]]                                   Final result                 Please view results for these tests on the individual orders.   LIGHT BLUE TOP       CT Abdomen Pelvis Without Contrast    Result Date: 1/15/2023  Narrative:  EXAM: CT ABDOMEN PELVIS WITHOUT IV CONTRAST ORDERING PROVIDER: LETTY HELLER CLINICAL HISTORY: upper abd pain COMPARISON: 3/30/2022 TECHNIQUE: CT abdomen and pelvis performed without IV or oral contrast, reformatted in the sagittal and coronal planes. This examination was performed according to our departmental dose optimization program which includes automated exposure control, adjustment of the MA and kV according to patient size, and/or use of iterative reconstruction technique. FINDINGS: BASILAR CHEST: No consolidation, nodule or effusion. LIVER: No mass, or abnormal density.Diffuse fatty change with hepatomegaly. BILIARY TRACT: Unremarkable gallbladder. SPLEEN: No mass or enlargement. PANCREAS: No mass or inflammatory process. Normal pancreatic duct ADRENAL GLANDS: Unremarkable. No mass. URINARY SYSTEM: Kidneys are normal in size. No stone, hydronephrosis, or mass. Normal ureters.  Bladder is normal without mass or stone.  GI TRACT: No mass, dilation, or wall thickening.  No diverticula.  No hernia.  Appendix is normal.  REPRODUCTIVE SYSTEM: Unremarkable PERITONEAL SPACE:No free air, free fluid, mass or adenopathy. RETROPERITONEAL SPACE:  No adenopathy, mass, aneurysm or significant vascular abnormality. BONES AND EXTRA-ABDOMINAL SOFT TISSUES: No aggressive osseous lesion.  No inguinal adenopathy or hernia.     Impression: Diffuse fatty change of liver with hepatomegaly. Appendix is normal. No evidence of obstructive uropathy on either side. No evidence of bowel obstruction or perienteric inflammation. Electronically signed by:  Kenneth Phipps MD  1/15/2023 4:26 PM CST Workstation: [id number]    US Breast Left Limited    Result Date: 1/26/2023  Narrative: PROCEDURE: Bilateral diagnostic mammogram with 3-D tomosynthesis with CAD REASON FOR EXAM: Painful left breast area with history of breast malignancy in mother. FINDINGS: Comparison study dated 10/24/2018. Heterogeneously dense fibroglandular tissue. Asymmetric  density in the left breast subareolar region in region of breast pain. This area was further evaluated with ultrasound. On ultrasound in the left breast retroareolar region there is an irregular-shaped mass lesion. This lesion measures 2.25 x 2.56 x 0.92 cm. This lesion becomes indistinct with the retroareolar skin and has associated retroareolar skin thickening in this region. No other demonstrated left breast suspicious mass. Patient unable to tolerate much compression on mammography. Left breast upper quadrant asymmetric density was therefore further evaluated with left breast ultrasound. On ultrasound this asymmetric density represents dense normal-appearing fibroglandular tissue in the 2:00 position. No malignant-type microcalcifications. Parenchymal pattern: Heterogeneously dense fibroglandular tissue.     Impression: Left breast subareolar/retroareolar mass lesion with associated skin thickening described above which is highly suggestive of malignancy. Biopsy is recommended to further evaluate. BI-RADS category 5: Highly suggestive of malignancy. Recommendation:  Ultrasound biopsy recommended. Electronically signed by:  Doug Calvo MD  1/26/2023 4:22 PM CST Workstation: [id number]    Mammo Diagnostic Digital Tomosynthesis Bilateral With CAD    Result Date: 1/26/2023  Narrative: PROCEDURE: Bilateral diagnostic mammogram with 3-D tomosynthesis with CAD REASON FOR EXAM: Painful left breast area with history of breast malignancy in mother. FINDINGS: Comparison study dated 10/24/2018. Heterogeneously dense fibroglandular tissue. Asymmetric density in the left breast subareolar region in region of breast pain. This area was further evaluated with ultrasound. On ultrasound in the left breast retroareolar region there is an irregular-shaped mass lesion. This lesion measures 2.25 x 2.56 x 0.92 cm. This lesion becomes indistinct with the retroareolar skin and has associated retroareolar skin thickening in this  region. No other demonstrated left breast suspicious mass. Patient unable to tolerate much compression on mammography. Left breast upper quadrant asymmetric density was therefore further evaluated with left breast ultrasound. On ultrasound this asymmetric density represents dense normal-appearing fibroglandular tissue in the 2:00 position. No malignant-type microcalcifications. Parenchymal pattern: Heterogeneously dense fibroglandular tissue.     Impression: Left breast subareolar/retroareolar mass lesion with associated skin thickening described above which is highly suggestive of malignancy. Biopsy is recommended to further evaluate. BI-RADS category 5: Highly suggestive of malignancy. Recommendation:  Ultrasound biopsy recommended. Electronically signed by:  Doug Calvo MD  1/26/2023 4:22 PM CST Workstation: [id number]          Final diagnoses:   Nausea and vomiting, unspecified vomiting type   Gastroesophageal reflux disease with esophagitis and hemorrhage   Withdrawal complaint       ED Disposition  ED Disposition     ED Disposition   Discharge    Condition   Stable    Comment   --             Erica Jacob MD  [address] [place] [id number] [phone]    Call in 1 day  for re evaluation, even if feeling better    [facility] EMERGENCY DEPARTMENT  [address]  [facility] [address] [phone]    As needed, If symptoms worsen         Medication List      ASK your doctor about these medications    sucralfate 1 g tablet  Commonly known as: CARAFATE  Take 1 tablet by mouth 4 (Four) Times a Day for 7 days.  Ask about: Should I take this medication?           Where to Get Your Medications      These medications were sent to [facility][id number] - [place], [place] - [address] AT [facility] OF  [address] - [phone]  - [phone] [address] [address]    Phone: [phone]   · sucralfate 1 g tablet           Timmy Aranda MD  01/26/23 2056

## 2023-01-16 NOTE — DISCHARGE INSTRUCTIONS
Cut down on your drinking.  Take Tylenol as needed.  Take Zofran as needed for nausea and vomiting.  Follow-up with your primary care for reevaluation.  Return to ER for intractable nausea vomiting/abdominal pain etc.

## 2023-01-19 ENCOUNTER — OFFICE VISIT (OUTPATIENT)
Dept: FAMILY MEDICINE CLINIC | Facility: CLINIC | Age: 38
End: 2023-01-19
Payer: MEDICAID

## 2023-01-19 ENCOUNTER — TELEPHONE (OUTPATIENT)
Dept: FAMILY MEDICINE CLINIC | Facility: CLINIC | Age: 38
End: 2023-01-19

## 2023-01-19 VITALS
BODY MASS INDEX: 27.59 KG/M2 | DIASTOLIC BLOOD PRESSURE: 78 MMHG | OXYGEN SATURATION: 98 % | HEIGHT: 67 IN | TEMPERATURE: 97.3 F | WEIGHT: 175.8 LBS | SYSTOLIC BLOOD PRESSURE: 134 MMHG | HEART RATE: 120 BPM

## 2023-01-19 DIAGNOSIS — F41.8 ANXIETY ABOUT HEALTH: ICD-10-CM

## 2023-01-19 DIAGNOSIS — K86.0 ALCOHOL-INDUCED CHRONIC PANCREATITIS: Primary | ICD-10-CM

## 2023-01-19 DIAGNOSIS — Z87.898 HISTORY OF ALCOHOL USE DISORDER: ICD-10-CM

## 2023-01-19 DIAGNOSIS — R11.2 NAUSEA AND VOMITING, UNSPECIFIED VOMITING TYPE: ICD-10-CM

## 2023-01-19 DIAGNOSIS — K21.9 GASTROESOPHAGEAL REFLUX DISEASE, UNSPECIFIED WHETHER ESOPHAGITIS PRESENT: ICD-10-CM

## 2023-01-19 PROCEDURE — 99213 OFFICE O/P EST LOW 20 MIN: CPT | Performed by: STUDENT IN AN ORGANIZED HEALTH CARE EDUCATION/TRAINING PROGRAM

## 2023-01-19 RX ORDER — TRAZODONE HYDROCHLORIDE 50 MG/1
50 TABLET ORAL DAILY
COMMUNITY
Start: 2023-01-16

## 2023-01-19 RX ORDER — BUPRENORPHINE AND NALOXONE 8; 2 MG/1; MG/1
FILM, SOLUBLE BUCCAL; SUBLINGUAL
COMMUNITY
Start: 2023-01-16

## 2023-01-19 RX ORDER — ESOMEPRAZOLE MAGNESIUM 40 MG/1
40 CAPSULE, DELAYED RELEASE ORAL
Qty: 30 CAPSULE | Refills: 4 | Status: SHIPPED | OUTPATIENT
Start: 2023-01-19

## 2023-01-19 NOTE — PROGRESS NOTES
Family Medicine Residency  Erica Jacob MD    Subjective:     Nata Bain is a 37 y.o. female who presents for ED follow up. Patient was seen in ED on 1/15/23 with complains of nausea and vomiting with abdominal pain. CT abdomen and pelvis showed Diffuse fatty change of liver with hepatomegaly.Appendix is normal. No evidence of obstructive uropathy on either side. No evidence of bowel obstruction or perienteric inflammation. Patient has not been drinking alcohol for few days and was tremulous, treated with ativan. She was also given zofran and reglan and her symptoms improved.  Since being discharged from ED patient has been feeling well and only occasionally gets nausea now.     Patient sees addiction clinic at [place] for Subxone and was reportedly told her that the pcp needs to take over prescription for rest of her medications like gabapentin, Ativan. Patient is currently taking Ativan 2 mg TID, Gabapentin 800 mg TID (for rest leg syndrome),  Remeron for sleep. Patient reports ativan helps with her drinking and the day she skips ativan dose she tends to start drinking. She drinks pint a day. Patient reports she tried Naltrexone in the past which did not help her symptoms. She is currently taking Acamprosate which she believes its for her stomach. Patient is very anxious that if she tapers off of ativan then she will start to drink and end up in the hospital.       The following portions of the patient's history were reviewed and updated as appropriate: allergies, current medications, past family history, past medical history, past social history, past surgical history and problem list.    Past Medical Hx:  Past Medical History:   Diagnosis Date   • Abnormal Pap smear of cervix    • Alcohol-induced acute pancreatitis 6/1/2020    Results From Last 14 Days Lab Units 02/27/21 1342 LIPASE U/L 65*  -advance diet as tolerated starting with clears -IV fluids   -will cont suboxone and give morphine for  breakthrough pain  -Zofran for nausea as needed -Scheduled Ativan, and CIWA protocol - f/u on CT abdomen     • Alcoholism (HCC)    • Anxiety    • Cervical dysplasia    • Depression    • Fibrocystic breast    • GERD (gastroesophageal reflux disease)    • Hypertension    • Seizures (HCC) 2017   • Substance abuse (HCC)    • Substance abuse (HCC)        Past Surgical Hx:  Past Surgical History:   Procedure Laterality Date   • COLONOSCOPY N/A 2/2/2021    Procedure: COLONOSCOPY;  Surgeon: Mirian Mills MD;  Location: [facility] ENDOSCOPY;  Service: Gastroenterology;  Laterality: N/A;   • ENDOSCOPY N/A 1/8/2021    Procedure: ESOPHAGOGASTRODUODENOSCOPY;  Surgeon: Mirian Mills MD;  Location: [facility] ENDOSCOPY;  Service: Gastroenterology;  Laterality: N/A;   • RHINOPLASTY         Current Meds:    Current Outpatient Medications:   •  buprenorphine-naloxone (SUBOXONE) 8-2 MG film film, , Disp: , Rfl:   •  esomeprazole (nexIUM) 40 MG capsule, Take 1 capsule by mouth Every Morning Before Breakfast., Disp: 30 capsule, Rfl: 4  •  gabapentin (NEURONTIN) 800 MG tablet, Take 800 mg by mouth 3 (Three) Times a Day., Disp: , Rfl:   •  LORazepam (ATIVAN) 2 MG tablet, Take 2 mg by mouth 3 (Three) Times a Day., Disp: , Rfl:   •  mirtazapine (REMERON SOL-TAB) 45 MG disintegrating tablet, , Disp: , Rfl:   •  mirtazapine (REMERON) 30 MG tablet, , Disp: , Rfl:   •  prazosin (MINIPRESS) 5 MG capsule, Take 5 mg by mouth Every Night., Disp: , Rfl:   •  acamprosate (CAMPRAL) 333 MG EC tablet, , Disp: , Rfl:   •  buprenorphine-naloxone (SUBOXONE) 8-2 MG per SL tablet, Place 1 tablet under the tongue 3 (Three) Times a Day. Patient takes one 8-2 tablet three times a day., Disp: , Rfl:   •  dicyclomine (BENTYL) 20 MG tablet, Take 1 tablet by mouth Every 6 (Six) Hours As Needed (abdominal cramping)., Disp: 30 tablet, Rfl: 0  •  escitalopram (LEXAPRO) 20 MG tablet, , Disp: , Rfl:   •  ondansetron ODT (ZOFRAN-ODT) 4 MG disintegrating tablet, Place 1  tablet on the tongue Every 6 (Six) Hours As Needed for Nausea or Vomiting., Disp: 20 tablet, Rfl: 0  •  promethazine (PHENERGAN) 25 MG suppository, Insert 1 suppository into the rectum Every 6 (Six) Hours As Needed for Nausea or Vomiting., Disp: 12 suppository, Rfl: 0  •  sucralfate (CARAFATE) 1 g tablet, Take 1 tablet by mouth 4 (Four) Times a Day for 7 days., Disp: 28 tablet, Rfl: 0  •  traZODone (DESYREL) 50 MG tablet, Take 50 mg by mouth Daily., Disp: , Rfl:     Allergies:  No Known Allergies    Family Hx:  Family History   Problem Relation Age of Onset   • Cancer Mother    • COPD Mother    • Hypertension Father    • Heart disease Father    • Breast cancer Maternal Aunt    • Breast cancer Maternal Grandmother    • COPD Maternal Grandmother    • Heart disease Maternal Grandmother    • Breast cancer Paternal Grandmother         Social History:  Social History     Socioeconomic History   • Marital status:    Tobacco Use   • Smoking status: Every Day     Packs/day: 1.00     Years: 20.00     Pack years: 20.00     Types: Cigarettes   • Smokeless tobacco: Never   Substance and Sexual Activity   • Alcohol use: Yes     Alcohol/week: 6.0 standard drinks     Types: 6 Shots of liquor per week     Comment: daily   • Drug use: Not Currently     Types: Fentanyl, Oxycodone     Comment: hx of abuse   • Sexual activity: Defer       Review of Systems  Review of Systems   Constitutional: Negative for activity change, appetite change and fatigue.   Respiratory: Negative for cough, chest tightness, shortness of breath and wheezing.    Cardiovascular: Negative for chest pain, palpitations and leg swelling.   Gastrointestinal: Positive for nausea. Negative for abdominal pain, constipation, diarrhea and vomiting.   Genitourinary: Negative for difficulty urinating, frequency and urgency.   Musculoskeletal: Negative for arthralgias and myalgias.   Skin: Negative for color change and rash.   Neurological: Negative for dizziness,  "weakness, light-headedness and headaches.   Psychiatric/Behavioral: Positive for sleep disturbance. Negative for behavioral problems. The patient is nervous/anxious.        Objective:     /78   Pulse 120   Temp 97.3 °F (36.3 °C)   Ht 170.2 cm (67\")   Wt 79.7 kg (175 lb 12.8 oz)   LMP  (LMP Unknown)   SpO2 98%   BMI 27.53 kg/m²   Physical Exam  Vitals and nursing note reviewed.   Constitutional:       General: She is not in acute distress.     Appearance: Normal appearance. She is obese. She is not ill-appearing, toxic-appearing or diaphoretic.   Cardiovascular:      Rate and Rhythm: Normal rate and regular rhythm.      Pulses: Normal pulses.      Heart sounds: Normal heart sounds. No murmur heard.  Pulmonary:      Effort: Pulmonary effort is normal. No respiratory distress.      Breath sounds: Normal breath sounds. No wheezing.   Abdominal:      General: Bowel sounds are normal.      Palpations: Abdomen is soft.      Tenderness: There is no abdominal tenderness. There is no guarding.   Musculoskeletal:      Right lower leg: No edema.      Left lower leg: No edema.   Neurological:      Mental Status: She is alert.   Psychiatric:         Mood and Affect: Mood is anxious.         Speech: Speech normal.         Behavior: Behavior normal. Behavior is cooperative.          Assessment/Plan:   Nata Bain is a 37 y.o. female who presents for ED follow up. Patient has h/o alcohol induced chronic pancreatitis and previously failed to follow up with GI. Due to persistent symptoms of abdominal pain with nausea and vomiting, patient will likely benefit from EGD and therefore advised to follow up with GI and referral was placed. In regards to addiction clinic, requested for documents to be faxed to our office to verify why patient is on a regimen of ativan daily in addition to acamprosate. I explained to patient that if the they no longer are going to take care of addiction medicine, then I will have to " taper her off of Ativan as it is not a long term medication. Instead she may benefit from Librium or Naltraxone as it is a FDA approved intervention for alcohol use disorder. Patient will also need close follow up with psychiatry due to long term substance abuse and anxiety symptoms related to that.     Diagnoses and all orders for this visit:    1. Alcohol-induced chronic pancreatitis (HCC) (Primary)  -     Ambulatory Referral to Gastroenterology    2. Nausea and vomiting, unspecified vomiting type  -     Ambulatory Referral to Gastroenterology  -     esomeprazole (nexIUM) 40 MG capsule; Take 1 capsule by mouth Every Morning Before Breakfast.  Dispense: 30 capsule; Refill: 4    3. Gastroesophageal reflux disease, unspecified whether esophagitis present  -     esomeprazole (nexIUM) 40 MG capsule; Take 1 capsule by mouth Every Morning Before Breakfast.  Dispense: 30 capsule; Refill: 4    4. History of alcohol use disorder  -     Ambulatory Referral to Behavioral Health    5. Anxiety about health  -     Ambulatory Referral to Behavioral Health        · Rx changes: none  · Patient Education: alcohol abstinence   · Compliance at present is estimated to be poor.   · Efforts to improve compliance (if necessary) will be directed at increased exercise.    Depression screening: Up to date; last screen      Follow-up:     Return in about 2 weeks (around 2/2/2023) for Recheck.    Preventative:  Health Maintenance   Topic Date Due   • Pneumococcal Vaccine 0-64 (1 - PCV) Never done   • TDAP/TD VACCINES (2 - Tdap) 08/02/2011   • ANNUAL PHYSICAL  Never done   • COVID-19 Vaccine (3 - Booster for Moderna series) 06/28/2021   • PAP SMEAR  10/10/2021   • INFLUENZA VACCINE  08/01/2022   • COLORECTAL CANCER SCREENING  02/02/2031   • HEPATITIS C SCREENING  Completed     Alcohol use:  reports current alcohol use of about 6.0 standard drinks per week.  Nicotine status  reports that she has been smoking cigarettes. She has a 20.00 pack-year  smoking history. She has never used smokeless tobacco.     Goals    None         RISK SCORE: 5        Erica Jacob MD PGY-3  [facility] Family Medicine Residency   This document has been electronically signed by Erica Jacob MD on January 19, 2023 17:20 CST

## 2023-01-23 ENCOUNTER — TELEPHONE (OUTPATIENT)
Dept: FAMILY MEDICINE CLINIC | Facility: CLINIC | Age: 38
End: 2023-01-23
Payer: MEDICAID

## 2023-01-23 NOTE — TELEPHONE ENCOUNTER
MICHELET GREER FROM [facility] CALLED TO SPEAK WITH DR PUENTE OR NURSE ABOUT PATIENT'S MEDS. SHE SEEMS TO HAVE SOME CONFUSED. CALL BACK NUMBER [phone]

## 2023-01-24 ENCOUNTER — TELEPHONE (OUTPATIENT)
Dept: FAMILY MEDICINE CLINIC | Facility: CLINIC | Age: 38
End: 2023-01-24
Payer: MEDICAID

## 2023-01-24 NOTE — TELEPHONE ENCOUNTER
Patient was seen in our office on 1/19/2023 stating  from [facility] sent her to us to start taking over all of her medications except her suboxone, which she would still continue to write herself. After talking to Dr. Hannah on 1/24/23 she stated to me that was not true that she told the patient to make an appointment with us to keep up with her noncontrolled medications and regular health care and that she was going to be the one to continue her controlled medications and did plan on starting to  taper the patient off of her ativan.

## 2023-01-25 ENCOUNTER — LAB (OUTPATIENT)
Dept: LAB | Facility: HOSPITAL | Age: 38
End: 2023-01-25
Payer: MEDICAID

## 2023-01-25 ENCOUNTER — OFFICE VISIT (OUTPATIENT)
Dept: OBSTETRICS AND GYNECOLOGY | Facility: CLINIC | Age: 38
End: 2023-01-25
Payer: MEDICAID

## 2023-01-25 VITALS
HEIGHT: 67 IN | WEIGHT: 179 LBS | DIASTOLIC BLOOD PRESSURE: 72 MMHG | BODY MASS INDEX: 28.09 KG/M2 | SYSTOLIC BLOOD PRESSURE: 120 MMHG

## 2023-01-25 DIAGNOSIS — R30.0 DYSURIA: ICD-10-CM

## 2023-01-25 DIAGNOSIS — N91.2 AMENORRHEA: ICD-10-CM

## 2023-01-25 DIAGNOSIS — N64.4 BREAST PAIN, LEFT: ICD-10-CM

## 2023-01-25 DIAGNOSIS — Z01.419 ENCOUNTER FOR WELL WOMAN EXAM WITH ROUTINE GYNECOLOGICAL EXAM: Primary | ICD-10-CM

## 2023-01-25 DIAGNOSIS — Z11.3 SCREENING EXAMINATION FOR STD (SEXUALLY TRANSMITTED DISEASE): ICD-10-CM

## 2023-01-25 DIAGNOSIS — L73.2 HIDRADENITIS SUPPURATIVA: ICD-10-CM

## 2023-01-25 DIAGNOSIS — R10.2 PELVIC PAIN: ICD-10-CM

## 2023-01-25 LAB
BILIRUB UR QL STRIP: NEGATIVE
CANDIDA ALBICANS: NEGATIVE
CLARITY UR: CLEAR
COLOR UR: YELLOW
GARDNERELLA VAGINALIS: NEGATIVE
GLUCOSE UR STRIP-MCNC: NEGATIVE MG/DL
HGB UR QL STRIP.AUTO: NEGATIVE
KETONES UR QL STRIP: NEGATIVE
LEUKOCYTE ESTERASE UR QL STRIP.AUTO: NEGATIVE
NITRITE UR QL STRIP: NEGATIVE
PH UR STRIP.AUTO: 6.5 [PH] (ref 5–8)
PROT UR QL STRIP: NEGATIVE
SP GR UR STRIP: 1.01 (ref 1–1.03)
T VAGINALIS DNA VAG QL PROBE+SIG AMP: NEGATIVE
UROBILINOGEN UR QL STRIP: NORMAL

## 2023-01-25 PROCEDURE — 87491 CHLMYD TRACH DNA AMP PROBE: CPT

## 2023-01-25 PROCEDURE — 87510 GARDNER VAG DNA DIR PROBE: CPT

## 2023-01-25 PROCEDURE — 87591 N.GONORRHOEAE DNA AMP PROB: CPT

## 2023-01-25 PROCEDURE — 87660 TRICHOMONAS VAGIN DIR PROBE: CPT

## 2023-01-25 PROCEDURE — 87480 CANDIDA DNA DIR PROBE: CPT

## 2023-01-25 PROCEDURE — 99214 OFFICE O/P EST MOD 30 MIN: CPT

## 2023-01-25 PROCEDURE — 87661 TRICHOMONAS VAGINALIS AMPLIF: CPT

## 2023-01-25 PROCEDURE — 81003 URINALYSIS AUTO W/O SCOPE: CPT

## 2023-01-25 NOTE — PROGRESS NOTES
Subjective   Nata Bain is a 37 y.o. Breast lump    History of Present Illness  LMP: April 2022  Pap: 10/18, nl inflammation, BV  BC: none    Patient presents today for multiple problems. Bilateral breast that have had lumps in them for years. States Dr told her to just refrain from using caffeine and smoking. Reports 2 weeks ago she noticed a lump in her left breast. It is a hard, reddened area behind nipple. This area is painful. Denies any nipple discharge.   Reports she was diagnosed before with a 5cm cyst on her left ovary. Did not follow up for treatment. For the last couple years reports pain in the left pelvic area. Pain is constant and sharp at times. Nothing makes the pain better or worse. Reports that she has not had a period since April 2022. Reports that the cyst may be pressing on her bladder. She has urgency, but only dribbles when she does go to the bathroom. Describes urine as orange and having blood in it. This problem has been ongoing for a while.   She is also concerned about some spots on her vagina. She has had them for a while, and has had to have one lanced and packed before.        Menstrual Problem  This is a chronic problem. The current episode started more than 1 month ago. Pertinent negatives include no abdominal pain, chest pain, chills, coughing, fatigue, fever, nausea or rash. Nothing aggravates the symptoms. She has tried nothing for the symptoms.   Breast Problem  This is a new problem. The current episode started 1 to 4 weeks ago. The problem occurs daily. The problem has been gradually worsening. Pertinent negatives include no abdominal pain, chest pain, chills, coughing, fatigue, fever, nausea or rash. Exacerbated by: wearing a bra. She has tried nothing for the symptoms. The treatment provided no relief.       The following portions of the patient's history were reviewed and updated as appropriate: She  has a past medical history of Abnormal Pap smear of cervix,  Alcohol-induced acute pancreatitis (6/1/2020), Alcoholism ([facility]), Anxiety, Cervical dysplasia, Depression, Fibrocystic breast, GERD (gastroesophageal reflux disease), Hypertension, Seizures ([facility]) (2017), Substance abuse ([facility]), and Substance abuse ([facility])..    Review of Systems   Constitutional: Negative for appetite change, chills, fatigue and fever.   Respiratory: Negative for apnea, cough, choking, chest tightness, shortness of breath, wheezing and stridor.    Cardiovascular: Negative for chest pain, palpitations and leg swelling.   Gastrointestinal: Negative for abdominal pain, constipation, diarrhea and nausea.   Genitourinary: Positive for amenorrhea, breast lump, breast pain, difficulty urinating, menstrual problem, urgency and vaginal pain. Negative for breast discharge, decreased libido, decreased urine volume, dyspareunia, dysuria, flank pain, frequency, genital sores, hematuria, pelvic pain, pelvic pressure, urinary incontinence, vaginal bleeding and vaginal discharge.   Skin: Negative for rash.       Objective   Physical Exam  Vitals reviewed. Exam conducted with a chaperone present.   Constitutional:       General: She is awake. She is not in acute distress.     Appearance: Normal appearance. She is well-developed and normal weight. She is not ill-appearing, toxic-appearing or diaphoretic.   Cardiovascular:      Rate and Rhythm: Normal rate and regular rhythm.      Pulses: Normal pulses.      Heart sounds: Normal heart sounds.   Pulmonary:      Effort: Pulmonary effort is normal.      Breath sounds: Normal breath sounds.   Chest:   Breasts:     Giovanni Score is 5.      Breasts are symmetrical.      Right: Normal. No swelling, bleeding, inverted nipple, mass, nipple discharge, skin change or tenderness.      Left: Mass and tenderness present. No bleeding, inverted nipple, nipple discharge or skin change.       Abdominal:      General: Bowel sounds are normal.      Tenderness: There is abdominal tenderness in  the left lower quadrant.      Hernia: There is no hernia in the left inguinal area or right inguinal area.   Genitourinary:     Exam position: Lithotomy position.      Pubic Area: No rash or pubic lice.       Giovanni stage (genital): 5.      Labia:         Right: No rash, tenderness, lesion or injury.         Left: No rash, tenderness, lesion or injury.       Vagina: No signs of injury and foreign body. Vaginal discharge present. No erythema, tenderness, bleeding, lesions or prolapsed vaginal walls.      Cervix: Friability present. No cervical motion tenderness, discharge, lesion, erythema, cervical bleeding or eversion.          Comments: Pap collected  STD swab collected  Vag panel collected  Uterus and Adnexa non-palpable   Lymphadenopathy:      Lower Body: No right inguinal adenopathy. No left inguinal adenopathy.   Skin:     General: Skin is warm and dry.   Neurological:      Mental Status: She is alert.   Psychiatric:         Behavior: Behavior is cooperative.           Assessment & Plan   Diagnoses and all orders for this visit:    1. Encounter for well woman exam with routine gynecological exam (Primary)  -     LIQUID-BASED PAP SMEAR, [facility] ([facility]); Future  -     LIQUID-BASED PAP SMEAR, [facility] ([facility])    2. Screening examination for STD (sexually transmitted disease)  -     Gardnerella vaginalis, Trichomonas vaginalis, Candida albicans, DNA - Swab, Vagina; Future  -     Chlamydia trachomatis, Neisseria gonorrhoeae, Trichomonas vaginalis, PCR - Swab, Cervix; Future  -     Gardnerella vaginalis, Trichomonas vaginalis, Candida albicans, DNA - Swab, Vagina  -     Chlamydia trachomatis, Neisseria gonorrhoeae, Trichomonas vaginalis, PCR - Swab, Cervix    3. Dysuria  -     Urinalysis With Culture If Indicated -; Future    4. Pelvic pain  -     US Non-ob Transvaginal; Future    5. Breast pain, left  -     US Breast Left Limited; Future    6. Hidradenitis suppurativa    7. Amenorrhea      1. Reviewed  preventative screening recommendations. Patient educated and encouraged to do monthly self breast exams. If pap smear is normal patient will receive a letter in the mail in about two weeks. If pap smear is abnormal we will call the patient and follow up with a plan. If pap smear is normal recommend to repeat pap in 5 years.    2-3. Will notify pt of lab results when available.   4. Will notify pt of results of TVUS when available.   5. After breast ultrasound, will send pt to general surgery for possible drainage. Informed patient if area becomes worse or she developers a fever, go to the ER immediately.   6. Keep area clean and dry.   7. Will follow up after TVUS. May need to discuss EMBx and birth control options.         This document has been electronically signed by BHAKTI Naranjo on January 25, 2023 14:38 CST

## 2023-01-26 ENCOUNTER — TELEPHONE (OUTPATIENT)
Dept: OBSTETRICS AND GYNECOLOGY | Facility: CLINIC | Age: 38
End: 2023-01-26
Payer: MEDICAID

## 2023-01-26 LAB
C TRACH RRNA CVX QL NAA+PROBE: NEGATIVE
N GONORRHOEA RRNA SPEC QL NAA+PROBE: NEGATIVE
TRICHOMONAS VAGINALIS PCR: NEGATIVE

## 2023-01-26 NOTE — TELEPHONE ENCOUNTER
Patient was in the office yesterday with complaints of a breast lump. I called patient this morning around 9:00 am to see if there was any way she could come today 1/26/23 @ 11:00 am to get a breast ultrasound. Patient said thank you and she would be here.

## 2023-01-26 NOTE — TELEPHONE ENCOUNTER
Pt missed this am breast ultrasound. Patient called back, stating that she freaked out and was scared, that's why she missed her appointment.   Informed patient that I think she has an infected cyst, but she needs to have this breast ultrasound done, so we can get her sent to general surgery to have it drained.   Patient verbalized understanding and was transferred to scheduling desk to get her worked in.

## 2023-01-27 ENCOUNTER — OFFICE VISIT (OUTPATIENT)
Dept: SURGERY | Facility: CLINIC | Age: 38
End: 2023-01-27
Payer: MEDICAID

## 2023-01-27 VITALS
HEIGHT: 67 IN | HEART RATE: 120 BPM | SYSTOLIC BLOOD PRESSURE: 110 MMHG | BODY MASS INDEX: 28.25 KG/M2 | DIASTOLIC BLOOD PRESSURE: 64 MMHG | WEIGHT: 180 LBS | TEMPERATURE: 97 F

## 2023-01-27 DIAGNOSIS — N61.1 ABSCESS OF LEFT BREAST: Primary | ICD-10-CM

## 2023-01-27 PROCEDURE — 10160 PNXR ASPIR ABSC HMTMA BULLA: CPT | Performed by: SURGERY

## 2023-01-27 PROCEDURE — 87070 CULTURE OTHR SPECIMN AEROBIC: CPT | Performed by: SURGERY

## 2023-01-27 PROCEDURE — 87205 SMEAR GRAM STAIN: CPT | Performed by: SURGERY

## 2023-01-27 NOTE — PROGRESS NOTES
Subjective   Nata Bain is a 37 y.o. female     Chief Complaint: Painful left breast mass    History of Present Illness presents on referral after patient underwent initially an ultrasound and then bilateral mammogram and repeat left ultrasound for tender palpable area just under the nipple areolar complex of the left breast.  Patient says she had cyst and benign masses in her breast told by other providers.  No other studies were ever been done.  She initially thought her mother and had breast cancer but her mother was present and corrected her and her mother actually did not have breast cancer she had some benign issues with her breast at age 19 but clearly no history of breast cancer.  Patient denies any nipple discharge.  She says that she has had this pain off and on for a while but more severe over the last 2 to 4 weeks.  She says the area seems of gotten bigger and more painful.  She saw her gynecologist yesterday and they sent her for the studies were all done yesterday.  The ultrasound and mammogram demonstrated a 2.2 x 2.6 x 0.9 cm irregular mass just below the skin of the areola that was irregular in shape unable to  evaluate this to see if this was a cyst because the patient's tenderness.  Another area at the 2 o'clock position was asymmetric density with normal fibroglandular appearing tissue.  Radiology said that her mammogram was BI-RADS 5 based upon this retroareolar mass.  No fever no chills.  Patient does smoke.  She has had pancreatitis in the past secondary to alcohol.    Review of Systems   Constitutional: Negative.    HENT: Negative.    Eyes: Negative.    Respiratory: Negative.    Cardiovascular: Negative.    Gastrointestinal: Positive for abdominal pain, diarrhea and vomiting.   Endocrine: Negative.    Genitourinary: Positive for difficulty urinating and frequency.   Musculoskeletal: Positive for arthralgias and back pain.   Skin: Negative.    Allergic/Immunologic: Negative.     Neurological: Negative.    Hematological: Negative.    Psychiatric/Behavioral: Negative.      Past Medical History:   Diagnosis Date   • Abnormal Pap smear of cervix    • Alcohol-induced acute pancreatitis 6/1/2020    Results From Last 14 Days Lab Units 02/27/21 1342 LIPASE U/L 65*  -advance diet as tolerated starting with clears -IV fluids   -will cont suboxone and give morphine for breakthrough pain  -Zofran for nausea as needed -Scheduled Ativan, and CIWA protocol - f/u on CT abdomen     • Alcoholism (HCC)    • Anxiety    • Cervical dysplasia    • Depression    • Fibrocystic breast    • GERD (gastroesophageal reflux disease)    • Hypertension    • Seizures (HCC) 2017   • Substance abuse (HCC)    • Substance abuse (HCC)      Past Surgical History:   Procedure Laterality Date   • COLONOSCOPY N/A 2/2/2021    Procedure: COLONOSCOPY;  Surgeon: Mirian Mills MD;  Location: [facility] ENDOSCOPY;  Service: Gastroenterology;  Laterality: N/A;   • ENDOSCOPY N/A 1/8/2021    Procedure: ESOPHAGOGASTRODUODENOSCOPY;  Surgeon: Mirian Mills MD;  Location: [facility] ENDOSCOPY;  Service: Gastroenterology;  Laterality: N/A;   • RHINOPLASTY       Family History   Problem Relation Age of Onset   • Breast cancer Mother    • Cancer Mother    • COPD Mother    • Hypertension Father    • Heart disease Father    • Breast cancer Maternal Grandmother    • COPD Maternal Grandmother    • Heart disease Maternal Grandmother    • Breast cancer Paternal Grandmother    • Breast cancer Maternal Aunt      Social History     Socioeconomic History   • Marital status:    Tobacco Use   • Smoking status: Every Day     Packs/day: 1.00     Years: 20.00     Pack years: 20.00     Types: Cigarettes   • Smokeless tobacco: Never   Vaping Use   • Vaping Use: Former   Substance and Sexual Activity   • Alcohol use: Yes     Alcohol/week: 6.0 standard drinks     Types: 6 Shots of liquor per week     Comment: daily   • Drug use: Not Currently     Types:  Fentanyl, Oxycodone     Comment: hx of abuse   • Sexual activity: Defer     No Known Allergies  Vitals:    01/27/23 1116   BP: 110/64   Pulse: 120   Temp: 97 °F (36.1 °C)       Home Medications:  Prior to Admission medications    Medication Sig Start Date End Date Taking? Authorizing Provider   acamprosate (CAMPRAL) 333 MG EC tablet  7/12/22  Yes Caty Yepez MD   buprenorphine-naloxone (SUBOXONE) 8-2 MG film film  1/16/23  Yes Caty Yepez MD   esomeprazole (nexIUM) 40 MG capsule Take 1 capsule by mouth Every Morning Before Breakfast. 1/19/23  Yes Erica Jacob MD   gabapentin (NEURONTIN) 800 MG tablet Take 800 mg by mouth 3 (Three) Times a Day. 12/31/20  Yes Caty Yepez MD   LORazepam (ATIVAN) 2 MG tablet Take 2 mg by mouth 3 (Three) Times a Day.   Yes Rajnit Zaragoza MD   mirtazapine (REMERON) 30 MG tablet  6/7/22  Yes Caty Yepez MD   ondansetron ODT (ZOFRAN-ODT) 4 MG disintegrating tablet Place 1 tablet on the tongue Every 6 (Six) Hours As Needed for Nausea or Vomiting. 3/30/22  Yes Phoenix Feldman PA-C   prazosin (MINIPRESS) 5 MG capsule Take 5 mg by mouth Every Night. 1/7/21  Yes Caty Yepez MD   traZODone (DESYREL) 50 MG tablet Take 50 mg by mouth Daily. 1/16/23  Yes Caty Yepez MD   buprenorphine-naloxone (SUBOXONE) 8-2 MG per SL tablet Place 1 tablet under the tongue 3 (Three) Times a Day. Patient takes one 8-2 tablet three times a day. 2/20/20   Caty Yepez MD   mirtazapine (REMERON SOL-TAB) 45 MG disintegrating tablet  12/10/21 1/27/23  Emergency, Nurse Epic, RN       Objective   Physical Exam  Constitutional:       General: She is in acute distress.      Appearance: She is normal weight.   HENT:      Nose: Nose normal.   Eyes:      General: No scleral icterus.  Chest:       Neurological:      Mental Status: She is alert.         Assessment & Plan Discussed options with the patient.  Some of her pain is related to her being a  very anxious patient.  Discussed with her possibly doing an ultrasound mammotome biopsy of this in the radiology department versus doing a biopsy in the operating room under anesthesia versus aspirating this area here in the office under local.  I gave her these options understanding that she was very tender and very anxious about the whole situation.  She chose to have the aspiration here in the clinic done today.  I went over with her how we would do this.  We prepped the area with Betadine infiltrated local and was able to get the site numb.  Using 18-gauge needle we aspirated approximately 3 cc of adria pus out of this space.  That will be sent for Gram stain and culture and cytology.  Patient was instructed local wound care.  She will follow-up with us in the office next week or sooner if she has any other concerns or questions      The encounter diagnosis was Abscess of left breast.                     This document has been electronically signed by Christo Cortez MD on January 27, 2023 13:22 CST

## 2023-01-30 LAB
BACTERIA SPEC AEROBE CULT: NORMAL
GRAM STN SPEC: NORMAL
GRAM STN SPEC: NORMAL

## 2023-01-31 LAB — REF LAB TEST METHOD: NORMAL

## 2023-02-17 NOTE — PROGRESS NOTES
I have seen the patient.  I have reviewed the notes, assessments, and/or procedures performed by Dr. Jacob, I concur with her/his documentation and assessment and plan for Nata Bain.       This document has been electronically signed by Nadir Villarreal MD on February 17, 2023 10:41 CST

## 2023-02-22 ENCOUNTER — APPOINTMENT (OUTPATIENT)
Dept: CT IMAGING | Facility: HOSPITAL | Age: 38
End: 2023-02-22
Payer: MEDICAID

## 2023-02-22 ENCOUNTER — APPOINTMENT (OUTPATIENT)
Dept: ULTRASOUND IMAGING | Facility: HOSPITAL | Age: 38
End: 2023-02-22
Payer: MEDICAID

## 2023-02-22 ENCOUNTER — TELEPHONE (OUTPATIENT)
Dept: FAMILY MEDICINE CLINIC | Facility: CLINIC | Age: 38
End: 2023-02-22
Payer: MEDICAID

## 2023-02-22 ENCOUNTER — HOSPITAL ENCOUNTER (EMERGENCY)
Facility: HOSPITAL | Age: 38
Discharge: HOME | End: 2023-02-22
Attending: FAMILY MEDICINE | Admitting: FAMILY MEDICINE
Payer: MEDICAID

## 2023-02-22 ENCOUNTER — APPOINTMENT (OUTPATIENT)
Dept: GENERAL RADIOLOGY | Facility: HOSPITAL | Age: 38
End: 2023-02-22
Payer: MEDICAID

## 2023-02-22 VITALS
HEIGHT: 67 IN | BODY MASS INDEX: 26.68 KG/M2 | TEMPERATURE: 98.3 F | WEIGHT: 170 LBS | SYSTOLIC BLOOD PRESSURE: 134 MMHG | OXYGEN SATURATION: 96 % | RESPIRATION RATE: 18 BRPM | HEART RATE: 102 BPM | DIASTOLIC BLOOD PRESSURE: 96 MMHG

## 2023-02-22 DIAGNOSIS — K76.0 FATTY INFILTRATION OF LIVER: Primary | ICD-10-CM

## 2023-02-22 DIAGNOSIS — R10.11 RIGHT UPPER QUADRANT ABDOMINAL PAIN: ICD-10-CM

## 2023-02-22 LAB
ALBUMIN SERPL-MCNC: 4.5 G/DL (ref 3.5–5.2)
ALBUMIN/GLOB SERPL: 1.3 G/DL
ALP SERPL-CCNC: 153 U/L (ref 39–117)
ALT SERPL W P-5'-P-CCNC: 47 U/L (ref 1–33)
AMPHET+METHAMPHET UR QL: NEGATIVE
AMPHETAMINES UR QL: NEGATIVE
ANION GAP SERPL CALCULATED.3IONS-SCNC: 17 MMOL/L (ref 5–15)
AST SERPL-CCNC: 77 U/L (ref 1–32)
BARBITURATES UR QL SCN: NEGATIVE
BASOPHILS # BLD AUTO: 0.08 10*3/MM3 (ref 0–0.2)
BASOPHILS NFR BLD AUTO: 0.8 % (ref 0–1.5)
BENZODIAZ UR QL SCN: POSITIVE
BILIRUB SERPL-MCNC: 0.3 MG/DL (ref 0–1.2)
BILIRUB UR QL STRIP: NEGATIVE
BUN SERPL-MCNC: 3 MG/DL (ref 6–20)
BUN/CREAT SERPL: 6.7 (ref 7–25)
BUPRENORPHINE SERPL-MCNC: POSITIVE NG/ML
CALCIUM SPEC-SCNC: 9.7 MG/DL (ref 8.6–10.5)
CANNABINOIDS SERPL QL: POSITIVE
CHLORIDE SERPL-SCNC: 103 MMOL/L (ref 98–107)
CK SERPL-CCNC: 50 U/L (ref 20–180)
CLARITY UR: CLEAR
CO2 SERPL-SCNC: 24 MMOL/L (ref 22–29)
COCAINE UR QL: NEGATIVE
COLOR UR: YELLOW
CREAT SERPL-MCNC: 0.45 MG/DL (ref 0.57–1)
D-LACTATE SERPL-SCNC: 2.1 MMOL/L (ref 0.5–2)
D-LACTATE SERPL-SCNC: 2.2 MMOL/L (ref 0.5–2)
DEPRECATED RDW RBC AUTO: 53.8 FL (ref 37–54)
EGFRCR SERPLBLD CKD-EPI 2021: 127.3 ML/MIN/1.73
EOSINOPHIL # BLD AUTO: 0.06 10*3/MM3 (ref 0–0.4)
EOSINOPHIL NFR BLD AUTO: 0.6 % (ref 0.3–6.2)
ERYTHROCYTE [DISTWIDTH] IN BLOOD BY AUTOMATED COUNT: 14.3 % (ref 12.3–15.4)
GLOBULIN UR ELPH-MCNC: 3.6 GM/DL
GLUCOSE SERPL-MCNC: 112 MG/DL (ref 65–99)
GLUCOSE UR STRIP-MCNC: NEGATIVE MG/DL
HCG SERPL QL: NEGATIVE
HCT VFR BLD AUTO: 46.1 % (ref 34–46.6)
HGB BLD-MCNC: 16.3 G/DL (ref 12–15.9)
HGB UR QL STRIP.AUTO: NEGATIVE
IMM GRANULOCYTES # BLD AUTO: 0.04 10*3/MM3 (ref 0–0.05)
IMM GRANULOCYTES NFR BLD AUTO: 0.4 % (ref 0–0.5)
KETONES UR QL STRIP: NEGATIVE
LEUKOCYTE ESTERASE UR QL STRIP.AUTO: NEGATIVE
LIPASE SERPL-CCNC: 29 U/L (ref 13–60)
LYMPHOCYTES # BLD AUTO: 3.76 10*3/MM3 (ref 0.7–3.1)
LYMPHOCYTES NFR BLD AUTO: 39.2 % (ref 19.6–45.3)
MAGNESIUM SERPL-MCNC: 1.9 MG/DL (ref 1.6–2.6)
MCH RBC QN AUTO: 36 PG (ref 26.6–33)
MCHC RBC AUTO-ENTMCNC: 35.4 G/DL (ref 31.5–35.7)
MCV RBC AUTO: 101.8 FL (ref 79–97)
METHADONE UR QL SCN: NEGATIVE
MONOCYTES # BLD AUTO: 0.6 10*3/MM3 (ref 0.1–0.9)
MONOCYTES NFR BLD AUTO: 6.3 % (ref 5–12)
NEUTROPHILS NFR BLD AUTO: 5.05 10*3/MM3 (ref 1.7–7)
NEUTROPHILS NFR BLD AUTO: 52.7 % (ref 42.7–76)
NITRITE UR QL STRIP: NEGATIVE
NRBC BLD AUTO-RTO: 0 /100 WBC (ref 0–0.2)
OPIATES UR QL: NEGATIVE
OXYCODONE UR QL SCN: NEGATIVE
PCP UR QL SCN: NEGATIVE
PH UR STRIP.AUTO: 6.5 [PH] (ref 5–9)
PLATELET # BLD AUTO: 290 10*3/MM3 (ref 140–450)
PMV BLD AUTO: 9.5 FL (ref 6–12)
POTASSIUM SERPL-SCNC: 3.7 MMOL/L (ref 3.5–5.2)
PROPOXYPH UR QL: NEGATIVE
PROT SERPL-MCNC: 8.1 G/DL (ref 6–8.5)
PROT UR QL STRIP: NEGATIVE
RBC # BLD AUTO: 4.53 10*6/MM3 (ref 3.77–5.28)
SODIUM SERPL-SCNC: 144 MMOL/L (ref 136–145)
SP GR UR STRIP: 1 (ref 1–1.03)
TRICYCLICS UR QL SCN: NEGATIVE
UROBILINOGEN UR QL STRIP: NORMAL
WBC NRBC COR # BLD: 9.59 10*3/MM3 (ref 3.4–10.8)

## 2023-02-22 PROCEDURE — 74176 CT ABD & PELVIS W/O CONTRAST: CPT

## 2023-02-22 PROCEDURE — 83605 ASSAY OF LACTIC ACID: CPT

## 2023-02-22 PROCEDURE — 81003 URINALYSIS AUTO W/O SCOPE: CPT

## 2023-02-22 PROCEDURE — 99283 EMERGENCY DEPT VISIT LOW MDM: CPT

## 2023-02-22 PROCEDURE — 96374 THER/PROPH/DIAG INJ IV PUSH: CPT

## 2023-02-22 PROCEDURE — 85025 COMPLETE CBC W/AUTO DIFF WBC: CPT

## 2023-02-22 PROCEDURE — 84703 CHORIONIC GONADOTROPIN ASSAY: CPT

## 2023-02-22 PROCEDURE — 99284 EMERGENCY DEPT VISIT MOD MDM: CPT

## 2023-02-22 PROCEDURE — 25010000002 KETOROLAC TROMETHAMINE PER 15 MG: Performed by: FAMILY MEDICINE

## 2023-02-22 PROCEDURE — 96375 TX/PRO/DX INJ NEW DRUG ADDON: CPT

## 2023-02-22 PROCEDURE — 76705 ECHO EXAM OF ABDOMEN: CPT

## 2023-02-22 PROCEDURE — 25010000002 MORPHINE PER 10 MG

## 2023-02-22 PROCEDURE — 82550 ASSAY OF CK (CPK): CPT

## 2023-02-22 PROCEDURE — 74018 RADEX ABDOMEN 1 VIEW: CPT

## 2023-02-22 PROCEDURE — 25010000002 ONDANSETRON PER 1 MG

## 2023-02-22 PROCEDURE — 80306 DRUG TEST PRSMV INSTRMNT: CPT

## 2023-02-22 PROCEDURE — 36415 COLL VENOUS BLD VENIPUNCTURE: CPT

## 2023-02-22 PROCEDURE — 83735 ASSAY OF MAGNESIUM: CPT

## 2023-02-22 PROCEDURE — 83690 ASSAY OF LIPASE: CPT

## 2023-02-22 PROCEDURE — 93010 ELECTROCARDIOGRAM REPORT: CPT | Performed by: INTERNAL MEDICINE

## 2023-02-22 PROCEDURE — 96376 TX/PRO/DX INJ SAME DRUG ADON: CPT

## 2023-02-22 PROCEDURE — 80053 COMPREHEN METABOLIC PANEL: CPT

## 2023-02-22 PROCEDURE — 25010000002 LORAZEPAM PER 2 MG

## 2023-02-22 PROCEDURE — 93005 ELECTROCARDIOGRAM TRACING: CPT | Performed by: FAMILY MEDICINE

## 2023-02-22 PROCEDURE — 93005 ELECTROCARDIOGRAM TRACING: CPT

## 2023-02-22 PROCEDURE — 25010000002 MORPHINE PER 10 MG: Performed by: FAMILY MEDICINE

## 2023-02-22 RX ORDER — LORAZEPAM 2 MG/ML
1 INJECTION INTRAMUSCULAR ONCE
Status: COMPLETED | OUTPATIENT
Start: 2023-02-22 | End: 2023-02-22

## 2023-02-22 RX ORDER — KETOROLAC TROMETHAMINE 15 MG/ML
15 INJECTION, SOLUTION INTRAMUSCULAR; INTRAVENOUS ONCE
Status: COMPLETED | OUTPATIENT
Start: 2023-02-22 | End: 2023-02-22

## 2023-02-22 RX ORDER — ONDANSETRON 2 MG/ML
4 INJECTION INTRAMUSCULAR; INTRAVENOUS ONCE
Status: COMPLETED | OUTPATIENT
Start: 2023-02-22 | End: 2023-02-22

## 2023-02-22 RX ORDER — LORAZEPAM 2 MG/1
2 TABLET ORAL ONCE
Status: COMPLETED | OUTPATIENT
Start: 2023-02-22 | End: 2023-02-22

## 2023-02-22 RX ADMIN — MORPHINE SULFATE 4 MG: 4 INJECTION, SOLUTION INTRAMUSCULAR; INTRAVENOUS at 20:58

## 2023-02-22 RX ADMIN — MORPHINE SULFATE 4 MG: 4 INJECTION, SOLUTION INTRAMUSCULAR; INTRAVENOUS at 17:50

## 2023-02-22 RX ADMIN — SODIUM CHLORIDE 500 ML: 9 INJECTION, SOLUTION INTRAVENOUS at 19:32

## 2023-02-22 RX ADMIN — ONDANSETRON 4 MG: 2 INJECTION INTRAMUSCULAR; INTRAVENOUS at 17:50

## 2023-02-22 RX ADMIN — LORAZEPAM 2 MG: 2 TABLET ORAL at 20:58

## 2023-02-22 RX ADMIN — LORAZEPAM 1 MG: 2 INJECTION INTRAMUSCULAR; INTRAVENOUS at 18:18

## 2023-02-22 RX ADMIN — KETOROLAC TROMETHAMINE 15 MG: 15 INJECTION, SOLUTION INTRAMUSCULAR; INTRAVENOUS at 20:26

## 2023-02-22 RX ADMIN — SODIUM CHLORIDE 1000 ML: 9 INJECTION, SOLUTION INTRAVENOUS at 17:49

## 2023-02-22 NOTE — TELEPHONE ENCOUNTER
Patient has called asking for Dr Jacob to return her call regarding her liver.  She stated she is in extreme pain.  I instructed her to go to the ER and she agreed to go, and asked for Dr Jacob to be contacted.  If you wish to speak with her call [phone].    Thanks  Nelly

## 2023-02-22 NOTE — ED PROVIDER NOTES
"Subjective   History of Present Illness  39 y/o female here for abdominal pain. She has a hx of anxiety, depression, alcoholism, seizure disorder, substance abuse, chronic pain and substance abuse. Abdominal pain started about a week ago, and it is located in the RUQ. It is a constant pain that is not alleviated any supportive measures. Per patient it is worse with specific movements and feel like \"her liver is pressing against her ribcage\". She admits to drinking \"a sleeve a day\". Her last drink was last night. She goes to the suboxone clinic and her last dose was 2 weeks ago. Per patient she would like to get off of it. Her last bowel movement was this morning with no issues and she is able to urinate without any problems. She describes this pain different compared to previous abdominal pains. She denies V/D/CP/SOB.     At time of examination she was wincing in pain.           Review of Systems   Constitutional: Positive for appetite change, fatigue and fever.   HENT: Negative.    Eyes: Negative.    Respiratory: Negative.  Negative for cough and shortness of breath.    Cardiovascular: Negative.  Negative for chest pain and leg swelling.   Gastrointestinal: Positive for abdominal pain. Negative for nausea and vomiting.   Endocrine: Negative.    Genitourinary: Positive for flank pain. Negative for dysuria.   Musculoskeletal: Positive for back pain.   Skin: Negative.    Allergic/Immunologic: Negative.    Neurological: Negative.  Negative for weakness, light-headedness and headaches.   Hematological: Negative.    Psychiatric/Behavioral: Negative.        Past Medical History:   Diagnosis Date   • Abnormal Pap smear of cervix    • Alcohol-induced acute pancreatitis 6/1/2020    Results From Last 14 Days Lab Units 02/27/21 1342 LIPASE U/L 65*  -advance diet as tolerated starting with clears -IV fluids   -will cont suboxone and give morphine for breakthrough pain  -Zofran for nausea as needed -Scheduled Ativan, and CIWA " protocol - f/u on CT abdomen     • Alcoholism (HCC)    • Anxiety    • Cervical dysplasia    • Depression    • Fibrocystic breast    • GERD (gastroesophageal reflux disease)    • Hypertension    • Seizures (HCC) 2017   • Substance abuse (HCC)    • Substance abuse (HCC)        No Known Allergies    Past Surgical History:   Procedure Laterality Date   • COLONOSCOPY N/A 2/2/2021    Procedure: COLONOSCOPY;  Surgeon: Mirian Mills MD;  Location: [facility] ENDOSCOPY;  Service: Gastroenterology;  Laterality: N/A;   • ENDOSCOPY N/A 1/8/2021    Procedure: ESOPHAGOGASTRODUODENOSCOPY;  Surgeon: Mirian Mills MD;  Location: [facility] ENDOSCOPY;  Service: Gastroenterology;  Laterality: N/A;   • RHINOPLASTY         Family History   Problem Relation Age of Onset   • Breast cancer Mother    • Cancer Mother    • COPD Mother    • Hypertension Father    • Heart disease Father    • Breast cancer Maternal Grandmother    • COPD Maternal Grandmother    • Heart disease Maternal Grandmother    • Breast cancer Paternal Grandmother    • Breast cancer Maternal Aunt        Social History     Socioeconomic History   • Marital status:    Tobacco Use   • Smoking status: Every Day     Packs/day: 1.00     Years: 20.00     Pack years: 20.00     Types: Cigarettes   • Smokeless tobacco: Never   Vaping Use   • Vaping Use: Former   Substance and Sexual Activity   • Alcohol use: Yes     Alcohol/week: 6.0 standard drinks     Types: 6 Shots of liquor per week     Comment: daily   • Drug use: Not Currently     Types: Fentanyl, Oxycodone     Comment: hx of abuse   • Sexual activity: Defer           Objective    Vitals:    02/22/23 2034 02/22/23 2104 02/22/23 2106 02/22/23 2213   BP: (!) 143/101 141/84  134/96   BP Location:    Right arm   Patient Position:    Sitting   Pulse: 102 98 98 102   Resp:  18  18   Temp:       TempSrc:       SpO2: 92% 92% 95% 96%   Weight:       Height:         Physical Exam  HENT:      Nose: Nose normal.      Mouth/Throat:       Mouth: Mucous membranes are moist.   Eyes:      Extraocular Movements: Extraocular movements intact.      Pupils: Pupils are equal, round, and reactive to light.   Cardiovascular:      Rate and Rhythm: Regular rhythm. Tachycardia present.      Heart sounds: Normal heart sounds.   Pulmonary:      Effort: Pulmonary effort is normal.      Breath sounds: Normal breath sounds.   Abdominal:      General: Bowel sounds are normal.      Palpations: Abdomen is soft.      Tenderness: There is abdominal tenderness in the right upper quadrant. There is no guarding.   Musculoskeletal:         General: Normal range of motion.   Skin:     General: Skin is warm.   Neurological:      General: No focal deficit present.      Mental Status: She is alert and oriented to person, place, and time.   Psychiatric:         Mood and Affect: Mood normal.         Procedures           ED Course      Results for orders placed or performed during the hospital encounter of 02/22/23   Comprehensive Metabolic Panel    Specimen: Blood   Result Value Ref Range    Glucose 112 (H) 65 - 99 mg/dL    BUN 3 (L) 6 - 20 mg/dL    Creatinine 0.45 (L) 0.57 - 1.00 mg/dL    Sodium 144 136 - 145 mmol/L    Potassium 3.7 3.5 - 5.2 mmol/L    Chloride 103 98 - 107 mmol/L    CO2 24.0 22.0 - 29.0 mmol/L    Calcium 9.7 8.6 - 10.5 mg/dL    Total Protein 8.1 6.0 - 8.5 g/dL    Albumin 4.5 3.5 - 5.2 g/dL    ALT (SGPT) 47 (H) 1 - 33 U/L    AST (SGOT) 77 (H) 1 - 32 U/L    Alkaline Phosphatase 153 (H) 39 - 117 U/L    Total Bilirubin 0.3 0.0 - 1.2 mg/dL    Globulin 3.6 gm/dL    A/G Ratio 1.3 g/dL    BUN/Creatinine Ratio 6.7 (L) 7.0 - 25.0    Anion Gap 17.0 (H) 5.0 - 15.0 mmol/L    eGFR 127.3 >60.0 mL/min/1.73   Lipase    Specimen: Blood   Result Value Ref Range    Lipase 29 13 - 60 U/L   CBC Auto Differential    Specimen: Blood   Result Value Ref Range    WBC 9.59 3.40 - 10.80 10*3/mm3    RBC 4.53 3.77 - 5.28 10*6/mm3    Hemoglobin 16.3 (H) 12.0 - 15.9 g/dL    Hematocrit 46.1  34.0 - 46.6 %    .8 (H) 79.0 - 97.0 fL    MCH 36.0 (H) 26.6 - 33.0 pg    MCHC 35.4 31.5 - 35.7 g/dL    RDW 14.3 12.3 - 15.4 %    RDW-SD 53.8 37.0 - 54.0 fl    MPV 9.5 6.0 - 12.0 fL    Platelets 290 140 - 450 10*3/mm3    Neutrophil % 52.7 42.7 - 76.0 %    Lymphocyte % 39.2 19.6 - 45.3 %    Monocyte % 6.3 5.0 - 12.0 %    Eosinophil % 0.6 0.3 - 6.2 %    Basophil % 0.8 0.0 - 1.5 %    Immature Grans % 0.4 0.0 - 0.5 %    Neutrophils, Absolute 5.05 1.70 - 7.00 10*3/mm3    Lymphocytes, Absolute 3.76 (H) 0.70 - 3.10 10*3/mm3    Monocytes, Absolute 0.60 0.10 - 0.90 10*3/mm3    Eosinophils, Absolute 0.06 0.00 - 0.40 10*3/mm3    Basophils, Absolute 0.08 0.00 - 0.20 10*3/mm3    Immature Grans, Absolute 0.04 0.00 - 0.05 10*3/mm3    nRBC 0.0 0.0 - 0.2 /100 WBC   Urine Drug Screen - Urine, Clean Catch    Specimen: Urine, Clean Catch   Result Value Ref Range    THC, Screen, Urine Positive (A) Negative    Phencyclidine (PCP), Urine Negative Negative    Cocaine Screen, Urine Negative Negative    Methamphetamine, Ur Negative Negative    Opiate Screen Negative Negative    Amphetamine Screen, Urine Negative Negative    Benzodiazepine Screen, Urine Positive (A) Negative    Tricyclic Antidepressants Screen Negative Negative    Methadone Screen, Urine Negative Negative    Barbiturates Screen, Urine Negative Negative    Oxycodone Screen, Urine Negative Negative    Propoxyphene Screen Negative Negative    Buprenorphine, Screen, Urine Positive (A) Negative   Urinalysis With Culture If Indicated - Urine, Clean Catch    Specimen: Urine, Clean Catch   Result Value Ref Range    Color, UA Yellow Yellow, Straw, Dark Yellow, Rhonda    Appearance, UA Clear Clear    pH, UA 6.5 5.0 - 9.0    Specific [place], UA 1.003 1.003 - 1.030    Glucose, UA Negative Negative    Ketones, UA Negative Negative    Bilirubin, UA Negative Negative    Blood, UA Negative Negative    Protein, UA Negative Negative    Leuk Esterase, UA Negative Negative    Nitrite, UA  Negative Negative    Urobilinogen, UA 0.2 E.U./dL 0.2 - 1.0 E.U./dL   hCG, Serum, Qualitative    Specimen: Blood   Result Value Ref Range    HCG Qualitative Negative Negative   Lactic Acid, Plasma    Specimen: Blood   Result Value Ref Range    Lactate 2.1 (C) 0.5 - 2.0 mmol/L   Magnesium    Specimen: Blood   Result Value Ref Range    Magnesium 1.9 1.6 - 2.6 mg/dL   CK    Specimen: Blood   Result Value Ref Range    Creatine Kinase 50 20 - 180 U/L   STAT Lactic Acid, Reflex    Specimen: Blood   Result Value Ref Range    Lactate 2.2 (C) 0.5 - 2.0 mmol/L   ECG 12 Lead Other; abd pain   Result Value Ref Range    QT Interval 324 ms    QTC Interval 469 ms                                    JOELLE reviewed by Han Perdomo MD       Medical Decision Making  38 year old female here for abdominal pain. AST 77, ALT 47, Alk Phos 153, Anion gap 17, Lipase 29, Wbc: 9.59, MCV: 101.8. She has presented to the ED multiple times in the past year for similar symptoms. Multiple CT scans done in the past year and a half, which demonstrated fatty liver. She was given multiple dose of pain medications, due to lack of relief a CT A/P was obtained (joelle does no show any records of suboxone use, but there is a scanned in copy which does support her history of attending a suboxone clinic). Todays CT A/P showed Diffuse fatty change of liver with hepatomegaly, Appendix is normal, No evidence of obstructive uropathy on either side, No evidence of bowel obstruction or perienteric inflammation. She was given multiple bout of pain mediciton which only brought her mild relief. US gallbladder was performed which showed: Fatty infiltration of the liver, dilated common bile duct with what may represent a dilated pancreatic duct. MRCP may be of benefit to further evaluate and normal imaging of the gallbladder.Discussed following up with a Gastroenterologist.(she was provided contact information incase at time of discharge). Per chart review she  was also referred by her PCP. At time of discharge her vital signs were improved, labs and scans were also reassuring. Discussed importance of alcohol reduction, weight loss and lifestyle modification. Discussed return percatuions with the patient, she agreed and understood.       Amount and/or Complexity of Data Reviewed  Labs: ordered.  Radiology: ordered.  ECG/medicine tests: ordered.      Risk  Prescription drug management.          Final diagnoses:   Fatty infiltration of liver   Right upper quadrant abdominal pain       ED Disposition  ED Disposition     ED Disposition   Discharge    Condition   Stable    Comment   --             Erica Jacob MD  [facility]   [address] [place], [place] [id number]  [phone]    Schedule an appointment as soon as possible for a visit       Mirian Mills MD  [address] [place] [address]   [address] [place], [place] [id number] [phone]    Schedule an appointment as soon as possible for a visit            Medication List      No changes were made to your prescriptions during this visit.         This document has been electronically signed by Ashley Farris MD on February 22, 2023 22:19 CST     Ashley Farris MD  Resident  02/22/23 2213       Ashley Farris MD  Resident  02/22/23 2221

## 2023-02-23 NOTE — DISCHARGE INSTRUCTIONS
Please call and schedule an appointment with Gastroenterologist as soon as possible. Please call and see your PCP for additional management.

## 2023-03-02 LAB
QT INTERVAL: 324 MS
QTC INTERVAL: 469 MS

## 2023-05-01 ENCOUNTER — TELEPHONE (OUTPATIENT)
Dept: FAMILY MEDICINE CLINIC | Facility: CLINIC | Age: 38
End: 2023-05-01
Payer: MEDICAID

## 2023-05-01 NOTE — TELEPHONE ENCOUNTER
Patient called wanting to speak to Dr Jacob. She stated she is an alcoholic and is about to go through detox and wants to talk to her about options.      Her#[phone]

## 2023-05-02 ENCOUNTER — TELEPHONE (OUTPATIENT)
Dept: FAMILY MEDICINE CLINIC | Facility: CLINIC | Age: 38
End: 2023-05-02
Payer: MEDICAID

## 2023-05-02 NOTE — TELEPHONE ENCOUNTER
Patient has called stating she missed a call from this office and she thinks it was Dr Jacob returning her call from yesterday.  Please call her at [phone].  Viryx  Nelly

## (undated) DEVICE — CANN SMPL SOFTECH BIFLO ETCO2 A/M 7FT

## (undated) DEVICE — BITEBLOCK ENDO W/STRAP 60F A/ LF DISP

## (undated) DEVICE — SINGLE-USE BIOPSY FORCEPS: Brand: RADIAL JAW 4